# Patient Record
Sex: MALE | Race: WHITE | NOT HISPANIC OR LATINO
[De-identification: names, ages, dates, MRNs, and addresses within clinical notes are randomized per-mention and may not be internally consistent; named-entity substitution may affect disease eponyms.]

---

## 2016-12-30 NOTE — H&P ADULT. - HISTORY OF PRESENT ILLNESS
67 y/o M w/ PMHx of HTN, HLD, chronic back pain s/p multiple spinal surgeries and intrathecal pump placment, emphysema, cardiomyopathy s/p atrial PPM, and s/p EVAR for AAA in 11/2016 p/w progressive weakness since his d/c from rehab. Per pt's family, pt left AMA from his rehab on 12/21, a week prior to his planned d/c. Since returning to home, pt has been minimally ambulatory w/ a walker and unable to perform any AODL. Pt lives with his wife, who works during the day, and does not have any HHA. Pt also reported poor PO intake for over a year due to decreased appetite. Pt denied any headache, focal neurological symptom, nausea, vomiting, fever, chills. Pt endorsed constipation requiring use of miralax daily and BM every other day. Off note, pt was found to have a suspicious RLQ mass, and has an appointment with Dr. Wells (Colorectal surgery) on Wednesday.     In the ED, pt's vital signs were T 97.7, HR: 61, /87 - 160/86, RR 18, SaO2 99% in RA. A head CT was unremarkable for any acute changes, but remarkable for chronic microvascular disease.

## 2016-12-30 NOTE — ED PROVIDER NOTE - GASTROINTESTINAL, MLM
Abdomen soft, non-tender, no guarding. Palpable intrathecal pump in LLQ. Abdomen soft, non-tender, no r/r/guarding. Palpable intrathecal pump in LLQ.

## 2016-12-30 NOTE — PROGRESS NOTE ADULT - SUBJECTIVE AND OBJECTIVE BOX
HPI:  66M w/ extensive PMH who was admitted to vascular surgery service in early December 2016 for EVAR of R iliac artery aneurysm, presents to [facility] ED w/ complaints of generalized weakness and loss of appetite, worsening over past few days. Since discharge from [facility], patient was at [facility] for rehabilitation. Patient left [facility] AMA on 12/21/16 to be home for [place]. Over the past few days, patient reports decreased PO intake and reports increasing weakness. Patient has had difficulty ambulating over the past few days which became acutely worse over the past 24 hours. patient presented to [facility] ED where he was found to be dehydrated and hyponatremic. Vascular surgery was consulted to rule out any vascular cause of weakness in lower extremities.     PAST MEDICAL & SURGICAL HISTORY:  Aneurysm of aortic root  Cardiac arrhythmia  Pulmonary emphysema  DVT (deep venous thrombosis)  ONOFRE (dyspnea on exertion)  Hyperlipidemia  Cardiomyopathy  HTN (hypertension)  AAA (abdominal aortic aneurysm)  History of back surgery  History of kidney surgery  Pacemaker: medtronic    	  MEDICATIONS:                  Complaint:     Otherwise 12 point review of systems is normal.    PHYSICAL EXAM:    Constitutional: good  Eyes: PERRL, EOMI, sclera non-icteric  Neck: supple, no masses, no JVD  Respiratory: CTA b/l, good air entry b/l, no wheezing, rhonchi, rales, with normal respiratory effort and no intercostal retractions  Cardiovascular: RRR, normal S1S2, no M/R/G  Gastrointestinal: soft, NTND, no masses palpable, BS normal in all four quadrants,   Extremities:  no c/c/e  Neurological: AAOx3  Temp:Afebrile    Vital Signs Last 24 Hrs  T(C): 36.5, Max: 36.5 (12-30 @ 16:58)  T(F): 97.7, Max: 97.7 (12-30 @ 16:58)  HR: 61 (61 - 61)  BP: 160/86 (116/87 - 160/86)  BP(mean): --  RR: 18 (18 - 18)  SpO2: 99% (97% - 99%)      ECG:      CHEST X RAY    CT      INTERPRETATION:  -  CT OF THE HEAD:    Clinical information:  65 y/o male,  AMS,  weakness.     Multiple axial scans of the head were obtained without intravenous  contrast medium administration.    The ventricles and subarachnoid spaces are slightly dilated. No   intracranial hemorrhage or mass is seen.      There are several patchy hypodense areas in the periventricular white   matter of bilateral cerebral hemispheres, probably representing ischemic   changes or aging demyelination.     Calcifications are seen in bilateral carotid siphons presumably secondary   to atherosclerotic changes.     The bony structures are intact.  Both mastoid bones are well pneumatized   and appear normal.  The sella turcica is normal in size.  Both orbits   appear normal.  The visualized paranasal sinuses are clear.  CT Angio  IMPRESSION:-    1.  No intracranial hemorrhage or mass.    2.  Several patchy hypodense areas in the periventricular white   Mild stable aneurysmal dilatation of infrarenal abdominal aorta.    Unchanged bilateral common iliac artery aneurysms.    Interval enlargement of lobulated soft tissue mass within the right lower   quadrant; differential diagnosis includes both benign and malignant   neoplasms including mesenteric desmoid or carcinoid tumor; tissue   diagnosis is recommended.      MRI    MRA    CT ANGIO    CAROTID DUPLEX    DUPLEX     Echocardiogram    Catheterization:    Stress Test:     LABS:	 	  CARDIAC MARKERS:  Troponin I, Serum: <0.015 ng/mL [0.015 - 0.045] (12-30 @ 15:09)                              8.7    6.8   )-----------( 196      ( 30 Dec 2016 15:09 )             25.6     30 Dec 2016 15:09    130    |  95     |  17     ----------------------------<  103    4.2     |  24     |  1.26     Ca    8.9        30 Dec 2016 15:09    TPro  7.9    /  Alb  3.0    /  TBili  0.8    /  DBili  x      /  AST  17     /  ALT  17     /  AlkPhos  92     30 Dec 2016 15:09    proBNP:   Lipid Profile:   HgA1c:   TSH: Thyroid Stimulating Hormone, Serum: 0.937 uIU/mL (12-30 @ 15:09)            ASSESSMENT/PLAN: HPI:  66M w/ extensive PMH who was admitted to vascular surgery service in early December 2016 for EVAR of R iliac artery aneurysm, presents to [facility] ED w/ complaints of generalized weakness and loss of appetite, worsening over past few days. Since discharge from [facility], patient was at [facility] for rehabilitation. Patient left [facility] AMA on 12/21/16 to be home for [place]. Over the past few days, patient reports decreased PO intake and reports increasing weakness. Patient has had difficulty ambulating over the past few days which became acutely worse over the past 24 hours. patient presented to [facility] ED where he was found to be dehydrated and hyponatremic. Vascular surgery was consulted to rule out any vascular cause of weakness in lower extremities.     PAST MEDICAL & SURGICAL HISTORY:  Aneurysm of aortic root  Cardiac arrhythmia  Pulmonary emphysema  DVT (deep venous thrombosis)  ONOFRE (dyspnea on exertion)  Hyperlipidemia  Cardiomyopathy  HTN (hypertension)  AAA (abdominal aortic aneurysm)  History of back surgery  History of kidney surgery  Pacemaker: medtronic    	  MEDICATIONS:                  Complaint:     Otherwise 12 point review of systems is normal.    PHYSICAL EXAM:    Constitutional: good  Eyes: PERRL, EOMI, sclera non-icteric  Neck: supple, no masses, no JVD  Respiratory: CTA b/l, good air entry b/l, no wheezing, rhonchi, rales, with normal respiratory effort and no intercostal retractions  Cardiovascular: RRR, normal S1S2, no M/R/G  Gastrointestinal: soft, NTND, no masses palpable, BS normal in all four quadrants,   Extremities:  no c/c/e  Neurological: AAOx3  Temp:Afebrile    Vital Signs Last 24 Hrs  T(C): 36.5, Max: 36.5 (12-30 @ 16:58)  T(F): 97.7, Max: 97.7 (12-30 @ 16:58)  HR: 61 (61 - 61)  BP: 160/86 (116/87 - 160/86)  BP(mean): --  RR: 18 (18 - 18)  SpO2: 99% (97% - 99%)      ECG:      CHEST X RAY    CT      INTERPRETATION:  -  CT OF THE HEAD:    Clinical information:  65 y/o male,  AMS,  weakness.     Multiple axial scans of the head were obtained without intravenous  contrast medium administration.    The ventricles and subarachnoid spaces are slightly dilated. No   intracranial hemorrhage or mass is seen.      There are several patchy hypodense areas in the periventricular white   matter of bilateral cerebral hemispheres, probably representing ischemic   changes or aging demyelination.     Calcifications are seen in bilateral carotid siphons presumably secondary   to atherosclerotic changes.     The bony structures are intact.  Both mastoid bones are well pneumatized   and appear normal.  The sella turcica is normal in size.  Both orbits   appear normal.  The visualized paranasal sinuses are clear.  CT Angio  IMPRESSION:-    1.  No intracranial hemorrhage or mass.    2.  Several patchy hypodense areas in the periventricular white   Mild stable aneurysmal dilatation of infrarenal abdominal aorta.    Unchanged bilateral common iliac artery aneurysms.    Interval enlargement of lobulated soft tissue mass within the right lower   quadrant; differential diagnosis includes both benign and malignant   neoplasms including mesenteric desmoid or carcinoid tumor; tissue   diagnosis is recommended.      MRI    MRA    CT ANGIO    CAROTID DUPLEX    DUPLEX     Echocardiogram    Catheterization:    Stress Test:     LABS:	 	  CARDIAC MARKERS:  Troponin I, Serum: <0.015 ng/mL [0.015 - 0.045] (12-30 @ 15:09)                              8.7    6.8   )-----------( 196      ( 30 Dec 2016 15:09 )             25.6     30 Dec 2016 15:09    130    |  95     |  17     ----------------------------<  103    4.2     |  24     |  1.26     Ca    8.9        30 Dec 2016 15:09    TPro  7.9    /  Alb  3.0    /  TBili  0.8    /  DBili  x      /  AST  17     /  ALT  17     /  AlkPhos  92     30 Dec 2016 15:09      TSH: Thyroid Stimulating Hormone, Serum: 0.937 uIU/mL (12-30 @ 15:09)            ASSESSMENT/PLAN: 	  Problem/Plan - 1:  ·  Problem: Weakness.  Plan: Pt was sent to rehab for deconditioning and training in the setting of chronic back pain after his EAVR for AAA (d/c on 12/2/2016. Pt recently left rehab AMA, although pt was scheduled for another week.     - Likely metabolic in the setting of poor PO intake/dehydration, found to have an CLAUDIO (Cr 1.26, d/c on 0.9 on 2/12/2016)  and hyponatremic (Na 130). s/p 1L NS bolus in ED. Euvolemic during exam.     - Possibly neurologic 2/2 chronic microvascular disease seen in CT-head - however no focal signs on neuro exam, 5/5 strength in upper and lower extremities.     - Possibly hematologic - Hgb 8.7 upon presentation, 12.1 in 11/2016    - Possibly endocrinologic - TSH wnl, f/u am cortisol    - Unlikely cardiac - recent Stress/Thalliym test unremarkable for significant cardiac dysfunction - pt s/p atrial pacemaker placement. Mild bibasilar crackles on exam, no JVD, no pedal edema, no orthopnea.    - Possibly iatrogenic - pt on opioids and reported taking valium intermittently for back pain. endorses fatigue, lethargy.     Problem/Plan - 2:  ·  Problem: CLAUDIO (acute kidney injury).  Plan: Cr 1.26 upon admission - s/p 1L NS bolus in ED  - f/u am Cr  - Started NS @ 100cc/hr  - f/u urine lytes.     Problem/Plan - 3:  ·  Problem: HTN (hypertension).  Plan: - c/w Hydralazine 10mg BID.     Problem/Plan - 4:  ·  Problem: Back pain.  Plan: s/p multiple spinal surgery, s/p intrathecal pump placement. Pain mgmt consulted during last admission and recommended the pain regimen  - c/w Oxycodone ER 30mg TID, Oxycontin 15mg q4 PRN- c/w Oxycodone ER 30mg TID, Oxycontin 15mg q4 PRN (Pain mgmt consulted during last admission and recommended the regimen)..     Problem/Plan - 5:  ·  Problem: Anemia.  Plan: Hgb 8.7 at presentation - 12.1 in 11/2016  - f/u Iron panel, B12, folate, haptoglobin, LDH, retic count  - Maintain active T&S, Transfuse if Hgb <7.   Problem/Plan - 6:  Problem: Abdominal mass, RLQ (right lower quadrant). Plan: Pt found to have a RLQ soft tissue mass during previous admission.  Problem/Plan - 7:  ·  Problem: Cardiomyopathy.  Plan: Hx of cardiomyopathy - recent stress test done in 11/2016 was wnl  - c/w Coreg 6.25mg BID.     Problem/Plan - 8:  ·  Problem: Pulmonary emphysema.  Plan: c/w O2 therapy PRN.     Problem/Plan - 9:  ·  Problem: Nutrition, metabolism, and development symptoms.  Plan: F: c/w NS @ 100cc/hr  E: Replete electrolytes PRN  N: c/w Regular diet.     Problem/Plan - 10:  Problem: Prophylactic measure. Plan; BPH: c/w Carduara, Finesteride    s/p EAVR for AAA: Vascular consulted in ED - NTD    Depression: c/w home medications

## 2016-12-30 NOTE — H&P ADULT. - PROBLEM SELECTOR PLAN 1
Pt was sent to rehab for deconditioning and training in the setting of chronic back pain after his EAVR for AAA (d/c on 12/2/2016. Pt recently left rehab AMA, although pt was scheduled for another week.     - Likely metabolic in the setting of poor PO intake/dehydration, found to have an CLAUDIO (Cr 1.26, d/c on 0.9 on 2/12/2016)  and hyponatremic (Na 130). s/p 1L NS bolus in ED. Euvolemic during exam.     - Possibly neurologic 2/2 chronic microvascular disease seen in CT-head - however no focal signs on neuro exam, 5/5 strength in upper and lower extremities.     - Possibly hematologic - Hgb 8.7 upon presentation, 12.1 in 11/2016    - Possibly endocrinologic - TSH wnl, f/u am cortisol    - Unlikely cardiac - recent Stress/Thalliym test unremarkable for significant cardiac dysfunction - pt s/p atrial pacemaker placement. Mild bibasilar crackles on exam, no JVD, no pedal edema, no orthopnea.    - Possibly iatrogenic - pt on opioids and reported taking valium intermittently for back pain. endorses fatigue, lethargy

## 2016-12-30 NOTE — ED PROVIDER NOTE - CONSTITUTIONAL, MLM
normal... Appears generally weak, nontoxic, afebrile, awake, alert, oriented to person, place, time/situation

## 2016-12-30 NOTE — H&P ADULT. - ASSESSMENT
67 y/o M w/ HTN, HLD, chronic back pain s/p multiple spinal surgeries and intrathecal pump placement, emphysema, cardiomyopathy s/p atrial PPM, and s/p EVAR for AAA, recently d/c from rehab after deconditioning from hospital stay and back pain, w/ weakness found to have an CLAUDIO, hyponatremia, and anemia.

## 2016-12-30 NOTE — H&P ADULT. - NEUROLOGICAL DETAILS
responds to verbal commands/cranial nerves intact/deep reflexes intact/sensation intact/responds to pain/alert and oriented x 3/normal strength

## 2016-12-30 NOTE — CONSULT NOTE ADULT - SUBJECTIVE AND OBJECTIVE BOX
Vascular Attending: Axel      HPI:  66M w/ extensive PMH who was admitted to vascular surgery service in early December 2016 for EVAR of R iliac artery aneurysm, presents to [facility] ED w/ complaints of generalized weakness and loss of appetite, worsening over past few days. Since discharge from [facility], patient was at [facility] for rehabilitation. Patient left [facility] AMA on 12/21/16 to be home for [place]. Over the past few days, patient reports decreased PO intake and reports increasing weakness. Patient has had difficulty ambulating over the past few days which became acutely worse over the past 24 hours. patient presented to [facility] ED where he was found to be dehydrated and hyponatremic. Vascular surgery was consulted to rule out any vascular cause of weakness in lower extremities.     PAST MEDICAL & SURGICAL HISTORY:  Aneurysm of aortic root  Cardiac arrhythmia  Pulmonary emphysema  DVT (deep venous thrombosis)  ONOFRE (dyspnea on exertion)  Hyperlipidemia  Cardiomyopathy  HTN (hypertension)  AAA (abdominal aortic aneurysm)  History of back surgery  History of kidney surgery  Pacemaker: medtronic      REVIEW OF SYSTEMS  Reports generalized malaise, decreased appetite, and difficulty ambulating     MEDICATIONS  (STANDING):    MEDICATIONS  (PRN):      Allergies    Altace (Unknown)  penicillin (Unknown)    Intolerances        Vital Signs Last 24 Hrs  T(C): 36.1, Max: 36.1 (12-30 @ 13:33)  T(F): 96.9, Max: 96.9 (12-30 @ 13:33)  HR: 61 (61 - 61)  BP: 116/87 (116/87 - 116/87)  BP(mean): --  RR: 18 (18 - 18)  SpO2: 97% (97% - 97%)    PHYSICAL EXAM:      Constitutional: AAOx3, No focal deficits noted    Eyes: WNL    Respiratory: Non-labored breathing, no acute respiratory distress    Gastrointestinal: Abdomen soft, ND, NT, palpable pain medication pump in LLQ of abdomen    Extremities: Bilateral groin incisions C/D/I, closed with staples removed. Healing appropriately. No surrounding erythema/inflammation, no induration, no signs of infection     Vascular: Palpable DP/PT bilaterally     Neurological: AAOx3, No focal deficits       LABS:                        8.7    6.8   )-----------( 196      ( 30 Dec 2016 15:09 )             25.6     30 Dec 2016 15:09    130    |  95     |  17     ----------------------------<  103    4.2     |  24     |  1.26     Ca    8.9        30 Dec 2016 15:09    TPro  7.9    /  Alb  3.0    /  TBili  0.8    /  DBili  x      /  AST  17     /  ALT  17     /  AlkPhos  92     30 Dec 2016 15:09    PT/INR - ( 30 Dec 2016 15:09 )   PT: 14.4 sec;   INR: 1.29          PTT - ( 30 Dec 2016 15:09 )  PTT:36.3 sec  Urinalysis Basic - ( 30 Dec 2016 15:09 )    Color: Yellow / Appearance: Clear / SG: <=1.005 / pH: x  Gluc: x / Ketone: NEGATIVE  / Bili: NEGATIVE / Urobili: 0.2 E.U./dL   Blood: x / Protein: NEGATIVE mg/dL / Nitrite: NEGATIVE   Leuk Esterase: NEGATIVE / RBC: x / WBC x   Sq Epi: x / Non Sq Epi: x / Bacteria: x        RADIOLOGY & ADDITIONAL STUDIES  N/A

## 2016-12-30 NOTE — ED ADULT NURSE NOTE - OBJECTIVE STATEMENT
Patient arrived to ED via walk-in stating, "For the past 3 days, I have been feeling more and more weak and I get short of breath easily."  Patient is A&Ox3, in no visible acute distress, complaining of increasing weakness and ONOFRE for 3 days.  Patient denies chest pain, SOB at rest, nausea, vomiting, diarrhea, fever, chills or cough.  PIV placed, labs drawn and sent, EKG done.  Will continue with plan of care.

## 2016-12-30 NOTE — H&P ADULT. - PROBLEM SELECTOR PLAN 2
Cr 1.26 upon admission - s/p 1L NS bolus in ED  - f/u am Cr  - Started NS @ 100cc/hr  - f/u urine lytes

## 2016-12-30 NOTE — ED PROVIDER NOTE - PROGRESS NOTE DETAILS
Vascular consulted, and will come down to see pt Pt due for pain meds, OK for wife to give patient's home dose (she brought meds with her) - 40mg Oxycontin. D/w Dr. Gonzales who informed that patient actually chose to leave rehab facility early despite them feeling he was "not ready" - this explains why patient deconditioned and weak, and not well enough to be at home. No acute cardiac issues per Christian and per ED workup. [facility] for medicine admission.

## 2016-12-30 NOTE — ED PROVIDER NOTE - MEDICAL DECISION MAKING DETAILS
66M with above PMHx who p/w progressively worsening generalized weakness after DC from rehab facility on DEC 21st after admission for vascular surgery procedure in early December.

## 2016-12-30 NOTE — ED PROVIDER NOTE - PMH
AAA (abdominal aortic aneurysm)    Aneurysm of aortic root    Cardiac arrhythmia    Cardiomyopathy    ONOFRE (dyspnea on exertion)    DVT (deep venous thrombosis)    HTN (hypertension)    Hyperlipidemia    Pulmonary emphysema

## 2016-12-30 NOTE — ED PROVIDER NOTE - NEUROLOGICAL, MLM
Alert and oriented x 3. Cn 2-12 intact. Strength 5/5 and sensation intact in all 4 extremities. no pronator drift. FTN normal.

## 2016-12-30 NOTE — H&P ADULT. - RS GEN PE MLT RESP DETAILS PC
no intercostal retractions/airway patent/no rhonchi/rales/no wheezes/no chest wall tenderness/respirations non-labored/breath sounds equal/good air movement

## 2016-12-30 NOTE — ED ADULT TRIAGE NOTE - CHIEF COMPLAINT QUOTE
Pt's spouse states "he is weak for 2-3 days, and he was not himself yesterday. " Pt is A&O x 3 and able to speak coherently in full sentences at this time. Pt C/O back pain ans reports "it's chronic." Pt had AA A repair surgery on 12/1/16. Pt denies chest pain, headache, N+V, fever, SOB, dizziness. EKG in progress.

## 2016-12-30 NOTE — H&P ADULT. - PROBLEM SELECTOR PLAN 10
BPH    s/p EAVR for AAA    Depression BPH: c/w Carduara, Finesteride    s/p EAVR for AAA: Vascular consulted in ED - NTD    Depression: c/w home medications    DVT PPx: c/w HSQ    Dispo: f/u PT recommendation    FULL CODE

## 2016-12-30 NOTE — ED PROVIDER NOTE - OBJECTIVE STATEMENT
A 66 year-old male with Hx of HTN, multiple spine surgeries, L hip replacement, and aortic and R iliac aneurysm repair (Dec. 2016) presents with worsening generalized weakness associated with dyspnea on exertion and decreased appetite for the past 9 days. The patient reports he was discharged 9 days ago from a rehab facility after his aneurysm repair earlier this month. Since then his ability to ambulate has deteriorated, and he now requires a walker. He denies any CP, fever, chills. falls, bleeding, or melena. A 66 year-old male with Hx of HTN, multiple spine surgeries, L hip replacement, and aortic and R iliac aneurysm repair (Dec. 2016) presents with worsening generalized weakness associated with dyspnea on exertion and decreased appetite for the past 9 days. The patient reports he was discharged 9 days ago from a rehab facility after his aneurysm repair earlier this month. Since then his ability to ambulate has deteriorated, and he now requires a walker and was too weak to lift himself up from his chair this am. He denies any CP, fever, chills. falls, bleeding, or melena. A 66 year-old male with Hx of HTN, HLD, emphysema, multiple spine surgeries, L hip replacement, and aortic and R iliac aneurysm repair (Dec. 2016) at [facility] with Dr. Reyna who presents with worsening generalized weakness associated with dyspnea on exertion and decreased appetite for the past 9 days. The patient reports he was discharged 9 days ago from a rehab facility after his aneurysm repair earlier this month. Since then his ability to ambulate has deteriorated, and he now requires a walker and was too weak to lift himself up from his chair this am. He denies any CP, fever, chills. falls, bleeding, or melena.

## 2016-12-30 NOTE — ED PROVIDER NOTE - NS ED MD SCRIBE ATTENDING SCRIBE SECTIONS
DISPOSITION/HIV/REVIEW OF SYSTEMS/VITAL SIGNS( Pullset)/HISTORY OF PRESENT ILLNESS/PHYSICAL EXAM/PAST MEDICAL/SURGICAL/SOCIAL HISTORY REVIEW OF SYSTEMS/VITAL SIGNS( Pullset)/PAST MEDICAL/SURGICAL/SOCIAL HISTORY/PHYSICAL EXAM/HISTORY OF PRESENT ILLNESS/PROGRESS NOTE/HIV/DISPOSITION

## 2016-12-30 NOTE — H&P ADULT. - GASTROINTESTINAL DETAILS
bowel sounds normal/no distention/no rigidity/no rebound tenderness/normal/soft/nontender/no guarding

## 2016-12-30 NOTE — CONSULT NOTE ADULT - ASSESSMENT
66M w/ recent uncomplicated EVAR for R iliac aneurysm (12/2016), presents to [facility] ED for generalized malaise and dehydration.   - Continue care per primary team   - No vascular issues at this time, Aneurysm repair stable  - No vascular surgery intervention required, please re-consult if any vascular issues arise   - d/w chief resident on call  - [phone]

## 2016-12-30 NOTE — H&P ADULT. - PROBLEM SELECTOR PLAN 6
Pt found to have a RLQ soft tissue mass during previous admission. Has an appointment w/ Dr. Wells outpt on Wednesday.  - Will discuss with attending regarding consulting colorectal surgery

## 2016-12-30 NOTE — H&P ADULT. - PROBLEM SELECTOR PLAN 5
Hgb 8.7 at presentation - 12.1 in 11/2016  - f/u Iron panel, B12, folate, haptoglobin, LDH, retic count  - Maintain active T&S, Transfuse if Hgb <7

## 2016-12-30 NOTE — H&P ADULT. - PROBLEM SELECTOR PLAN 4
- c/w Oxycodone ER 30mg TID, Oxycontin 15mg q4 PRN (Pain mgmt consulted during last admission and recommended the regimen). s/p multiple spinal surgery, s/p intrathecal pump placement. Pain mgmt consulted during last admission and recommended the pain regimen  - c/w Oxycodone ER 30mg TID, Oxycontin 15mg q4 PRN

## 2016-12-31 NOTE — PHYSICAL THERAPY INITIAL EVALUATION ADULT - LEVEL OF INDEPENDENCE: SIT/STAND, REHAB EVAL
minimum assist (75% patients effort)/moderate assist (50% patients effort)/fairly steady, no loss of balance

## 2016-12-31 NOTE — PHYSICAL THERAPY INITIAL EVALUATION ADULT - GAIT DEVIATIONS NOTED, PT EVAL
decreased velocity of limb motion/decreased urban/slightly unsteady gait, 1-2 loss of balance with turning, required constant cueing to ambulate closer to assistive device and decreased flexed forward posture; decreased foot clearance (R>L)/decreased step length

## 2016-12-31 NOTE — PHYSICAL THERAPY INITIAL EVALUATION ADULT - PERTINENT HX OF CURRENT PROBLEM, REHAB EVAL
65 y/o M w/ PMHx of HTN, HLD, chronic back pain s/p multiple spinal surgeries and intrathecal pump placment, emphysema, cardiomyopathy s/p atrial PPM, and s/p EVAR for AAA in 11/2016 p/w progressive weakness since his d/c from rehab. Per pt's family, pt left AMA from his rehab on 12/21, a week prior to his planned d/c. Since returning to home, pt has been minimally ambulatory w/ a walker and unable to perform any AODL. Please refer to H&P on [place] for remaining.

## 2016-12-31 NOTE — PROGRESS NOTE ADULT - SUBJECTIVE AND OBJECTIVE BOX
INTERVAL HISTORY:66yMalePatient is a 66y old  Male who presents with a chief complaint of Weakness (30 Dec 2016 17:49)    	  MEDICATIONS:  doxazosin 4milliGRAM(s) Oral at bedtime  carvedilol 6.25milliGRAM(s) Oral every 12 hours  hydrALAZINE 10milliGRAM(s) Oral every 12 hours        oxyCODONE IR 15milliGRAM(s) Oral every 4 hours PRN  nortriptyline 75milliGRAM(s) Oral at bedtime  vortioxetine 20milliGRAM(s) Oral daily  oxyCODONE ER Tablet 30milliGRAM(s) Oral every 8 hours    polyethylene glycol 3350 17Gram(s) Oral two times a day PRN  pantoprazole    Tablet 40milliGRAM(s) Oral before breakfast    finasteride 5milliGRAM(s) Oral daily    aspirin enteric coated 81milliGRAM(s) Oral daily  folic acid 1milliGRAM(s) Oral daily  sodium chloride 0.9%. 1000milliLiter(s) IV Continuous <Continuous>  heparin  Injectable 5000Unit(s) SubCutaneous every 8 hours      Complaint:     Otherwise 12 point review of systems is normal.    PHYSICAL EXAM:    Constitutional: good  Eyes: PERRL, EOMI, sclera non-icteric  Neck: supple, no masses, no JVD  Respiratory: CTA b/l, good air entry b/l, no wheezing, rhonchi, rales, with normal respiratory effort and no intercostal retractions  Cardiovascular: RRR, normal S1S2, no M/R/G  Gastrointestinal: soft, NTND, no masses palpable, BS normal in all four quadrants,   Extremities:  no c/c/e  Neurological: AAOx3  Temp:Afebrile    Vital Signs Last 24 Hrs  T(C): 36.2, Max: 36.6 (12-30 @ 18:05)  T(F): 97.1, Max: 97.9 (12-30 @ 18:05)  HR: 70 (61 - 90)  BP: 122/78 (116/87 - 182/78)  BP(mean): --  RR: 19 (18 - 19)  SpO2: 98% (95% - 99%)      ECG:      CHEST X RAY    CT    MRI    MRA    CT ANGIO    CAROTID DUPLEX    DUPLEX     Echocardiogram    Catheterization:    Stress Test:     LABS:	 	  CARDIAC MARKERS:  Troponin I, Serum: <0.015 ng/mL [0.015 - 0.045] (12-30 @ 15:09)                              8.3    6.5   )-----------( 225      ( 31 Dec 2016 07:11 )             25.2     31 Dec 2016 07:11    131    |  97     |  15     ----------------------------<  87     3.8     |  21     |  0.92     Ca    8.7        31 Dec 2016 07:11    TPro  7.9    /  Alb  3.0    /  TBili  0.8    /  DBili  x      /  AST  17     /  ALT  17     /  AlkPhos  92     30 Dec 2016 15:09    proBNP:   Lipid Profile:   HgA1c:   TSH: Thyroid Stimulating Hormone, Serum: 0.937 uIU/mL (12-30 @ 15:09)            ASSESSMENT/PLAN:

## 2016-12-31 NOTE — PHYSICAL THERAPY INITIAL EVALUATION ADULT - DIAGNOSIS, PT EVAL
Decreased overall functional mobility secondary to gait dysfunction, impaired dynamic balance, decreased strength, decreased endurance.

## 2016-12-31 NOTE — PHYSICAL THERAPY INITIAL EVALUATION ADULT - ADDITIONAL COMMENTS
Patient denies history of falls within the past year. Patient states upon discharge home from rehab, patient unable to "get up on his own and walk." Patient states his wife assists with hygiene and is present for safety when showering. Patient's bathroom has assistive equipment (grab bars and raised toilet seats).

## 2016-12-31 NOTE — PHYSICAL THERAPY INITIAL EVALUATION ADULT - LEVEL OF INDEPENDENCE: TOILET, REHAB EVAL
contact guard/Slightly unsteady; patient unable to perform hygiene; PT performed hygiene for patient in standing with contact guard (about 2 minutes)

## 2016-12-31 NOTE — CONSULT NOTE ADULT - SUBJECTIVE AND OBJECTIVE BOX
REASON FOR CONSULT:    HISTORY OF PRESENT ILLNESS:   HPI	  67 y/o M w/ PMHx of HTN, HLD, chronic back pain s/p multiple spinal surgeries and intrathecal pump placment, emphysema, cardiomyopathy s/p atrial PPM, and s/p EVAR for AAA in 11/2016 p/w progressive weakness since his d/c from rehab. Per pt's family, pt left AMA from his rehab on 12/21, a week prior to his planned d/c. Since returning to home, pt has been minimally ambulatory w/ a walker and unable to perform any AODL. Pt lives with his wife, who works during the day, and does not have any HHA. Pt also reported poor PO intake for over a year due to decreased appetite. Pt denied any headache, focal neurological symptom, nausea, vomiting, fever, chills. Pt endorsed constipation requiring use of miralax daily and BM every other day. Off note, pt was found to have a suspicious RLQ mass, and has an appointment with Dr. Wells (Colorectal surgery) on Wednesday.       PAST MEDICAL & SURGICAL HISTORY:  Aneurysm of aortic root  Cardiac arrhythmia  Pulmonary emphysema  DVT (deep venous thrombosis)  ONOFRE (dyspnea on exertion)  Hyperlipidemia  Cardiomyopathy  HTN (hypertension)  AAA (abdominal aortic aneurysm)  History of back surgery  History of kidney surgery  Pacemaker: medtronic      [ ] Diabetes   [x ] Hypertension  [ ] Hyperlipidemia  [x ] CAD  [ ] PCI  [ ] CABG    PREVIOUS DIAGNOSTIC TESTING:    [ ] Echocardiogram:  [ ]  Catheterization:  [x ] Stress Test:  Myocardial perfusion imaging is normal. Overall left ventricular systolic   function is normal without regional wall motion abnormalities. The EKG   stress test is normal.    	    MEDICATIONS:  doxazosin 4milliGRAM(s) Oral at bedtime  carvedilol 6.25milliGRAM(s) Oral every 12 hours  hydrALAZINE 10milliGRAM(s) Oral every 12 hours        oxyCODONE IR 15milliGRAM(s) Oral every 4 hours PRN  nortriptyline 75milliGRAM(s) Oral at bedtime  vortioxetine 20milliGRAM(s) Oral daily  oxyCODONE ER Tablet 30milliGRAM(s) Oral every 8 hours    polyethylene glycol 3350 17Gram(s) Oral two times a day PRN  pantoprazole    Tablet 40milliGRAM(s) Oral before breakfast    finasteride 5milliGRAM(s) Oral daily    aspirin enteric coated 81milliGRAM(s) Oral daily  folic acid 1milliGRAM(s) Oral daily  sodium chloride 0.9%. 1000milliLiter(s) IV Continuous <Continuous>  heparin  Injectable 5000Unit(s) SubCutaneous every 8 hours      FAMILY HISTORY:      SOCIAL HISTORY:    [ ] Non-smoker  [ ] Smoker  [ ] Alcohol    Allergies    Altace (Unknown)  penicillin (Unknown)    Intolerances    	    REVIEW OF SYSTEMS:    [x] as per HPI  CONSTITUTIONAL: No fever, weight loss, or fatigue  ENT:  No difficulty hearing, tinnitus, vertigo; No sinus or throat pain  RESPIRATORY: No cough, wheezing, chills or hemoptysis; No Shortness of Breath  CARDIOVASCULAR: No chest pain, palpitations, dizziness, or leg swelling  GASTROINTESTINAL: No abdominal or epigastric pain. No nausea, vomiting, or hematemesis; No diarrhea or constipation. No melena or hematochezia.  GENITOURINARY: No dysuria, frequency, hematuria, or incontinence  NEUROLOGICAL: No headaches, memory loss, loss of strength, numbness, or tremors  MUSCULOSKELETAL: No joint pain or swelling; No muscle, back, or extremity pain  [x] All others negative	  [ ] Unable to obtain    PHYSICAL EXAM:  T(C): 36.2, Max: 36.6 (12-30 @ 18:05)  HR: 70 (61 - 90)  BP: 122/78 (116/87 - 182/78)  RR: 19 (18 - 19)  SpO2: 98% (95% - 99%)  Wt(kg): --  I&O's Summary      Appearance: Normal	  HEENT:   Normal oral mucosa, PERRL, EOMI	  Lymphatic: No lymphadenopathy  Cardiovascular: Normal S1 S2, No JVD, No murmurs, No edema  Respiratory: Lungs clear to auscultation	  Psychiatry: A & O x 3, Mood & affect appropriate  Gastrointestinal:  Soft, Non-tender, + BS	  Skin: No rashes, No ecchymoses, No cyanosis	  Neurologic: Non-focal  Extremities: Normal range of motion, No clubbing, cyanosis or edema  Vascular: Peripheral pulses palpable 2+ bilaterally    TELEMETRY: 	    ECG:  Diagnosis Line Electronic atrial pacemaker  Nonspecific intraventricular block  Possible Inferior infarct , age undetermined    ECHO:  STRESS:  CATH:  	  RADIOLOGY:  CXR:Apparent retrocardiac opacity could potentially represent a left lower   lobe pneumonia. If management would change, consider a lateral view for   further evaluation.    CT:  US:   	  	  LABS:	 	    CARDIAC MARKERS:  Troponin I, Serum: <0.015 ng/mL (12-30 @ 15:09)                                  8.3    6.5   )-----------( 225      ( 31 Dec 2016 07:11 )             25.2     31 Dec 2016 07:11    131    |  97     |  15     ----------------------------<  87     3.8     |  21     |  0.92     Ca    8.7        31 Dec 2016 07:11    TPro  7.9    /  Alb  3.0    /  TBili  0.8    /  DBili  x      /  AST  17     /  ALT  17     /  AlkPhos  92     30 Dec 2016 15:09    proBNP:   Lipid Profile:   HgA1c:   TSH: Thyroid Stimulating Hormone, Serum: 0.937 uIU/mL (12-30 @ 15:09)      ASSESSMENT/PLAN: 	  1. atrial pacemaker - would get EP to interrogate to r/o cardiac etiology of weakness, continue ASA for now  2. EVR AAA recent - keep normotensive, hgb stable pulses wequal  3. HTN - normotensive  4. CAD - recent pharm stress normal  5. cardiomyopathy - please obtain 2D echo eval EF

## 2017-01-01 NOTE — PROGRESS NOTE ADULT - SUBJECTIVE AND OBJECTIVE BOX
INTERVAL HISTORY:66yMalePatient is a 66y old  Male who presents with a chief complaint of Weakness (30 Dec 2016 17:49)    	  MEDICATIONS:  doxazosin 4milliGRAM(s) Oral at bedtime  carvedilol 6.25milliGRAM(s) Oral every 12 hours  hydrALAZINE 10milliGRAM(s) Oral every 12 hours        nortriptyline 75milliGRAM(s) Oral at bedtime  vortioxetine 20milliGRAM(s) Oral daily  oxyCODONE ER Tablet 30milliGRAM(s) Oral every 8 hours  oxyCODONE IR 15milliGRAM(s) Oral every 4 hours PRN    polyethylene glycol 3350 17Gram(s) Oral two times a day PRN  pantoprazole    Tablet 40milliGRAM(s) Oral before breakfast    finasteride 5milliGRAM(s) Oral daily    aspirin enteric coated 81milliGRAM(s) Oral daily  folic acid 1milliGRAM(s) Oral daily  sodium chloride 0.9%. 1000milliLiter(s) IV Continuous <Continuous>  heparin  Injectable 5000Unit(s) SubCutaneous every 8 hours  cyanocobalamin 1000MICROGram(s) Oral daily      Complaint:     Otherwise 12 point review of systems is normal.    PHYSICAL EXAM:      Appearance: Normal	  HEENT:   Normal oral mucosa  Cardiovascular: Normal S1 S2, No JVD, No murmurs, No edema  Respiratory: Lungs clear to auscultation	  Gastrointestinal:  Soft, Non-tender, + BS	  Extremities: Normal range of motion, No clubbing, cyanosis or edema  Vascular: Peripheral pulses palpable 2+ bilaterally            Vital Signs Last 24 Hrs  T(C): 36.7, Max: 36.7 (12-31 @ 20:29)  T(F): 98, Max: 98.1 (12-31 @ 22:16)  HR: 67 (61 - 71)  BP: 130/94 (102/68 - 170/98)  BP(mean): --  RR: 17 (17 - 18)  SpO2: 98% (95% - 98%)        ECG:      CHEST X RAY    CT    MRI    MRA    CT ANGIO    CAROTID DUPLEX    DUPLEX     Echocardiogram    Catheterization:    Stress Test:     LABS:	 	  CARDIAC MARKERS:  Troponin I, Serum: <0.015 ng/mL [0.015 - 0.045] (12-30 @ 15:09)                              8.3    4.8   )-----------( 216      ( 01 Jan 2017 12:46 )             25.2     01 Jan 2017 12:47    136    |  104    |  10     ----------------------------<  97     3.9     |  25     |  0.90     Ca    8.5        01 Jan 2017 12:47  Mg     2.2       01 Jan 2017 06:45    TPro  6.9    /  Alb  2.6    /  TBili  0.4    /  DBili  x      /  AST  17     /  ALT  14     /  AlkPhos  96     01 Jan 2017 06:45    proBNP:   Lipid Profile:   HgA1c:   TSH: Thyroid Stimulating Hormone, Serum: 0.937 uIU/mL (12-30 @ 15:09)            ASSESSMENT/PLAN: INTERVAL HISTORY:66yMalePatient is a 66y old  Male who presents with a chief complaint of Weakness (30 Dec 2016 17:49)    	  MEDICATIONS:  doxazosin 4milliGRAM(s) Oral at bedtime  carvedilol 6.25milliGRAM(s) Oral every 12 hours  hydrALAZINE 10milliGRAM(s) Oral every 12 hours        nortriptyline 75milliGRAM(s) Oral at bedtime  vortioxetine 20milliGRAM(s) Oral daily  oxyCODONE ER Tablet 30milliGRAM(s) Oral every 8 hours  oxyCODONE IR 15milliGRAM(s) Oral every 4 hours PRN    polyethylene glycol 3350 17Gram(s) Oral two times a day PRN  pantoprazole    Tablet 40milliGRAM(s) Oral before breakfast    finasteride 5milliGRAM(s) Oral daily    aspirin enteric coated 81milliGRAM(s) Oral daily  folic acid 1milliGRAM(s) Oral daily  sodium chloride 0.9%. 1000milliLiter(s) IV Continuous <Continuous>  heparin  Injectable 5000Unit(s) SubCutaneous every 8 hours  cyanocobalamin 1000MICROGram(s) Oral daily      Complaint:     Otherwise 12 point review of systems is normal.    PHYSICAL EXAM:      Appearance: Normal	  HEENT:   Normal oral mucosa  Cardiovascular: Normal S1 S2, No JVD, No murmurs, No edema  Respiratory: Lungs clear to auscultation	  Gastrointestinal:  Soft, Non-tender, + BS	  Extremities: Normal range of motion, No clubbing, cyanosis or edema  Vascular: Peripheral pulses palpable 2+ bilaterally            Vital Signs Last 24 Hrs  T(C): 36.7, Max: 36.7 (12-31 @ 20:29)  T(F): 98, Max: 98.1 (12-31 @ 22:16)  HR: 67 (61 - 71)  BP: 130/94 (102/68 - 170/98)  BP(mean): --  RR: 17 (17 - 18)  SpO2: 98% (95% - 98%)        ECG:      CHEST X RAY    CT    MRI    MRA    CT ANGIO    CAROTID DUPLEX    DUPLEX     Echocardiogram    Catheterization:    Stress Test:     LABS:	 	  CARDIAC MARKERS:  Troponin I, Serum: <0.015 ng/mL [0.015 - 0.045] (12-30 @ 15:09)                              8.3    4.8   )-----------( 216      ( 01 Jan 2017 12:46 )             25.2     01 Jan 2017 12:47    136    |  104    |  10     ----------------------------<  97     3.9     |  25     |  0.90     Ca    8.5        01 Jan 2017 12:47  Mg     2.2       01 Jan 2017 06:45    TPro  6.9    /  Alb  2.6    /  TBili  0.4    /  DBili  x      /  AST  17     /  ALT  14     /  AlkPhos  96     01 Jan 2017 06:45    proBNP:   Lipid Profile:   HgA1c:   TSH: Thyroid Stimulating Hormone, Serum: 0.937 uIU/mL (12-30 @ 15:09)            ASSESSMENT/PLAN: 	    Problem: Anemia.  Plan: Hgb 8.3 today. Was 12.7 on 11/21. Workup revealed low rectic index, likely 2/2 low production/bone marrow failure, with component of iron deficiency.     Problem/Plan - 2:  ·  Problem: Hyponatremia.  Plan:Plan: Hyponatremia now resolved, likely 2/2 hypovolemia. Will continue to monitor with BMPs. Without symptoms at this time..     Problem/Plan - 3:  ·  Problem: Chronic back painAAA (abdominal aortic aneurysm).  Plan:Plan: Patient with chronic back pain and multiple back surgeries. Takes home pain regimen of: Oxycontin 60mg q8 and Oxycodone 15q4 at home.   Will put on Oxycontin 30mg q8, and Oxycodone 15q4 PRN severe pain  Continue Nortyriptyline 75 mg qHS.     Problem/Plan - 4:  ·  Problem: AAA (abdominal aortic aneurysm)HTN (hypertension).  Plan:Plan: History of EVAR 11/16 for AAA.   Continue on Coreg 6.25mg q12hrs, Hydralazine 10mg q12hrs,.     Problem/Plan - 5:  ·  Problem: BPH (benign prostatic hyperplasia)Nutrition, metabolism, and development symptoms.  Plan:Plan: Continue Flomax and Cardura.     Problem/Plan - 6:  Problem: HTN (hypertension)Need for prophylactic measure. Plan:Plan: BP monitored while inpatient. Continue on Coreg 6.25mg q12hrs, Hydralazine 10mg q12hrs. Cardura also will help HTN..

## 2017-01-01 NOTE — PROGRESS NOTE ADULT - PROBLEM SELECTOR PLAN 7
Cyaocobalamin 1000mcg daily, folic acid 1mg daily.   No fluids  Normal diet   keep K>4, Mg>2, replete PRN

## 2017-01-01 NOTE — PROGRESS NOTE ADULT - SUBJECTIVE AND OBJECTIVE BOX
INTERVAL HPI:  	  MEDICATIONS:  doxazosin 4milliGRAM(s) Oral at bedtime  carvedilol 6.25milliGRAM(s) Oral every 12 hours  hydrALAZINE 10milliGRAM(s) Oral every 12 hours        nortriptyline 75milliGRAM(s) Oral at bedtime  vortioxetine 20milliGRAM(s) Oral daily  oxyCODONE ER Tablet 30milliGRAM(s) Oral every 8 hours  oxyCODONE IR 15milliGRAM(s) Oral every 4 hours PRN    polyethylene glycol 3350 17Gram(s) Oral two times a day PRN  pantoprazole    Tablet 40milliGRAM(s) Oral before breakfast    finasteride 5milliGRAM(s) Oral daily    aspirin enteric coated 81milliGRAM(s) Oral daily  folic acid 1milliGRAM(s) Oral daily  sodium chloride 0.9%. 1000milliLiter(s) IV Continuous <Continuous>  heparin  Injectable 5000Unit(s) SubCutaneous every 8 hours  cyanocobalamin 1000MICROGram(s) Oral daily      REVIEW OF SYSTEMS:  [x] As per HPI  CONSTITUTIONAL: No fever, weight loss, or fatigue  RESPIRATORY: No cough, wheezing, chills or hemoptysis; No Shortness of Breath  CARDIOVASCULAR: No chest pain, palpitations, dizziness, or leg swelling  GASTROINTESTINAL: No abdominal or epigastric pain. No nausea, vomiting, or hematemesis; No diarrhea or constipation. No melena or hematochezia.  MUSCULOSKELETAL: No joint pain or swelling; No muscle, back, or extremity pain  [x] All others negative	  [ ] Unable to obtain    PHYSICAL EXAM:  T(C): 36.7, Max: 36.7 (12-31 @ 20:29)  HR: 67 (61 - 71)  BP: 130/94 (102/68 - 170/98)  RR: 17 (17 - 18)  SpO2: 98% (95% - 98%)  Wt(kg): --  I&O's Summary        Appearance: Normal	  HEENT:   Normal oral mucosa  Cardiovascular: Normal S1 S2, No JVD, No murmurs, No edema  Respiratory: Lungs clear to auscultation	  Gastrointestinal:  Soft, Non-tender, + BS	  Extremities: Normal range of motion, No clubbing, cyanosis or edema  Vascular: Peripheral pulses palpable 2+ bilaterally    TELEMETRY: 	    ECG:    	  RADIOLOGY:   CXR:  CT:  US:    CARDIAC TESTING:  Echocardiogram:  Catheterization:  Stress Test:      LABS:	 	    CARDIAC MARKERS:                                  7.7    4.8   )-----------( 203      ( 01 Jan 2017 06:45 )             24.0     01 Jan 2017 06:45    138    |  105    |  11     ----------------------------<  98     3.5     |  24     |  0.91     Ca    8.3        01 Jan 2017 06:45  Mg     2.2       01 Jan 2017 06:45    TPro  6.9    /  Alb  2.6    /  TBili  0.4    /  DBili  x      /  AST  17     /  ALT  14     /  AlkPhos  96     01 Jan 2017 06:45    proBNP:   Lipid Profile:   HgA1c:   TSH:     ASSESSMENT/PLAN: 	  1. atrial pacemaker - would get EP to interrogate to r/o cardiac etiology of weakness, continue ASA for now  2. EVR AAA recent - keep normotensive, hgb stable pulses wequal  3. HTN - normotensive  4. CAD - recent pharm stress normal  5. cardiomyopathy - please obtain 2D echo eval EF

## 2017-01-01 NOTE — PROGRESS NOTE ADULT - PROBLEM SELECTOR PLAN 1
Hgb 8.3 today. Was 12.7 on 11/21. Workup revealed low rectic index, likely 2/2 low production/bone marrow failure, with component of iron deficiency.

## 2017-01-01 NOTE — PROGRESS NOTE ADULT - PROBLEM SELECTOR PLAN 2
Hyponatremia now resolved, likely 2/2 hypovolemia. Will continue to monitor with BMPs. Without symptoms at this time.

## 2017-01-01 NOTE — PROGRESS NOTE ADULT - SUBJECTIVE AND OBJECTIVE BOX
INTERVAL HPI/OVERNIGHT EVENTS:  No acute events today. Still complaining of weakness, but improved from yesterday    ROS positive for headache and weakness.     VITAL SIGNS:  T(F): 98.2  HR: 78  BP: 149/78  RR: 18  SpO2: 99%  Wt(kg): --    PHYSICAL EXAM:    Constitutional: NAD, comfortable in bed  Eyes: PERRLA, EOMI  ENMT: MMM  Neck: No lymphadenopathy  Respiratory: CTA bl, no wheezes  Cardiovascular: RRR s1s2 no murmurs, rubs, gallops  Gastrointestinal: NTND, NBSx4, no organomegaly  Extremities: WWP  Vascular: 2+ distal pulses  Neurological: AAOx3    MEDICATIONS  (STANDING):  finasteride 5milliGRAM(s) Oral daily  aspirin enteric coated 81milliGRAM(s) Oral daily  doxazosin 4milliGRAM(s) Oral at bedtime  nortriptyline 75milliGRAM(s) Oral at bedtime  carvedilol 6.25milliGRAM(s) Oral every 12 hours  pantoprazole    Tablet 40milliGRAM(s) Oral before breakfast  folic acid 1milliGRAM(s) Oral daily  sodium chloride 0.9%. 1000milliLiter(s) IV Continuous <Continuous>  heparin  Injectable 5000Unit(s) SubCutaneous every 8 hours  vortioxetine 20milliGRAM(s) Oral daily  oxyCODONE ER Tablet 30milliGRAM(s) Oral every 8 hours  hydrALAZINE 10milliGRAM(s) Oral every 12 hours  lactobacillus acidophilus 1Tablet(s) Oral daily  cyanocobalamin 1000MICROGram(s) Oral daily    MEDICATIONS  (PRN):  polyethylene glycol 3350 17Gram(s) Oral two times a day PRN Constipation  oxyCODONE IR 15milliGRAM(s) Oral every 4 hours PRN SEVERE BREAKTHROUGH PAIN (8-10)  acetaminophen   Tablet. 650milliGRAM(s) Oral every 6 hours PRN Mild Pain (1 - 3)      Allergies    Altace (Unknown)  penicillin (Unknown)    Intolerances        LABS:                        8.3    4.8   )-----------( 216      ( 01 Jan 2017 12:46 )             25.2     01 Jan 2017 12:47    136    |  104    |  10     ----------------------------<  97     3.9     |  25     |  0.90     Ca    8.5        01 Jan 2017 12:47  Mg     2.2       01 Jan 2017 06:45    TPro  6.9    /  Alb  2.6    /  TBili  0.4    /  DBili  x      /  AST  17     /  ALT  14     /  AlkPhos  96     01 Jan 2017 06:45          RADIOLOGY & ADDITIONAL TESTS:

## 2017-01-01 NOTE — PROGRESS NOTE ADULT - PROBLEM SELECTOR PLAN 3
Patient with chronic back pain and multiple back surgeries. Takes home pain regimen of: Oxycontin 60mg q8 and Oxycodone 15q4 at home.   Will put on Oxycontin 30mg q8, and Oxycodone 15q4 PRN severe pain  Continue Nortyriptyline 75 mg qHS

## 2017-01-02 NOTE — PROGRESS NOTE ADULT - PROBLEM SELECTOR PLAN 2
Hyponatremia now resolved, likely 2/2 hypovolemia. Will continue to monitor with BMPs. Without symptoms at this time. Continues to have low sodium levels today 134. Previous urine lytes showed large amount of sodium being lost in the urine. Not on diuretic therapy. Unlikely SIADH as it was fluid responsive. Serum AM cortisol was borderline for adrenal insufficiency.  - restart fluids at 80cc/hr  - discuss further work up of adrenal insufficiency

## 2017-01-02 NOTE — PROGRESS NOTE ADULT - PROBLEM SELECTOR PLAN 7
Cyaocobalamin 1000mcg daily, folic acid 1mg daily.   No fluids  Normal diet   keep K>4, Mg>2, replete PRN Cyanocobalamin 1000mcg daily, folic acid 1mg daily.   No fluids  Normal diet   keep K>4, Mg>2, replete PRN

## 2017-01-02 NOTE — PROGRESS NOTE ADULT - PROBLEM SELECTOR PLAN 3
Patient with chronic back pain and multiple back surgeries. Takes home pain regimen of: Oxycontin 30mg q8 and Oxycodone 15q4 at home. High doses of sedating medications may be contributing to patient's symptoms.   c/w Oxycontin 30mg q8h  Oxycodone 15mg q8h PRN for severe pain  c/w Nortyriptyline 75 mg qHS

## 2017-01-02 NOTE — PROGRESS NOTE ADULT - SUBJECTIVE AND OBJECTIVE BOX
Hematology/Oncology Attending Note/coverage for Dr. East  Patient evaluated, notes reviewed.  This 66 year old male was admitted 3 days ago for inability to walk and removal of a slowly growing mass in the right lower abdominal mass, present for few years. He complains of difficulty walking and was hospitalised at a nursing home for rehabilitation in late December.  He had recently endovascular surgery for an aortic aneurism and common femoral aneurism   with stent placement.  Past Medical History is significant for 8 spinal surgeries of cervical and thoraco-lumbar spine, cardiomyopathy, hypertension, hyperlipidemia, atrial fibrillation, pacemaker placement and placement of a subcutaneous pump for intrathecal opioid administration that he is no more using because poor tolerance to Dilaudid and Morphine. S/P left hip replacement.  Medications: Oxicontin 30 mg x 3/day, Oxycodone 15 mg every 6 hrs, PRN, Valium 2 mg oralaly x 1 day, Aspirin 81 mg/day.  Review of Systems: Denies abdominal pain, weight loss, gastro-intestinal bleeding, shortness of breath, dysuria.  Very poor  walking, even getting out of bed is difficult.  Physical exam: Oriented x 3.  Supple neck.  No JVD.No cervical, supraclavicular, axillary adenopathy. Cor: normal S1,S2.  Abdomen: subcutaneous pump placed in left flank.  No hepatomergaly.  No splenomegaly.  Normal bowel sounds. No leg swelling.  CBC Full  -  ( 02 Jan 2017 06:03 )  WBC Count : 5.0 K/uL  Hemoglobin : 7.8 g/dL  Hematocrit : 24.4 %  Platelet Count - Automated : 257 K/uL  Mean Cell Volume : 84.7 fL  Mean Cell Hemoglobin : 27.1 pg  Mean Cell Hemoglobin Concentration : 32.0 g/dL  Auto Neutrophil # : x  Auto Lymphocyte # : x  Auto Monocyte # : x  Auto Eosinophil # : x  Auto Basophil # : x  Auto Neutrophil % : x  Auto Lymphocyte % : x  Auto Monocyte % : x  Auto Eosinophil % : x  Auto Basophil % : x  02 Jan 2017 06:03    134    |  101    |  9      ----------------------------<  94     3.5     |  24     |  0.92     Ca    8.2        02 Jan 2017 06:03  Phos  3.0       02 Jan 2017 06:03  Mg     1.8       02 Jan 2017 06:03    TPro  6.9    /  Alb  2.6    /  TBili  0.4    /  DBili  x      /  AST  17     /  ALT  14     /  AlkPhos  96     01 Jan 2017 06:45  ICU Vital Signs Last 24 Hrs  T(C): 36.3, Max: 36.9 (01-01 @ 20:49)  T(F): 97.4, Max: 98.5 (01-01 @ 20:49)  HR: 72 (65 - 78)  BP: 151/86 (115/78 - 183/96)  BP(mean): --  ABP: --  ABP(mean): --  RR: 18 (17 - 18)  SpO2: 96% (94% - 100%)  MEDICATIONS  (STANDING):  finasteride 5milliGRAM(s) Oral daily  aspirin enteric coated 81milliGRAM(s) Oral daily  doxazosin 4milliGRAM(s) Oral at bedtime  nortriptyline 75milliGRAM(s) Oral at bedtime  carvedilol 6.25milliGRAM(s) Oral every 12 hours  pantoprazole    Tablet 40milliGRAM(s) Oral before breakfast  folic acid 1milliGRAM(s) Oral daily  heparin  Injectable 5000Unit(s) SubCutaneous every 8 hours  vortioxetine 20milliGRAM(s) Oral daily  oxyCODONE ER Tablet 30milliGRAM(s) Oral every 8 hours  lactobacillus acidophilus 1Tablet(s) Oral daily  sodium chloride 0.9%. 1000milliLiter(s) IV Continuous <Continuous>  hydrALAZINE 25milliGRAM(s) Oral every 8 hours  cyanocobalamin Injectable 1000MICROGram(s) IntraMuscular daily    MEDICATIONS  (PRN):  polyethylene glycol 3350 17Gram(s) Oral two times a day PRN Constipation  oxyCODONE IR 15milliGRAM(s) Oral every 4 hours PRN SEVERE BREAKTHROUGH PAIN (8-10)  acetaminophen   Tablet. 650milliGRAM(s) Oral every 6 hours PRN Mild Pain (1 - 3)  melatonin 3milliGRAM(s) Oral at bedtime PRN Insomnia

## 2017-01-02 NOTE — PROGRESS NOTE ADULT - PROBLEM SELECTOR PLAN 1
Hgb 8.3 today. Was 12.7 on 11/21. Workup revealed low rectic index, likely 2/2 low production/bone marrow failure, with component of iron deficiency. Hgb 7.8 today. Was 12.7 on 11/21 however at discharge from prior admission ~9 12/4. Symptomatic anemia may be causing his complaints. Iron studies show a low iron level with normal ferritin and low TIBC, more consistent with ACD. May be attributable to concerning mass seen on CT abdomen.   - transfuse 1 unit PRBCs for anemia in vasculopathic patient. Patient currently refusing blood until he has a second opinion. Consent signed and in the chart.  - Surgery consult for work up of intra-abdominal RLQ mass, Dr. Wells

## 2017-01-02 NOTE — PROGRESS NOTE ADULT - SUBJECTIVE AND OBJECTIVE BOX
INTERVAL HPI: weakness improving  	  MEDICATIONS:  doxazosin 4milliGRAM(s) Oral at bedtime  carvedilol 6.25milliGRAM(s) Oral every 12 hours  hydrALAZINE 25milliGRAM(s) Oral every 8 hours        nortriptyline 75milliGRAM(s) Oral at bedtime  vortioxetine 20milliGRAM(s) Oral daily  oxyCODONE ER Tablet 30milliGRAM(s) Oral every 8 hours  acetaminophen   Tablet. 650milliGRAM(s) Oral every 6 hours PRN  melatonin 3milliGRAM(s) Oral at bedtime PRN  oxyCODONE IR 15milliGRAM(s) Oral every 8 hours PRN    polyethylene glycol 3350 17Gram(s) Oral two times a day PRN  pantoprazole    Tablet 40milliGRAM(s) Oral before breakfast    finasteride 5milliGRAM(s) Oral daily    aspirin enteric coated 81milliGRAM(s) Oral daily  folic acid 1milliGRAM(s) Oral daily  heparin  Injectable 5000Unit(s) SubCutaneous every 8 hours  sodium chloride 0.9%. 1000milliLiter(s) IV Continuous <Continuous>  cyanocobalamin Injectable 1000MICROGram(s) IntraMuscular daily      REVIEW OF SYSTEMS:  [x] As per HPI  CONSTITUTIONAL: No fever, weight loss, or fatigue  RESPIRATORY: No cough, wheezing, chills or hemoptysis; No Shortness of Breath  CARDIOVASCULAR: No chest pain, palpitations, dizziness, or leg swelling  GASTROINTESTINAL: No abdominal or epigastric pain. No nausea, vomiting, or hematemesis; No diarrhea or constipation. No melena or hematochezia.  MUSCULOSKELETAL: No joint pain or swelling; No muscle, back, or extremity pain  [x] All others negative	  [ ] Unable to obtain    PHYSICAL EXAM:  T(C): 36.6, Max: 36.9 (01-01 @ 20:49)  HR: 78 (65 - 78)  BP: 140/88 (115/78 - 183/96)  RR: 18 (17 - 18)  SpO2: 95% (94% - 100%)  Wt(kg): --  I&O's Summary        Appearance: Normal	  HEENT:   Normal oral mucosa  Cardiovascular: Normal S1 S2, No JVD, No murmurs, No edema  Respiratory: Lungs clear to auscultation	  Gastrointestinal:  Soft, Non-tender, + BS	  Extremities: Normal range of motion, No clubbing, cyanosis or edema  Vascular: Peripheral pulses palpable 2+ bilaterally    TELEMETRY: 	    ECG:    	  RADIOLOGY:   CXR:  CT:  US:    CARDIAC TESTING:  Echocardiogram:  Catheterization:  Stress Test:      LABS:	 	    CARDIAC MARKERS:                                  7.8    5.0   )-----------( 257      ( 02 Jan 2017 06:03 )             24.4     02 Jan 2017 06:03    134    |  101    |  9      ----------------------------<  94     3.5     |  24     |  0.92     Ca    8.2        02 Jan 2017 06:03  Phos  3.0       02 Jan 2017 06:03  Mg     1.8       02 Jan 2017 06:03    TPro  6.9    /  Alb  2.6    /  TBili  0.4    /  DBili  x      /  AST  17     /  ALT  14     /  AlkPhos  96     01 Jan 2017 06:45    proBNP:   Lipid Profile:   HgA1c:   TSH:     ASSESSMENT/PLAN: 	    1. atrial pacemaker - would get EP to interrogate to r/o cardiac etiology of weakness, continue ASA for now  2. EVR AAA recent - keep normotensive, hgb stable pulses wequal  3. HTN - normotensive  4. CAD - recent pharm stress normal  5. cardiomyopathy - please obtain 2D echo eval EF

## 2017-01-02 NOTE — PROGRESS NOTE ADULT - SUBJECTIVE AND OBJECTIVE BOX
INTERVAL HPI/OVERNIGHT EVENTS:    VITAL SIGNS:  T(F): 97.8  HR: 78  BP: 140/88  RR: 18  SpO2: 95%  Wt(kg): --    PHYSICAL EXAM:    Constitutional:  Eyes:  ENMT:  Neck:  Respiratory:  Cardiovascular:  Gastrointestinal:  Extremities:  Vascular:  Neurological:  Musculoskeletal:    MEDICATIONS  (STANDING):  finasteride 5milliGRAM(s) Oral daily  aspirin enteric coated 81milliGRAM(s) Oral daily  doxazosin 4milliGRAM(s) Oral at bedtime  nortriptyline 75milliGRAM(s) Oral at bedtime  carvedilol 6.25milliGRAM(s) Oral every 12 hours  pantoprazole    Tablet 40milliGRAM(s) Oral before breakfast  folic acid 1milliGRAM(s) Oral daily  heparin  Injectable 5000Unit(s) SubCutaneous every 8 hours  vortioxetine 20milliGRAM(s) Oral daily  oxyCODONE ER Tablet 30milliGRAM(s) Oral every 8 hours  lactobacillus acidophilus 1Tablet(s) Oral daily  sodium chloride 0.9%. 1000milliLiter(s) IV Continuous <Continuous>  hydrALAZINE 25milliGRAM(s) Oral every 8 hours  cyanocobalamin Injectable 1000MICROGram(s) IntraMuscular daily    MEDICATIONS  (PRN):  polyethylene glycol 3350 17Gram(s) Oral two times a day PRN Constipation  acetaminophen   Tablet. 650milliGRAM(s) Oral every 6 hours PRN Mild Pain (1 - 3)  melatonin 3milliGRAM(s) Oral at bedtime PRN Insomnia  oxyCODONE IR 15milliGRAM(s) Oral every 8 hours PRN Severe Pain (7 - 10)      Allergies    Altace (Unknown)  penicillin (Unknown)    Intolerances        LABS:                        7.8    5.0   )-----------( 257      ( 02 Jan 2017 06:03 )             24.4     02 Jan 2017 06:03    134    |  101    |  9      ----------------------------<  94     3.5     |  24     |  0.92     Ca    8.2        02 Jan 2017 06:03  Phos  3.0       02 Jan 2017 06:03  Mg     1.8       02 Jan 2017 06:03    TPro  6.9    /  Alb  2.6    /  TBili  0.4    /  DBili  x      /  AST  17     /  ALT  14     /  AlkPhos  96     01 Jan 2017 06:45          RADIOLOGY & ADDITIONAL TESTS: INTERVAL HPI/OVERNIGHT EVENTS:  Still feeling weak and tired today. Denies HA, CP, SOB. Insisting that he wants his pain medications and valium. ROS negative.      VITAL SIGNS:  T(F): 97.8  HR: 78  BP: 140/88  RR: 18  SpO2: 95%  Wt(kg): --    PHYSICAL EXAM:    Constitutional: appears fatigued, NAD  Eyes: pale conjuctiva, PERRL, EOMI  ENMT: MMM  Neck: supple, no JVD  Respiratory: CTAB  Cardiovascular: RRR, no MRG  Gastrointestinal: soft, non-tender, distended, normal bowel sounds  Extremities: chronic skin changes in b/l LE extremities, no edema  Vascular: pulses palpable in all 4 extremities  Neurological: A&Ox3, moves all extremities, generalized weakness non-focal    MEDICATIONS  (STANDING):  finasteride 5milliGRAM(s) Oral daily  aspirin enteric coated 81milliGRAM(s) Oral daily  doxazosin 4milliGRAM(s) Oral at bedtime  nortriptyline 75milliGRAM(s) Oral at bedtime  carvedilol 6.25milliGRAM(s) Oral every 12 hours  pantoprazole    Tablet 40milliGRAM(s) Oral before breakfast  folic acid 1milliGRAM(s) Oral daily  heparin  Injectable 5000Unit(s) SubCutaneous every 8 hours  vortioxetine 20milliGRAM(s) Oral daily  oxyCODONE ER Tablet 30milliGRAM(s) Oral every 8 hours  lactobacillus acidophilus 1Tablet(s) Oral daily  sodium chloride 0.9%. 1000milliLiter(s) IV Continuous <Continuous>  hydrALAZINE 25milliGRAM(s) Oral every 8 hours  cyanocobalamin Injectable 1000MICROGram(s) IntraMuscular daily    MEDICATIONS  (PRN):  polyethylene glycol 3350 17Gram(s) Oral two times a day PRN Constipation  acetaminophen   Tablet. 650milliGRAM(s) Oral every 6 hours PRN Mild Pain (1 - 3)  melatonin 3milliGRAM(s) Oral at bedtime PRN Insomnia  oxyCODONE IR 15milliGRAM(s) Oral every 8 hours PRN Severe Pain (7 - 10)      Allergies    Altace (Unknown)  penicillin (Unknown)    Intolerances        LABS:                        7.8    5.0   )-----------( 257      ( 02 Jan 2017 06:03 )             24.4     02 Jan 2017 06:03    134    |  101    |  9      ----------------------------<  94     3.5     |  24     |  0.92     Ca    8.2        02 Jan 2017 06:03  Phos  3.0       02 Jan 2017 06:03  Mg     1.8       02 Jan 2017 06:03    TPro  6.9    /  Alb  2.6    /  TBili  0.4    /  DBili  x      /  AST  17     /  ALT  14     /  AlkPhos  96     01 Jan 2017 06:45          RADIOLOGY & ADDITIONAL TESTS:

## 2017-01-02 NOTE — PROGRESS NOTE ADULT - PROBLEM SELECTOR PLAN 2
Continues to have low sodium levels today 134. Previous urine lytes showed large amount of sodium being lost in the urine. Not on diuretic therapy. Unlikely SIADH as it was fluid responsive. Serum AM cortisol was borderline for adrenal insufficiency.  - restart fluids at 80cc/hr  - discuss further work up of adrenal insufficiency

## 2017-01-02 NOTE — PROGRESS NOTE ADULT - PROBLEM SELECTOR PLAN 3
Patient with chronic back pain and multiple back surgeries. Takes home pain regimen of: Oxycontin 60mg q8 and Oxycodone 15q4 at home.   Will put on Oxycontin 30mg q8, and Oxycodone 15q4 PRN severe pain  Continue Nortyriptyline 75 mg qHS Patient with chronic back pain and multiple back surgeries. Takes home pain regimen of: Oxycontin 30mg q8 and Oxycodone 15q4 at home. High doses of sedating medications may be contributing to patient's symptoms.   c/w Oxycontin 30mg q8h  Oxycodone 15mg q8h PRN for severe pain  c/w Nortyriptyline 75 mg qHS

## 2017-01-02 NOTE — PROGRESS NOTE ADULT - PROBLEM SELECTOR PLAN 7
Cyanocobalamin 1000mcg daily, folic acid 1mg daily.   No fluids  Normal diet   keep K>4, Mg>2, replete PRN

## 2017-01-02 NOTE — PROGRESS NOTE ADULT - SUBJECTIVE AND OBJECTIVE BOX
INTERVAL HPI/OVERNIGHT EVENTS:  Still feeling weak and tired today. Denies HA, CP, SOB. Insisting that he wants his pain medications and valium. ROS negative.  Vital Signs Last 24 Hrs  T(C): 36.6, Max: 36.9 (01-01 @ 20:49)  T(F): 97.9, Max: 98.5 (01-01 @ 20:49)  HR: 74 (65 - 78)  BP: 174/96 (140/88 - 179/94)  BP(mean): --  RR: 18 (17 - 18)  SpO2: 95% (94% - 96%)            PHYSICAL EXAM:    Constitutional: appears fatigued, NAD  Eyes: pale conjuctiva, PERRL, EOMI  ENMT: MMM  Neck: supple, no JVD  Respiratory: CTAB  Cardiovascular: RRR, no MRG  Gastrointestinal: soft, non-tender, distended, normal bowel sounds  Extremities: chronic skin changes in b/l LE extremities, no edema  Vascular: pulses palpable in all 4 extremities  Neurological: A&Ox3, moves all extremities, generalized weakness non-focal    MEDICATIONS  (STANDING):  finasteride 5milliGRAM(s) Oral daily  aspirin enteric coated 81milliGRAM(s) Oral daily  doxazosin 4milliGRAM(s) Oral at bedtime  nortriptyline 75milliGRAM(s) Oral at bedtime  carvedilol 6.25milliGRAM(s) Oral every 12 hours  pantoprazole    Tablet 40milliGRAM(s) Oral before breakfast  folic acid 1milliGRAM(s) Oral daily  heparin  Injectable 5000Unit(s) SubCutaneous every 8 hours  vortioxetine 20milliGRAM(s) Oral daily  oxyCODONE ER Tablet 30milliGRAM(s) Oral every 8 hours  lactobacillus acidophilus 1Tablet(s) Oral daily  sodium chloride 0.9%. 1000milliLiter(s) IV Continuous <Continuous>  hydrALAZINE 25milliGRAM(s) Oral every 8 hours  cyanocobalamin Injectable 1000MICROGram(s) IntraMuscular daily    MEDICATIONS  (PRN):  polyethylene glycol 3350 17Gram(s) Oral two times a day PRN Constipation  acetaminophen   Tablet. 650milliGRAM(s) Oral every 6 hours PRN Mild Pain (1 - 3)  melatonin 3milliGRAM(s) Oral at bedtime PRN Insomnia  oxyCODONE IR 15milliGRAM(s) Oral every 8 hours PRN Severe Pain (7 - 10)      Allergies    Altace (Unknown)  penicillin (Unknown)    Intolerances        LABS:                        7.8    5.0   )-----------( 257      ( 02 Jan 2017 06:03 )             24.4     02 Jan 2017 06:03    134    |  101    |  9      ----------------------------<  94     3.5     |  24     |  0.92     Ca    8.2        02 Jan 2017 06:03  Phos  3.0       02 Jan 2017 06:03  Mg     1.8       02 Jan 2017 06:03    TPro  6.9    /  Alb  2.6    /  TBili  0.4    /  DBili  x      /  AST  17     /  ALT  14     /  AlkPhos  96     01 Jan 2017 06:45          RADIOLOGY & ADDITIONAL TESTS:

## 2017-01-02 NOTE — PROGRESS NOTE ADULT - PROBLEM SELECTOR PLAN 1
Hgb 7.8 today. Was 12.7 on 11/21 however at discharge from prior admission ~9 12/4. Symptomatic anemia may be causing his complaints. Iron studies show a low iron level with normal ferritin and low TIBC, more consistent with ACD. May be attributable to concerning mass seen on CT abdomen.   - transfuse 1 unit PRBCs for anemia in vasculopathic patient. Patient currently refusing blood until he has a second opinion. Consent signed and in the chart.  - Surgery consult for work up of intra-abdominal RLQ mass, Dr. Wells

## 2017-01-02 NOTE — CONSULT NOTE ADULT - ASSESSMENT
66M with multiple medical comorbidities admitted to medicine for symptomatic anemia, incidental RLQ mass  -transfuse prn per medical team  -trend CBC

## 2017-01-03 ENCOUNTER — RESULT REVIEW (OUTPATIENT)
Age: 67
End: 2017-01-03

## 2017-01-03 NOTE — DIETITIAN INITIAL EVALUATION ADULT. - PROBLEM SELECTOR PLAN 4
s/p multiple spinal surgery, s/p intrathecal pump placement. Pain mgmt consulted during last admission and recommended the pain regimen  - c/w Oxycodone ER 30mg TID, Oxycontin 15mg q4 PRN

## 2017-01-03 NOTE — DIETITIAN INITIAL EVALUATION ADULT. - NS AS NUTRI INTERV ED CONTENT
Nutrition relationship to health/disease/Purpose of the nutrition education/Recommended modifications/Discussed Iron rich foods. Discussed tips for constipation. Encouraged pt to choose fiber rich WW products. Encouraged PO intake during meals. Understadning stated, provide additional motivation as needed.

## 2017-01-03 NOTE — PROGRESS NOTE ADULT - SUBJECTIVE AND OBJECTIVE BOX
SUBJECTIVE: Pt seen and examined at bedside. Given 1U PRBC last night for symptomatic anemia. No f/c/n/v. +BM/+Flatus. Tolerating diet.    Vital Signs Last 24 Hrs  T(C): 36.6, Max: 36.9 (01-02 @ 20:32)  T(F): 97.8, Max: 98.4 (01-02 @ 20:32)  HR: 70 (70 - 78)  BP: 158/88 (140/88 - 186/104)  BP(mean): --  RR: 18 (18 - 18)  SpO2: 95% (94% - 95%)    PHYSICAL EXAM    Gen: NAD  Abd: Soft, min distended, NT, bilateral inguinal hernias noted      LABS:                        7.8    5.0   )-----------( 257      ( 02 Jan 2017 06:03 )             24.4     03 Jan 2017 06:58    136    |  102    |  7      ----------------------------<  86     3.3     |  23     |  0.83     Ca    8.6        03 Jan 2017 06:58  Phos  3.4       03 Jan 2017 06:58  Mg     2.1       03 Jan 2017 06:58      ASSESSMENT AND PLAN  66M with multiple medical comorbidities admitted to medicine for symptomatic anemia, surgery called for RLQ mass that pt has had for past 3yrs. Pt currently AF, HD stable with benign exam  -Dr. Wells to see pt this AM and give further surgical plan  -f/u AM CBC  -Further management per primary team  -D/W Dr. Wells SUBJECTIVE: Pt seen and examined at bedside. Given 1U PRBC last night for symptomatic anemia. No f/c/n/v. +BM/+Flatus. Tolerating diet.    Vital Signs Last 24 Hrs  T(C): 36.6, Max: 36.9 (01-02 @ 20:32)  T(F): 97.8, Max: 98.4 (01-02 @ 20:32)  HR: 70 (70 - 78)  BP: 158/88 (140/88 - 186/104)  BP(mean): --  RR: 18 (18 - 18)  SpO2: 95% (94% - 95%)    PHYSICAL EXAM    Gen: NAD  Abd: Soft, min distended, NT, bilateral inguinal hernias noted      LABS:                        7.8    5.0   )-----------( 257      ( 02 Jan 2017 06:03 )             24.4     03 Jan 2017 06:58    136    |  102    |  7      ----------------------------<  86     3.3     |  23     |  0.83     Ca    8.6        03 Jan 2017 06:58  Phos  3.4       03 Jan 2017 06:58  Mg     2.1       03 Jan 2017 06:58      ASSESSMENT AND PLAN  66M with multiple medical comorbidities admitted to medicine for symptomatic anemia, surgery called for RLQ mass that pt has had for past 3yrs. Pt currently AF, HD stable with benign exam  -Dr. Wells to see pt this AM and give further surgical plan  -f/u AM CBC  -Further management per primary team    ADDENDUM 9:53AM 1/3/2017  Spoke with Dr. Wells who saw pt and discussed with primary team. Primary team currently performing Heme/GI workup. No acute surgical management until Heme/GI workup completed.  -D/W Dr. Wells

## 2017-01-03 NOTE — DIETITIAN INITIAL EVALUATION ADULT. - NS AS NUTRI INTERV MEALS SNACK
As medically feasible recommend to advance diet. If PO intake becomes poor consider low sodium > DASH to promote Intake. If pt is started on Medications for DM/if FS becomes elevated consider adding CNSCHO to diet order. Recommend Adding Mech Soft to current diet order (pt agreeable).

## 2017-01-03 NOTE — DIETITIAN INITIAL EVALUATION ADULT. - ORAL INTAKE PTA
per h&p pt is noted with poor PO intake over the last year 2/2 lack of appetite. Per Discussion with pt, pt reports consuming ~50% of meals over the "last few months" 2/2 to decreased appetite.

## 2017-01-03 NOTE — PROGRESS NOTE ADULT - PROBLEM SELECTOR PLAN 1
Hg 8.4 today after 1 unit PRBCs yesterday. Symptoms not significantly improved. Will continue work up for anemia.  - GI consulted for colonoscopy for work up of anemia and intra abdominal mass  - Colonoscopy tomorrow, NPO at midnight, bowel prep  - f/u GI and surgery recs

## 2017-01-03 NOTE — PROGRESS NOTE ADULT - SUBJECTIVE AND OBJECTIVE BOX
INTERVAL HPI/OVERNIGHT EVENTS:  GERMAINE. Still feeling weak and tired. Denies HA, SOB, CP, ROS negative.     VITAL SIGNS:  T(F): 97.6  HR: 70  BP: 143/90  RR: 18  SpO2: 95%  Wt(kg): --    PHYSICAL EXAM:    Constitutional: appears well, NAD  Eyes: PERRL, EOMI  ENMT: MMM  Neck: supple, no JVD  Respiratory: CTAB  Cardiovascular: RRR, no MRG  Gastrointestinal: soft, NTND, normal BS  Extremities: chronic skin changes, no edema  Vascular: pulses palpable in 4 extremities  Neurological: A&Ox3, generalized weakness    MEDICATIONS  (STANDING):  finasteride 5milliGRAM(s) Oral daily  aspirin enteric coated 81milliGRAM(s) Oral daily  doxazosin 4milliGRAM(s) Oral at bedtime  nortriptyline 75milliGRAM(s) Oral at bedtime  carvedilol 6.25milliGRAM(s) Oral every 12 hours  pantoprazole    Tablet 40milliGRAM(s) Oral before breakfast  folic acid 1milliGRAM(s) Oral daily  heparin  Injectable 5000Unit(s) SubCutaneous every 8 hours  vortioxetine 20milliGRAM(s) Oral daily  oxyCODONE ER Tablet 30milliGRAM(s) Oral every 8 hours  lactobacillus acidophilus 1Tablet(s) Oral daily  hydrALAZINE 25milliGRAM(s) Oral every 8 hours  cyanocobalamin Injectable 1000MICROGram(s) IntraMuscular daily  polyethylene glycol/electrolyte Solution. 4000milliLiter(s) Oral once  amLODIPine   Tablet 5milliGRAM(s) Oral daily    MEDICATIONS  (PRN):  polyethylene glycol 3350 17Gram(s) Oral two times a day PRN Constipation  acetaminophen   Tablet. 650milliGRAM(s) Oral every 6 hours PRN Mild Pain (1 - 3)  melatonin 3milliGRAM(s) Oral at bedtime PRN Insomnia  ALPRAZolam 1milliGRAM(s) Oral at bedtime PRN insomnia  oxyCODONE IR 15milliGRAM(s) Oral every 6 hours PRN Severe Pain (7 - 10)      Allergies    Altace (Unknown)  penicillin (Unknown)    Intolerances        LABS:                        8.4    5.3   )-----------( 230      ( 03 Jan 2017 06:58 )             26.3     03 Jan 2017 06:58    136    |  102    |  7      ----------------------------<  86     3.3     |  23     |  0.83     Ca    8.6        03 Jan 2017 06:58  Phos  3.4       03 Jan 2017 06:58  Mg     2.1       03 Jan 2017 06:58            RADIOLOGY & ADDITIONAL TESTS:

## 2017-01-03 NOTE — PROGRESS NOTE ADULT - ATTENDING COMMENTS
[facility] medical team    Dr Blanchard
S&E @ [facility] Corrie Pickens &Christian oCyne called
S&E @[facility]

## 2017-01-03 NOTE — DIETITIAN INITIAL EVALUATION ADULT. - FACTORS AFF FOOD INTAKE
pt denies issues swallowing. pt reports that he is not wearing his dentures as they do not fit and need to be resized, as a result pt has issues chewing; pt agreeable to trying Mech Soft Diet. pt reports constipation 2/2 medications.

## 2017-01-03 NOTE — PROGRESS NOTE ADULT - PROBLEM SELECTOR PLAN 2
Na 136 today, will continue to monitor. Na levels unlikely to be contributing to symptoms as the Na level is borderline normal. Will continue to monitor.

## 2017-01-03 NOTE — DIETITIAN INITIAL EVALUATION ADULT. - ENERGY NEEDS
Height: 5 feet 5 inches, Weight (Current): 220 pounds,  pounds +/-10%, %IBW %162, BMI 32.5    IBW used to calculate energy needs due to pt's current body weight exceeding 120% of IBW

## 2017-01-03 NOTE — DIETITIAN INITIAL EVALUATION ADULT. - OTHER INFO
pt with hx of HTN/HLD is now admitted with weakness, CLAUDIO noted, Iron Deficiency Anemia noted. pt is currently NPO @ 12 for colonoscopy today. pt noted with prior Order for DASH diet, pt reports fair PO intake of PO diet & consuming 50% of meals. NKFA. Skin Intact. Moderate Nutrition Risk.

## 2017-01-03 NOTE — PROGRESS NOTE ADULT - SUBJECTIVE AND OBJECTIVE BOX
Still feeling weak and tired. Denies HA, SOB, CP, ROS negative.   Asking for more pain meds  VITAL SIGNS:  Vital Signs Last 24 Hrs  T(C): 36.3, Max: 36.9 (01-02 @ 20:32)  T(F): 97.4, Max: 98.4 (01-02 @ 20:32)  HR: 67 (67 - 73)  BP: 96/62 (96/62 - 186/104)  BP(mean): --  RR: 18 (18 - 18)  SpO2: 95% (94% - 95%)  PHYSICAL EXAM:    Constitutional: appears well, NAD  Eyes: PERRL, EOMI  ENMT: MMM  Neck: supple, no JVD  Respiratory: CTAB  Cardiovascular: RRR, no MRG  Gastrointestinal: soft, NTND, normal BS  Extremities: chronic skin changes, no edema  Vascular: pulses palpable in 4 extremities  Neurological: A&Ox3, generalized weakness    MEDICATIONS  (STANDING):  finasteride 5milliGRAM(s) Oral daily  aspirin enteric coated 81milliGRAM(s) Oral daily  doxazosin 4milliGRAM(s) Oral at bedtime  nortriptyline 75milliGRAM(s) Oral at bedtime  carvedilol 6.25milliGRAM(s) Oral every 12 hours  pantoprazole    Tablet 40milliGRAM(s) Oral before breakfast  folic acid 1milliGRAM(s) Oral daily  heparin  Injectable 5000Unit(s) SubCutaneous every 8 hours  vortioxetine 20milliGRAM(s) Oral daily  oxyCODONE ER Tablet 30milliGRAM(s) Oral every 8 hours  lactobacillus acidophilus 1Tablet(s) Oral daily  hydrALAZINE 25milliGRAM(s) Oral every 8 hours  cyanocobalamin Injectable 1000MICROGram(s) IntraMuscular daily  polyethylene glycol/electrolyte Solution. 4000milliLiter(s) Oral once  amLODIPine   Tablet 5milliGRAM(s) Oral daily    MEDICATIONS  (PRN):  polyethylene glycol 3350 17Gram(s) Oral two times a day PRN Constipation  acetaminophen   Tablet. 650milliGRAM(s) Oral every 6 hours PRN Mild Pain (1 - 3)  melatonin 3milliGRAM(s) Oral at bedtime PRN Insomnia  ALPRAZolam 1milliGRAM(s) Oral at bedtime PRN insomnia  oxyCODONE IR 15milliGRAM(s) Oral every 6 hours PRN Severe Pain (7 - 10)      Allergies    Altace (Unknown)  penicillin (Unknown)    Intolerances        LABS:                        8.4    5.3   )-----------( 230      ( 03 Jan 2017 06:58 )             26.3     03 Jan 2017 06:58    136    |  102    |  7      ----------------------------<  86     3.3     |  23     |  0.83     Ca    8.6        03 Jan 2017 06:58  Phos  3.4       03 Jan 2017 06:58  Mg     2.1       03 Jan 2017 06:58            RADIOLOGY & ADDITIONAL TESTS:

## 2017-01-03 NOTE — DIETITIAN INITIAL EVALUATION ADULT. - ADHERENCE
pt appears to follow regular diet. pt consumes white bread/pasta & reports he does not like [employer] products

## 2017-01-03 NOTE — PROGRESS NOTE ADULT - PROBLEM SELECTOR PLAN 7
Cyanocobalamin 1000mcg daily, folic acid 1mg daily.   No fluids  Regular diet   keep K>4, Mg>2, replete PRN

## 2017-01-03 NOTE — CONSULT NOTE ADULT - SUBJECTIVE AND OBJECTIVE BOX
Patient is a 66y old  Male who presents with a chief complaint of Weakness (30 Dec 2016 17:49)      HPI:  67 y/o M w/ PMHx of HTN, HLD, chronic back pain s/p multiple spinal surgeries and intrathecal pump placment, emphysema, cardiomyopathy s/p atrial PPM, and s/p EVAR for AAA in 11/2016 p/w progressive weakness since his d/c from rehab. Per pt's family, pt left AMA from his rehab on 12/21, a week prior to his planned d/c. Since returning to home, pt has been minimally ambulatory w/ a walker and unable to perform any AODL. Pt lives with his wife, who works during the day, and does not have any HHA. Pt also reported poor PO intake for over a year due to decreased appetite. Pt denied any headache, focal neurological symptom, nausea, vomiting, fever, chills. Pt endorsed constipation requiring use of miralax daily and BM every other day. Of note, pt was found to have a suspicious RLQ mass, and has an appointment with Dr. Wells (Colorectal surgery) on Wednesday. Called by Dr. East to schedule for colonoscopy.  Per patient last one was 3-4 years ago    In the ED, pt's vital signs were T 97.7, HR: 61, /87 - 160/86, RR 18, SaO2 99% in RA. A head CT was unremarkable for any acute changes, but remarkable for chronic microvascular disease. (30 Dec 2016 17:49)      REVIEW OF SYSTEMS:  Constitutional: No fever, weight loss or fatigue  ENMT:  No difficulty hearing, tinnitus, vertigo; No sinus or throat pain  Respiratory: No cough, wheezing, chills or hemoptysis  Cardiovascular: No chest pain, palpitations, dizziness or leg swelling  Gastrointestinal: No abdominal or epigastric pain. No nausea, vomiting or hematemesis; No diarrhea or constipation. No melena or hematochezia.  Skin: No itching, burning, rashes or lesions   Musculoskeletal: No joint pain or swelling; No muscle, back or extremity pain    PAST MEDICAL & SURGICAL HISTORY:  Aneurysm of aortic root  Cardiac arrhythmia  Pulmonary emphysema  DVT (deep venous thrombosis)  ONOFRE (dyspnea on exertion)  Hyperlipidemia  Cardiomyopathy  HTN (hypertension)  AAA (abdominal aortic aneurysm)  History of back surgery  History of kidney surgery  Pacemaker: medtronic      FAMILY HISTORY:      SOCIAL HISTORY:  Smoking Status: [ ] Current, [ ] Former, [ ] Never  Pack Years:    MEDICATIONS:  MEDICATIONS  (STANDING):  finasteride 5milliGRAM(s) Oral daily  aspirin enteric coated 81milliGRAM(s) Oral daily  doxazosin 4milliGRAM(s) Oral at bedtime  nortriptyline 75milliGRAM(s) Oral at bedtime  carvedilol 6.25milliGRAM(s) Oral every 12 hours  pantoprazole    Tablet 40milliGRAM(s) Oral before breakfast  folic acid 1milliGRAM(s) Oral daily  heparin  Injectable 5000Unit(s) SubCutaneous every 8 hours  vortioxetine 20milliGRAM(s) Oral daily  oxyCODONE ER Tablet 30milliGRAM(s) Oral every 8 hours  lactobacillus acidophilus 1Tablet(s) Oral daily  hydrALAZINE 25milliGRAM(s) Oral every 8 hours  cyanocobalamin Injectable 1000MICROGram(s) IntraMuscular daily  polyethylene glycol/electrolyte Solution. 4000milliLiter(s) Oral once    MEDICATIONS  (PRN):  polyethylene glycol 3350 17Gram(s) Oral two times a day PRN Constipation  acetaminophen   Tablet. 650milliGRAM(s) Oral every 6 hours PRN Mild Pain (1 - 3)  melatonin 3milliGRAM(s) Oral at bedtime PRN Insomnia  ALPRAZolam 1milliGRAM(s) Oral at bedtime PRN insomnia      Allergies    Altace (Unknown)  penicillin (Unknown)    Intolerances        Vital Signs Last 24 Hrs  T(C): 36.4, Max: 36.9 (01-02 @ 20:32)  T(F): 97.6, Max: 98.4 (01-02 @ 20:32)  HR: 70 (70 - 74)  BP: 143/90 (143/90 - 186/104)  BP(mean): --  RR: 18 (18 - 18)  SpO2: 95% (94% - 95%)        PHYSICAL EXAM:    General: Well developed; well nourished; in no acute distress  HEENT: MMM, conjunctiva and sclera clear  Gastrointestinal: Soft, non-tender non-distended; Normal bowel sounds; No rebound or guarding  Extremities: Normal range of motion, No clubbing, cyanosis or edema  Neurological: Alert and oriented x3  Skin: Warm and dry. No obvious rash      LABS:                        8.4    5.3   )-----------( 230      ( 03 Jan 2017 06:58 )             26.3     03 Jan 2017 06:58    136    |  102    |  7      ----------------------------<  86     3.3     |  23     |  0.83     Ca    8.6        03 Jan 2017 06:58  Phos  3.4       03 Jan 2017 06:58  Mg     2.1       03 Jan 2017 06:58            RADIOLOGY & ADDITIONAL STUDIES:

## 2017-01-03 NOTE — DIETITIAN INITIAL EVALUATION ADULT. - PROBLEM SELECTOR PLAN 10
BPH: c/w Carduara, Finesteride    s/p EAVR for AAA: Vascular consulted in ED - NTD    Depression: c/w home medications    DVT PPx: c/w HSQ    Dispo: f/u PT recommendation    FULL CODE

## 2017-01-03 NOTE — PROGRESS NOTE ADULT - SUBJECTIVE AND OBJECTIVE BOX
INTERVAL HISTORY:  	  no c/o cp/sob. received prbc    MEDICATIONS:  doxazosin 4milliGRAM(s) Oral at bedtime  carvedilol 6.25milliGRAM(s) Oral every 12 hours  hydrALAZINE 25milliGRAM(s) Oral every 8 hours        nortriptyline 75milliGRAM(s) Oral at bedtime  vortioxetine 20milliGRAM(s) Oral daily  oxyCODONE ER Tablet 30milliGRAM(s) Oral every 8 hours  acetaminophen   Tablet. 650milliGRAM(s) Oral every 6 hours PRN  melatonin 3milliGRAM(s) Oral at bedtime PRN  ALPRAZolam 1milliGRAM(s) Oral at bedtime PRN    polyethylene glycol 3350 17Gram(s) Oral two times a day PRN  pantoprazole    Tablet 40milliGRAM(s) Oral before breakfast  polyethylene glycol/electrolyte Solution. 4000milliLiter(s) Oral once    finasteride 5milliGRAM(s) Oral daily    aspirin enteric coated 81milliGRAM(s) Oral daily  folic acid 1milliGRAM(s) Oral daily  heparin  Injectable 5000Unit(s) SubCutaneous every 8 hours  cyanocobalamin Injectable 1000MICROGram(s) IntraMuscular daily          PHYSICAL EXAM:  T(C): 36.4, Max: 36.9 (01-02 @ 20:32)  HR: 70 (70 - 74)  BP: 143/90 (143/90 - 186/104)  RR: 18 (18 - 18)  SpO2: 95% (94% - 95%)  Wt(kg): --  I&O's Summary        Appearance: Normal	  HEENT:   Normal oral mucosa, PERRL, EOMI	  Lymphatic: No lymphadenopathy  Cardiovascular: Normal S1 S2, No JVD, No murmurs, No edema  Respiratory: Lungs clear to auscultation	  Psychiatry: A & O x 3, Mood & affect appropriate  Gastrointestinal:  Soft, Non-tender, + BS	  Skin: No rashes, No ecchymoses, No cyanosis  Neurologic: Non-focal  Extremities: Normal range of motion, No clubbing, cyanosis or edema      TELEMETRY: 	    ECG:  	  RADIOLOGY:   DIAGNOSTIC TESTING:  [ ] Echocardiogram:  [ ]  Catheterization:  [ ] Stress Test:    OTHER: 	    LABS:	 	    CARDIAC MARKERS:                                  8.4    5.3   )-----------( 230      ( 03 Jan 2017 06:58 )             26.3     03 Jan 2017 06:58    136    |  102    |  7      ----------------------------<  86     3.3     |  23     |  0.83     Ca    8.6        03 Jan 2017 06:58  Phos  3.4       03 Jan 2017 06:58  Mg     2.1       03 Jan 2017 06:58      proBNP:   Lipid Profile:   HgA1c:   TSH:     ASSESSMENT/PLAN: 	  hb improved after 1unit PRBC- discussed with house staFF- ? IF OVERUSE OF OPIATES. gi/HEME w/u, for echo - have asked dr bee to f/u with pt- discussed with Dr oliva- once medicaL ISSUES RESOLVED- will proceed with surgical evaluation

## 2017-01-04 NOTE — PROGRESS NOTE ADULT - PROBLEM SELECTOR PLAN 7
Cyanocobalamin 1000mcg daily, folic acid 1mg daily.   No fluids  NPO for procedure, resume regular diet after  keep K>4, Mg>2, replete PRN

## 2017-01-04 NOTE — CONSULT NOTE ADULT - ASSESSMENT
Admitted for  Problem/Plan - 1:  ·  Problem: Anemia.  Plan: Hg 9.4 today, significantly improved. Plan for colonoscopy today to further assess anemia and abdominal mass.  - f/u colonoscopy results  - f/u GI and surgery recs  - c/w folate 1mg qd  - c/w B12 injections qd.     Problem/Plan - 2:  ·  Problem: Hyponatremia.  Plan: Na 139 today, will continue to monitor. Increase likely from bowel prep and mild dehydration..     Problem/Plan - 3:  ·  Problem: Chronic back pain.  Plan: Patient with chronic back pain and multiple back surgeries. Takes home pain regimen of: Oxycontin 30mg q8 and Oxycodone 15q4 at home. High doses of sedating medications may be contributing to patient's symptoms.   - Dr. Lopez to be called for recs on pain management, f/u recs  c/w Oxycontin 30mg q8h  Oxycodone 15mg q8h PRN for severe pain  c/w Nortyriptyline 75 mg qHS.     Problem/Plan - 4:  ·  Problem: AAA (abdominal aortic aneurysm).  Plan: History of EVAR 11/16 for AAA.   Continue on Coreg 6.25mg q12hrs, Hydralazine 10mg q12hrs,.

## 2017-01-04 NOTE — PROGRESS NOTE ADULT - PROBLEM SELECTOR PLAN 1
Hg 9.4 today, significantly improved. Plan for colonoscopy today to further assess anemia and abdominal mass.  - f/u colonoscopy results  - f/u GI and surgery recs  - c/w folate 1mg qd  - c/w B12 injections qd

## 2017-01-04 NOTE — CONSULT NOTE ADULT - SUBJECTIVE AND OBJECTIVE BOX
Pain Management Consult Note - [facility] [phone]    Chief Complaint: chronic back pain/opioid tolerance    HPI: 67 y/o male with chronic pain and an intrathecal pump that is no longer in use admitted with progressive weakness. He is now on OxyContin 30 mg TID and oxycodone 15 mg twice daily as needed. He states he tried to cut OxyContin down to 20 mg tid but his pain was not adequately controlled. He recent cut his OxyContin dose down from 60 mg TID to 30 mg tid. He would like to try to cut it down further.      Pain is ___ sharp __x__dull ___burning ___achy ___ Intensity: ____ mild _x__mod ___severe     Location ____surgical site ____cervical ___x__lumbar ____abd ____upper ext____lower ext    Worse with _x___activity __x__movement _____physical therapy___ Rest    Improved with _x___medication ____rest ____physical therapy    ROS: Const:  ___febrile   Eyes:___ENT:___CV: _-__chest pain  Resp: __-__sob  GI:__-_nausea _-__vomiting _-__abd pain ___npo ___clears __full diet __bm  :___ Musk: __+_pain ___spasm  Skin:___ Neuro:  ___sedation___confusion___ numbness ___weakness ___paresth  Psych:__anxiety  Endo:___ Heme:___;  __+_all others reviewed and negative    Allergies:  Altace (Unknown)  penicillin (Unknown)      PAST MEDICAL & SURGICAL HISTORY:  Aneurysm of aortic root  Cardiac arrhythmia  Pulmonary emphysema  DVT (deep venous thrombosis)  ONOFRE (dyspnea on exertion)  Hyperlipidemia  Cardiomyopathy  HTN (hypertension)  AAA (abdominal aortic aneurysm)  History of back surgery  History of kidney surgery  Pacemaker: medtronic      SH: _former__Tobacco   _-__Alcohol                              FH:FAMILY HISTORY: n/c      MEDICATIONS  (STANDING):  finasteride 5milliGRAM(s) Oral daily  aspirin enteric coated 81milliGRAM(s) Oral daily  doxazosin 4milliGRAM(s) Oral at bedtime  nortriptyline 75milliGRAM(s) Oral at bedtime  carvedilol 6.25milliGRAM(s) Oral every 12 hours  pantoprazole    Tablet 40milliGRAM(s) Oral before breakfast  folic acid 1milliGRAM(s) Oral daily  heparin  Injectable 5000Unit(s) SubCutaneous every 8 hours  vortioxetine 20milliGRAM(s) Oral daily  oxyCODONE ER Tablet 30milliGRAM(s) Oral every 8 hours  lactobacillus acidophilus 1Tablet(s) Oral daily  hydrALAZINE 25milliGRAM(s) Oral every 8 hours  cyanocobalamin Injectable 1000MICROGram(s) IntraMuscular daily  amLODIPine   Tablet 5milliGRAM(s) Oral daily  potassium chloride    Tablet ER 40milliEquivalent(s) Oral every 4 hours    MEDICATIONS  (PRN):  polyethylene glycol 3350 17Gram(s) Oral two times a day PRN Constipation  acetaminophen   Tablet. 650milliGRAM(s) Oral every 6 hours PRN Mild Pain (1 - 3)  melatonin 3milliGRAM(s) Oral at bedtime PRN Insomnia  ALPRAZolam 1milliGRAM(s) Oral at bedtime PRN insomnia  oxyCODONE IR 15milliGRAM(s) Oral every 6 hours PRN Severe Pain (7 - 10)      T(F): 97.8, Max: 97.8 (01-04 @ 10:13)  HR: 70 (62 - 75)  BP: 173/84 (96/62 - 173/84)  RR: 17 (17 - 18)  SpO2: 94% (94% - 96%)  Wt(kg): --    PHYSICAL EXAM:  Gen Appearance: _x__no acute distress __x_appropriate        Neuro: ___SILT feet__x__ EOM Intact Psych: AAOX_3_, _x__mood/affect appropriate        Eyes: _x__conjunctiva WNL  ___x__ Pupils equal and round        ENT: _x_ears and nose atraumatic__x_ Hearing grossly intact        Neck: _x__trachea midline, no visible masses ___thyroid without palpable mass    Resp: _x__Nml WOB____No tactile fremitus ___clear to auscultation    Cardio: ___extremities free from edema ____pedal pulses palpable    GI/Abdomen: ___soft _____ Nontender______Nondistended_____HSM    Lymphatic: ___no palpable nodes in neck  ___no palpable nodes calves and feet    Skin/Wound: ___Incision, ___Dressing c/d/i,   ____surrounding tissues soft,  ___drain/chest tube present____    Muscular: EHL ___/5  Gastrocnemius___/5    _x__absent clubbing/cyanosis      ASSESSMENT: This is a 66y old Male with a history of   Aneurysm of aortic root  Cardiac arrhythmia  Pulmonary emphysema  DVT (deep venous thrombosis)  ONOFRE (dyspnea on exertion)  Hyperlipidemia  Cardiomyopathy  HTN (hypertension)  AAA (abdominal aortic aneurysm)  with chronic pain history, opioid tolerance, and admitted with fatigue    Recommended Treatment PLAN:  1) Consider decreasing OxyContin to 40 mg bid - patient states he will think about it  2) Oxycodone 15 mg q 6 h prn

## 2017-01-04 NOTE — PROGRESS NOTE ADULT - SUBJECTIVE AND OBJECTIVE BOX
Vital Signs Last 24 Hrs  T(C): 36.5, Max: 36.5 (01-04 @ 05:05)  T(F): 97.7, Max: 97.7 (01-04 @ 05:05)  HR: 75 (62 - 75)  BP: 160/104 (96/62 - 160/104)  BP(mean): --  RR: 18 (18 - 18)  SpO2: 94% (94% - 96%)  PHYSICAL EXAM:    Constitutional:  Eyes:  ENMT:  Neck:  Respiratory:  Cardiovascular:  Gastrointestinal:  Extremities:  Vascular:  Neurological:  Musculoskeletal:    MEDICATIONS  (STANDING):  finasteride 5milliGRAM(s) Oral daily  aspirin enteric coated 81milliGRAM(s) Oral daily  doxazosin 4milliGRAM(s) Oral at bedtime  nortriptyline 75milliGRAM(s) Oral at bedtime  carvedilol 6.25milliGRAM(s) Oral every 12 hours  pantoprazole    Tablet 40milliGRAM(s) Oral before breakfast  folic acid 1milliGRAM(s) Oral daily  heparin  Injectable 5000Unit(s) SubCutaneous every 8 hours  vortioxetine 20milliGRAM(s) Oral daily  oxyCODONE ER Tablet 30milliGRAM(s) Oral every 8 hours  lactobacillus acidophilus 1Tablet(s) Oral daily  hydrALAZINE 25milliGRAM(s) Oral every 8 hours  cyanocobalamin Injectable 1000MICROGram(s) IntraMuscular daily  amLODIPine   Tablet 5milliGRAM(s) Oral daily    MEDICATIONS  (PRN):  polyethylene glycol 3350 17Gram(s) Oral two times a day PRN Constipation  acetaminophen   Tablet. 650milliGRAM(s) Oral every 6 hours PRN Mild Pain (1 - 3)  melatonin 3milliGRAM(s) Oral at bedtime PRN Insomnia  ALPRAZolam 1milliGRAM(s) Oral at bedtime PRN insomnia  oxyCODONE IR 15milliGRAM(s) Oral every 6 hours PRN Severe Pain (7 - 10)      Allergies    Altace (Unknown)  penicillin (Unknown)    Intolerances        LABS:                        8.4    5.3   )-----------( 230      ( 03 Jan 2017 06:58 )             26.3     03 Jan 2017 06:58    136    |  102    |  7      ----------------------------<  86     3.3     |  23     |  0.83     Ca    8.6        03 Jan 2017 06:58  Phos  3.4       03 Jan 2017 06:58  Mg     2.1       03 Jan 2017 06:58            RADIOLOGY & ADDITIONAL TESTS: Constitutional: appears well, NAD  Eyes: PERRL, EOMI  ENMT: MMM  Neck: supple, no JVD  Respiratory: CTAB  Cardiovascular: RRR, no MRG  Gastrointestinal: soft, NTND, normal BS  Extremities: no edema  Vascular: pulses palpable in 4 extremities  Neurological: a&oX3, grossly intact  Vital Signs Last 24 Hrs  T(C): 36.5, Max: 36.6 (01-04 @ 10:13)  T(F): 97.7, Max: 97.8 (01-04 @ 10:13)  HR: 85 (65 - 85)  BP: 135/85 (125/84 - 173/84)  BP(mean): --  RR: 18 (17 - 18)  SpO2: 94% (94% - 96%)      finasteride 5milliGRAM(s) Oral daily  aspirin enteric coated 81milliGRAM(s) Oral daily  doxazosin 4milliGRAM(s) Oral at bedtime  nortriptyline 75milliGRAM(s) Oral at bedtime  carvedilol 6.25milliGRAM(s) Oral every 12 hours  pantoprazole    Tablet 40milliGRAM(s) Oral before breakfast  folic acid 1milliGRAM(s) Oral daily  heparin  Injectable 5000Unit(s) SubCutaneous every 8 hours  vortioxetine 20milliGRAM(s) Oral daily  oxyCODONE ER Tablet 30milliGRAM(s) Oral every 8 hours  lactobacillus acidophilus 1Tablet(s) Oral daily  hydrALAZINE 25milliGRAM(s) Oral every 8 hours  cyanocobalamin Injectable 1000MICROGram(s) IntraMuscular daily  amLODIPine   Tablet 5milliGRAM(s) Oral daily    MEDICATIONS  (PRN):  polyethylene glycol 3350 17Gram(s) Oral two times a day PRN Constipation  acetaminophen   Tablet. 650milliGRAM(s) Oral every 6 hours PRN Mild Pain (1 - 3)  melatonin 3milliGRAM(s) Oral at bedtime PRN Insomnia  ALPRAZolam 1milliGRAM(s) Oral at bedtime PRN insomnia  oxyCODONE IR 15milliGRAM(s) Oral every 6 hours PRN Severe Pain (7 - 10)      Allergies    Altace (Unknown)  penicillin (Unknown)    Intolerances        LABS:                        8.4    5.3   )-----------( 230      ( 03 Jan 2017 06:58 )             26.3     03 Jan 2017 06:58    136    |  102    |  7      ----------------------------<  86     3.3     |  23     |  0.83     Ca    8.6        03 Jan 2017 06:58  Phos  3.4       03 Jan 2017 06:58  Mg     2.1       03 Jan 2017 06:58      Assessment and Plan:   · Assessment		  65 y/o M w/ PMHx of HTN, HLD, chronic back pain s/p multiple spinal surgeries and intrathecal pump placment, emphysema, cardiomyopathy s/p atrial PPM, and s/p EVAR for AAA in 11/2016 p/w progressive weakness since his d/c from rehab likely due to symptomatic anemia vs. medications (on high doses of opiates and benzos) vs. deconditioning from surgery.    Problem/Plan - 1:  ·  Problem: Anemia.  Plan: Hg 9.4 today, significantly improved. Plan for colonoscopy today to further assess anemia and abdominal mass.  - f/u colonoscopy results  - f/u GI and surgery recs  - c/w folate 1mg qd  - c/w B12 injections qd.     Problem/Plan - 2:  ·  Problem: Hyponatremia.  Plan: Na 139 today, will continue to monitor. Increase likely from bowel prep and mild dehydration..     Problem/Plan - 3:  ·  Problem: Chronic back pain.  Plan: Patient with chronic back pain and multiple back surgeries. Takes home pain regimen of: Oxycontin 30mg q8 and Oxycodone 15q4 at home. High doses of sedating medications may be contributing to patient's symptoms.   c/w Oxycontin 30mg q8h  Oxycodone 15mg q8h PRN for severe pain  c/w Nortyriptyline 75 mg qHS.     Problem/Plan - 4:  ·  Problem: AAA (abdominal aortic aneurysm).  Plan: History of EVAR 11/16 for AAA.   Continue on Coreg 6.25mg q12hrs, Hydralazine 10mg q12hrs,.     Problem/Plan - 5:  ·  Problem: BPH (benign prostatic hyperplasia).  Plan: Continue Flomax and Cardura.     Problem/Plan - 6:  Problem: HTN (hypertension). Plan: BP monitored while inpatient. Continue on Coreg 6.25mg q12hrs, Hydralazine 10mg q12hrs

## 2017-01-04 NOTE — PROGRESS NOTE ADULT - PROBLEM SELECTOR PLAN 5
Continue Flomax and Cardura

## 2017-01-04 NOTE — PROGRESS NOTE ADULT - PROBLEM SELECTOR PLAN 3
Patient with chronic back pain and multiple back surgeries. Takes home pain regimen of: Oxycontin 30mg q8 and Oxycodone 15q4 at home. High doses of sedating medications may be contributing to patient's symptoms.   - Dr. Lopez to be called for recs on pain management, f/u recs  c/w Oxycontin 30mg q8h  Oxycodone 15mg q8h PRN for severe pain  c/w Nortyriptyline 75 mg qHS

## 2017-01-04 NOTE — CONSULT NOTE ADULT - SUBJECTIVE AND OBJECTIVE BOX
Patient is a 66y old  Male who presents with a chief complaint of Weakness (30 Dec 2016 17:49)      HPI:  67 y/o M w/ PMHx of HTN, HLD, chronic back pain s/p multiple spinal surgeries and intrathecal pump placment, emphysema, cardiomyopathy s/p atrial PPM, and s/p EVAR for AAA in 11/2016 p/w progressive weakness since his d/c from rehab. Per pt's family, pt left AMA from his rehab on 12/21, a week prior to his planned d/c. Since returning to home, pt has been minimally ambulatory w/ a walker and unable to perform any AODL. Pt lives with his wife, who works during the day, and does not have any HHA. Pt also reported poor PO intake for over a year due to decreased appetite. Pt denied any headache, focal neurological symptom, nausea, vomiting, fever, chills. Pt endorsed constipation requiring use of miralax daily and BM every other day. Off note, pt was found to have a suspicious RLQ mass, and has an appointment with Dr. Welsl (Colorectal surgery) on Wednesday.     In the ED, pt's vital signs were T 97.7, HR: 61, /87 - 160/86, RR 18, SaO2 99% in RA. A head CT was unremarkable for any acute changes, but remarkable for chronic microvascular disease. (30 Dec 2016 17:49)      PAST MEDICAL & SURGICAL HISTORY:  Aneurysm of aortic root  Cardiac arrhythmia  Pulmonary emphysema  DVT (deep venous thrombosis)  ONOFRE (dyspnea on exertion)  Hyperlipidemia  Cardiomyopathy  HTN (hypertension)  AAA (abdominal aortic aneurysm)  History of back surgery  History of kidney surgery  Pacemaker: medtronic      MEDICATIONS  (STANDING):  finasteride 5milliGRAM(s) Oral daily  aspirin enteric coated 81milliGRAM(s) Oral daily  doxazosin 4milliGRAM(s) Oral at bedtime  nortriptyline 75milliGRAM(s) Oral at bedtime  carvedilol 6.25milliGRAM(s) Oral every 12 hours  pantoprazole    Tablet 40milliGRAM(s) Oral before breakfast  folic acid 1milliGRAM(s) Oral daily  heparin  Injectable 5000Unit(s) SubCutaneous every 8 hours  vortioxetine 20milliGRAM(s) Oral daily  lactobacillus acidophilus 1Tablet(s) Oral daily  hydrALAZINE 25milliGRAM(s) Oral every 8 hours  cyanocobalamin Injectable 1000MICROGram(s) IntraMuscular daily  amLODIPine   Tablet 5milliGRAM(s) Oral daily  oxyCODONE ER Tablet 30milliGRAM(s) Oral every 8 hours    MEDICATIONS  (PRN):  polyethylene glycol 3350 17Gram(s) Oral two times a day PRN Constipation  acetaminophen   Tablet. 650milliGRAM(s) Oral every 6 hours PRN Mild Pain (1 - 3)  melatonin 3milliGRAM(s) Oral at bedtime PRN Insomnia  ALPRAZolam 1milliGRAM(s) Oral at bedtime PRN insomnia  oxyCODONE IR 15milliGRAM(s) Oral every 6 hours PRN Severe Pain (7 - 10)      Social History: lives with spouse in a 1 level private house, + ramp to enter, was at [facility] subacute rehab left AM     Functional Level Prior to Admission: reports difficulty with self ADLS secondary to back pain , walks with a rolling walker, minimal ambulation    FAMILY HISTORY:      CBC Full  -  ( 04 Jan 2017 07:14 )  WBC Count : 4.8 K/uL  Hemoglobin : 9.4 g/dL  Hematocrit : 29.1 %  Platelet Count - Automated : 307 K/uL  Mean Cell Volume : 83.9 fL  Mean Cell Hemoglobin : 27.1 pg  Mean Cell Hemoglobin Concentration : 32.3 g/dL  Auto Neutrophil # : x  Auto Lymphocyte # : x  Auto Monocyte # : x  Auto Eosinophil # : x  Auto Basophil # : x  Auto Neutrophil % : x  Auto Lymphocyte % : x  Auto Monocyte % : x  Auto Eosinophil % : x  Auto Basophil % : x      04 Jan 2017 07:14    139    |  103    |  10     ----------------------------<  78     3.5     |  23     |  0.79     Ca    8.7        04 Jan 2017 07:14  Phos  3.4       03 Jan 2017 06:58  Mg     2.1       04 Jan 2017 07:14    Radiology:    EXAM:  CT BRAIN                           PROCEDURE DATE:  12/30/2016                 INTERPRETATION:  -  CT OF THE HEAD:    Clinical information:  67 y/o male,  AMS,  weakness.     Multiple axial scans of the head were obtained without intravenous  contrast medium administration.    The ventricles and subarachnoid spaces are slightly dilated. No   intracranial hemorrhage or mass is seen.      There are several patchy hypodense areas in the periventricular white   matter of bilateral cerebral hemispheres, probably representing ischemic   changes or aging demyelination.     Calcifications are seen in bilateral carotid siphons presumably secondary   to atherosclerotic changes.     The bony structures are intact.  Both mastoid bones are well pneumatized   and appear normal.  The sella turcica is normal in size.  Both orbits   appear normal.  The visualized paranasal sinuses are clear.    IMPRESSION:-    1.  No intracranial hemorrhage or mass.    2.  Several patchy hypodense areas in the periventricular white matter of   bilateral cerebral hemispheres.    EXAM:  XR CHEST-FRONTAL                           PROCEDURE DATE:  12/30/2016                 INTERPRETATION:    CLINICAL INDICATION: Weakness, possible pneumonia    EXAM: Portable AP radiograph of the chest.    COMPARISON: 11/01/2016    FINDINGS:    There is suggestion of a left lower lobe retrocardiac opacity. This could   represent pneumonia. No large pleural effusion or evidence of   pneumothorax. There is partially imaged cervical spine fusion hardware   again noted and a left chest wall dual lead cardiac pacemaker is also   again present. The cardiomediastinal silhouette is magnified by   projection.    IMPRESSION:   Apparent retrocardiac opacity could potentially represent a left lower   lobe pneumonia. If management would change, consider a lateral view for   further evaluation.      Vital Signs Last 24 Hrs  T(C): 36.6, Max: 36.6 (01-04 @ 10:13)  T(F): 97.8, Max: 97.8 (01-04 @ 10:13)  HR: 70 (62 - 75)  BP: 173/84 (96/62 - 173/84)  BP(mean): --  RR: 17 (17 - 18)  SpO2: 94% (94% - 96%)    REVIEW OF SYSTEMS:    CONSTITUTIONAL: fatigue  EYES: No eye pain, visual disturbances, or discharge  ENMT:  No difficulty hearing, tinnitus, vertigo; No sinus or throat pain  NECK: No pain or stiffness  BREASTS: No pain, masses, or nipple discharge  RESPIRATORY: No cough, wheezing, chills or hemoptysis; No shortness of breath  CARDIOVASCULAR: No chest pain, palpitations, dizziness, or leg swelling  GASTROINTESTINAL: No abdominal or epigastric pain. No nausea, vomiting, or hematemesis; No diarrhea or constipation. No melena or hematochezia.  GENITOURINARY: No dysuria, frequency, hematuria, or incontinence  NEUROLOGICAL: No headaches, memory loss, loss of strength, numbness, or tremors  SKIN: No itching, burning, rashes, or lesions   LYMPH NODES: No enlarged glands  ENDOCRINE: No heat or cold intolerance; No hair loss  MUSCULOSKELETAL: back pain  PSYCHIATRIC: No depression, anxiety, mood swings, or difficulty sleeping  HEME/LYMPH: No easy bruising, or bleeding gums  ALLERGY AND IMMUNOLOGIC: No hives or eczema      Physical Exam: well developed, well nourished  male, lying in bed, c/o fatigue, back pain    HEENT: normocephalic/ atraumatic, anicteric    Neck: supple, negative JVD, negative carotid bruits,    Chest: CTA bilaterally, neg wheeze, rhonchi, rales, egophany    Cardiovascular: regular rate and rhythm, neg murmurs/rubs/gallops    Abdomen: soft, non tender, negative rebound/guarding, non distended, normal bowel sounds, neg hepatosplenomegaly    Extremities: WWP, neg cyanosis/clubbing/edema, negative calf tenderness to palpation, negative Kenneth's sign    Spine: normal curvature, neg spasms, midline well surgical scars, diffuse paralumbar TTP, neg straight leg raise    Neurologic Exam:    Alert and oriented to person, place, date/year, speech fluent w/o dysarthria, repetition intact, comprehension intact,     Cranial Nerves:     II:                      pupils equal, round and reactive to light, visual fields intact   III/ IV/VI:            extraocular movements intact, neg nystagmus  V:                     facial sensation intact, V1-3 normal  VII:                    face symmetric, no droop, normal eye closure and smile  VIII:                   hearing intact to finger rub bilaterally  IX/ X:                 soft palate rise symmetrical  XI:                     head turning, shoulder shrug normal  XII:                    tongue midline    Motor Exam:    Bilateral UE:     non focal                          negative pronator drift      Bilateral LE:      non focal    Sensory: intact to LT/PP in all UE/LE dermatomes    DTR:      =  biceps/     triceps/     brachioradialis               =  patella/   medial hamstring/    ankle                 neg clonus                 neg Babinski                 Finger to Nose: wnl    Joint Position Sense:  intact    Gait:  NT        PM&R Impression:    1) deconditioned  2) chronic low back pain/opioid dependent/ failed back syndrome/ s/p multiple spinal surgeries/ IT pump      Recommendations:    1) Physical therapy focusing on therapeutic exercises, bed mobility/transfer out of bed evaluation, progressive ambulation with assistive devices.    2) Disposition Plan: anticipate subacute rehab placement

## 2017-01-04 NOTE — PROGRESS NOTE ADULT - PROBLEM SELECTOR PLAN 6
BP monitored while inpatient. Continue on Coreg 6.25mg q12hrs, Hydralazine 10mg q12hrs. Cardura also will help HTN.

## 2017-01-04 NOTE — PROGRESS NOTE ADULT - SUBJECTIVE AND OBJECTIVE BOX
Pt seen and examined colonoscopy today    REVIEW OF SYSTEMS:  Constitutional: No fever, weight loss or fatigue  Cardiovascular: No chest pain, palpitations, dizziness or leg swelling  Gastrointestinal: No abdominal or epigastric pain. No nausea, vomiting or hematemesis; No diarrhea or constipation. No melena or hematochezia.  Skin: No itching, burning, rashes or lesions       MEDICATIONS:  MEDICATIONS  (STANDING):  finasteride 5milliGRAM(s) Oral daily  aspirin enteric coated 81milliGRAM(s) Oral daily  doxazosin 4milliGRAM(s) Oral at bedtime  nortriptyline 75milliGRAM(s) Oral at bedtime  carvedilol 6.25milliGRAM(s) Oral every 12 hours  pantoprazole    Tablet 40milliGRAM(s) Oral before breakfast  folic acid 1milliGRAM(s) Oral daily  heparin  Injectable 5000Unit(s) SubCutaneous every 8 hours  vortioxetine 20milliGRAM(s) Oral daily  oxyCODONE ER Tablet 30milliGRAM(s) Oral every 8 hours  lactobacillus acidophilus 1Tablet(s) Oral daily  hydrALAZINE 25milliGRAM(s) Oral every 8 hours  cyanocobalamin Injectable 1000MICROGram(s) IntraMuscular daily  amLODIPine   Tablet 5milliGRAM(s) Oral daily  potassium chloride    Tablet ER 40milliEquivalent(s) Oral every 4 hours    MEDICATIONS  (PRN):  polyethylene glycol 3350 17Gram(s) Oral two times a day PRN Constipation  acetaminophen   Tablet. 650milliGRAM(s) Oral every 6 hours PRN Mild Pain (1 - 3)  melatonin 3milliGRAM(s) Oral at bedtime PRN Insomnia  ALPRAZolam 1milliGRAM(s) Oral at bedtime PRN insomnia  oxyCODONE IR 15milliGRAM(s) Oral every 6 hours PRN Severe Pain (7 - 10)      Allergies    Altace (Unknown)  penicillin (Unknown)    Intolerances        Vital Signs Last 24 Hrs  T(C): 36.5, Max: 36.5 (01-04 @ 05:05)  T(F): 97.7, Max: 97.7 (01-04 @ 05:05)  HR: 75 (62 - 75)  BP: 160/104 (96/62 - 160/104)  BP(mean): --  RR: 18 (18 - 18)  SpO2: 94% (94% - 96%)    I & Os for current day (as of 01-04 @ 09:03)  =============================================  IN: 0 ml / OUT: 300 ml / NET: -300 ml      PHYSICAL EXAM:    General: Well developed; well nourished; in no acute distress  HEENT: MMM, conjunctiva and sclera clear  Gastrointestinal: Soft non-tender non-distended; Normal bowel sounds; No hepatosplenomegaly  Skin: Warm and dry. No obvious rash    LABS:      CBC Full  -  ( 04 Jan 2017 07:14 )  WBC Count : 4.8 K/uL  Hemoglobin : 9.4 g/dL  Hematocrit : 29.1 %  Platelet Count - Automated : 307 K/uL  Mean Cell Volume : 83.9 fL  Mean Cell Hemoglobin : 27.1 pg  Mean Cell Hemoglobin Concentration : 32.3 g/dL  Auto Neutrophil # : x  Auto Lymphocyte # : x  Auto Monocyte # : x  Auto Eosinophil # : x  Auto Basophil # : x  Auto Neutrophil % : x  Auto Lymphocyte % : x  Auto Monocyte % : x  Auto Eosinophil % : x  Auto Basophil % : x    04 Jan 2017 07:14    139    |  103    |  10     ----------------------------<  78     3.5     |  23     |  0.79     Ca    8.7        04 Jan 2017 07:14  Phos  3.4       03 Jan 2017 06:58  Mg     2.1       04 Jan 2017 07:14      PT/INR - ( 04 Jan 2017 07:14 )   PT: 14.4 sec;   INR: 1.29          PTT - ( 04 Jan 2017 07:14 )  PTT:39.2 sec                  RADIOLOGY & ADDITIONAL STUDIES (The following images were personally reviewed):

## 2017-01-04 NOTE — PROGRESS NOTE ADULT - PROBLEM SELECTOR PROBLEM 8
Need for prophylactic measure

## 2017-01-04 NOTE — PROGRESS NOTE ADULT - SUBJECTIVE AND OBJECTIVE BOX
INTERVAL HISTORY:  	  no c/o cp, sob    MEDICATIONS:  doxazosin 4milliGRAM(s) Oral at bedtime  carvedilol 6.25milliGRAM(s) Oral every 12 hours  hydrALAZINE 25milliGRAM(s) Oral every 8 hours  amLODIPine   Tablet 5milliGRAM(s) Oral daily        nortriptyline 75milliGRAM(s) Oral at bedtime  vortioxetine 20milliGRAM(s) Oral daily  oxyCODONE ER Tablet 30milliGRAM(s) Oral every 8 hours  acetaminophen   Tablet. 650milliGRAM(s) Oral every 6 hours PRN  melatonin 3milliGRAM(s) Oral at bedtime PRN  ALPRAZolam 1milliGRAM(s) Oral at bedtime PRN  oxyCODONE IR 15milliGRAM(s) Oral every 6 hours PRN    polyethylene glycol 3350 17Gram(s) Oral two times a day PRN  pantoprazole    Tablet 40milliGRAM(s) Oral before breakfast    finasteride 5milliGRAM(s) Oral daily    aspirin enteric coated 81milliGRAM(s) Oral daily  folic acid 1milliGRAM(s) Oral daily  heparin  Injectable 5000Unit(s) SubCutaneous every 8 hours  cyanocobalamin Injectable 1000MICROGram(s) IntraMuscular daily          PHYSICAL EXAM:  T(C): 36.5, Max: 36.5 (01-04 @ 05:05)  HR: 75 (62 - 75)  BP: 160/104 (96/62 - 160/104)  RR: 18 (18 - 18)  SpO2: 94% (94% - 96%)  Wt(kg): --  I&O's Summary        Appearance: Normal	  HEENT:   Normal oral mucosa, PERRL, EOMI	  Lymphatic: No lymphadenopathy  Cardiovascular: Normal S1 S2, No JVD,  No edema  Respiratory: Lungs clear to auscultation	  Psychiatry: A & O x 3, Mood & affect appropriate,lethargic  Gastrointestinal:  Soft, Non-tender, + BS	  Skin: No rashes, No ecchymoses, No cyanosis  Neurologic: Non-focal  Extremities: Normal range of motion, No clubbing, cyanosis or edema      TELEMETRY: 	    ECG:  	  RADIOLOGY:   DIAGNOSTIC TESTING:  [ ] Echocardiogram:  [ ]  Catheterization:  [ ] Stress Test:    OTHER: 	    LABS:	 	    CARDIAC MARKERS:                                  8.4    5.3   )-----------( 230      ( 03 Jan 2017 06:58 )             26.3     03 Jan 2017 06:58    136    |  102    |  7      ----------------------------<  86     3.3     |  23     |  0.83     Ca    8.6        03 Jan 2017 06:58  Phos  3.4       03 Jan 2017 06:58  Mg     2.1       03 Jan 2017 06:58      proBNP:   Lipid Profile:   HgA1c:   TSH:     ASSESSMENT/PLAN: 	  Hb stable-hypokalemia- to correct- discussed karla avelar yesterday- Gi w/u in progress, await echo-

## 2017-01-04 NOTE — PROGRESS NOTE ADULT - PROBLEM SELECTOR PLAN 4
History of EVAR 11/16 for AAA.   Continue on Coreg 6.25mg q12hrs, Hydralazine 10mg q12hrs,

## 2017-01-05 ENCOUNTER — TRANSCRIPTION ENCOUNTER (OUTPATIENT)
Age: 67
End: 2017-01-05

## 2017-01-05 NOTE — PROGRESS NOTE ADULT - PROVIDER SPECIALTY LIST ADULT
Cardiology
Gastroenterology
Gastroenterology
Heme/Onc
Internal Medicine
Pain Medicine
Surgery
Surgery

## 2017-01-05 NOTE — PROGRESS NOTE ADULT - ASSESSMENT
1. Anemia, normocytic with no bleeding events; possibly anemia of chronic disease.  Initiate workup: Iron, TIBC, Ferritin levels, B12 levels, reticulocyte count.  2. Right lower quadrant abdominal mass: Pending surgical intervention.  May need abdominal CT scan with oral/iv contrast, CEA Levels.  3. Pain control: apparently the patient's regimen was with Oxycontin and Oxycodone.  Does not want Pain control service consultation  because he believes that are out of network.  4. Hypertension, cardiac problems: management as per Cardiology  Dr East will return tomorrow
65 y/o M w/ PMHx of HTN, HLD, chronic back pain s/p multiple spinal surgeries and intrathecal pump placment, emphysema, cardiomyopathy s/p atrial PPM, and s/p EVAR for AAA in 11/2016 p/w progressive weakness since his d/c from rehab likely due to symptomatic anemia vs. medications (on high doses of opiates and benzos) vs. deconditioning from surgery.
65 y/o M w/ PMHx of HTN, HLD, chronic back pain s/p multiple spinal surgeries and intrathecal pump placment, emphysema, cardiomyopathy s/p atrial PPM, and s/p EVAR for AAA in 11/2016 p/w progressive weakness since his d/c from rehab likely due to symptomatic anemia vs. medications (on high doses of opiates and benzos) vs. deconditioning from surgery.
67 y/o M w/ PMHx of HTN, HLD, chronic back pain s/p multiple spinal surgeries and intrathecal pump placment, emphysema, cardiomyopathy s/p atrial PPM, and s/p EVAR for AAA in 11/2016 p/w progressive weakness since his d/c from rehab likely due to symptomatic anemia vs. medications (on high doses of opiates and benzos) vs. deconditioning from surgery.
67 y/o M w/ PMHx of HTN, HLD, chronic back pain s/p multiple spinal surgeries and intrathecal pump placment, emphysema, cardiomyopathy s/p atrial PPM, and s/p EVAR for AAA in 11/2016 p/w progressive weakness since his d/c from rehab likely due to symptomatic anemia vs. medications (on high doses of opiates and benzos) vs. deconditioning from surgery.
colonoscopy  no cecal mass, one polyp and few tiny diverticulae, hemorrhoids
67 y/o M w/ PMHx of HTN, HLD, chronic back pain s/p multiple spinal surgeries and intrathecal pump placment, emphysema, cardiomyopathy s/p atrial PPM, and s/p EVAR for AAA in 11/2016 p/w progressive weakness since his d/c from rehab likely due to symptomatic anemia vs. medications (on high doses of opiates and benzos) vs. deconditioning from surgery.
results of colonoscopy discussed with patient

## 2017-01-05 NOTE — DISCHARGE NOTE ADULT - CARE PLAN
Principal Discharge DX:	Weakness  Goal:	follow up  Instructions for follow-up, activity and diet:	You were admitted to the hospital for weakness. We determined that this was due to either anemia or from the large amount of pain medications. Colonoscopy did not explain the anemia. You will be continued to be followed in the outpatient setting to continue the work up.  Secondary Diagnosis:	Abdominal aortic aneurysm (AAA) without rupture  Instructions for follow-up, activity and diet:	Your aneurysm was not an active issue on this admission. You should continue to follow with your vascular surgeon.  Secondary Diagnosis:	Abdominal mass, RLQ (right lower quadrant)  Instructions for follow-up, activity and diet:	Your abdominal mass appears that it has grown from your previous CT scan. You are being followed by Dr. Wells for further work up after rehab.

## 2017-01-05 NOTE — CONSULT NOTE ADULT - SUBJECTIVE AND OBJECTIVE BOX
The patient was seen this morning at 9:30 AM and his history was reviewed as a retired  and who had multiple skeletal surgeries for degenerative disease, severe atherosclerotic cardiovascular disease and chronic pain syndrome requiring continuous infusion of opiates which he eventually failed. The patient was admitted because of fatigue and dyspnea on exertion. He was found to be severely anemic and transfused. CT scan demonstrates a highly vascular right-sided colonic mass consistent with carcinoid tumor.    Physical exam is remarkable for a man who looks 10-15 years beyond his stated age. His vital signs are within normal limits. His head and neck exam reveals mucous membrane and facial pallor. There is no palpable lymphadenopathy in the neck and the is no jugular venous distention. The lungs sounds are clear but clearly distant. The heart  sounds are regular with no auscultatory evidence for murmur, ectopy or gallop. The patient has excessive truncal obesity with a large dye stasis recti hernia measuring over 16 cm and a left lower quadrant subcutaneous delivery device which measures appr 8 cm. The patient has no chronic venostasis changes. Neurologically he is intact.    Laboratory exam demonstrates I am saturations less than 10%. There is no microcytic indices.

## 2017-01-05 NOTE — PROGRESS NOTE ADULT - SUBJECTIVE AND OBJECTIVE BOX
Pt. seen at 0905  Pain Management Progress Note - [facility] [phone]    HPI:  Pt. complains of back pain.  Used prn pain medications minimally over the last 24 hours.  Pt. describes the pain as "achy".            Pertinent PMH: Pain at: _x__Back ___Neck___Knee ___Hip ___Shoulder ___ Opioid tolerance    Pain is ___ sharp ____dull ___burning _x_achy ___ Intensity: ____ mild ____mod ____severe     Location _____surgical site _____cervical __x___lumbar ____abd _____upper ext____lower ext    Worse with ___x_activity __x__movement _____physical therapy___ Rest    Improved with __x__medication __x__rest ____physical therapy    finasteride  aspirin enteric coated  doxazosin  nortriptyline  carvedilol  polyethylene glycol 3350  pantoprazole    Tablet  folic acid  heparin  Injectable  vortioxetine  lactobacillus acidophilus  acetaminophen   Tablet.  melatonin  cyanocobalamin Injectable  hydrALAZINE  ALPRAZolam  amLODIPine   Tablet  oxyCODONE IR  oxyCODONE ER Tablet      ROS: Const:  ___febrile   Eyes:___ENT:___CV: ___chest pain  Resp: ____sob  GI:_-__nausea _-__vomiting _-___abd pain ___npo ___clears _+__full diet __bm  :___ Musk: _+__pain ___spasm  Skin:___ Neuro:  _-__ykgqxnol_-__ioagweuqp____ numbness ___weakness ___paresthesia  Psych:___anxiety  Endo:___ Heme:___Allergy:___      01-05 @ 08:0584 mL/min/1.73M2          Hemoglobin: 9.4 g/dL (01-05 @ 08:05)  Hemoglobin: 9.4 g/dL (01-04 @ 07:14)        T(C): 37, Max: 37 (01-05 @ 09:13)  HR: 64 (64 - 85)  BP: 126/81 (108/64 - 138/84)  RR: 16 (16 - 18)  SpO2: 93% (93% - 95%)  Wt(kg): --     PHYSICAL EXAM:  Gen Appearance: _x__no acute distress _x__appropriate         Neuro: _x__SILT feet_x_ EOM Intact Psych: AAOX_3_, _x__mood/affect appropriate        Eyes: _x__conjunctiva WNL  __x___ Pupils equal and round        ENT: _x__ears and nose atraumatic_x__ Hearing grossly intact        Neck: ___trachea midline, no visible masses ___thyroid without palpable mass    Resp: _x__Nml WOB____No tactile fremitus ___clear to auscultation    Cardio: ___extremities free from edema ____pedal pulses palpable    GI/Abdomen: __x_soft __x___ Nontender______Nondistended_____HSM    Lymphatic: ___no palpable nodes in neck  ___no palpable nodes calves and feet    Skin/Wound: ___Incision, ___Dressing c/d/i,   ____surrounding tissues soft,  ___drain/chest tube present____    Muscular: EHL _5__/5  Gastrocnemius___/5    _x__absent clubbing/cyanosis         ASSESSMENT:  This is a 66y old Male with a history of:  WEAKNESS; DEHYDRATION  Aneurysm of aortic root  Cardiac arrhythmia  Pulmonary emphysema  DVT (deep venous thrombosis)  ONOFRE (dyspnea on exertion)  Hyperlipidemia  Cardiomyopathy  HTN (hypertension)  AAA (abdominal aortic aneurysm)  Weakness  Abnormal CT of the abdomen  BPH (benign prostatic hyperplasia)  Chronic back pain  Need for prophylactic measure  AAA (abdominal aortic aneurysm)  Hyponatremia  Anemia  Back pain  CLAUDIO (acute kidney injury)  Prophylactic measure  Nutrition, metabolism, and development symptoms  Cardiomyopathy  Pulmonary emphysema  Hyperlipidemia  HTN (hypertension)  Abdominal mass, RLQ (right lower quadrant)  S/P AAA repair  Weakness  History of back surgery  History of kidney surgery  Pacemaker  WEAKNESS  Dehydration  chronic back pain and is doing ok.        Recommended Treatment PLAN:  1.  Suggest decrease oxycontin to 40 mg BID  2.  Suggest continue [id number] mg po q6h prn

## 2017-01-05 NOTE — DISCHARGE NOTE ADULT - PATIENT PORTAL LINK FT
“You can access the [employer] Patient Portal, offered by [facility], by registering with the following website: [email or web address]”

## 2017-01-05 NOTE — PROGRESS NOTE ADULT - SUBJECTIVE AND OBJECTIVE BOX
INTERVAL HISTORY:  	no c/o cp/sob      MEDICATIONS:  doxazosin 4milliGRAM(s) Oral at bedtime  carvedilol 6.25milliGRAM(s) Oral every 12 hours  hydrALAZINE 25milliGRAM(s) Oral every 8 hours  amLODIPine   Tablet 5milliGRAM(s) Oral daily        nortriptyline 75milliGRAM(s) Oral at bedtime  vortioxetine 20milliGRAM(s) Oral daily  acetaminophen   Tablet. 650milliGRAM(s) Oral every 6 hours PRN  melatonin 3milliGRAM(s) Oral at bedtime PRN  ALPRAZolam 1milliGRAM(s) Oral at bedtime PRN  oxyCODONE IR 15milliGRAM(s) Oral every 6 hours PRN  oxyCODONE ER Tablet 30milliGRAM(s) Oral every 8 hours    polyethylene glycol 3350 17Gram(s) Oral two times a day PRN  pantoprazole    Tablet 40milliGRAM(s) Oral before breakfast    finasteride 5milliGRAM(s) Oral daily    aspirin enteric coated 81milliGRAM(s) Oral daily  folic acid 1milliGRAM(s) Oral daily  heparin  Injectable 5000Unit(s) SubCutaneous every 8 hours  cyanocobalamin Injectable 1000MICROGram(s) IntraMuscular daily          PHYSICAL EXAM:  T(C): 37, Max: 37 (01-05 @ 09:13)  HR: 64 (64 - 85)  BP: 126/81 (108/64 - 138/84)  RR: 16 (16 - 18)  SpO2: 93% (93% - 95%)  Wt(kg): --  I&O's Summary        Appearance: Normal	  HEENT:   Normal oral mucosa, PERRL, EOMI	  Lymphatic: No lymphadenopathy  Cardiovascular: Normal S1 S2, No JVD  Respiratory: Lungs clear to auscultation	  Psychiatry: A & O x 3, Mood & affect appropriate  Gastrointestinal:  Soft, Non-tender, + BS	  Skin: No rashes, No ecchymoses, No cyanosis  Neurologic: Non-focal  Extremities: Normal range of motion, No clubbing, cyanosis or edema      TELEMETRY: 	    ECG:  	  RADIOLOGY:   DIAGNOSTIC TESTING:  [ ] Echocardiogram:  [ ]  Catheterization:  [ ] Stress Test:    OTHER: 	    LABS:	 	    CARDIAC MARKERS:                                  9.4    4.9   )-----------( 330      ( 05 Jan 2017 08:05 )             30.2     05 Jan 2017 08:05    139    |  104    |  13     ----------------------------<  68     4.2     |  23     |  0.94     Ca    8.9        05 Jan 2017 08:05  Mg     2.1       05 Jan 2017 08:05      proBNP:   Lipid Profile:   HgA1c:   TSH:     ASSESSMENT/PLAN: 	    CV stable- had recent ppm check, echo  normal LV fx with minimal aortic root dilatation. discussed with house staff-

## 2017-01-05 NOTE — DISCHARGE NOTE ADULT - PLAN OF CARE
follow up You were admitted to the hospital for weakness. We determined that this was due to either anemia or from the large amount of pain medications. Colonoscopy did not explain the anemia. You will be continued to be followed in the outpatient setting to continue the work up. Your aneurysm was not an active issue on this admission. You should continue to follow with your vascular surgeon. Your abdominal mass appears that it has grown from your previous CT scan. You are being followed by Dr. Wells for further work up after rehab.

## 2017-01-05 NOTE — PROGRESS NOTE ADULT - SUBJECTIVE AND OBJECTIVE BOX
SUBJECTIVE: Pt seen and examined at bedside. No overnight events. No f/c/n/v. +BM/+Flatus. Pt reports less fatigue and weakness than on admission    Vital Signs Last 24 Hrs  T(C): 36.6, Max: 36.7 (01-04 @ 22:33)  T(F): 97.9, Max: 98 (01-04 @ 22:33)  HR: 68 (65 - 85)  BP: 115/75 (108/64 - 173/84)  BP(mean): --  RR: 18 (17 - 18)  SpO2: 94% (94% - 95%)    PHYSICAL EXAM    Gen: NAD  Abd: Soft, some distension, NT      LABS:                        9.4    4.8   )-----------( 307      ( 04 Jan 2017 07:14 )             29.1     04 Jan 2017 07:14    139    |  103    |  10     ----------------------------<  78     3.5     |  23     |  0.79     Ca    8.7        04 Jan 2017 07:14  Phos  3.4       03 Jan 2017 06:58  Mg     2.1       04 Jan 2017 07:14      PT/INR - ( 04 Jan 2017 07:14 )   PT: 14.4 sec;   INR: 1.29          PTT - ( 04 Jan 2017 07:14 )  PTT:39.2 sec      ASSESSMENT AND PLAN  66M with multiple medical comorbidities admitted to medicine for symptomatic anemia, surgery called for RLQ mass that pt has had for past 3yrs. Pt currently AF, HD stable with benign exam. SUBJECTIVE: Pt seen and examined at bedside. No overnight events. No f/c/n/v. +BM/+Flatus. Pt reports less fatigue and weakness than on admission    Vital Signs Last 24 Hrs  T(C): 36.6, Max: 36.7 (01-04 @ 22:33)  T(F): 97.9, Max: 98 (01-04 @ 22:33)  HR: 68 (65 - 85)  BP: 115/75 (108/64 - 173/84)  BP(mean): --  RR: 18 (17 - 18)  SpO2: 94% (94% - 95%)    PHYSICAL EXAM    Gen: NAD  Abd: Soft, some distension, NT      LABS:                        9.4    4.8   )-----------( 307      ( 04 Jan 2017 07:14 )             29.1     04 Jan 2017 07:14    139    |  103    |  10     ----------------------------<  78     3.5     |  23     |  0.79     Ca    8.7        04 Jan 2017 07:14  Phos  3.4       03 Jan 2017 06:58  Mg     2.1       04 Jan 2017 07:14      PT/INR - ( 04 Jan 2017 07:14 )   PT: 14.4 sec;   INR: 1.29          PTT - ( 04 Jan 2017 07:14 )  PTT:39.2 sec      ASSESSMENT AND PLAN  66M with multiple medical comorbidities admitted to medicine for symptomatic anemia, surgery called for RLQ mass that pt has had for past 3yrs. Pt currently AF, HD stable with benign exam.   -No surgical intervention this time  -f/u path report from colonoscopy  -Further management per primary team

## 2017-01-05 NOTE — DISCHARGE NOTE ADULT - HOSPITAL COURSE
66M PMHx HTN, HLD, emphysema, cardiomyopathy s/p atrial PPM, chronic back pain treated with high dose opioids s/p atrial PP intrathecal pump, and recent admission for AAA s/p EVAR presents to [facility] complaining of weakness after leaving [facility]. He was found to be anemic which was new since his EVAR procedure. The anemia is likely multifactorial with Fe studies consistent with anemia of chronic disease, a low vitamin B12 level with negative IF antibodies. Normal thyroid and adrenal 66M PMHx HTN, HLD, emphysema, cardiomyopathy s/p atrial PPM, chronic back pain treated with high dose opioids s/p atrial PP intrathecal pump, and recent admission for AAA s/p EVAR presents to [facility] complaining of weakness after leaving [facility]. He was found to be anemic which was new since his EVAR procedure. The anemia is likely multifactorial with Fe studies consistent with anemia of chronic disease, a low vitamin B12 level with negative IF antibodies. Normal thyroid and adrenal function. Weakness may also be attributable to the large amount of pain medications he is taking. Pain management was consulted, but the patient does not want to participate in an opioid taper. A colonoscopy was performed by Dr. Coyne and found a polyp and mild diverticulosis. Results are pending. The RLQ mass was evaluated by Dr. Wells and there is no acute surgical intervention. He will follow up with him after rehab. He is hemodynamically stable for discharge to rehab. 66M PMHx HTN, HLD, emphysema, cardiomyopathy s/p atrial PPM, chronic back pain treated with high dose opioids s/p atrial PP intrathecal pump, and recent admission for AAA s/p EVAR presents to [facility] complaining of weakness after leaving [facility]. He was found to be anemic which was new since his EVAR procedure. The anemia is likely multifactorial with Fe studies consistent with anemia of chronic disease, a low vitamin B12 level with negative IF antibodies. Normal thyroid and adrenal function. Weakness may also be attributable to the large amount of pain medications he is taking. Pain management was consulted, but the patient does not want to participate in an opioid taper. A colonoscopy was performed by Dr. Coyne and found a polyp and mild diverticulosis. Polyp is a tubular adenoma. The RLQ mass was evaluated by Dr. Wells and there is no acute surgical intervention. He will follow up with him after rehab. He is hemodynamically stable for discharge to rehab.

## 2017-01-05 NOTE — DISCHARGE NOTE ADULT - CARE PROVIDERS DIRECT ADDRESSES
,DirectAddress_Unknown,DirectAddress_Unknown,[email or web address],DirectAddress_Unknown

## 2017-01-05 NOTE — DISCHARGE NOTE ADULT - MEDICATION SUMMARY - MEDICATIONS TO TAKE
I will START or STAY ON the medications listed below when I get home from the hospital:    Proscar 5 mg oral tablet  -- 1 tab(s) by mouth once a day  -- Indication: For BPH (benign prostatic hyperplasia)    oxyCODONE 15 mg oral tablet  -- 1 tab(s) by mouth every 6 hours, As Needed MDD:4 Pills  -- Indication: For Pain    aspirin 81 mg oral tablet  -- 1 tab(s) by mouth once a day  -- Indication: For Cardiomyopathy    oxyCODONE 30 mg oral tablet, extended release  -- 1 tab(s) by mouth every 8 hours MDD:3 Pills  -- Caution federal law prohibits the transfer of this drug to any person other  than the person for whom it was prescribed.  Check with your doctor before becoming pregnant.  Do not chew, break, or crush.  Do not drink alcoholic beverages when taking this medication.  May cause drowsiness.  Alcohol may intensify this effect.  Use care when operating dangerous machinery.  Obtain medical advice before taking any non-prescription drugs as some may affect the action of this medication.  Swallow whole.  Do not crush.  This drug may impair the ability to drive or operate machinery.  Use care until you become familiar with its effects.  This prescription cannot be refilled.  Using more of this medication than prescribed may cause serious breathing problems.    -- Indication: For Pain    Cardura 4 mg oral tablet  -- 1 tab(s) by mouth once a day  -- Indication: For BPH (benign prostatic hyperplasia)    Pamelor 75 mg oral capsule  -- 1 cap(s) by mouth once a day (at bedtime)  -- Indication: For Depression    Trintellix 20 mg oral tablet  -- 1 tab(s) by mouth once a day  -- Indication: For Depression    ALPRAZolam 1 mg oral tablet  -- 1 tab(s) by mouth once a day (at bedtime), As needed, insomnia  -- Indication: For Insomnia    Coreg 6.25 mg oral tablet  -- 1 tab(s) by mouth 2 times a day  -- Indication: For HTN (hypertension)    amLODIPine 5 mg oral tablet  -- 1 tab(s) by mouth once a day  -- Indication: For HTN (hypertension)    MiraLax oral powder for reconstitution  -- 1  by mouth once a day  -- Indication: For Constipation    melatonin 3 mg oral tablet  -- 1 tab(s) by mouth once a day (at bedtime), As needed, Insomnia  -- Indication: For insomnia    lactobacillus acidophilus oral capsule  --  by mouth   -- Indication: For Prophylactic measure    omeprazole 20 mg oral delayed release capsule  -- 1 cap(s) by mouth once a day  -- Indication: For GERD    hydrALAZINE 10 mg oral tablet  --  by mouth 2 times a day  -- Indication: For HTN (hypertension)    folic acid 1 mg oral tablet  -- 1 tab(s) by mouth once a day  -- Indication: For Anemia    B-12 Resin 1000 mcg oral tablet  -- 1 tab(s) by mouth once a week  -- Indication: For Anemia

## 2017-01-05 NOTE — PROGRESS NOTE ADULT - PROBLEM SELECTOR PROBLEM 1
Anemia
Abdominal mass, RLQ (right lower quadrant)
Anemia
Abnormal CT of the abdomen

## 2017-01-05 NOTE — DISCHARGE NOTE ADULT - CARE PROVIDER_API CALL
Kahlil Wells), ColonRectal Surgery; Surgery  [address] [place] [address] [address]  [place] [address]  Phone: [phone]  Fax: [phone]    Brien East), Medicine  [address] [place] [address]  Phone: [phone]  Fax: [phone]    Cristi Coyne), Medicine  [address]  [place] [address]  Phone: [phone]  Fax: [phone]

## 2017-01-05 NOTE — CONSULT NOTE ADULT - ATTENDING COMMENTS
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

## 2017-01-05 NOTE — PROGRESS NOTE ADULT - SUBJECTIVE AND OBJECTIVE BOX
Pt seen and examined   no complaints   results of endoscopy was discussed with patient    REVIEW OF SYSTEMS:  Constitutional: No fever, weight loss or fatigue  Cardiovascular: No chest pain, palpitations, dizziness or leg swelling  Gastrointestinal: No abdominal or epigastric pain. No nausea, vomiting or hematemesis; No diarrhea or constipation. No melena or hematochezia.  Skin: No itching, burning, rashes or lesions       MEDICATIONS:  MEDICATIONS  (STANDING):  finasteride 5milliGRAM(s) Oral daily  aspirin enteric coated 81milliGRAM(s) Oral daily  doxazosin 4milliGRAM(s) Oral at bedtime  nortriptyline 75milliGRAM(s) Oral at bedtime  carvedilol 6.25milliGRAM(s) Oral every 12 hours  pantoprazole    Tablet 40milliGRAM(s) Oral before breakfast  folic acid 1milliGRAM(s) Oral daily  heparin  Injectable 5000Unit(s) SubCutaneous every 8 hours  vortioxetine 20milliGRAM(s) Oral daily  lactobacillus acidophilus 1Tablet(s) Oral daily  hydrALAZINE 25milliGRAM(s) Oral every 8 hours  cyanocobalamin Injectable 1000MICROGram(s) IntraMuscular daily  amLODIPine   Tablet 5milliGRAM(s) Oral daily  oxyCODONE ER Tablet 30milliGRAM(s) Oral every 8 hours    MEDICATIONS  (PRN):  polyethylene glycol 3350 17Gram(s) Oral two times a day PRN Constipation  acetaminophen   Tablet. 650milliGRAM(s) Oral every 6 hours PRN Mild Pain (1 - 3)  melatonin 3milliGRAM(s) Oral at bedtime PRN Insomnia  ALPRAZolam 1milliGRAM(s) Oral at bedtime PRN insomnia  oxyCODONE IR 15milliGRAM(s) Oral every 6 hours PRN Severe Pain (7 - 10)      Allergies    Altace (Unknown)  penicillin (Unknown)    Intolerances        Vital Signs Last 24 Hrs  T(C): 37, Max: 37 (01-05 @ 09:13)  T(F): 98.6, Max: 98.6 (01-05 @ 09:13)  HR: 64 (64 - 85)  BP: 126/81 (108/64 - 138/84)  BP(mean): --  RR: 16 (16 - 18)  SpO2: 93% (93% - 95%)    I & Os for current day (as of 01-05 @ 13:26)  =============================================  IN: 0 ml / OUT: 1888 ml / NET: -1888 ml      PHYSICAL EXAM:    General: Well developed; well nourished; in no acute distress  HEENT: MMM, conjunctiva and sclera clear  Gastrointestinal: Soft non-tender non-distended; Normal bowel sounds; No hepatosplenomegaly  Skin: Warm and dry. No obvious rash    LABS:      CBC Full  -  ( 05 Jan 2017 08:05 )  WBC Count : 4.9 K/uL  Hemoglobin : 9.4 g/dL  Hematocrit : 30.2 %  Platelet Count - Automated : 330 K/uL  Mean Cell Volume : 86.0 fL  Mean Cell Hemoglobin : 26.8 pg  Mean Cell Hemoglobin Concentration : 31.1 g/dL  Auto Neutrophil # : x  Auto Lymphocyte # : x  Auto Monocyte # : x  Auto Eosinophil # : x  Auto Basophil # : x  Auto Neutrophil % : x  Auto Lymphocyte % : x  Auto Monocyte % : x  Auto Eosinophil % : x  Auto Basophil % : x    05 Jan 2017 08:05    139    |  104    |  13     ----------------------------<  68     4.2     |  23     |  0.94     Ca    8.9        05 Jan 2017 08:05  Mg     2.1       05 Jan 2017 08:05      PT/INR - ( 04 Jan 2017 07:14 )   PT: 14.4 sec;   INR: 1.29          PTT - ( 04 Jan 2017 07:14 )  PTT:39.2 sec                  RADIOLOGY & ADDITIONAL STUDIES (The following images were personally reviewed):

## 2017-01-05 NOTE — CONSULT NOTE ADULT - CONSULT REQUESTED DATE/TIME
05-Jan-2017 09:14
02-Jan-2017 12:20
03-Jan-2017 10:16
04-Jan-2017 12:55
04-Jan-2017 13:35
30-Dec-2016 16:00
31-Dec-2016 10:47

## 2017-01-09 ENCOUNTER — APPOINTMENT (OUTPATIENT)
Dept: VASCULAR SURGERY | Facility: CLINIC | Age: 67
End: 2017-01-09

## 2017-02-01 ENCOUNTER — APPOINTMENT (OUTPATIENT)
Dept: HEART AND VASCULAR | Facility: CLINIC | Age: 67
End: 2017-02-01

## 2017-02-01 VITALS — DIASTOLIC BLOOD PRESSURE: 80 MMHG | SYSTOLIC BLOOD PRESSURE: 124 MMHG

## 2017-02-01 VITALS — HEART RATE: 72 BPM | BODY MASS INDEX: 30.71 KG/M2 | WEIGHT: 214 LBS

## 2017-02-01 DIAGNOSIS — Z01.810 ENCOUNTER FOR PREPROCEDURAL CARDIOVASCULAR EXAMINATION: ICD-10-CM

## 2017-02-01 RX ORDER — CIPROFLOXACIN HYDROCHLORIDE 500 MG/1
500 TABLET, FILM COATED ORAL
Qty: 6 | Refills: 0 | Status: DISCONTINUED | COMMUNITY
Start: 2017-01-26 | End: 2017-01-31

## 2017-02-01 RX ORDER — VORTIOXETINE 20 MG/1
20 TABLET, FILM COATED ORAL
Refills: 0 | Status: ACTIVE | COMMUNITY

## 2017-02-01 RX ORDER — METRONIDAZOLE 500 MG/1
500 TABLET ORAL
Qty: 6 | Refills: 0 | Status: DISCONTINUED | COMMUNITY
Start: 2017-01-26

## 2017-02-01 RX ORDER — FOLIC ACID 1 MG/1
1 TABLET ORAL
Refills: 0 | Status: ACTIVE | COMMUNITY

## 2017-02-10 VITALS
HEIGHT: 69 IN | RESPIRATION RATE: 16 BRPM | TEMPERATURE: 98 F | WEIGHT: 214.07 LBS | SYSTOLIC BLOOD PRESSURE: 161 MMHG | DIASTOLIC BLOOD PRESSURE: 80 MMHG | HEART RATE: 74 BPM

## 2017-02-10 NOTE — PATIENT PROFILE ADULT. - PMH
AAA (abdominal aortic aneurysm)    Anemia    Aneurysm of aortic root    Cardiac arrhythmia    Cardiomyopathy    Depression  anxiety  ONOFRE (dyspnea on exertion)    DVT (deep venous thrombosis)    HTN (hypertension)    Hyperlipidemia    Pulmonary emphysema  COPD AAA (abdominal aortic aneurysm)    Anemia    Aneurysm of aortic root    Cardiac arrhythmia    Cardiomyopathy    Depression  anxiety  ONOFRE (dyspnea on exertion)    DVT (deep venous thrombosis)    HTN (hypertension)    Hyperlipidemia    Pelvic mass    Pulmonary emphysema  COPD

## 2017-02-10 NOTE — PRE-OP CHECKLIST - INTERNAL PROSTHESES
yes(specify)/Left THR, Cervical and Lumbar spine with rods and screws, Cardiac Stent X4, Intrathecal pump

## 2017-02-10 NOTE — PATIENT PROFILE ADULT. - NS MD HP INPLANTS MED DEV
Pacemaker, Cardiac Stent x4, Intrathecal pump, Left hip, lumbar spine, cervical spine,/Prosthetic device(s)

## 2017-02-10 NOTE — PATIENT PROFILE ADULT. - PSH
History of back surgery  multiple, lumbar  History of kidney surgery    Pacemaker  medtronic  S/P AAA (abdominal aortic aneurysm) repair    S/P arthroscopy of right knee    S/P carpal tunnel release    S/P hip replacement, left    Surgery, elective  Intrathecal pump placement  Surgery, elective  Cardiac Stent x4  Surgery, elective  multiple neck surgery, cervical

## 2017-02-12 ENCOUNTER — RESULT REVIEW (OUTPATIENT)
Age: 67
End: 2017-02-12

## 2017-02-13 ENCOUNTER — INPATIENT (INPATIENT)
Facility: HOSPITAL | Age: 67
LOS: 4 days | Discharge: HOME HEALTH | DRG: 330 | End: 2017-02-18
Attending: COLON & RECTAL SURGERY | Admitting: COLON & RECTAL SURGERY
Payer: MEDICARE

## 2017-02-13 DIAGNOSIS — D13.9 BENIGN NEOPLASM OF ILL-DEFINED SITES WITHIN THE DIGESTIVE SYSTEM: ICD-10-CM

## 2017-02-13 DIAGNOSIS — Z98.890 OTHER SPECIFIED POSTPROCEDURAL STATES: Chronic | ICD-10-CM

## 2017-02-13 DIAGNOSIS — Z96.642 PRESENCE OF LEFT ARTIFICIAL HIP JOINT: ICD-10-CM

## 2017-02-13 DIAGNOSIS — Z41.9 ENCOUNTER FOR PROCEDURE FOR PURPOSES OTHER THAN REMEDYING HEALTH STATE, UNSPECIFIED: Chronic | ICD-10-CM

## 2017-02-13 DIAGNOSIS — K63.9 DISEASE OF INTESTINE, UNSPECIFIED: ICD-10-CM

## 2017-02-13 DIAGNOSIS — Z95.0 PRESENCE OF CARDIAC PACEMAKER: Chronic | ICD-10-CM

## 2017-02-13 DIAGNOSIS — Z96.642 PRESENCE OF LEFT ARTIFICIAL HIP JOINT: Chronic | ICD-10-CM

## 2017-02-13 DIAGNOSIS — D62 ACUTE POSTHEMORRHAGIC ANEMIA: ICD-10-CM

## 2017-02-13 LAB
ALBUMIN SERPL ELPH-MCNC: 3 G/DL — LOW (ref 3.4–5)
ALP SERPL-CCNC: 59 U/L — SIGNIFICANT CHANGE UP (ref 40–120)
ALT FLD-CCNC: 19 U/L — SIGNIFICANT CHANGE UP (ref 12–42)
ANION GAP SERPL CALC-SCNC: 12 MMOL/L — SIGNIFICANT CHANGE UP (ref 9–16)
ANION GAP SERPL CALC-SCNC: 8 MMOL/L — LOW (ref 9–16)
APTT BLD: 30.9 SEC — SIGNIFICANT CHANGE UP (ref 27.5–37.4)
AST SERPL-CCNC: 23 U/L — SIGNIFICANT CHANGE UP (ref 15–37)
BASE EXCESS BLDA CALC-SCNC: -1.5 MMOL/L — SIGNIFICANT CHANGE UP (ref -2–3)
BASE EXCESS BLDA CALC-SCNC: -3.1 MMOL/L — LOW (ref -2–3)
BILIRUB SERPL-MCNC: 0.8 MG/DL — SIGNIFICANT CHANGE UP (ref 0.2–1.2)
BUN SERPL-MCNC: 13 MG/DL — SIGNIFICANT CHANGE UP (ref 7–23)
BUN SERPL-MCNC: 13 MG/DL — SIGNIFICANT CHANGE UP (ref 7–23)
CA-I BLDA-SCNC: 1.01 MMOL/L — LOW (ref 1.1–1.3)
CA-I BLDA-SCNC: 1.06 MMOL/L — LOW (ref 1.1–1.3)
CALCIUM SERPL-MCNC: 7.5 MG/DL — LOW (ref 8.5–10.5)
CALCIUM SERPL-MCNC: 8.1 MG/DL — LOW (ref 8.5–10.5)
CHLORIDE SERPL-SCNC: 102 MMOL/L — SIGNIFICANT CHANGE UP (ref 96–108)
CHLORIDE SERPL-SCNC: 98 MMOL/L — SIGNIFICANT CHANGE UP (ref 96–108)
CO2 SERPL-SCNC: 23 MMOL/L — SIGNIFICANT CHANGE UP (ref 22–31)
CO2 SERPL-SCNC: 26 MMOL/L — SIGNIFICANT CHANGE UP (ref 22–31)
COHGB MFR BLDA: 0.3 % — SIGNIFICANT CHANGE UP
COHGB MFR BLDA: 0.3 % — SIGNIFICANT CHANGE UP
CREAT SERPL-MCNC: 0.86 MG/DL — SIGNIFICANT CHANGE UP (ref 0.5–1.3)
CREAT SERPL-MCNC: 0.9 MG/DL — SIGNIFICANT CHANGE UP (ref 0.5–1.3)
GAS PNL BLDA: SIGNIFICANT CHANGE UP
GAS PNL BLDA: SIGNIFICANT CHANGE UP
GLUCOSE SERPL-MCNC: 168 MG/DL — HIGH (ref 70–99)
GLUCOSE SERPL-MCNC: 201 MG/DL — HIGH (ref 70–99)
HCO3 BLDA-SCNC: 21 MMOL/L — SIGNIFICANT CHANGE UP (ref 21–28)
HCO3 BLDA-SCNC: 23 MMOL/L — SIGNIFICANT CHANGE UP (ref 21–28)
HCT VFR BLD CALC: 28.1 % — LOW (ref 39–50)
HCT VFR BLD CALC: 30.6 % — LOW (ref 39–50)
HGB BLD-MCNC: 10.4 G/DL — LOW (ref 13–17)
HGB BLD-MCNC: 9.3 G/DL — LOW (ref 13–17)
HGB BLDA-MCNC: 10.3 G/DL — LOW (ref 13–17)
HGB BLDA-MCNC: 9.7 G/DL — LOW (ref 13–17)
INR BLD: 1.29 — HIGH (ref 0.88–1.16)
MAGNESIUM SERPL-MCNC: 1.5 MG/DL — LOW (ref 1.6–2.4)
MAGNESIUM SERPL-MCNC: 1.6 MG/DL — SIGNIFICANT CHANGE UP (ref 1.6–2.4)
MCHC RBC-ENTMCNC: 27.1 PG — SIGNIFICANT CHANGE UP (ref 27–34)
MCHC RBC-ENTMCNC: 27.5 PG — SIGNIFICANT CHANGE UP (ref 27–34)
MCHC RBC-ENTMCNC: 33.1 G/DL — SIGNIFICANT CHANGE UP (ref 32–36)
MCHC RBC-ENTMCNC: 34 G/DL — SIGNIFICANT CHANGE UP (ref 32–36)
MCV RBC AUTO: 81 FL — SIGNIFICANT CHANGE UP (ref 80–100)
MCV RBC AUTO: 81.9 FL — SIGNIFICANT CHANGE UP (ref 80–100)
METHGB MFR BLDA: 0.5 % — SIGNIFICANT CHANGE UP
METHGB MFR BLDA: 0.7 % — SIGNIFICANT CHANGE UP
O2 CT VFR BLDA CALC: 14 ML/DL — SIGNIFICANT CHANGE UP (ref 15–23)
O2 CT VFR BLDA CALC: 14.8 ML/DL — SIGNIFICANT CHANGE UP (ref 15–23)
OXYHGB MFR BLDA: 98 % — SIGNIFICANT CHANGE UP (ref 94–100)
OXYHGB MFR BLDA: 98 % — SIGNIFICANT CHANGE UP (ref 94–100)
PCO2 BLDA: 34 MMHG — LOW (ref 35–48)
PCO2 BLDA: 36 MMHG — SIGNIFICANT CHANGE UP (ref 35–48)
PH BLDA: 7.41 — SIGNIFICANT CHANGE UP (ref 7.35–7.45)
PH BLDA: 7.42 — SIGNIFICANT CHANGE UP (ref 7.35–7.45)
PHOSPHATE SERPL-MCNC: 3.1 MG/DL — SIGNIFICANT CHANGE UP (ref 2.5–4.5)
PHOSPHATE SERPL-MCNC: 3.3 MG/DL — SIGNIFICANT CHANGE UP (ref 2.5–4.5)
PLATELET # BLD AUTO: 210 K/UL — SIGNIFICANT CHANGE UP (ref 150–400)
PLATELET # BLD AUTO: 231 K/UL — SIGNIFICANT CHANGE UP (ref 150–400)
PO2 BLDA: 238 MMHG — HIGH (ref 83–108)
PO2 BLDA: 258 MMHG — HIGH (ref 83–108)
POTASSIUM BLDA-SCNC: 3.6 MMOL/L — SIGNIFICANT CHANGE UP (ref 3.5–4.9)
POTASSIUM BLDA-SCNC: 3.6 MMOL/L — SIGNIFICANT CHANGE UP (ref 3.5–4.9)
POTASSIUM SERPL-MCNC: 3.5 MMOL/L — SIGNIFICANT CHANGE UP (ref 3.5–5.3)
POTASSIUM SERPL-MCNC: 3.8 MMOL/L — SIGNIFICANT CHANGE UP (ref 3.5–5.3)
POTASSIUM SERPL-SCNC: 3.5 MMOL/L — SIGNIFICANT CHANGE UP (ref 3.5–5.3)
POTASSIUM SERPL-SCNC: 3.8 MMOL/L — SIGNIFICANT CHANGE UP (ref 3.5–5.3)
PROT SERPL-MCNC: 6.9 G/DL — SIGNIFICANT CHANGE UP (ref 6.4–8.2)
PROTHROM AB SERPL-ACNC: 14.3 SEC — HIGH (ref 10–13.1)
RBC # BLD: 3.43 M/UL — LOW (ref 4.2–5.8)
RBC # BLD: 3.78 M/UL — LOW (ref 4.2–5.8)
RBC # FLD: 14.3 % — SIGNIFICANT CHANGE UP (ref 10.3–16.9)
RBC # FLD: 14.6 % — SIGNIFICANT CHANGE UP (ref 10.3–16.9)
SAO2 % BLDA: 99 % — SIGNIFICANT CHANGE UP (ref 95–100)
SAO2 % BLDA: 99 % — SIGNIFICANT CHANGE UP (ref 95–100)
SODIUM BLDA-SCNC: 130 MMOL/L — LOW (ref 138–146)
SODIUM BLDA-SCNC: 132 MMOL/L — LOW (ref 138–146)
SODIUM SERPL-SCNC: 133 MMOL/L — LOW (ref 135–145)
SODIUM SERPL-SCNC: 136 MMOL/L — SIGNIFICANT CHANGE UP (ref 135–145)
WBC # BLD: 14 K/UL — HIGH (ref 3.8–10.5)
WBC # BLD: 8.1 K/UL — SIGNIFICANT CHANGE UP (ref 3.8–10.5)
WBC # FLD AUTO: 14 K/UL — HIGH (ref 3.8–10.5)
WBC # FLD AUTO: 8.1 K/UL — SIGNIFICANT CHANGE UP (ref 3.8–10.5)

## 2017-02-13 PROCEDURE — 86077 PHYS BLOOD BANK SERV XMATCH: CPT

## 2017-02-13 RX ORDER — FINASTERIDE 5 MG/1
5 TABLET, FILM COATED ORAL DAILY
Qty: 0 | Refills: 0 | Status: DISCONTINUED | OUTPATIENT
Start: 2017-02-14 | End: 2017-02-18

## 2017-02-13 RX ORDER — METRONIDAZOLE 500 MG
500 TABLET ORAL EVERY 8 HOURS
Qty: 0 | Refills: 0 | Status: COMPLETED | OUTPATIENT
Start: 2017-02-13 | End: 2017-02-14

## 2017-02-13 RX ORDER — ONDANSETRON 8 MG/1
4 TABLET, FILM COATED ORAL EVERY 6 HOURS
Qty: 0 | Refills: 0 | Status: DISCONTINUED | OUTPATIENT
Start: 2017-02-13 | End: 2017-02-18

## 2017-02-13 RX ORDER — CIPROFLOXACIN LACTATE 400MG/40ML
400 VIAL (ML) INTRAVENOUS EVERY 12 HOURS
Qty: 0 | Refills: 0 | Status: COMPLETED | OUTPATIENT
Start: 2017-02-13 | End: 2017-02-14

## 2017-02-13 RX ORDER — NORTRIPTYLINE HYDROCHLORIDE 10 MG/1
75 CAPSULE ORAL AT BEDTIME
Qty: 0 | Refills: 0 | Status: DISCONTINUED | OUTPATIENT
Start: 2017-02-14 | End: 2017-02-18

## 2017-02-13 RX ORDER — NALOXONE HYDROCHLORIDE 4 MG/.1ML
0.1 SPRAY NASAL
Qty: 0 | Refills: 0 | Status: DISCONTINUED | OUTPATIENT
Start: 2017-02-13 | End: 2017-02-18

## 2017-02-13 RX ORDER — DOXAZOSIN MESYLATE 4 MG
4 TABLET ORAL AT BEDTIME
Qty: 0 | Refills: 0 | Status: DISCONTINUED | OUTPATIENT
Start: 2017-02-14 | End: 2017-02-18

## 2017-02-13 RX ORDER — MAGNESIUM SULFATE 500 MG/ML
1 VIAL (ML) INJECTION ONCE
Qty: 0 | Refills: 0 | Status: COMPLETED | OUTPATIENT
Start: 2017-02-13 | End: 2017-02-13

## 2017-02-13 RX ORDER — POTASSIUM CHLORIDE 20 MEQ
10 PACKET (EA) ORAL
Qty: 0 | Refills: 0 | Status: DISCONTINUED | OUTPATIENT
Start: 2017-02-13 | End: 2017-02-14

## 2017-02-13 RX ORDER — SODIUM CHLORIDE 9 MG/ML
1000 INJECTION, SOLUTION INTRAVENOUS
Qty: 0 | Refills: 0 | Status: DISCONTINUED | OUTPATIENT
Start: 2017-02-13 | End: 2017-02-14

## 2017-02-13 RX ORDER — HEPARIN SODIUM 5000 [USP'U]/ML
5000 INJECTION INTRAVENOUS; SUBCUTANEOUS EVERY 8 HOURS
Qty: 0 | Refills: 0 | Status: DISCONTINUED | OUTPATIENT
Start: 2017-02-14 | End: 2017-02-18

## 2017-02-13 RX ORDER — CARVEDILOL PHOSPHATE 80 MG/1
6.25 CAPSULE, EXTENDED RELEASE ORAL EVERY 12 HOURS
Qty: 0 | Refills: 0 | Status: DISCONTINUED | OUTPATIENT
Start: 2017-02-14 | End: 2017-02-18

## 2017-02-13 RX ORDER — HYDROMORPHONE HYDROCHLORIDE 2 MG/ML
30 INJECTION INTRAMUSCULAR; INTRAVENOUS; SUBCUTANEOUS
Qty: 0 | Refills: 0 | Status: DISCONTINUED | OUTPATIENT
Start: 2017-02-13 | End: 2017-02-14

## 2017-02-13 RX ORDER — HYDROMORPHONE HYDROCHLORIDE 2 MG/ML
0.5 INJECTION INTRAMUSCULAR; INTRAVENOUS; SUBCUTANEOUS ONCE
Qty: 0 | Refills: 0 | Status: DISCONTINUED | OUTPATIENT
Start: 2017-02-13 | End: 2017-02-13

## 2017-02-13 RX ORDER — PANTOPRAZOLE SODIUM 20 MG/1
40 TABLET, DELAYED RELEASE ORAL DAILY
Qty: 0 | Refills: 0 | Status: DISCONTINUED | OUTPATIENT
Start: 2017-02-13 | End: 2017-02-18

## 2017-02-13 RX ORDER — ACETAMINOPHEN 500 MG
1000 TABLET ORAL ONCE
Qty: 0 | Refills: 0 | Status: COMPLETED | OUTPATIENT
Start: 2017-02-13 | End: 2017-02-13

## 2017-02-13 RX ORDER — HYDROMORPHONE HYDROCHLORIDE 2 MG/ML
1 INJECTION INTRAMUSCULAR; INTRAVENOUS; SUBCUTANEOUS
Qty: 0 | Refills: 0 | Status: DISCONTINUED | OUTPATIENT
Start: 2017-02-13 | End: 2017-02-15

## 2017-02-13 RX ORDER — HYDROMORPHONE HYDROCHLORIDE 2 MG/ML
0.5 INJECTION INTRAMUSCULAR; INTRAVENOUS; SUBCUTANEOUS
Qty: 0 | Refills: 0 | Status: DISCONTINUED | OUTPATIENT
Start: 2017-02-13 | End: 2017-02-13

## 2017-02-13 RX ADMIN — Medication 200 MILLIGRAM(S): at 18:53

## 2017-02-13 RX ADMIN — Medication 100 GRAM(S): at 23:32

## 2017-02-13 RX ADMIN — Medication 100 MILLIEQUIVALENT(S): at 21:06

## 2017-02-13 RX ADMIN — Medication 202 MILLIGRAM(S): at 21:37

## 2017-02-13 RX ADMIN — Medication 400 MILLIGRAM(S): at 19:22

## 2017-02-13 RX ADMIN — HYDROMORPHONE HYDROCHLORIDE 30 MILLILITER(S): 2 INJECTION INTRAMUSCULAR; INTRAVENOUS; SUBCUTANEOUS at 17:35

## 2017-02-13 RX ADMIN — Medication 1 MILLIGRAM(S): at 22:30

## 2017-02-13 RX ADMIN — Medication 100 MILLIGRAM(S): at 22:28

## 2017-02-13 RX ADMIN — Medication 1000 MILLIGRAM(S): at 21:20

## 2017-02-13 RX ADMIN — HYDROMORPHONE HYDROCHLORIDE 0.5 MILLIGRAM(S): 2 INJECTION INTRAMUSCULAR; INTRAVENOUS; SUBCUTANEOUS at 18:38

## 2017-02-13 RX ADMIN — PANTOPRAZOLE SODIUM 40 MILLIGRAM(S): 20 TABLET, DELAYED RELEASE ORAL at 18:52

## 2017-02-13 RX ADMIN — HYDROMORPHONE HYDROCHLORIDE 0.5 MILLIGRAM(S): 2 INJECTION INTRAMUSCULAR; INTRAVENOUS; SUBCUTANEOUS at 17:15

## 2017-02-13 RX ADMIN — ONDANSETRON 4 MILLIGRAM(S): 8 TABLET, FILM COATED ORAL at 18:48

## 2017-02-13 NOTE — H&P ADULT. - PMH
AAA (abdominal aortic aneurysm)    Anemia    Aneurysm of aortic root    Cardiac arrhythmia    Cardiomyopathy    Depression  anxiety  ONOFRE (dyspnea on exertion)    DVT (deep venous thrombosis)    HTN (hypertension)    Hyperlipidemia    Pelvic mass    Pulmonary emphysema  COPD

## 2017-02-13 NOTE — PROGRESS NOTE ADULT - SUBJECTIVE AND OBJECTIVE BOX
Pre Op Dx: Mesenteric mass in RLQ  Procedure: Right hemicolectomy, right inguinal hernia repair   Surgeon: Dr. Wells    S: Pt seen and examined in PACU, C/O back pain, Denies CP, SOB, N/V. Pain not controlled with PCA    O:  T(C): 36.4, Max: 36.4 (02-13 @ 17:05)  T(F): 97.5, Max: 97.5 (02-13 @ 17:05)  HR: 88 (82 - 88)  BP: 160/81 (117/91 - 199/103)  RR: 18 (14 - 20)  SpO2: 100% (90% - 100%)  Wt(kg): --                        9.3    8.1   )-----------( 210      ( 13 Feb 2017 17:42 )             28.1     13 Feb 2017 17:42    136    |  102    |  13     ----------------------------<  168    3.5     |  26     |  0.86     Ca    7.5        13 Feb 2017 17:42  Phos  3.1       13 Feb 2017 17:42  Mg     1.5       13 Feb 2017 17:42        Gen: NAD, resting comfortably in bed  Neuro: AAOx3, No focal deficits  C/V: RRR, +S1/S2  Pulm: CTAB, no respiratory distress  Abd: soft, ND, ATTP  Incision: C/D/I  Extrem: WNL, SCDs in place      A/P: 66yMale s/p above procedure  Diet: NPO  IVF   Pain/nausea control PRN   DVT ppx: SCDs/SQH  Increase dilaudid breakthrough   Valium 0.5 q 6H

## 2017-02-13 NOTE — H&P ADULT. - HISTORY OF PRESENT ILLNESS
Patient is a 66 year old male with extensive past medical history including recent AAA and right iliac aneurysm repaired endovascularly, who was incidentally found to have a mesenteric mass in the RLQ that expanded on repeat imaging. Because the mass was enlarging, he was scheduled electively for surgical intervention. He was seen pre-operatively by his PMD, Dr. East and his cardiologist, Dr. Gonzales.

## 2017-02-13 NOTE — H&P ADULT. - PROBLEM SELECTOR PLAN 1
- Admit to surgery, telemetry bed  - OR for diagnostic laparoscopy, right hemicolectomy  - Post-op consults to pain management, patient's internal medicine physician and cardiologist

## 2017-02-13 NOTE — BRIEF OPERATIVE NOTE - PROCEDURE
Inguinal hernia repair, right  02/13/2017    Active  NALPER  Omentectomy  02/13/2017  partial  Active  NALPER  Hemicolectomy, right  02/13/2017    Active  NALPER  Exploratory laparotomy  02/13/2017    Active  NALPER  Diagnostic laparoscopy  02/13/2017    Active  NALPER

## 2017-02-13 NOTE — BRIEF OPERATIVE NOTE - OPERATION/FINDINGS
Diagnostic laparoscopy, small mass identified in mesentery of ascending colon, firmly adherent to surrounding small bowel and sidewall. Ileum noted to have extensive interloop adhesions, and was arranged in an unusual position in the right lower quadrant. Appendix noted within right inguinal hernia, unable to be reduced laparoscopically. Converted to open procedure to aid in visualization and mobilization of small bowel off mesenteric mass and adjacent colon. Appendix reduced, right hemicolectomy performed with stapled anastomosis.

## 2017-02-13 NOTE — H&P ADULT. - ASSESSMENT
66M with multiple medical comorbidities with enlarging mesenteric mass in RLQ presents for surgical intervention.

## 2017-02-13 NOTE — H&P ADULT. - PSH
History of back surgery  multiple, lumbar  History of kidney surgery    Pacemaker  medtronic  S/P AAA (abdominal aortic aneurysm) repair  with right iliac aneurysm endovascular repair  S/P arthroscopy of right knee    S/P carpal tunnel release    S/P hip replacement, left    Surgery, elective  Intrathecal pump placement  Surgery, elective  Cardiac Stent x4  Surgery, elective  multiple neck surgery, cervical

## 2017-02-14 LAB
ANION GAP SERPL CALC-SCNC: 9 MMOL/L — SIGNIFICANT CHANGE UP (ref 9–16)
BUN SERPL-MCNC: 12 MG/DL — SIGNIFICANT CHANGE UP (ref 7–23)
CALCIUM SERPL-MCNC: 8.3 MG/DL — LOW (ref 8.5–10.5)
CHLORIDE SERPL-SCNC: 101 MMOL/L — SIGNIFICANT CHANGE UP (ref 96–108)
CO2 SERPL-SCNC: 25 MMOL/L — SIGNIFICANT CHANGE UP (ref 22–31)
CREAT SERPL-MCNC: 0.84 MG/DL — SIGNIFICANT CHANGE UP (ref 0.5–1.3)
GLUCOSE SERPL-MCNC: 173 MG/DL — HIGH (ref 70–99)
HCT VFR BLD CALC: 25.8 % — LOW (ref 39–50)
HGB BLD-MCNC: 8.5 G/DL — LOW (ref 13–17)
MAGNESIUM SERPL-MCNC: 1.9 MG/DL — SIGNIFICANT CHANGE UP (ref 1.6–2.4)
MCHC RBC-ENTMCNC: 26.8 PG — LOW (ref 27–34)
MCHC RBC-ENTMCNC: 32.9 G/DL — SIGNIFICANT CHANGE UP (ref 32–36)
MCV RBC AUTO: 81.4 FL — SIGNIFICANT CHANGE UP (ref 80–100)
PHOSPHATE SERPL-MCNC: 1.6 MG/DL — LOW (ref 2.5–4.5)
PLATELET # BLD AUTO: 223 K/UL — SIGNIFICANT CHANGE UP (ref 150–400)
POTASSIUM SERPL-MCNC: 3.5 MMOL/L — SIGNIFICANT CHANGE UP (ref 3.5–5.3)
POTASSIUM SERPL-SCNC: 3.5 MMOL/L — SIGNIFICANT CHANGE UP (ref 3.5–5.3)
RBC # BLD: 3.17 M/UL — LOW (ref 4.2–5.8)
RBC # FLD: 14 % — SIGNIFICANT CHANGE UP (ref 10.3–16.9)
SODIUM SERPL-SCNC: 135 MMOL/L — SIGNIFICANT CHANGE UP (ref 135–145)
WBC # BLD: 8 K/UL — SIGNIFICANT CHANGE UP (ref 3.8–10.5)
WBC # FLD AUTO: 8 K/UL — SIGNIFICANT CHANGE UP (ref 3.8–10.5)

## 2017-02-14 PROCEDURE — 99221 1ST HOSP IP/OBS SF/LOW 40: CPT

## 2017-02-14 RX ORDER — ACETAMINOPHEN 500 MG
1000 TABLET ORAL ONCE
Qty: 0 | Refills: 0 | Status: COMPLETED | OUTPATIENT
Start: 2017-02-14 | End: 2017-02-14

## 2017-02-14 RX ORDER — KETOROLAC TROMETHAMINE 30 MG/ML
15 SYRINGE (ML) INJECTION EVERY 6 HOURS
Qty: 0 | Refills: 0 | Status: DISCONTINUED | OUTPATIENT
Start: 2017-02-14 | End: 2017-02-15

## 2017-02-14 RX ORDER — HYDROMORPHONE HYDROCHLORIDE 2 MG/ML
30 INJECTION INTRAMUSCULAR; INTRAVENOUS; SUBCUTANEOUS
Qty: 0 | Refills: 0 | Status: DISCONTINUED | OUTPATIENT
Start: 2017-02-14 | End: 2017-02-14

## 2017-02-14 RX ORDER — OXYCODONE HYDROCHLORIDE 5 MG/1
30 TABLET ORAL EVERY 8 HOURS
Qty: 0 | Refills: 0 | Status: DISCONTINUED | OUTPATIENT
Start: 2017-02-14 | End: 2017-02-18

## 2017-02-14 RX ORDER — LIDOCAINE 4 G/100G
1 CREAM TOPICAL ONCE
Qty: 0 | Refills: 0 | Status: COMPLETED | OUTPATIENT
Start: 2017-02-14 | End: 2017-02-14

## 2017-02-14 RX ORDER — POTASSIUM CHLORIDE 20 MEQ
20 PACKET (EA) ORAL ONCE
Qty: 0 | Refills: 0 | Status: COMPLETED | OUTPATIENT
Start: 2017-02-14 | End: 2017-02-14

## 2017-02-14 RX ORDER — HYDROMORPHONE HYDROCHLORIDE 2 MG/ML
2 INJECTION INTRAMUSCULAR; INTRAVENOUS; SUBCUTANEOUS ONCE
Qty: 0 | Refills: 0 | Status: DISCONTINUED | OUTPATIENT
Start: 2017-02-14 | End: 2017-02-14

## 2017-02-14 RX ORDER — OXYCODONE HYDROCHLORIDE 5 MG/1
15 TABLET ORAL ONCE
Qty: 0 | Refills: 0 | Status: DISCONTINUED | OUTPATIENT
Start: 2017-02-14 | End: 2017-02-14

## 2017-02-14 RX ORDER — HYDROMORPHONE HYDROCHLORIDE 2 MG/ML
30 INJECTION INTRAMUSCULAR; INTRAVENOUS; SUBCUTANEOUS
Qty: 0 | Refills: 0 | Status: DISCONTINUED | OUTPATIENT
Start: 2017-02-14 | End: 2017-02-15

## 2017-02-14 RX ORDER — SODIUM CHLORIDE 9 MG/ML
1000 INJECTION, SOLUTION INTRAVENOUS
Qty: 0 | Refills: 0 | Status: DISCONTINUED | OUTPATIENT
Start: 2017-02-14 | End: 2017-02-14

## 2017-02-14 RX ORDER — SODIUM CHLORIDE 9 MG/ML
1000 INJECTION, SOLUTION INTRAVENOUS
Qty: 0 | Refills: 0 | Status: DISCONTINUED | OUTPATIENT
Start: 2017-02-14 | End: 2017-02-15

## 2017-02-14 RX ADMIN — Medication 100 MILLIGRAM(S): at 07:28

## 2017-02-14 RX ADMIN — OXYCODONE HYDROCHLORIDE 30 MILLIGRAM(S): 5 TABLET ORAL at 22:14

## 2017-02-14 RX ADMIN — Medication 15 MILLIGRAM(S): at 12:40

## 2017-02-14 RX ADMIN — Medication 200 MILLIGRAM(S): at 06:18

## 2017-02-14 RX ADMIN — OXYCODONE HYDROCHLORIDE 30 MILLIGRAM(S): 5 TABLET ORAL at 17:54

## 2017-02-14 RX ADMIN — HYDROMORPHONE HYDROCHLORIDE 2 MILLIGRAM(S): 2 INJECTION INTRAMUSCULAR; INTRAVENOUS; SUBCUTANEOUS at 06:03

## 2017-02-14 RX ADMIN — Medication 63.75 MILLIMOLE(S): at 11:06

## 2017-02-14 RX ADMIN — PANTOPRAZOLE SODIUM 40 MILLIGRAM(S): 20 TABLET, DELAYED RELEASE ORAL at 11:06

## 2017-02-14 RX ADMIN — Medication 100 MILLIGRAM(S): at 14:33

## 2017-02-14 RX ADMIN — OXYCODONE HYDROCHLORIDE 15 MILLIGRAM(S): 5 TABLET ORAL at 06:03

## 2017-02-14 RX ADMIN — HYDROMORPHONE HYDROCHLORIDE 1 MILLIGRAM(S): 2 INJECTION INTRAMUSCULAR; INTRAVENOUS; SUBCUTANEOUS at 04:12

## 2017-02-14 RX ADMIN — HYDROMORPHONE HYDROCHLORIDE 1 MILLIGRAM(S): 2 INJECTION INTRAMUSCULAR; INTRAVENOUS; SUBCUTANEOUS at 01:49

## 2017-02-14 RX ADMIN — SODIUM CHLORIDE 120 MILLILITER(S): 9 INJECTION, SOLUTION INTRAVENOUS at 14:29

## 2017-02-14 RX ADMIN — SODIUM CHLORIDE 120 MILLILITER(S): 9 INJECTION, SOLUTION INTRAVENOUS at 06:18

## 2017-02-14 RX ADMIN — Medication 15 MILLIGRAM(S): at 17:54

## 2017-02-14 RX ADMIN — Medication 400 MILLIGRAM(S): at 05:06

## 2017-02-14 RX ADMIN — HYDROMORPHONE HYDROCHLORIDE 2 MILLIGRAM(S): 2 INJECTION INTRAMUSCULAR; INTRAVENOUS; SUBCUTANEOUS at 05:30

## 2017-02-14 RX ADMIN — Medication 4 MILLIGRAM(S): at 22:14

## 2017-02-14 RX ADMIN — CARVEDILOL PHOSPHATE 6.25 MILLIGRAM(S): 80 CAPSULE, EXTENDED RELEASE ORAL at 06:18

## 2017-02-14 RX ADMIN — HEPARIN SODIUM 5000 UNIT(S): 5000 INJECTION INTRAVENOUS; SUBCUTANEOUS at 06:18

## 2017-02-14 RX ADMIN — SODIUM CHLORIDE 135 MILLILITER(S): 9 INJECTION, SOLUTION INTRAVENOUS at 17:54

## 2017-02-14 RX ADMIN — Medication 15 MILLIGRAM(S): at 18:30

## 2017-02-14 RX ADMIN — Medication 15 MILLIGRAM(S): at 12:13

## 2017-02-14 RX ADMIN — HEPARIN SODIUM 5000 UNIT(S): 5000 INJECTION INTRAVENOUS; SUBCUTANEOUS at 22:14

## 2017-02-14 RX ADMIN — HYDROMORPHONE HYDROCHLORIDE 30 MILLILITER(S): 2 INJECTION INTRAMUSCULAR; INTRAVENOUS; SUBCUTANEOUS at 07:23

## 2017-02-14 RX ADMIN — OXYCODONE HYDROCHLORIDE 30 MILLIGRAM(S): 5 TABLET ORAL at 14:32

## 2017-02-14 RX ADMIN — OXYCODONE HYDROCHLORIDE 15 MILLIGRAM(S): 5 TABLET ORAL at 04:40

## 2017-02-14 RX ADMIN — NORTRIPTYLINE HYDROCHLORIDE 75 MILLIGRAM(S): 10 CAPSULE ORAL at 22:14

## 2017-02-14 RX ADMIN — FINASTERIDE 5 MILLIGRAM(S): 5 TABLET, FILM COATED ORAL at 12:14

## 2017-02-14 RX ADMIN — Medication 100 MILLIEQUIVALENT(S): at 00:55

## 2017-02-14 RX ADMIN — CARVEDILOL PHOSPHATE 6.25 MILLIGRAM(S): 80 CAPSULE, EXTENDED RELEASE ORAL at 17:55

## 2017-02-14 RX ADMIN — HYDROMORPHONE HYDROCHLORIDE 1 MILLIGRAM(S): 2 INJECTION INTRAMUSCULAR; INTRAVENOUS; SUBCUTANEOUS at 08:18

## 2017-02-14 RX ADMIN — HYDROMORPHONE HYDROCHLORIDE 1 MILLIGRAM(S): 2 INJECTION INTRAMUSCULAR; INTRAVENOUS; SUBCUTANEOUS at 00:30

## 2017-02-14 RX ADMIN — Medication 1000 MILLIGRAM(S): at 06:03

## 2017-02-14 RX ADMIN — HEPARIN SODIUM 5000 UNIT(S): 5000 INJECTION INTRAVENOUS; SUBCUTANEOUS at 14:31

## 2017-02-14 RX ADMIN — HYDROMORPHONE HYDROCHLORIDE 30 MILLILITER(S): 2 INJECTION INTRAMUSCULAR; INTRAVENOUS; SUBCUTANEOUS at 05:54

## 2017-02-14 RX ADMIN — HYDROMORPHONE HYDROCHLORIDE 30 MILLILITER(S): 2 INJECTION INTRAMUSCULAR; INTRAVENOUS; SUBCUTANEOUS at 09:46

## 2017-02-14 RX ADMIN — Medication 20 MILLIEQUIVALENT(S): at 11:06

## 2017-02-14 NOTE — PHYSICAL THERAPY INITIAL EVALUATION ADULT - DID THE PATIENT HAVE SURGERY?
yes/Inguinal hernia repair, right; Omentectomy; Hemicolectomy, right; Exploratory laparotomy; Diagnostic laparoscopy

## 2017-02-14 NOTE — PROGRESS NOTE ADULT - SUBJECTIVE AND OBJECTIVE BOX
POD#1    SL tachycardia  Afebrile  VSS  Excellent UO    ABD: soft    OOB  Pulmonary Toilet  Pain managemment  social svc

## 2017-02-14 NOTE — PROGRESS NOTE ADULT - SUBJECTIVE AND OBJECTIVE BOX
Patient is a 66 year old male with extensive past medical history including recent AAA and right iliac aneurysm repaired endovascularly, who was incidentally found to have a mesenteric mass in the RLQ that expanded on repeat imaging. Because the mass was enlarging, he was scheduled electively for surgical intervention.   POD #1    	  MEDICATIONS:  doxazosin 4milliGRAM(s) Oral at bedtime  carvedilol 6.25milliGRAM(s) Oral every 12 hours        ondansetron Injectable 4milliGRAM(s) IV Push every 6 hours PRN  nortriptyline 75milliGRAM(s) Oral at bedtime  HYDROmorphone PCA (5 mG/mL) Rescue Clinician Bolus 1milliGRAM(s) IV Push every 1 hour PRN  diazepam  Injectable 1milliGRAM(s) IV Push every 6 hours PRN  HYDROmorphone PCA (1 mG/mL) 30milliLiter(s) PCA Continuous PCA Continuous  oxyCODONE ER Tablet 30milliGRAM(s) Oral every 8 hours  ketorolac   Injectable 15milliGRAM(s) IV Push every 6 hours    pantoprazole  Injectable 40milliGRAM(s) IV Push daily    finasteride 5milliGRAM(s) Oral daily    heparin  Injectable 5000Unit(s) SubCutaneous every 8 hours  dextrose 5% + sodium chloride 0.45%. 1000milliLiter(s) IV Continuous <Continuous>      Complaint: Abd. pain    Otherwise 12 point review of systems is normal.    PHYSICAL EXAM:    Constitutional: NAD  Eyes: PERRL, EOMI, sclera non-icteric  Neck: supple, no masses, no JVD  Respiratory: CTA b/l, good air entry b/l, no wheezing, rhonchi, rales, with normal respiratory effort and no intercostal retractions  Cardiovascular: RRR, normal S1S2, no M/R/G  Gastrointestinal: soft,  Extremities:  no c/c/e  Neurological: AAOx3  ICU Vital Signs Last 24 Hrs  T(C): 37.2, Max: 37.2 (02-14 @ 17:06)  T(F): 98.9, Max: 98.9 (02-14 @ 17:06)  HR: 89 (85 - 120)  BP: 122/76 (122/76 - 199/103)  BP(mean): --  ABP: 171/95 (171/95 - 177/94)  ABP(mean): --  RR: 18 (16 - 20)  SpO2: 94% (94% - 100%)      ECG:      CHEST X RAY    CT    MRI    MRA    CT ANGIO    CAROTID DUPLEX    DUPLEX     Echocardiogram    Catheterization:    Stress Test:     LABS:	 	  CARDIAC MARKERS:                              8.5    8.0   )-----------( 223      ( 14 Feb 2017 07:24 )             25.8     14 Feb 2017 07:24    135    |  101    |  12     ----------------------------<  173    3.5     |  25     |  0.84     Ca    8.3        14 Feb 2017 07:24  Phos  1.6       14 Feb 2017 07:24  Mg     1.9       14 Feb 2017 07:24    TPro  6.9    /  Alb  3.0    /  TBili  0.8    /  DBili  x      /  AST  23     /  ALT  19     /  AlkPhos  59     13 Feb 2017 21:58        ASSESSMENT/PLAN: 	  NPO  Pain management  As per orders

## 2017-02-14 NOTE — CONSULT NOTE ADULT - SUBJECTIVE AND OBJECTIVE BOX
REASON FOR CONSULT:    HISTORY OF PRESENT ILLNESS:  HPI:  Patient is a 66 year old male with extensive past medical history including recent AAA and right iliac aneurysm repaired endovascularly, who was incidentally found to have a mesenteric mass in the RLQ that expanded on repeat imaging. Because the mass was enlarging, he was scheduled electively for surgical intervention. He is S/P surgery yesterday. He has no c/o cp, sob. c/o abdominal pain      PAST MEDICAL & SURGICAL HISTORY:  Pelvic mass  Anemia  Depression: anxiety  Aneurysm of aortic root  Cardiac arrhythmia  Pulmonary emphysema: COPD  DVT (deep venous thrombosis)  ONOFRE (dyspnea on exertion)  Hyperlipidemia  Cardiomyopathy  HTN (hypertension)  AAA (abdominal aortic aneurysm)  Surgery, elective: Intrathecal pump placement  Surgery, elective: Cardiac Stent x4  S/P carpal tunnel release  S/P AAA (abdominal aortic aneurysm) repair: with right iliac aneurysm endovascular repair  S/P arthroscopy of right knee  S/P hip replacement, left  Surgery, elective: multiple neck surgery, cervical  History of back surgery: multiple, lumbar  History of kidney surgery  Pacemaker: medtronic      [ ] Diabetes   [ x] Hypertension  [ ] Hyperlipidemia  [ x] CAD  [x ] PCI  [ ] CABG    PREVIOUS DIAGNOSTIC TESTING:    [x ] Echocardiogram:  [ ]  Catheterization:  [x ] Stress Test:  	    MEDICATIONS:  doxazosin 4milliGRAM(s) Oral at bedtime  carvedilol 6.25milliGRAM(s) Oral every 12 hours    metroNIDAZOLE  IVPB 500milliGRAM(s) IV Intermittent every 8 hours      ondansetron Injectable 4milliGRAM(s) IV Push every 6 hours PRN  nortriptyline 75milliGRAM(s) Oral at bedtime  HYDROmorphone PCA (5 mG/mL) Rescue Clinician Bolus 1milliGRAM(s) IV Push every 1 hour PRN  diazepam  Injectable 1milliGRAM(s) IV Push every 6 hours PRN  HYDROmorphone PCA (1 mG/mL) 30milliLiter(s) PCA Continuous PCA Continuous    pantoprazole  Injectable 40milliGRAM(s) IV Push daily    finasteride 5milliGRAM(s) Oral daily    lactated ringers. 1000milliLiter(s) IV Continuous <Continuous>  heparin  Injectable 5000Unit(s) SubCutaneous every 8 hours      FAMILY HISTORY:      SOCIAL HISTORY:    [ ] Non-smoker  [ ] Smoker  [ ] Alcohol    Allergies    Altace (Unknown)  penicillin (Unknown)    Intolerances    	    REVIEW OF SYSTEMS:    [x] as per HPI  CONSTITUTIONAL: No fever, weight loss, or fatigue  ENT:   No sinus or throat pain  RESPIRATORY: No cough, wheezing, chills or hemoptysis; No Shortness of Breath  CARDIOVASCULAR: No chest pain, palpitations, dizziness  GASTROINTESTINAL: as above  GENITOURINARY: No dysuria, hematuria, or incontinence  NEUROLOGICAL: No headaches  MUSCULOSKELETAL: Chronic back pain  [x] All others negative	  [ ] Unable to obtain    PHYSICAL EXAM:  T(C): 36.7, Max: 36.7 (02-13 @ 19:05)  HR: 104 (82 - 120)  BP: 136/79 (117/91 - 199/103)  RR: 18 (14 - 20)  SpO2: 98% (90% - 100%)  Wt(kg): --  I&O's Summary    I & Os for current day (as of 14 Feb 2017 07:41)  =============================================  IN: 2120 ml / OUT: 1830 ml / NET: 290 ml      Appearance: Normal	  HEENT:   Normal oral mucosa, PERRL, EOMI	  Lymphatic: No lymphadenopathy  Cardiovascular: Normal S1 S2, No JVD, soft systolic murmur  Respiratory: Lungs clear to auscultation	  Psychiatry: A & O x 3, Mood & affect appropriate  Gastrointestinal:  Soft, S/P surgery	  Skin: No rashes, No ecchymoses, No cyanosis	  Neurologic: Non-focal  Extremities: Normal range of motion, No clubbing, cyanosis.      TELEMETRY: Sinus tachycardia	    ECG:    ECHO:  STRESS:  CATH:  	  RADIOLOGY:  CXR:  CT:  US:   	  	  LABS:	 	    CARDIAC MARKERS:                                  10.4   14.0  )-----------( 231      ( 13 Feb 2017 21:57 )             30.6     13 Feb 2017 21:58    133    |  98     |  13     ----------------------------<  201    3.8     |  23     |  0.90     Ca    8.1        13 Feb 2017 21:58  Phos  3.3       13 Feb 2017 21:58  Mg     1.6       13 Feb 2017 21:58    TPro  6.9    /  Alb  3.0    /  TBili  0.8    /  DBili  x      /  AST  23     /  ALT  19     /  AlkPhos  59     13 Feb 2017 21:58    proBNP:   Lipid Profile:   HgA1c:   TSH:     ASSESSMENT/PLAN: 	  s/p surgery. I feel his tachycardia is due to pain and not receiving his BB. If still NPO today would give IV Lopressor until can tolerate PO Coreg,

## 2017-02-14 NOTE — CONSULT NOTE ADULT - SUBJECTIVE AND OBJECTIVE BOX
Pain Management Consult Note - [facility] [phone]    Chief Complaint:  Abdominal pain, back pain    HPI:  67 y/o male with a history of chronic back pain and now complains of abdominal pain S/P exploratory laparotomy, right hemicolectomy, inguinal hernia repair and appendectomy on 2/13/17.  The patient has had multiple neck and back surgeries in the past.  States he takes oxycontin 30 mg po q8h and oxycodone 15 mg BID at home.  Pt. states he had an intrathecal pump, but is not using it now.  Pt. states the PCA is not helping.      Pain is _x__ sharp ____dull ___burning ___achy ___ Intensity: ____ mild ___mod ___severe     Location __x__surgical site ____cervical ___x__lumbar __x__abd ____upper ext____lower ext    Worse with __x__activity _x___movement _____physical therapy___ Rest    Improved with __x__medication ___x_rest ____physical therapy    ROS: Const:  _-__febrile   Eyes:___ENT:_-__CV: _-__chest pain  Resp: _-___sob  GI:_+__nausea _-__vomiting _+__abd pain _+__npo ___clears __full diet __bm  :___ Musk: _+__pain ___spasm  Skin:___ Neuro:  _-__bjkawcuf_-__wajrvjoyx__-_ numbness _-__weakness ___paresth  Psych:__anxiety  Endo:__-_ Heme:_+__Allergy:_PCN, altace________, ___all others reviewed and negative    PAST MEDICAL & SURGICAL HISTORY:  Pelvic mass  Anemia  Depression: anxiety  Aneurysm of aortic root  Cardiac arrhythmia  Pulmonary emphysema: COPD  DVT (deep venous thrombosis)  ONOFRE (dyspnea on exertion)  Hyperlipidemia  Cardiomyopathy  HTN (hypertension)  AAA (abdominal aortic aneurysm)  Surgery, elective: Intrathecal pump placement  Surgery, elective: Cardiac Stent x4  S/P carpal tunnel release  S/P AAA (abdominal aortic aneurysm) repair: with right iliac aneurysm endovascular repair  S/P arthroscopy of right knee  S/P hip replacement, left  Surgery, elective: multiple neck surgery, cervical  History of back surgery: multiple, lumbar  History of kidney surgery  Pacemaker: medtronic      SH: _quit smoking 2010__Tobacco   _denies__Alcohol                          FH:FAMILY HISTORY:  mother-HTN      lactated ringers. 1000milliLiter(s) IV Continuous <Continuous>  ondansetron Injectable 4milliGRAM(s) IV Push every 6 hours PRN  naloxone Injectable 0.1milliGRAM(s) IV Push every 3 minutes PRN  heparin  Injectable 5000Unit(s) SubCutaneous every 8 hours  metroNIDAZOLE  IVPB 500milliGRAM(s) IV Intermittent every 8 hours  finasteride 5milliGRAM(s) Oral daily  doxazosin 4milliGRAM(s) Oral at bedtime  nortriptyline 75milliGRAM(s) Oral at bedtime  carvedilol 6.25milliGRAM(s) Oral every 12 hours  pantoprazole  Injectable 40milliGRAM(s) IV Push daily  HYDROmorphone PCA (5 mG/mL) Rescue Clinician Bolus 1milliGRAM(s) IV Push every 1 hour PRN  diazepam  Injectable 1milliGRAM(s) IV Push every 6 hours PRN  HYDROmorphone PCA (1 mG/mL) 30milliLiter(s) PCA Continuous PCA Continuous      T(C): 36.7, Max: 36.7 (02-13 @ 19:05)  HR: 104 (82 - 120)  BP: 136/79 (117/91 - 199/103)  RR: 18 (14 - 20)  SpO2: 98% (90% - 100%)  Wt(kg): --    T(C): 36.7, Max: 36.7 (02-13 @ 19:05)  HR: 104 (82 - 120)  BP: 136/79 (117/91 - 199/103)  RR: 18 (14 - 20)  SpO2: 98% (90% - 100%)  Wt(kg): --    T(C): 36.7, Max: 36.7 (02-13 @ 19:05)  HR: 104 (82 - 120)  BP: 136/79 (117/91 - 199/103)  RR: 18 (14 - 20)  SpO2: 98% (90% - 100%)  Wt(kg): --    PHYSICAL EXAM:  Gen Appearance: _x__no acute distress _x__appropriate        Neuro: _x__SILT feet__x__ EOM Intact Psych: AAOX_3_, _x__mood/affect appropriate        Eyes: _x__conjunctiva WNL  __x___ Pupils equal and round        ENT: _x__ears and nose atraumatic_x__ Hearing grossly intact        Neck: ___trachea midline, no visible masses ___thyroid without palpable mass    Resp: _x__Nml WOB____No tactile fremitus ___clear to auscultation    Cardio: _x__extremities free from edema __x__pedal pulses palpable    GI/Abdomen: _x__soft __x__ diffusely tender______Nondistended_____HSM    Lymphatic: ___no palpable nodes in neck  ___no palpable nodes calves and feet    Skin/Wound: ___Incision, _x__Dressing c/d/(midline abd incision),   ____surrounding tissues soft,  ___drain/chest tube present____    Muscular: EHL _5__/5  Gastrocnemius___/5    _x__absent clubbing/cyanosis      ASSESSMENT: This is a 66y old Male with a history of   Pelvic mass  Anemia  Depression: anxiety  Aneurysm of aortic root  Cardiac arrhythmia  Pulmonary emphysema: COPD  DVT (deep venous thrombosis)  ONOFRE (dyspnea on exertion)  Hyperlipidemia  Cardiomyopathy  HTN (hypertension)  AAA (abdominal aortic aneurysm)  Mesenteric mass  Diagnostic laparoscopy  Exploratory laparotomy  Hemicolectomy, right  Omentectomy: partial  Inguinal hernia repair, right  Surgery, elective: Intrathecal pump placement  Surgery, elective: Cardiac Stent x4  S/P carpal tunnel release  S/P AAA (abdominal aortic aneurysm) repair: with right iliac aneurysm endovascular repair  S/P arthroscopy of right knee  S/P hip replacement, left  Surgery, elective: multiple neck surgery, cervical  History of back surgery: multiple, lumbar  History of kidney surgery  Pacemaker: medtronic  and opioid dependence, chronic neck and back pain and abdominal pain S/P exploratory laparotomy, right hemicolectomy, inguinal hernia repair and is complaining of uncontrolled abdominal pain      Recommended Treatment PLAN: Pain Management Consult Note - [facility] [phone]    Chief Complaint:  Abdominal pain, back pain    HPI:  67 y/o male with a history of chronic back pain and now complains of abdominal pain S/P exploratory laparotomy, right hemicolectomy, inguinal hernia repair and appendectomy on 2/13/17.  The patient has had multiple neck and back surgeries in the past.  States he takes oxycontin 30 mg po q8h and oxycodone 15 mg BID at home.  Pt. states he had an intrathecal pump, but is not using it now.  Pt. states the PCA is not helping.      Pain is _x__ sharp ____dull ___burning ___achy ___ Intensity: ____ mild ___mod ___severe     Location __x__surgical site ____cervical ___x__lumbar __x__abd ____upper ext____lower ext    Worse with __x__activity _x___movement _____physical therapy___ Rest    Improved with __x__medication ___x_rest ____physical therapy    ROS: Const:  _-__febrile   Eyes:___ENT:_-__CV: _-__chest pain  Resp: _-___sob  GI:_+__nausea _-__vomiting _+__abd pain _+__npo ___clears __full diet __bm  :___ Musk: _+__pain ___spasm  Skin:___ Neuro:  _-__aagqegfn_-__rgtpkhyda__-_ numbness _-__weakness ___paresth  Psych:__anxiety  Endo:__-_ Heme:_+__Allergy:_PCN, altace________, ___all others reviewed and negative    PAST MEDICAL & SURGICAL HISTORY:  Pelvic mass  Anemia  Depression: anxiety  Aneurysm of aortic root  Cardiac arrhythmia  Pulmonary emphysema: COPD  DVT (deep venous thrombosis)  ONOFRE (dyspnea on exertion)  Hyperlipidemia  Cardiomyopathy  HTN (hypertension)  AAA (abdominal aortic aneurysm)  Surgery, elective: Intrathecal pump placement  Surgery, elective: Cardiac Stent x4  S/P carpal tunnel release  S/P AAA (abdominal aortic aneurysm) repair: with right iliac aneurysm endovascular repair  S/P arthroscopy of right knee  S/P hip replacement, left  Surgery, elective: multiple neck surgery, cervical  History of back surgery: multiple, lumbar  History of kidney surgery  Pacemaker: medtronic      SH: _quit smoking 2010__Tobacco   _denies__Alcohol                          FH:FAMILY HISTORY:  mother-HTN      lactated ringers. 1000milliLiter(s) IV Continuous <Continuous>  ondansetron Injectable 4milliGRAM(s) IV Push every 6 hours PRN  naloxone Injectable 0.1milliGRAM(s) IV Push every 3 minutes PRN  heparin  Injectable 5000Unit(s) SubCutaneous every 8 hours  metroNIDAZOLE  IVPB 500milliGRAM(s) IV Intermittent every 8 hours  finasteride 5milliGRAM(s) Oral daily  doxazosin 4milliGRAM(s) Oral at bedtime  nortriptyline 75milliGRAM(s) Oral at bedtime  carvedilol 6.25milliGRAM(s) Oral every 12 hours  pantoprazole  Injectable 40milliGRAM(s) IV Push daily  HYDROmorphone PCA (5 mG/mL) Rescue Clinician Bolus 1milliGRAM(s) IV Push every 1 hour PRN  diazepam  Injectable 1milliGRAM(s) IV Push every 6 hours PRN  HYDROmorphone PCA (1 mG/mL) 30milliLiter(s) PCA Continuous PCA Continuous      T(C): 36.7, Max: 36.7 (02-13 @ 19:05)  HR: 104 (82 - 120)  BP: 136/79 (117/91 - 199/103)  RR: 18 (14 - 20)  SpO2: 98% (90% - 100%)  Wt(kg): --    T(C): 36.7, Max: 36.7 (02-13 @ 19:05)  HR: 104 (82 - 120)  BP: 136/79 (117/91 - 199/103)  RR: 18 (14 - 20)  SpO2: 98% (90% - 100%)  Wt(kg): --    T(C): 36.7, Max: 36.7 (02-13 @ 19:05)  HR: 104 (82 - 120)  BP: 136/79 (117/91 - 199/103)  RR: 18 (14 - 20)  SpO2: 98% (90% - 100%)  Wt(kg): --    PHYSICAL EXAM:  Gen Appearance: _x__no acute distress _x__appropriate        Neuro: _x__SILT feet__x__ EOM Intact Psych: AAOX_3_, _x__mood/affect appropriate        Eyes: _x__conjunctiva WNL  __x___ Pupils equal and round        ENT: _x__ears and nose atraumatic_x__ Hearing grossly intact        Neck: ___trachea midline, no visible masses ___thyroid without palpable mass    Resp: _x__Nml WOB____No tactile fremitus ___clear to auscultation    Cardio: _x__extremities free from edema __x__pedal pulses palpable    GI/Abdomen: _x__soft __x__ diffusely tender______Nondistended_____HSM    Lymphatic: ___no palpable nodes in neck  ___no palpable nodes calves and feet    Skin/Wound: ___Incision, _x__Dressing c/d/(midline abd incision),   ____surrounding tissues soft,  ___drain/chest tube present____    Muscular: EHL _5__/5  Gastrocnemius___/5    _x__absent clubbing/cyanosis      ASSESSMENT: This is a 66y old Male with a history of   Pelvic mass  Anemia  Depression: anxiety  Aneurysm of aortic root  Cardiac arrhythmia  Pulmonary emphysema: COPD  DVT (deep venous thrombosis)  ONOFRE (dyspnea on exertion)  Hyperlipidemia  Cardiomyopathy  HTN (hypertension)  AAA (abdominal aortic aneurysm)  Mesenteric mass  Diagnostic laparoscopy  Exploratory laparotomy  Hemicolectomy, right  Omentectomy: partial  Inguinal hernia repair, right  Surgery, elective: Intrathecal pump placement  Surgery, elective: Cardiac Stent x4  S/P carpal tunnel release  S/P AAA (abdominal aortic aneurysm) repair: with right iliac aneurysm endovascular repair  S/P arthroscopy of right knee  S/P hip replacement, left  Surgery, elective: multiple neck surgery, cervical  History of back surgery: multiple, lumbar  History of kidney surgery  Pacemaker: medtronic  and opioid dependence, chronic neck and back pain and abdominal pain S/P exploratory laparotomy, right hemicolectomy, inguinal hernia repair and is complaining of uncontrolled abdominal pain      Recommended Treatment PLAN:  1.  Dilaudid PCA 0/0.4mg/6min with 1 mg IV q1h prn  2.  Start home dose of oxycontin 30 mg po q8h  3.  Toradol 15 mg IV q6h x 4 doses

## 2017-02-14 NOTE — PHYSICAL THERAPY INITIAL EVALUATION ADULT - ADDITIONAL COMMENTS
Patient states that he lives in a 1 level apartment with 1 small step to enter the building. Lives with wife.

## 2017-02-14 NOTE — PHYSICAL THERAPY INITIAL EVALUATION ADULT - PERTINENT HX OF CURRENT PROBLEM, REHAB EVAL
Patient is a 66 year old male with extensive past medical history including recent AAA and right iliac aneurysm repaired endovascularly, who was incidentally found to have a mesenteric mass in the RLQ that expanded on repeat imaging. Because the mass was enlarging, he was scheduled electively for surgical intervention. Patient is a 66 year old male with extensive past medical history including recent AAA and right iliac aneurysm, 5 spine surgeries with continued back pain, and L ARIEL in 2011; incidentally found to have a mesenteric mass in the RLQ that expanded on repeat imaging. Because the mass was enlarging, he was scheduled electively for surgical intervention.

## 2017-02-14 NOTE — PHYSICAL THERAPY INITIAL EVALUATION ADULT - DIAGNOSIS, PT EVAL
4I: Impaired Joint Mobility, Motor Function, Muscle Performance, and Range of Motion Associated with Bony or Soft Tissue Surgery

## 2017-02-14 NOTE — PROGRESS NOTE ADULT - SUBJECTIVE AND OBJECTIVE BOX
O/N: c/o severe back abd and shoulder pain, restless in bed, valium added for spasms. Tachycardic to 120s due to pain, PCA demand dose increased, IV tylenol added. Post Op HH stable 10.4/30.6.     STATUS POST: Lap converted to open right hemicolectomy and right inguinal hernia repair    POST OP DAY #: 1 STATUS POST: Lap converted to open right hemicolectomy and right inguinal hernia repair    POST OP DAY #: 1    SUBJECTIVE: Patient seen and examined bedside by chief resident.    O/N: c/o severe back abd and shoulder pain, restless in bed, valium added for spasms. Tachycardic to 120s due to pain, PCA demand dose increased, IV tylenol added. Post Op HH stable 10.4/30.6.     heparin  Injectable 5000Unit(s) SubCutaneous every 8 hours  metroNIDAZOLE  IVPB 500milliGRAM(s) IV Intermittent every 8 hours  doxazosin 4milliGRAM(s) Oral at bedtime  carvedilol 6.25milliGRAM(s) Oral every 12 hours      Vital Signs Last 24 Hrs  T(C): 36.7, Max: 36.7 (02-13 @ 19:05)  T(F): 98, Max: 98 (02-13 @ 19:05)  HR: 104 (82 - 120)  BP: 136/79 (117/91 - 199/103)  BP(mean): --  RR: 18 (14 - 20)  SpO2: 98% (90% - 100%)  I&O's Detail    I & Os for current day (as of 14 Feb 2017 07:36)  =============================================  IN:    lactated ringers.: 1320 ml    IV PiggyBack: 800 ml    Total IN: 2120 ml  ---------------------------------------------  OUT:    Indwelling Catheter - Urethral: 1830 ml    Total OUT: 1830 ml  ---------------------------------------------  Total NET: 290 ml      General: NAD, resting comfortably in bed  C/V: NSR  Pulm: Nonlabored breathing, no respiratory distress  Abd: soft, NT/ND. site w/o edema/erythema/discharge  Extrem: WWP, no edema, SCDs in place        LABS:                        10.4   14.0  )-----------( 231      ( 13 Feb 2017 21:57 )             30.6     13 Feb 2017 21:58    133    |  98     |  13     ----------------------------<  201    3.8     |  23     |  0.90     Ca    8.1        13 Feb 2017 21:58  Phos  3.3       13 Feb 2017 21:58  Mg     1.6       13 Feb 2017 21:58    TPro  6.9    /  Alb  3.0    /  TBili  0.8    /  DBili  x      /  AST  23     /  ALT  19     /  AlkPhos  59     13 Feb 2017 21:58    PT/INR - ( 13 Feb 2017 17:42 )   PT: 14.3 sec;   INR: 1.29          PTT - ( 13 Feb 2017 17:42 )  PTT:30.9 sec      RADIOLOGY & ADDITIONAL STUDIES:

## 2017-02-14 NOTE — CONSULT NOTE ADULT - SUBJECTIVE AND OBJECTIVE BOX
LUPE KESSLER    [id number]    Patient is a 66y old  Male who presents with a chief complaint of Mesenteric mass (13 Feb 2017 17:50)      HPI:  Patient is a 66 year old male with extensive past medical history including recent AAA and right iliac aneurysm repaired endovascularly, who was incidentally found to have a mesenteric mass in the RLQ that expanded on repeat imaging. Because the mass was enlarging, he was scheduled electively for surgical intervention. He was seen pre-operatively by his PMD, Dr. East and his cardiologist, Dr. Gonzales. (13 Feb 2017 17:50)      PAST MEDICAL & SURGICAL HISTORY:  Pelvic mass  Anemia  Depression: anxiety  Aneurysm of aortic root  Cardiac arrhythmia  Pulmonary emphysema: COPD  DVT (deep venous thrombosis)  ONOFRE (dyspnea on exertion)  Hyperlipidemia  Cardiomyopathy  HTN (hypertension)  AAA (abdominal aortic aneurysm)  Surgery, elective: Intrathecal pump placement  Surgery, elective: Cardiac Stent x4  S/P carpal tunnel release  S/P AAA (abdominal aortic aneurysm) repair: with right iliac aneurysm endovascular repair  S/P arthroscopy of right knee  S/P hip replacement, left  Surgery, elective: multiple neck surgery, cervical  History of back surgery: multiple, lumbar  History of kidney surgery  Pacemaker: medtronic        Social History:    FAMILY HISTORY:      Functional Level Prior to Admission:                          8.5    8.0   )-----------( 223      ( 14 Feb 2017 07:24 )             25.8       14 Feb 2017 07:24    135    |  101    |  12     ----------------------------<  173    3.5     |  25     |  0.84     Ca    8.3        14 Feb 2017 07:24  Phos  1.6       14 Feb 2017 07:24  Mg     1.9       14 Feb 2017 07:24    TPro  6.9    /  Alb  3.0    /  TBili  0.8    /  DBili  x      /  AST  23     /  ALT  19     /  AlkPhos  59     13 Feb 2017 21:58      Vital Signs Last 24 Hrs  T(C): 37.1, Max: 37.1 (02-14 @ 09:35)  T(F): 98.8, Max: 98.8 (02-14 @ 09:35)  HR: 106 (82 - 120)  BP: 133/81 (117/91 - 199/103)  BP(mean): --  RR: 16 (14 - 20)  SpO2: 95% (90% - 100%)      PHYSICAL EXAM:This 66 male was admitted on 2/13/17 and unerwent exp. laparotomy, Rt. hemicolectomy, Rt. inguinal hernia repair and partial omentectomy for pelvic, mesenteric mass. Recent h/o AAA repair, endovascularly. Lt. hip replacement in 2011. Lt. CTS release. Cervical and lumbar surgery. h/o OA knee. Cardiomyopathy. Lives with wife. Ambulation with cane or walker.      Constitutional:lying in bed. alert, oriented and cooperative.    Eyes:neg.    ENMT:neg.    Neck:supple. no neck pain    Breasts:    Back:no back pain.    Respiratory:    Cardiovascular:    Gastrointestinal:abdminal wound pain.    Genitourinary:    Rectal:    Extremities:rom wnl, Rt. knee pain on extension.    Vascular:    Neurological:alert. Deconditioned. no focal deficits. 3+-4/5. Attempted bed mobility- c/o mod. severe abdomail wound pain.    Skin:    Lymph Nodes:    Musculoskeletal:    Psychiatric:            Impression:1. Deconditioned. 2. Exp lap. Rt. hemicolectomy, Rt. inguinal hernia repair and partial omentectomy for pelvic andmesenteric mass. 3. OA knee.    Recommendations:1. PT for therapeutic ex., bed mobility, transfer activity and gait training as tolerated. 2. Home PT or short term Subacute Rehab. placement, pending function progress.

## 2017-02-14 NOTE — PHYSICAL THERAPY INITIAL EVALUATION ADULT - CRITERIA FOR SKILLED THERAPEUTIC INTERVENTIONS
impairments found/anticipated equipment needs at discharge/rehab potential/therapy frequency/anticipated discharge recommendation

## 2017-02-15 LAB
ANION GAP SERPL CALC-SCNC: 6 MMOL/L — LOW (ref 9–16)
BUN SERPL-MCNC: 11 MG/DL — SIGNIFICANT CHANGE UP (ref 7–23)
CALCIUM SERPL-MCNC: 8.1 MG/DL — LOW (ref 8.5–10.5)
CHLORIDE SERPL-SCNC: 105 MMOL/L — SIGNIFICANT CHANGE UP (ref 96–108)
CO2 SERPL-SCNC: 29 MMOL/L — SIGNIFICANT CHANGE UP (ref 22–31)
CREAT SERPL-MCNC: 0.92 MG/DL — SIGNIFICANT CHANGE UP (ref 0.5–1.3)
GLUCOSE SERPL-MCNC: 111 MG/DL — HIGH (ref 70–99)
HCT VFR BLD CALC: 21.8 % — LOW (ref 39–50)
HCT VFR BLD CALC: 24.8 % — LOW (ref 39–50)
HGB BLD-MCNC: 6.9 G/DL — CRITICAL LOW (ref 13–17)
HGB BLD-MCNC: 8 G/DL — LOW (ref 13–17)
MAGNESIUM SERPL-MCNC: 2.1 MG/DL — SIGNIFICANT CHANGE UP (ref 1.6–2.4)
MCHC RBC-ENTMCNC: 27.1 PG — SIGNIFICANT CHANGE UP (ref 27–34)
MCHC RBC-ENTMCNC: 27.6 PG — SIGNIFICANT CHANGE UP (ref 27–34)
MCHC RBC-ENTMCNC: 31.7 G/DL — LOW (ref 32–36)
MCHC RBC-ENTMCNC: 32.3 G/DL — SIGNIFICANT CHANGE UP (ref 32–36)
MCV RBC AUTO: 85.5 FL — SIGNIFICANT CHANGE UP (ref 80–100)
MCV RBC AUTO: 85.5 FL — SIGNIFICANT CHANGE UP (ref 80–100)
PHOSPHATE SERPL-MCNC: 1.7 MG/DL — LOW (ref 2.5–4.5)
PLATELET # BLD AUTO: 178 K/UL — SIGNIFICANT CHANGE UP (ref 150–400)
PLATELET # BLD AUTO: 191 K/UL — SIGNIFICANT CHANGE UP (ref 150–400)
POTASSIUM SERPL-MCNC: 3.3 MMOL/L — LOW (ref 3.5–5.3)
POTASSIUM SERPL-SCNC: 3.3 MMOL/L — LOW (ref 3.5–5.3)
RBC # BLD: 2.55 M/UL — LOW (ref 4.2–5.8)
RBC # BLD: 2.9 M/UL — LOW (ref 4.2–5.8)
RBC # FLD: 14.1 % — SIGNIFICANT CHANGE UP (ref 10.3–16.9)
RBC # FLD: 14.4 % — SIGNIFICANT CHANGE UP (ref 10.3–16.9)
SODIUM SERPL-SCNC: 140 MMOL/L — SIGNIFICANT CHANGE UP (ref 135–145)
WBC # BLD: 4.9 K/UL — SIGNIFICANT CHANGE UP (ref 3.8–10.5)
WBC # BLD: 6 K/UL — SIGNIFICANT CHANGE UP (ref 3.8–10.5)
WBC # FLD AUTO: 4.9 K/UL — SIGNIFICANT CHANGE UP (ref 3.8–10.5)
WBC # FLD AUTO: 6 K/UL — SIGNIFICANT CHANGE UP (ref 3.8–10.5)

## 2017-02-15 PROCEDURE — 99232 SBSQ HOSP IP/OBS MODERATE 35: CPT

## 2017-02-15 RX ORDER — OXYCODONE HYDROCHLORIDE 5 MG/1
15 TABLET ORAL EVERY 4 HOURS
Qty: 0 | Refills: 0 | Status: DISCONTINUED | OUTPATIENT
Start: 2017-02-15 | End: 2017-02-18

## 2017-02-15 RX ORDER — HYDROMORPHONE HYDROCHLORIDE 2 MG/ML
1 INJECTION INTRAMUSCULAR; INTRAVENOUS; SUBCUTANEOUS
Qty: 0 | Refills: 0 | Status: DISCONTINUED | OUTPATIENT
Start: 2017-02-15 | End: 2017-02-18

## 2017-02-15 RX ORDER — POTASSIUM CHLORIDE 20 MEQ
40 PACKET (EA) ORAL
Qty: 0 | Refills: 0 | Status: COMPLETED | OUTPATIENT
Start: 2017-02-15 | End: 2017-02-16

## 2017-02-15 RX ORDER — POTASSIUM PHOSPHATE, MONOBASIC POTASSIUM PHOSPHATE, DIBASIC 236; 224 MG/ML; MG/ML
21 INJECTION, SOLUTION INTRAVENOUS ONCE
Qty: 0 | Refills: 0 | Status: COMPLETED | OUTPATIENT
Start: 2017-02-15 | End: 2017-02-15

## 2017-02-15 RX ORDER — ACETAMINOPHEN 500 MG
650 TABLET ORAL ONCE
Qty: 0 | Refills: 0 | Status: COMPLETED | OUTPATIENT
Start: 2017-02-15 | End: 2017-02-15

## 2017-02-15 RX ADMIN — OXYCODONE HYDROCHLORIDE 15 MILLIGRAM(S): 5 TABLET ORAL at 13:29

## 2017-02-15 RX ADMIN — OXYCODONE HYDROCHLORIDE 15 MILLIGRAM(S): 5 TABLET ORAL at 23:10

## 2017-02-15 RX ADMIN — Medication 15 MILLIGRAM(S): at 00:39

## 2017-02-15 RX ADMIN — HEPARIN SODIUM 5000 UNIT(S): 5000 INJECTION INTRAVENOUS; SUBCUTANEOUS at 13:57

## 2017-02-15 RX ADMIN — HEPARIN SODIUM 5000 UNIT(S): 5000 INJECTION INTRAVENOUS; SUBCUTANEOUS at 06:13

## 2017-02-15 RX ADMIN — POTASSIUM PHOSPHATE, MONOBASIC POTASSIUM PHOSPHATE, DIBASIC 64.25 MILLIMOLE(S): 236; 224 INJECTION, SOLUTION INTRAVENOUS at 10:07

## 2017-02-15 RX ADMIN — CARVEDILOL PHOSPHATE 6.25 MILLIGRAM(S): 80 CAPSULE, EXTENDED RELEASE ORAL at 06:13

## 2017-02-15 RX ADMIN — OXYCODONE HYDROCHLORIDE 30 MILLIGRAM(S): 5 TABLET ORAL at 14:31

## 2017-02-15 RX ADMIN — Medication 40 MILLIEQUIVALENT(S): at 18:00

## 2017-02-15 RX ADMIN — HEPARIN SODIUM 5000 UNIT(S): 5000 INJECTION INTRAVENOUS; SUBCUTANEOUS at 21:08

## 2017-02-15 RX ADMIN — FINASTERIDE 5 MILLIGRAM(S): 5 TABLET, FILM COATED ORAL at 12:37

## 2017-02-15 RX ADMIN — OXYCODONE HYDROCHLORIDE 30 MILLIGRAM(S): 5 TABLET ORAL at 06:13

## 2017-02-15 RX ADMIN — OXYCODONE HYDROCHLORIDE 15 MILLIGRAM(S): 5 TABLET ORAL at 19:00

## 2017-02-15 RX ADMIN — NORTRIPTYLINE HYDROCHLORIDE 75 MILLIGRAM(S): 10 CAPSULE ORAL at 21:09

## 2017-02-15 RX ADMIN — SODIUM CHLORIDE 135 MILLILITER(S): 9 INJECTION, SOLUTION INTRAVENOUS at 11:49

## 2017-02-15 RX ADMIN — Medication 15 MILLIGRAM(S): at 06:13

## 2017-02-15 RX ADMIN — OXYCODONE HYDROCHLORIDE 15 MILLIGRAM(S): 5 TABLET ORAL at 18:00

## 2017-02-15 RX ADMIN — OXYCODONE HYDROCHLORIDE 30 MILLIGRAM(S): 5 TABLET ORAL at 15:15

## 2017-02-15 RX ADMIN — HYDROMORPHONE HYDROCHLORIDE 1 MILLIGRAM(S): 2 INJECTION INTRAMUSCULAR; INTRAVENOUS; SUBCUTANEOUS at 21:12

## 2017-02-15 RX ADMIN — Medication 40 MILLIEQUIVALENT(S): at 08:56

## 2017-02-15 RX ADMIN — CARVEDILOL PHOSPHATE 6.25 MILLIGRAM(S): 80 CAPSULE, EXTENDED RELEASE ORAL at 18:00

## 2017-02-15 RX ADMIN — Medication 4 MILLIGRAM(S): at 21:09

## 2017-02-15 RX ADMIN — OXYCODONE HYDROCHLORIDE 15 MILLIGRAM(S): 5 TABLET ORAL at 22:41

## 2017-02-15 RX ADMIN — OXYCODONE HYDROCHLORIDE 30 MILLIGRAM(S): 5 TABLET ORAL at 21:08

## 2017-02-15 RX ADMIN — PANTOPRAZOLE SODIUM 40 MILLIGRAM(S): 20 TABLET, DELAYED RELEASE ORAL at 12:38

## 2017-02-15 RX ADMIN — OXYCODONE HYDROCHLORIDE 15 MILLIGRAM(S): 5 TABLET ORAL at 12:51

## 2017-02-15 RX ADMIN — HYDROMORPHONE HYDROCHLORIDE 1 MILLIGRAM(S): 2 INJECTION INTRAMUSCULAR; INTRAVENOUS; SUBCUTANEOUS at 21:35

## 2017-02-15 RX ADMIN — Medication 650 MILLIGRAM(S): at 07:28

## 2017-02-15 RX ADMIN — Medication 650 MILLIGRAM(S): at 08:30

## 2017-02-15 NOTE — PROGRESS NOTE ADULT - SUBJECTIVE AND OBJECTIVE BOX
POD#2    passed flatus  pain controlled    AVSS  Abd: soft, appropriately tender  incisons clean  excellent UO    OOB  Pulmonary toilet  clears  soc svc

## 2017-02-15 NOTE — PROGRESS NOTE ADULT - SUBJECTIVE AND OBJECTIVE BOX
Pain Management Progress Note - [facility] [phone]    HPI:  Pt. states pain is improved today.  States he is taking clear liquids now.              Pertinent PMH: Pain at: ___Back ___Neck___Knee ___Hip ___Shoulder __x_ Opioid tolerance    Pain is __x_ sharp ____dull ___burning ___achy ___ Intensity: ____ mild ____mod ____severe     Location __x___surgical site _____cervical _____lumbar __x__abd _____upper ext____lower ext    Worse with __x__activity __x__movement _____physical therapy___ Rest    Improved with __x__medication __x__rest ____physical therapy      ondansetron Injectable  naloxone Injectable  heparin  Injectable  finasteride  doxazosin  nortriptyline  carvedilol  pantoprazole  Injectable  diazepam  Injectable  oxyCODONE ER Tablet  dextrose 5% + sodium chloride 0.45%.  potassium chloride   Powder  oxyCODONE IR  HYDROmorphone  Injectable      ROS: Const:  ___febrile   Eyes:___ENT:___CV: ___chest pain  Resp: ____sob  GI:___nausea ___vomiting __+__abd pain ___npo __+_clears ___full diet __bm  :___ Musk: __+_pain ___spasm  Skin:___ Neuro:  _-__ridaepya_-__qouaubdvo__-__ numbness ___weakness ___paresthesia  Psych:___anxiety  Endo:___ Heme:___Allergy:___      02-15 @ 07:1886 mL/min/1.73M2          Hemoglobin: 6.9 g/dL (02-15 @ 07:18)  Hemoglobin: 8.5 g/dL (02-14 @ 07:24)  Hemoglobin: 10.4 g/dL (02-13 @ 21:57)  Hemoglobin: 9.3 g/dL (02-13 @ 17:42)        T(C): 37.1, Max: 37.2 (02-14 @ 17:06)  HR: 73 (73 - 90)  BP: 115/68 (115/68 - 137/71)  RR: 16 (16 - 18)  SpO2: 96% (94% - 97%)  Wt(kg): --     PHYSICAL EXAM:  Gen Appearance: __x_no acute distress _x__appropriate         Neuro: _x__SILT feet__x__ EOM Intact Psych: AAOX_3_, _x__mood/affect appropriate        Eyes: _x__conjunctiva WNL  _x____ Pupils equal and round        ENT: _x__ears and nose atraumatic_x__ Hearing grossly intact        Neck: ___trachea midline, no visible masses ___thyroid without palpable mass    Resp: __x_Nml WOB____No tactile fremitus ___clear to auscultation    Cardio: ___extremities free from edema ____pedal pulses palpable    GI/Abdomen: ___soft _____ Nontender______Nondistended_____HSM    Lymphatic: ___no palpable nodes in neck  ___no palpable nodes calves and feet    Skin/Wound: ___Incision, __x_Dressing c/d/i to midline abd incision,   ____surrounding tissues soft,  ___drain/chest tube present____    Muscular: EHL __5_/5  Gastrocnemius___/5    _x__absent clubbing/cyanosis         ASSESSMENT:  This is a 66y old Male with a history of:  Pelvic mass  Anemia  Depression  Aneurysm of aortic root  Cardiac arrhythmia  Pulmonary emphysema  DVT (deep venous thrombosis)  ONOFRE (dyspnea on exertion)  Hyperlipidemia  Cardiomyopathy  HTN (hypertension)  AAA (abdominal aortic aneurysm)  Mesenteric mass  Diagnostic laparoscopy  Exploratory laparotomy  Hemicolectomy, right  Omentectomy  Inguinal hernia repair, right  Surgery, elective  Surgery, elective  S/P carpal tunnel release  S/P AAA (abdominal aortic aneurysm) repair  S/P arthroscopy of right knee  S/P hip replacement, left  Surgery, elective  History of back surgery  History of kidney surgery  Pacemaker  and opioid dependence with abdominal pain S/P exploratory laparotomy and right hemicolectomy and is doing better today      Recommended Treatment PLAN:  1.  Oxycontin 30 mg po q8h  2.  D/C PCA start oxycodone 15 mg po q4h prn  3.  Dilaudid 1 mg IV q2h prn

## 2017-02-15 NOTE — PROGRESS NOTE ADULT - SUBJECTIVE AND OBJECTIVE BOX
INTERVAL HISTORY:  	feeling betetr except for abdominal "soreness", ambulated , on liquid diet. no c/o cp/sob      MEDICATIONS:  doxazosin 4milliGRAM(s) Oral at bedtime  carvedilol 6.25milliGRAM(s) Oral every 12 hours        ondansetron Injectable 4milliGRAM(s) IV Push every 6 hours PRN  nortriptyline 75milliGRAM(s) Oral at bedtime  HYDROmorphone PCA (5 mG/mL) [facility] Bolus 1milliGRAM(s) IV Push every 1 hour PRN  diazepam  Injectable 1milliGRAM(s) IV Push every 6 hours PRN  HYDROmorphone PCA (1 mG/mL) 30milliLiter(s) PCA Continuous PCA Continuous  oxyCODONE ER Tablet 30milliGRAM(s) Oral every 8 hours    pantoprazole  Injectable 40milliGRAM(s) IV Push daily    finasteride 5milliGRAM(s) Oral daily    heparin  Injectable 5000Unit(s) SubCutaneous every 8 hours  dextrose 5% + sodium chloride 0.45%. 1000milliLiter(s) IV Continuous <Continuous>          PHYSICAL EXAM:  T(C): 36.3, Max: 37.2 (02-14 @ 17:06)  HR: 75 (74 - 106)  BP: 137/71 (122/76 - 137/71)  RR: 18 (16 - 18)  SpO2: 94% (94% - 97%)  Wt(kg): --  I&O's Summary    I & Os for current day (as of 15 Feb 2017 07:57)  =============================================  IN: 3285 ml / OUT: 3650 ml / NET: -365 ml        Appearance: Normal	  HEENT:   Normal oral mucosa, PERRL, EOMI	  Lymphatic: No lymphadenopathy  Cardiovascular: Normal S1 S2, No JVD, No murmurs, No edema  Respiratory: Lungs clear to auscultation	  Psychiatry: A & O x 3, Mood & affect appropriate  Gastrointestinal:  Soft, mild tenderness	  Skin: No rashes, No ecchymoses, No cyanosis  Neurologic: Non-focal  Extremities: Normal range of motion, No clubbing,     TELEMETRY: 	    ECG:  	  RADIOLOGY:   DIAGNOSTIC TESTING:  [ ] Echocardiogram:  [ ]  Catheterization:  [ ] Stress Test:    OTHER: 	    LABS:	 	    CARDIAC MARKERS:                                  6.9    4.9   )-----------( 178      ( 15 Feb 2017 07:18 )             21.8     14 Feb 2017 07:24    135    |  101    |  12     ----------------------------<  173    3.5     |  25     |  0.84     Ca    8.3        14 Feb 2017 07:24  Phos  1.6       14 Feb 2017 07:24  Mg     1.9       14 Feb 2017 07:24    TPro  6.9    /  Alb  3.0    /  TBili  0.8    /  DBili  x      /  AST  23     /  ALT  19     /  AlkPhos  59     13 Feb 2017 21:58    proBNP:   Lipid Profile:   HgA1c:   TSH:     ASSESSMENT/PLAN: 	  CV status satble, HR better now that he is back on Coreg. monitor BP- discussed with nursing staff

## 2017-02-15 NOTE — PROGRESS NOTE ADULT - ASSESSMENT
66yMale s/p above procedure    NPO/IVF  Pain/nausea control  DVT ppx  AM labs  [facility]  [facility] meds 66yMale s/p R hemicoloectomy, passing gas, pain well controlled     Advance to CLD  D/c verde, TOV 1500  Pain/nausea control  DVT ppx  AM labs  [facility]

## 2017-02-15 NOTE — PROGRESS NOTE ADULT - SUBJECTIVE AND OBJECTIVE BOX
Patient is a 66 year old male with extensive past medical history including recent AAA and right iliac aneurysm repaired endovascularly, who was incidentally found to have a mesenteric mass in the RLQ that expanded on repeat imaging. Because the mass was enlarging, he was scheduled electively for surgical intervention.   POD #2        	  MEDICATIONS:  doxazosin 4milliGRAM(s) Oral at bedtime  carvedilol 6.25milliGRAM(s) Oral every 12 hours        ondansetron Injectable 4milliGRAM(s) IV Push every 6 hours PRN  nortriptyline 75milliGRAM(s) Oral at bedtime  diazepam  Injectable 1milliGRAM(s) IV Push every 6 hours PRN  oxyCODONE ER Tablet 30milliGRAM(s) Oral every 8 hours  oxyCODONE IR 15milliGRAM(s) Oral every 4 hours PRN  HYDROmorphone  Injectable 1milliGRAM(s) IV Push every 2 hours PRN    pantoprazole  Injectable 40milliGRAM(s) IV Push daily    finasteride 5milliGRAM(s) Oral daily    heparin  Injectable 5000Unit(s) SubCutaneous every 8 hours  potassium chloride   Powder 40milliEquivalent(s) Oral two times a day      Complaint:     Otherwise 12 point review of systems is normal.    PHYSICAL EXAM:    Constitutional:NAD  Eyes: PERRL, EOMI, sclera non-icteric  Neck: supple, no masses, no JVD  Respiratory: CTA b/l, good air entry b/l, no wheezing, rhonchi, rales, with normal respiratory effort and no intercostal retractions  Cardiovascular: RRR, normal S1S2, no M/R/G  Gastrointestinal: soft, NTND,  Extremities:  no c/c/e  Neurological: AAOx3    ICU Vital Signs Last 24 Hrs  T(C): 36.1, Max: 37.1 (02-15 @ 10:16)  T(F): 97, Max: 98.7 (02-15 @ 10:16)  HR: 78 (73 - 86)  BP: 141/74 (113/80 - 141/74)  BP(mean): --  ABP: --  ABP(mean): --  RR: 16 (15 - 18)  SpO2: 97% (94% - 97%)      ECG:      CHEST X RAY    CT    MRI    MRA    CT ANGIO    CAROTID DUPLEX    DUPLEX     Echocardiogram    Catheterization:    Stress Test:     LABS:	 	  CARDIAC MARKERS:                              8.0    6.0   )-----------( 191      ( 15 Feb 2017 17:19 )             24.8     15 Feb 2017 07:18    140    |  105    |  11     ----------------------------<  111    3.3     |  29     |  0.92     Ca    8.1        15 Feb 2017 07:18  Phos  1.7       15 Feb 2017 07:18  Mg     2.1       15 Feb 2017 07:18    TPro  6.9    /  Alb  3.0    /  TBili  0.8    /  DBili  x      /  AST  23     /  ALT  19     /  AlkPhos  59     13 Feb 2017 21:58    ASSESSMENT/PLAN:    OOB  Pain control  clears

## 2017-02-15 NOTE — PROGRESS NOTE ADULT - SUBJECTIVE AND OBJECTIVE BOX
O/N: VSS, afebrile, pain well controlled. +Flatus  2/14: GERMAINE. Pain much improved from yesterday. OOBA with PT. No flatus, no nausea.    STATUS POST: Lap converted to open right hemicolectomy and right inguinal hernia repair     POST OP DAY #: 2 STATUS POST: Lap converted to open right hemicolectomy and right inguinal hernia repair     POST OP DAY #: 2    STATUS POST:  R hemicolectomy     SUBJECTIVE: Patient seen and examined bedside by chief resident.    O/N: VSS, afebrile, pain well controlled. +Flatus  2/14: GERMAINE. Pain much improved from yesterday. OOBA with PT. No flatus, no nausea.    heparin  Injectable 5000Unit(s) SubCutaneous every 8 hours  doxazosin 4milliGRAM(s) Oral at bedtime  carvedilol 6.25milliGRAM(s) Oral every 12 hours      Vital Signs Last 24 Hrs  T(C): 36.3, Max: 37.2 (02-14 @ 17:06)  T(F): 97.3, Max: 98.9 (02-14 @ 17:06)  HR: 75 (74 - 106)  BP: 137/71 (122/76 - 137/71)  BP(mean): --  RR: 18 (16 - 18)  SpO2: 94% (94% - 97%)  I&O's Detail    I & Os for current day (as of 15 Feb 2017 07:09)  =============================================  IN:    dextrose 5% + sodium chloride 0.45%.: 1350 ml    lactated ringers.: 600 ml    lactated ringers: 480 ml    IV PiggyBack: 350 ml    Oral Fluid: 100 ml    Total IN: 2880 ml  ---------------------------------------------  OUT:    Indwelling Catheter - Urethral: 3250 ml    Total OUT: 3250 ml  ---------------------------------------------  Total NET: -370 ml      General: NAD, resting comfortably in bed  C/V: NSR  Pulm: Nonlabored breathing, no respiratory distress  Abd: soft, NT/ND, site clean with mild serous drainage from midline  Extrem: WWP, no edema, SCDs in place      LABS:                        8.5    8.0   )-----------( 223      ( 14 Feb 2017 07:24 )             25.8     14 Feb 2017 07:24    135    |  101    |  12     ----------------------------<  173    3.5     |  25     |  0.84     Ca    8.3        14 Feb 2017 07:24  Phos  1.6       14 Feb 2017 07:24  Mg     1.9       14 Feb 2017 07:24    TPro  6.9    /  Alb  3.0    /  TBili  0.8    /  DBili  x      /  AST  23     /  ALT  19     /  AlkPhos  59     13 Feb 2017 21:58    PT/INR - ( 13 Feb 2017 17:42 )   PT: 14.3 sec;   INR: 1.29          PTT - ( 13 Feb 2017 17:42 )  PTT:30.9 sec      RADIOLOGY & ADDITIONAL STUDIES:

## 2017-02-16 ENCOUNTER — TRANSCRIPTION ENCOUNTER (OUTPATIENT)
Age: 67
End: 2017-02-16

## 2017-02-16 LAB
ANION GAP SERPL CALC-SCNC: 7 MMOL/L — LOW (ref 9–16)
BUN SERPL-MCNC: 7 MG/DL — SIGNIFICANT CHANGE UP (ref 7–23)
CALCIUM SERPL-MCNC: 8.3 MG/DL — LOW (ref 8.5–10.5)
CHLORIDE SERPL-SCNC: 106 MMOL/L — SIGNIFICANT CHANGE UP (ref 96–108)
CO2 SERPL-SCNC: 26 MMOL/L — SIGNIFICANT CHANGE UP (ref 22–31)
CREAT SERPL-MCNC: 0.9 MG/DL — SIGNIFICANT CHANGE UP (ref 0.5–1.3)
GLUCOSE SERPL-MCNC: 83 MG/DL — SIGNIFICANT CHANGE UP (ref 70–99)
HCT VFR BLD CALC: 27.8 % — LOW (ref 39–50)
HGB BLD-MCNC: 8.8 G/DL — LOW (ref 13–17)
MAGNESIUM SERPL-MCNC: 2.1 MG/DL — SIGNIFICANT CHANGE UP (ref 1.6–2.4)
MCHC RBC-ENTMCNC: 27.2 PG — SIGNIFICANT CHANGE UP (ref 27–34)
MCHC RBC-ENTMCNC: 31.7 G/DL — LOW (ref 32–36)
MCV RBC AUTO: 85.8 FL — SIGNIFICANT CHANGE UP (ref 80–100)
PHOSPHATE SERPL-MCNC: 2.5 MG/DL — SIGNIFICANT CHANGE UP (ref 2.5–4.5)
PLATELET # BLD AUTO: 190 K/UL — SIGNIFICANT CHANGE UP (ref 150–400)
POTASSIUM SERPL-MCNC: 4.7 MMOL/L — SIGNIFICANT CHANGE UP (ref 3.5–5.3)
POTASSIUM SERPL-SCNC: 4.7 MMOL/L — SIGNIFICANT CHANGE UP (ref 3.5–5.3)
RBC # BLD: 3.24 M/UL — LOW (ref 4.2–5.8)
RBC # FLD: 14.9 % — SIGNIFICANT CHANGE UP (ref 10.3–16.9)
SODIUM SERPL-SCNC: 139 MMOL/L — SIGNIFICANT CHANGE UP (ref 135–145)
WBC # BLD: 7.1 K/UL — SIGNIFICANT CHANGE UP (ref 3.8–10.5)
WBC # FLD AUTO: 7.1 K/UL — SIGNIFICANT CHANGE UP (ref 3.8–10.5)

## 2017-02-16 PROCEDURE — 99232 SBSQ HOSP IP/OBS MODERATE 35: CPT

## 2017-02-16 RX ORDER — ACETAMINOPHEN 500 MG
975 TABLET ORAL EVERY 8 HOURS
Qty: 0 | Refills: 0 | Status: DISCONTINUED | OUTPATIENT
Start: 2017-02-16 | End: 2017-02-18

## 2017-02-16 RX ORDER — KETOROLAC TROMETHAMINE 30 MG/ML
15 SYRINGE (ML) INJECTION EVERY 6 HOURS
Qty: 0 | Refills: 0 | Status: DISCONTINUED | OUTPATIENT
Start: 2017-02-16 | End: 2017-02-18

## 2017-02-16 RX ORDER — HYDROMORPHONE HYDROCHLORIDE 2 MG/ML
1 INJECTION INTRAMUSCULAR; INTRAVENOUS; SUBCUTANEOUS ONCE
Qty: 0 | Refills: 0 | Status: DISCONTINUED | OUTPATIENT
Start: 2017-02-16 | End: 2017-02-16

## 2017-02-16 RX ORDER — KETOROLAC TROMETHAMINE 30 MG/ML
15 SYRINGE (ML) INJECTION EVERY 6 HOURS
Qty: 0 | Refills: 0 | Status: DISCONTINUED | OUTPATIENT
Start: 2017-02-16 | End: 2017-02-16

## 2017-02-16 RX ORDER — HYDRALAZINE HCL 50 MG
10 TABLET ORAL
Qty: 0 | Refills: 0 | Status: DISCONTINUED | OUTPATIENT
Start: 2017-02-16 | End: 2017-02-18

## 2017-02-16 RX ADMIN — FINASTERIDE 5 MILLIGRAM(S): 5 TABLET, FILM COATED ORAL at 14:04

## 2017-02-16 RX ADMIN — HYDROMORPHONE HYDROCHLORIDE 1 MILLIGRAM(S): 2 INJECTION INTRAMUSCULAR; INTRAVENOUS; SUBCUTANEOUS at 11:03

## 2017-02-16 RX ADMIN — OXYCODONE HYDROCHLORIDE 15 MILLIGRAM(S): 5 TABLET ORAL at 22:15

## 2017-02-16 RX ADMIN — HYDROMORPHONE HYDROCHLORIDE 1 MILLIGRAM(S): 2 INJECTION INTRAMUSCULAR; INTRAVENOUS; SUBCUTANEOUS at 06:00

## 2017-02-16 RX ADMIN — OXYCODONE HYDROCHLORIDE 30 MILLIGRAM(S): 5 TABLET ORAL at 23:00

## 2017-02-16 RX ADMIN — OXYCODONE HYDROCHLORIDE 15 MILLIGRAM(S): 5 TABLET ORAL at 17:49

## 2017-02-16 RX ADMIN — OXYCODONE HYDROCHLORIDE 15 MILLIGRAM(S): 5 TABLET ORAL at 18:49

## 2017-02-16 RX ADMIN — NORTRIPTYLINE HYDROCHLORIDE 75 MILLIGRAM(S): 10 CAPSULE ORAL at 22:14

## 2017-02-16 RX ADMIN — HYDROMORPHONE HYDROCHLORIDE 1 MILLIGRAM(S): 2 INJECTION INTRAMUSCULAR; INTRAVENOUS; SUBCUTANEOUS at 04:43

## 2017-02-16 RX ADMIN — HEPARIN SODIUM 5000 UNIT(S): 5000 INJECTION INTRAVENOUS; SUBCUTANEOUS at 14:15

## 2017-02-16 RX ADMIN — HEPARIN SODIUM 5000 UNIT(S): 5000 INJECTION INTRAVENOUS; SUBCUTANEOUS at 04:46

## 2017-02-16 RX ADMIN — OXYCODONE HYDROCHLORIDE 15 MILLIGRAM(S): 5 TABLET ORAL at 13:30

## 2017-02-16 RX ADMIN — Medication 975 MILLIGRAM(S): at 14:04

## 2017-02-16 RX ADMIN — OXYCODONE HYDROCHLORIDE 30 MILLIGRAM(S): 5 TABLET ORAL at 14:05

## 2017-02-16 RX ADMIN — Medication 4 MILLIGRAM(S): at 22:14

## 2017-02-16 RX ADMIN — Medication 10 MILLIGRAM(S): at 14:22

## 2017-02-16 RX ADMIN — HEPARIN SODIUM 5000 UNIT(S): 5000 INJECTION INTRAVENOUS; SUBCUTANEOUS at 22:14

## 2017-02-16 RX ADMIN — Medication 40 MILLIEQUIVALENT(S): at 04:46

## 2017-02-16 RX ADMIN — PANTOPRAZOLE SODIUM 40 MILLIGRAM(S): 20 TABLET, DELAYED RELEASE ORAL at 14:04

## 2017-02-16 RX ADMIN — OXYCODONE HYDROCHLORIDE 30 MILLIGRAM(S): 5 TABLET ORAL at 04:46

## 2017-02-16 RX ADMIN — HYDROMORPHONE HYDROCHLORIDE 1 MILLIGRAM(S): 2 INJECTION INTRAMUSCULAR; INTRAVENOUS; SUBCUTANEOUS at 05:05

## 2017-02-16 RX ADMIN — CARVEDILOL PHOSPHATE 6.25 MILLIGRAM(S): 80 CAPSULE, EXTENDED RELEASE ORAL at 17:49

## 2017-02-16 RX ADMIN — Medication 975 MILLIGRAM(S): at 22:14

## 2017-02-16 RX ADMIN — CARVEDILOL PHOSPHATE 6.25 MILLIGRAM(S): 80 CAPSULE, EXTENDED RELEASE ORAL at 04:46

## 2017-02-16 RX ADMIN — HYDROMORPHONE HYDROCHLORIDE 1 MILLIGRAM(S): 2 INJECTION INTRAMUSCULAR; INTRAVENOUS; SUBCUTANEOUS at 10:02

## 2017-02-16 RX ADMIN — OXYCODONE HYDROCHLORIDE 15 MILLIGRAM(S): 5 TABLET ORAL at 23:00

## 2017-02-16 RX ADMIN — OXYCODONE HYDROCHLORIDE 30 MILLIGRAM(S): 5 TABLET ORAL at 15:05

## 2017-02-16 RX ADMIN — OXYCODONE HYDROCHLORIDE 15 MILLIGRAM(S): 5 TABLET ORAL at 12:30

## 2017-02-16 RX ADMIN — HYDROMORPHONE HYDROCHLORIDE 1 MILLIGRAM(S): 2 INJECTION INTRAMUSCULAR; INTRAVENOUS; SUBCUTANEOUS at 05:43

## 2017-02-16 NOTE — PROVIDER CONTACT NOTE (OTHER) - BACKGROUND
2/13 lap converted to open right hemicolectomy
pt is postop day 1 for abdominal surgery, and pain is unrelieved despite all available ordered interventions

## 2017-02-16 NOTE — PROGRESS NOTE ADULT - ASSESSMENT
66yMale s/p R hemicoloectomy, passing gas, pain well controlled     Advance to CLD  D/c verde, TOV 1500  Pain/nausea control  DVT ppx  AM labs  [facility] 66[facility] s/p R hemicoloectomy, passing gas, pain well controlled     Advance to CLD  Pain/nausea control  DVT ppx  AM labs  IS  OOBA  Home meds

## 2017-02-16 NOTE — PROGRESS NOTE ADULT - SUBJECTIVE AND OBJECTIVE BOX
INTERVAL HISTORY:66yMalePatient is a 66y old  Male who presents with a chief complaint of Mesenteric mass (16 Feb 2017 12:15)    	  MEDICATIONS:  doxazosin 4milliGRAM(s) Oral at bedtime  carvedilol 6.25milliGRAM(s) Oral every 12 hours  hydrALAZINE 10milliGRAM(s) Oral two times a day        ondansetron Injectable 4milliGRAM(s) IV Push every 6 hours PRN  nortriptyline 75milliGRAM(s) Oral at bedtime  diazepam  Injectable 1milliGRAM(s) IV Push every 6 hours PRN  oxyCODONE ER Tablet 30milliGRAM(s) Oral every 8 hours  oxyCODONE IR 15milliGRAM(s) Oral every 4 hours PRN  HYDROmorphone  Injectable 1milliGRAM(s) IV Push every 2 hours PRN  ketorolac   Injectable 15milliGRAM(s) IV Push every 6 hours PRN  acetaminophen   Tablet 975milliGRAM(s) Oral every 8 hours    pantoprazole  Injectable 40milliGRAM(s) IV Push daily    finasteride 5milliGRAM(s) Oral daily    heparin  Injectable 5000Unit(s) SubCutaneous every 8 hours      Complaint: Pain    Otherwise 12 point review of systems is normal.    PHYSICAL EXAM:    Constitutional:NAD  Eyes: PERRL, EOMI, sclera non-icteric  Neck: supple, no masses, no JVD  Respiratory: CTA b/l, good air entry b/l, no wheezing, rhonchi, rales, with normal respiratory effort and no intercostal retractions  Cardiovascular: RRR, normal S1S2, no M/R/G  Gastrointestinal: soft,  Extremities:  no c/c/e  Neurological: AAOx3  OOB in chair  ICU Vital Signs Last 24 Hrs  T(C): 37.1, Max: 37.4 (02-15 @ 21:04)  T(F): 98.7, Max: 99.3 (02-15 @ 21:04)  HR: 90 (70 - 97)  BP: 113/73 (113/73 - 194/86)  BP(mean): --  ABP: --  ABP(mean): --  RR: 16 (15 - 16)  SpO2: 95% (93% - 97%)      ECG:      CHEST X RAY    CT    MRI    MRA    CT ANGIO    CAROTID DUPLEX    DUPLEX     Echocardiogram    Catheterization:    Stress Test:     LABS:	 	  CARDIAC MARKERS:                              8.8    7.1   )-----------( 190      ( 16 Feb 2017 07:30 )             27.8     16 Feb 2017 07:27    139    |  106    |  7      ----------------------------<  83     4.7     |  26     |  0.90     Ca    8.3        16 Feb 2017 07:27  Phos  2.5       16 Feb 2017 07:27  Mg     2.1       16 Feb 2017 07:27            ASSESSMENT/PLAN: 	  As per orders

## 2017-02-16 NOTE — PROGRESS NOTE ADULT - SUBJECTIVE AND OBJECTIVE BOX
Pt. seen at 0740  Pain Management Progress Note - [facility] [phone]    HPI:  Pt. continues to complain of sharp pain in the abdomen.  Used prn pain medication moderately over the last 24 hours.            Pertinent PMH: Pain at: ___Back ___Neck___Knee ___Hip ___Shoulder _x__ Opioid tolerance    Pain is __x_ sharp ____dull ___burning ___achy ___ Intensity: ____ mild ____mod ____severe     Location __x___surgical site _____cervical _____lumbar __x__abd _____upper ext____lower ext    Worse with ___x_activity __x__movement _____physical therapy___ Rest    Improved with __x__medication __x__rest ____physical therapy    ondansetron Injectable  naloxone Injectable  heparin  Injectable  finasteride  doxazosin  nortriptyline  carvedilol  pantoprazole  Injectable  diazepam  Injectable  oxyCODONE ER Tablet  oxyCODONE IR  HYDROmorphone  Injectable        ROS: Const:  ___febrile   Eyes:___ENT:___CV: ___chest pain  Resp: ____sob  GI:___nausea ___vomiting _+___abd pain ___npo _+_clears ___full diet __bm  :___ Musk: ___pain ___spasm  Skin:___ Neuro:  _-__dhqspwmh__-_loftswdoz__-__ numbness _-__weakness ___paresthesia  Psych:___anxiety  Endo:___ Heme:___Allergy:___      02-16 @ 07:2788 mL/min/1.73M2          Hemoglobin: 8.8 g/dL (02-16 @ 07:30)  Hemoglobin: 8.0 g/dL (02-15 @ 17:19)  Hemoglobin: 6.9 g/dL (02-15 @ 07:18)        T(C): 36.6, Max: 37.4 (02-15 @ 21:04)  HR: 70 (70 - 97)  BP: 160/86 (113/80 - 174/87)  RR: 16 (15 - 16)  SpO2: 93% (93% - 97%)  Wt(kg): --     PHYSICAL EXAM:  Gen Appearance: __x_no acute distress __x_appropriate         Neuro: _x__SILT feet__x__ EOM Intact Psych: AAOX_3_, __x_mood/affect appropriate        Eyes: __x_conjunctiva WNL  ____x_ Pupils equal and round        ENT: _x__ears and nose atraumatic__x_ Hearing grossly intact        Neck: ___trachea midline, no visible masses ___thyroid without palpable mass    Resp: _x__Nml WOB____No tactile fremitus ___clear to auscultation    Cardio: ___extremities free from edema ____pedal pulses palpable    GI/Abdomen: ___soft __x___ diffusely tender to light touch_____Nondistended_____HSM    Lymphatic: ___no palpable nodes in neck  ___no palpable nodes calves and feet    Skin/Wound: ___Incision, ___Dressing c/d/i,   ____surrounding tissues soft,  ___drain/chest tube present____    Muscular: EHL _5__/5  Gastrocnemius___/5    _x__absent clubbing/cyanosis         ASSESSMENT:  This is a 66y old Male with a history of:  R19.07  No h/o HF  Pelvic mass  Anemia  Depression  Aneurysm of aortic root  Cardiac arrhythmia  Pulmonary emphysema  DVT (deep venous thrombosis)  ONOFRE (dyspnea on exertion)  Hyperlipidemia  Cardiomyopathy  HTN (hypertension)  AAA (abdominal aortic aneurysm)  Mesenteric mass  Diagnostic laparoscopy  Exploratory laparotomy  Hemicolectomy, right  Omentectomy  Inguinal hernia repair, right  Surgery, elective  Surgery, elective  S/P carpal tunnel release  S/P AAA (abdominal aortic aneurysm) repair  S/P arthroscopy of right knee  S/P hip replacement, left  Surgery, elective  History of back surgery  History of kidney surgery  Pacemaker  opioid dependence, abdominal pain S/P exploratory laparotomy and right hemicolectomy and continues to complain of abdominal pain on current regimen        Recommended Treatment PLAN:  1.  Oxycontin 30 mg po q8h  2.  Dilaudid 1 mg IV q2h prn  3.  Oxycodone 15 mg po q4h prn  4.  Toradol 15 mg IV q6h x 4  5.  Tylenol 975 mg po q8h

## 2017-02-16 NOTE — DISCHARGE NOTE ADULT - MEDICATION SUMMARY - MEDICATIONS TO TAKE
I will START or STAY ON the medications listed below when I get home from the hospital:    Proscar 5 mg oral tablet  -- 1 tab(s) by mouth once a day  -- Indication: For home med/5alpha reductase inhibitor    aspirin 81 mg oral tablet  -- 1 tab(s) by mouth once a day  -- Indication: For Ppx    acetaminophen 325 mg oral tablet  -- 3 tab(s) by mouth every 8 hours  -- Indication: For Pain, as needed    oxyCODONE 15 mg oral tablet  -- 1 tab(s) by mouth every 4 hours, As needed, Moderate Pain (4 - 6) MDD:6  -- Indication: For Moderate pain, as needed    oxyCODONE 30 mg oral tablet, extended release  -- 1 tab(s) by mouth every 8 hours, As Needed -for severe pain MDD:3  -- Indication: For Severe pain    Cardura 4 mg oral tablet  -- 1 tab(s) by mouth once a day  -- Indication: For home med/antiandrenergic    Pamelor 75 mg oral capsule  -- 1 cap(s) by mouth once a day (at bedtime)  -- Indication: For home med/antidepressant    Trintellix 20 mg oral tablet  -- 1 tab(s) by mouth once a day  -- Indication: For home med/antidepressant    ALPRAZolam 1 mg oral tablet  -- 1 tab(s) by mouth once a day (at bedtime), As needed, insomnia  -- Indication: For home med/anxiolytic    Coreg 6.25 mg oral tablet  -- 1 tab(s) by mouth 2 times a day  -- Indication: For home med/HTN    omeprazole 20 mg oral delayed release capsule  -- 1 cap(s) by mouth once a day  -- Indication: For home med/PPI    hydrALAZINE 10 mg oral tablet  --  by mouth 2 times a day  -- Indication: For home med/HTN    folic acid 1 mg oral tablet  -- 1 tab(s) by mouth once a day  -- Indication: For home med/supplement    B-12 Resin 1000 mcg oral tablet  -- 1 tab(s) by mouth once a week  -- Indication: For home med/supplement

## 2017-02-16 NOTE — DISCHARGE NOTE ADULT - PLAN OF CARE
Recovery, return to normal activities You will have home physical therapy initiated. Please continue to work on their recommended therapeutic regimen to ensure best possible post-operative outcomes. Continue to advance diet as tolerated. Please follow a low residue diet, minimizing consumption of dairy, whole grains, and raw vegetables. You may shower, but do not bathe or submerge in water for at least two weeks. Leave steri-strips in place if present; they will fall off on their own. Please be sure to keep the wound clean and dry, changing any dressings daily (except steri-strips), unless instructed otherwise. No heavy lifting or strenuous exercise until cleared by your surgeon.    Please follow up with Dr. Wells in one week; you may call the office to make an appointment at your earliest convenience.    Contact your doctor or go to the ER for fever > 101.5, chills, nausea, vomiting, chest pain, shortness of breath, pain not controlled by medication or excessive bleeding. You will have home physical therapy initiated. Please continue to work on their recommended therapeutic regimen to ensure best possible post-operative outcomes. Continue to advance diet as tolerated. Please follow a low residue diet, minimizing consumption of dairy, whole grains, and raw vegetables. You may shower, but do not bathe or submerge in water for at least two weeks. Leave steri-strips in place if present; they will fall off on their own. Please be sure to keep the wound clean and dry, changing any dressings daily (except steri-strips), unless instructed otherwise. No heavy lifting or strenuous exercise until cleared by your surgeon.    Please follow up with Dr. Wells on Friday; you may call the office to make an appointment at your earliest convenience.    Contact your doctor or go to the ER for fever > 101.5, chills, nausea, vomiting, chest pain, shortness of breath, pain not controlled by medication or excessive bleeding.

## 2017-02-16 NOTE — DISCHARGE NOTE ADULT - HOSPITAL COURSE
66yoM with PMH depression, DVT, emphysema, HTN, HLD, AAA s/p EVAR, cardiomyopathy admitted with omental mass for which he underwent a right hemicolectomy and omentectomy, which was uncomplicated though dissection was complicated. Patient's post-operative course was complicated by difficult pain management as pt was on intrathecal pain pump and robust pain medication regimen at home. Pain management service was consulted for adequate pain management. Diet was advanced as tolerated and pain was well controlled on medication. On day of discharge, pt deemed stable and ready to return home with plan to follow up as an outpatient.

## 2017-02-16 NOTE — PROGRESS NOTE ADULT - SUBJECTIVE AND OBJECTIVE BOX
O/N; GERMAINE  2/15: Advanced to clears. PT recs HPT. Hg 6.9, transfused 1unit pRBC, repeat 8.0. mehreen d/c'd, passed TOV O/N; GERMAINE  2/15: Advanced to clears. PT recs HPT. Hg 6.9, transfused 1unit pRBC, repeat 8.0. verde d/c'd, passed TOV    STATUS POST:   Lap converted to open right hemicolectomy and right inguinal hernia repair     POST OPERATIVE DAY #: 3    SUBJECTIVE:   Pain controlled. Denies CP/SOB/N/V/palpitations  Flatus: [ X] YES [ ] NO             Bowel Movement: [ ] YES [X ] NO      MEDICATIONS  (STANDING):  heparin  Injectable 5000Unit(s) SubCutaneous every 8 hours  finasteride 5milliGRAM(s) Oral daily  doxazosin 4milliGRAM(s) Oral at bedtime  nortriptyline 75milliGRAM(s) Oral at bedtime  carvedilol 6.25milliGRAM(s) Oral every 12 hours  pantoprazole  Injectable 40milliGRAM(s) IV Push daily  oxyCODONE ER Tablet 30milliGRAM(s) Oral every 8 hours    MEDICATIONS  (PRN):  ondansetron Injectable 4milliGRAM(s) IV Push every 6 hours PRN Nausea  naloxone Injectable 0.1milliGRAM(s) IV Push every 3 minutes PRN For ANY of the following changes in patient status:  A. RR LESS THAN 10 breaths per minute, B. Oxygen saturation LESS THAN 90%, C. Sedation score of 6  diazepam  Injectable 1milliGRAM(s) IV Push every 6 hours PRN Muscle spasm  oxyCODONE IR 15milliGRAM(s) Oral every 4 hours PRN Moderate Pain (4 - 6)  HYDROmorphone  Injectable 1milliGRAM(s) IV Push every 2 hours PRN Severe Pain (7 - 10)      Vital Signs Last 24 Hrs  T(C): 36.6, Max: 37.4 (02-15 @ 21:04)  T(F): 97.8, Max: 99.3 (02-15 @ 21:04)  HR: 97 (73 - 97)  BP: 174/87 (113/80 - 174/87)  BP(mean): --  RR: 16 (15 - 16)  SpO2: 97% (94% - 97%)    Physical Exam:    General: A&Ox3, NAD.   CV: RRR  Pulm: nonlabored breathing  Abd: soft, NTND, no guarding, no rebound. Incision: CDI.  Ext: No edema. WWP.       I&O's Detail    I & Os for current day (as of 16 Feb 2017 08:06)  =============================================  IN:    Oral Fluid: 900 ml    dextrose 5% + sodium chloride 0.45%.: 540 ml    Packed Red Blood Cells: 400 ml    IV PiggyBack: 250 ml    Total IN: 2090 ml  ---------------------------------------------  OUT:    Voided: 2875 ml    Total OUT: 2875 ml  ---------------------------------------------  Total NET: -785 ml      LABS:                        8.8    7.1   )-----------( 190      ( 16 Feb 2017 07:30 )             27.8     16 Feb 2017 07:27    139    |  106    |  7      ----------------------------<  83     4.7     |  26     |  0.90     Ca    8.3        16 Feb 2017 07:27  Phos  2.5       16 Feb 2017 07:27  Mg     2.1       16 Feb 2017 07:27

## 2017-02-16 NOTE — DISCHARGE NOTE ADULT - CARE PLAN
Principal Discharge DX:	Mesenteric mass  Goal:	Recovery, return to normal activities  Instructions for follow-up, activity and diet:	You will have home physical therapy initiated. Please continue to work on their recommended therapeutic regimen to ensure best possible post-operative outcomes. Continue to advance diet as tolerated. Please follow a low residue diet, minimizing consumption of dairy, whole grains, and raw vegetables. You may shower, but do not bathe or submerge in water for at least two weeks. Leave steri-strips in place if present; they will fall off on their own. Please be sure to keep the wound clean and dry, changing any dressings daily (except steri-strips), unless instructed otherwise. No heavy lifting or strenuous exercise until cleared by your surgeon.    Please follow up with Dr. Wells in one week; you may call the office to make an appointment at your earliest convenience.    Contact your doctor or go to the ER for fever > 101.5, chills, nausea, vomiting, chest pain, shortness of breath, pain not controlled by medication or excessive bleeding. Principal Discharge DX:	Mesenteric mass  Goal:	Recovery, return to normal activities  Instructions for follow-up, activity and diet:	You will have home physical therapy initiated. Please continue to work on their recommended therapeutic regimen to ensure best possible post-operative outcomes. Continue to advance diet as tolerated. Please follow a low residue diet, minimizing consumption of dairy, whole grains, and raw vegetables. You may shower, but do not bathe or submerge in water for at least two weeks. Leave steri-strips in place if present; they will fall off on their own. Please be sure to keep the wound clean and dry, changing any dressings daily (except steri-strips), unless instructed otherwise. No heavy lifting or strenuous exercise until cleared by your surgeon.    Please follow up with Dr. Wells on Friday; you may call the office to make an appointment at your earliest convenience.    Contact your doctor or go to the ER for fever > 101.5, chills, nausea, vomiting, chest pain, shortness of breath, pain not controlled by medication or excessive bleeding.

## 2017-02-16 NOTE — DISCHARGE NOTE ADULT - CARE PROVIDER_API CALL
Kahlil Wells), ColonRectal Surgery  [address] [place] [address]  Phone: [phone]  Fax: [phone]

## 2017-02-16 NOTE — PROGRESS NOTE ADULT - SUBJECTIVE AND OBJECTIVE BOX
INTERVAL HISTORY:  	no c/o sob,cp. c/o back and abdominal pain  Received PRBC    MEDICATIONS:  doxazosin 4milliGRAM(s) Oral at bedtime  carvedilol 6.25milliGRAM(s) Oral every 12 hours        ondansetron Injectable 4milliGRAM(s) IV Push every 6 hours PRN  nortriptyline 75milliGRAM(s) Oral at bedtime  diazepam  Injectable 1milliGRAM(s) IV Push every 6 hours PRN  oxyCODONE ER Tablet 30milliGRAM(s) Oral every 8 hours  oxyCODONE IR 15milliGRAM(s) Oral every 4 hours PRN  HYDROmorphone  Injectable 1milliGRAM(s) IV Push every 2 hours PRN    pantoprazole  Injectable 40milliGRAM(s) IV Push daily    finasteride 5milliGRAM(s) Oral daily    heparin  Injectable 5000Unit(s) SubCutaneous every 8 hours          PHYSICAL EXAM:  T(C): 36.6, Max: 37.4 (02-15 @ 21:04)  HR: 97 (73 - 97)  BP: 137/93 (113/80 - 174/87)  RR: 16 (15 - 16)  SpO2: 97% (94% - 97%)  Wt(kg): --  I&O's Summary    I & Os for current day (as of 16 Feb 2017 09:08)  =============================================  IN: 2090 ml / OUT: 2875 ml / NET: -785 ml        Appearance: Normal	  HEENT:   Normal oral mucosa, PERRL, EOMI	  Lymphatic: No lymphadenopathy  Cardiovascular: Normal S1 S2, No JVD, soft murmur  Respiratory: Lungs clear to auscultation	  Psychiatry: A & O x 3, Mood & affect appropriate  Gastrointestinal:  Soft,   Skin: No rashes, No ecchymoses, No cyanosis  Neurologic: Non-focal  Extremities: Normal range of motion, No clubbing, cyanosis  Vascular: Peripheral pulses palpable 2+ bilaterally    TELEMETRY: 	    ECG:  	  RADIOLOGY:   DIAGNOSTIC TESTING:  [ ] Echocardiogram:  [ ]  Catheterization:  [ ] Stress Test:    OTHER: 	    LABS:	 	    CARDIAC MARKERS:                                  8.8    7.1   )-----------( 190      ( 16 Feb 2017 07:30 )             27.8     16 Feb 2017 07:27    139    |  106    |  7      ----------------------------<  83     4.7     |  26     |  0.90     Ca    8.3        16 Feb 2017 07:27  Phos  2.5       16 Feb 2017 07:27  Mg     2.1       16 Feb 2017 07:27      proBNP:   Lipid Profile:   HgA1c:   TSH:     ASSESSMENT/PLAN: 	  BP running a bit high but probably due to pain- to monitor. DVT prophylaxis, cont antihypeertensives and coreg

## 2017-02-16 NOTE — DISCHARGE NOTE ADULT - PATIENT PORTAL LINK FT
“You can access the [facility] Patient Portal, offered by [facility], by registering with the following website: [email or web address]”

## 2017-02-16 NOTE — PROGRESS NOTE ADULT - SUBJECTIVE AND OBJECTIVE BOX
more pain overnight  passing flatus  tolerating PO    AVSS  Abd: soft  appropriately tender  incision: clean    OOB  Clears  check labs  PT  Soc Svc

## 2017-02-17 LAB
ANION GAP SERPL CALC-SCNC: 8 MMOL/L — LOW (ref 9–16)
BUN SERPL-MCNC: 9 MG/DL — SIGNIFICANT CHANGE UP (ref 7–23)
CALCIUM SERPL-MCNC: 8.3 MG/DL — LOW (ref 8.5–10.5)
CHLORIDE SERPL-SCNC: 105 MMOL/L — SIGNIFICANT CHANGE UP (ref 96–108)
CO2 SERPL-SCNC: 25 MMOL/L — SIGNIFICANT CHANGE UP (ref 22–31)
CREAT SERPL-MCNC: 0.92 MG/DL — SIGNIFICANT CHANGE UP (ref 0.5–1.3)
GLUCOSE SERPL-MCNC: 88 MG/DL — SIGNIFICANT CHANGE UP (ref 70–99)
HCT VFR BLD CALC: 27.4 % — LOW (ref 39–50)
HGB BLD-MCNC: 8.8 G/DL — LOW (ref 13–17)
MAGNESIUM SERPL-MCNC: 2 MG/DL — SIGNIFICANT CHANGE UP (ref 1.6–2.4)
MCHC RBC-ENTMCNC: 27.2 PG — SIGNIFICANT CHANGE UP (ref 27–34)
MCHC RBC-ENTMCNC: 32.1 G/DL — SIGNIFICANT CHANGE UP (ref 32–36)
MCV RBC AUTO: 84.8 FL — SIGNIFICANT CHANGE UP (ref 80–100)
PHOSPHATE SERPL-MCNC: 3 MG/DL — SIGNIFICANT CHANGE UP (ref 2.5–4.5)
PLATELET # BLD AUTO: 212 K/UL — SIGNIFICANT CHANGE UP (ref 150–400)
POTASSIUM SERPL-MCNC: 3.8 MMOL/L — SIGNIFICANT CHANGE UP (ref 3.5–5.3)
POTASSIUM SERPL-SCNC: 3.8 MMOL/L — SIGNIFICANT CHANGE UP (ref 3.5–5.3)
RBC # BLD: 3.23 M/UL — LOW (ref 4.2–5.8)
RBC # FLD: 14.8 % — SIGNIFICANT CHANGE UP (ref 10.3–16.9)
SODIUM SERPL-SCNC: 138 MMOL/L — SIGNIFICANT CHANGE UP (ref 135–145)
WBC # BLD: 5.2 K/UL — SIGNIFICANT CHANGE UP (ref 3.8–10.5)
WBC # FLD AUTO: 5.2 K/UL — SIGNIFICANT CHANGE UP (ref 3.8–10.5)

## 2017-02-17 PROCEDURE — 99232 SBSQ HOSP IP/OBS MODERATE 35: CPT

## 2017-02-17 RX ORDER — POTASSIUM CHLORIDE 20 MEQ
20 PACKET (EA) ORAL ONCE
Qty: 0 | Refills: 0 | Status: COMPLETED | OUTPATIENT
Start: 2017-02-17 | End: 2017-02-17

## 2017-02-17 RX ORDER — OXYCODONE HYDROCHLORIDE 5 MG/1
1 TABLET ORAL
Qty: 21 | Refills: 0 | OUTPATIENT
Start: 2017-02-17 | End: 2017-02-24

## 2017-02-17 RX ADMIN — OXYCODONE HYDROCHLORIDE 15 MILLIGRAM(S): 5 TABLET ORAL at 02:44

## 2017-02-17 RX ADMIN — OXYCODONE HYDROCHLORIDE 30 MILLIGRAM(S): 5 TABLET ORAL at 06:31

## 2017-02-17 RX ADMIN — Medication 975 MILLIGRAM(S): at 05:07

## 2017-02-17 RX ADMIN — Medication 20 MILLIEQUIVALENT(S): at 10:16

## 2017-02-17 RX ADMIN — FINASTERIDE 5 MILLIGRAM(S): 5 TABLET, FILM COATED ORAL at 10:51

## 2017-02-17 RX ADMIN — OXYCODONE HYDROCHLORIDE 15 MILLIGRAM(S): 5 TABLET ORAL at 11:25

## 2017-02-17 RX ADMIN — Medication 975 MILLIGRAM(S): at 14:00

## 2017-02-17 RX ADMIN — CARVEDILOL PHOSPHATE 6.25 MILLIGRAM(S): 80 CAPSULE, EXTENDED RELEASE ORAL at 19:10

## 2017-02-17 RX ADMIN — OXYCODONE HYDROCHLORIDE 15 MILLIGRAM(S): 5 TABLET ORAL at 21:40

## 2017-02-17 RX ADMIN — OXYCODONE HYDROCHLORIDE 15 MILLIGRAM(S): 5 TABLET ORAL at 17:20

## 2017-02-17 RX ADMIN — HYDROMORPHONE HYDROCHLORIDE 1 MILLIGRAM(S): 2 INJECTION INTRAMUSCULAR; INTRAVENOUS; SUBCUTANEOUS at 19:18

## 2017-02-17 RX ADMIN — HEPARIN SODIUM 5000 UNIT(S): 5000 INJECTION INTRAVENOUS; SUBCUTANEOUS at 13:59

## 2017-02-17 RX ADMIN — PANTOPRAZOLE SODIUM 40 MILLIGRAM(S): 20 TABLET, DELAYED RELEASE ORAL at 05:06

## 2017-02-17 RX ADMIN — Medication 4 MILLIGRAM(S): at 22:33

## 2017-02-17 RX ADMIN — OXYCODONE HYDROCHLORIDE 15 MILLIGRAM(S): 5 TABLET ORAL at 20:57

## 2017-02-17 RX ADMIN — OXYCODONE HYDROCHLORIDE 30 MILLIGRAM(S): 5 TABLET ORAL at 00:00

## 2017-02-17 RX ADMIN — Medication 10 MILLIGRAM(S): at 05:06

## 2017-02-17 RX ADMIN — CARVEDILOL PHOSPHATE 6.25 MILLIGRAM(S): 80 CAPSULE, EXTENDED RELEASE ORAL at 05:07

## 2017-02-17 RX ADMIN — OXYCODONE HYDROCHLORIDE 30 MILLIGRAM(S): 5 TABLET ORAL at 14:40

## 2017-02-17 RX ADMIN — HEPARIN SODIUM 5000 UNIT(S): 5000 INJECTION INTRAVENOUS; SUBCUTANEOUS at 05:06

## 2017-02-17 RX ADMIN — OXYCODONE HYDROCHLORIDE 30 MILLIGRAM(S): 5 TABLET ORAL at 23:32

## 2017-02-17 RX ADMIN — Medication 975 MILLIGRAM(S): at 22:33

## 2017-02-17 RX ADMIN — HEPARIN SODIUM 5000 UNIT(S): 5000 INJECTION INTRAVENOUS; SUBCUTANEOUS at 22:32

## 2017-02-17 RX ADMIN — OXYCODONE HYDROCHLORIDE 15 MILLIGRAM(S): 5 TABLET ORAL at 03:45

## 2017-02-17 RX ADMIN — Medication 10 MILLIGRAM(S): at 19:10

## 2017-02-17 RX ADMIN — OXYCODONE HYDROCHLORIDE 15 MILLIGRAM(S): 5 TABLET ORAL at 10:51

## 2017-02-17 RX ADMIN — HYDROMORPHONE HYDROCHLORIDE 1 MILLIGRAM(S): 2 INJECTION INTRAMUSCULAR; INTRAVENOUS; SUBCUTANEOUS at 20:00

## 2017-02-17 RX ADMIN — OXYCODONE HYDROCHLORIDE 30 MILLIGRAM(S): 5 TABLET ORAL at 14:00

## 2017-02-17 RX ADMIN — NORTRIPTYLINE HYDROCHLORIDE 75 MILLIGRAM(S): 10 CAPSULE ORAL at 22:33

## 2017-02-17 RX ADMIN — OXYCODONE HYDROCHLORIDE 30 MILLIGRAM(S): 5 TABLET ORAL at 05:06

## 2017-02-17 RX ADMIN — OXYCODONE HYDROCHLORIDE 30 MILLIGRAM(S): 5 TABLET ORAL at 22:29

## 2017-02-17 RX ADMIN — OXYCODONE HYDROCHLORIDE 15 MILLIGRAM(S): 5 TABLET ORAL at 16:59

## 2017-02-17 NOTE — DIETITIAN INITIAL EVALUATION ADULT. - ENERGY NEEDS
IBW used as pt is currently greater than 120% ideal body weight.   Increased protein needs post-operatively

## 2017-02-17 NOTE — PROGRESS NOTE ADULT - SUBJECTIVE AND OBJECTIVE BOX
Physical Medicine and Rehabilitation Progress Note    LUPE KESSLER    MRN-[id number]    Patient is a 66y old  Male who presents with a chief complaint of Mesenteric mass (16 Feb 2017 12:15)      Vital Signs Last 24 Hrs  T(C): 36.1, Max: 37.2 (02-16 @ 14:00)  T(F): 96.9, Max: 98.9 (02-16 @ 14:00)  HR: 71 (65 - 90)  BP: 162/87 (113/73 - 194/86)  BP(mean): --  RR: 16 (15 - 20)  SpO2: 97% (94% - 100%)    Current Functional Status in Physical Therapy:OOB in chair. Alert, oriented and cooperative. Stable. No abdominal pain. General condition improved. 2/17/17 Hb 8.8 Hct 27.4.    Bed Mobility:    Transfers:Able to stand from chair with close contact guarding. Poor balance.    Ambulation:using rolling walker, contact guarding.      Impression:!. Deconditioned. 2. s/p exp hemicolectomy, ing. hernia repair. 3. OA knnees. s/p Lt. THR.      Recommendations:1. Continue PT as tolerated. 2. Home PT.

## 2017-02-17 NOTE — PROGRESS NOTE ADULT - SUBJECTIVE AND OBJECTIVE BOX
STATUS POST:  POD4 hemicolectomy, omentectomy      SUBJECTIVE: Patient seen and examined bedside by chief resident. No complaints this AM. Pain well controlled.    heparin  Injectable 5000Unit(s) SubCutaneous every 8 hours  doxazosin 4milliGRAM(s) Oral at bedtime  carvedilol 6.25milliGRAM(s) Oral every 12 hours  hydrALAZINE 10milliGRAM(s) Oral two times a day      Vital Signs Last 24 Hrs  T(C): 36.1, Max: 37.2 (02-16 @ 14:00)  T(F): 96.9, Max: 98.9 (02-16 @ 14:00)  HR: 71 (65 - 90)  BP: 162/87 (113/73 - 194/86)  BP(mean): --  RR: 16 (15 - 20)  SpO2: 97% (93% - 100%)  I&O's Detail    I & Os for current day (as of 17 Feb 2017 08:08)  =============================================  IN:    Oral Fluid: 120 ml    Total IN: 120 ml  ---------------------------------------------  OUT:    Voided: 2600 ml    Total OUT: 2600 ml  ---------------------------------------------  Total NET: -2480 ml      General: NAD, resting comfortably in bed  C/V: NSR, HR in 70s. , though pt asymptomatic  Pulm: Nonlabored breathing, no respiratory distress  Abd: soft, NT/ND.  Extrem: WWP, no edema, SCDs in place        LABS:                        8.8    5.2   )-----------( 212      ( 17 Feb 2017 07:38 )             27.4     17 Feb 2017 07:38    138    |  105    |  9      ----------------------------<  88     3.8     |  25     |  0.92     Ca    8.3        17 Feb 2017 07:38  Phos  3.0       17 Feb 2017 07:38  Mg     2.0       17 Feb 2017 07:38            RADIOLOGY & ADDITIONAL STUDIES:

## 2017-02-17 NOTE — PROGRESS NOTE ADULT - SUBJECTIVE AND OBJECTIVE BOX
INTERVAL HISTORY:66yMalePatient is a 66y old  Male who presents with a chief complaint of Mesenteric mass (16 Feb 2017 12:15)    	  MEDICATIONS:  doxazosin 4milliGRAM(s) Oral at bedtime  carvedilol 6.25milliGRAM(s) Oral every 12 hours  hydrALAZINE 10milliGRAM(s) Oral two times a day        ondansetron Injectable 4milliGRAM(s) IV Push every 6 hours PRN  nortriptyline 75milliGRAM(s) Oral at bedtime  diazepam  Injectable 1milliGRAM(s) IV Push every 6 hours PRN  oxyCODONE ER Tablet 30milliGRAM(s) Oral every 8 hours  oxyCODONE IR 15milliGRAM(s) Oral every 4 hours PRN  HYDROmorphone  Injectable 1milliGRAM(s) IV Push every 2 hours PRN  ketorolac   Injectable 15milliGRAM(s) IV Push every 6 hours PRN  acetaminophen   Tablet 975milliGRAM(s) Oral every 8 hours    pantoprazole  Injectable 40milliGRAM(s) IV Push daily    finasteride 5milliGRAM(s) Oral daily    heparin  Injectable 5000Unit(s) SubCutaneous every 8 hours  potassium chloride   Powder 20milliEquivalent(s) Oral once      Complaint:     Otherwise 12 point review of systems is normal.    PHYSICAL EXAM:    Constitutional: good  Eyes: PERRL, EOMI, sclera non-icteric  Neck: supple, no masses, no JVD  Respiratory: CTA b/l, good air entry b/l, no wheezing, rhonchi, rales,   Cardiovascular: RRR, normal S1S2, no M/R/G  Gastrointestinal: soft, NTND,  Extremities:  no c/c/e  Neurological: AAOx3  OOB in chair    Vital Signs Last 24 Hrs  T(C): 36.1, Max: 37.2 (02-16 @ 14:00)  T(F): 96.9, Max: 98.9 (02-16 @ 14:00)  HR: 71 (65 - 90)  BP: 162/87 (113/73 - 194/86)  BP(mean): --  RR: 16 (15 - 20)  SpO2: 97% (93% - 100%)      ECG:      CHEST X RAY    CT    MRI    MRA    CT ANGIO    CAROTID DUPLEX    DUPLEX     Echocardiogram    Catheterization:    Stress Test:     LABS:	 	  CARDIAC MARKERS:                              8.8    5.2   )-----------( 212      ( 17 Feb 2017 07:38 )             27.4     17 Feb 2017 07:38    138    |  105    |  9      ----------------------------<  88     3.8     |  25     |  0.92     Ca    8.3        17 Feb 2017 07:38  Phos  3.0       17 Feb 2017 07:38  Mg     2.0       17 Feb 2017 07:38            ASSESSMENT/PLAN: 	    66yoM with PMH depression, DVT, emphysema, HTN, HLD, AAA s/p EVAR, cardiomyopathy admitted after right hemicolectomy, omentectomy POD4  Pain/Nausea control  Diet FLD, advanced as tolerated  SQH/SCDs/OOBA/IS

## 2017-02-17 NOTE — PROGRESS NOTE ADULT - SUBJECTIVE AND OBJECTIVE BOX
INTERVAL HPI: tolerating diet, pain control no cp sob palp  	  MEDICATIONS:  doxazosin 4milliGRAM(s) Oral at bedtime  carvedilol 6.25milliGRAM(s) Oral every 12 hours  hydrALAZINE 10milliGRAM(s) Oral two times a day        ondansetron Injectable 4milliGRAM(s) IV Push every 6 hours PRN  nortriptyline 75milliGRAM(s) Oral at bedtime  diazepam  Injectable 1milliGRAM(s) IV Push every 6 hours PRN  oxyCODONE ER Tablet 30milliGRAM(s) Oral every 8 hours  oxyCODONE IR 15milliGRAM(s) Oral every 4 hours PRN  HYDROmorphone  Injectable 1milliGRAM(s) IV Push every 2 hours PRN  ketorolac   Injectable 15milliGRAM(s) IV Push every 6 hours PRN  acetaminophen   Tablet 975milliGRAM(s) Oral every 8 hours    pantoprazole  Injectable 40milliGRAM(s) IV Push daily    finasteride 5milliGRAM(s) Oral daily    heparin  Injectable 5000Unit(s) SubCutaneous every 8 hours      REVIEW OF SYSTEMS:  [x] As per HPI  CONSTITUTIONAL: No fever, weight loss, or fatigue  RESPIRATORY: No cough, wheezing, chills or hemoptysis; No Shortness of Breath  CARDIOVASCULAR: No chest pain, palpitations, dizziness, or leg swelling  GASTROINTESTINAL: No abdominal or epigastric pain. No nausea, vomiting, or hematemesis; No diarrhea or constipation. No melena or hematochezia.  MUSCULOSKELETAL: No joint pain or swelling; No muscle, back, or extremity pain  [x] All others negative	  [ ] Unable to obtain    PHYSICAL EXAM:  T(C): 36.1, Max: 37.2 (02-16 @ 14:00)  HR: 71 (65 - 90)  BP: 162/87 (113/73 - 186/92)  RR: 16 (15 - 20)  SpO2: 97% (95% - 100%)  Wt(kg): --  I&O's Summary  I & Os for 24h ending 17 Feb 2017 07:00  =============================================  IN: 120 ml / OUT: 2600 ml / NET: -2480 ml    I & Os for current day (as of 17 Feb 2017 13:46)  =============================================  IN: 300 ml / OUT: 0 ml / NET: 300 ml        Appearance: Normal	  HEENT:   Normal oral mucosa  Cardiovascular: Normal S1 S2, No JVD, No murmurs, No edema  Respiratory: Lungs clear to auscultation	  Gastrointestinal:  Soft, Non-tender, + BS	  Extremities: Normal range of motion, No clubbing, cyanosis or edema  Vascular: Peripheral pulses palpable 2+ bilaterally    TELEMETRY: 	    ECG:    	  RADIOLOGY:   CXR:  CT:  US:    CARDIAC TESTING:  Echocardiogram:  Catheterization:  Stress Test:      LABS:	 	    CARDIAC MARKERS:                                  8.8    5.2   )-----------( 212      ( 17 Feb 2017 07:38 )             27.4     17 Feb 2017 07:38    138    |  105    |  9      ----------------------------<  88     3.8     |  25     |  0.92     Ca    8.3        17 Feb 2017 07:38  Phos  3.0       17 Feb 2017 07:38  Mg     2.0       17 Feb 2017 07:38      proBNP:   Lipid Profile:   HgA1c:   TSH:     ASSESSMENT/PLAN: 	  BP running a bit high but probably due to pain- to monitor. DVT prophylaxis, cont antihypeertensives and coreg

## 2017-02-17 NOTE — PROVIDER CONTACT NOTE (OTHER) - ACTION/TREATMENT ORDERED:
Coreg 6.25 and hydralazine 10mg po will be given
TYLER Marquez ordered valium 1mg IV and was given.
Will continue to monitor.
1x IV Dilaudid 1mg
15mg oxycodone PO ordered, and  1g ofirmev iv

## 2017-02-17 NOTE — PROVIDER CONTACT NOTE (OTHER) - ASSESSMENT
pt's bp 175/85 hr 77.
Pt has b/l hand tremors. Wife stated pt has had that before surgery but was "on and off". Now, b/l hand tremors appear continuous.
Pt verbalizes severe pain in abdomen. Pain level 10/10. Pt is restless and moaning in pain. PCA dilaudid boluses 1mg x2 has been given without any pain relief.
Pt states pain is 8/10. Not relieved by Dilaudid 1mg IV. /87 HR 76. AM coreg 6.25mg PO.
pt has severe pain 10/10 and tachycardia in 130s bp 147/89

## 2017-02-17 NOTE — PROGRESS NOTE ADULT - SUBJECTIVE AND OBJECTIVE BOX
tolerating PO  passing flatus  pain controlled    AVSS  BP on high side  Abd: soft  incision clean    Med F/U  Increase diet

## 2017-02-17 NOTE — PROGRESS NOTE ADULT - ASSESSMENT
66yoM with PMH depression, DVT, emphysema, HTN, HLD, AAA s/p EVAR, cardiomyopathy admitted after right hemicolectomy, omentectomy POD4  Pain/Nausea control  Diet FLD, advanced as tolerated  SQH/SCDs/OOBA/IS  Possible d/c this weekend, will discuss plan with attending

## 2017-02-17 NOTE — PROGRESS NOTE ADULT - SUBJECTIVE AND OBJECTIVE BOX
Pain Management Progress Note - [facility] [phone]    HPI: Patient reports pain is improved today. Patient to be discharged tomorrow. Patient using pain medications regularly reports well controlled.           Pertinent PMH: Pain at: ___Back ___Neck___Knee ___Hip ___Shoulder __x_ Opioid tolerance    Pain is ___ sharp ____dull ___burning ___achy ___ Intensity: ____ mild ____mod ____severe     Location _____surgical site _____cervical _____lumbar __x__abd _____upper ext____lower ext    Worse with __x__activity ____movement _____physical therapy___ Rest    Improved with _x___medication _x__rest ____physical therapy    (Floorstock) [completed]  (Floorstock) [completed]  (Floorstock) [completed]  (Floorstock) [completed]  (Floorstock) [completed]  (Floorstock) [completed]  (Floorstock) [completed]  lactated ringers. [discontinued]  ondansetron Injectable  HYDROmorphone PCA (1 mG/mL) [discontinued]  HYDROmorphone PCA (1 mG/mL) Rescue Clinician Bolus [discontinued]  naloxone Injectable  heparin  Injectable  (ADM OVERRIDE) [completed]  (ADM OVERRIDE) [completed]  ciprofloxacin   IVPB [completed]  metroNIDAZOLE  IVPB [completed]  HYDROmorphone  Injectable [completed]  finasteride  doxazosin  nortriptyline  carvedilol  pantoprazole  Injectable  acetaminophen  IVPB. [completed]  HYDROmorphone PCA (5 mG/mL) Rescue Clinician Bolus [discontinued]  (ADM OVERRIDE) [completed]  diazepam  Injectable [discontinued]  magnesium sulfate  IVPB [completed]  potassium chloride  10 mEq/100 mL IVPB [discontinued]  promethazine IVPB [completed]  (ADM OVERRIDE) [completed]  LORazepam   Injectable [completed]  (ADM OVERRIDE) [completed]  diazepam  Injectable  (ADM OVERRIDE) [completed]  oxyCODONE IR [completed]  (ADM OVERRIDE) [completed]  acetaminophen  IVPB. [completed]  HYDROmorphone PCA (1 mG/mL) [discontinued]  lidocaine   Patch [completed]  HYDROmorphone PCA (1 mG/mL) [discontinued]  HYDROmorphone  Injectable [completed]  (ADM OVERRIDE) [completed]  HYDROmorphone PCA (1 mG/mL) [discontinued]  sodium phosphate IVPB [completed]  potassium chloride    Tablet ER [completed]  oxyCODONE ER Tablet  ketorolac   Injectable [completed]  lactated ringers [discontinued]  dextrose 5% + sodium chloride 0.45%. [discontinued]  acetaminophen   Tablet. [completed]  potassium chloride   Powder [completed]  potassium phosphate IVPB [completed]  oxyCODONE IR  HYDROmorphone  Injectable  HYDROmorphone  Injectable [completed]  ketorolac   Injectable [discontinued]  ketorolac   Injectable  acetaminophen   Tablet  hydrALAZINE  freetext medication  -  potassium chloride   Powder [completed]      ROS: Const:  ___febrile   Eyes:___ENT:___CV: ___chest pain  Resp: ____sob  GI:_-__nausea _-__vomiting __+__abd pain ___npo ___clears __+_full diet __bm  :___ Musk: ___pain ___spasm  Skin:___ Neuro:  __-_cuirfhwh_-__mheaywgyi____ numbness ___weakness ___paresthesia  Psych:_-__anxiety  Endo:___ Heme:___Allergy:___      02-17 @ 07:3885 mL/min/1.73M2          Hemoglobin: 8.8 g/dL (02-17 @ 07:38)  Hemoglobin: 8.8 g/dL (02-16 @ 07:30)  Hemoglobin: 8.0 g/dL (02-15 @ 17:19)        T(C): 36.4, Max: 37.1 (02-16 @ 17:50)  HR: 84 (65 - 90)  BP: 122/86 (113/73 - 186/92)  RR: 16 (15 - 20)  SpO2: 99% (95% - 100%)  Wt(kg): --     PHYSICAL EXAM:  Gen Appearance: __x_no acute distress _x__appropriate         Neuro: _x__SILT feet__x__ EOM Intact Psych: AAOX_x_, _x__mood/affect appropriate        Eyes: __x_conjunctiva WNL  __x___ Pupils equal and round        ENT: _x__ears and nose atraumatic_x__ Hearing grossly intact        Neck: _x__trachea midline, no visible masses _x__thyroid without palpable mass    Resp: ___Nml WOB____No tactile fremitus ___clear to auscultation    Cardio: ___extremities free from edema ____pedal pulses palpable    GI/Abdomen: _x__soft ___x__ Nontender____x__Nondistended_____HSM    Lymphatic: ___no palpable nodes in neck  ___no palpable nodes calves and feet    Skin/Wound: _staples intact, no erythema__Incision, ___Dressing c/d/i,   ____surrounding tissues soft,  ___drain/chest tube present____    Muscular: EHL __5_/5  Gastrocnemius__5_/5    __x_absent clubbing/cyanosis         ASSESSMENT:  This is a 66y old Male with a history of:  R19.07  No h/o HF  Handoff  MEWS Score  Pelvic mass  Anemia  Depression  Aneurysm of aortic root  Cardiac arrhythmia  Pulmonary emphysema  DVT (deep venous thrombosis)  ONOFRE (dyspnea on exertion)  Hyperlipidemia  Cardiomyopathy  HTN (hypertension)  AAA (abdominal aortic aneurysm)  Mesenteric mass  Mesenteric mass  Mesenteric mass  Mesenteric mass  Mesenteric mass  Diagnostic laparoscopy  Exploratory laparotomy  Hemicolectomy, right  Omentectomy  Inguinal hernia repair, right  Surgery, elective  Surgery, elective  S/P carpal tunnel release  S/P AAA (abdominal aortic aneurysm) repair  S/P arthroscopy of right knee  S/P hip replacement, left  Surgery, elective  History of back surgery  History of kidney surgery  Pacemaker    Reports doing well with no significant changes. Patient reports will be discharged tomorrow.     Recommended Treatment PLAN:  Oxycontin 30mg po q8h  Dilaudid 1mg IV q2h PRN  Oxycodone 15mg po q4h PRN   Toradol 15mg IV q6h x4 PRN  Tylenol 975 po q8h

## 2017-02-18 VITALS
HEART RATE: 77 BPM | TEMPERATURE: 96 F | SYSTOLIC BLOOD PRESSURE: 156 MMHG | OXYGEN SATURATION: 98 % | DIASTOLIC BLOOD PRESSURE: 96 MMHG | RESPIRATION RATE: 18 BRPM

## 2017-02-18 LAB
ANION GAP SERPL CALC-SCNC: 6 MMOL/L — LOW (ref 9–16)
BUN SERPL-MCNC: 9 MG/DL — SIGNIFICANT CHANGE UP (ref 7–23)
CALCIUM SERPL-MCNC: 8.3 MG/DL — LOW (ref 8.5–10.5)
CHLORIDE SERPL-SCNC: 106 MMOL/L — SIGNIFICANT CHANGE UP (ref 96–108)
CO2 SERPL-SCNC: 28 MMOL/L — SIGNIFICANT CHANGE UP (ref 22–31)
CREAT SERPL-MCNC: 0.84 MG/DL — SIGNIFICANT CHANGE UP (ref 0.5–1.3)
GLUCOSE SERPL-MCNC: 87 MG/DL — SIGNIFICANT CHANGE UP (ref 70–99)
HCT VFR BLD CALC: 30.4 % — LOW (ref 39–50)
HGB BLD-MCNC: 9.7 G/DL — LOW (ref 13–17)
MAGNESIUM SERPL-MCNC: 2 MG/DL — SIGNIFICANT CHANGE UP (ref 1.6–2.4)
MCHC RBC-ENTMCNC: 26.8 PG — LOW (ref 27–34)
MCHC RBC-ENTMCNC: 31.9 G/DL — LOW (ref 32–36)
MCV RBC AUTO: 84 FL — SIGNIFICANT CHANGE UP (ref 80–100)
PHOSPHATE SERPL-MCNC: 3.2 MG/DL — SIGNIFICANT CHANGE UP (ref 2.5–4.5)
PLATELET # BLD AUTO: 289 K/UL — SIGNIFICANT CHANGE UP (ref 150–400)
POTASSIUM SERPL-MCNC: 3.7 MMOL/L — SIGNIFICANT CHANGE UP (ref 3.5–5.3)
POTASSIUM SERPL-SCNC: 3.7 MMOL/L — SIGNIFICANT CHANGE UP (ref 3.5–5.3)
RBC # BLD: 3.62 M/UL — LOW (ref 4.2–5.8)
RBC # FLD: 14.4 % — SIGNIFICANT CHANGE UP (ref 10.3–16.9)
SODIUM SERPL-SCNC: 140 MMOL/L — SIGNIFICANT CHANGE UP (ref 135–145)
WBC # BLD: 5.4 K/UL — SIGNIFICANT CHANGE UP (ref 3.8–10.5)
WBC # FLD AUTO: 5.4 K/UL — SIGNIFICANT CHANGE UP (ref 3.8–10.5)

## 2017-02-18 PROCEDURE — 86922 COMPATIBILITY TEST ANTIGLOB: CPT

## 2017-02-18 PROCEDURE — 85730 THROMBOPLASTIN TIME PARTIAL: CPT

## 2017-02-18 PROCEDURE — 86850 RBC ANTIBODY SCREEN: CPT

## 2017-02-18 PROCEDURE — 85027 COMPLETE CBC AUTOMATED: CPT

## 2017-02-18 PROCEDURE — 85610 PROTHROMBIN TIME: CPT

## 2017-02-18 PROCEDURE — 86880 COOMBS TEST DIRECT: CPT

## 2017-02-18 PROCEDURE — 82330 ASSAY OF CALCIUM: CPT

## 2017-02-18 PROCEDURE — 88307 TISSUE EXAM BY PATHOLOGIST: CPT

## 2017-02-18 PROCEDURE — 86921 COMPATIBILITY TEST INCUBATE: CPT

## 2017-02-18 PROCEDURE — 86901 BLOOD TYPING SEROLOGIC RH(D): CPT

## 2017-02-18 PROCEDURE — 36415 COLL VENOUS BLD VENIPUNCTURE: CPT

## 2017-02-18 PROCEDURE — 84132 ASSAY OF SERUM POTASSIUM: CPT

## 2017-02-18 PROCEDURE — 88309 TISSUE EXAM BY PATHOLOGIST: CPT

## 2017-02-18 PROCEDURE — 85018 HEMOGLOBIN: CPT

## 2017-02-18 PROCEDURE — 84100 ASSAY OF PHOSPHORUS: CPT

## 2017-02-18 PROCEDURE — 80048 BASIC METABOLIC PNL TOTAL CA: CPT

## 2017-02-18 PROCEDURE — 84295 ASSAY OF SERUM SODIUM: CPT

## 2017-02-18 PROCEDURE — 80053 COMPREHEN METABOLIC PANEL: CPT

## 2017-02-18 PROCEDURE — 88313 SPECIAL STAINS GROUP 2: CPT

## 2017-02-18 PROCEDURE — 36430 TRANSFUSION BLD/BLD COMPNT: CPT

## 2017-02-18 PROCEDURE — 86870 RBC ANTIBODY IDENTIFICATION: CPT

## 2017-02-18 PROCEDURE — 88305 TISSUE EXAM BY PATHOLOGIST: CPT

## 2017-02-18 PROCEDURE — 83735 ASSAY OF MAGNESIUM: CPT

## 2017-02-18 PROCEDURE — 97162 PT EVAL MOD COMPLEX 30 MIN: CPT

## 2017-02-18 PROCEDURE — 86900 BLOOD TYPING SEROLOGIC ABO: CPT

## 2017-02-18 PROCEDURE — 86904 BLOOD TYPING PATIENT SERUM: CPT

## 2017-02-18 PROCEDURE — P9016: CPT

## 2017-02-18 RX ORDER — OXYCODONE HYDROCHLORIDE 5 MG/1
1 TABLET ORAL
Qty: 30 | Refills: 0
Start: 2017-02-18

## 2017-02-18 RX ORDER — OXYCODONE HYDROCHLORIDE 5 MG/1
1 TABLET ORAL
Qty: 90 | Refills: 0 | COMMUNITY

## 2017-02-18 RX ORDER — ACETAMINOPHEN 500 MG
3 TABLET ORAL
Qty: 0 | Refills: 0 | DISCHARGE
Start: 2017-02-18

## 2017-02-18 RX ORDER — POTASSIUM CHLORIDE 20 MEQ
20 PACKET (EA) ORAL ONCE
Qty: 0 | Refills: 0 | Status: COMPLETED | OUTPATIENT
Start: 2017-02-18 | End: 2017-02-18

## 2017-02-18 RX ORDER — METOCLOPRAMIDE HCL 10 MG
10 TABLET ORAL ONCE
Qty: 0 | Refills: 0 | Status: DISCONTINUED | OUTPATIENT
Start: 2017-02-18 | End: 2017-02-18

## 2017-02-18 RX ADMIN — HEPARIN SODIUM 5000 UNIT(S): 5000 INJECTION INTRAVENOUS; SUBCUTANEOUS at 06:06

## 2017-02-18 RX ADMIN — OXYCODONE HYDROCHLORIDE 30 MILLIGRAM(S): 5 TABLET ORAL at 06:08

## 2017-02-18 RX ADMIN — HEPARIN SODIUM 5000 UNIT(S): 5000 INJECTION INTRAVENOUS; SUBCUTANEOUS at 14:00

## 2017-02-18 RX ADMIN — Medication 20 MILLIEQUIVALENT(S): at 12:27

## 2017-02-18 RX ADMIN — FINASTERIDE 5 MILLIGRAM(S): 5 TABLET, FILM COATED ORAL at 14:01

## 2017-02-18 RX ADMIN — Medication 10 MILLIGRAM(S): at 06:08

## 2017-02-18 RX ADMIN — OXYCODONE HYDROCHLORIDE 30 MILLIGRAM(S): 5 TABLET ORAL at 07:31

## 2017-02-18 RX ADMIN — OXYCODONE HYDROCHLORIDE 15 MILLIGRAM(S): 5 TABLET ORAL at 08:30

## 2017-02-18 RX ADMIN — OXYCODONE HYDROCHLORIDE 15 MILLIGRAM(S): 5 TABLET ORAL at 08:06

## 2017-02-18 RX ADMIN — Medication 975 MILLIGRAM(S): at 14:01

## 2017-02-18 RX ADMIN — PANTOPRAZOLE SODIUM 40 MILLIGRAM(S): 20 TABLET, DELAYED RELEASE ORAL at 06:06

## 2017-02-18 RX ADMIN — OXYCODONE HYDROCHLORIDE 30 MILLIGRAM(S): 5 TABLET ORAL at 14:01

## 2017-02-18 RX ADMIN — Medication 975 MILLIGRAM(S): at 06:08

## 2017-02-18 RX ADMIN — OXYCODONE HYDROCHLORIDE 15 MILLIGRAM(S): 5 TABLET ORAL at 12:27

## 2017-02-18 RX ADMIN — CARVEDILOL PHOSPHATE 6.25 MILLIGRAM(S): 80 CAPSULE, EXTENDED RELEASE ORAL at 06:07

## 2017-02-18 RX ADMIN — ONDANSETRON 4 MILLIGRAM(S): 8 TABLET, FILM COATED ORAL at 08:05

## 2017-02-18 RX ADMIN — OXYCODONE HYDROCHLORIDE 30 MILLIGRAM(S): 5 TABLET ORAL at 15:00

## 2017-02-18 RX ADMIN — OXYCODONE HYDROCHLORIDE 15 MILLIGRAM(S): 5 TABLET ORAL at 13:00

## 2017-02-18 NOTE — PROGRESS NOTE ADULT - SUBJECTIVE AND OBJECTIVE BOX
O/N: afebrile, VSS, GERMAINE  2/17: Diet advanced, tolerated well.    STATUS POST: Lap converted to open right hemicolectomy and right inguinal hernia repair EBL 600cc      POST OP DAY #: 5 O/N: afebrile, VSS, GERMAINE  2/17: Diet advanced, tolerated well.    STATUS POST: Lap converted to open right hemicolectomy and right inguinal hernia repair EBL 600cc      POST OP DAY #: 5     SUBJECTIVE: Patient seen and examined bedside by chief resident. No complaints this AM. +BM. Pain well controlled. Anticipating discharge home today    heparin  Injectable 5000Unit(s) SubCutaneous every 8 hours  doxazosin 4milliGRAM(s) Oral at bedtime  carvedilol 6.25milliGRAM(s) Oral every 12 hours  hydrALAZINE 10milliGRAM(s) Oral two times a day      Vital Signs Last 24 Hrs  T(C): 36.4, Max: 36.8 (02-17 @ 17:09)  T(F): 97.5, Max: 98.2 (02-17 @ 17:09)  HR: 71 (69 - 84)  BP: 134/73 (122/86 - 159/91)  BP(mean): --  RR: 18 (16 - 18)  SpO2: 97% (96% - 99%)  I&O's Detail    I & Os for current day (as of 18 Feb 2017 09:39)  =============================================  IN:    Oral Fluid: 300 ml    Total IN: 300 ml  ---------------------------------------------  OUT:    Voided: 600 ml    Total OUT: 600 ml  ---------------------------------------------  Total NET: -300 ml      General: NAD, resting comfortably in bed  C/V: NSR, HR in 80s  Pulm: Nonlabored breathing, no respiratory distress  Abd: soft, NT/ND. Well healing incisions  Extrem: WWP, no edema, SCDs in place        LABS:                        9.7    5.4   )-----------( 289      ( 18 Feb 2017 07:25 )             30.4     18 Feb 2017 07:25    140    |  106    |  9      ----------------------------<  87     3.7     |  28     |  0.84     Ca    8.3        18 Feb 2017 07:25  Phos  3.2       18 Feb 2017 07:25  Mg     2.0       18 Feb 2017 07:25            RADIOLOGY & ADDITIONAL STUDIES:

## 2017-02-18 NOTE — PROGRESS NOTE ADULT - ASSESSMENT
66yMale s/p above procedure    Low res diet  Pain control  DVT ppx  IS  OOBA  Home meds 66yoM with complicated PMH admitted with omental mass s/p right hemicolectomy and omentectomy POD5    Low res diet  Pain control  DVT ppx  IS  OOBA  Home meds  repletion for hypokalemia  d/c home today

## 2017-02-18 NOTE — PROGRESS NOTE ADULT - SUBJECTIVE AND OBJECTIVE BOX
moved bowels  adequate po intake    AVSS  appropriate abdominal tenderness  incision clean  stable H&H  NL elytes    D/C home later  low residue diet  f/u next friday

## 2017-02-21 DIAGNOSIS — Z88.0 ALLERGY STATUS TO PENICILLIN: ICD-10-CM

## 2017-02-21 DIAGNOSIS — R25.1 TREMOR, UNSPECIFIED: ICD-10-CM

## 2017-02-21 DIAGNOSIS — K21.9 GASTRO-ESOPHAGEAL REFLUX DISEASE WITHOUT ESOPHAGITIS: ICD-10-CM

## 2017-02-21 DIAGNOSIS — D64.9 ANEMIA, UNSPECIFIED: ICD-10-CM

## 2017-02-21 DIAGNOSIS — E66.9 OBESITY, UNSPECIFIED: ICD-10-CM

## 2017-02-21 DIAGNOSIS — R10.9 UNSPECIFIED ABDOMINAL PAIN: ICD-10-CM

## 2017-02-21 DIAGNOSIS — G89.29 OTHER CHRONIC PAIN: ICD-10-CM

## 2017-02-21 DIAGNOSIS — I72.3 ANEURYSM OF ILIAC ARTERY: ICD-10-CM

## 2017-02-21 DIAGNOSIS — D49.0 NEOPLASM OF UNSPECIFIED BEHAVIOR OF DIGESTIVE SYSTEM: ICD-10-CM

## 2017-02-21 DIAGNOSIS — I10 ESSENTIAL (PRIMARY) HYPERTENSION: ICD-10-CM

## 2017-02-21 DIAGNOSIS — E78.5 HYPERLIPIDEMIA, UNSPECIFIED: ICD-10-CM

## 2017-02-21 DIAGNOSIS — M54.2 CERVICALGIA: ICD-10-CM

## 2017-02-21 DIAGNOSIS — I51.7 CARDIOMEGALY: ICD-10-CM

## 2017-02-21 DIAGNOSIS — I71.4 ABDOMINAL AORTIC ANEURYSM, WITHOUT RUPTURE: ICD-10-CM

## 2017-02-21 DIAGNOSIS — M54.9 DORSALGIA, UNSPECIFIED: ICD-10-CM

## 2017-02-21 DIAGNOSIS — G89.28 OTHER CHRONIC POSTPROCEDURAL PAIN: ICD-10-CM

## 2017-02-21 DIAGNOSIS — Z95.0 PRESENCE OF CARDIAC PACEMAKER: ICD-10-CM

## 2017-02-21 DIAGNOSIS — Z86.718 PERSONAL HISTORY OF OTHER VENOUS THROMBOSIS AND EMBOLISM: ICD-10-CM

## 2017-02-21 DIAGNOSIS — F32.9 MAJOR DEPRESSIVE DISORDER, SINGLE EPISODE, UNSPECIFIED: ICD-10-CM

## 2017-02-21 DIAGNOSIS — F11.20 OPIOID DEPENDENCE, UNCOMPLICATED: ICD-10-CM

## 2017-02-21 DIAGNOSIS — Z88.8 ALLERGY STATUS TO OTHER DRUGS, MEDICAMENTS AND BIOLOGICAL SUBSTANCES STATUS: ICD-10-CM

## 2017-02-21 DIAGNOSIS — J43.9 EMPHYSEMA, UNSPECIFIED: ICD-10-CM

## 2017-02-21 DIAGNOSIS — K40.90 UNILATERAL INGUINAL HERNIA, WITHOUT OBSTRUCTION OR GANGRENE, NOT SPECIFIED AS RECURRENT: ICD-10-CM

## 2017-02-22 DIAGNOSIS — Z53.31 LAPAROSCOPIC SURGICAL PROCEDURE CONVERTED TO OPEN PROCEDURE: ICD-10-CM

## 2017-02-22 DIAGNOSIS — K40.30 UNILATERAL INGUINAL HERNIA, WITH OBSTRUCTION, WITHOUT GANGRENE, NOT SPECIFIED AS RECURRENT: ICD-10-CM

## 2017-03-08 ENCOUNTER — APPOINTMENT (OUTPATIENT)
Dept: HEART AND VASCULAR | Facility: CLINIC | Age: 67
End: 2017-03-08

## 2017-03-08 VITALS — SYSTOLIC BLOOD PRESSURE: 118 MMHG | HEART RATE: 88 BPM | DIASTOLIC BLOOD PRESSURE: 70 MMHG

## 2017-03-08 PROBLEM — R19.00 INTRA-ABDOMINAL AND PELVIC SWELLING, MASS AND LUMP, UNSPECIFIED SITE: Chronic | Status: ACTIVE | Noted: 2017-02-10

## 2017-03-08 PROBLEM — F32.9 MAJOR DEPRESSIVE DISORDER, SINGLE EPISODE, UNSPECIFIED: Chronic | Status: ACTIVE | Noted: 2017-02-10

## 2017-03-08 PROBLEM — D64.9 ANEMIA, UNSPECIFIED: Chronic | Status: ACTIVE | Noted: 2017-02-10

## 2017-03-08 RX ORDER — METOCLOPRAMIDE 5 MG/1
5 TABLET ORAL
Qty: 20 | Refills: 0 | Status: DISCONTINUED | COMMUNITY
Start: 2017-02-24

## 2017-03-08 RX ORDER — POLYETHYLENE GLYCOL 3350 17 G/17G
17 POWDER, FOR SOLUTION ORAL
Qty: 30 | Refills: 0 | Status: DISCONTINUED | COMMUNITY
Start: 2016-12-20

## 2017-03-08 RX ORDER — SODIUM SULFATE, POTASSIUM SULFATE, MAGNESIUM SULFATE 17.5; 3.13; 1.6 G/ML; G/ML; G/ML
17.5-3.13-1.6 SOLUTION, CONCENTRATE ORAL
Qty: 354 | Refills: 0 | Status: DISCONTINUED | COMMUNITY
Start: 2017-01-26

## 2017-03-15 ENCOUNTER — APPOINTMENT (OUTPATIENT)
Dept: HEART AND VASCULAR | Facility: CLINIC | Age: 67
End: 2017-03-15

## 2017-03-15 VITALS — SYSTOLIC BLOOD PRESSURE: 156 MMHG | DIASTOLIC BLOOD PRESSURE: 98 MMHG | HEART RATE: 84 BPM

## 2017-03-15 DIAGNOSIS — I10 ESSENTIAL (PRIMARY) HYPERTENSION: ICD-10-CM

## 2017-03-23 ENCOUNTER — MEDICATION RENEWAL (OUTPATIENT)
Age: 67
End: 2017-03-23

## 2017-03-28 LAB — SURGICAL PATHOLOGY STUDY: SIGNIFICANT CHANGE UP

## 2017-03-29 ENCOUNTER — APPOINTMENT (OUTPATIENT)
Dept: HEART AND VASCULAR | Facility: CLINIC | Age: 67
End: 2017-03-29

## 2017-03-29 VITALS — DIASTOLIC BLOOD PRESSURE: 84 MMHG | HEART RATE: 72 BPM | SYSTOLIC BLOOD PRESSURE: 122 MMHG

## 2017-03-29 VITALS — WEIGHT: 202.63 LBS | BODY MASS INDEX: 29.07 KG/M2

## 2017-03-29 RX ORDER — CLOTRIMAZOLE 10 MG/G
1 CREAM TOPICAL
Qty: 30 | Refills: 0 | Status: DISCONTINUED | COMMUNITY
Start: 2016-12-20 | End: 2017-03-22

## 2017-03-29 RX ORDER — NYSTATIN 100000 [USP'U]/G
100000 CREAM TOPICAL
Qty: 30 | Refills: 0 | Status: DISCONTINUED | COMMUNITY
Start: 2016-12-02

## 2017-03-29 RX ORDER — CARVEDILOL 6.25 MG/1
6.25 TABLET, FILM COATED ORAL
Qty: 60 | Refills: 0 | Status: DISCONTINUED | COMMUNITY
Start: 2016-12-20

## 2017-03-29 RX ORDER — NYSTATIN AND TRIAMCINOLONE ACETONIDE 100000; 1 MG/G; MG/G
100000-0.1 CREAM TOPICAL
Qty: 60 | Refills: 0 | Status: DISCONTINUED | COMMUNITY
Start: 2017-01-26 | End: 2017-03-13

## 2017-05-17 ENCOUNTER — APPOINTMENT (OUTPATIENT)
Dept: HEART AND VASCULAR | Facility: CLINIC | Age: 67
End: 2017-05-17

## 2017-05-17 VITALS
DIASTOLIC BLOOD PRESSURE: 78 MMHG | BODY MASS INDEX: 29.27 KG/M2 | SYSTOLIC BLOOD PRESSURE: 120 MMHG | WEIGHT: 204 LBS | HEART RATE: 76 BPM

## 2017-09-06 ENCOUNTER — APPOINTMENT (OUTPATIENT)
Dept: HEART AND VASCULAR | Facility: CLINIC | Age: 67
End: 2017-09-06
Payer: MEDICARE

## 2017-09-06 VITALS — DIASTOLIC BLOOD PRESSURE: 86 MMHG | HEART RATE: 80 BPM | SYSTOLIC BLOOD PRESSURE: 130 MMHG

## 2017-09-06 PROCEDURE — 93000 ELECTROCARDIOGRAM COMPLETE: CPT

## 2017-09-06 PROCEDURE — 99214 OFFICE O/P EST MOD 30 MIN: CPT | Mod: 25

## 2017-12-13 ENCOUNTER — APPOINTMENT (OUTPATIENT)
Dept: HEART AND VASCULAR | Facility: CLINIC | Age: 67
End: 2017-12-13
Payer: MEDICARE

## 2017-12-13 VITALS — HEART RATE: 72 BPM

## 2017-12-13 VITALS — DIASTOLIC BLOOD PRESSURE: 80 MMHG | WEIGHT: 204 LBS | BODY MASS INDEX: 29.27 KG/M2 | SYSTOLIC BLOOD PRESSURE: 130 MMHG

## 2017-12-13 PROCEDURE — 99214 OFFICE O/P EST MOD 30 MIN: CPT | Mod: 25

## 2017-12-13 PROCEDURE — 93000 ELECTROCARDIOGRAM COMPLETE: CPT

## 2017-12-13 RX ORDER — OXYCODONE HYDROCHLORIDE 30 MG/1
30 TABLET ORAL
Qty: 21 | Refills: 0 | Status: DISCONTINUED | COMMUNITY
Start: 2017-02-17 | End: 2017-12-13

## 2017-12-13 RX ORDER — OXYCODONE HYDROCHLORIDE 20 MG/1
20 TABLET, EXTENDED RELEASE ORAL
Qty: 120 | Refills: 0 | Status: ACTIVE | COMMUNITY
Start: 2017-11-20

## 2018-03-08 ENCOUNTER — MEDICATION RENEWAL (OUTPATIENT)
Age: 68
End: 2018-03-08

## 2018-03-21 ENCOUNTER — APPOINTMENT (OUTPATIENT)
Dept: HEART AND VASCULAR | Facility: CLINIC | Age: 68
End: 2018-03-21

## 2018-04-08 ENCOUNTER — APPOINTMENT (OUTPATIENT)
Dept: HEART AND VASCULAR | Facility: CLINIC | Age: 68
End: 2018-04-08
Payer: MEDICARE

## 2018-04-08 ENCOUNTER — MEDICATION RENEWAL (OUTPATIENT)
Age: 68
End: 2018-04-08

## 2018-04-08 VITALS
DIASTOLIC BLOOD PRESSURE: 86 MMHG | WEIGHT: 204 LBS | SYSTOLIC BLOOD PRESSURE: 130 MMHG | BODY MASS INDEX: 29.27 KG/M2 | HEART RATE: 76 BPM

## 2018-04-08 PROCEDURE — 99214 OFFICE O/P EST MOD 30 MIN: CPT | Mod: 25

## 2018-04-08 PROCEDURE — 93000 ELECTROCARDIOGRAM COMPLETE: CPT

## 2018-04-08 RX ORDER — IRON/IRON ASP GLY/FA/MV-MIN 38 125-25-1MG
TABLET ORAL
Refills: 0 | Status: DISCONTINUED | COMMUNITY
End: 2018-04-08

## 2018-06-10 ENCOUNTER — APPOINTMENT (OUTPATIENT)
Dept: HEART AND VASCULAR | Facility: CLINIC | Age: 68
End: 2018-06-10
Payer: MEDICARE

## 2018-06-10 PROCEDURE — 93000 ELECTROCARDIOGRAM COMPLETE: CPT

## 2018-06-10 PROCEDURE — 99214 OFFICE O/P EST MOD 30 MIN: CPT | Mod: 25

## 2018-06-18 ENCOUNTER — MEDICATION RENEWAL (OUTPATIENT)
Age: 68
End: 2018-06-18

## 2018-07-05 ENCOUNTER — MEDICATION RENEWAL (OUTPATIENT)
Age: 68
End: 2018-07-05

## 2018-08-01 ENCOUNTER — APPOINTMENT (OUTPATIENT)
Dept: HEART AND VASCULAR | Facility: CLINIC | Age: 68
End: 2018-08-01
Payer: MEDICARE

## 2018-08-01 VITALS — HEART RATE: 72 BPM

## 2018-08-01 VITALS — SYSTOLIC BLOOD PRESSURE: 146 MMHG | DIASTOLIC BLOOD PRESSURE: 98 MMHG | BODY MASS INDEX: 29.7 KG/M2 | WEIGHT: 207 LBS

## 2018-08-01 DIAGNOSIS — Z87.891 PERSONAL HISTORY OF NICOTINE DEPENDENCE: ICD-10-CM

## 2018-08-01 DIAGNOSIS — I49.9 CARDIAC ARRHYTHMIA, UNSPECIFIED: ICD-10-CM

## 2018-08-01 PROCEDURE — 99213 OFFICE O/P EST LOW 20 MIN: CPT | Mod: 25

## 2018-08-01 RX ORDER — LABETALOL HYDROCHLORIDE 200 MG/1
200 TABLET, FILM COATED ORAL
Qty: 60 | Refills: 1 | Status: DISCONTINUED | COMMUNITY
Start: 2018-07-05 | End: 2018-07-18

## 2018-09-27 ENCOUNTER — APPOINTMENT (OUTPATIENT)
Dept: VASCULAR SURGERY | Facility: CLINIC | Age: 68
End: 2018-09-27
Payer: MEDICARE

## 2018-09-27 VITALS
SYSTOLIC BLOOD PRESSURE: 120 MMHG | HEART RATE: 87 BPM | DIASTOLIC BLOOD PRESSURE: 70 MMHG | OXYGEN SATURATION: 95 % | RESPIRATION RATE: 14 BRPM

## 2018-09-27 PROCEDURE — 99213 OFFICE O/P EST LOW 20 MIN: CPT

## 2018-10-02 RX ORDER — DRONABINOL 10 MG/1
10 CAPSULE ORAL
Qty: 60 | Refills: 0 | Status: DISCONTINUED | COMMUNITY
Start: 2018-01-29 | End: 2018-10-02

## 2018-10-02 RX ORDER — OXYCODONE AND ACETAMINOPHEN 10; 325 MG/1; MG/1
10-325 TABLET ORAL
Qty: 42 | Refills: 0 | Status: DISCONTINUED | COMMUNITY
Start: 2017-02-17 | End: 2018-10-02

## 2018-10-02 RX ORDER — METOPROLOL SUCCINATE 50 MG/1
50 TABLET, EXTENDED RELEASE ORAL
Refills: 3 | Status: DISCONTINUED | COMMUNITY
End: 2018-10-02

## 2018-10-02 RX ORDER — NORTRIPTYLINE HYDROCHLORIDE 75 MG/1
75 CAPSULE ORAL
Refills: 0 | Status: ACTIVE | COMMUNITY

## 2018-10-05 ENCOUNTER — APPOINTMENT (OUTPATIENT)
Dept: HEART AND VASCULAR | Facility: CLINIC | Age: 68
End: 2018-10-05
Payer: MEDICARE

## 2018-10-05 VITALS — SYSTOLIC BLOOD PRESSURE: 120 MMHG | HEART RATE: 68 BPM | DIASTOLIC BLOOD PRESSURE: 80 MMHG

## 2018-10-05 PROCEDURE — 93000 ELECTROCARDIOGRAM COMPLETE: CPT

## 2018-10-05 PROCEDURE — 99214 OFFICE O/P EST MOD 30 MIN: CPT | Mod: 25

## 2018-10-05 RX ORDER — BACLOFEN 10 MG/1
10 TABLET ORAL
Refills: 0 | Status: DISCONTINUED | COMMUNITY
End: 2018-10-05

## 2018-10-05 RX ORDER — LABETALOL HYDROCHLORIDE 200 MG/1
200 TABLET, FILM COATED ORAL
Refills: 0 | Status: DISCONTINUED | COMMUNITY
End: 2018-10-05

## 2018-10-17 ENCOUNTER — APPOINTMENT (OUTPATIENT)
Dept: GASTROENTEROLOGY | Facility: CLINIC | Age: 68
End: 2018-10-17

## 2019-01-09 ENCOUNTER — APPOINTMENT (OUTPATIENT)
Dept: HEART AND VASCULAR | Facility: CLINIC | Age: 69
End: 2019-01-09

## 2019-01-15 NOTE — ED PROVIDER NOTE - INPATIENT RESIDENT/ACP NOTIFIED DATE
Date Form Received: 01/14/2019  Authorization: YES  Date Sent for Auth:   Type of Form: DISABILITY   Company: [email or web address]   Where form complete:  Comment: FAX TO SONALI   30-Dec-2016 16:28

## 2019-01-30 ENCOUNTER — APPOINTMENT (OUTPATIENT)
Dept: HEART AND VASCULAR | Facility: CLINIC | Age: 69
End: 2019-01-30

## 2019-02-20 ENCOUNTER — APPOINTMENT (OUTPATIENT)
Dept: HEART AND VASCULAR | Facility: CLINIC | Age: 69
End: 2019-02-20
Payer: MEDICARE

## 2019-02-20 VITALS
WEIGHT: 204 LBS | SYSTOLIC BLOOD PRESSURE: 130 MMHG | DIASTOLIC BLOOD PRESSURE: 76 MMHG | BODY MASS INDEX: 30.21 KG/M2 | HEIGHT: 69 IN | HEART RATE: 76 BPM

## 2019-02-20 DIAGNOSIS — Z95.0 PRESENCE OF CARDIAC PACEMAKER: ICD-10-CM

## 2019-02-20 PROCEDURE — 93000 ELECTROCARDIOGRAM COMPLETE: CPT

## 2019-02-20 PROCEDURE — 93306 TTE W/DOPPLER COMPLETE: CPT

## 2019-02-20 PROCEDURE — 99214 OFFICE O/P EST MOD 30 MIN: CPT | Mod: 25

## 2019-02-20 NOTE — PHYSICAL EXAM
[General Appearance - Well Developed] : well developed [Normal Appearance] : normal appearance [Well Groomed] : well groomed [General Appearance - Well Nourished] : well nourished [No Deformities] : no deformities [General Appearance - In No Acute Distress] : no acute distress [Normal Conjunctiva] : the conjunctiva exhibited no abnormalities [Normal Oral Mucosa] : normal oral mucosa [Normal Jugular Venous A Waves Present] : normal jugular venous A waves present [Normal Jugular Venous V Waves Present] : normal jugular venous V waves present [No Jugular Venous Sousa A Waves] : no jugular venous sousa A waves [] : no respiratory distress [Respiration, Rhythm And Depth] : normal respiratory rhythm and effort [Exaggerated Use Of Accessory Muscles For Inspiration] : no accessory muscle use [Auscultation Breath Sounds / Voice Sounds] : lungs were clear to auscultation bilaterally [Heart Rate And Rhythm] : heart rate and rhythm were normal [Heart Sounds] : normal S1 and S2 [Murmurs] : no murmurs present [Edema] : no peripheral edema present [Bowel Sounds] : normal bowel sounds [Abdomen Soft] : soft [Abdomen Tenderness] : non-tender [Nail Clubbing] : no clubbing of the fingernails [Skin Color & Pigmentation] : normal skin color and pigmentation [Oriented To Time, Place, And Person] : oriented to person, place, and time [FreeTextEntry1] : uses cane

## 2019-02-20 NOTE — DISCUSSION/SUMMARY
[FreeTextEntry1] : EKG:NSR,first degree AVB\par cont Zocor for lipids; hydralazine for hptn\par echo:( COULDN'T POSITION PT. PROPERLY DUE TO BACK/ORTHOPEDIC ISSUES)\par DILATED AORTIC ROOT, NORMAL LV FX\par labs done DEC reviewed, LDL=85mg%,DW=930un%\par ppm f/u with Dr ROSA Friedman. f/u for AAA WITH dR RAHMAN\par WILL GET CHEST CT W/O CONTRAST FOR BETTER DEFINITION OF AORTIC ROOT ENLARGEMENT

## 2019-02-20 NOTE — REVIEW OF SYSTEMS
[Headache] : headache [see HPI] : see HPI [Heartburn] : heartburn [Nocturia] : nocturia [Joint Pain] : joint pain [Tingling (Paresthesia)] : tingling [Negative] : Heme/Lymph [Abdominal Pain] : no abdominal pain [Nausea] : no nausea [Change in Appetite] : no change in appetite [Dizziness] : no dizziness [Tremor] : no tremor was seen [Numbness (Hypesthesia)] : no numbness [Confusion] : no confusion was observed [Memory Lapses Or Loss] : no memory lapses or loss [Anxiety] : no anxiety [Under Stress] : not under stress

## 2020-01-27 NOTE — PROGRESS NOTE ADULT - SUBJECTIVE AND OBJECTIVE BOX
INTERVAL HPI/OVERNIGHT EVENTS:    VITAL SIGNS:  T(F): 97.7  HR: 75  BP: 160/104  RR: 18  SpO2: 94%  Wt(kg): --    PHYSICAL EXAM:    Constitutional:  Eyes:  ENMT:  Neck:  Respiratory:  Cardiovascular:  Gastrointestinal:  Extremities:  Vascular:  Neurological:  Musculoskeletal:    MEDICATIONS  (STANDING):  finasteride 5milliGRAM(s) Oral daily  aspirin enteric coated 81milliGRAM(s) Oral daily  doxazosin 4milliGRAM(s) Oral at bedtime  nortriptyline 75milliGRAM(s) Oral at bedtime  carvedilol 6.25milliGRAM(s) Oral every 12 hours  pantoprazole    Tablet 40milliGRAM(s) Oral before breakfast  folic acid 1milliGRAM(s) Oral daily  heparin  Injectable 5000Unit(s) SubCutaneous every 8 hours  vortioxetine 20milliGRAM(s) Oral daily  oxyCODONE ER Tablet 30milliGRAM(s) Oral every 8 hours  lactobacillus acidophilus 1Tablet(s) Oral daily  hydrALAZINE 25milliGRAM(s) Oral every 8 hours  cyanocobalamin Injectable 1000MICROGram(s) IntraMuscular daily  amLODIPine   Tablet 5milliGRAM(s) Oral daily    MEDICATIONS  (PRN):  polyethylene glycol 3350 17Gram(s) Oral two times a day PRN Constipation  acetaminophen   Tablet. 650milliGRAM(s) Oral every 6 hours PRN Mild Pain (1 - 3)  melatonin 3milliGRAM(s) Oral at bedtime PRN Insomnia  ALPRAZolam 1milliGRAM(s) Oral at bedtime PRN insomnia  oxyCODONE IR 15milliGRAM(s) Oral every 6 hours PRN Severe Pain (7 - 10)      Allergies    Altace (Unknown)  penicillin (Unknown)    Intolerances        LABS:                        8.4    5.3   )-----------( 230      ( 03 Jan 2017 06:58 )             26.3     03 Jan 2017 06:58    136    |  102    |  7      ----------------------------<  86     3.3     |  23     |  0.83     Ca    8.6        03 Jan 2017 06:58  Phos  3.4       03 Jan 2017 06:58  Mg     2.1       03 Jan 2017 06:58            RADIOLOGY & ADDITIONAL TESTS: sialoadenitis INTERVAL HPI/OVERNIGHT EVENTS:  Bowel prep overnight with expected discomfort. Otherwise asymptomatic, ROS negative.    VITAL SIGNS:  T(F): 97.7  HR: 75  BP: 160/104  RR: 18  SpO2: 94%  Wt(kg): --    PHYSICAL EXAM:    Constitutional: appears well, NAD  Eyes: PERRL, EOMI  ENMT: MMM  Neck: supple, no JVD  Respiratory: CTAB  Cardiovascular: RRR, no MRG  Gastrointestinal: soft, NTND, normal BS  Extremities: no edema  Vascular: pulses palpable in 4 extremities  Neurological: a&oX3, grossly intact    MEDICATIONS  (STANDING):  finasteride 5milliGRAM(s) Oral daily  aspirin enteric coated 81milliGRAM(s) Oral daily  doxazosin 4milliGRAM(s) Oral at bedtime  nortriptyline 75milliGRAM(s) Oral at bedtime  carvedilol 6.25milliGRAM(s) Oral every 12 hours  pantoprazole    Tablet 40milliGRAM(s) Oral before breakfast  folic acid 1milliGRAM(s) Oral daily  heparin  Injectable 5000Unit(s) SubCutaneous every 8 hours  vortioxetine 20milliGRAM(s) Oral daily  oxyCODONE ER Tablet 30milliGRAM(s) Oral every 8 hours  lactobacillus acidophilus 1Tablet(s) Oral daily  hydrALAZINE 25milliGRAM(s) Oral every 8 hours  cyanocobalamin Injectable 1000MICROGram(s) IntraMuscular daily  amLODIPine   Tablet 5milliGRAM(s) Oral daily    MEDICATIONS  (PRN):  polyethylene glycol 3350 17Gram(s) Oral two times a day PRN Constipation  acetaminophen   Tablet. 650milliGRAM(s) Oral every 6 hours PRN Mild Pain (1 - 3)  melatonin 3milliGRAM(s) Oral at bedtime PRN Insomnia  ALPRAZolam 1milliGRAM(s) Oral at bedtime PRN insomnia  oxyCODONE IR 15milliGRAM(s) Oral every 6 hours PRN Severe Pain (7 - 10)      Allergies    Altace (Unknown)  penicillin (Unknown)    Intolerances        LABS:                        8.4    5.3   )-----------( 230      ( 03 Jan 2017 06:58 )             26.3     03 Jan 2017 06:58    136    |  102    |  7      ----------------------------<  86     3.3     |  23     |  0.83     Ca    8.6        03 Jan 2017 06:58  Phos  3.4       03 Jan 2017 06:58  Mg     2.1       03 Jan 2017 06:58            RADIOLOGY & ADDITIONAL TESTS:

## 2020-02-26 ENCOUNTER — APPOINTMENT (OUTPATIENT)
Dept: HEART AND VASCULAR | Facility: CLINIC | Age: 70
End: 2020-02-26
Payer: MEDICARE

## 2020-02-26 VITALS
WEIGHT: 186 LBS | SYSTOLIC BLOOD PRESSURE: 138 MMHG | DIASTOLIC BLOOD PRESSURE: 90 MMHG | BODY MASS INDEX: 27.47 KG/M2 | HEART RATE: 64 BPM

## 2020-02-26 PROCEDURE — 93000 ELECTROCARDIOGRAM COMPLETE: CPT

## 2020-02-26 PROCEDURE — 99214 OFFICE O/P EST MOD 30 MIN: CPT | Mod: 25

## 2020-02-26 NOTE — PHYSICAL EXAM
[General Appearance - Well Developed] : well developed [Well Groomed] : well groomed [Normal Appearance] : normal appearance [General Appearance - Well Nourished] : well nourished [No Deformities] : no deformities [General Appearance - In No Acute Distress] : no acute distress [Normal Conjunctiva] : the conjunctiva exhibited no abnormalities [Normal Oral Mucosa] : normal oral mucosa [Normal Jugular Venous A Waves Present] : normal jugular venous A waves present [Normal Jugular Venous V Waves Present] : normal jugular venous V waves present [No Jugular Venous Sousa A Waves] : no jugular venous sousa A waves [] : no respiratory distress [Exaggerated Use Of Accessory Muscles For Inspiration] : no accessory muscle use [Respiration, Rhythm And Depth] : normal respiratory rhythm and effort [Heart Rate And Rhythm] : heart rate and rhythm were normal [Auscultation Breath Sounds / Voice Sounds] : lungs were clear to auscultation bilaterally [Heart Sounds] : normal S1 and S2 [Edema] : no peripheral edema present [Murmurs] : no murmurs present [Abdomen Soft] : soft [Bowel Sounds] : normal bowel sounds [Abdomen Tenderness] : non-tender [Nail Clubbing] : no clubbing of the fingernails [Skin Color & Pigmentation] : normal skin color and pigmentation [Oriented To Time, Place, And Person] : oriented to person, place, and time [FreeTextEntry1] : uses cane

## 2020-02-26 NOTE — REVIEW OF SYSTEMS
[Feeling Fatigued] : feeling fatigued [see HPI] : see HPI [Heartburn] : heartburn [Joint Pain] : joint pain [Nocturia] : nocturia [Tingling (Paresthesia)] : tingling [Negative] : Heme/Lymph [Recent Weight Loss (___ Lbs)] : recent [unfilled] ~Ulb weight loss [Numbness (Hypesthesia)] : numbness [Nausea] : no nausea [Headache] : no headache [Abdominal Pain] : no abdominal pain [Dizziness] : no dizziness [Change in Appetite] : no change in appetite [Tremor] : no tremor was seen [Anxiety] : no anxiety [Confusion] : no confusion was observed [Memory Lapses Or Loss] : no memory lapses or loss [Under Stress] : not under stress

## 2020-02-26 NOTE — DISCUSSION/SUMMARY
[FreeTextEntry1] : EKG:NSR,IVCD,First degree AVB\par advised to f/u with dr diggs for AAA and gave them Rx for CTA for thoracic( for aortic root dilatation) and abdominal  aorta. cont Zocor for lipids; hydralazine +toprol or hptn. meds renewed. labs done feb reviewed\par f/u with Dr Pickens for abdominal issues\par BP elevated but in a lot of pain- lost weight due to dental issues

## 2020-03-11 ENCOUNTER — TRANSCRIPTION ENCOUNTER (OUTPATIENT)
Age: 70
End: 2020-03-11

## 2020-07-23 ENCOUNTER — APPOINTMENT (OUTPATIENT)
Dept: VASCULAR SURGERY | Facility: CLINIC | Age: 70
End: 2020-07-23
Payer: MEDICARE

## 2020-07-23 VITALS — SYSTOLIC BLOOD PRESSURE: 90 MMHG | HEART RATE: 74 BPM | DIASTOLIC BLOOD PRESSURE: 40 MMHG

## 2020-07-23 PROCEDURE — 99213 OFFICE O/P EST LOW 20 MIN: CPT

## 2020-08-19 ENCOUNTER — APPOINTMENT (OUTPATIENT)
Dept: HEART AND VASCULAR | Facility: CLINIC | Age: 70
End: 2020-08-19
Payer: MEDICARE

## 2020-08-19 VITALS — WEIGHT: 183 LBS | HEART RATE: 64 BPM | BODY MASS INDEX: 27.02 KG/M2

## 2020-08-19 VITALS — SYSTOLIC BLOOD PRESSURE: 140 MMHG | DIASTOLIC BLOOD PRESSURE: 90 MMHG

## 2020-08-19 PROCEDURE — 99214 OFFICE O/P EST MOD 30 MIN: CPT | Mod: 25

## 2020-08-19 PROCEDURE — 93000 ELECTROCARDIOGRAM COMPLETE: CPT

## 2020-08-19 NOTE — DISCUSSION/SUMMARY
[FreeTextEntry1] : EKG:NSR ,IVCD\par has increased muscle cramps- they are concerned it's the statin- add CoQ 10 and hold Zocor for 10 DAYS.increase hydralazine for better BP control and check BP; resume Zocor if cramps not improved( for lipids. f/u PPM with EP. f/u AAA with dr diggs. called for CT report- no thoracic aneurysm

## 2020-08-19 NOTE — PHYSICAL EXAM
[General Appearance - Well Developed] : well developed [Normal Appearance] : normal appearance [General Appearance - Well Nourished] : well nourished [No Deformities] : no deformities [Well Groomed] : well groomed [Normal Oral Mucosa] : normal oral mucosa [Normal Conjunctiva] : the conjunctiva exhibited no abnormalities [General Appearance - In No Acute Distress] : no acute distress [Normal Jugular Venous V Waves Present] : normal jugular venous V waves present [Normal Jugular Venous A Waves Present] : normal jugular venous A waves present [No Jugular Venous Sousa A Waves] : no jugular venous sousa A waves [] : no respiratory distress [Auscultation Breath Sounds / Voice Sounds] : lungs were clear to auscultation bilaterally [Exaggerated Use Of Accessory Muscles For Inspiration] : no accessory muscle use [Respiration, Rhythm And Depth] : normal respiratory rhythm and effort [Heart Rate And Rhythm] : heart rate and rhythm were normal [Heart Sounds] : normal S1 and S2 [Edema] : no peripheral edema present [Bowel Sounds] : normal bowel sounds [Murmurs] : no murmurs present [Abdomen Tenderness] : non-tender [Abdomen Soft] : soft [FreeTextEntry1] : device in LLQ [Nail Clubbing] : no clubbing of the fingernails [Skin Color & Pigmentation] : normal skin color and pigmentation [Oriented To Time, Place, And Person] : oriented to person, place, and time

## 2020-08-19 NOTE — HISTORY OF PRESENT ILLNESS
[FreeTextEntry1] : hydralazine lowered because of low BP- had ppm yesterday and bp 160/90. no cp,sob,palpitations

## 2020-08-19 NOTE — REVIEW OF SYSTEMS
[Headache] : no headache [Feeling Fatigued] : feeling fatigued [see HPI] : see HPI [Recent Weight Loss (___ Lbs)] : recent [unfilled] ~Ulb weight loss [Heartburn] : heartburn [Nausea] : no nausea [Abdominal Pain] : no abdominal pain [Change in Appetite] : no change in appetite [Nocturia] : nocturia [Joint Pain] : joint pain [Dizziness] : no dizziness [Muscle Cramps] : muscle cramps [Tingling (Paresthesia)] : tingling [Tremor] : no tremor was seen [Numbness (Hypesthesia)] : numbness [Confusion] : no confusion was observed [Anxiety] : no anxiety [Memory Lapses Or Loss] : no memory lapses or loss [Under Stress] : not under stress [Negative] : Heme/Lymph

## 2020-10-09 NOTE — CONSULT NOTE ADULT - SUBJECTIVE AND OBJECTIVE BOX
"[facility]    Medicine Progress Note - Hospitalist Service       Date of Admission:  10/6/2020  Assessment & Plan                   Gio Lin is a 44 year old male admitted on 10/6/2020. He presented to the emergency department with symptoms consistent with alcohol withdrawal.    He was admitted in ICU and received frequent doses of Ativan for the first 36 hours of hospitalization.  He was transferred to the medical floor on 10/8/2020.    Over the last 24 hours has required less Ativan but still 7mg in last 10 hours.  He still feels anxious but no longer having visual hallucinations. We will continue hospitalization tonight due to ongoing need for high dose benzodiazepines.    His paresthesias are nearly resolved.    Initially had a very elevated lactate but this resolved with IV hydration.  We have not found  any signs or symptoms of infection.      Principal Problem:    Alcohol withdrawal syndrome without complication (H).  History of alcohol dependance with recent relapse for months.    Assessment: Patient reports he has been drinking \"1/2 gallon of vodka\" daily with his girlfriend.  Last drink about 52 hours ago.    Plan: Continue CIWA protocol.  Ativan.  Oral vitamins.    Active Problems:    Alcoholic ketoacidosis    Assessment: Improved dramatically with IV fluid bolus and Ativan dosage.  Eating well.    Plan: Continue with good oral intake.    Hypomagnesemia    Assessment: Resolved    Plan: MOnitor    Hypophosphatemia    Assessment: Resolved    Plan: Monitor             Diet: Combination Diet Regular Diet Adult    DVT Prophylaxis: Pneumatic Compression Devices  Aguilar Catheter: not present  Code Status: Full Code           Disposition Plan   Expected discharge: Tomorrow, recommended to prior living arrangement once no longer requiring high-dose benzodiazepines.  Entered: Narayan Chiu MD 10/09/2020, 9:52 AM       The patient's care was discussed with the Patient.  Did call " patient's son but he did not answer.  His emergency contact is currently hospitalized.    Narayan Chiu MD  Hospitalist Service  [facility]  Contact information available via [facility] Paging/Directory    ______________________________________________________________________    Interval History   Patient reports that he does feel better today.  Still quite anxious but not as bad as yesterday.  Visual hallucinations are now gone.  Paresthesias in his lower extremities have completely resolved.  Still has some pins-and-needles sensation over his outer arms bilaterally in the upper extremities.  No nausea or vomiting.  Eating and drinking well.  Has not had any fevers.  Denies any shortness of breath, cough or cold symptoms.  Denies any abdominal pain.    Data reviewed today: I reviewed all medications, new labs and imaging results over the last 24 hours. I personally reviewed no images or EKG's today.    Physical Exam   Vital Signs: Temp: 96.9  F (36.1  C) Temp src: Tympanic BP: 122/83 Pulse: 63   Resp: 16 SpO2: 95 % O2 Device: None (Room air)    Weight: 211 lbs 9.6 oz  Constitutional: awake, alert, cooperative, no apparent distress, and appears stated age  Respiratory: Clear toascultation bilaterally.  No increased respiratory effort.   Cardiovascular: Regularrate and rhythm.  No murmurs rubs or gallops heard.   GI: Abdomen Soft, non-tender.  No masses, rebound or guarding.   Musculoskeletal: No synovitis.  Muscle strength age and body habitus appropriateas well as equal and even.   Neuropsychiatric: General: normal, calm and normal eye contact  Orientation: oriented to self, place, time and situation  Memory and insight: memory for past and recent events intact and thought process normal    Data   Recent Labs   Lab 10/09/20  0455 10/08/20  1200 10/08/20  0445 10/07/20  0430 10/06/20  1049 10/06/20  1049   WBC 7.9  --   --  11.0  --  16.4*   HGB 15.0  --   --  13.7  --  16.2   MCV 86  --   --   86  --  84     --   --  233  --  308     --  139 141  --  138   POTASSIUM 3.7 3.8 3.1*  3.1* 3.5   < > 3.1*   CHLORIDE 108*  --  110* 110*  --  101   CO2 23  --  20* 24  --  15*   BUN 12  --  9 10  --  11   CR 0.94  --  0.83 0.94  --  1.07   ANIONGAP 7  --  9 7  --  22*   GINA 8.9  --  8.4* 8.2*  --  9.7   GLC 84  --  119* 105  --  166*   ALBUMIN  --   --   --  3.5  --  4.4   PROTTOTAL  --   --   --  5.9*  --  7.6   BILITOTAL  --   --   --  0.9  --  0.8   ALKPHOS  --   --   --  52  --  57   ALT  --   --   --  17  --  23   AST  --   --   --  18  --  24   LIPASE  --   --   --   --   --  24    < > = values in this interval not displayed.     No results found for this or any previous visit (from the past 24 hour(s)).   HPI:  67 y/o M w/ PMHx of HTN, HLD, chronic back pain s/p multiple spinal surgeries and intrathecal pump placment, emphysema, cardiomyopathy s/p atrial PPM, and s/p EVAR for AAA 12/1/2016 admitted to the medical team for symptomatic anemia; no overt signs of bleeding, FOBT negative on admission. Patient was discharged from rehab end of December and became progressively weak to the point where he had trouble getting up on his own (previously able). Patient denies fevers, chills, night sweats, no chest pain or SOB but endorses ONOFRE. No abdominal pain, n/v, or changes in bowel habits. No bloody or dark or tarry stools. Last colonoscopy was 4 years ago, polyp removed by GI doctor at [facility]. Was scheduled to meet Dr. Wells on Wed for workup of RLQ mass found on CT in October 2016.      PAST MEDICAL & SURGICAL HISTORY:  Aneurysm of aortic root  Cardiac arrhythmia  Pulmonary emphysema  DVT (deep venous thrombosis)  ONOFRE (dyspnea on exertion)  Hyperlipidemia  Cardiomyopathy  HTN (hypertension)  AAA (abdominal aortic aneurysm)  History of back surgery  History of kidney surgery  Pacemaker: [employer]      REVIEW OF SYSTEMS per hpi    MEDICATIONS  (STANDING):  finasteride 5milliGRAM(s) Oral daily  aspirin enteric coated 81milliGRAM(s) Oral daily  doxazosin 4milliGRAM(s) Oral at bedtime  nortriptyline 75milliGRAM(s) Oral at bedtime  carvedilol 6.25milliGRAM(s) Oral every 12 hours  pantoprazole    Tablet 40milliGRAM(s) Oral before breakfast  folic acid 1milliGRAM(s) Oral daily  heparin  Injectable 5000Unit(s) SubCutaneous every 8 hours  vortioxetine 20milliGRAM(s) Oral daily  oxyCODONE ER Tablet 30milliGRAM(s) Oral every 8 hours  lactobacillus acidophilus 1Tablet(s) Oral daily  sodium chloride 0.9%. 1000milliLiter(s) IV Continuous <Continuous>  hydrALAZINE 25milliGRAM(s) Oral every 8 hours  cyanocobalamin Injectable 1000MICROGram(s) IntraMuscular daily    MEDICATIONS  (PRN):  polyethylene glycol 3350 17Gram(s) Oral two times a day PRN Constipation  acetaminophen   Tablet. 650milliGRAM(s) Oral every 6 hours PRN Mild Pain (1 - 3)  melatonin 3milliGRAM(s) Oral at bedtime PRN Insomnia  oxyCODONE IR 15milliGRAM(s) Oral every 8 hours PRN Severe Pain (7 - 10)      Allergies    Altace (Unknown)  penicillin (Unknown)    Intolerances        SOCIAL HISTORY: NC    FAMILY HISTORY:  denies family history of colon cancer or any other cancer  denies personal history of any other cancer    Vital Signs Last 24 Hrs  T(C): 36.6, Max: 36.9 (01-01 @ 20:49)  T(F): 97.8, Max: 98.5 (01-01 @ 20:49)  HR: 78 (65 - 78)  BP: 140/88 (115/78 - 183/96)  BP(mean): --  RR: 18 (17 - 18)  SpO2: 95% (94% - 100%)    PHYSICAL EXAM:      Constitutional: NAD    Respiratory: CTAB    Cardiovascular: RRR, +s1,s2    Gastrointestinal: soft, NDNT, no rebound    Rectal: deferred    Extremities: WWP, no edema    Vascular: DP pulses palpated bilaterally    Neurological: AAOx3      LABS:                        7.8    5.0   )-----------( 257      ( 02 Jan 2017 06:03 )             24.4     02 Jan 2017 06:03    134    |  101    |  9      ----------------------------<  94     3.5     |  24     |  0.92     Ca    8.2        02 Jan 2017 06:03  Phos  3.0       02 Jan 2017 06:03  Mg     1.8       02 Jan 2017 06:03    TPro  6.9    /  Alb  2.6    /  TBili  0.4    /  DBili  x      /  AST  17     /  ALT  14     /  AlkPhos  96     01 Jan 2017 06:45          RADIOLOGY & ADDITIONAL STUDIES

## 2021-01-21 ENCOUNTER — NON-APPOINTMENT (OUTPATIENT)
Age: 71
End: 2021-01-21

## 2021-01-27 ENCOUNTER — APPOINTMENT (OUTPATIENT)
Dept: HEART AND VASCULAR | Facility: CLINIC | Age: 71
End: 2021-01-27
Payer: MEDICARE

## 2021-01-27 VITALS
WEIGHT: 189 LBS | BODY MASS INDEX: 27.91 KG/M2 | SYSTOLIC BLOOD PRESSURE: 110 MMHG | DIASTOLIC BLOOD PRESSURE: 70 MMHG | HEART RATE: 60 BPM

## 2021-01-27 PROCEDURE — 93000 ELECTROCARDIOGRAM COMPLETE: CPT

## 2021-01-27 PROCEDURE — 99214 OFFICE O/P EST MOD 30 MIN: CPT | Mod: 25

## 2021-01-27 RX ORDER — DOXYCYCLINE HYCLATE 100 MG/1
100 CAPSULE ORAL
Refills: 0 | Status: DISCONTINUED | COMMUNITY
End: 2021-01-27

## 2021-01-27 NOTE — DISCUSSION/SUMMARY
[FreeTextEntry1] : EKG:NSR,Rhythm strip NSR\par discussed NOAC bleeding and to avoid NSAIDs,etc\par cont hydralazine for HPTN;Zocor for lipids; Eliquis for pAF- called radiology to see if had Chest CT- pt states he did(had to leave message for facility to call me back - if not will need f/u for thoracic aorta\par Reviewed labs done JAN 4- CMP NOT DONE. LDL=62MG%he will be seeing PCP next week and asked them to get full chemistries

## 2021-01-27 NOTE — PHYSICAL EXAM
[General Appearance - Well Developed] : well developed [Normal Appearance] : normal appearance [Well Groomed] : well groomed [General Appearance - Well Nourished] : well nourished [No Deformities] : no deformities [General Appearance - In No Acute Distress] : no acute distress [Normal Conjunctiva] : the conjunctiva exhibited no abnormalities [Normal Oral Mucosa] : normal oral mucosa [Normal Jugular Venous A Waves Present] : normal jugular venous A waves present [Normal Jugular Venous V Waves Present] : normal jugular venous V waves present [No Jugular Venous Sousa A Waves] : no jugular venous sousa A waves [] : no respiratory distress [Respiration, Rhythm And Depth] : normal respiratory rhythm and effort [Exaggerated Use Of Accessory Muscles For Inspiration] : no accessory muscle use [Auscultation Breath Sounds / Voice Sounds] : lungs were clear to auscultation bilaterally [Heart Rate And Rhythm] : heart rate and rhythm were normal [Heart Sounds] : normal S1 and S2 [Murmurs] : no murmurs present [Edema] : no peripheral edema present [Bowel Sounds] : normal bowel sounds [Abdomen Soft] : soft [Abdomen Tenderness] : non-tender [Nail Clubbing] : no clubbing of the fingernails [Skin Color & Pigmentation] : normal skin color and pigmentation [FreeTextEntry1] : uses cane [Oriented To Time, Place, And Person] : oriented to person, place, and time

## 2021-01-27 NOTE — REVIEW OF SYSTEMS
[Recent Weight Gain (___ Lbs)] : recent [unfilled] ~Ulb weight gain [Feeling Fatigued] : feeling fatigued [see HPI] : see HPI [Heartburn] : heartburn [Nocturia] : nocturia [Joint Pain] : joint pain [Muscle Cramps] : muscle cramps [Numbness (Hypesthesia)] : numbness [Tingling (Paresthesia)] : tingling [Negative] : Heme/Lymph [Headache] : no headache [Recent Weight Loss (___ Lbs)] : no recent weight loss [Abdominal Pain] : no abdominal pain [Nausea] : no nausea [Change in Appetite] : no change in appetite [Dizziness] : no dizziness [Tremor] : no tremor was seen [Confusion] : no confusion was observed [Memory Lapses Or Loss] : no memory lapses or loss [Anxiety] : no anxiety [Under Stress] : not under stress

## 2021-01-27 NOTE — HISTORY OF PRESENT ILLNESS
[FreeTextEntry1] : no cp,sob- has LBP- no bleeding\par on review of EP note apparently pt. had bout of prolonged AF- I started him on Eliquis as i feel benefits>> risks

## 2021-02-10 ENCOUNTER — APPOINTMENT (OUTPATIENT)
Dept: HEART AND VASCULAR | Facility: CLINIC | Age: 71
End: 2021-02-10

## 2021-05-12 ENCOUNTER — APPOINTMENT (OUTPATIENT)
Dept: VASCULAR SURGERY | Facility: CLINIC | Age: 71
End: 2021-05-12
Payer: MEDICARE

## 2021-05-12 VITALS — SYSTOLIC BLOOD PRESSURE: 144 MMHG | DIASTOLIC BLOOD PRESSURE: 85 MMHG | HEART RATE: 69 BPM

## 2021-05-12 VITALS — SYSTOLIC BLOOD PRESSURE: 144 MMHG | HEART RATE: 69 BPM | DIASTOLIC BLOOD PRESSURE: 85 MMHG

## 2021-05-12 PROCEDURE — 93978 VASCULAR STUDY: CPT

## 2021-05-12 PROCEDURE — 99213 OFFICE O/P EST LOW 20 MIN: CPT

## 2021-05-17 NOTE — HISTORY OF PRESENT ILLNESS
[FreeTextEntry1] : 72 y/o M with PMH of HLD, pAfib, HTN, PPM, leg edema, ONOFRE, DVT, emphysema, presence of neurostimulator/ drug pump in abdomen, ascending TAA, aortic root dilation, R iliac arty aneurysm s/p EVAR 2016. He presents for routine follow up on EVAR. Patient complains of chronic left sided neck pain and back pain which he notes has become constant. He recently had a CT scan of the chest ordered by Dr. Gonzales and bring the CD for review of images. Patient was told there is a slight dilation noted on CT scan but he does not have the report with him. He tries to stay active as much as possible. Denies any rest pain, unusual abdominal or back pain, bowel disfunction, fever or chills. \par \par Patient accompanied by his wife today.

## 2021-05-17 NOTE — ADDENDUM
[FreeTextEntry1] : This note was written by Marixa Mendez on 05/12/2021 acting as scribe for Sangeetha Torre M.D.\par \par I, Dr. Jonathan Vivas  have read and attest that all the information, medical decision making and discharge instructions within are true and accurate. \par \par I, Dr. Jonathan Vivas, personally performed the evaluation and management (E/M) services for this established patient who presents today with (a) new problem(s)/exacerbation of (an) existing condition(s).  That E/M includes conducting the examination, assessing all new/exacerbated conditions, and establishing a new plan of care.  Today, my ACP, Natalya Andrews NP, was here to observe my evaluation and management services for this new problem/exacerbated condition to be followed going forward.\par

## 2021-05-17 NOTE — PHYSICAL EXAM
[Respiratory Effort] : normal respiratory effort [Oriented to Person] : oriented to person [Oriented to Place] : oriented to place [Calm] : calm [2+] : left 2+ [No Rash or Lesion] : No rash or lesion [JVD] : no jugular venous distention  [Ankle Swelling (On Exam)] : not present [Varicose Veins Of Lower Extremities] : not present [] : not present [Abdomen Masses] : No abdominal masses [Abdomen Tenderness] : ~T ~M No abdominal tenderness [Abdominal Bruit] : no abdominal bruit  [de-identified] : Cooperative, Friendly [de-identified] : NC/AT  [de-identified] : Supple  [de-identified] : FROM

## 2021-05-17 NOTE — CONSULT LETTER
[Dear  ___] : Dear  [unfilled], [FreeTextEntry2] : Santiago Gonzales MD\par [address]\[place] [address] \par \par EAMON Palacios\par [address]\[place] [address]\par \par Patric Garcia MD\par [address]\[place] [address] [FreeTextEntry1] : I saw Mr Surinder Guerrero in the office for followup. He underwent an EVAR in 2016 for a right common iliac artery aneurysm. He also has known thoracic aortic aneurysmal changes.  Today, he presents accompanied by his wife and reports a recent CT scan of the chest with increase in dilation size of ascending thoracic aneurysm.  Denies any unusual abdominal, back or chest pain. He tries to stay as active as possible.\par \par On exam, abdomen is soft and nontender. Legs are warm with no edema. Blood pressure 144/85, heart rate 69 b/min. \par \par Abdominal ultrasound is very limited due to a nerve stimulator pump on left mid and lower quadrants. Aortic endovascular graft patent extending into right common iliac. Residual right common iliac aneurysm sac measuring 2.4x2.8cm without evidence of an endoleak.  Left common iliac artery not visualized but left external iliac appreciated and patent.\par \par I reviewed the CT scans.  Mr Guerrero has an ascending aortic aneurysm of 3.9 cm.  I will refer him to Dr Garcia for further monitoring of this.  I recommend a yearly CT angiogram of the chest and include the abdomen and pelvis given difficult visualizing the endograft with ultrasound.   \par \par I will see him again in one year. \par \par  [FreeTextEntry3] : Sincerely, \par \par Jonathan Vivas M.D. \par , [facility]\par , Department of Surgery [facility]\par Professor of Surgery, [employer] at [facility]

## 2021-05-17 NOTE — ASSESSMENT
[Arterial/Venous Disease] : arterial/venous disease [Aneurysm Surgery] : aneurysm surgery [Medication Management] : medication management [Smoking Cessation] : smoking cessation [FreeTextEntry1] : 70 y/o M with hx of ascending TAA, aortic root dilation, and R Iliac artery aneurysm s/p EVAR 2016. Abdomen soft and nontender. LEs warm to touch with no edema. /85, HR 69. Abd US ordered is slightly limited due to abdominal nerve stimulator in place. Endovascular graft in abdominal aorta to right VONDA is patent. Residual R VONDA aneurysm sac with no evidence of endoleak, 2.4x2.8cm. L VONDA not able to visualize. L external iliac is patent. Also, I have reviewed the CT of the chest completed from outside hospital. \par \par Plan:\par -I recommended to complete yearly CT of the chest and include abdomen/pelvis given difficulty to visualize EVAR due to neurostimulator pump. \par -I have provided referral with Dr. Patric Garcia cardiothoracic surgeon for ascending aortic aneurysm and aortic root dilation. \par -No vascular surgery interventions recommended at this time. \par -Will contact Dr. Gonzales to discuss case. \par -Continue medications as prescribed\par -Stay as active as possible\par -Monitor any unusual abdominal or back pain\par -F/u here in 1 year with CT scan results.

## 2021-05-17 NOTE — PROCEDURE
[FreeTextEntry1] :  Abdominal US ordered today, shows : patent endovascular graft in abdominal aorta to right VONDA. Limited study due to abdominal pump/stimulator. Residual R VONDA aneurysm sac with no evidence of endoleak, 2.4x2.8cm. L VONDA not able to visualize. L external iliac is patent.

## 2021-05-26 ENCOUNTER — APPOINTMENT (OUTPATIENT)
Dept: HEART AND VASCULAR | Facility: CLINIC | Age: 71
End: 2021-05-26
Payer: MEDICARE

## 2021-05-26 VITALS
WEIGHT: 190 LBS | HEART RATE: 80 BPM | BODY MASS INDEX: 28.06 KG/M2 | SYSTOLIC BLOOD PRESSURE: 110 MMHG | DIASTOLIC BLOOD PRESSURE: 80 MMHG

## 2021-05-26 DIAGNOSIS — I71.4 ABDOMINAL AORTIC ANEURYSM, W/OUT RUPTURE: ICD-10-CM

## 2021-05-26 PROCEDURE — 99214 OFFICE O/P EST MOD 30 MIN: CPT | Mod: 25

## 2021-05-26 PROCEDURE — 93000 ELECTROCARDIOGRAM COMPLETE: CPT

## 2021-05-26 NOTE — PHYSICAL EXAM
[Normal Venous Pressure] : normal venous pressure [No Carotid Bruit] : no carotid bruit [Normal S1, S2] : normal S1, S2 [No Rub] : no rub [Normal] : clear lung fields, good air entry, no respiratory distress [Soft] : abdomen soft [Non Tender] : non-tender [Normal Bowel Sounds] : normal bowel sounds [No Edema] : no edema [No Rash] : no rash [Moves all extremities] : moves all extremities [Alert and Oriented] : alert and oriented [de-identified] : uses cane

## 2021-05-26 NOTE — HISTORY OF PRESENT ILLNESS
[FreeTextEntry1] : saw dr diggs who will be following him yearly- he will be seeing dr Garcia in aorta evaluation\par no cp,sob\par gets ppm checked with Dr ROSA Friedman

## 2021-05-26 NOTE — DISCUSSION/SUMMARY
[FreeTextEntry1] : EKG:NSR,First Degree AVB\par cont Zocor for lipids; Eliquis +Toprol for PAF;hydralazine for hPTN( changed to 2 separate dosages to make meds easier). toprol +hydralazine for thoracic aorta\par meds renewed\par AAA f/u with Dr Vivas- aortic f/u with Dr barth

## 2021-06-02 ENCOUNTER — APPOINTMENT (OUTPATIENT)
Dept: CARDIOTHORACIC SURGERY | Facility: CLINIC | Age: 71
End: 2021-06-02
Payer: MEDICARE

## 2021-06-02 VITALS
SYSTOLIC BLOOD PRESSURE: 184 MMHG | HEART RATE: 82 BPM | RESPIRATION RATE: 17 BRPM | DIASTOLIC BLOOD PRESSURE: 95 MMHG | BODY MASS INDEX: 28.14 KG/M2 | WEIGHT: 190 LBS | HEIGHT: 69 IN | TEMPERATURE: 96.5 F | OXYGEN SATURATION: 95 %

## 2021-06-02 PROCEDURE — 99204 OFFICE O/P NEW MOD 45 MIN: CPT

## 2021-10-13 ENCOUNTER — APPOINTMENT (OUTPATIENT)
Dept: HEART AND VASCULAR | Facility: CLINIC | Age: 71
End: 2021-10-13

## 2021-10-22 ENCOUNTER — APPOINTMENT (OUTPATIENT)
Dept: HEART AND VASCULAR | Facility: CLINIC | Age: 71
End: 2021-10-22
Payer: MEDICARE

## 2021-10-22 VITALS — DIASTOLIC BLOOD PRESSURE: 84 MMHG | SYSTOLIC BLOOD PRESSURE: 122 MMHG | HEART RATE: 72 BPM

## 2021-10-22 DIAGNOSIS — I44.0 ATRIOVENTRICULAR BLOCK, FIRST DEGREE: ICD-10-CM

## 2021-10-22 PROCEDURE — 93000 ELECTROCARDIOGRAM COMPLETE: CPT

## 2021-10-22 PROCEDURE — 99214 OFFICE O/P EST MOD 30 MIN: CPT | Mod: 25

## 2021-10-22 RX ORDER — SIMVASTATIN 10 MG/1
10 TABLET, FILM COATED ORAL
Refills: 0 | Status: DISCONTINUED | COMMUNITY
Start: 2021-06-03 | End: 2021-10-22

## 2021-10-22 NOTE — DISCUSSION/SUMMARY
[FreeTextEntry1] : EKG:NSR,First Degree AVB,RBBB\par continue Zocor for lipids; hydralazine for HPTN/aorta\par toprol + Eliquis for PAF\par needs f/u with Dr Lemos for ppm and will get echo there

## 2021-10-22 NOTE — PHYSICAL EXAM
[Normal Venous Pressure] : normal venous pressure [No Carotid Bruit] : no carotid bruit [Normal S1, S2] : normal S1, S2 [No Rub] : no rub [Normal] : clear lung fields, good air entry, no respiratory distress [Soft] : abdomen soft [Non Tender] : non-tender [Normal Bowel Sounds] : normal bowel sounds [No Edema] : no edema [No Rash] : no rash [Moves all extremities] : moves all extremities [Alert and Oriented] : alert and oriented [de-identified] : uses cane

## 2021-10-31 ENCOUNTER — RX RENEWAL (OUTPATIENT)
Age: 71
End: 2021-10-31

## 2021-11-05 ENCOUNTER — APPOINTMENT (OUTPATIENT)
Dept: HEART AND VASCULAR | Facility: CLINIC | Age: 71
End: 2021-11-05
Payer: MEDICARE

## 2021-11-05 PROCEDURE — 93306 TTE W/DOPPLER COMPLETE: CPT

## 2021-12-29 NOTE — PROGRESS NOTE ADULT - PROBLEM SELECTOR PROBLEM 2
Rebeca Delaney is a 61 year old female presenting for back pain and hip pain. Pt states that its the lower left side. Pt states that the pain started around last week. Pt declines injury. Pt declines numbness and tingling. Pt states that its hard for her to bend down. Pt has experienced similar back pain in the past.     Patient would like communication of their results via:   Cell Phone:   Telephone Information:   Mobile [phone]     Okay to leave a message containing results? Yes     Medications reviewed and updated.Denies known Latex allergy or symptoms of Latex sensitivity.    Health Maintenance Due   Topic Date Due   • COVID-19 Vaccine (1) Never done   • Shingles Vaccine (1 of 2) Never done   • Influenza Vaccine (1) Never done   • Cervical Cancer Risk  01/20/2022   • Lung Cancer Screening  02/12/2022       Patient is due for topics as listed above but is not proceeding with Immunization(s) COVID-19, Influenza and Shingles at this time.     Recent PHQ 2/9 Score    PHQ 2:  Date Adult PHQ 2 Score Adult PHQ 2 Interpretation   12/29/2021 0 No further screening needed       
Hyponatremia
Anemia
Hyponatremia
Anemia

## 2022-03-22 NOTE — CONSULT NOTE ADULT - PROBLEM SELECTOR RECOMMENDATION 2
From: Venessa Huerta  To: Cherelle Lowery  Sent: 3/19/2022 1:50 AM CDT  Subject: Question regarding TESTOSTERONE, TOTAL, MALE    What are things I can do to raise my testosterone levels because I have a pretty healthy diet I’ve gone from 300LBS to 226 and now I’m also dieting down to reach a goal of 205 lbs im consistent in the gym( 6-7 days) and with my clean eating what do you recommend ?   
colonoscopy  patient consents

## 2022-05-06 ENCOUNTER — APPOINTMENT (OUTPATIENT)
Dept: HEART AND VASCULAR | Facility: CLINIC | Age: 72
End: 2022-05-06
Payer: MEDICARE

## 2022-05-06 VITALS — SYSTOLIC BLOOD PRESSURE: 130 MMHG | HEART RATE: 72 BPM | DIASTOLIC BLOOD PRESSURE: 80 MMHG

## 2022-05-06 PROCEDURE — 93000 ELECTROCARDIOGRAM COMPLETE: CPT

## 2022-05-06 PROCEDURE — 99214 OFFICE O/P EST MOD 30 MIN: CPT | Mod: 25

## 2022-05-06 RX ORDER — KRILL/OM-3/DHA/EPA/PHOSPHO/AST 1000-230MG
81 CAPSULE ORAL DAILY
Refills: 0 | Status: DISCONTINUED | COMMUNITY
Start: 2021-06-03 | End: 2022-05-06

## 2022-05-06 NOTE — PHYSICAL EXAM
[Normal Venous Pressure] : normal venous pressure [No Carotid Bruit] : no carotid bruit [Normal S1, S2] : normal S1, S2 [No Rub] : no rub [Normal] : clear lung fields, good air entry, no respiratory distress [Soft] : abdomen soft [Non Tender] : non-tender [Normal Bowel Sounds] : normal bowel sounds [No Edema] : no edema [No Rash] : no rash [Moves all extremities] : moves all extremities [Alert and Oriented] : alert and oriented [de-identified] : using walker

## 2022-05-06 NOTE — DISCUSSION/SUMMARY
[FreeTextEntry1] : EKG NSR,RBBB\par continue Eliquis +Toprol for PAF; Zocor for lipids;hydralazine for HPTN

## 2022-06-30 ENCOUNTER — APPOINTMENT (OUTPATIENT)
Dept: GASTROENTEROLOGY | Facility: CLINIC | Age: 72
End: 2022-06-30

## 2022-06-30 VITALS — HEIGHT: 69 IN | WEIGHT: 202 LBS | OXYGEN SATURATION: 99 % | TEMPERATURE: 96.9 F | BODY MASS INDEX: 29.92 KG/M2

## 2022-06-30 DIAGNOSIS — Z98.890 OTHER SPECIFIED POSTPROCEDURAL STATES: ICD-10-CM

## 2022-06-30 DIAGNOSIS — R12 HEARTBURN: ICD-10-CM

## 2022-06-30 DIAGNOSIS — R13.10 DYSPHAGIA, UNSPECIFIED: ICD-10-CM

## 2022-06-30 DIAGNOSIS — Z86.79 OTHER SPECIFIED POSTPROCEDURAL STATES: ICD-10-CM

## 2022-06-30 PROCEDURE — 99205 OFFICE O/P NEW HI 60 MIN: CPT

## 2022-06-30 NOTE — REVIEW OF SYSTEMS
[As noted in HPI] : as noted in HPI [Shortness Of Breath] : shortness of breath [SOB on Exertion] : shortness of breath during exertion [As Noted in HPI] : as noted in HPI [Arthralgias] : arthralgias [Joint Swelling] : joint swelling [Joint Stiffness] : joint stiffness [Limb Weakness] : limb weakness [Difficulty Walking] : difficulty walking [Negative] : Integumentary

## 2022-06-30 NOTE — ASSESSMENT
[FreeTextEntry1] : Impression: Longstanding GERD with worsening symptoms off of PPI.  Chronic constipation secondary to opiates as well as neurologic/spinal abnormalities.  Mild anemia which is normocytic and appears to be secondary to chronic disease with no evidence of iron deficiency.  History of polyps on early colonoscopies but none on recent colonoscopies.  Status post resection for benign mass outside of colon.  Oropharyngeal dysphagia due to cervical radiculopathy.\par \par Plan: Renew omeprazole 20 mg daily.  Can use as needed.  No problem with long-term daily use if necessary.  Continue MiraLAX daily.  Take Colace daily as well.  Can reduce to every other day if stool becomes too soft or loose.  No GI work-up is indicated for this mild normocytic anemia.  Patient has a history of polyps but has significant cardiopulmonary morbidities and at this point risk-benefit analysis does not favor further screening colonoscopies.

## 2022-06-30 NOTE — PHYSICAL EXAM
[General Appearance - Alert] : alert [General Appearance - In No Acute Distress] : in no acute distress [Sclera] : the sclera and conjunctiva were normal [PERRL With Normal Accommodation] : pupils were equal in size, round, and reactive to light [Extraocular Movements] : extraocular movements were intact [Outer Ear] : the ears and nose were normal in appearance [Oropharynx] : the oropharynx was normal [Auscultation Breath Sounds / Voice Sounds] : lungs were clear to auscultation bilaterally [Heart Sounds] : normal S1 and S2 [Heart Rate And Rhythm] : heart rate was normal and rhythm regular [Heart Sounds Gallop] : no gallops [Murmurs] : no murmurs [Heart Sounds Pericardial Friction Rub] : no pericardial rub [Bowel Sounds] : normal bowel sounds [Abdomen Soft] : soft [Abdomen Tenderness] : non-tender [] : no hepato-splenomegaly [Abdomen Mass (___ Cm)] : no abdominal mass palpated [FreeTextEntry1] : Multiple joint deformities and varying degrees of peripheral muscular atrophy

## 2022-06-30 NOTE — HISTORY OF PRESENT ILLNESS
[Heartburn] : heartburn worsened [Nausea] : denies nausea [Vomiting] : denies vomiting [Diarrhea] : denies diarrhea [Constipation] : stable constipation [Yellow Skin Or Eyes (Jaundice)] : denies jaundice [Abdominal Pain] : denies abdominal pain [Abdominal Swelling] : denies abdominal swelling [Rectal Pain] : denies rectal pain [GERD] : gastroesophageal reflux disease [Peptic Ulcer Disease] : no peptic ulcer disease [Pancreatitis] : no pancreatitis [Cholelithiasis] : cholelithiasis [Inflammatory Bowel Disease] : no inflammatory bowel disease [Irritable Bowel Syndrome] : no irritable bowel syndrome [Diverticulitis] : no diverticulitis [Alcohol Abuse] : no alcohol abuse [Abdominal Surgery] : abdominal surgery [Appendectomy] : appendectomy [de-identified] : 72-year-old male with multiple medical problems including atrial fibrillation, cardiomyopathy, PPM and thoracic and abdominal aortic aneurysms, hypertension, multiple spinal and orthopedic surgeries with chronic pain and opioid dependence resulting in chronic constipation with occasional fecal impaction as well as rare incontinence has not been seen here for many years and is now here to establish follow-up.  Patient had stenting of AAA in 2016, in 2017 he underwent colon and small bowel resection for a benign mass outside of the colon found on imaging.  He had a colonoscopy prior to this which was reportedly unremarkable.  He had polyps on his initial colonoscopy but has not had any polyps on follow-up colonoscopy since.  Also has a history of reflux had been on omeprazole for many years but this was discontinued by his cardiologist for reasons unknown other than he was on it "for too long".  Patient has daily and severe heartburn for which she takes large amounts of Tums.  Patient also has oropharyngeal dysphagia and subsists on a soft diet for the most part.  EGD in 2014 showed no significant pathology.  Uses MiraLAX every day and is fairly regular bowel elimination, although occasionally he has a buildup of very hard stool for which he takes an enema with good relief.  Uses Colace only under such conditions not on a regular basis.  In the past had been on Linzess and Amitiza as well as Metamucil without much benefit.\par \par Recent labs by his primary nurse practitioner showing hemoglobin of 10.7 with an MCV of 86.6.  He also has mild renal insufficiency with a creatinine around 1.4.  BUN is normal.

## 2022-08-07 ENCOUNTER — INPATIENT (INPATIENT)
Facility: HOSPITAL | Age: 72
LOS: 8 days | Discharge: NURSING, REHAB OR LONG-TERM CARE | DRG: 444 | End: 2022-08-16
Attending: STUDENT IN AN ORGANIZED HEALTH CARE EDUCATION/TRAINING PROGRAM | Admitting: INTERNAL MEDICINE
Payer: MEDICARE

## 2022-08-07 VITALS
SYSTOLIC BLOOD PRESSURE: 108 MMHG | TEMPERATURE: 97 F | HEART RATE: 60 BPM | OXYGEN SATURATION: 95 % | RESPIRATION RATE: 18 BRPM | DIASTOLIC BLOOD PRESSURE: 64 MMHG

## 2022-08-07 DIAGNOSIS — Z95.0 PRESENCE OF CARDIAC PACEMAKER: Chronic | ICD-10-CM

## 2022-08-07 DIAGNOSIS — Z41.9 ENCOUNTER FOR PROCEDURE FOR PURPOSES OTHER THAN REMEDYING HEALTH STATE, UNSPECIFIED: Chronic | ICD-10-CM

## 2022-08-07 DIAGNOSIS — Z98.890 OTHER SPECIFIED POSTPROCEDURAL STATES: Chronic | ICD-10-CM

## 2022-08-07 DIAGNOSIS — Z96.642 PRESENCE OF LEFT ARTIFICIAL HIP JOINT: Chronic | ICD-10-CM

## 2022-08-07 LAB
ALBUMIN SERPL ELPH-MCNC: 2.9 G/DL — LOW (ref 3.3–5)
ALP SERPL-CCNC: 60 U/L — SIGNIFICANT CHANGE UP (ref 40–120)
ALT FLD-CCNC: 9 U/L — LOW (ref 10–45)
ANION GAP SERPL CALC-SCNC: 11 MMOL/L — SIGNIFICANT CHANGE UP (ref 5–17)
AST SERPL-CCNC: 19 U/L — SIGNIFICANT CHANGE UP (ref 10–40)
BILIRUB SERPL-MCNC: 0.9 MG/DL — SIGNIFICANT CHANGE UP (ref 0.2–1.2)
BUN SERPL-MCNC: 26 MG/DL — HIGH (ref 7–23)
CALCIUM SERPL-MCNC: 8 MG/DL — LOW (ref 8.4–10.5)
CHLORIDE SERPL-SCNC: 97 MMOL/L — SIGNIFICANT CHANGE UP (ref 96–108)
CO2 SERPL-SCNC: 21 MMOL/L — LOW (ref 22–31)
CREAT SERPL-MCNC: 1.67 MG/DL — HIGH (ref 0.5–1.3)
EGFR: 43 ML/MIN/1.73M2 — LOW
GLUCOSE SERPL-MCNC: 96 MG/DL — SIGNIFICANT CHANGE UP (ref 70–99)
HCT VFR BLD CALC: 28 % — LOW (ref 39–50)
HGB BLD-MCNC: 9.4 G/DL — LOW (ref 13–17)
MAGNESIUM SERPL-MCNC: 2 MG/DL — SIGNIFICANT CHANGE UP (ref 1.6–2.6)
MCHC RBC-ENTMCNC: 29.2 PG — SIGNIFICANT CHANGE UP (ref 27–34)
MCHC RBC-ENTMCNC: 33.6 GM/DL — SIGNIFICANT CHANGE UP (ref 32–36)
MCV RBC AUTO: 87 FL — SIGNIFICANT CHANGE UP (ref 80–100)
NRBC # BLD: 0 /100 WBCS — SIGNIFICANT CHANGE UP (ref 0–0)
PHOSPHATE SERPL-MCNC: 3.3 MG/DL — SIGNIFICANT CHANGE UP (ref 2.5–4.5)
PLATELET # BLD AUTO: 170 K/UL — SIGNIFICANT CHANGE UP (ref 150–400)
POTASSIUM SERPL-MCNC: 4.7 MMOL/L — SIGNIFICANT CHANGE UP (ref 3.5–5.3)
POTASSIUM SERPL-SCNC: 4.7 MMOL/L — SIGNIFICANT CHANGE UP (ref 3.5–5.3)
PROT SERPL-MCNC: 6.1 G/DL — SIGNIFICANT CHANGE UP (ref 6–8.3)
RBC # BLD: 3.22 M/UL — LOW (ref 4.2–5.8)
RBC # FLD: 13 % — SIGNIFICANT CHANGE UP (ref 10.3–14.5)
SARS-COV-2 RNA SPEC QL NAA+PROBE: SIGNIFICANT CHANGE UP
SODIUM SERPL-SCNC: 129 MMOL/L — LOW (ref 135–145)
WBC # BLD: 12.05 K/UL — HIGH (ref 3.8–10.5)
WBC # FLD AUTO: 12.05 K/UL — HIGH (ref 3.8–10.5)

## 2022-08-07 PROCEDURE — 76705 ECHO EXAM OF ABDOMEN: CPT | Mod: 26

## 2022-08-07 RX ORDER — CARVEDILOL PHOSPHATE 80 MG/1
12.5 CAPSULE, EXTENDED RELEASE ORAL EVERY 12 HOURS
Refills: 0 | Status: DISCONTINUED | OUTPATIENT
Start: 2022-08-07 | End: 2022-08-07

## 2022-08-07 RX ORDER — DIAZEPAM 5 MG
2 TABLET ORAL DAILY
Refills: 0 | Status: DISCONTINUED | OUTPATIENT
Start: 2022-08-07 | End: 2022-08-09

## 2022-08-07 RX ORDER — OXYCODONE HYDROCHLORIDE 5 MG/1
10 TABLET ORAL EVERY 6 HOURS
Refills: 0 | Status: DISCONTINUED | OUTPATIENT
Start: 2022-08-07 | End: 2022-08-08

## 2022-08-07 RX ORDER — OXYCODONE HYDROCHLORIDE 5 MG/1
20 TABLET ORAL THREE TIMES A DAY
Refills: 0 | Status: DISCONTINUED | OUTPATIENT
Start: 2022-08-07 | End: 2022-08-07

## 2022-08-07 RX ORDER — FOLIC ACID 0.8 MG
1 TABLET ORAL DAILY
Refills: 0 | Status: DISCONTINUED | OUTPATIENT
Start: 2022-08-07 | End: 2022-08-16

## 2022-08-07 RX ORDER — METOPROLOL TARTRATE 50 MG
50 TABLET ORAL
Refills: 0 | Status: DISCONTINUED | OUTPATIENT
Start: 2022-08-07 | End: 2022-08-16

## 2022-08-07 RX ORDER — SIMVASTATIN 20 MG/1
10 TABLET, FILM COATED ORAL AT BEDTIME
Refills: 0 | Status: DISCONTINUED | OUTPATIENT
Start: 2022-08-07 | End: 2022-08-16

## 2022-08-07 RX ORDER — OXYCODONE HYDROCHLORIDE 5 MG/1
15 TABLET ORAL
Refills: 0 | Status: DISCONTINUED | OUTPATIENT
Start: 2022-08-07 | End: 2022-08-07

## 2022-08-07 RX ORDER — OXYCODONE HYDROCHLORIDE 5 MG/1
20 TABLET ORAL EVERY 12 HOURS
Refills: 0 | Status: DISCONTINUED | OUTPATIENT
Start: 2022-08-07 | End: 2022-08-08

## 2022-08-07 RX ORDER — CEFTRIAXONE 500 MG/1
INJECTION, POWDER, FOR SOLUTION INTRAMUSCULAR; INTRAVENOUS
Refills: 0 | Status: DISCONTINUED | OUTPATIENT
Start: 2022-08-07 | End: 2022-08-08

## 2022-08-07 RX ORDER — NORTRIPTYLINE HYDROCHLORIDE 10 MG/1
75 CAPSULE ORAL DAILY
Refills: 0 | Status: DISCONTINUED | OUTPATIENT
Start: 2022-08-07 | End: 2022-08-09

## 2022-08-07 RX ORDER — METRONIDAZOLE 500 MG
TABLET ORAL
Refills: 0 | Status: DISCONTINUED | OUTPATIENT
Start: 2022-08-07 | End: 2022-08-14

## 2022-08-07 RX ORDER — HEPARIN SODIUM 5000 [USP'U]/ML
1600 INJECTION INTRAVENOUS; SUBCUTANEOUS
Qty: 25000 | Refills: 0 | Status: DISCONTINUED | OUTPATIENT
Start: 2022-08-07 | End: 2022-08-08

## 2022-08-07 RX ORDER — POLYETHYLENE GLYCOL 3350 17 G/17G
17 POWDER, FOR SOLUTION ORAL DAILY
Refills: 0 | Status: DISCONTINUED | OUTPATIENT
Start: 2022-08-07 | End: 2022-08-15

## 2022-08-07 RX ORDER — DOXAZOSIN MESYLATE 4 MG
4 TABLET ORAL AT BEDTIME
Refills: 0 | Status: DISCONTINUED | OUTPATIENT
Start: 2022-08-07 | End: 2022-08-16

## 2022-08-07 RX ORDER — POLYETHYLENE GLYCOL 3350 17 G/17G
17 POWDER, FOR SOLUTION ORAL DAILY
Refills: 0 | Status: DISCONTINUED | OUTPATIENT
Start: 2022-08-07 | End: 2022-08-07

## 2022-08-07 RX ORDER — SODIUM CHLORIDE 9 MG/ML
1000 INJECTION INTRAMUSCULAR; INTRAVENOUS; SUBCUTANEOUS
Refills: 0 | Status: DISCONTINUED | OUTPATIENT
Start: 2022-08-07 | End: 2022-08-08

## 2022-08-07 RX ORDER — CEFTRIAXONE 500 MG/1
1000 INJECTION, POWDER, FOR SOLUTION INTRAMUSCULAR; INTRAVENOUS ONCE
Refills: 0 | Status: COMPLETED | OUTPATIENT
Start: 2022-08-07 | End: 2022-08-07

## 2022-08-07 RX ORDER — OXYCODONE HYDROCHLORIDE 5 MG/1
15 TABLET ORAL EVERY 4 HOURS
Refills: 0 | Status: DISCONTINUED | OUTPATIENT
Start: 2022-08-07 | End: 2022-08-07

## 2022-08-07 RX ORDER — METRONIDAZOLE 500 MG
500 TABLET ORAL EVERY 8 HOURS
Refills: 0 | Status: DISCONTINUED | OUTPATIENT
Start: 2022-08-07 | End: 2022-08-14

## 2022-08-07 RX ORDER — CEFTRIAXONE 500 MG/1
1000 INJECTION, POWDER, FOR SOLUTION INTRAMUSCULAR; INTRAVENOUS EVERY 24 HOURS
Refills: 0 | Status: DISCONTINUED | OUTPATIENT
Start: 2022-08-08 | End: 2022-08-08

## 2022-08-07 RX ORDER — PANTOPRAZOLE SODIUM 20 MG/1
40 TABLET, DELAYED RELEASE ORAL
Refills: 0 | Status: DISCONTINUED | OUTPATIENT
Start: 2022-08-07 | End: 2022-08-16

## 2022-08-07 RX ORDER — METRONIDAZOLE 500 MG
500 TABLET ORAL ONCE
Refills: 0 | Status: COMPLETED | OUTPATIENT
Start: 2022-08-07 | End: 2022-08-07

## 2022-08-07 RX ORDER — FINASTERIDE 5 MG/1
5 TABLET, FILM COATED ORAL DAILY
Refills: 0 | Status: DISCONTINUED | OUTPATIENT
Start: 2022-08-07 | End: 2022-08-16

## 2022-08-07 RX ORDER — OXYCODONE HYDROCHLORIDE 5 MG/1
20 TABLET ORAL EVERY 8 HOURS
Refills: 0 | Status: DISCONTINUED | OUTPATIENT
Start: 2022-08-07 | End: 2022-08-07

## 2022-08-07 RX ADMIN — CEFTRIAXONE 100 MILLIGRAM(S): 500 INJECTION, POWDER, FOR SOLUTION INTRAMUSCULAR; INTRAVENOUS at 19:36

## 2022-08-07 RX ADMIN — OXYCODONE HYDROCHLORIDE 15 MILLIGRAM(S): 5 TABLET ORAL at 22:14

## 2022-08-07 RX ADMIN — Medication 100 MILLIGRAM(S): at 22:20

## 2022-08-07 RX ADMIN — SODIUM CHLORIDE 50 MILLILITER(S): 9 INJECTION INTRAMUSCULAR; INTRAVENOUS; SUBCUTANEOUS at 22:16

## 2022-08-07 RX ADMIN — Medication 50 MILLIGRAM(S): at 22:16

## 2022-08-07 RX ADMIN — Medication 4 MILLIGRAM(S): at 22:18

## 2022-08-07 RX ADMIN — Medication 100 MILLIGRAM(S): at 18:26

## 2022-08-07 NOTE — H&P ADULT - NSICDXPASTSURGICALHX_GEN_ALL_CORE_FT
PAST SURGICAL HISTORY:  History of back surgery multiple, lumbar    History of kidney surgery     Pacemaker medtronic    S/P AAA (abdominal aortic aneurysm) repair with right iliac aneurysm endovascular repair    S/P arthroscopy of right knee     S/P carpal tunnel release     S/P hip replacement, left     Surgery, elective Cardiac Stent x4    Surgery, elective Intrathecal pump placement    Surgery, elective multiple neck surgery, cervical

## 2022-08-07 NOTE — H&P ADULT - NSICDXPASTMEDICALHX_GEN_ALL_CORE_FT
PAST MEDICAL HISTORY:  AAA (abdominal aortic aneurysm)     Anemia     Aneurysm of aortic root     Cardiac arrhythmia     Cardiomyopathy     Depression anxiety    ONOFRE (dyspnea on exertion)     DVT (deep venous thrombosis)     HTN (hypertension)     Hyperlipidemia     Pelvic mass     Pulmonary emphysema COPD

## 2022-08-07 NOTE — H&P ADULT - ASSESSMENT
73 y/o male with complicated medical history presents with 1 day history of symptomatic acute cholecystitis vs. choledocholithiasis.    NPO/IVF pending echo  f/u Echo  f/u RUQ U/S  CTX/Flagyl  f/u cards, GI  Home meds as appropriate

## 2022-08-07 NOTE — H&P ADULT - HISTORY OF PRESENT ILLNESS
71 y/o male with complicated past medical history of colon mass s/p partial colectomy (2017), appendectomy, hernia repair, aortic and iliac aneurysm surgery (2017) s/p stent placement, HTN, HLD, paroxysmal atrial fibrillation on Eliquis, blood clotting disorder s/p IVC filter placement, right knee ligament repair, pacemaker implant (2004) s/p recent ppm interrogation on Eliquis, herniated disc and related surgery in [id number] complicated by spinal fusion, local hematoma, foot drop with chronic pain s/p failed intrathecal morphine pump which caused bradycardia and left hip prosthesis. He presents as a transfer from [facility] where he initially presented for "crushing" chest pain, and "stabbing" epigastric pain. No associated nausea/vomiting, fever, or jaundice. Afebrile, VSS on presentation to [facility] ED.  Cardiac workup was negative at that hospital. CT revealed distended gall bladder with gall stones, jeannette-cholecystic fluid, dilated CBD to 2cm and CBD stone.         Home meds:  Oxycontin 20mg TID  Oxycodone 15mg BID as needed  Zocor 10mg daily  Pamelor 75mg daily  Carvedilol 12.5mg BID  Hydralazine 50mg BID - 2nd dose cut in half (25mg)  Cardura 4mg daily  Proscar 5mg daily  Trintellix 20mg daily  Valium 2mg prn  Omeprazole 20mg daily  Culturelle probiotic daily  Folic acid 1mg daily  Miralax once daily  Metoprolol 50mg BID  Vitamin D 50,000u once a week

## 2022-08-07 NOTE — H&P ADULT - NSHPPHYSICALEXAM_GEN_ALL_CORE
General: Comfortable, NAD  HEENT: Normocephalic, atraumatic. Vision and hearing grossly normal.   CV: Regular rate and rhythm  Pulm: No signs of respiratory distress  Abd: Soft, tender to palpation in RUQ, nontender in other quadrants, non distended. Parnell's sign positive. No guarding or rigidity.   : Deferred  Neuro: A&Ox4  Psych: Appropriate mood/affect  Skin: No rashes, no jaundice  Ext: No peripheral edema, SCDs in place

## 2022-08-08 DIAGNOSIS — I10 ESSENTIAL (PRIMARY) HYPERTENSION: ICD-10-CM

## 2022-08-08 DIAGNOSIS — F32.1 MAJOR DEPRESSIVE DISORDER, SINGLE EPISODE, MODERATE: ICD-10-CM

## 2022-08-08 DIAGNOSIS — I48.20 CHRONIC ATRIAL FIBRILLATION, UNSPECIFIED: ICD-10-CM

## 2022-08-08 DIAGNOSIS — K81.0 ACUTE CHOLECYSTITIS: ICD-10-CM

## 2022-08-08 DIAGNOSIS — E87.1 HYPO-OSMOLALITY AND HYPONATREMIA: ICD-10-CM

## 2022-08-08 LAB
ALBUMIN SERPL ELPH-MCNC: 2.9 G/DL — LOW (ref 3.3–5)
ALBUMIN SERPL ELPH-MCNC: 3.1 G/DL — LOW (ref 3.3–5)
ALP SERPL-CCNC: 68 U/L — SIGNIFICANT CHANGE UP (ref 40–120)
ALP SERPL-CCNC: 74 U/L — SIGNIFICANT CHANGE UP (ref 40–120)
ALT FLD-CCNC: 12 U/L — SIGNIFICANT CHANGE UP (ref 10–45)
ALT FLD-CCNC: 12 U/L — SIGNIFICANT CHANGE UP (ref 10–45)
ANION GAP SERPL CALC-SCNC: 11 MMOL/L — SIGNIFICANT CHANGE UP (ref 5–17)
ANION GAP SERPL CALC-SCNC: 12 MMOL/L — SIGNIFICANT CHANGE UP (ref 5–17)
ANION GAP SERPL CALC-SCNC: 12 MMOL/L — SIGNIFICANT CHANGE UP (ref 5–17)
APTT BLD: 130.8 SEC — CRITICAL HIGH (ref 27.5–35.5)
APTT BLD: 58.7 SEC — HIGH (ref 27.5–35.5)
APTT BLD: 65.2 SEC — HIGH (ref 27.5–35.5)
APTT BLD: >200 SEC — CRITICAL HIGH (ref 27.5–35.5)
AST SERPL-CCNC: 22 U/L — SIGNIFICANT CHANGE UP (ref 10–40)
AST SERPL-CCNC: 29 U/L — SIGNIFICANT CHANGE UP (ref 10–40)
BILIRUB SERPL-MCNC: 0.6 MG/DL — SIGNIFICANT CHANGE UP (ref 0.2–1.2)
BILIRUB SERPL-MCNC: 0.8 MG/DL — SIGNIFICANT CHANGE UP (ref 0.2–1.2)
BLD GP AB SCN SERPL QL: NEGATIVE — SIGNIFICANT CHANGE UP
BUN SERPL-MCNC: 28 MG/DL — HIGH (ref 7–23)
BUN SERPL-MCNC: 30 MG/DL — HIGH (ref 7–23)
BUN SERPL-MCNC: 31 MG/DL — HIGH (ref 7–23)
CALCIUM SERPL-MCNC: 8.2 MG/DL — LOW (ref 8.4–10.5)
CALCIUM SERPL-MCNC: 8.5 MG/DL — SIGNIFICANT CHANGE UP (ref 8.4–10.5)
CALCIUM SERPL-MCNC: 8.8 MG/DL — SIGNIFICANT CHANGE UP (ref 8.4–10.5)
CHLORIDE SERPL-SCNC: 95 MMOL/L — LOW (ref 96–108)
CHLORIDE SERPL-SCNC: 95 MMOL/L — LOW (ref 96–108)
CHLORIDE SERPL-SCNC: 96 MMOL/L — SIGNIFICANT CHANGE UP (ref 96–108)
CK MB CFR SERPL CALC: 3 NG/ML — SIGNIFICANT CHANGE UP (ref 0–6.7)
CK SERPL-CCNC: 89 U/L — SIGNIFICANT CHANGE UP (ref 30–200)
CO2 SERPL-SCNC: 19 MMOL/L — LOW (ref 22–31)
CO2 SERPL-SCNC: 22 MMOL/L — SIGNIFICANT CHANGE UP (ref 22–31)
CO2 SERPL-SCNC: 23 MMOL/L — SIGNIFICANT CHANGE UP (ref 22–31)
CREAT ?TM UR-MCNC: 93 MG/DL — SIGNIFICANT CHANGE UP
CREAT SERPL-MCNC: 1.42 MG/DL — HIGH (ref 0.5–1.3)
CREAT SERPL-MCNC: 1.49 MG/DL — HIGH (ref 0.5–1.3)
CREAT SERPL-MCNC: 1.67 MG/DL — HIGH (ref 0.5–1.3)
EGFR: 43 ML/MIN/1.73M2 — LOW
EGFR: 49 ML/MIN/1.73M2 — LOW
EGFR: 52 ML/MIN/1.73M2 — LOW
GLUCOSE SERPL-MCNC: 93 MG/DL — SIGNIFICANT CHANGE UP (ref 70–99)
GLUCOSE SERPL-MCNC: 94 MG/DL — SIGNIFICANT CHANGE UP (ref 70–99)
GLUCOSE SERPL-MCNC: 95 MG/DL — SIGNIFICANT CHANGE UP (ref 70–99)
HCT VFR BLD CALC: 29.2 % — LOW (ref 39–50)
HGB BLD-MCNC: 9.5 G/DL — LOW (ref 13–17)
INR BLD: 2.49 — HIGH (ref 0.88–1.16)
INR BLD: 2.96 — HIGH (ref 0.88–1.16)
MAGNESIUM SERPL-MCNC: 2 MG/DL — SIGNIFICANT CHANGE UP (ref 1.6–2.6)
MAGNESIUM SERPL-MCNC: 2.1 MG/DL — SIGNIFICANT CHANGE UP (ref 1.6–2.6)
MCHC RBC-ENTMCNC: 28.9 PG — SIGNIFICANT CHANGE UP (ref 27–34)
MCHC RBC-ENTMCNC: 32.5 GM/DL — SIGNIFICANT CHANGE UP (ref 32–36)
MCV RBC AUTO: 88.8 FL — SIGNIFICANT CHANGE UP (ref 80–100)
NRBC # BLD: 0 /100 WBCS — SIGNIFICANT CHANGE UP (ref 0–0)
OSMOLALITY SERPL: 275 MOSM/KG — LOW (ref 280–301)
OSMOLALITY UR: 441 MOSM/KG — SIGNIFICANT CHANGE UP (ref 300–900)
PHOSPHATE SERPL-MCNC: 3.2 MG/DL — SIGNIFICANT CHANGE UP (ref 2.5–4.5)
PHOSPHATE SERPL-MCNC: 3.9 MG/DL — SIGNIFICANT CHANGE UP (ref 2.5–4.5)
PLATELET # BLD AUTO: 151 K/UL — SIGNIFICANT CHANGE UP (ref 150–400)
POTASSIUM SERPL-MCNC: 4.2 MMOL/L — SIGNIFICANT CHANGE UP (ref 3.5–5.3)
POTASSIUM SERPL-MCNC: 4.5 MMOL/L — SIGNIFICANT CHANGE UP (ref 3.5–5.3)
POTASSIUM SERPL-MCNC: 4.9 MMOL/L — SIGNIFICANT CHANGE UP (ref 3.5–5.3)
POTASSIUM SERPL-SCNC: 4.2 MMOL/L — SIGNIFICANT CHANGE UP (ref 3.5–5.3)
POTASSIUM SERPL-SCNC: 4.5 MMOL/L — SIGNIFICANT CHANGE UP (ref 3.5–5.3)
POTASSIUM SERPL-SCNC: 4.9 MMOL/L — SIGNIFICANT CHANGE UP (ref 3.5–5.3)
PROT SERPL-MCNC: 6.4 G/DL — SIGNIFICANT CHANGE UP (ref 6–8.3)
PROT SERPL-MCNC: 6.4 G/DL — SIGNIFICANT CHANGE UP (ref 6–8.3)
PROTHROM AB SERPL-ACNC: 29.9 SEC — HIGH (ref 10.5–13.4)
PROTHROM AB SERPL-ACNC: 35.6 SEC — HIGH (ref 10.5–13.4)
RBC # BLD: 3.29 M/UL — LOW (ref 4.2–5.8)
RBC # FLD: 13.1 % — SIGNIFICANT CHANGE UP (ref 10.3–14.5)
RH IG SCN BLD-IMP: POSITIVE — SIGNIFICANT CHANGE UP
SODIUM SERPL-SCNC: 126 MMOL/L — LOW (ref 135–145)
SODIUM SERPL-SCNC: 129 MMOL/L — LOW (ref 135–145)
SODIUM SERPL-SCNC: 130 MMOL/L — LOW (ref 135–145)
SODIUM UR-SCNC: 20 MMOL/L — SIGNIFICANT CHANGE UP
TROPONIN T SERPL-MCNC: 0.01 NG/ML — SIGNIFICANT CHANGE UP (ref 0–0.01)
WBC # BLD: 12.33 K/UL — HIGH (ref 3.8–10.5)
WBC # FLD AUTO: 12.33 K/UL — HIGH (ref 3.8–10.5)

## 2022-08-08 PROCEDURE — 99223 1ST HOSP IP/OBS HIGH 75: CPT

## 2022-08-08 PROCEDURE — 71045 X-RAY EXAM CHEST 1 VIEW: CPT | Mod: 26

## 2022-08-08 PROCEDURE — 93306 TTE W/DOPPLER COMPLETE: CPT | Mod: 26

## 2022-08-08 PROCEDURE — 99222 1ST HOSP IP/OBS MODERATE 55: CPT

## 2022-08-08 RX ORDER — OXYCODONE HYDROCHLORIDE 5 MG/1
15 TABLET ORAL EVERY 8 HOURS
Refills: 0 | Status: DISCONTINUED | OUTPATIENT
Start: 2022-08-08 | End: 2022-08-09

## 2022-08-08 RX ORDER — CEFTRIAXONE 500 MG/1
2000 INJECTION, POWDER, FOR SOLUTION INTRAMUSCULAR; INTRAVENOUS EVERY 24 HOURS
Refills: 0 | Status: DISCONTINUED | OUTPATIENT
Start: 2022-08-09 | End: 2022-08-14

## 2022-08-08 RX ORDER — SODIUM CHLORIDE 9 MG/ML
1000 INJECTION, SOLUTION INTRAVENOUS
Refills: 0 | Status: DISCONTINUED | OUTPATIENT
Start: 2022-08-08 | End: 2022-08-09

## 2022-08-08 RX ORDER — CEFTRIAXONE 500 MG/1
INJECTION, POWDER, FOR SOLUTION INTRAMUSCULAR; INTRAVENOUS
Refills: 0 | Status: DISCONTINUED | OUTPATIENT
Start: 2022-08-08 | End: 2022-08-14

## 2022-08-08 RX ORDER — OXYCODONE HYDROCHLORIDE 5 MG/1
20 TABLET ORAL EVERY 8 HOURS
Refills: 0 | Status: DISCONTINUED | OUTPATIENT
Start: 2022-08-08 | End: 2022-08-09

## 2022-08-08 RX ORDER — CEFTRIAXONE 500 MG/1
2000 INJECTION, POWDER, FOR SOLUTION INTRAMUSCULAR; INTRAVENOUS ONCE
Refills: 0 | Status: COMPLETED | OUTPATIENT
Start: 2022-08-08 | End: 2022-08-08

## 2022-08-08 RX ORDER — HEPARIN SODIUM 5000 [USP'U]/ML
1400 INJECTION INTRAVENOUS; SUBCUTANEOUS
Qty: 25000 | Refills: 0 | Status: DISCONTINUED | OUTPATIENT
Start: 2022-08-08 | End: 2022-08-08

## 2022-08-08 RX ADMIN — Medication 4 MILLIGRAM(S): at 22:37

## 2022-08-08 RX ADMIN — PANTOPRAZOLE SODIUM 40 MILLIGRAM(S): 20 TABLET, DELAYED RELEASE ORAL at 06:18

## 2022-08-08 RX ADMIN — OXYCODONE HYDROCHLORIDE 15 MILLIGRAM(S): 5 TABLET ORAL at 19:59

## 2022-08-08 RX ADMIN — OXYCODONE HYDROCHLORIDE 20 MILLIGRAM(S): 5 TABLET ORAL at 22:37

## 2022-08-08 RX ADMIN — OXYCODONE HYDROCHLORIDE 20 MILLIGRAM(S): 5 TABLET ORAL at 23:00

## 2022-08-08 RX ADMIN — OXYCODONE HYDROCHLORIDE 10 MILLIGRAM(S): 5 TABLET ORAL at 06:17

## 2022-08-08 RX ADMIN — OXYCODONE HYDROCHLORIDE 20 MILLIGRAM(S): 5 TABLET ORAL at 00:05

## 2022-08-08 RX ADMIN — CEFTRIAXONE 100 MILLIGRAM(S): 500 INJECTION, POWDER, FOR SOLUTION INTRAMUSCULAR; INTRAVENOUS at 18:44

## 2022-08-08 RX ADMIN — Medication 2 MILLIGRAM(S): at 22:38

## 2022-08-08 RX ADMIN — Medication 100 MILLIGRAM(S): at 16:53

## 2022-08-08 RX ADMIN — NORTRIPTYLINE HYDROCHLORIDE 75 MILLIGRAM(S): 10 CAPSULE ORAL at 11:43

## 2022-08-08 RX ADMIN — HEPARIN SODIUM 16 UNIT(S)/HR: 5000 INJECTION INTRAVENOUS; SUBCUTANEOUS at 02:57

## 2022-08-08 RX ADMIN — Medication 100 MILLIGRAM(S): at 06:19

## 2022-08-08 RX ADMIN — SIMVASTATIN 10 MILLIGRAM(S): 20 TABLET, FILM COATED ORAL at 00:06

## 2022-08-08 RX ADMIN — OXYCODONE HYDROCHLORIDE 20 MILLIGRAM(S): 5 TABLET ORAL at 13:42

## 2022-08-08 RX ADMIN — SODIUM CHLORIDE 75 MILLILITER(S): 9 INJECTION, SOLUTION INTRAVENOUS at 16:50

## 2022-08-08 RX ADMIN — OXYCODONE HYDROCHLORIDE 10 MILLIGRAM(S): 5 TABLET ORAL at 16:14

## 2022-08-08 RX ADMIN — FINASTERIDE 5 MILLIGRAM(S): 5 TABLET, FILM COATED ORAL at 11:39

## 2022-08-08 RX ADMIN — Medication 100 MILLIGRAM(S): at 22:36

## 2022-08-08 RX ADMIN — SIMVASTATIN 10 MILLIGRAM(S): 20 TABLET, FILM COATED ORAL at 22:37

## 2022-08-08 RX ADMIN — OXYCODONE HYDROCHLORIDE 20 MILLIGRAM(S): 5 TABLET ORAL at 00:30

## 2022-08-08 RX ADMIN — Medication 50 MILLIGRAM(S): at 18:44

## 2022-08-08 RX ADMIN — NORTRIPTYLINE HYDROCHLORIDE 75 MILLIGRAM(S): 10 CAPSULE ORAL at 00:06

## 2022-08-08 RX ADMIN — OXYCODONE HYDROCHLORIDE 15 MILLIGRAM(S): 5 TABLET ORAL at 18:43

## 2022-08-08 RX ADMIN — Medication 1 MILLIGRAM(S): at 11:41

## 2022-08-08 RX ADMIN — OXYCODONE HYDROCHLORIDE 20 MILLIGRAM(S): 5 TABLET ORAL at 11:44

## 2022-08-08 RX ADMIN — Medication 50 MILLIGRAM(S): at 06:25

## 2022-08-08 NOTE — CONSULT NOTE ADULT - SUBJECTIVE AND OBJECTIVE BOX
HPI:  73 y/o male with complicated past medical history of colon mass s/p partial colectomy (2017), appendectomy, hernia repair, aortic and iliac aneurysm surgery (2017) s/p stent placement, HTN, HLD, paroxysmal atrial fibrillation on Eliquis, Right LE DVT >5 years ago s/p IVC filter placement, right knee ligament repair, pacemaker implant (2004) s/p recent ppm interrogation on Eliquis, herniated disc and related surgery in [id number] complicated by spinal fusion, local hematoma, foot drop with chronic pain s/p failed intrathecal morphine pump which caused bradycardia and left hip prosthesis. He presents as a transfer from [facility] where he initially presented for "crushing" chest pain, and "stabbing" epigastric pain. No associated nausea/vomiting, fever, or jaundice. Afebrile, VSS on presentation to [place] ED.  Cardiac workup was negative at that hospital. CT revealed distended gall bladder with gall stones, jeannette-cholecystic fluid, dilated CBD to 2cm and CBD stone.     Home meds:  Oxycontin 20mg TID  Oxycodone 15mg BID as needed  Zocor 10mg daily  Pamelor 75mg daily  Carvedilol 12.5mg BID  Hydralazine 50mg BID - 2nd dose cut in half (25mg)  Cardura 4mg daily  Proscar 5mg daily  Trintellix 20mg daily  Valium 2mg prn  Omeprazole 20mg daily  Culturelle probiotic daily  Folic acid 1mg daily  Miralax once daily  Metoprolol 50mg BID  Vitamin D 50,000u once a week   (07 Aug 2022 17:45)    PAST MEDICAL & SURGICAL HISTORY:  AAA (abdominal aortic aneurysm)  HTN (hypertension)  Cardiomyopathy  Hyperlipidemia  ONOFRE (dyspnea on exertion)  DVT (deep venous thrombosis)  Pulmonary emphysema  COPD  Cardiac arrhythmia  Aneurysm of aortic root  Depression  anxiety  Anemia  Pelvic mass    Pacemaker  medtronic      History of kidney surgery  History of back surgery  multiple, lumbar      Surgery, elective  multiple neck surgery, cervical    S/P hip replacement, left    S/P arthroscopy of right knee    S/P AAA (abdominal aortic aneurysm) repair  with right iliac aneurysm endovascular repair    S/P carpal tunnel release    Surgery, elective  Cardiac Stent x4    Surgery, elective  Intrathecal pump placement    Home Medications:  ALPRAZolam 1 mg oral tablet: 1 tab(s) orally once a day (at bedtime), As needed, insomnia (07 Aug 2022 21:10)  Cardura 4 mg oral tablet: 1 tab(s) orally once a day (07 Aug 2022 21:10)  Eliquis 5 mg oral tablet: 1 tab(s) orally once a day (07 Aug 2022 21:10)  folic acid 1 mg oral tablet: 1 tab(s) orally once a day (07 Aug 2022 21:10)  hydrALAZINE 10 mg oral tablet: 50 milligram(s) orally 1 time a day + 25 milligrams orally 1 time a day (07 Aug 2022 21:10)  omeprazole 20 mg oral delayed release capsule: 1 cap(s) orally once a day (07 Aug 2022 21:10)  OxyCONTIN 20 mg oral tablet, extended release: 20 milligram(s) orally every 8 hours (07 Aug 2022 21:10)  Pamelor 75 mg oral capsule: 1 cap(s) orally once a day (at bedtime) (07 Aug 2022 21:10)  Proscar 5 mg oral tablet: 1 tab(s) orally once a day (07 Aug 2022 21:10)  simvastatin 5 mg oral tablet: 1 tab(s) orally once a day (at bedtime) (07 Aug 2022 21:10)  Toprol-XL 50 mg oral tablet, extended release: 1 tab(s) orally 2 times a day (07 Aug 2022 21:10)  Trintellix 20 mg oral tablet: 1 tab(s) orally once a day (07 Aug 2022 21:10)  Valium 2 mg oral tablet: 1 tab(s) orally once a day (at bedtime), As Needed - for insomnia (07 Aug 2022 21:10)    Allergies  Altace (Unknown)  penicillin (Unknown)    Intolerances    FAMILY HISTORY:  Stroke in mother   Stroke in father     Social History:  No current cigarette use   Lives in [place] with wife     REVIEW OF SYSTEMS:  CONSTITUTIONAL: No fever, weight loss  EYES: No eye pain, or discharge  ENMT:  No tinnitus, vertigo  NECK: No pain or stiffness  RESPIRATORY: No cough, No dyspnea  CARDIOVASCULAR: No chest pain, or leg swelling  GASTROINTESTINAL: No abdominal pain. No diarrhea ;No melena or hematochezia.  GENITOURINARY: No dysuria, frequency, or hematuria  NEUROLOGICAL: No numbness, or tremors  SKIN: No itching, burning, rashes, or lesions   ENDOCRINE: No heat or cold intolerance;  MUSCULOSKELETAL: No joint pain or swelling;   PSYCHIATRIC: No mood swings, or difficulty sleeping  HEME/LYMPH: No easy bruising, or bleeding gums  ALLERGY AND IMMUNOLOGIC: No hives or eczema    Diet, NPO:   Except Medications (08-07-22 @ 21:04) [Active]      CURRENT MEDICATIONS:   cefTRIAXone   IVPB      cefTRIAXone   IVPB 2000 milliGRAM(s) IV Intermittent once  diazepam    Tablet 2 milliGRAM(s) Oral daily PRN  doxazosin 4 milliGRAM(s) Oral at bedtime  finasteride 5 milliGRAM(s) Oral daily  folic acid 1 milliGRAM(s) Oral daily  lactated ringers. 1000 milliLiter(s) IV Continuous <Continuous>  metoprolol succinate ER 50 milliGRAM(s) Oral two times a day  metroNIDAZOLE  IVPB      metroNIDAZOLE  IVPB 500 milliGRAM(s) IV Intermittent every 8 hours  nortriptyline 75 milliGRAM(s) Oral daily  oxyCODONE    IR 10 milliGRAM(s) Oral every 6 hours PRN  oxyCODONE  ER Tablet 20 milliGRAM(s) Oral every 12 hours  pantoprazole    Tablet 40 milliGRAM(s) Oral before breakfast  polyethylene glycol 3350 17 Gram(s) Oral daily PRN  simvastatin 10 milliGRAM(s) Oral at bedtime      VITAL SIGNS, INS/OUTS (last 24 hours):  Vital Signs Last 24 Hrs  T(C): 36.2 (08 Aug 2022 09:24), Max: 36.9 (08 Aug 2022 06:01)  T(F): 97.2 (08 Aug 2022 09:24), Max: 98.4 (08 Aug 2022 06:01)  HR: 70 (08 Aug 2022 11:42) (60 - 70)  BP: 159/73 (08 Aug 2022 11:42) (122/65 - 159/73)  BP(mean): 105 (08 Aug 2022 11:42) (87 - 110)  RR: 18 (08 Aug 2022 11:42) (17 - 18)  SpO2: 95% (08 Aug 2022 11:42) (94% - 98%)    Parameters below as of 08 Aug 2022 11:42  Patient On (Oxygen Delivery Method): nasal cannula  O2 Flow (L/min): 2    I&O's Summary    07 Aug 2022 07:01  -  08 Aug 2022 07:00  --------------------------------------------------------  IN: 830 mL / OUT: 700 mL / NET: 130 mL    08 Aug 2022 07:01  -  08 Aug 2022 14:05  --------------------------------------------------------  IN: 320 mL / OUT: 300 mL / NET: 20 mL        PHYSICAL EXAM:  Gen: Reclining in bed at time of exam, appears stated age  HEENT: NCAT, MMM, clear OP  Neck: supple, trachea at midline  CV: RRR, +S1/S2  Pulm: adequate respiratory effort, no increase in work of breathing  Abd: soft, NTND  Skin: warm and dry, no new rashes vs prior report  Ext: WWP, no LE edema  Neuro: AOx3, no gross focal neurological deficits  Psych: affect and behavior appropriate, pleasant at time of interview    BASIC LABS:                        9.5    12.33 )-----------( 151      ( 08 Aug 2022 06:11 )             29.2     08-08    126<L>  |  95<L>  |  31<H>  ----------------------------<  93  4.9   |  19<L>  |  1.67<H>    Ca    8.2<L>      08 Aug 2022 06:11  Phos  3.9     08-08  Mg     2.0     08-08    TPro  6.4  /  Alb  2.9<L>  /  TBili  0.8  /  DBili  x   /  AST  29  /  ALT  12  /  AlkPhos  74  08-08    PT/INR - ( 08 Aug 2022 06:11 )   PT: 35.6 sec;   INR: 2.96          PTT - ( 08 Aug 2022 12:05 )  PTT:>200.0 sec    CAPILLARY BLOOD GLUCOSE          OTHER LABS:        MICRODATA:      IMAGING:    EKG:    #Diet - Diet, NPO:   Except Medications (08-07-22 @ 21:04) [Active]        #DVT PPx -  HPI:  71 y/o male with complicated past medical history of colon mass s/p partial colectomy (2017), appendectomy, hernia repair, aortic and iliac aneurysm surgery (2017) s/p stent placement, HTN, HLD, paroxysmal atrial fibrillation on Eliquis, Right LE DVT >5 years ago s/p IVC filter placement, right knee ligament repair, pacemaker implant (2004) s/p recent ppm interrogation on Eliquis, herniated disc and related surgery in [id number] complicated by spinal fusion, local hematoma, foot drop with chronic pain s/p failed intrathecal morphine pump which caused bradycardia and left hip prosthesis. He presents as a transfer from [facility] where he initially presented for "crushing" chest pain, and "stabbing" epigastric pain. No associated nausea/vomiting, fever, or jaundice. Afebrile, VSS on presentation to [place] ED.  Cardiac workup was negative at that hospital. CT revealed distended gall bladder with gall stones, jeannette-cholecystic fluid, dilated CBD to 2cm and CBD stone.     Home meds:  Oxycontin 20mg TID  Oxycodone 15mg BID as needed  Zocor 10mg daily  Pamelor 75mg daily  Carvedilol 12.5mg BID  Hydralazine 50mg BID - 2nd dose cut in half (25mg)  Cardura 4mg daily  Proscar 5mg daily  Trintellix 20mg daily  Valium 2mg prn  Omeprazole 20mg daily  Culturelle probiotic daily  Folic acid 1mg daily  Miralax once daily  Metoprolol 50mg BID  Vitamin D 50,000u once a week   (07 Aug 2022 17:45)    PAST MEDICAL & SURGICAL HISTORY:  AAA (abdominal aortic aneurysm)  HTN (hypertension)  Cardiomyopathy  Hyperlipidemia  ONOFRE (dyspnea on exertion)  DVT (deep venous thrombosis)  Pulmonary emphysema  COPD  Cardiac arrhythmia  Aneurysm of aortic root  Depression  anxiety  Anemia  Pelvic mass    Pacemaker  medtronic      History of kidney surgery  History of back surgery  multiple, lumbar      Surgery, elective  multiple neck surgery, cervical    S/P hip replacement, left    S/P arthroscopy of right knee    S/P AAA (abdominal aortic aneurysm) repair  with right iliac aneurysm endovascular repair    S/P carpal tunnel release    Surgery, elective  Cardiac Stent x4    Surgery, elective  Intrathecal pump placement    Home Medications:  ALPRAZolam 1 mg oral tablet: 1 tab(s) orally once a day (at bedtime), As needed, insomnia (07 Aug 2022 21:10)  Cardura 4 mg oral tablet: 1 tab(s) orally once a day (07 Aug 2022 21:10)  Eliquis 5 mg oral tablet: 1 tab(s) orally once a day (07 Aug 2022 21:10)  folic acid 1 mg oral tablet: 1 tab(s) orally once a day (07 Aug 2022 21:10)  hydrALAZINE 10 mg oral tablet: 50 milligram(s) orally 1 time a day + 25 milligrams orally 1 time a day (07 Aug 2022 21:10)  omeprazole 20 mg oral delayed release capsule: 1 cap(s) orally once a day (07 Aug 2022 21:10)  OxyCONTIN 20 mg oral tablet, extended release: 20 milligram(s) orally every 8 hours (07 Aug 2022 21:10)  Pamelor 75 mg oral capsule: 1 cap(s) orally once a day (at bedtime) (07 Aug 2022 21:10)  Proscar 5 mg oral tablet: 1 tab(s) orally once a day (07 Aug 2022 21:10)  simvastatin 5 mg oral tablet: 1 tab(s) orally once a day (at bedtime) (07 Aug 2022 21:10)  Toprol-XL 50 mg oral tablet, extended release: 1 tab(s) orally 2 times a day (07 Aug 2022 21:10)  Trintellix 20 mg oral tablet: 1 tab(s) orally once a day (07 Aug 2022 21:10)  Valium 2 mg oral tablet: 1 tab(s) orally once a day (at bedtime), As Needed - for insomnia (07 Aug 2022 21:10)    Allergies  Altace (Unknown)  penicillin (Unknown)    Intolerances    FAMILY HISTORY:  Stroke in mother   Stroke in father     Social History:  No current cigarette use   Lives in [place] with wife     REVIEW OF SYSTEMS:  CONSTITUTIONAL: No fever, weight loss  EYES: No eye pain, or discharge  ENMT:  No tinnitus, vertigo  NECK: No pain or stiffness  RESPIRATORY: No cough, No dyspnea  CARDIOVASCULAR: CV symptoms as per HPI  GASTROINTESTINAL: Abdominal symptoms as per HPI.   GENITOURINARY: No dysuria, frequency, or hematuria  NEUROLOGICAL: No numbness, or tremors  SKIN: No itching, burning, rashes, or lesions   ENDOCRINE: No heat or cold intolerance;  MUSCULOSKELETAL: No joint pain or swelling;   HEME/LYMPH: No easy bruising, or bleeding gums  ALLERGY AND IMMUNOLOGIC: No hives or eczema    Diet, NPO:   Except Medications (08-07-22 @ 21:04) [Active]      CURRENT MEDICATIONS:   cefTRIAXone   IVPB      cefTRIAXone   IVPB 2000 milliGRAM(s) IV Intermittent once  diazepam    Tablet 2 milliGRAM(s) Oral daily PRN  doxazosin 4 milliGRAM(s) Oral at bedtime  finasteride 5 milliGRAM(s) Oral daily  folic acid 1 milliGRAM(s) Oral daily  lactated ringers. 1000 milliLiter(s) IV Continuous <Continuous>  metoprolol succinate ER 50 milliGRAM(s) Oral two times a day  metroNIDAZOLE  IVPB      metroNIDAZOLE  IVPB 500 milliGRAM(s) IV Intermittent every 8 hours  nortriptyline 75 milliGRAM(s) Oral daily  oxyCODONE    IR 10 milliGRAM(s) Oral every 6 hours PRN  oxyCODONE  ER Tablet 20 milliGRAM(s) Oral every 12 hours  pantoprazole    Tablet 40 milliGRAM(s) Oral before breakfast  polyethylene glycol 3350 17 Gram(s) Oral daily PRN  simvastatin 10 milliGRAM(s) Oral at bedtime      VITAL SIGNS, INS/OUTS (last 24 hours):  Vital Signs Last 24 Hrs  T(C): 36.2 (08 Aug 2022 09:24), Max: 36.9 (08 Aug 2022 06:01)  T(F): 97.2 (08 Aug 2022 09:24), Max: 98.4 (08 Aug 2022 06:01)  HR: 70 (08 Aug 2022 11:42) (60 - 70)  BP: 159/73 (08 Aug 2022 11:42) (122/65 - 159/73)  BP(mean): 105 (08 Aug 2022 11:42) (87 - 110)  RR: 18 (08 Aug 2022 11:42) (17 - 18)  SpO2: 95% (08 Aug 2022 11:42) (94% - 98%)    Parameters below as of 08 Aug 2022 11:42  Patient On (Oxygen Delivery Method): nasal cannula  O2 Flow (L/min): 2    I&O's Summary    07 Aug 2022 07:01  -  08 Aug 2022 07:00  --------------------------------------------------------  IN: 830 mL / OUT: 700 mL / NET: 130 mL    08 Aug 2022 07:01  -  08 Aug 2022 14:05  --------------------------------------------------------  IN: 320 mL / OUT: 300 mL / NET: 20 mL    PHYSICAL EXAM:  Gen: Reclining in bed at time of exam, appears stated age  HEENT: NCAT, MMM, clear OP  Neck: supple, trachea at midline  CV: RRR, +S1/S2  Pulm: adequate respiratory effort, no increase in work of breathing  Abd: soft, NTND  Skin: warm and dry, no new rashes vs prior report  Ext: WWP, no LE edema  Neuro: AOx3, no gross focal neurological deficits  Psych: affect and behavior appropriate, pleasant at time of interview    BASIC LABS:                        9.5    12.33 )-----------( 151      ( 08 Aug 2022 06:11 )             29.2     08-08    126<L>  |  95<L>  |  31<H>  ----------------------------<  93  4.9   |  19<L>  |  1.67<H>    Ca    8.2<L>      08 Aug 2022 06:11  Phos  3.9     08-08  Mg     2.0     08-08    TPro  6.4  /  Alb  2.9<L>  /  TBili  0.8  /  DBili  x   /  AST  29  /  ALT  12  /  AlkPhos  74  08-08    PT/INR - ( 08 Aug 2022 06:11 )   PT: 35.6 sec;   INR: 2.96          PTT - ( 08 Aug 2022 12:05 )  PTT:>200.0 sec    CAPILLARY BLOOD GLUCOSE          OTHER LABS:        MICRODATA:      IMAGING:    EKG:    #Diet - Diet, NPO:   Except Medications (08-07-22 @ 21:04) [Active]        #DVT PPx -  HPI:  71 y/o male with complicated past medical history of colon mass s/p partial colectomy (2017), appendectomy, hernia repair, aortic and iliac aneurysm surgery (2017) s/p stent placement, HTN, HLD, paroxysmal atrial fibrillation on Eliquis, Right LE DVT >5 years ago s/p IVC filter placement, right knee ligament repair, pacemaker implant (2004) s/p recent ppm interrogation on Eliquis, herniated disc and related surgery in [id number] complicated by spinal fusion, local hematoma, foot drop with chronic pain s/p failed intrathecal morphine pump which caused bradycardia and left hip prosthesis. He presents as a transfer from [facility] where he initially presented for "crushing" chest pain, and "stabbing" epigastric pain. No associated nausea/vomiting, fever, or jaundice. Afebrile, VSS on presentation to [place] ED.  Cardiac workup was negative at that hospital. CT revealed distended gall bladder with gall stones, jeannette-cholecystic fluid, dilated CBD to 2cm and CBD stone.     Home meds:  Oxycontin 20mg TID  Oxycodone 15mg BID as needed  Zocor 10mg daily  Pamelor 75mg daily  Hydralazine 50mg BID - 2nd dose cut in half (25mg)  Cardura 4mg daily  Proscar 5mg daily  Trintellix 20mg daily  Valium 2mg prn  Omeprazole 20mg daily  Culturelle probiotic daily  Folic acid 1mg daily  Miralax once daily  Metoprolol 50mg BID  Vitamin D 50,000u once a week   (07 Aug 2022 17:45)    PAST MEDICAL & SURGICAL HISTORY:  AAA (abdominal aortic aneurysm)  HTN (hypertension)  Cardiomyopathy  Hyperlipidemia  ONOFRE (dyspnea on exertion)  DVT (deep venous thrombosis)  Pulmonary emphysema  COPD  Cardiac arrhythmia  Aneurysm of aortic root  Depression  anxiety  Anemia  Pelvic mass    Pacemaker  medtronic      History of kidney surgery  History of back surgery  multiple, lumbar      Surgery, elective  multiple neck surgery, cervical    S/P hip replacement, left    S/P arthroscopy of right knee    S/P AAA (abdominal aortic aneurysm) repair  with right iliac aneurysm endovascular repair    S/P carpal tunnel release    Surgery, elective  Cardiac Stent x4    Surgery, elective  Intrathecal pump placement    Home Medications:  ALPRAZolam 1 mg oral tablet: 1 tab(s) orally once a day (at bedtime), As needed, insomnia (07 Aug 2022 21:10)  Cardura 4 mg oral tablet: 1 tab(s) orally once a day (07 Aug 2022 21:10)  Eliquis 5 mg oral tablet: 1 tab(s) orally once a day (07 Aug 2022 21:10)  folic acid 1 mg oral tablet: 1 tab(s) orally once a day (07 Aug 2022 21:10)  hydrALAZINE 10 mg oral tablet: 50 milligram(s) orally 1 time a day + 25 milligrams orally 1 time a day (07 Aug 2022 21:10)  omeprazole 20 mg oral delayed release capsule: 1 cap(s) orally once a day (07 Aug 2022 21:10)  OxyCONTIN 20 mg oral tablet, extended release: 20 milligram(s) orally every 8 hours (07 Aug 2022 21:10)  Pamelor 75 mg oral capsule: 1 cap(s) orally once a day (at bedtime) (07 Aug 2022 21:10)  Proscar 5 mg oral tablet: 1 tab(s) orally once a day (07 Aug 2022 21:10)  simvastatin 5 mg oral tablet: 1 tab(s) orally once a day (at bedtime) (07 Aug 2022 21:10)  Toprol-XL 50 mg oral tablet, extended release: 1 tab(s) orally 2 times a day (07 Aug 2022 21:10)  Trintellix 20 mg oral tablet: 1 tab(s) orally once a day (07 Aug 2022 21:10)  Valium 2 mg oral tablet: 1 tab(s) orally once a day (at bedtime), As Needed - for insomnia (07 Aug 2022 21:10)    Allergies  Altace (Unknown)  penicillin (Unknown)    Intolerances    FAMILY HISTORY:  Stroke in mother   Stroke in father     Social History:  No current cigarette use   Lives in [place] with wife     REVIEW OF SYSTEMS:  CONSTITUTIONAL: No fever, weight loss  EYES: No eye pain, or discharge  ENMT:  No tinnitus, vertigo  NECK: No pain or stiffness  RESPIRATORY: No cough, No dyspnea  CARDIOVASCULAR: CV symptoms as per HPI  GASTROINTESTINAL: Abdominal symptoms as per HPI.   GENITOURINARY: No dysuria, frequency, or hematuria  NEUROLOGICAL: No numbness, or tremors  SKIN: No itching, burning, rashes, or lesions   ENDOCRINE: No heat or cold intolerance;  MUSCULOSKELETAL: No joint pain or swelling;   HEME/LYMPH: No easy bruising, or bleeding gums  ALLERGY AND IMMUNOLOGIC: No hives or eczema    Diet, NPO:   Except Medications (08-07-22 @ 21:04) [Active]      CURRENT MEDICATIONS:   cefTRIAXone   IVPB      cefTRIAXone   IVPB 2000 milliGRAM(s) IV Intermittent once  diazepam    Tablet 2 milliGRAM(s) Oral daily PRN  doxazosin 4 milliGRAM(s) Oral at bedtime  finasteride 5 milliGRAM(s) Oral daily  folic acid 1 milliGRAM(s) Oral daily  lactated ringers. 1000 milliLiter(s) IV Continuous <Continuous>  metoprolol succinate ER 50 milliGRAM(s) Oral two times a day  metroNIDAZOLE  IVPB      metroNIDAZOLE  IVPB 500 milliGRAM(s) IV Intermittent every 8 hours  nortriptyline 75 milliGRAM(s) Oral daily  oxyCODONE    IR 10 milliGRAM(s) Oral every 6 hours PRN  oxyCODONE  ER Tablet 20 milliGRAM(s) Oral every 12 hours  pantoprazole    Tablet 40 milliGRAM(s) Oral before breakfast  polyethylene glycol 3350 17 Gram(s) Oral daily PRN  simvastatin 10 milliGRAM(s) Oral at bedtime      VITAL SIGNS, INS/OUTS (last 24 hours):  Vital Signs Last 24 Hrs  T(C): 36.2 (08 Aug 2022 09:24), Max: 36.9 (08 Aug 2022 06:01)  T(F): 97.2 (08 Aug 2022 09:24), Max: 98.4 (08 Aug 2022 06:01)  HR: 70 (08 Aug 2022 11:42) (60 - 70)  BP: 159/73 (08 Aug 2022 11:42) (122/65 - 159/73)  BP(mean): 105 (08 Aug 2022 11:42) (87 - 110)  RR: 18 (08 Aug 2022 11:42) (17 - 18)  SpO2: 95% (08 Aug 2022 11:42) (94% - 98%)    Parameters below as of 08 Aug 2022 11:42  Patient On (Oxygen Delivery Method): nasal cannula  O2 Flow (L/min): 2    I&O's Summary    07 Aug 2022 07:01  -  08 Aug 2022 07:00  --------------------------------------------------------  IN: 830 mL / OUT: 700 mL / NET: 130 mL    08 Aug 2022 07:01  -  08 Aug 2022 14:05  --------------------------------------------------------  IN: 320 mL / OUT: 300 mL / NET: 20 mL    PHYSICAL EXAM:  Gen: Reclining in bed at time of exam, appears stated age  HEENT: NCAT, MMM, clear OP  Neck: supple, trachea at midline  CV: RRR, +S1/S2  Pulm: adequate respiratory effort, no increase in work of breathing  Abd: soft, ND; tender to palpation   Skin: warm and dry,   Ext: WWP, no LE edema  Neuro: AOx3, no gross focal neurological deficits  Psych: affect and behavior appropriate, pleasant at time of interview    BASIC LABS:                        9.5    12.33 )-----------( 151      ( 08 Aug 2022 06:11 )             29.2     08-08    126<L>  |  95<L>  |  31<H>  ----------------------------<  93  4.9   |  19<L>  |  1.67<H>    Ca    8.2<L>      08 Aug 2022 06:11  Phos  3.9     08-08  Mg     2.0     08-08    TPro  6.4  /  Alb  2.9<L>  /  TBili  0.8  /  DBili  x   /  AST  29  /  ALT  12  /  AlkPhos  74  08-08    PT/INR - ( 08 Aug 2022 06:11 )   PT: 35.6 sec;   INR: 2.96          PTT - ( 08 Aug 2022 12:05 )  PTT:>200.0 sec    CAPILLARY BLOOD GLUCOSE          OTHER LABS:        MICRODATA:      IMAGING:    EKG:    #Diet - Diet, NPO:   Except Medications (08-07-22 @ 21:04) [Active]        #DVT PPx -

## 2022-08-08 NOTE — PROVIDER CONTACT NOTE (CHANGE IN STATUS NOTIFICATION) - NAME OF MD/NP/PA/DO NOTIFIED:
PLAN:  1. I will check a PTH, calcium and albumin.  2. I will check 1, 25 dihydroxy vitamin D and 25-hydroxy vitamin D.  3. I will check a 24-hour urine calcium.  4. I did advised the patient to decrease calcium to 600 mg per day.  5. I advised the patient to increase dietary intake of calcium and vitamin D.  6. She is to have one yogurt per day to increase her dietary calcium.  7. She was advised that the recommended daily calcium is 1000 mg with diet and supplement combined.  8. A list of high calcium-containing foods and high vitamin D-containing foods was given to the patient.  9. I also discussed alternative treatment options with the patient such as Fosamax.  10. Patient was reassured that none of these treatments are lifelong. She was assured that bone mineral density will be reevaluated every 1-2 years.  11. She was advised of side effects including osteonecrosis of the jaw and irritation of the GI mucosal lining which can worsen esophageal reflux with Fosamax.  12. I will have the patient follow-up in 3 months if she chooses to start treatment.  13. I advised patient we will discuss lab results over the phone.    ORDERS:  Orders Placed This Encounter   • Albumin Level   • Calcium Level   • Parathyroid Hormone Intact without Calcium   • Vitamin D 1 25 Dihydroxy   • Calcium Urine/24 hr   • Creatinine Serum   • Cortisol, Saliva   • Cortisol, Saliva     Dietary Sources of Vitamin D   Food Vitamin D Content   Cod Liver oil, 1 tablespoon 1360 units (34 mcg)   Elmer (sockeye), cooked, 3oz 794 units (~20 mcg)   Mushrooms that have been exposed to ultraviolet light to increase vitamin D, 3 oz (not yet commonly available) 400 units (10 mcg)   Mackerel, cooked, 3oz 388 units (~10 mcg)   Tuna Fish, canned in water, drained, 3 oz 154 units (~4 mcg)   Milk, nonfat ,reduced fat, and whole vitamin D-fortified, 1 cup ~120 units (3 mcg)   Orange juice fortified with vitamin D, 1 cup 100 units (2.5 mcg)   Margarine,  fortified, 1 tablespoon 60 units (1.5 mcg)   Egg, 1 whole (vitamin D is found in yolk) 25 units (~0.6 mcg)     Calcium content of common foods   Food Calcium Content   Milk or yogurt 8oz 300mg   Swiss cheese 1oz 270mg   Cottage cheeese 8oz 125mg   Calcium-fortified cereal 1c 300mg   Calcium-fortified orange juice 1c 270mg   Sardines (6) 290mg   Tofu (firm) 1c 500mg   Broccoli cooked 1c 75mg   Almonds 4oz 300mg         Dietary supplements       Alendronate Sodium Effervescent tablet    What is this medicine?  ALENDRONATE (a BECKY droe aaliyah) slows calcium loss from bones. It helps to make healthy bone and to slow bone loss in people with osteoporosis.  This medicine may be used for other purposes; ask your health care provider or pharmacist if you have questions.    What should I tell my health care provider before I take this medicine?  They need to know if you have any of these conditions:  · esophagus, stomach, or intestine problems, like acid-reflux or GERD  · dental disease  · heart disease  · high blood pressure  · kidney disease  · low blood calcium  · low vitamin D  · problems swallowing  · problems sitting or standing for 30 minutes  · an unusual or allergic reaction to alendronate, other medicines, foods, dyes, or preservatives  · pregnant or trying to get pregnant  · breast-feeding    How should I use this medicine?  You must take this medicine exactly as directed or you will lower the amount of medicine you absorb into your body or you may cause yourself harm. Take your dose by mouth first thing in the morning, after you are up for the day. Do not eat or drink anything before you take this medicine. Dissolve your medicine in a glass (4 fluid ounces) of plain, room temperature water. Do not take this tablet with any other drink. Wait at least 5 minutes after the medicine has dissolved and then stir for 10 seconds and drink. After taking this medicine, do not eat breakfast, drink, or take any medicines or  vitamins for at least 30 minutes. Stand or sit up for at least 30 minutes after you take this medicine; do not lie down. Take this medicine on the same day every week. Do not take your medicine more often than directed.  Talk to your pediatrician regarding the use of this medicine in children. Special care may be needed.  Overdosage: If you think you've taken too much of this medicine contact a poison control center or emergency room at once.  NOTE: This medicine is only for you. Do not share this medicine with others.    What if I miss a dose?  If you miss a dose, take the dose on the morning after you remember. Then take your next dose on your regular day of the week. Never take 2 tablets on the same day. Do not take double or extra doses.    What may interact with this medicine?  · aluminum hydroxide  · antacids  · aspirin  · calcium supplements  · drugs for inflammation like ibuprofen, naproxen, and others  · iron supplements  · magnesium supplements  · vitamins with minerals    This list may not describe all possible interactions. Give your health care provider a list of all the medicines, herbs, non-prescription drugs, or dietary supplements you use. Also tell them if you smoke, drink alcohol, or use illegal drugs. Some items may interact with your medicine.    What should I watch for while using this medicine?  Visit your doctor or health care professional for regular checks ups. It may be some time before you see benefit from this medicine. Do not stop taking your medication except on your doctor's advice. Your doctor or health care professional may order blood tests and other tests to see how you are doing.  You should make sure you get enough calcium and vitamin D while you are taking this medicine, unless your doctor tells you not to. Discuss the foods you eat and the vitamins you take with your health care professional.  Some people who take this medicine have severe bone, joint, and/or muscle pain. This  MD Jannie Madison medicine may also increase your risk for a broken thigh bone. Tell your doctor right away if you have pain in your upper leg or groin. Tell your doctor if you have any pain that does not go away or that gets worse.  This medicine can make you more sensitive to the sun. If you get a rash while taking this medicine, sunlight may cause the rash to get worse. Keep out of the sun. If you cannot avoid being in the sun, wear protective clothing and use sunscreen. Do not use sun lamps or tanning beds/booths.    What side effects may I notice from receiving this medicine?  Side effects that you should report to your doctor or health care professional as soon as possible:  · allergic reactions like skin rash, itching or hives, swelling of the face, lips, or tongue  · black or tarry stools  · bone, muscle or joint pain  · changes in vision  · chest pain  · heartburn or stomach pain  · jaw pain, especially after dental work  · pain or trouble when swallowing  · redness, blistering, peeling or loosening of the skin, including inside the mouth  Side effects that usually do not require medical attention (Report these to your doctor or health care professional if they continue or are bothersome.):  · changes in taste  · diarrhea or constipation  · eye pain or itching  · headache  · nausea or vomiting  · stomach gas or fullness  This list may not describe all possible side effects. Call your doctor for medical advice about side effects. You may report side effects to FDA at 8-747-FDA-8182.    Where should I keep my medicine?  Keep out of the reach of children.  Store between 20 and 25 degrees C (68 and 77 degrees F). Keep this medicine in the original container. Throw away any unused medicine after the expiration date.    NOTE: This sheet is a summary. It may not cover all possible information. If you have questions about this medicine, talk to your doctor, pharmacist, or health care provider.  NOTE:This sheet is a summary. It may  not cover all possible information. If you have questions about this medicine, talk to your doctor, pharmacist, or health care provider. Copyright© 2016 [employer]    FOLLOW-UP    It is recommended you schedule a follow-up appointment with Dr. Rojas and Maldonado Jay PA-C    Return in about 3 months (around 11/8/2017).    Office hours are 8:00 am to 5:00 pm Monday through Friday. If it is urgent that you speak with someone outside of these hours, the [facility] will be able to assist you. You can reach the office by calling [phone].    Thank you for choosing Shannan Rojas M.D. and Maldonado Jay PA-C, as your Endocrinology providers.    At [facility], one important tool we use to improve our patient services is our Patient Survey. Following your visit you may receive our survey in the mail.     · Please take the time to complete the survey.     · If your visit with us was great, we want to hear about it.     · If we can improve, please let us know how.

## 2022-08-08 NOTE — CONSULT NOTE ADULT - ASSESSMENT
71 y/o male with complicated past medical history of benign colon mass s/p partial colectomy (2017), appendectomy, hernia repair, aortic and iliac aneurysm surgery (2017) s/p stent placement, HTN, HLD, paroxysmal atrial fibrillation on Eliquis, blood clotting disorder s/p IVC filter placement, right knee ligament repair, pacemaker implant (2004) s/p recent ppm interrogation on Eliquis, herniated disc and related surgery in 7485-2797 complicated by spinal fusion, local hematoma, foot drop with chronic pain s/p failed intrathecal morphine pump which caused bradycardia and left hip prosthesis. He presents as a transfer from [facility] for chest pain/epigastric pain. CT revealed distended gall bladder with gall stones, jeannette-cholecystic fluid, dilated CBD to 1.8 cm.    #  71 y/o male with complicated past medical history of benign colon mass s/p partial colectomy (2017), appendectomy, hernia repair, aortic and iliac aneurysm surgery (2017) s/p stent placement, HTN, HLD, paroxysmal atrial fibrillation on Eliquis, blood clotting disorder s/p IVC filter placement, right knee ligament repair, pacemaker implant (2004) s/p recent ppm interrogation on Eliquis, herniated disc and related surgery in [id number] complicated by spinal fusion, local hematoma, foot drop with chronic pain s/p failed intrathecal morphine pump which caused bradycardia and left hip prosthesis. He presents as a transfer from [facility] for chest pain/epigastric pain. US revealed distended gall bladder with gall stones, 1.3 cm gallstone in the GB neck, dilated CBD to 1.8 cm.    # Gallstones disease  # Chronic CBD dilation --attributed to opiate use   no evidence of CBD stones on US  and given normal LTs including bilirubin there is a low suspicion for choledocholithiasis  - may obtain MRCP is possible  - plan for cholecystectomy and consider IOC if needed  - monitor LTs. Replete elctrolytes    Recommendations discussed with primary team  Plan discussed with GI service attending    Prasad Cervantes MD  PGY-5 GI fellow  Pager: [phone]

## 2022-08-08 NOTE — CONSULT NOTE ADULT - ASSESSMENT
***incomplete note 71 y/o male with history of colon mass s/p partial colectomy (2017), appendectomy, hernia repair, aortic and iliac aneurysm surgery (2017) s/p stent placement, HTN, HLD, chronic atrial fibrillation on Eliquis, Right LE DVT >5 years ago s/p IVC filter placement, right knee ligament repair, , herniated disc and related surgery in [id number] complicated by spinal fusion, local hematoma, foot drop with chronic pain s/p failed intrathecal morphine pump which caused bradycardia (s/p pacemaker implant [2004]) and left hip prosthesis. He presents as a transfer from [facility] where he initially presented epigastric pain. Cardiac workup was negative at that hospital. CT imaging concerning for cholecystitis vs choledocholithiasis.     # Acute cholecystitis vs choledocholithiasis.   Agree with empiric antibiotics; Recommend Ceftriaxone 2g given weight, indication.   # Preoperative evaluation - Defer assessment of jeannette-operative cardiac risk, and need for further cardiac evaluation to cardiology consult service.     # Chronic atrial fibrillation - Continue metoprolol succinate (per outpatient documentation, takes 50mg ER BID); Holding apixaban pending possible procedure; [ ] resume apixaban 5mg BID post-procedure when bleeding risk acceptable    # Moderate major depression - Continue nortriptyline 75mg qd ,  [ ] Resume vortioxetine 20mg qd     # HTN - takes hydralazine 50mg qAM, 25mg qPM at home; [ ] Resume if BP persistently above 150mmHg;   Ensure adequate pain control   IF NPO and pain control, with SBP >180 mmHg persistently, can use labetalol 10mg IV push if HR >60 bpm or hydralazine IV 5mg    #Hyponatremia   Sodium, Serum: 126 mmol/L (08-08-22 @ 06:11)  Sodium, Serum: 129 mmol/L (08-07-22 @ 15:25)  Likely hypovolemic hyponatremia 2/2 diminished PO intake   Continue IVF, f/u PM BMP; monitor closely on IV fluids given small left sided pleural effusion on CXR from today.     # HLD - continue simvastatin 10mg qHS  # BPH - continue finasteride 5mg qd, doxazosin 4mg qHS  # Chronic pain - Defer pain med recs to pain management consult; takes miralax qd at home. [ ] Recommend continuing standing bowel regimen-- miralax qd while on opiates.     DVT ppx - Already on heparin drip

## 2022-08-08 NOTE — PROGRESS NOTE ADULT - ASSESSMENT
72 year old male with complex PMHx admitted for acute cholecystitis vs. possible choledocholithiasis.    NPO/IVF  Pain/nausea control  CTX/Flagyl  OOBA/IS/SCDs  Heparin gtt  Home meds as appropriate  ERCP today if pacemaker compatible  ECHO pending  Pain management consult  AM labs

## 2022-08-08 NOTE — PROGRESS NOTE ADULT - SUBJECTIVE AND OBJECTIVE BOX
EPS Device interrogation    Indication:  Pt has paroxysmal AFIB, with PPM implanted in 2004     Device model: 					    Implanting Physician:    Functioning Mode: 			    Underlying Rhythm:    Pacemaker dependency: No    Battery status:    Lead parameters:   				  RA lead:    Sense:      mV.              Threshold:      V at    ms.           Impedance:       ohms  RV lead:    Sense:      mV.              Threshold:      V at    ms.           Impedance:       ohms  LV lead:    Sense:      mV.              Threshold:      V at    ms.           Impedance:       ohms    Shock coil Impedance:     Events/Alert:     Parameter change: 	    For ICD only:  tachy therapy – unchanged / Turned OFF / Turned ON      Will discuss with EPS attending.    EPS Device interrogation    Indication:  Pt has paroxysmal AFIB, with PPM implanted in 2004, and generator changed in 2014 by Dr. Florencio Salmeron.  Pt states he goes to have Dr. Lemos for PPM check.      Device model: Medtronic dual chamber PPM (Adapta ADDR01).    RA lead model: 75326 -- implanted in 2004   RV lead model: 505110  -- implanted in 2004      Functioning Mode: AAIR-DDDR  bpm     Underlying Rhythm:  NSR with 1:1 conduction, currently HR 60-70s on telemetry.     Pacemaker dependency: No  AP 95%   3%    Battery status: 27 months left     Lead parameters: 				  RA lead:    Sense: 1.4-2     mV.              Threshold: 0.75  V at 0.4   ms.           Impedance:  432  ohms (stable trend)  RV lead:    Sense: 5.6-8     mV.              Threshold: 2.25 V at 0.4   ms.           Impedance: 1000  ohms (stable trend)      Observations:  Pt is not pacing dependent.  Battery life is ~2 more years.   Has chronically high RV pacing threshold and impedance, but these trends have been stable.   Has paroxysmal AFIB episodes. Last episode was in 5/8/22.   Device is NOT MRI compatible.   No need for reprogramming before surgery.

## 2022-08-08 NOTE — PROGRESS NOTE ADULT - SUBJECTIVE AND OBJECTIVE BOX
INTERVAL HPI/OVERNIGHT EVENTS: endurance cath placed, hep drip started, NS 50, echo pending, RUQ US - stone at gb     SUBJECTIVE: Patient seen and examined at bedside with chief. Patient reports doing well, primarily c/o chronic back pain and perseverating on pain regimen. Otherwise denies abdominal pain, n/v, cp, sob. +BM, +flatus.      cefTRIAXone   IVPB      cefTRIAXone   IVPB 1000 milliGRAM(s) IV Intermittent every 24 hours  doxazosin 4 milliGRAM(s) Oral at bedtime  heparin  Infusion 1400 Unit(s)/Hr IV Continuous <Continuous>  metoprolol succinate ER 50 milliGRAM(s) Oral two times a day  metroNIDAZOLE  IVPB      metroNIDAZOLE  IVPB 500 milliGRAM(s) IV Intermittent every 8 hours      Vital Signs Last 24 Hrs  T(C): 36.9 (08 Aug 2022 06:01), Max: 36.9 (08 Aug 2022 06:01)  T(F): 98.4 (08 Aug 2022 06:01), Max: 98.4 (08 Aug 2022 06:01)  HR: 66 (08 Aug 2022 03:35) (60 - 66)  BP: 155/78 (08 Aug 2022 03:35) (108/64 - 155/78)  BP(mean): 110 (08 Aug 2022 03:35) (89 - 110)  RR: 17 (08 Aug 2022 03:35) (17 - 18)  SpO2: 98% (08 Aug 2022 03:35) (94% - 98%)    Parameters below as of 08 Aug 2022 03:35  Patient On (Oxygen Delivery Method): nasal cannula  O2 Flow (L/min): 4    I&O's Detail    07 Aug 2022 07:01  -  08 Aug 2022 07:00  --------------------------------------------------------  IN:    IV PiggyBack: 150 mL    Oral Fluid: 150 mL  Total IN: 300 mL    OUT:    Voided (mL): 700 mL  Total OUT: 700 mL    Total NET: -400 mL          General: NAD, resting comfortably in bed  C/V: NSR  Pulm: Nonlabored breathing, no respiratory distress  Abd: soft, NT/ND.   Extrem: WWP, no edema, SCDs in place      LABS:                        9.5    12.33 )-----------( 151      ( 08 Aug 2022 06:11 )             29.2     08-08    126<L>  |  95<L>  |  31<H>  ----------------------------<  93  4.9   |  19<L>  |  1.67<H>    Ca    8.2<L>      08 Aug 2022 06:11  Phos  3.9     08-08  Mg     2.0     08-08    TPro  6.4  /  Alb  2.9<L>  /  TBili  0.8  /  DBili  x   /  AST  29  /  ALT  12  /  AlkPhos  74  08-08    PT/INR - ( 08 Aug 2022 06:11 )   PT: 35.6 sec;   INR: 2.96          PTT - ( 08 Aug 2022 06:11 )  PTT:130.8 sec      RADIOLOGY & ADDITIONAL STUDIES:

## 2022-08-08 NOTE — CONSULT NOTE ADULT - SUBJECTIVE AND OBJECTIVE BOX
GASTROENTEROLOGY CONSULT NOTE  HPI:  71 y/o male with complicated past medical history of colon mass s/p partial colectomy (2017), appendectomy, hernia repair, aortic and iliac aneurysm surgery (2017) s/p stent placement, HTN, HLD, paroxysmal atrial fibrillation on Eliquis, blood clotting disorder s/p IVC filter placement, right knee ligament repair, pacemaker implant (2004) s/p recent ppm interrogation on Eliquis, herniated disc and related surgery in [id number] complicated by spinal fusion, local hematoma, foot drop with chronic pain s/p failed intrathecal morphine pump which caused bradycardia and left hip prosthesis. He presents as a transfer from [facility] where he initially presented for "crushing" chest pain, and "stabbing" epigastric pain. No associated nausea/vomiting, fever, or jaundice. Afebrile, VSS on presentation to [facility].  Cardiac workup was negative at that hospital. CT revealed distended gall bladder with gall stones, jeannette-cholecystic fluid, dilated CBD to 2cm and CBD stone. Pt was seen by GI in June 2022 for GERD        Home meds:  Oxycontin 20mg TID  Oxycodone 15mg BID as needed  Zocor 10mg daily  Pamelor 75mg daily  Carvedilol 12.5mg BID  Hydralazine 50mg BID - 2nd dose cut in half (25mg)  Cardura 4mg daily  Proscar 5mg daily  Trintellix 20mg daily  Valium 2mg prn  Omeprazole 20mg daily  Culturelle probiotic daily  Folic acid 1mg daily  Miralax once daily  Metoprolol 50mg BID  Vitamin D 50,000u once a week   (07 Aug 2022 17:45)    Allergies    Altace (Unknown)  penicillin (Unknown)    Intolerances      Home Medications:  ALPRAZolam 1 mg oral tablet: 1 tab(s) orally once a day (at bedtime), As needed, insomnia (07 Aug 2022 21:10)  Cardura 4 mg oral tablet: 1 tab(s) orally once a day (07 Aug 2022 21:10)  Eliquis 5 mg oral tablet: 1 tab(s) orally once a day (07 Aug 2022 21:10)  folic acid 1 mg oral tablet: 1 tab(s) orally once a day (07 Aug 2022 21:10)  hydrALAZINE 10 mg oral tablet: 50 milligram(s) orally 1 time a day + 25 milligrams orally 1 time a day (07 Aug 2022 21:10)  omeprazole 20 mg oral delayed release capsule: 1 cap(s) orally once a day (07 Aug 2022 21:10)  OxyCONTIN 20 mg oral tablet, extended release: 20 milligram(s) orally every 8 hours (07 Aug 2022 21:10)  Pamelor 75 mg oral capsule: 1 cap(s) orally once a day (at bedtime) (07 Aug 2022 21:10)  Proscar 5 mg oral tablet: 1 tab(s) orally once a day (07 Aug 2022 21:10)  simvastatin 5 mg oral tablet: 1 tab(s) orally once a day (at bedtime) (07 Aug 2022 21:10)  Toprol-XL 50 mg oral tablet, extended release: 1 tab(s) orally 2 times a day (07 Aug 2022 21:10)  Trintellix 20 mg oral tablet: 1 tab(s) orally once a day (07 Aug 2022 21:10)  Valium 2 mg oral tablet: 1 tab(s) orally once a day (at bedtime), As Needed - for insomnia (07 Aug 2022 21:10)    MEDICATIONS:  MEDICATIONS  (STANDING):  cefTRIAXone   IVPB      cefTRIAXone   IVPB 2000 milliGRAM(s) IV Intermittent once  doxazosin 4 milliGRAM(s) Oral at bedtime  finasteride 5 milliGRAM(s) Oral daily  folic acid 1 milliGRAM(s) Oral daily  heparin  Infusion 1400 Unit(s)/Hr (14 mL/Hr) IV Continuous <Continuous>  lactated ringers. 1000 milliLiter(s) (75 mL/Hr) IV Continuous <Continuous>  metoprolol succinate ER 50 milliGRAM(s) Oral two times a day  metroNIDAZOLE  IVPB      metroNIDAZOLE  IVPB 500 milliGRAM(s) IV Intermittent every 8 hours  nortriptyline 75 milliGRAM(s) Oral daily  oxyCODONE  ER Tablet 20 milliGRAM(s) Oral every 12 hours  pantoprazole    Tablet 40 milliGRAM(s) Oral before breakfast  simvastatin 10 milliGRAM(s) Oral at bedtime    MEDICATIONS  (PRN):  diazepam    Tablet 2 milliGRAM(s) Oral daily PRN sleep  oxyCODONE    IR 10 milliGRAM(s) Oral every 6 hours PRN Severe Pain (7 - 10)  polyethylene glycol 3350 17 Gram(s) Oral daily PRN Constipation    PAST MEDICAL & SURGICAL HISTORY:  AAA (abdominal aortic aneurysm)      HTN (hypertension)      Cardiomyopathy      Hyperlipidemia      ONOFRE (dyspnea on exertion)      DVT (deep venous thrombosis)      Pulmonary emphysema  COPD      Cardiac arrhythmia      Aneurysm of aortic root      Depression  anxiety      Anemia      Pelvic mass      Pacemaker  medtronic      History of kidney surgery      History of back surgery  multiple, lumbar      Surgery, elective  multiple neck surgery, cervical      S/P hip replacement, left      S/P arthroscopy of right knee      S/P AAA (abdominal aortic aneurysm) repair  with right iliac aneurysm endovascular repair      S/P carpal tunnel release      Surgery, elective  Cardiac Stent x4      Surgery, elective  Intrathecal pump placement        FAMILY HISTORY:    SOCIAL HISTORY:  [place]: [ ] Current, [ ] Former, [ ] Never; Pack Years:  Alcohol:  Illicit Drugs:    REVIEW OF SYSTEMS:  CONSTITUTIONAL: No fevers or chills  HEENT: No visual changes; No vertigo or throat pain   NECK: No pain or stiffness  RESPIRATORY: No cough, wheezing, hemoptysis; No shortness of breath  CARDIOVASCULAR: No chest pain or palpitations  GASTROINTESTINAL: as above  GENITOURINARY: No dysuria, frequency or hematuria  NEUROLOGICAL: No numbness or weakness  SKIN: No itching, burning, rashes, or lesions   All other 10 review of systems is negative unless indicated above.    Vital Signs Last 24 Hrs  T(C): 36.2 (08 Aug 2022 09:24), Max: 36.9 (08 Aug 2022 06:01)  T(F): 97.2 (08 Aug 2022 09:24), Max: 98.4 (08 Aug 2022 06:01)  HR: 70 (08 Aug 2022 11:42) (60 - 70)  BP: 159/73 (08 Aug 2022 11:42) (108/64 - 159/73)  BP(mean): 105 (08 Aug 2022 11:42) (87 - 110)  RR: 18 (08 Aug 2022 11:42) (17 - 18)  SpO2: 95% (08 Aug 2022 11:42) (94% - 98%)    Parameters below as of 08 Aug 2022 11:42  Patient On (Oxygen Delivery Method): nasal cannula  O2 Flow (L/min): 2      08-07 @ 07:01  -  08-08 @ 07:00  --------------------------------------------------------  IN: 830 mL / OUT: 700 mL / NET: 130 mL    08-08 @ 07:01  -  08-08 @ 12:44  --------------------------------------------------------  IN: 320 mL / OUT: 300 mL / NET: 20 mL        PHYSICAL EXAM:    General: Well developed; in no acute distress  Eyes: Anicteric sclerae, moist conjunctivae  HENT: Moist mucous membranes  Neck: Trachea midline, supple  Lungs: Normal respiratory effort  Cardiovascular: RRR  Abdomen: Soft, mild RUQ tenderness, non-distended; old abdominal scars. No rebound or guarding  Extremities: Normal range of motion, No clubbing, cyanosis or edema  Neurological: Alert and oriented x3  Skin: Warm and dry. No obvious rash    LABS:                        9.5    12.33 )-----------( 151      ( 08 Aug 2022 06:11 )             29.2     08-08    126<L>  |  95<L>  |  31<H>  ----------------------------<  93  4.9   |  19<L>  |  1.67<H>    Ca    8.2<L>      08 Aug 2022 06:11  Phos  3.9     08-08  Mg     2.0     08-08    TPro  6.4  /  Alb  2.9<L>  /  TBili  0.8  /  DBili  x   /  AST  29  /  ALT  12  /  AlkPhos  74  08-08        PT/INR - ( 08 Aug 2022 06:11 )   PT: 35.6 sec;   INR: 2.96          PTT - ( 08 Aug 2022 06:11 )  PTT:130.8 sec    RADIOLOGY & ADDITIONAL STUDIES:     Reviewed

## 2022-08-08 NOTE — CONSULT NOTE ADULT - SUBJECTIVE AND OBJECTIVE BOX
[phone]    Chief Complaint: abdominal pain     HPI: Patient seen and examined today. This is a 73 y/o male PMH of colon mass s/p partial colectomy, appendectomy, hernia repair, aortic and iliac aneurysm, s/p stent placement, HTN, HLD, afib on eliquis, chronic back pain with 1 day history of symptomatic acute cholecystitis vs choledocholithiasis with reports of abdominal pain. Patient reports endorisng pain to the right upper quadrant, states pain feels crushing and sharp. Patient reports pain comes and goes but increases with any movement. Patient reports a history of chronic back pain for which he takes Oxycontin 20 mg PO TID and Oxycodone 15 mg PO q8h PRN prescribed by his PCP. Patient reports concern that he is on a lower dose here in the hospital than his home dose. Patient reports history of Morphine intrathecal pump which he did not react well to, still in place but inactive. Patient is istop positive for Oxycodone 15 mg po q8h last filled 8/2022 and Oxycontin er 20 mg po tid last filled 8/2022 prescribed by Federico Dill. Patient denies side effects from current pain regimen.     Pain is Xsharp ____dull ___burning ___achy ___ Intensity: ____ mild ___mod _X__severe     Location ____surgical site ____cervical _____lumbar __X__abd ____upper ext____lower ext    Worse with ___X_activity _X___movement _____physical therapy___ Rest    Improved with ___X_medication __X__rest ____physical therapy    ROS: Const:  _N__febrile   Eyes:___ENT:___CV: _N__chest pain  Resp: _N___sob  GI:N___nausea __N_vomiting Y___abd pain ___npo ___clears __full diet __bm  :___ Musk: __Y_pain ___spasm  Skin:___ Neuro:  ___sedation__N_confusion___ numbness _N__weakness ___paresth  Psych:__anxiety  Endo:___ Heme:___Allergy:_ALTACE, PCN________, __Y_all others reviewed and negative    PAST MEDICAL & SURGICAL HISTORY:  AAA (abdominal aortic aneurysm)  HTN (hypertension)  Cardiomyopathy  Hyperlipidemia  ONOFRE (dyspnea on exertion)  DVT (deep venous thrombosis)  Pulmonary emphysema  COPD  Cardiac arrhythmia  Aneurysm of aortic root  Depression  anxiety  Anemia  Pelvic mass  Pacemaker  medtronic  History of kidney surgery  History of back surgery  multiple, lumbar  Surgery, elective  multiple neck surgery, cervical  S/P hip replacement, left  S/P arthroscopy of right knee  S/P AAA (abdominal aortic aneurysm) repair  with right iliac aneurysm endovascular repair  S/P carpal tunnel release  Surgery, elective  Cardiac Stent x4  Surgery, elective  Intrathecal pump placement    SH: __Y_Tobacco   __N_Alcohol                          FH:FAMILY HISTORY: NO PERTINENT MEDICAL HISTORY NOTED IN FIRST DEGREE RELATIVES      cefTRIAXone   IVPB      cefTRIAXone   IVPB 2000 milliGRAM(s) IV Intermittent once  diazepam    Tablet 2 milliGRAM(s) Oral daily PRN  doxazosin 4 milliGRAM(s) Oral at bedtime  finasteride 5 milliGRAM(s) Oral daily  folic acid 1 milliGRAM(s) Oral daily  heparin  Infusion 1400 Unit(s)/Hr IV Continuous <Continuous>  lactated ringers. 1000 milliLiter(s) IV Continuous <Continuous>  metoprolol succinate ER 50 milliGRAM(s) Oral two times a day  metroNIDAZOLE  IVPB      metroNIDAZOLE  IVPB 500 milliGRAM(s) IV Intermittent every 8 hours  nortriptyline 75 milliGRAM(s) Oral daily  oxyCODONE    IR 10 milliGRAM(s) Oral every 6 hours PRN  oxyCODONE  ER Tablet 20 milliGRAM(s) Oral every 12 hours  pantoprazole    Tablet 40 milliGRAM(s) Oral before breakfast  polyethylene glycol 3350 17 Gram(s) Oral daily PRN  simvastatin 10 milliGRAM(s) Oral at bedtime      T(C): 36.2 (08-08-22 @ 09:24), Max: 36.9 (08-08-22 @ 06:01)  HR: 66 (08-08-22 @ 08:20) (60 - 66)  BP: 122/65 (08-08-22 @ 08:20) (108/64 - 155/78)  RR: 17 (08-08-22 @ 08:20) (17 - 18)  SpO2: 95% (08-08-22 @ 08:20) (94% - 98%)  Wt(kg): --    T(C): 36.2 (08-08-22 @ 09:24), Max: 36.9 (08-08-22 @ 06:01)  HR: 66 (08-08-22 @ 08:20) (60 - 66)  BP: 122/65 (08-08-22 @ 08:20) (108/64 - 155/78)  RR: 17 (08-08-22 @ 08:20) (17 - 18)  SpO2: 95% (08-08-22 @ 08:20) (94% - 98%)  Wt(kg): --    T(C): 36.2 (08-08-22 @ 09:24), Max: 36.9 (08-08-22 @ 06:01)  HR: 66 (08-08-22 @ 08:20) (60 - 66)  BP: 122/65 (08-08-22 @ 08:20) (108/64 - 155/78)  RR: 17 (08-08-22 @ 08:20) (17 - 18)  SpO2: 95% (08-08-22 @ 08:20) (94% - 98%)  Wt(kg): --    PHYSICAL EXAM:  Gen Appearance: __X_no acute distress _X__appropriate        Neuro: ___SILT feet____ EOM Intact Psych: AAOX_3_, _X__mood/affect appropriate        Eyes: __X_conjunctiva WNL  _X____ Pupils equal and round        ENT: _X__ears and nose atraumatic_X__ Hearing grossly intact        Neck: _X__trachea midline, no visible masses ___thyroid without palpable mass    Resp: _X__Nml WOB____No tactile fremitus ___clear to auscultation    Cardio: __X_extremities free from edema ____pedal pulses palpable    GI/Abdomen: ___soft _____ Nontender___X___Nondistended_____HSM    Lymphatic: ___no palpable nodes in neck  ___no palpable nodes calves and feet    Skin/Wound: ___Incision, ___Dressing c/d/i,   ____surrounding tissues soft,  ___drain/chest tube present____    Muscular: EHL ___/5  Gastrocnemius___/5    __X_absent clubbing/cyanosis      ASSESSMENT: This is a 72y old Male with a history of colon mass s/p partial colectomy, appendectomy, hernia repair, aortic and iliac aneurysm, s/p stent placement, HTN, HLD, afib on eliquis, chronic back pain with 1 day history of symptomatic acute cholecystitis vs choledocholithiasis with reports of abdominal pain.    Recommended Treatment PLAN:                 [phone]    Chief Complaint: abdominal pain     HPI: Patient seen and examined today. This is a 71 y/o male PMH of colon mass s/p partial colectomy, appendectomy, hernia repair, aortic and iliac aneurysm, s/p stent placement, HTN, HLD, afib on eliquis, chronic back pain with 1 day history of symptomatic acute cholecystitis vs choledocholithiasis with reports of abdominal pain. Patient reports endorisng pain to the right upper quadrant, states pain feels crushing and sharp. Patient reports pain comes and goes but increases with any movement. Patient reports a history of chronic back pain for which he takes Oxycontin 20 mg PO TID and Oxycodone 15 mg PO q8h PRN prescribed by his PCP. Patient reports concern that he is on a lower dose here in the hospital than his home dose. Patient reports history of Morphine intrathecal pump which he did not react well to, still in place but inactive. Patient is istop positive for Oxycodone 15 mg po q8h last filled 8/2022 and Oxycontin er 20 mg po tid last filled 8/2022 prescribed by Federico Dill. Patient denies side effects from current pain regimen.     Pain is Xsharp ____dull ___burning ___achy ___ Intensity: ____ mild ___mod _X__severe     Location ____surgical site ____cervical _____lumbar __X__abd ____upper ext____lower ext    Worse with ___X_activity _X___movement _____physical therapy___ Rest    Improved with ___X_medication __X__rest ____physical therapy    ROS: Const:  _N__febrile   Eyes:___ENT:___CV: _N__chest pain  Resp: _N___sob  GI:N___nausea __N_vomiting Y___abd pain ___npo ___clears __full diet __bm  :___ Musk: __Y_pain ___spasm  Skin:___ Neuro:  ___sedation__N_confusion___ numbness _N__weakness ___paresth  Psych:__anxiety  Endo:___ Heme:___Allergy:_ALTACE, PCN________, __Y_all others reviewed and negative    PAST MEDICAL & SURGICAL HISTORY:  AAA (abdominal aortic aneurysm)  HTN (hypertension)  Cardiomyopathy  Hyperlipidemia  ONOFRE (dyspnea on exertion)  DVT (deep venous thrombosis)  Pulmonary emphysema  COPD  Cardiac arrhythmia  Aneurysm of aortic root  Depression  anxiety  Anemia  Pelvic mass  Pacemaker  medtronic  History of kidney surgery  History of back surgery  multiple, lumbar  Surgery, elective  multiple neck surgery, cervical  S/P hip replacement, left  S/P arthroscopy of right knee  S/P AAA (abdominal aortic aneurysm) repair  with right iliac aneurysm endovascular repair  S/P carpal tunnel release  Surgery, elective  Cardiac Stent x4  Surgery, elective  Intrathecal pump placement    SH: __Y_Tobacco   __N_Alcohol                          FH:FAMILY HISTORY: NO PERTINENT MEDICAL HISTORY NOTED IN FIRST DEGREE RELATIVES      cefTRIAXone   IVPB      cefTRIAXone   IVPB 2000 milliGRAM(s) IV Intermittent once  diazepam    Tablet 2 milliGRAM(s) Oral daily PRN  doxazosin 4 milliGRAM(s) Oral at bedtime  finasteride 5 milliGRAM(s) Oral daily  folic acid 1 milliGRAM(s) Oral daily  heparin  Infusion 1400 Unit(s)/Hr IV Continuous <Continuous>  lactated ringers. 1000 milliLiter(s) IV Continuous <Continuous>  metoprolol succinate ER 50 milliGRAM(s) Oral two times a day  metroNIDAZOLE  IVPB      metroNIDAZOLE  IVPB 500 milliGRAM(s) IV Intermittent every 8 hours  nortriptyline 75 milliGRAM(s) Oral daily  oxyCODONE    IR 10 milliGRAM(s) Oral every 6 hours PRN  oxyCODONE  ER Tablet 20 milliGRAM(s) Oral every 12 hours  pantoprazole    Tablet 40 milliGRAM(s) Oral before breakfast  polyethylene glycol 3350 17 Gram(s) Oral daily PRN  simvastatin 10 milliGRAM(s) Oral at bedtime      T(C): 36.2 (08-08-22 @ 09:24), Max: 36.9 (08-08-22 @ 06:01)  HR: 66 (08-08-22 @ 08:20) (60 - 66)  BP: 122/65 (08-08-22 @ 08:20) (108/64 - 155/78)  RR: 17 (08-08-22 @ 08:20) (17 - 18)  SpO2: 95% (08-08-22 @ 08:20) (94% - 98%)  Wt(kg): --    T(C): 36.2 (08-08-22 @ 09:24), Max: 36.9 (08-08-22 @ 06:01)  HR: 66 (08-08-22 @ 08:20) (60 - 66)  BP: 122/65 (08-08-22 @ 08:20) (108/64 - 155/78)  RR: 17 (08-08-22 @ 08:20) (17 - 18)  SpO2: 95% (08-08-22 @ 08:20) (94% - 98%)  Wt(kg): --    T(C): 36.2 (08-08-22 @ 09:24), Max: 36.9 (08-08-22 @ 06:01)  HR: 66 (08-08-22 @ 08:20) (60 - 66)  BP: 122/65 (08-08-22 @ 08:20) (108/64 - 155/78)  RR: 17 (08-08-22 @ 08:20) (17 - 18)  SpO2: 95% (08-08-22 @ 08:20) (94% - 98%)  Wt(kg): --    PHYSICAL EXAM:  Gen Appearance: __X_no acute distress _X__appropriate        Neuro: ___SILT feet____ EOM Intact Psych: AAOX_3_, _X__mood/affect appropriate        Eyes: __X_conjunctiva WNL  _X____ Pupils equal and round        ENT: _X__ears and nose atraumatic_X__ Hearing grossly intact        Neck: _X__trachea midline, no visible masses ___thyroid without palpable mass    Resp: _X__Nml WOB____No tactile fremitus ___clear to auscultation    Cardio: __X_extremities free from edema ____pedal pulses palpable    GI/Abdomen: ___soft _____ Nontender___X___Nondistended_____HSM    Lymphatic: ___no palpable nodes in neck  ___no palpable nodes calves and feet    Skin/Wound: ___Incision, ___Dressing c/d/i,   ____surrounding tissues soft,  ___drain/chest tube present____    Muscular: EHL ___/5  Gastrocnemius___/5    __X_absent clubbing/cyanosis      ASSESSMENT: This is a 72y old Male with a history of colon mass s/p partial colectomy, appendectomy, hernia repair, aortic and iliac aneurysm, s/p stent placement, HTN, HLD, afib on eliquis, chronic back pain with 1 day history of symptomatic acute cholecystitis vs choledocholithiasis with reports of abdominal pain.    Recommended Treatment PLAN:  1. Consider increasing to Oxycontin ER 20 mg PO TID (patient home dose as per patient and ISTOP)  2. Consider increasing to Oxycodone 15 mg PO q8h PRN severe pain (patient home dose as per patient and ISTOP)  HOLD ALL OPIOIDS FOR SEDATION, RR<10, O2SAT <93%, SBP <96  Plan discussed with Dr. Frey

## 2022-08-08 NOTE — PROVIDER CONTACT NOTE (CHANGE IN STATUS NOTIFICATION) - ASSESSMENT
Pt stated having sharp chest pain that radiated to his clavicles, no shortness of breath sating at 94%, BP was 179/82, HR 81

## 2022-08-09 ENCOUNTER — APPOINTMENT (OUTPATIENT)
Dept: GASTROENTEROLOGY | Facility: CLINIC | Age: 72
End: 2022-08-09

## 2022-08-09 LAB
ALBUMIN SERPL ELPH-MCNC: 3.2 G/DL — LOW (ref 3.3–5)
ALBUMIN SERPL ELPH-MCNC: 3.5 G/DL — SIGNIFICANT CHANGE UP (ref 3.3–5)
ALP SERPL-CCNC: 70 U/L — SIGNIFICANT CHANGE UP (ref 40–120)
ALP SERPL-CCNC: 77 U/L — SIGNIFICANT CHANGE UP (ref 40–120)
ALT FLD-CCNC: 12 U/L — SIGNIFICANT CHANGE UP (ref 10–45)
ALT FLD-CCNC: 14 U/L — SIGNIFICANT CHANGE UP (ref 10–45)
ANION GAP SERPL CALC-SCNC: 13 MMOL/L — SIGNIFICANT CHANGE UP (ref 5–17)
ANION GAP SERPL CALC-SCNC: 13 MMOL/L — SIGNIFICANT CHANGE UP (ref 5–17)
APTT BLD: 56.8 SEC — HIGH (ref 27.5–35.5)
APTT BLD: 58.4 SEC — HIGH (ref 27.5–35.5)
APTT BLD: 61.4 SEC — HIGH (ref 27.5–35.5)
APTT BLD: 86 SEC — HIGH (ref 27.5–35.5)
AST SERPL-CCNC: 22 U/L — SIGNIFICANT CHANGE UP (ref 10–40)
AST SERPL-CCNC: 26 U/L — SIGNIFICANT CHANGE UP (ref 10–40)
BASE EXCESS BLDA CALC-SCNC: -0.2 MMOL/L — SIGNIFICANT CHANGE UP (ref -2–3)
BILIRUB DIRECT SERPL-MCNC: 0.3 MG/DL — SIGNIFICANT CHANGE UP (ref 0–0.3)
BILIRUB INDIRECT FLD-MCNC: 0.3 MG/DL — SIGNIFICANT CHANGE UP (ref 0.2–1)
BILIRUB SERPL-MCNC: 0.6 MG/DL — SIGNIFICANT CHANGE UP (ref 0.2–1.2)
BILIRUB SERPL-MCNC: 0.7 MG/DL — SIGNIFICANT CHANGE UP (ref 0.2–1.2)
BUN SERPL-MCNC: 25 MG/DL — HIGH (ref 7–23)
BUN SERPL-MCNC: 26 MG/DL — HIGH (ref 7–23)
CALCIUM SERPL-MCNC: 8.8 MG/DL — SIGNIFICANT CHANGE UP (ref 8.4–10.5)
CALCIUM SERPL-MCNC: 8.8 MG/DL — SIGNIFICANT CHANGE UP (ref 8.4–10.5)
CHLORIDE SERPL-SCNC: 97 MMOL/L — SIGNIFICANT CHANGE UP (ref 96–108)
CHLORIDE SERPL-SCNC: 97 MMOL/L — SIGNIFICANT CHANGE UP (ref 96–108)
CK MB CFR SERPL CALC: 7 NG/ML — HIGH (ref 0–6.7)
CK MB CFR SERPL CALC: 9 NG/ML — HIGH (ref 0–6.7)
CK SERPL-CCNC: 208 U/L — HIGH (ref 30–200)
CK SERPL-CCNC: 239 U/L — HIGH (ref 30–200)
CO2 BLDA-SCNC: 24 MMOL/L — SIGNIFICANT CHANGE UP (ref 19–24)
CO2 SERPL-SCNC: 22 MMOL/L — SIGNIFICANT CHANGE UP (ref 22–31)
CO2 SERPL-SCNC: 22 MMOL/L — SIGNIFICANT CHANGE UP (ref 22–31)
CREAT SERPL-MCNC: 1.27 MG/DL — SIGNIFICANT CHANGE UP (ref 0.5–1.3)
CREAT SERPL-MCNC: 1.27 MG/DL — SIGNIFICANT CHANGE UP (ref 0.5–1.3)
EGFR: 60 ML/MIN/1.73M2 — SIGNIFICANT CHANGE UP
EGFR: 60 ML/MIN/1.73M2 — SIGNIFICANT CHANGE UP
GLUCOSE BLDC GLUCOMTR-MCNC: 128 MG/DL — HIGH (ref 70–99)
GLUCOSE BLDC GLUCOMTR-MCNC: 141 MG/DL — HIGH (ref 70–99)
GLUCOSE SERPL-MCNC: 133 MG/DL — HIGH (ref 70–99)
GLUCOSE SERPL-MCNC: 136 MG/DL — HIGH (ref 70–99)
HCO3 BLDA-SCNC: 23 MMOL/L — SIGNIFICANT CHANGE UP (ref 21–28)
HCT VFR BLD CALC: 29 % — LOW (ref 39–50)
HCT VFR BLD CALC: 30.3 % — LOW (ref 39–50)
HGB BLD-MCNC: 10 G/DL — LOW (ref 13–17)
HGB BLD-MCNC: 9.8 G/DL — LOW (ref 13–17)
INR BLD: 2.16 — HIGH (ref 0.88–1.16)
INR BLD: 2.22 — HIGH (ref 0.88–1.16)
INR BLD: 2.51 — HIGH (ref 0.88–1.16)
LACTATE SERPL-SCNC: 2.1 MMOL/L — HIGH (ref 0.5–2)
MAGNESIUM SERPL-MCNC: 1.8 MG/DL — SIGNIFICANT CHANGE UP (ref 1.6–2.6)
MAGNESIUM SERPL-MCNC: 2 MG/DL — SIGNIFICANT CHANGE UP (ref 1.6–2.6)
MCHC RBC-ENTMCNC: 28.7 PG — SIGNIFICANT CHANGE UP (ref 27–34)
MCHC RBC-ENTMCNC: 29.4 PG — SIGNIFICANT CHANGE UP (ref 27–34)
MCHC RBC-ENTMCNC: 33 GM/DL — SIGNIFICANT CHANGE UP (ref 32–36)
MCHC RBC-ENTMCNC: 33.8 GM/DL — SIGNIFICANT CHANGE UP (ref 32–36)
MCV RBC AUTO: 86.8 FL — SIGNIFICANT CHANGE UP (ref 80–100)
MCV RBC AUTO: 87.1 FL — SIGNIFICANT CHANGE UP (ref 80–100)
NRBC # BLD: 0 /100 WBCS — SIGNIFICANT CHANGE UP (ref 0–0)
NRBC # BLD: 0 /100 WBCS — SIGNIFICANT CHANGE UP (ref 0–0)
NT-PROBNP SERPL-SCNC: 6929 PG/ML — HIGH (ref 0–300)
NT-PROBNP SERPL-SCNC: HIGH PG/ML (ref 0–300)
PCO2 BLDA: 31 MMHG — LOW (ref 35–48)
PH BLDA: 7.47 — HIGH (ref 7.35–7.45)
PHOSPHATE SERPL-MCNC: 2 MG/DL — LOW (ref 2.5–4.5)
PHOSPHATE SERPL-MCNC: 2.4 MG/DL — LOW (ref 2.5–4.5)
PLATELET # BLD AUTO: 145 K/UL — LOW (ref 150–400)
PLATELET # BLD AUTO: 163 K/UL — SIGNIFICANT CHANGE UP (ref 150–400)
PO2 BLDA: 182 MMHG — HIGH (ref 83–108)
POTASSIUM SERPL-MCNC: 3.5 MMOL/L — SIGNIFICANT CHANGE UP (ref 3.5–5.3)
POTASSIUM SERPL-MCNC: 3.7 MMOL/L — SIGNIFICANT CHANGE UP (ref 3.5–5.3)
POTASSIUM SERPL-SCNC: 3.5 MMOL/L — SIGNIFICANT CHANGE UP (ref 3.5–5.3)
POTASSIUM SERPL-SCNC: 3.7 MMOL/L — SIGNIFICANT CHANGE UP (ref 3.5–5.3)
PROT SERPL-MCNC: 6.7 G/DL — SIGNIFICANT CHANGE UP (ref 6–8.3)
PROT SERPL-MCNC: 7 G/DL — SIGNIFICANT CHANGE UP (ref 6–8.3)
PROTHROM AB SERPL-ACNC: 25.9 SEC — HIGH (ref 10.5–13.4)
PROTHROM AB SERPL-ACNC: 26.6 SEC — HIGH (ref 10.5–13.4)
PROTHROM AB SERPL-ACNC: 30.2 SEC — HIGH (ref 10.5–13.4)
RBC # BLD: 3.33 M/UL — LOW (ref 4.2–5.8)
RBC # BLD: 3.49 M/UL — LOW (ref 4.2–5.8)
RBC # FLD: 13 % — SIGNIFICANT CHANGE UP (ref 10.3–14.5)
RBC # FLD: 13.1 % — SIGNIFICANT CHANGE UP (ref 10.3–14.5)
SAO2 % BLDA: 100 % — HIGH (ref 94–98)
SODIUM SERPL-SCNC: 132 MMOL/L — LOW (ref 135–145)
SODIUM SERPL-SCNC: 132 MMOL/L — LOW (ref 135–145)
TROPONIN T SERPL-MCNC: 0.01 NG/ML — SIGNIFICANT CHANGE UP (ref 0–0.01)
TROPONIN T SERPL-MCNC: 0.01 NG/ML — SIGNIFICANT CHANGE UP (ref 0–0.01)
TROPONIN T SERPL-MCNC: 0.15 NG/ML — CRITICAL HIGH (ref 0–0.01)
TROPONIN T SERPL-MCNC: 0.16 NG/ML — CRITICAL HIGH (ref 0–0.01)
WBC # BLD: 10.14 K/UL — SIGNIFICANT CHANGE UP (ref 3.8–10.5)
WBC # BLD: 13.33 K/UL — HIGH (ref 3.8–10.5)
WBC # FLD AUTO: 10.14 K/UL — SIGNIFICANT CHANGE UP (ref 3.8–10.5)
WBC # FLD AUTO: 13.33 K/UL — HIGH (ref 3.8–10.5)

## 2022-08-09 PROCEDURE — 93971 EXTREMITY STUDY: CPT | Mod: 26,GC

## 2022-08-09 PROCEDURE — 99223 1ST HOSP IP/OBS HIGH 75: CPT

## 2022-08-09 PROCEDURE — 71045 X-RAY EXAM CHEST 1 VIEW: CPT | Mod: 26

## 2022-08-09 PROCEDURE — 93010 ELECTROCARDIOGRAM REPORT: CPT

## 2022-08-09 PROCEDURE — 99291 CRITICAL CARE FIRST HOUR: CPT | Mod: 25

## 2022-08-09 PROCEDURE — 76604 US EXAM CHEST: CPT | Mod: 26,GC

## 2022-08-09 PROCEDURE — 76705 ECHO EXAM OF ABDOMEN: CPT | Mod: 26,GC

## 2022-08-09 PROCEDURE — 93308 TTE F-UP OR LMTD: CPT | Mod: 26

## 2022-08-09 PROCEDURE — 93308 TTE F-UP OR LMTD: CPT | Mod: 26,GC

## 2022-08-09 RX ORDER — ASPIRIN/CALCIUM CARB/MAGNESIUM 324 MG
324 TABLET ORAL ONCE
Refills: 0 | Status: DISCONTINUED | OUTPATIENT
Start: 2022-08-09 | End: 2022-08-09

## 2022-08-09 RX ORDER — PHYTONADIONE (VIT K1) 5 MG
10 TABLET ORAL ONCE
Refills: 0 | Status: COMPLETED | OUTPATIENT
Start: 2022-08-09 | End: 2022-08-09

## 2022-08-09 RX ORDER — HYDRALAZINE HCL 50 MG
25 TABLET ORAL
Refills: 0 | Status: DISCONTINUED | OUTPATIENT
Start: 2022-08-09 | End: 2022-08-11

## 2022-08-09 RX ORDER — CLOPIDOGREL BISULFATE 75 MG/1
600 TABLET, FILM COATED ORAL ONCE
Refills: 0 | Status: DISCONTINUED | OUTPATIENT
Start: 2022-08-09 | End: 2022-08-09

## 2022-08-09 RX ORDER — POTASSIUM PHOSPHATE, MONOBASIC POTASSIUM PHOSPHATE, DIBASIC 236; 224 MG/ML; MG/ML
30 INJECTION, SOLUTION INTRAVENOUS ONCE
Refills: 0 | Status: COMPLETED | OUTPATIENT
Start: 2022-08-09 | End: 2022-08-09

## 2022-08-09 RX ORDER — ACETAMINOPHEN 500 MG
1000 TABLET ORAL ONCE
Refills: 0 | Status: COMPLETED | OUTPATIENT
Start: 2022-08-09 | End: 2022-08-09

## 2022-08-09 RX ORDER — FUROSEMIDE 40 MG
20 TABLET ORAL ONCE
Refills: 0 | Status: COMPLETED | OUTPATIENT
Start: 2022-08-09 | End: 2022-08-09

## 2022-08-09 RX ORDER — HEPARIN SODIUM 5000 [USP'U]/ML
1000 INJECTION INTRAVENOUS; SUBCUTANEOUS
Qty: 25000 | Refills: 0 | Status: DISCONTINUED | OUTPATIENT
Start: 2022-08-09 | End: 2022-08-09

## 2022-08-09 RX ORDER — LABETALOL HCL 100 MG
10 TABLET ORAL ONCE
Refills: 0 | Status: COMPLETED | OUTPATIENT
Start: 2022-08-09 | End: 2022-08-09

## 2022-08-09 RX ORDER — ACETAMINOPHEN 500 MG
650 TABLET ORAL EVERY 6 HOURS
Refills: 0 | Status: DISCONTINUED | OUTPATIENT
Start: 2022-08-09 | End: 2022-08-16

## 2022-08-09 RX ORDER — SODIUM,POTASSIUM PHOSPHATES 278-250MG
1 POWDER IN PACKET (EA) ORAL ONCE
Refills: 0 | Status: COMPLETED | OUTPATIENT
Start: 2022-08-09 | End: 2022-08-09

## 2022-08-09 RX ORDER — DIAZEPAM 5 MG
2 TABLET ORAL AT BEDTIME
Refills: 0 | Status: DISCONTINUED | OUTPATIENT
Start: 2022-08-09 | End: 2022-08-09

## 2022-08-09 RX ORDER — NICARDIPINE HYDROCHLORIDE 30 MG/1
10 CAPSULE, EXTENDED RELEASE ORAL
Qty: 40 | Refills: 0 | Status: DISCONTINUED | OUTPATIENT
Start: 2022-08-09 | End: 2022-08-10

## 2022-08-09 RX ORDER — FUROSEMIDE 40 MG
40 TABLET ORAL ONCE
Refills: 0 | Status: COMPLETED | OUTPATIENT
Start: 2022-08-09 | End: 2022-08-09

## 2022-08-09 RX ORDER — LIDOCAINE 4 G/100G
1 CREAM TOPICAL ONCE
Refills: 0 | Status: COMPLETED | OUTPATIENT
Start: 2022-08-09 | End: 2022-08-09

## 2022-08-09 RX ORDER — CLOPIDOGREL BISULFATE 75 MG/1
300 TABLET, FILM COATED ORAL ONCE
Refills: 0 | Status: DISCONTINUED | OUTPATIENT
Start: 2022-08-09 | End: 2022-08-09

## 2022-08-09 RX ORDER — HEPARIN SODIUM 5000 [USP'U]/ML
900 INJECTION INTRAVENOUS; SUBCUTANEOUS
Qty: 25000 | Refills: 0 | Status: DISCONTINUED | OUTPATIENT
Start: 2022-08-09 | End: 2022-08-10

## 2022-08-09 RX ORDER — CHLORHEXIDINE GLUCONATE 213 G/1000ML
1 SOLUTION TOPICAL
Refills: 0 | Status: DISCONTINUED | OUTPATIENT
Start: 2022-08-09 | End: 2022-08-16

## 2022-08-09 RX ORDER — OLANZAPINE 15 MG/1
5 TABLET, FILM COATED ORAL ONCE
Refills: 0 | Status: COMPLETED | OUTPATIENT
Start: 2022-08-09 | End: 2022-08-09

## 2022-08-09 RX ORDER — HEPARIN SODIUM 5000 [USP'U]/ML
INJECTION INTRAVENOUS; SUBCUTANEOUS
Qty: 25000 | Refills: 0 | Status: DISCONTINUED | OUTPATIENT
Start: 2022-08-09 | End: 2022-08-09

## 2022-08-09 RX ORDER — LABETALOL HCL 100 MG
20 TABLET ORAL ONCE
Refills: 0 | Status: COMPLETED | OUTPATIENT
Start: 2022-08-09 | End: 2022-08-09

## 2022-08-09 RX ORDER — HYDROMORPHONE HYDROCHLORIDE 2 MG/ML
0.5 INJECTION INTRAMUSCULAR; INTRAVENOUS; SUBCUTANEOUS ONCE
Refills: 0 | Status: DISCONTINUED | OUTPATIENT
Start: 2022-08-09 | End: 2022-08-09

## 2022-08-09 RX ORDER — DEXMEDETOMIDINE HYDROCHLORIDE IN 0.9% SODIUM CHLORIDE 4 UG/ML
0.1 INJECTION INTRAVENOUS
Qty: 400 | Refills: 0 | Status: DISCONTINUED | OUTPATIENT
Start: 2022-08-09 | End: 2022-08-09

## 2022-08-09 RX ORDER — HYDRALAZINE HCL 50 MG
50 TABLET ORAL
Refills: 0 | Status: DISCONTINUED | OUTPATIENT
Start: 2022-08-09 | End: 2022-08-11

## 2022-08-09 RX ADMIN — OXYCODONE HYDROCHLORIDE 20 MILLIGRAM(S): 5 TABLET ORAL at 06:30

## 2022-08-09 RX ADMIN — OXYCODONE HYDROCHLORIDE 15 MILLIGRAM(S): 5 TABLET ORAL at 01:44

## 2022-08-09 RX ADMIN — Medication 1 MILLIGRAM(S): at 07:02

## 2022-08-09 RX ADMIN — NICARDIPINE HYDROCHLORIDE 50 MG/HR: 30 CAPSULE, EXTENDED RELEASE ORAL at 12:35

## 2022-08-09 RX ADMIN — DEXMEDETOMIDINE HYDROCHLORIDE IN 0.9% SODIUM CHLORIDE 2.28 MICROGRAM(S)/KG/HR: 4 INJECTION INTRAVENOUS at 14:46

## 2022-08-09 RX ADMIN — OXYCODONE HYDROCHLORIDE 20 MILLIGRAM(S): 5 TABLET ORAL at 06:00

## 2022-08-09 RX ADMIN — DEXMEDETOMIDINE HYDROCHLORIDE IN 0.9% SODIUM CHLORIDE 2.28 MICROGRAM(S)/KG/HR: 4 INJECTION INTRAVENOUS at 11:45

## 2022-08-09 RX ADMIN — Medication 10 MILLIGRAM(S): at 02:56

## 2022-08-09 RX ADMIN — Medication 40 MILLIGRAM(S): at 18:34

## 2022-08-09 RX ADMIN — OLANZAPINE 5 MILLIGRAM(S): 15 TABLET, FILM COATED ORAL at 11:09

## 2022-08-09 RX ADMIN — Medication 10 MILLIGRAM(S): at 03:30

## 2022-08-09 RX ADMIN — Medication 102 MILLIGRAM(S): at 07:53

## 2022-08-09 RX ADMIN — Medication 100 MILLIGRAM(S): at 15:34

## 2022-08-09 RX ADMIN — Medication 50 MILLIGRAM(S): at 06:32

## 2022-08-09 RX ADMIN — Medication 100 MILLIGRAM(S): at 05:31

## 2022-08-09 RX ADMIN — HEPARIN SODIUM 10 UNIT(S)/HR: 5000 INJECTION INTRAVENOUS; SUBCUTANEOUS at 16:58

## 2022-08-09 RX ADMIN — Medication 20 MILLIGRAM(S): at 09:53

## 2022-08-09 RX ADMIN — LIDOCAINE 1 PATCH: 4 CREAM TOPICAL at 23:01

## 2022-08-09 RX ADMIN — Medication 20 MILLIGRAM(S): at 09:45

## 2022-08-09 RX ADMIN — Medication 10 MILLIGRAM(S): at 05:22

## 2022-08-09 RX ADMIN — OXYCODONE HYDROCHLORIDE 15 MILLIGRAM(S): 5 TABLET ORAL at 02:52

## 2022-08-09 RX ADMIN — Medication 40 MILLIGRAM(S): at 11:27

## 2022-08-09 RX ADMIN — CEFTRIAXONE 100 MILLIGRAM(S): 500 INJECTION, POWDER, FOR SOLUTION INTRAMUSCULAR; INTRAVENOUS at 18:34

## 2022-08-09 RX ADMIN — PANTOPRAZOLE SODIUM 40 MILLIGRAM(S): 20 TABLET, DELAYED RELEASE ORAL at 07:53

## 2022-08-09 RX ADMIN — Medication 400 MILLIGRAM(S): at 19:32

## 2022-08-09 RX ADMIN — Medication 1000 MILLIGRAM(S): at 19:59

## 2022-08-09 RX ADMIN — Medication 1 PACKET(S): at 16:38

## 2022-08-09 RX ADMIN — Medication 1 MILLIGRAM(S): at 10:45

## 2022-08-09 RX ADMIN — Medication 20 MILLIGRAM(S): at 07:32

## 2022-08-09 RX ADMIN — HYDROMORPHONE HYDROCHLORIDE 0.5 MILLIGRAM(S): 2 INJECTION INTRAMUSCULAR; INTRAVENOUS; SUBCUTANEOUS at 03:00

## 2022-08-09 RX ADMIN — Medication 100 MILLIGRAM(S): at 22:27

## 2022-08-09 RX ADMIN — HYDROMORPHONE HYDROCHLORIDE 0.5 MILLIGRAM(S): 2 INJECTION INTRAMUSCULAR; INTRAVENOUS; SUBCUTANEOUS at 02:45

## 2022-08-09 NOTE — CONSULT NOTE ADULT - SUBJECTIVE AND OBJECTIVE BOX
CRITICAL CARE CONSULT NOTE    HPI:  73 y/o male with complicated past medical history of colon mass s/p partial colectomy (2017), appendectomy, hernia repair, aortic and iliac aneurysm surgery (2017) s/p stent placement, HTN, HLD, paroxysmal atrial fibrillation on Eliquis, blood clotting disorder s/p IVC filter placement, right knee ligament repair, pacemaker implant (2004) s/p recent ppm interrogation on Eliquis, herniated disc and related surgery in 6582-0504 complicated by spinal fusion, local hematoma, foot drop with chronic pain s/p failed intrathecal morphine pump which caused bradycardia and left hip prosthesis. He presents as a transfer from [facility] where he initially presented for "crushing" chest pain, and "stabbing" epigastric pain. No associated nausea/vomiting, fever, or jaundice. Afebrile, VSS on presentation to [place] ED.  Cardiac workup was negative at that hospital. CT revealed distended gall bladder with gall stones, jeannette-cholecystic fluid, dilated CBD to 2cm and CBD stone.         Home meds:  Oxycontin 20mg TID  Oxycodone 15mg BID as needed  Zocor 10mg daily  Pamelor 75mg daily  Carvedilol 12.5mg BID  Hydralazine 50mg BID - 2nd dose cut in half (25mg)  Cardura 4mg daily  Proscar 5mg daily  Trintellix 20mg daily  Valium 2mg prn  Omeprazole 20mg daily  Culturelle probiotic daily  Folic acid 1mg daily  Miralax once daily  Metoprolol 50mg BID  Vitamin D 50,000u once a week   (07 Aug 2022 17:45)      REVIEW OF SYSTEMS:  Constitutional: No fever, weight loss or fatigue  Eyes: No eye pain, visual disturbances, or discharge  ENMT:  No difficulty hearing, tinnitus, vertigo; No sinus or throat pain  Neck: No pain, stiffness or neck swelling  Respiratory: see HPI  Cardiovascular: No chest pain, palpitations, dizziness or leg swelling  Gastrointestinal: No abdominal or epigastric pain. No nausea, vomiting or hematemesis; No diarrhea or constipation. No melena or hematochezia.  Genitourinary: No dysuria, frequency, hematuria or incontinence  Neurological: No headaches, memory loss, loss of strength, numbness or tremors  Skin: No itching, burning, rashes or lesions   Lymph Nodes: No enlarged glands  Endocrine: No heat or cold intolerance; No hair loss  Musculoskeletal: No joint pain or swelling; No muscle, back or extremity pain  Psychiatric: No depression, anxiety, mood swings or difficulty sleeping  Heme/Lymph: No easy bruising or bleeding gums  Allergy and Immunologic: No hives or eczema    PAST MEDICAL & SURGICAL HISTORY:  AAA (abdominal aortic aneurysm)      HTN (hypertension)      Cardiomyopathy      Hyperlipidemia      ONOFRE (dyspnea on exertion)      DVT (deep venous thrombosis)      Pulmonary emphysema  COPD      Cardiac arrhythmia      Aneurysm of aortic root      Depression  anxiety      Anemia      Pelvic mass      Pacemaker  medtronic      History of kidney surgery      History of back surgery  multiple, lumbar      Surgery, elective  multiple neck surgery, cervical      S/P hip replacement, left      S/P arthroscopy of right knee      S/P AAA (abdominal aortic aneurysm) repair  with right iliac aneurysm endovascular repair      S/P carpal tunnel release      Surgery, elective  Cardiac Stent x4      Surgery, elective  Intrathecal pump placement          FAMILY HISTORY:      SOCIAL HISTORY:  Smoking Status: [ ] Current, [ ] Former, [ ] Never  Pack Years:    MEDICATIONS:  Pulmonary:    Antimicrobials:  cefTRIAXone   IVPB      cefTRIAXone   IVPB 2000 milliGRAM(s) IV Intermittent every 24 hours  metroNIDAZOLE  IVPB      metroNIDAZOLE  IVPB 500 milliGRAM(s) IV Intermittent every 8 hours    Anticoagulants:    Onc:    GI/:  pantoprazole    Tablet 40 milliGRAM(s) Oral before breakfast  polyethylene glycol 3350 17 Gram(s) Oral daily PRN    Endocrine:  finasteride 5 milliGRAM(s) Oral daily  simvastatin 10 milliGRAM(s) Oral at bedtime    Cardiac:  doxazosin 4 milliGRAM(s) Oral at bedtime  hydrALAZINE 50 milliGRAM(s) Oral <User Schedule>  hydrALAZINE 25 milliGRAM(s) Oral <User Schedule>  metoprolol succinate ER 50 milliGRAM(s) Oral two times a day  niCARdipine Infusion 10 mG/Hr IV Continuous <Continuous>    Other Medications:  chlorhexidine 2% Cloths 1 Application(s) Topical <User Schedule>  dexMEDEtomidine Infusion 0.1 MICROgram(s)/kG/Hr IV Continuous <Continuous>  diazepam    Tablet 2 milliGRAM(s) Oral at bedtime  folic acid 1 milliGRAM(s) Oral daily  lidocaine   4% Patch 1 Patch Transdermal once PRN  LORazepam     Tablet 1 milliGRAM(s) Oral at bedtime  nortriptyline 75 milliGRAM(s) Oral daily  oxyCODONE    IR 15 milliGRAM(s) Oral every 8 hours PRN  oxyCODONE  ER Tablet 20 milliGRAM(s) Oral every 8 hours  potassium phosphate IVPB 30 milliMole(s) IV Intermittent once      Allergies    Altace (Unknown)  penicillin (Unknown)    Intolerances        Vital Signs Last 24 Hrs  T(C): 37.2 (09 Aug 2022 10:12), Max: 37.2 (09 Aug 2022 10:12)  T(F): 98.9 (09 Aug 2022 10:12), Max: 98.9 (09 Aug 2022 10:12)  HR: 114 (09 Aug 2022 10:00) (82 - 120)  BP: 190/120 (09 Aug 2022 11:34) (169/79 - 224/114)  BP(mean): 122 (09 Aug 2022 09:50) (122 - 144)  RR: 40 (09 Aug 2022 10:00) (14 - 45)  SpO2: 96% (09 Aug 2022 10:00) (90% - 98%)    Parameters below as of 09 Aug 2022 10:00  Patient On (Oxygen Delivery Method): mask, Venturi        08-08 @ 07:01  -  08-09 @ 07:00  --------------------------------------------------------  IN: 3036 mL / OUT: 1720 mL / NET: 1316 mL    08-09 @ 07:01  -  08-09 @ 13:05  --------------------------------------------------------  IN: 0 mL / OUT: 3150 mL / NET: -3150 mL          PHYSICAL EXAM:  Constitutional: WDWN  Head: NC/AT  EENT: PERRL, anicteric sclera; oropharynx clear, MMM  Neck: supple, no appreciable JVD  Respiratory: CTA B/L; no W/R/R  Cardiovascular: +S1/S2, RRR  Gastrointestinal: soft, NT/ND  Extremities: WWP; no edema, clubbing or cyanosis  Vascular: 2+ radial pulses B/L  Neurological: awake and alert; PIMENTEL    LABS:  ABG - ( 09 Aug 2022 09:31 )  pH, Arterial: 7.47  pH, Blood: x     /  pCO2: 31    /  pO2: 182   / HCO3: 23    / Base Excess: -0.2  /  SaO2: 100.0               CBC Full  -  ( 09 Aug 2022 09:33 )  WBC Count : 13.33 K/uL  RBC Count : 3.49 M/uL  Hemoglobin : 10.0 g/dL  Hematocrit : 30.3 %  Platelet Count - Automated : 163 K/uL  Mean Cell Volume : 86.8 fl  Mean Cell Hemoglobin : 28.7 pg  Mean Cell Hemoglobin Concentration : 33.0 gm/dL  Auto Neutrophil # : x  Auto Lymphocyte # : x  Auto Monocyte # : x  Auto Eosinophil # : x  Auto Basophil # : x  Auto Neutrophil % : x  Auto Lymphocyte % : x  Auto Monocyte % : x  Auto Eosinophil % : x  Auto Basophil % : x    08-09    132<L>  |  97  |  26<H>  ----------------------------<  136<H>  3.5   |  22  |  1.27    Ca    8.8      09 Aug 2022 09:33  Phos  2.0     08-09  Mg     1.8     08-09    TPro  7.0  /  Alb  3.5  /  TBili  0.7  /  DBili  x   /  AST  26  /  ALT  14  /  AlkPhos  77  08-09    PT/INR - ( 09 Aug 2022 09:33 )   PT: 25.9 sec;   INR: 2.16          PTT - ( 09 Aug 2022 09:33 )  PTT:61.4 sec                  RADIOLOGY & ADDITIONAL STUDIES: CRITICAL CARE CONSULT NOTE    HPI:  71 y/o male with complicated past medical history of colon mass s/p partial colectomy (2017), appendectomy, hernia repair, aortic and iliac aneurysm surgery (2017) s/p stent placement, HTN, HLD, paroxysmal atrial fibrillation on Eliquis, blood clotting disorder s/p IVC filter placement, right knee ligament repair, pacemaker implant (2004) s/p recent ppm interrogation on Eliquis, herniated disc and related surgery in 4311-3904 complicated by spinal fusion, local hematoma, foot drop with chronic pain s/p failed intrathecal morphine pump which caused bradycardia and left hip prosthesis. He presents as a transfer from [facility] where he initially presented for "crushing" chest pain, and "stabbing" epigastric pain. No associated nausea/vomiting, fever, or jaundice. Afebrile, VSS on presentation to [place] ED.  Cardiac workup was negative at that hospital. CT revealed distended gall bladder with gall stones, jeannette-cholecystic fluid, dilated CBD to 2cm and CBD stone.         Home meds:  Oxycontin 20mg TID  Oxycodone 15mg BID as needed  Zocor 10mg daily  Pamelor 75mg daily  Carvedilol 12.5mg BID  Hydralazine 50mg BID - 2nd dose cut in half (25mg)  Cardura 4mg daily  Proscar 5mg daily  Trintellix 20mg daily  Valium 2mg prn  Omeprazole 20mg daily  Culturelle probiotic daily  Folic acid 1mg daily  Miralax once daily  Metoprolol 50mg BID  Vitamin D 50,000u once a week   (07 Aug 2022 17:45)      REVIEW OF SYSTEMS:  Constitutional: No fever, weight loss or fatigue  Eyes: No eye pain, visual disturbances, or discharge  ENMT:  No difficulty hearing, tinnitus, vertigo; No sinus or throat pain  Neck: No pain, stiffness or neck swelling  Respiratory: see HPI  Cardiovascular: No chest pain, palpitations, dizziness or leg swelling  Gastrointestinal: No abdominal or epigastric pain. No nausea, vomiting or hematemesis; No diarrhea or constipation. No melena or hematochezia.  Genitourinary: No dysuria, frequency, hematuria or incontinence  Neurological: No headaches, memory loss, loss of strength, numbness or tremors  Skin: No itching, burning, rashes or lesions   Lymph Nodes: No enlarged glands  Endocrine: No heat or cold intolerance; No hair loss  Musculoskeletal: No joint pain or swelling; No muscle, back or extremity pain  Psychiatric: No depression, anxiety, mood swings or difficulty sleeping  Heme/Lymph: No easy bruising or bleeding gums  Allergy and Immunologic: No hives or eczema    PAST MEDICAL & SURGICAL HISTORY:  AAA (abdominal aortic aneurysm)      HTN (hypertension)      Cardiomyopathy      Hyperlipidemia      ONOFRE (dyspnea on exertion)      DVT (deep venous thrombosis)      Pulmonary emphysema  COPD      Cardiac arrhythmia      Aneurysm of aortic root      Depression  anxiety      Anemia      Pelvic mass      Pacemaker  medtronic      History of kidney surgery      History of back surgery  multiple, lumbar      Surgery, elective  multiple neck surgery, cervical      S/P hip replacement, left      S/P arthroscopy of right knee      S/P AAA (abdominal aortic aneurysm) repair  with right iliac aneurysm endovascular repair      S/P carpal tunnel release      Surgery, elective  Cardiac Stent x4      Surgery, elective  Intrathecal pump placement          FAMILY HISTORY:      SOCIAL HISTORY:  Smoking Status: [ ] Current, [ ] Former, [ ] Never  Pack Years:    MEDICATIONS:  Pulmonary:    Antimicrobials:  cefTRIAXone   IVPB      cefTRIAXone   IVPB 2000 milliGRAM(s) IV Intermittent every 24 hours  metroNIDAZOLE  IVPB      metroNIDAZOLE  IVPB 500 milliGRAM(s) IV Intermittent every 8 hours    Anticoagulants:    Onc:    GI/:  pantoprazole    Tablet 40 milliGRAM(s) Oral before breakfast  polyethylene glycol 3350 17 Gram(s) Oral daily PRN    Endocrine:  finasteride 5 milliGRAM(s) Oral daily  simvastatin 10 milliGRAM(s) Oral at bedtime    Cardiac:  doxazosin 4 milliGRAM(s) Oral at bedtime  hydrALAZINE 50 milliGRAM(s) Oral <User Schedule>  hydrALAZINE 25 milliGRAM(s) Oral <User Schedule>  metoprolol succinate ER 50 milliGRAM(s) Oral two times a day  niCARdipine Infusion 10 mG/Hr IV Continuous <Continuous>    Other Medications:  chlorhexidine 2% Cloths 1 Application(s) Topical <User Schedule>  dexMEDEtomidine Infusion 0.1 MICROgram(s)/kG/Hr IV Continuous <Continuous>  diazepam    Tablet 2 milliGRAM(s) Oral at bedtime  folic acid 1 milliGRAM(s) Oral daily  lidocaine   4% Patch 1 Patch Transdermal once PRN  LORazepam     Tablet 1 milliGRAM(s) Oral at bedtime  nortriptyline 75 milliGRAM(s) Oral daily  oxyCODONE    IR 15 milliGRAM(s) Oral every 8 hours PRN  oxyCODONE  ER Tablet 20 milliGRAM(s) Oral every 8 hours  potassium phosphate IVPB 30 milliMole(s) IV Intermittent once      Allergies    Altace (Unknown)  penicillin (Unknown)    Intolerances        Vital Signs Last 24 Hrs  T(C): 37.2 (09 Aug 2022 10:12), Max: 37.2 (09 Aug 2022 10:12)  T(F): 98.9 (09 Aug 2022 10:12), Max: 98.9 (09 Aug 2022 10:12)  HR: 114 (09 Aug 2022 10:00) (82 - 120)  BP: 190/120 (09 Aug 2022 11:34) (169/79 - 224/114)  BP(mean): 122 (09 Aug 2022 09:50) (122 - 144)  RR: 40 (09 Aug 2022 10:00) (14 - 45)  SpO2: 96% (09 Aug 2022 10:00) (90% - 98%)    Parameters below as of 09 Aug 2022 10:00  Patient On (Oxygen Delivery Method): mask, Venturi        08-08 @ 07:01  -  08-09 @ 07:00  --------------------------------------------------------  IN: 3036 mL / OUT: 1720 mL / NET: 1316 mL    08-09 @ 07:01  -  08-09 @ 13:05  --------------------------------------------------------  IN: 0 mL / OUT: 3150 mL / NET: -3150 mL          PHYSICAL EXAM:  Constitutional: agitated on b/l mitten restraints, on BiPAP  Head: NC/AT  EENT: PERRL, anicteric sclera; oropharynx clear, MMM  Neck: supple, no appreciable JVD  Respiratory: +crackles right lung base, otherwise clear to auscultation remaining lung fields and w/o wheezing or rhonchi.  Cardiovascular: +S1/S2, RRR  Gastrointestinal: soft, NT/ND  Extremities: WWP; no edema, clubbing or cyanosis  Vascular: 2+ radial pulses B/L  Neurological: awake and alert    LABS:  ABG - ( 09 Aug 2022 09:31 )  pH, Arterial: 7.47  pH, Blood: x     /  pCO2: 31    /  pO2: 182   / HCO3: 23    / Base Excess: -0.2  /  SaO2: 100.0               CBC Full  -  ( 09 Aug 2022 09:33 )  WBC Count : 13.33 K/uL  RBC Count : 3.49 M/uL  Hemoglobin : 10.0 g/dL  Hematocrit : 30.3 %  Platelet Count - Automated : 163 K/uL  Mean Cell Volume : 86.8 fl  Mean Cell Hemoglobin : 28.7 pg  Mean Cell Hemoglobin Concentration : 33.0 gm/dL  Auto Neutrophil # : x  Auto Lymphocyte # : x  Auto Monocyte # : x  Auto Eosinophil # : x  Auto Basophil # : x  Auto Neutrophil % : x  Auto Lymphocyte % : x  Auto Monocyte % : x  Auto Eosinophil % : x  Auto Basophil % : x    08-09    132<L>  |  97  |  26<H>  ----------------------------<  136<H>  3.5   |  22  |  1.27    Ca    8.8      09 Aug 2022 09:33  Phos  2.0     08-09  Mg     1.8     08-09    TPro  7.0  /  Alb  3.5  /  TBili  0.7  /  DBili  x   /  AST  26  /  ALT  14  /  AlkPhos  77  08-09    PT/INR - ( 09 Aug 2022 09:33 )   PT: 25.9 sec;   INR: 2.16          PTT - ( 09 Aug 2022 09:33 )  PTT:61.4 sec                  RADIOLOGY & ADDITIONAL STUDIES:

## 2022-08-09 NOTE — CONSULT NOTE ADULT - ASSESSMENT
Assessment: 2 y/o M with past medical history significant for HTN, HLD, pAFib (Eliquis), ?hypercoagulable state s/p IVC filter, PPM (2004), colon adenocarcinoma s/p partial colectomy (2017), appendectomy, aorta/iliac aneurysm repair (2017), herniated disc s/p spinal fusion (1986) with foot drop and chronic pain who presented to [facility] on 8/7/22 for further management of cholecystitis vs choledocholithiasis. RRT was called on 8/9/22 for hypertension, tachypnea with total picture concerning for flash pulmonary edema likely secondary to TACO vs TRALI after being given 2 packs of FFP at 4AM and 5AM. Plan to continue with respiratory support and diuresis.     Plan of care while under SICU:   - POCUS revealing small R pleural effusion with underlying consolidation, B-lines in L anterior lung fields, heart with mildly decreased contractility  - Patient agitated, anxious and disorientated- given 1mg IV Ativan, followed by 5mg IM Zyprexa with minimal improvement  - Repeat POCUS with pulm fellows revealing moderately reduced EF (LV ballooning concerning for takasubo), bilateral consolidations  - Started on Precedex gtt for agitation   - UOP decreased, given an additional 40mg IV lasix  - MICU team consulted for further assessment and transfer to their team

## 2022-08-09 NOTE — PROCEDURE NOTE - NSUS ED ADDITIONAL DETAIL1 FT
Normal appearing kidneys with no signs of ascities Normal appearing kidneys with no signs of ascites

## 2022-08-09 NOTE — CONSULT NOTE ADULT - ASSESSMENT
73yo man w/ hx multiple abdominal surgeries, hx aortic and iliac aneurysm surgery (2017) s/p stent placement, HTN, HLD, paroxysmal atrial fibrillation on Eliquis, blood clotting disorder s/p IVC filter placement, hx pacemaker implant (2004) s/p recent ppm interrogation, admitted from outside hospital for management of suspected acute cholecystitis vs. choledocholithiasis, MICU team consulted due to concern for increased work of breathing in setting of hypertensive urgency and interval worsening of LV function noted on bedside POCUS c/f possible Takusubo cardiomyopathy.     Recommendations:  #Increased Work of Breathing  Pt noted for increased WOB in past 24hrs following completion of plasma transfusion to achieve pre-op INR goal. 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 DDx includes TRALI vs. TACO vs. flash pulmonary edema 2/2 HTN urgency vs. takusubo cardiomyopathy   - recommend cardene gtt 10mg/kg to acheive BP control, goal SBP b/t 140-160 within 24hrs  - s/p IV lasix 80mg, will hold off on further diuresis for now in favor of optimizing afterload reduction first  - formal repeat TTE  - trend troponins, CKMB Q6hrs to peak to r/o acute MI    #HTN urgency  - c/w Precedex gtt  - recommend cardene gtt 10mg/kg to acheive BP control, goal SBP b/t 140-160 within 24hrs    #R/O Sepsis  - f/u BCx sent 8/9  - obtain sputum cx if patient able to produce  - c/w CTX 2g Q24hrs  - c/w IV flagyl Q8hrs    Recommend transfer to MICU for further optimization of BP management and respiratory monitoring prior to undergoing possible cholecystectomy. Discussed with critical care attending Dr. Luisa Thorpe. All recommendations are finalized after attending attestation.

## 2022-08-09 NOTE — CONSULT NOTE ADULT - ATTENDING COMMENTS
Initial attending contact date  8/9/22    . See fellow note written above for details. I reviewed the fellow documentation. I have personally seen and examined this patient. I reviewed vitals, labs, medications, cardiac studies, and additional imaging. I agree with the above fellow's findings and plans as written above with the following additions/statements.      72M w/ complicated PMH including depression/anxiety, atrial fibrillation on Eliquis, PPM (2004), HTN, HLD, R hemicoloectomy for colon mass (2017), AAA s/p EVAR (2016) for R iliac artery aneurysm, possible coagulopathy s/p IVC filter placement, chronic back pain w/ remote spinal fusion and intrathecal pump, who was transferred from [facility] 8/7/22 w/ acute cholecystitis  -Cardiology initially consulted for pre-op risk assessment, deemed to be int risk for int risk procedure given prior ECHO with normal LVEF  -EKG NSR with 1st degree, RBBB  -In the interim, with hypertensive emergency leading to pulm edema/acute HF with BNP in 6000s, CXR consistent with bl effusions, PVC  -On exam agitated on NRB  -Bedside ECHO appears with depressed EF ~ 30% with anteroapical ballooning/hypokinesis with inferobasal hyperkinesis consistent with likely takotsubo either due to HTN emergency vs TRALI  -Plan for intubation with aggressive IV diuresis - given total lasix 60 mg IV x 1  -Would start lasix 40 mg IV bid,  agree with nicardipine drip for BP control in setting of HF   -Official ECHO  -Resume AC (IV heparin) for known parox A fib Initial attending contact date  8/9/22    . See fellow note written above for details. I reviewed the fellow documentation. I have personally seen and examined this patient. I reviewed vitals, labs, medications, cardiac studies, and additional imaging. I agree with the above fellow's findings and plans as written above with the following additions/statements.      72M w/ complicated PMH including depression/anxiety, atrial fibrillation on Eliquis, PPM (2004), HTN, HLD, R hemicoloectomy for colon mass (2017), AAA s/p EVAR (2016) for R iliac artery aneurysm, possible coagulopathy s/p IVC filter placement, chronic back pain w/ remote spinal fusion and intrathecal pump, who was transferred from [facility] 8/7/22 w/ acute cholecystitis  -Cardiology initially consulted for pre-op risk assessment, deemed to be int risk for int risk procedure given prior ECHO with normal LVEF  -EKG NSR with 1st degree, RBBB  -In the interim, with hypertensive emergency leading to pulm edema/acute HF with BNP in 6000s, CXR consistent with bl effusions, PVC  -On exam agitated on NRB  -Bedside ECHO appears with depressed EF ~ 30% with anteroapical ballooning/hypokinesis with inferobasal hyperkinesis consistent with likely takotsubo either due to HTN emergency vs TRALI  -EKG remains unchanged with RBBB without new ischemic changes   -Plan for intubation with aggressive IV diuresis - given total lasix 60 mg IV x 1  -Would start lasix 40 mg IV bid,  agree with nicardipine drip for BP control in setting of HF   -Official ECHO  -Resume AC (IV heparin) for known parox A fib Initial attending contact date  8/9/22    . See fellow note written above for details. I reviewed the fellow documentation. I have personally seen and examined this patient. I reviewed vitals, labs, medications, cardiac studies, and additional imaging. I agree with the above fellow's findings and plans as written above with the following additions/statements.      72M w/ complicated PMH including depression/anxiety, atrial fibrillation on Eliquis, PPM (2004), HTN, HLD, R hemicoloectomy for colon mass (2017), AAA s/p EVAR (2016) for R iliac artery aneurysm, possible coagulopathy s/p IVC filter placement, chronic back pain w/ remote spinal fusion and intrathecal pump, who was transferred from [facility] 8/7/22 w/ acute cholecystitis  -Cardiology initially consulted for pre-op risk assessment, deemed to be int risk for int risk procedure given prior ECHO with normal LVEF  -EKG NSR with 1st degree, RBBB  -In the interim, with hypertensive emergency leading to pulm edema/acute HF with BNP in 6000s, CXR consistent with bl effusions, PVC  -On exam agitated on NRB  -Bedside ECHO appears with depressed EF ~ 30% with anteroapical ballooning/hypokinesis with inferobasal hyperkinesis consistent with likely takotsubo either due to HTN emergency vs TRALI  -EKG remains unchanged with RBBB without new ischemic changes   -Plan for intubation with aggressive IV diuresis - given total lasix 60 mg IV x 1  -Would start lasix 40 mg IV bid,  agree with nicardipine drip for BP control in setting of HF   -Official ECHO  -Resume AC (IV heparin) for known parox A fib    Addendum:  cpk/mb/trop: 239/9/ .15    Mild elevation in cardio biomarkers including troponin expected in setting of hypertensive emergency/acute HF/stress induced CM  Unlikely type 1 MI/ACS/ acute coronary plaque rupture given nonischemic EKG. However can only definitely r/o underlying CAD with CCTA vs cath which can be pursued if EF does not improve or new ischemic changes on ekg or significant elevation in troponin

## 2022-08-09 NOTE — PROCEDURE NOTE - NSUSCPTCODES_ED_ALL
30787 Duplex Scan of Extremity Veins (Unilateral)
57729 US Chest (PTX, Pleural Effussion/CHF vs COPD)
04480 Abdominal Ultrasound (GB/FAST)
36699 Echocardiography Transthoracic with Image 2D (Echo/FAST)

## 2022-08-09 NOTE — PROGRESS NOTE ADULT - ASSESSMENT
71 y/o male with complicated past medical history of colon mass s/p partial colectomy (2017), appendectomy, hernia repair, aortic and iliac aneurysm surgery (2017) s/p stent placement, HTN, HLD, paroxysmal atrial fibrillation on Eliquis, blood clotting disorder s/p IVC filter placement, right knee ligament repair, pacemaker implant (2004) s/p recent ppm interrogation on Eliquis, herniated disc and related surgery in 9332-8418 complicated by spinal fusion, local hematoma, foot drop with chronic pain s/p failed intrathecal morphine pump which caused bradycardia and left hip prosthesis. He presents as a transfer from [facility] where he initially presented for "crushing" chest pain, and "stabbing" epigastric pain. No associated nausea/vomiting, fever, or jaundice. Afebrile, VSS on presentation to [facility].  Cardiac workup was negative at that hospital. CT revealed distended gall bladder with gall stones, jeannette-cholecystic fluid, dilated CBD to 2cm and CBD stone. On 8/9 AM, a rapid response was called for tachypnea and hypertension to the 220s with concern for flash pulmonary edema; MICU was consulted for better management of hypertensive urgency, pt was transferred to the MICU.     Neuro:    Pulm:    Cardio:    GI:    Endo:    ID:    :      Heme/Onc:      F: None  E: Replete PRN  N: NPO  GI ppx: Pantoprazole PO 40 Qd  DVT ppx: None  Code: Full  Dispo: MICU       73 y/o male with complicated past medical history of colon mass s/p partial colectomy (2017), appendectomy, hernia repair, aortic and iliac aneurysm surgery (2017) s/p stent placement, HTN, HLD, paroxysmal atrial fibrillation on Eliquis, blood clotting disorder s/p IVC filter placement, right knee ligament repair, pacemaker implant (2004) s/p recent ppm interrogation on Eliquis, herniated disc and related surgery in 5471-1061 complicated by spinal fusion, local hematoma, foot drop with chronic pain s/p failed intrathecal morphine pump which caused bradycardia and left hip prosthesis. He presents as a transfer from [facility] where he initially presented for "crushing" chest pain, and "stabbing" epigastric pain. No associated nausea/vomiting, fever, or jaundice. Afebrile, VSS on presentation to [place] [facility].  Cardiac workup was negative at that hospital. CT revealed distended gall bladder with gall stones, jeannette-cholecystic fluid, dilated CBD to 2cm and CBD stone. On 8/9 AM, a rapid response was called for tachypnea and hypertension to the 220s with concern for flash pulmonary edema; MICU was consulted for better management of hypertensive urgency, pt was transferred to the MICU.     Neuro:  #Metabolic encephalopathy    Pulm:  #    Cardio:  #Hypertensive emergency  8/9 AM rapid response was called for hypertensive emergency as patient desatted to 90% requiring 4L NC, with /121 and . On exam patient awake and alert, AAOx3, mentating well and protecting airway. Complaining of abdominal pain. EKG done without ischemic changes. Bedside POCUS significant for small pleural effusions, B-lines consistent w/ pulmonary edema, and overly distended bladder. Barber catheter was placed with 1L immediate output. Nicardipine drip started to control BP, BPs dropped to 90s/60s, Nicardipine drip dc'ed    #Troponemia      GI:  # Acute cholecystitis   RUQ US results:  - C/w Ceftriaxone 2g and Metronidazole 500 Q8  - Coags in morningas per surg    Endo:    ID:    :      Heme/Onc:      F: None  E: Replete PRN  N: NPO  GI ppx: Pantoprazole PO 40 Qd  DVT ppx: None  Code: Full  Dispo: MICU       73 y/o male with complicated past medical history of colon mass s/p partial colectomy (2017), appendectomy, hernia repair, aortic and iliac aneurysm surgery (2017) s/p stent placement, HTN, HLD, paroxysmal atrial fibrillation on Eliquis, blood clotting disorder s/p IVC filter placement, right knee ligament repair, pacemaker implant (2004) s/p recent ppm interrogation on Eliquis, herniated disc and related surgery in [id number] complicated by spinal fusion, local hematoma, foot drop with chronic pain s/p failed intrathecal morphine pump which caused bradycardia and left hip prosthesis. He presents as a transfer from [facility] where he initially presented for "crushing" chest pain, and "stabbing" epigastric pain. No associated nausea/vomiting, fever, or jaundice. Afebrile, VSS on presentation to [place] ED.  Cardiac workup was negative at that hospital. CT revealed distended gall bladder with gall stones, jeannette-cholecystic fluid, dilated CBD to 2cm and CBD stone. On 8/9 AM, a rapid response was called for tachypnea and hypertension to the 220s with concern for flash pulmonary edema; MICU was consulted for better management of hypertensive urgency, pt was transferred to the MICU.     Neuro:  #Metabolic encephalopathy    Pulm:  #Increased Work of Breathing  Pt noted for increased WOB in past 24hrs following completion of plasma transfusion to achieve pre-op INR goal. 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 DDx includes TRALI vs. TACO vs. flash pulmonary edema 2/2 HTN urgency vs. takusubo cardiomyopathy. Pt was placed on bipap for transfer to MICU but currently back on NC    - s/p IV lasix 80mg, will hold off on further diuresis for now in favor of optimizing afterload reduction first  - If increased work of breathing and decreased oxygen saturation, can go back on bipap  - f/u formal repeat TTE      Cardio:  #Hypertensive emergency  8/9 AM rapid response was called for hypertensive emergency as patient desatted to 90% requiring 4L NC, with /121 and . On exam patient awake and alert, AAOx3, mentating well and protecting airway.  EKG done without ischemic changes. Barber catheter was placed with 1L immediate output. Nicardipine drip started to control BP, BPs dropped to 90s/60s, Nicardipine drip dc'ed. Goal SBP b/t 140-160 within 24hrs  - Low dose Cardine if Systolic BP >180  - f/u TTE  - c/w Precedex gtt    #Paroxysmal afib  On eliquis at home, s/p IVC filter placement. Pacemaker was implanted in 2004, s/p recent PPM interrogation on Eliquis.  - Currently on heparin gtt    #Troponemia, r/o NSTEMI  Noted on interval bedside POCUS for dilated LV w/ hyperdynamic base and apical hypokinesis c/f possible takusubo cardiomyopathy picture. Most recent troponin T elevated at 0.15, CKMB elevated at 9.0.   - ASA and plavix loaded  - Trend troponin to peak  - f/u EKG  - f/u TTE    #Hx aortic and iliac aneurysm surgery (2017) s/p stent placement  - No intervention currently necessary     GI:  # Acute cholecystitis   CT revealed distended gall bladder with gall stones, jeannette-cholecystic fluid, dilated CBD to 2cm and CBD stone  RUQ US results: Gallstone impacted at the gallbladder neck with associated gallbladder wall thickening and positive sonographic Parnell's sign, compatible with acute cholecystitis.    - C/w Ceftriaxone 2g and Metronidazole 500 Q8  - Coags in morning as per surg  - f/u surg recs    Endo:  No active issues    ID:  #R/O Sepsis   Meets SIRS criteria with HR>90, RR>20, WBC >12K, and source of infection (Cholecystitis)  - f/u BCx sent 8/9  - obtain sputum cx if patient able to produce  - c/w CTX 2g Q24hrs  - c/w IV flagyl Q8hrs    :  No active issues     Heme/Onc:  #Hx colon mass s/p partial colectomy (2017)  - No intervention currently necessary     F: None  E: Replete PRN  N: NPO  GI ppx: None  DVT ppx: Heparin gtt  Code: Full  Dispo: MICU       73 y/o male with complicated past medical history of colon mass s/p partial colectomy (2017), appendectomy, hernia repair, aortic and iliac aneurysm surgery (2017) s/p stent placement, HTN, HLD, paroxysmal atrial fibrillation on Eliquis, blood clotting disorder s/p IVC filter placement, right knee ligament repair, pacemaker implant (2004) s/p recent ppm interrogation on Eliquis, herniated disc and related surgery in [id number] complicated by spinal fusion, local hematoma, foot drop with chronic pain s/p failed intrathecal morphine pump which caused bradycardia and left hip prosthesis. He presents as a transfer from [facility] where he initially presented for "crushing" chest pain, and "stabbing" epigastric pain. No associated nausea/vomiting, fever, or jaundice. Afebrile, VSS on presentation to [place] ED.  Cardiac workup was negative at that hospital. CT revealed distended gall bladder with gall stones, jeannette-cholecystic fluid, dilated CBD to 2cm and CBD stone. On 8/9 AM, a rapid response was called for tachypnea and hypertension to the 220s with concern for flash pulmonary edema; MICU was consulted for better management of hypertensive urgency, pt was transferred to the MICU.     Neuro:  #Metabolic encephalopathy  Likely i/s/o sepsis and hypertensive emergency  - Manage underlying causes as below    Pulm:  #Increased Work of Breathing  Pt noted for increased WOB in past 24hrs following completion of plasma transfusion to achieve pre-op INR goal. 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 DDx includes TRALI vs. TACO vs. flash pulmonary edema 2/2 HTN urgency vs. takusubo cardiomyopathy. Pt was placed on bipap for transfer to MICU but currently back on NC    - s/p IV lasix 80mg, will hold off on further diuresis for now in favor of optimizing afterload reduction first  - If increased work of breathing and decreased oxygen saturation, can go back on bipap  - f/u formal repeat TTE      Cardio:  #Hypertensive emergency  8/9 AM rapid response was called for hypertensive emergency as patient desatted to 90% requiring 4L NC, with /121 and . On exam patient awake and alert, AAOx3, mentating well and protecting airway.  EKG done without ischemic changes. Barber catheter was placed with 1L immediate output. Nicardipine drip started to control BP, BPs dropped to 90s/60s, Nicardipine drip dc'ed. Goal SBP b/t 140-160 within 24hrs  - Low dose Cardine if Systolic BP >180  - f/u TTE  - c/w Precedex gtt    #Paroxysmal afib  On eliquis at home, s/p IVC filter placement. Pacemaker was implanted in 2004, s/p recent PPM interrogation on Eliquis.  - Currently on heparin gtt    #Troponemia, r/o NSTEMI  Noted on interval bedside POCUS for dilated LV w/ hyperdynamic base and apical hypokinesis c/f possible takusubo cardiomyopathy picture. Most recent troponin T elevated at 0.15, CKMB elevated at 9.0.   - ASA and plavix loaded  - Trend troponin to peak  - f/u EKG  - f/u TTE    #Hx aortic and iliac aneurysm surgery (2017) s/p stent placement  - No intervention currently necessary     GI:  # Acute cholecystitis   CT revealed distended gall bladder with gall stones, jeannette-cholecystic fluid, dilated CBD to 2cm and CBD stone  RUQ US results: Gallstone impacted at the gallbladder neck with associated gallbladder wall thickening and positive sonographic Parnell's sign, compatible with acute cholecystitis.    - C/w Ceftriaxone 2g and Metronidazole 500 Q8  - Coags in morning as per surg  - f/u surg recs    Endo:  No active issues    ID:  #R/O Sepsis   Meets SIRS criteria with HR>90, RR>20, WBC >12K, and source of infection (Cholecystitis)  - f/u BCx sent 8/9  - obtain sputum cx if patient able to produce  - c/w CTX 2g Q24hrs  - c/w IV flagyl Q8hrs    :  No active issues, use PO instead of IV electrolyte repletion if possible (in the interest of maintaining optimal fluid balance)    Heme/Onc:  #Hx colon mass s/p partial colectomy (2017)  - No intervention currently necessary     F: None  E: Replete PRN  N: NPO  GI ppx: None  DVT ppx: Heparin gtt  Code: Full  Dispo: MICU

## 2022-08-09 NOTE — PROGRESS NOTE ADULT - ASSESSMENT
71 y/o male with complicated medical history presents with 1 day history of symptomatic acute cholecystitis vs. choledocholithiasis. Patient's INR remains elevated this AM despite receiving 3U FFP in last 24 hours.     OR pending normalization of INR  NPO/IVF  Pain/nausea control  CTX/Flagyl  OOBA/IS/SCDs  Home meds as appropriate  AM labs

## 2022-08-09 NOTE — CONSULT NOTE ADULT - SUBJECTIVE AND OBJECTIVE BOX
HPI:  Surinder Guerrero is a 71 y/o M with past medical history significant for HTN, HLD, pAFib (Eliquis), ?hypercoagulable state s/p IVC filter, PPM (2004), colon adenocarcinoma s/p partial colectomy (2017), appendectomy, aorta/iliac aneurysm repair (2017), herniated disc s/p spinal fusion (1986) with foot drop and chronic pain ***    PAST MEDICAL & SURGICAL HISTORY:  AAA (abdominal aortic aneurysm)  HTN (hypertension)  Cardiomyopathy  Hyperlipidemia  ONOFRE (dyspnea on exertion)  DVT (deep venous thrombosis)  Pulmonary emphysema  COPD  Cardiac arrhythmia  Aneurysm of aortic root  Depression-anxiety  Anemia  Pelvic mass  Pacemaker-medtronic  History of kidney surgery  History of back surgery-multiple, lumbar  Surgery, elective-multiple neck surgery, cervical  S/P hip replacement, left  S/P arthroscopy of right knee  S/P AAA (abdominal aortic aneurysm) repair-with right iliac aneurysm endovascular repair  S/P carpal tunnel release  Surgery, elective-Cardiac Stent x4  Surgery, elective-Intrathecal pump placement      Allergies  Altace (Unknown)  penicillin (Unknown)      Home Medications:  ALPRAZolam 1 mg oral tablet: 1 tab(s) orally once a day (at bedtime), As needed, insomnia (07 Aug 2022 21:10)  Cardura 4 mg oral tablet: 1 tab(s) orally once a day (07 Aug 2022 21:10)  Eliquis 5 mg oral tablet: 1 tab(s) orally once a day (07 Aug 2022 21:10)  folic acid 1 mg oral tablet: 1 tab(s) orally once a day (07 Aug 2022 21:10)  hydrALAZINE 10 mg oral tablet: 50 milligram(s) orally 1 time a day + 25 milligrams orally 1 time a day (07 Aug 2022 21:10)  omeprazole 20 mg oral delayed release capsule: 1 cap(s) orally once a day (07 Aug 2022 21:10)  OxyCONTIN 20 mg oral tablet, extended release: 20 milligram(s) orally every 8 hours (07 Aug 2022 21:10)  Pamelor 75 mg oral capsule: 1 cap(s) orally once a day (at bedtime) (07 Aug 2022 21:10)  Proscar 5 mg oral tablet: 1 tab(s) orally once a day (07 Aug 2022 21:10)  simvastatin 5 mg oral tablet: 1 tab(s) orally once a day (at bedtime) (07 Aug 2022 21:10)  Toprol-XL 50 mg oral tablet, extended release: 1 tab(s) orally 2 times a day (07 Aug 2022 21:10)  Trintellix 20 mg oral tablet: 1 tab(s) orally once a day (07 Aug 2022 21:10)  Valium 2 mg oral tablet: 1 tab(s) orally once a day (at bedtime), As Needed - for insomnia (07 Aug 2022 21:10)    MEDICATIONS  (STANDING):  cefTRIAXone   IVPB      cefTRIAXone   IVPB 2000 milliGRAM(s) IV Intermittent every 24 hours  chlorhexidine 2% Cloths 1 Application(s) Topical <User Schedule>  dexMEDEtomidine Infusion 0.1 MICROgram(s)/kG/Hr (2.28 mL/Hr) IV Continuous <Continuous>  doxazosin 4 milliGRAM(s) Oral at bedtime  finasteride 5 milliGRAM(s) Oral daily  folic acid 1 milliGRAM(s) Oral daily  heparin  Infusion 1000 Unit(s)/Hr (10 mL/Hr) IV Continuous <Continuous>  hydrALAZINE 50 milliGRAM(s) Oral <User Schedule>  hydrALAZINE 25 milliGRAM(s) Oral <User Schedule>  metoprolol succinate ER 50 milliGRAM(s) Oral two times a day  metroNIDAZOLE  IVPB      metroNIDAZOLE  IVPB 500 milliGRAM(s) IV Intermittent every 8 hours  niCARdipine Infusion 10 mG/Hr (50 mL/Hr) IV Continuous <Continuous>  pantoprazole    Tablet 40 milliGRAM(s) Oral before breakfast  simvastatin 10 milliGRAM(s) Oral at bedtime    MEDICATIONS  (PRN):  lidocaine   4% Patch 1 Patch Transdermal once PRN abd pain  polyethylene glycol 3350 17 Gram(s) Oral daily PRN Constipation      Physical Exam:   General: Well appearing, resting comfortably in bed in no acute distress  Neuro: Grossly intact bilaterally   HEENT: Normocephalic, atraumatic, no JVD, trachea midline   Chest:   Heart: Regular S1/S2, no murmus rubs or gallops    Lungs: Unlabored breathing on ***; Clear to auscultation bilaterally, no adventitious sounds   Abdomen: Soft, non-tender, normoactive bowel sounds, no tenderness to palpation in all 4 quadrants   Upper Extremities: No edema, freely mobile bilaterally   Lower Extremities: No edema, SCDs in place, 2+ DP pulse bilaterally   Skin: Warm, non-diaphoretic throughout       Labs:                         10.0   13.33 )-----------( 163      ( 09 Aug 2022 09:33 )             30.3     08-09    132<L>  |  97  |  26<H>  ----------------------------<  136<H>  3.5   |  22  |  1.27    Ca    8.8      09 Aug 2022 09:33  Phos  2.0     08-09  Mg     1.8     08-09    TPro  7.0  /  Alb  3.5  /  TBili  0.7  /  DBili  x   /  AST  26  /  ALT  14  /  AlkPhos  77  08-09    CAPILLARY BLOOD GLUCOSE  POCT Blood Glucose.: 128 mg/dL (09 Aug 2022 09:03)    CARDIAC MARKERS ( 09 Aug 2022 14:05 )  x     / 0.15 ng/mL / 239 U/L / x     / 9.0 ng/mL  CARDIAC MARKERS ( 09 Aug 2022 09:33 )  x     / 0.01 ng/mL / x     / x     / x      CARDIAC MARKERS ( 09 Aug 2022 05:30 )  x     / 0.01 ng/mL / x     / x     / x      CARDIAC MARKERS ( 08 Aug 2022 20:23 )  x     / 0.01 ng/mL / 89 U/L / x     / 3.0 ng/mL      PT/INR - ( 09 Aug 2022 09:33 )   PT: 25.9 sec;   INR: 2.16     PTT - ( 09 Aug 2022 09:33 )  PTT:61.4 sec    COVID-19 PCR: NotDetec (07 Aug 2022 15:25)      Vital Signs:   Vital Signs Last 24 Hrs  T(C): 37.2 (09 Aug 2022 10:12), Max: 37.2 (09 Aug 2022 10:12)  T(F): 98.9 (09 Aug 2022 10:12), Max: 98.9 (09 Aug 2022 10:12)  HR: 102 (09 Aug 2022 15:21) (77 - 120)  BP: 116/88 (09 Aug 2022 15:00) (97/63 - 224/114)  BP(mean): 97 (09 Aug 2022 15:00) (76 - 149)  RR: 39 (09 Aug 2022 15:21) (14 - 45)  SpO2: 93% (09 Aug 2022 15:21) (90% - 99%)    Parameters below as of 09 Aug 2022 15:21  Patient On (Oxygen Delivery Method): nasal cannula  O2 Flow (L/min): 6      Input/Output:   I&O's Detail    08 Aug 2022 07:01  -  09 Aug 2022 07:00  --------------------------------------------------------  IN:    Heparin: 70 mL    IV PiggyBack: 200 mL    Lactated Ringers: 1575 mL    Oral Fluid: 480 mL    Plasma: 611 mL    sodium chloride 0.9%: 100 mL  Total IN: 3036 mL    OUT:    Voided (mL): 1720 mL  Total OUT: 1720 mL    Total NET: 1316 mL      09 Aug 2022 07:01  -  09 Aug 2022 15:40  --------------------------------------------------------  IN:    Dexmedetomidine: 25 mL    NiCARdipine: 50 mL  Total IN: 75 mL    OUT:    Indwelling Catheter - Urethral (mL): 3750 mL    Voided (mL): 200 mL  Total OUT: 3950 mL    Total NET: -3875 mL        Daily     Daily      HPI:  Surinder Guerrero is a 71 y/o M with past medical history significant for HTN, HLD, pAFib (Eliquis), ?hypercoagulable state s/p IVC filter, PPM (2004), colon adenocarcinoma s/p partial colectomy (2017), appendectomy, aorta/iliac aneurysm repair (2017), herniated disc s/p spinal fusion (1986) with foot drop and chronic pain. He had presented to [facility] on 8/7/22 as a transfer from [facility] where he presented for stabbing epigastric pain. CT scan was significant for dilated CBD to 2cm, CBD stone and jeannette-cholecystic fluid. Upon arrival to [facility], he was started on heparin drip due to history of AFib on Eliquis at home, however patient had an inappropriate response to heparin with PTT supratherapeutic to 130 then >200, heparin drip was held and he was given FFP to correct his coagulopathy. Overnight patient was noted to be shaking, along with HTN with SBP 200s refractory to Labetalol IV pushes and anxious. Rapid response was called at around 0830 8/9/22, due to hypertension, tachypnea and oxygen desaturation. STAT labs were ordered revealing mildly respiratory alkalosis only. CXR completed showed pulmonary edema, he was ordered for BiPAP, given 20mg IV lasix and transferred to SICU for further management.       PAST MEDICAL & SURGICAL HISTORY:  AAA (abdominal aortic aneurysm)  HTN (hypertension)  Cardiomyopathy  Hyperlipidemia  ONOFRE (dyspnea on exertion)  DVT (deep venous thrombosis)  Pulmonary emphysema  COPD  Cardiac arrhythmia  Aneurysm of aortic root  Depression-anxiety  Anemia  Pelvic mass  Pacemaker-medtronic  History of kidney surgery  History of back surgery-multiple, lumbar  Surgery, elective-multiple neck surgery, cervical  S/P hip replacement, left  S/P arthroscopy of right knee  S/P AAA (abdominal aortic aneurysm) repair-with right iliac aneurysm endovascular repair  S/P carpal tunnel release  Surgery, elective-Cardiac Stent x4  Surgery, elective-Intrathecal pump placement      Allergies  Altace (Unknown)  penicillin (Unknown)      Home Medications:  ALPRAZolam 1 mg oral tablet: 1 tab(s) orally once a day (at bedtime), As needed, insomnia (07 Aug 2022 21:10)  Cardura 4 mg oral tablet: 1 tab(s) orally once a day (07 Aug 2022 21:10)  Eliquis 5 mg oral tablet: 1 tab(s) orally once a day (07 Aug 2022 21:10)  folic acid 1 mg oral tablet: 1 tab(s) orally once a day (07 Aug 2022 21:10)  hydrALAZINE 10 mg oral tablet: 50 milligram(s) orally 1 time a day + 25 milligrams orally 1 time a day (07 Aug 2022 21:10)  omeprazole 20 mg oral delayed release capsule: 1 cap(s) orally once a day (07 Aug 2022 21:10)  OxyCONTIN 20 mg oral tablet, extended release: 20 milligram(s) orally every 8 hours (07 Aug 2022 21:10)  Pamelor 75 mg oral capsule: 1 cap(s) orally once a day (at bedtime) (07 Aug 2022 21:10)  Proscar 5 mg oral tablet: 1 tab(s) orally once a day (07 Aug 2022 21:10)  simvastatin 5 mg oral tablet: 1 tab(s) orally once a day (at bedtime) (07 Aug 2022 21:10)  Toprol-XL 50 mg oral tablet, extended release: 1 tab(s) orally 2 times a day (07 Aug 2022 21:10)  Trintellix 20 mg oral tablet: 1 tab(s) orally once a day (07 Aug 2022 21:10)  Valium 2 mg oral tablet: 1 tab(s) orally once a day (at bedtime), As Needed - for insomnia (07 Aug 2022 21:10)    MEDICATIONS  (STANDING):  cefTRIAXone   IVPB      cefTRIAXone   IVPB 2000 milliGRAM(s) IV Intermittent every 24 hours  chlorhexidine 2% Cloths 1 Application(s) Topical <User Schedule>  dexMEDEtomidine Infusion 0.1 MICROgram(s)/kG/Hr (2.28 mL/Hr) IV Continuous <Continuous>  doxazosin 4 milliGRAM(s) Oral at bedtime  finasteride 5 milliGRAM(s) Oral daily  folic acid 1 milliGRAM(s) Oral daily  heparin  Infusion 1000 Unit(s)/Hr (10 mL/Hr) IV Continuous <Continuous>  hydrALAZINE 50 milliGRAM(s) Oral <User Schedule>  hydrALAZINE 25 milliGRAM(s) Oral <User Schedule>  metoprolol succinate ER 50 milliGRAM(s) Oral two times a day  metroNIDAZOLE  IVPB      metroNIDAZOLE  IVPB 500 milliGRAM(s) IV Intermittent every 8 hours  niCARdipine Infusion 10 mG/Hr (50 mL/Hr) IV Continuous <Continuous>  pantoprazole    Tablet 40 milliGRAM(s) Oral before breakfast  simvastatin 10 milliGRAM(s) Oral at bedtime    MEDICATIONS  (PRN):  lidocaine   4% Patch 1 Patch Transdermal once PRN abd pain  polyethylene glycol 3350 17 Gram(s) Oral daily PRN Constipation      Physical Exam:   General: Anxious ,restless elderly male    Neuro: Grossly intact bilaterally, Alert to name and year only   HEENT: Normocephalic, atraumatic, no JVD, trachea midline   Chest: Equal rise and fall of chest   Heart: Regular S1/S2, no murmurs rubs or gallops    Lungs: Tachypneic on face mask, Clear to auscultation bilaterally, no adventitious sounds   Abdomen: Soft, non-tender, normoactive bowel sounds, no tenderness to palpation in all 4 quadrants   Upper Extremities: No edema, freely mobile bilaterally   Lower Extremities: No edema, SCDs in place, 2+ DP pulse bilaterally   Skin: Warm, non-diaphoretic throughout       Labs:                         10.0   13.33 )-----------( 163      ( 09 Aug 2022 09:33 )             30.3     08-09    132<L>  |  97  |  26<H>  ----------------------------<  136<H>  3.5   |  22  |  1.27    Ca    8.8      09 Aug 2022 09:33  Phos  2.0     08-09  Mg     1.8     08-09    TPro  7.0  /  Alb  3.5  /  TBili  0.7  /  DBili  x   /  AST  26  /  ALT  14  /  AlkPhos  77  08-09    CAPILLARY BLOOD GLUCOSE  POCT Blood Glucose.: 128 mg/dL (09 Aug 2022 09:03)    CARDIAC MARKERS ( 09 Aug 2022 14:05 )  x     / 0.15 ng/mL / 239 U/L / x     / 9.0 ng/mL  CARDIAC MARKERS ( 09 Aug 2022 09:33 )  x     / 0.01 ng/mL / x     / x     / x      CARDIAC MARKERS ( 09 Aug 2022 05:30 )  x     / 0.01 ng/mL / x     / x     / x      CARDIAC MARKERS ( 08 Aug 2022 20:23 )  x     / 0.01 ng/mL / 89 U/L / x     / 3.0 ng/mL      PT/INR - ( 09 Aug 2022 09:33 )   PT: 25.9 sec;   INR: 2.16     PTT - ( 09 Aug 2022 09:33 )  PTT:61.4 sec    COVID-19 PCR: NotDetec (07 Aug 2022 15:25)      Vital Signs:   Vital Signs Last 24 Hrs  T(C): 37.2 (09 Aug 2022 10:12), Max: 37.2 (09 Aug 2022 10:12)  T(F): 98.9 (09 Aug 2022 10:12), Max: 98.9 (09 Aug 2022 10:12)  HR: 102 (09 Aug 2022 15:21) (77 - 120)  BP: 116/88 (09 Aug 2022 15:00) (97/63 - 224/114)  BP(mean): 97 (09 Aug 2022 15:00) (76 - 149)  RR: 39 (09 Aug 2022 15:21) (14 - 45)  SpO2: 93% (09 Aug 2022 15:21) (90% - 99%)    Parameters below as of 09 Aug 2022 15:21  Patient On (Oxygen Delivery Method): nasal cannula  O2 Flow (L/min): 6      Input/Output:   I&O's Detail    08 Aug 2022 07:01  -  09 Aug 2022 07:00  --------------------------------------------------------  IN:    Heparin: 70 mL    IV PiggyBack: 200 mL    Lactated Ringers: 1575 mL    Oral Fluid: 480 mL    Plasma: 611 mL    sodium chloride 0.9%: 100 mL  Total IN: 3036 mL    OUT:    Voided (mL): 1720 mL  Total OUT: 1720 mL    Total NET: 1316 mL      09 Aug 2022 07:01  -  09 Aug 2022 15:40  --------------------------------------------------------  IN:    Dexmedetomidine: 25 mL    NiCARdipine: 50 mL  Total IN: 75 mL    OUT:    Indwelling Catheter - Urethral (mL): 3750 mL    Voided (mL): 200 mL  Total OUT: 3950 mL    Total NET: -3875 mL        Daily     Daily

## 2022-08-09 NOTE — CONSULT NOTE ADULT - SUBJECTIVE AND OBJECTIVE BOX
HPI:  73 y/o male with complicated past medical history of colon mass s/p partial colectomy (2017), appendectomy, hernia repair, aortic and iliac aneurysm surgery (2017) s/p stent placement, HTN, HLD, paroxysmal atrial fibrillation on Eliquis, blood clotting disorder s/p IVC filter placement, right knee ligament repair, pacemaker implant (2004) s/p recent ppm interrogation on Eliquis, herniated disc and related surgery in [id number] complicated by spinal fusion, local hematoma, foot drop with chronic pain s/p failed intrathecal morphine pump which caused bradycardia and left hip prosthesis. He presents as a transfer from [facility] where he initially presented for "crushing" chest pain, and "stabbing" epigastric pain. No associated nausea/vomiting, fever, or jaundice. Afebrile, VSS on presentation to [place] ED.  Cardiac workup was negative at that hospital. CT revealed distended gall bladder with gall stones, jeannette-cholecystic fluid, dilated CBD to 2cm and CBD stone.         Home meds:  Oxycontin 20mg TID  Oxycodone 15mg BID as needed  Zocor 10mg daily  Pamelor 75mg daily  Carvedilol 12.5mg BID  Hydralazine 50mg BID - 2nd dose cut in half (25mg)  Cardura 4mg daily  Proscar 5mg daily  Trintellix 20mg daily  Valium 2mg prn  Omeprazole 20mg daily  Culturelle probiotic daily  Folic acid 1mg daily  Miralax once daily  Metoprolol 50mg BID  Vitamin D 50,000u once a week   (07 Aug 2022 17:45)      ROS: A 10-point review of systems was otherwise negative.    PAST MEDICAL & SURGICAL HISTORY:  AAA (abdominal aortic aneurysm)      HTN (hypertension)      Cardiomyopathy      Hyperlipidemia      ONOFRE (dyspnea on exertion)      DVT (deep venous thrombosis)      Pulmonary emphysema  COPD      Cardiac arrhythmia      Aneurysm of aortic root      Depression  anxiety      Anemia      Pelvic mass      Pacemaker  medtronic      History of kidney surgery      History of back surgery  multiple, lumbar      Surgery, elective  multiple neck surgery, cervical      S/P hip replacement, left      S/P arthroscopy of right knee      S/P AAA (abdominal aortic aneurysm) repair  with right iliac aneurysm endovascular repair      S/P carpal tunnel release      Surgery, elective  Cardiac Stent x4      Surgery, elective  Intrathecal pump placement        SOCIAL HISTORY:  FAMILY HISTORY:    ALLERGIES: 	  Altace (Unknown)  penicillin (Unknown)          MEDICATIONS:  cefTRIAXone   IVPB      cefTRIAXone   IVPB 2000 milliGRAM(s) IV Intermittent every 24 hours  diazepam    Tablet 2 milliGRAM(s) Oral daily PRN  doxazosin 4 milliGRAM(s) Oral at bedtime  finasteride 5 milliGRAM(s) Oral daily  folic acid 1 milliGRAM(s) Oral daily  lactated ringers. 1000 milliLiter(s) IV Continuous <Continuous>  metoprolol succinate ER 50 milliGRAM(s) Oral two times a day  metroNIDAZOLE  IVPB      metroNIDAZOLE  IVPB 500 milliGRAM(s) IV Intermittent every 8 hours  nortriptyline 75 milliGRAM(s) Oral daily  oxyCODONE    IR 15 milliGRAM(s) Oral every 8 hours PRN  oxyCODONE  ER Tablet 20 milliGRAM(s) Oral every 8 hours  pantoprazole    Tablet 40 milliGRAM(s) Oral before breakfast  polyethylene glycol 3350 17 Gram(s) Oral daily PRN  simvastatin 10 milliGRAM(s) Oral at bedtime      PHYSICAL EXAM:  T(C): 36.8 (08-08-22 @ 21:46), Max: 36.9 (08-08-22 @ 06:01)  HR: 82 (08-08-22 @ 21:11) (66 - 82)  BP: 169/79 (08-08-22 @ 21:11) (122/65 - 179/88)  RR: 14 (08-08-22 @ 21:11) (14 - 18)  SpO2: 97% (08-08-22 @ 21:11) (94% - 98%)  Wt(kg): --    GEN: Awake, comfortable. NAD.   HEENT: NCAT  RESP: CTA b/l  CV: RRR, normal s1/s2. No m/r/g.  ABD: very tender to palpation in epigastric region and RUQ  EXT: Warm. No edema.  NEURO: AAOx3. No focal deficits.    I&O's Summary    07 Aug 2022 07:01  -  08 Aug 2022 07:00  --------------------------------------------------------  IN: 830 mL / OUT: 700 mL / NET: 130 mL    08 Aug 2022 07:01  -  09 Aug 2022 00:32  --------------------------------------------------------  IN: 1950 mL / OUT: 1170 mL / NET: 780 mL        LABS:	 	                        9.5    12.33 )-----------( 151      ( 08 Aug 2022 06:11 )             29.2     08-08    130<L>  |  95<L>  |  28<H>  ----------------------------<  95  4.2   |  23  |  1.49<H>    Ca    8.5      08 Aug 2022 20:23  Phos  3.2     08-08  Mg     2.1     08-08    TPro  6.4  /  Alb  3.1<L>  /  TBili  0.6  /  DBili  x   /  AST  22  /  ALT  12  /  AlkPhos  68  08-08    CARDIAC MARKERS ( 08 Aug 2022 20:23 )  x     / 0.01 ng/mL / 89 U/L / x     / 3.0 ng/mL    echo:      1. Normal left ventricular size and systolic function.   2. The right ventricle is normal in size. Right ventricular systolic   function is borderline reduced.   3. No significant valvular disease.   4. Pulmonary hypertension present, pulmonary artery systolic pressure is   48 mmHg.   5. No pericardial effusion.   6. The aortic root is mildly dilated. The aortic root measures 3.90 cm   at level of the sinuses of Valsalva (normal 3.1-3.7 cm for men, 2.7-3.3   cm for women).

## 2022-08-09 NOTE — GOALS OF CARE CONVERSATION - ADVANCED CARE PLANNING - CONVERSATION DETAILS
Discussed advanced directives and past wishes with wife Radha. She describes that he does not have a specific advanced directive or healthcare proxy, but she feels comfortable acting as his surrogate decision-maker. They have had several friends discuss code status and adopt DNR/DNI status, and they have discussed this. As of a few months ago, their discussion was that they would NOT want to be DNR/DNI, they would want treatment to resolve acute conditions even if that treatment were invasive such as chest compressions or a breathing tube.  Discussed in a little more detail what some of these interventions would look like and what Surinder would choose for himself. Radha thinks that he would want to go through a code and intubation if we thought it were reversible. However, if we knew he had a grave prognosis or after a prolonged period of suffering, she would want to then transition treatment to comfort. We discussed how in the moment it is difficult to discern these differences, so our default behavior will be to provide all care and update her as soon as possible. She is amenable to this plan and to future discussions.    Patient remains full code.

## 2022-08-09 NOTE — CONSULT NOTE ADULT - ASSESSMENT
73 y/o male with history of colon mass s/p partial colectomy (2017), appendectomy, hernia repair, aortic and iliac aneurysm surgery (2017) s/p stent placement, HTN, HLD, chronic atrial fibrillation on Eliquis, Right LE DVT >5 years ago s/p IVC filter placement, right knee ligament repair, herniated disc and related surgery in [id number] complicated by spinal fusion, local hematoma, foot drop with chronic pain s/p failed intrathecal morphine pump which caused bradycardia (s/p pacemaker implant [2004]) and left hip prosthesis. He presents as a transfer from [facility] where he initially presented epigastric pain. Cardiac workup was negative at that hospital. CT imaging concerning for cholecystitis vs choledocholithiasis. Cardiology was consulted for pre-op risk assessment an possible chest pain.    # Atypical chest pain   Patient reports that he has moderate-to-severe abdominal pain that is worse with deep breath, palpation or position change. He reports that the pain form the abdomen feels as if it is extending to the chest and to his left and right shoulders. He denies SOB. Before he presented with choledocholithiasis, patient reports good ET, denies ONOFRE.   Patient reports that he had a stress test about 1 1/2 years ago and it was normal. Patient follows with Dr. Wallace as an outpatient.   EKG: NSR @83 bpm, 1st degree AV block, unchanged form admission  ECHO: normal LV,  RV function borderline reduced, PASP 48 mmMg, mildly dilated aortic root  Trop negative  c/w trop trend   Please assure adequate pain control  c/w tele monitoring       # Pre-op risk assessment   METs>4  Patient reports that he had a stress test about 1 1/2 years ago and it was normal.  No history of CAD per patient  RCRI: 71 y/o male with history of colon mass s/p partial colectomy (2017), appendectomy, hernia repair, aortic and iliac aneurysm surgery (2017) s/p stent placement, HTN, HLD, chronic atrial fibrillation on Eliquis, Right LE DVT >5 years ago s/p IVC filter placement, right knee ligament repair, herniated disc and related surgery in [id number] complicated by spinal fusion, local hematoma, foot drop with chronic pain s/p failed intrathecal morphine pump which caused bradycardia (s/p pacemaker implant [2004]) and left hip prosthesis. He presents as a transfer from [facility] where he initially presented epigastric pain. Cardiac workup was negative at that hospital. CT imaging concerning for cholecystitis vs choledocholithiasis. Cardiology was consulted for pre-op risk assessment an possible chest pain.    # Atypical chest pain   Patient reports that he has moderate-to-severe abdominal pain that is worse with deep breath, palpation or position change. He reports that the pain form the abdomen feels as if it is extending to the chest and to his left and right shoulders. He denies SOB. Before he presented with choledocholithiasis, patient reports good ET, denies ONOFRE.   Patient reports that he had a stress test about 1 1/2 years ago and it was normal. Patient follows with Dr. Wallace as an outpatient.   EKG: NSR @83 bpm, 1st degree AV block, unchanged form admission  ECHO: normal LV,  RV function borderline reduced, PASP 48 mmMg, mildly dilated aortic root  Trop negative  c/w trop trend   Please assure adequate pain control  c/w tele monitoring       # Pre-op risk assessment   METs>4  Patient reports that he had a stress test about 1 1/2 years ago and it was normal.  No history of CAD per patient  RCRI: 1, class II risk (elevated procedure risk), 6.0% of 30 day risk of death, MI or cardiac arrest   If no ekg changes with next trop and trop is negative, patient is intermediate risk for an intermediate risk procedure ( urgent cholecystectomy)      #afib  -resume apixaban post procedure  - c/w home BB     73 y/o male with history of colon mass s/p partial colectomy (2017), appendectomy, hernia repair, aortic and iliac aneurysm surgery (2017) s/p stent placement, HTN, HLD, chronic atrial fibrillation on Eliquis, Right LE DVT >5 years ago s/p IVC filter placement, right knee ligament repair, herniated disc and related surgery in [id number] complicated by spinal fusion, local hematoma, foot drop with chronic pain s/p failed intrathecal morphine pump which caused bradycardia (s/p pacemaker implant [2004]) and left hip prosthesis. He presents as a transfer from [facility] where he initially presented epigastric pain. Cardiac workup was negative at that hospital. CT imaging concerning for cholecystitis vs choledocholithiasis. Cardiology was consulted for pre-op risk assessment an possible chest pain.    # Atypical chest pain   Patient reports that he has moderate-to-severe abdominal pain that is worse with deep breath, palpation or position change. He reports that the pain form the abdomen feels as if it is extending to the chest and to his left and right shoulders. He denies SOB. Before he presented with choledocholithiasis, patient reports good ET, denies ONOFRE.   Patient reports that he had a stress test about 1 1/2 years ago and it was normal per patient. Patient follows with Dr. Gonzales and Dr. Lemos () as an outpatient.   EKG: NSR @83 bpm, 1st degree AV block, RBBB, unchanged form admission  ECHO: normal LV,  RV function borderline reduced, PASP 48 mmMg, mildly dilated aortic root  Trop negative  c/w trop trend   Please assure adequate pain control  c/w tele monitoring     # Pre-op risk assessment   METs>4  Patient reports that he had a stress test about 1 1/2 years ago and it was normal.  No history of CAD per patient  RCRI: 1, class II risk (elevated procedure risk), 6.0% of 30 day risk of death, MI or cardiac arrest   If no ekg changes with next trop and trop is negative, patient is intermediate risk for an intermediate risk procedure ( urgent cholecystectomy)      #afib  -resume apixaban post procedure  - c/w home BB     71 y/o male with history of colon mass s/p partial colectomy (2017), appendectomy, hernia repair, aortic and iliac aneurysm surgery (2017) s/p stent placement, HTN, HLD, chronic atrial fibrillation on Eliquis, Right LE DVT >5 years ago s/p IVC filter placement, right knee ligament repair, herniated disc and related surgery in [id number] complicated by spinal fusion, local hematoma, foot drop with chronic pain s/p failed intrathecal morphine pump which caused bradycardia (s/p pacemaker implant [2004]) and left hip prosthesis. He presents as a transfer from [facility] where he initially presented epigastric pain. Cardiac workup was negative at that hospital. CT imaging concerning for cholecystitis vs choledocholithiasis. Cardiology was consulted for pre-op risk assessment an possible chest pain.    # Atypical chest pain   Patient reports that he has moderate-to-severe abdominal pain that is worse with deep breath, palpation or position change. He reports that the pain form the abdomen feels as if it is extending to the chest and to his left and right shoulders. He denies SOB. Before he presented with choledocholithiasis, patient reports good ET, denies ONOFRE.   Patient reports that he had a stress test about 1 1/2 years ago and it was normal per patient. Patient follows with Dr. Gonzales and Dr. Lemos () as an outpatient.   EKG: NSR @83 bpm, 1st degree AV block, RBBB, unchanged form admission  ECHO: normal LV,  RV function borderline reduced, PASP 48 mmMg, mildly dilated aortic root  Trop negative  c/w trop trend   Please assure adequate pain control  c/w tele monitoring     # Pre-op risk assessment   METs>4  Patient reports that he had a stress test about 1 1/2 years ago and it was normal.  No history of CAD per patient  RCRI: 1, class II risk (elevated procedure risk), 6.0% of 30 day risk of death, MI or cardiac arrest   If no ekg changes with next trop and trop is negative, patient is intermediate risk for an intermediate risk procedure ( urgent cholecystectomy)      #afib  -resume apixaban post procedure  - c/w home BB      ADDENDUM  Overnight patient was noted to be hypertensive to the 200s and required IV labetalol. Rapid response called for acute hypoxic respiratory failure with POCUS concerning for newly reduced EF. Previous TTE 8/8/22 Normal LV EF 60-65%, mild dilation of the aortic root. Patient placed on BiPAP and transferred to the SICU (5EA). Concern for flash pulmonary edema in the setting of suspected transfusion related acute lung injury (s/p 2 U FFP).  - Recommend STAT echocardiogram to re-assess EF   - Management as per SICU/Critical care team for hypertension.      Case discussed with Cardiology consult attending.   71 y/o male with history of colon mass s/p partial colectomy (2017), appendectomy, hernia repair, aortic and iliac aneurysm surgery (2017) s/p stent placement, HTN, HLD, chronic atrial fibrillation on Eliquis, Right LE DVT >5 years ago s/p IVC filter placement, right knee ligament repair, herniated disc and related surgery in [id number] complicated by spinal fusion, local hematoma, foot drop with chronic pain s/p failed intrathecal morphine pump which caused bradycardia (s/p pacemaker implant [2004]) and left hip prosthesis. He presents as a transfer from [facility] where he initially presented epigastric pain. Cardiac workup was negative at that hospital. CT imaging concerning for cholecystitis vs choledocholithiasis. Cardiology was consulted for pre-op risk assessment an possible chest pain.    # Atypical chest pain   Patient reports that he has moderate-to-severe abdominal pain that is worse with deep breath, palpation or position change. He reports that the pain form the abdomen feels as if it is extending to the chest and to his left and right shoulders. He denies SOB. Before he presented with choledocholithiasis, patient reports good ET, denies ONOFRE.   Patient reports that he had a stress test about 1 1/2 years ago and it was normal per patient. Patient follows with Dr. Gonzales and Dr. Lemos () as an outpatient.   EKG: NSR @83 bpm, 1st degree AV block, RBBB, unchanged form admission  ECHO: normal LV,  RV function borderline reduced, PASP 48 mmMg, mildly dilated aortic root  Trop negative  c/w trop trend   Please assure adequate pain control  c/w tele monitoring     # Pre-op risk assessment   METs>4  Patient reports that he had a stress test about 1 1/2 years ago and it was normal.  No history of CAD per patient  RCRI: 1, class II risk (elevated procedure risk), 6.0% of 30 day risk of death, MI or cardiac arrest   If no ekg changes with next trop and trop is negative, patient is intermediate risk for an intermediate risk procedure ( urgent cholecystectomy)      #afib  -resume apixaban post procedure  - c/w home BB      ADDENDUM  Overnight patient was noted to be hypertensive to the 200s and required IV labetalol and Lasix 20mg IVP. Rapid response called for acute hypoxic respiratory failure with POCUS concerning for newly reduced EF. Previous TTE 8/8/22 Normal LV EF 60-65%, mild dilation of the aortic root. Patient placed on BiPAP and transferred to the SICU (5EA). Concern for flash pulmonary edema in the setting of suspected transfusion related acute lung injury (s/p 2 U FFP).  - Recommend STAT echocardiogram to re-assess EF   - Management as per SICU/Critical care team for hypertension.      Case discussed with Cardiology consult attending.

## 2022-08-09 NOTE — CONSULT NOTE ADULT - CRITICAL CARE ATTENDING COMMENT
Transferred from outside hospital for cholecystectomy complicated by acute decompensation with new cardiomyopathy, hypertension and encephalopathy. Doubt TACO as only a small volume of blood products was transfused; does have apical ballooning on bedside POCUS which can also lead to cardiomyopathy in an acute infectious state. Troponins are rising; will treat as NSTEMI with hep gtt but will hold anti platelets due to possible OR versus IR placement of drainage tube.  Abx to cover intraabdominal pathogens. Transfer to MICU.

## 2022-08-09 NOTE — CHART NOTE - NSCHARTNOTEFT_GEN_A_CORE
9am 8/9/22 rapid response called overhead, patient assessed at bedside. History: 72M w/ complicated PMH including depression/anxiety, atrial fibrillation on Eliquis, PPM (2004), HTN, HLD, R hemicoloectomy for colon mass (2017), AAA s/p EVAR (2016) for R iliac artery aneurysm, possible coagulopathy s/p IVC filter placement, chronic back pain w/ remote spinal fusion and intrathecal pump, who was transferred from [facility] 8/7/22 w/ acute cholecystitis, admitted to telemetry floor for INR correction and optimization for surgery. TTE this admission significant for pulm HTN otherwise unremarkable.   this morning found to be in hypertensive emergency with BP 9am 8/9/22 rapid response called overhead, patient assessed at bedside. History: 72M w/ complicated PMH including depression/anxiety, atrial fibrillation on Eliquis, PPM (2004), HTN, HLD, R hemicoloectomy for colon mass (2017), AAA s/p EVAR (2016) for R iliac artery aneurysm, possible coagulopathy s/p IVC filter placement, chronic back pain w/ remote spinal fusion and intrathecal pump, who was transferred from [facility] 8/7/22 w/ acute cholecystitis, admitted to telemetry floor for INR correction and optimization for surgery. TTE this admission significant for pulm HTN otherwise unremarkable.   This morning at approximately 9am rapid response was called for hypertensive emergency as patient desatted to 90% requiring 4L NC, with /121 and . On exam patient awake and alert, AAOx3, mentating well and protecting airway. Complaining of abdominal pain. EKG done without ischemic changes. Bedside POCUS significant for small pleural effusions, B-lines consistent w/ pulmonary edema, and overly distended bladder. Barber catheter was placed with 1L immediate output. Repeat labs drawn. Patient was given dose IV lasix and further BP control. Placed on bipap for tachypnea. Patient was subsequently transferred to SICU service for BP management and monitoring.

## 2022-08-09 NOTE — GOALS OF CARE CONVERSATION - ADVANCED CARE PLANNING - NSGCTIMESPENT_GEN_ALL_CORE
"----- Message from Sima Shin sent at 8/31/2017 12:42 PM CDT -----  Contact: Pt called  Pt called, states she was informed of the "Cyber hacking" of pacemakers and a recall. She will like to speak with someone today. Ph for pt is [phone]. Thank you  " 30

## 2022-08-09 NOTE — PROGRESS NOTE ADULT - SUBJECTIVE AND OBJECTIVE BOX
SUBJECTIVE:  Patient seen and examined at bedside.  ROS:    Vital Signs Last 12 Hrs  T(F): 98.9 (08-09-22 @ 10:12), Max: 98.9 (08-09-22 @ 10:12)  HR: 77 (08-09-22 @ 14:00) (77 - 120)  BP: 97/66 (08-09-22 @ 14:00) (97/63 - 219/111)  BP(mean): 76 (08-09-22 @ 14:00) (76 - 144)  RR: 40 (08-09-22 @ 10:00) (14 - 45)  SpO2: 98% (08-09-22 @ 14:00) (90% - 99%)  I&O's Summary    08 Aug 2022 07:01  -  09 Aug 2022 07:00  --------------------------------------------------------  IN: 3036 mL / OUT: 1720 mL / NET: 1316 mL    09 Aug 2022 07:01  -  09 Aug 2022 15:04  --------------------------------------------------------  IN: 50 mL / OUT: 3950 mL / NET: -3900 mL        PHYSICAL EXAM:  Constitutional: NAD, comfortable in bed.  HEENT: NC/AT, PERRLA, EOMI, no conjunctival pallor or scleral icterus, MMM  Neck: Supple, no JVD  Respiratory: CTA B/L. No w/r/r.   Cardiovascular: RRR, normal S1 and S2, no m/r/g.   Gastrointestinal: +BS, soft NTND, no guarding or rebound tenderness, no palpable masses   Extremities: wwp; no cyanosis, clubbing or edema.   Vascular: Pulses equal and strong throughout.   Neurological: AAOx3, no CN deficits, strength and sensation intact throughout.   Skin: No gross skin abnormalities or rashes        LABS:                        10.0   13.33 )-----------( 163      ( 09 Aug 2022 09:33 )             30.3     08-09    132<L>  |  97  |  26<H>  ----------------------------<  136<H>  3.5   |  22  |  1.27    Ca    8.8      09 Aug 2022 09:33  Phos  2.0     08-09  Mg     1.8     08-09    TPro  7.0  /  Alb  3.5  /  TBili  0.7  /  DBili  x   /  AST  26  /  ALT  14  /  AlkPhos  77  08-09    PT/INR - ( 09 Aug 2022 09:33 )   PT: 25.9 sec;   INR: 2.16          PTT - ( 09 Aug 2022 09:33 )  PTT:61.4 sec        RADIOLOGY & ADDITIONAL TESTS:    MEDICATIONS  (STANDING):  cefTRIAXone   IVPB      cefTRIAXone   IVPB 2000 milliGRAM(s) IV Intermittent every 24 hours  chlorhexidine 2% Cloths 1 Application(s) Topical <User Schedule>  dexMEDEtomidine Infusion 0.1 MICROgram(s)/kG/Hr (2.28 mL/Hr) IV Continuous <Continuous>  doxazosin 4 milliGRAM(s) Oral at bedtime  finasteride 5 milliGRAM(s) Oral daily  folic acid 1 milliGRAM(s) Oral daily  hydrALAZINE 50 milliGRAM(s) Oral <User Schedule>  hydrALAZINE 25 milliGRAM(s) Oral <User Schedule>  metoprolol succinate ER 50 milliGRAM(s) Oral two times a day  metroNIDAZOLE  IVPB      metroNIDAZOLE  IVPB 500 milliGRAM(s) IV Intermittent every 8 hours  niCARdipine Infusion 10 mG/Hr (50 mL/Hr) IV Continuous <Continuous>  pantoprazole    Tablet 40 milliGRAM(s) Oral before breakfast  potassium phosphate IVPB 30 milliMole(s) IV Intermittent once  simvastatin 10 milliGRAM(s) Oral at bedtime    MEDICATIONS  (PRN):  lidocaine   4% Patch 1 Patch Transdermal once PRN abd pain  polyethylene glycol 3350 17 Gram(s) Oral daily PRN Constipation     SUBJECTIVE:  Patient seen and examined at bedside.  Unable to obtain ROS as pt is confused and has altered mentation    Vital Signs Last 12 Hrs  T(F): 98.9 (08-09-22 @ 10:12), Max: 98.9 (08-09-22 @ 10:12)  HR: 77 (08-09-22 @ 14:00) (77 - 120)  BP: 97/66 (08-09-22 @ 14:00) (97/63 - 219/111)  BP(mean): 76 (08-09-22 @ 14:00) (76 - 144)  RR: 40 (08-09-22 @ 10:00) (14 - 45)  SpO2: 98% (08-09-22 @ 14:00) (90% - 99%)  I&O's Summary    08 Aug 2022 07:01  -  09 Aug 2022 07:00  --------------------------------------------------------  IN: 3036 mL / OUT: 1720 mL / NET: 1316 mL    09 Aug 2022 07:01  -  09 Aug 2022 15:04  --------------------------------------------------------  IN: 50 mL / OUT: 3950 mL / NET: -3900 mL        PHYSICAL EXAM:  Constitutional: NAD  HEENT: NC/AT  Neck: Supple, no JVD  Respiratory: CTA B/L. No w/r/r.   Cardiovascular: RRR, normal S1 and S2, no m/r/g.   Gastrointestinal: +BS, soft NTND, no guarding or rebound tenderness, no palpable masses   Extremities: wwp; no cyanosis, clubbing or edema.   Vascular: Pulses equal and strong throughout.   Neurological: AAOx0, responds to voice and stimulation  Skin: No gross skin abnormalities or rashes        LABS:                        10.0   13.33 )-----------( 163      ( 09 Aug 2022 09:33 )             30.3     08-09    132<L>  |  97  |  26<H>  ----------------------------<  136<H>  3.5   |  22  |  1.27    Ca    8.8      09 Aug 2022 09:33  Phos  2.0     08-09  Mg     1.8     08-09    TPro  7.0  /  Alb  3.5  /  TBili  0.7  /  DBili  x   /  AST  26  /  ALT  14  /  AlkPhos  77  08-09    PT/INR - ( 09 Aug 2022 09:33 )   PT: 25.9 sec;   INR: 2.16          PTT - ( 09 Aug 2022 09:33 )  PTT:61.4 sec        RADIOLOGY & ADDITIONAL TESTS:    MEDICATIONS  (STANDING):  cefTRIAXone   IVPB      cefTRIAXone   IVPB 2000 milliGRAM(s) IV Intermittent every 24 hours  chlorhexidine 2% Cloths 1 Application(s) Topical <User Schedule>  dexMEDEtomidine Infusion 0.1 MICROgram(s)/kG/Hr (2.28 mL/Hr) IV Continuous <Continuous>  doxazosin 4 milliGRAM(s) Oral at bedtime  finasteride 5 milliGRAM(s) Oral daily  folic acid 1 milliGRAM(s) Oral daily  hydrALAZINE 50 milliGRAM(s) Oral <User Schedule>  hydrALAZINE 25 milliGRAM(s) Oral <User Schedule>  metoprolol succinate ER 50 milliGRAM(s) Oral two times a day  metroNIDAZOLE  IVPB      metroNIDAZOLE  IVPB 500 milliGRAM(s) IV Intermittent every 8 hours  niCARdipine Infusion 10 mG/Hr (50 mL/Hr) IV Continuous <Continuous>  pantoprazole    Tablet 40 milliGRAM(s) Oral before breakfast  potassium phosphate IVPB 30 milliMole(s) IV Intermittent once  simvastatin 10 milliGRAM(s) Oral at bedtime    MEDICATIONS  (PRN):  lidocaine   4% Patch 1 Patch Transdermal once PRN abd pain  polyethylene glycol 3350 17 Gram(s) Oral daily PRN Constipation

## 2022-08-09 NOTE — PRE-OP CHECKLIST - 1.
Old spinal injuries from  work at [employer] [facility] [employer] in the 80's..[employer]

## 2022-08-09 NOTE — PROGRESS NOTE ADULT - SUBJECTIVE AND OBJECTIVE BOX
INTERVAL HPI/OVERNIGHT EVENTS: c/o chest pain - EKG, Labs, CXR wnl; Cards rec - pain managment, trend trops, cleared for OR; 6 PM Coags - INR 2.49, 12 AM Coags - INR 2.52; given 2u FFP; given .5 Dilaudid x 1 for breakthrough severe pain, SBPs >200 (was anxious/shaking) given labet 10mg x 1, SBPs 188, given labet 10mg x 1, now ; 5 AM, given lidocaine patch , given labet 10mg x 1    SUBJECTIVE: Patient seen and examined at bedside with chief. Patient reports severe anxiety regarding surgery but otherwise denies abdominal pain, n/v, cp, sob.      cefTRIAXone   IVPB      cefTRIAXone   IVPB 2000 milliGRAM(s) IV Intermittent every 24 hours  doxazosin 4 milliGRAM(s) Oral at bedtime  labetalol Injectable 20 milliGRAM(s) IV Push once  metoprolol succinate ER 50 milliGRAM(s) Oral two times a day  metroNIDAZOLE  IVPB      metroNIDAZOLE  IVPB 500 milliGRAM(s) IV Intermittent every 8 hours      Vital Signs Last 24 Hrs  T(C): 36.7 (09 Aug 2022 05:52), Max: 37 (09 Aug 2022 02:00)  T(F): 98 (09 Aug 2022 05:52), Max: 98.6 (09 Aug 2022 02:00)  HR: 95 (09 Aug 2022 05:00) (66 - 95)  BP: 197/92 (09 Aug 2022 05:00) (122/65 - 224/114)  BP(mean): 126 (08 Aug 2022 16:29) (87 - 126)  RR: 14 (09 Aug 2022 01:00) (14 - 18)  SpO2: 93% (09 Aug 2022 05:52) (93% - 97%)    Parameters below as of 08 Aug 2022 16:29  Patient On (Oxygen Delivery Method): nasal cannula  O2 Flow (L/min): 2    I&O's Detail    08 Aug 2022 07:01  -  09 Aug 2022 07:00  --------------------------------------------------------  IN:    Heparin: 70 mL    IV PiggyBack: 200 mL    Lactated Ringers: 1575 mL    Oral Fluid: 480 mL    Plasma: 611 mL    sodium chloride 0.9%: 100 mL  Total IN: 3036 mL    OUT:    Voided (mL): 1520 mL  Total OUT: 1520 mL    Total NET: 1516 mL          General: NAD, resting comfortably in bed  C/V: NSR  Pulm: Nonlabored breathing, no respiratory distress  Abd: soft, mildly distended, moderate RUQ ttp with no rebound or guarding.  Extrem: WWP, no edema, SCDs in place        LABS:                        9.8    10.14 )-----------( 145      ( 09 Aug 2022 05:30 )             29.0     08-09    132<L>  |  97  |  25<H>  ----------------------------<  133<H>  3.7   |  22  |  1.27    Ca    8.8      09 Aug 2022 05:30  Phos  2.4     08-09  Mg     2.0     08-09    TPro  6.7  /  Alb  3.2<L>  /  TBili  0.6  /  DBili  0.3  /  AST  22  /  ALT  12  /  AlkPhos  70  08-09    PT/INR - ( 09 Aug 2022 05:30 )   PT: 26.6 sec;   INR: 2.22          PTT - ( 09 Aug 2022 05:30 )  PTT:58.4 sec      RADIOLOGY & ADDITIONAL STUDIES:

## 2022-08-09 NOTE — CHART NOTE - NSCHARTNOTEFT_GEN_A_CORE
72M w/ complicated PMH including depression/anxiety, atrial fibrillation on Eliquis, PPM (2004), HTN, HLD, R hemicoloectomy for colon mass (2017), AAA s/p EVAR (2016) for R iliac artery aneurysm, possible coagulopathy s/p IVC filter placement, chronic back pain w/ remote spinal fusion and intrathecal pump, who was transferred from [facility] 8/7/22 w/ acute cholecystitis. On admission T 97F, HR 60, /64, RR 18. Exam significant for TTP in RUQ, soft, non-distended. Labs significant for WBC 12, INR 2.96, Cr 1.67, Tbili 0.9, Alk phos 60, AST/ALT 19/9. US revealed stable CBD dilation to 1.8 cm (compared to CT 7/2020), 1.3 cm gallstone impacted at GB neck, and wall thickening to 0.6 cm, consistent with acute cholecystitis. CXR showed small L sided pleural effusion. In consultation with GI chronic CBD dilation was felt to be used to chronic opioid use at home for back pain. Patient was admitted to telemetry floor for INR correction and optimization for surgery. He was given his home metoprolol and hydralazine held as was not hypertensive. Patient was given FFP and vitamin K and INR slowly began to correct. Cardiology was consulted for pre-operative risk stratification and recommended continuing beta blocker jeannette-operatively. Echo significant for pulmonary HTN but otherwise unremarkable. Overnight on hospital day one patient became hypertensive to up to 224/114, for which he was given repeated doses of labetalol with decrease to 181/86. Patient was tachycardic to 100-110 on monitor thus hydralazine was not restarted. Vitals otherwise were normal and patient had normal O2 Sat on RA, making 0.7 cc/kg/hr urine on 75 cc/hr NS. On HD2 patient was seen on rounds and found to be hypertensive to 208/100 for which 20 labetalol was administered. At that time exam significant for some mild RUQ pain. At approximately 930AM patient became SOB  with desaturation to 90 requiring 4L NC, with /121 and . Patient was AAO x1 to name, and rapid response was called. EKG non-concerning, bedside US significant for B-lines consistent w/ pulmonary edema, and overly distended bladder. Barber catheter was placed with 1L immediate output 72M w/ complicated PMH including depression/anxiety, atrial fibrillation on Eliquis, PPM (2004), HTN, HLD, R hemicoloectomy for colon mass (2017), AAA s/p EVAR (2016) for R iliac artery aneurysm, possible coagulopathy s/p IVC filter placement, chronic back pain w/ remote spinal fusion and intrathecal pump, who was transferred from [facility] 8/7/22 w/ acute cholecystitis. On admission T 97F, HR 60, /64, RR 18. Exam significant for TTP in RUQ, soft, non-distended. Labs significant for WBC 12, INR 2.96, Cr 1.67, Tbili 0.9, Alk phos 60, AST/ALT 19/9. US revealed stable CBD dilation to 1.8 cm (compared to CT 7/2020), 1.3 cm gallstone impacted at GB neck, and wall thickening to 0.6 cm, consistent with acute cholecystitis. CXR showed small L sided pleural effusion. In consultation with GI chronic CBD dilation was felt to be used to chronic opioid use at home for back pain. Patient was admitted to telemetry floor for INR correction and optimization for surgery. He was given his home metoprolol and hydralazine held as was not hypertensive. Patient was given FFP and vitamin K and INR slowly began to correct. Cardiology was consulted for pre-operative risk stratification and recommended continuing beta blocker jeannette-operatively. Echo significant for pulmonary HTN but otherwise unremarkable. Overnight on hospital day one patient became hypertensive to up to 224/114, for which he was given repeated doses of labetalol with decrease to 181/86. Patient was tachycardic to 100-110 on monitor thus hydralazine was not restarted. Vitals otherwise were normal and patient had normal O2 Sat on RA, making 0.7 cc/kg/hr urine on 75 cc/hr NS. On HD2 patient was seen on rounds and found to be hypertensive to 208/100 for which 20 labetalol was administered. At that time exam significant for some mild RUQ pain. At approximately 930AM patient became SOB  with desaturation to 90 requiring 4L NC, with /121 and . Patient was AAO x1 to name, and rapid response was called. EKG non-concerning, bedside US significant for B-lines consistent w/ pulmonary edema, and overly distended bladder. Barber catheter was placed with 1L immediate output. Labs were repeated significant for WBC 13.3 (10.1), Hgb 10 (9.8), INR 2.16, Cr 1.27, lactate 2.1, and normal LFTs. After speaking with Dr. Doyle patient was transferred to SICU for BP management and monitoring, restarted on hydralazine, and given 20 lasix. Will continue to monitor.

## 2022-08-10 LAB
ALBUMIN SERPL ELPH-MCNC: 3.2 G/DL — LOW (ref 3.3–5)
ALP SERPL-CCNC: 76 U/L — SIGNIFICANT CHANGE UP (ref 40–120)
ALT FLD-CCNC: 14 U/L — SIGNIFICANT CHANGE UP (ref 10–45)
ANION GAP SERPL CALC-SCNC: 13 MMOL/L — SIGNIFICANT CHANGE UP (ref 5–17)
ANION GAP SERPL CALC-SCNC: 15 MMOL/L — SIGNIFICANT CHANGE UP (ref 5–17)
ANION GAP SERPL CALC-SCNC: 17 MMOL/L — SIGNIFICANT CHANGE UP (ref 5–17)
APTT BLD: 58.6 SEC — HIGH (ref 27.5–35.5)
APTT BLD: 69.9 SEC — HIGH (ref 27.5–35.5)
APTT BLD: 84.1 SEC — HIGH (ref 27.5–35.5)
AST SERPL-CCNC: 29 U/L — SIGNIFICANT CHANGE UP (ref 10–40)
BILIRUB SERPL-MCNC: 0.8 MG/DL — SIGNIFICANT CHANGE UP (ref 0.2–1.2)
BUN SERPL-MCNC: 29 MG/DL — HIGH (ref 7–23)
BUN SERPL-MCNC: 33 MG/DL — HIGH (ref 7–23)
BUN SERPL-MCNC: 34 MG/DL — HIGH (ref 7–23)
CALCIUM SERPL-MCNC: 8.5 MG/DL — SIGNIFICANT CHANGE UP (ref 8.4–10.5)
CALCIUM SERPL-MCNC: 8.9 MG/DL — SIGNIFICANT CHANGE UP (ref 8.4–10.5)
CALCIUM SERPL-MCNC: 9 MG/DL — SIGNIFICANT CHANGE UP (ref 8.4–10.5)
CHLORIDE SERPL-SCNC: 94 MMOL/L — LOW (ref 96–108)
CHLORIDE SERPL-SCNC: 95 MMOL/L — LOW (ref 96–108)
CHLORIDE SERPL-SCNC: 96 MMOL/L — SIGNIFICANT CHANGE UP (ref 96–108)
CK MB CFR SERPL CALC: 5.5 NG/ML — SIGNIFICANT CHANGE UP (ref 0–6.7)
CK SERPL-CCNC: 129 U/L — SIGNIFICANT CHANGE UP (ref 30–200)
CO2 SERPL-SCNC: 25 MMOL/L — SIGNIFICANT CHANGE UP (ref 22–31)
CO2 SERPL-SCNC: 25 MMOL/L — SIGNIFICANT CHANGE UP (ref 22–31)
CO2 SERPL-SCNC: 28 MMOL/L — SIGNIFICANT CHANGE UP (ref 22–31)
CREAT SERPL-MCNC: 1.33 MG/DL — HIGH (ref 0.5–1.3)
CREAT SERPL-MCNC: 1.34 MG/DL — HIGH (ref 0.5–1.3)
CREAT SERPL-MCNC: 1.42 MG/DL — HIGH (ref 0.5–1.3)
EGFR: 52 ML/MIN/1.73M2 — LOW
EGFR: 56 ML/MIN/1.73M2 — LOW
EGFR: 56 ML/MIN/1.73M2 — LOW
GLUCOSE SERPL-MCNC: 118 MG/DL — HIGH (ref 70–99)
GLUCOSE SERPL-MCNC: 120 MG/DL — HIGH (ref 70–99)
GLUCOSE SERPL-MCNC: 125 MG/DL — HIGH (ref 70–99)
HCT VFR BLD CALC: 31 % — LOW (ref 39–50)
HGB BLD-MCNC: 10.6 G/DL — LOW (ref 13–17)
INR BLD: 1.61 — HIGH (ref 0.88–1.16)
MAGNESIUM SERPL-MCNC: 1.9 MG/DL — SIGNIFICANT CHANGE UP (ref 1.6–2.6)
MAGNESIUM SERPL-MCNC: 2 MG/DL — SIGNIFICANT CHANGE UP (ref 1.6–2.6)
MAGNESIUM SERPL-MCNC: 2 MG/DL — SIGNIFICANT CHANGE UP (ref 1.6–2.6)
MCHC RBC-ENTMCNC: 29 PG — SIGNIFICANT CHANGE UP (ref 27–34)
MCHC RBC-ENTMCNC: 34.2 GM/DL — SIGNIFICANT CHANGE UP (ref 32–36)
MCV RBC AUTO: 84.9 FL — SIGNIFICANT CHANGE UP (ref 80–100)
NRBC # BLD: 0 /100 WBCS — SIGNIFICANT CHANGE UP (ref 0–0)
PHOSPHATE SERPL-MCNC: 1.9 MG/DL — LOW (ref 2.5–4.5)
PHOSPHATE SERPL-MCNC: 2 MG/DL — LOW (ref 2.5–4.5)
PHOSPHATE SERPL-MCNC: 2.9 MG/DL — SIGNIFICANT CHANGE UP (ref 2.5–4.5)
PLATELET # BLD AUTO: 227 K/UL — SIGNIFICANT CHANGE UP (ref 150–400)
POTASSIUM SERPL-MCNC: 2.6 MMOL/L — CRITICAL LOW (ref 3.5–5.3)
POTASSIUM SERPL-MCNC: 2.9 MMOL/L — CRITICAL LOW (ref 3.5–5.3)
POTASSIUM SERPL-MCNC: 3.6 MMOL/L — SIGNIFICANT CHANGE UP (ref 3.5–5.3)
POTASSIUM SERPL-SCNC: 2.6 MMOL/L — CRITICAL LOW (ref 3.5–5.3)
POTASSIUM SERPL-SCNC: 2.9 MMOL/L — CRITICAL LOW (ref 3.5–5.3)
POTASSIUM SERPL-SCNC: 3.6 MMOL/L — SIGNIFICANT CHANGE UP (ref 3.5–5.3)
PROT SERPL-MCNC: 7.1 G/DL — SIGNIFICANT CHANGE UP (ref 6–8.3)
PROTHROM AB SERPL-ACNC: 19.2 SEC — HIGH (ref 10.5–13.4)
RBC # BLD: 3.65 M/UL — LOW (ref 4.2–5.8)
RBC # FLD: 12.8 % — SIGNIFICANT CHANGE UP (ref 10.3–14.5)
SARS-COV-2 RNA SPEC QL NAA+PROBE: SIGNIFICANT CHANGE UP
SODIUM SERPL-SCNC: 135 MMOL/L — SIGNIFICANT CHANGE UP (ref 135–145)
SODIUM SERPL-SCNC: 136 MMOL/L — SIGNIFICANT CHANGE UP (ref 135–145)
SODIUM SERPL-SCNC: 137 MMOL/L — SIGNIFICANT CHANGE UP (ref 135–145)
TROPONIN T SERPL-MCNC: 0.1 NG/ML — CRITICAL HIGH (ref 0–0.01)
WBC # BLD: 12.4 K/UL — HIGH (ref 3.8–10.5)
WBC # FLD AUTO: 12.4 K/UL — HIGH (ref 3.8–10.5)

## 2022-08-10 PROCEDURE — 76937 US GUIDE VASCULAR ACCESS: CPT | Mod: 26

## 2022-08-10 PROCEDURE — 93010 ELECTROCARDIOGRAM REPORT: CPT

## 2022-08-10 PROCEDURE — 36000 PLACE NEEDLE IN VEIN: CPT

## 2022-08-10 PROCEDURE — 99233 SBSQ HOSP IP/OBS HIGH 50: CPT | Mod: GC

## 2022-08-10 PROCEDURE — 99233 SBSQ HOSP IP/OBS HIGH 50: CPT

## 2022-08-10 PROCEDURE — 99447 NTRPROF PH1/NTRNET/EHR 11-20: CPT

## 2022-08-10 PROCEDURE — ZZZZZ: CPT

## 2022-08-10 RX ORDER — HEPARIN SODIUM 5000 [USP'U]/ML
800 INJECTION INTRAVENOUS; SUBCUTANEOUS
Qty: 25000 | Refills: 0 | Status: DISCONTINUED | OUTPATIENT
Start: 2022-08-10 | End: 2022-08-11

## 2022-08-10 RX ORDER — OXYCODONE HYDROCHLORIDE 5 MG/1
15 TABLET ORAL ONCE
Refills: 0 | Status: DISCONTINUED | OUTPATIENT
Start: 2022-08-10 | End: 2022-08-10

## 2022-08-10 RX ORDER — LANOLIN ALCOHOL/MO/W.PET/CERES
5 CREAM (GRAM) TOPICAL AT BEDTIME
Refills: 0 | Status: DISCONTINUED | OUTPATIENT
Start: 2022-08-10 | End: 2022-08-16

## 2022-08-10 RX ORDER — POTASSIUM CHLORIDE 20 MEQ
10 PACKET (EA) ORAL
Refills: 0 | Status: COMPLETED | OUTPATIENT
Start: 2022-08-10 | End: 2022-08-10

## 2022-08-10 RX ORDER — POLYETHYLENE GLYCOL 3350 17 G/17G
17 POWDER, FOR SOLUTION ORAL DAILY
Refills: 0 | Status: DISCONTINUED | OUTPATIENT
Start: 2022-08-10 | End: 2022-08-15

## 2022-08-10 RX ORDER — HYDRALAZINE HCL 50 MG
25 TABLET ORAL ONCE
Refills: 0 | Status: COMPLETED | OUTPATIENT
Start: 2022-08-10 | End: 2022-08-10

## 2022-08-10 RX ORDER — OXYCODONE HYDROCHLORIDE 5 MG/1
20 TABLET ORAL EVERY 8 HOURS
Refills: 0 | Status: DISCONTINUED | OUTPATIENT
Start: 2022-08-10 | End: 2022-08-16

## 2022-08-10 RX ORDER — POTASSIUM CHLORIDE 20 MEQ
40 PACKET (EA) ORAL ONCE
Refills: 0 | Status: DISCONTINUED | OUTPATIENT
Start: 2022-08-10 | End: 2022-08-10

## 2022-08-10 RX ORDER — MAGNESIUM SULFATE 500 MG/ML
1 VIAL (ML) INJECTION ONCE
Refills: 0 | Status: COMPLETED | OUTPATIENT
Start: 2022-08-10 | End: 2022-08-10

## 2022-08-10 RX ORDER — POTASSIUM PHOSPHATE, MONOBASIC POTASSIUM PHOSPHATE, DIBASIC 236; 224 MG/ML; MG/ML
30 INJECTION, SOLUTION INTRAVENOUS ONCE
Refills: 0 | Status: COMPLETED | OUTPATIENT
Start: 2022-08-10 | End: 2022-08-10

## 2022-08-10 RX ORDER — POTASSIUM CHLORIDE 20 MEQ
40 PACKET (EA) ORAL EVERY 4 HOURS
Refills: 0 | Status: COMPLETED | OUTPATIENT
Start: 2022-08-10 | End: 2022-08-10

## 2022-08-10 RX ORDER — HYDRALAZINE HCL 50 MG
10 TABLET ORAL ONCE
Refills: 0 | Status: COMPLETED | OUTPATIENT
Start: 2022-08-10 | End: 2022-08-10

## 2022-08-10 RX ORDER — SODIUM,POTASSIUM PHOSPHATES 278-250MG
1 POWDER IN PACKET (EA) ORAL ONCE
Refills: 0 | Status: COMPLETED | OUTPATIENT
Start: 2022-08-10 | End: 2022-08-10

## 2022-08-10 RX ORDER — POTASSIUM CHLORIDE 20 MEQ
40 PACKET (EA) ORAL ONCE
Refills: 0 | Status: COMPLETED | OUTPATIENT
Start: 2022-08-10 | End: 2022-08-10

## 2022-08-10 RX ORDER — OXYCODONE HYDROCHLORIDE 5 MG/1
5 TABLET ORAL ONCE
Refills: 0 | Status: DISCONTINUED | OUTPATIENT
Start: 2022-08-10 | End: 2022-08-10

## 2022-08-10 RX ADMIN — CHLORHEXIDINE GLUCONATE 1 APPLICATION(S): 213 SOLUTION TOPICAL at 05:11

## 2022-08-10 RX ADMIN — LIDOCAINE 1 PATCH: 4 CREAM TOPICAL at 11:53

## 2022-08-10 RX ADMIN — Medication 25 MILLIGRAM(S): at 20:50

## 2022-08-10 RX ADMIN — Medication 50 MILLIGRAM(S): at 17:45

## 2022-08-10 RX ADMIN — Medication 50 MILLIGRAM(S): at 06:02

## 2022-08-10 RX ADMIN — POLYETHYLENE GLYCOL 3350 17 GRAM(S): 17 POWDER, FOR SOLUTION ORAL at 13:25

## 2022-08-10 RX ADMIN — Medication 40 MILLIEQUIVALENT(S): at 10:41

## 2022-08-10 RX ADMIN — Medication 100 MILLIEQUIVALENT(S): at 11:47

## 2022-08-10 RX ADMIN — Medication 100 MILLIEQUIVALENT(S): at 07:06

## 2022-08-10 RX ADMIN — Medication 4 MILLIGRAM(S): at 21:07

## 2022-08-10 RX ADMIN — Medication 100 MILLIEQUIVALENT(S): at 08:49

## 2022-08-10 RX ADMIN — Medication 40 MILLIEQUIVALENT(S): at 13:24

## 2022-08-10 RX ADMIN — PANTOPRAZOLE SODIUM 40 MILLIGRAM(S): 20 TABLET, DELAYED RELEASE ORAL at 06:02

## 2022-08-10 RX ADMIN — Medication 10 MILLIGRAM(S): at 22:17

## 2022-08-10 RX ADMIN — Medication 1 MILLIGRAM(S): at 11:17

## 2022-08-10 RX ADMIN — POTASSIUM PHOSPHATE, MONOBASIC POTASSIUM PHOSPHATE, DIBASIC 83.33 MILLIMOLE(S): 236; 224 INJECTION, SOLUTION INTRAVENOUS at 16:34

## 2022-08-10 RX ADMIN — CEFTRIAXONE 100 MILLIGRAM(S): 500 INJECTION, POWDER, FOR SOLUTION INTRAMUSCULAR; INTRAVENOUS at 19:05

## 2022-08-10 RX ADMIN — Medication 100 MILLIGRAM(S): at 13:00

## 2022-08-10 RX ADMIN — OXYCODONE HYDROCHLORIDE 5 MILLIGRAM(S): 5 TABLET ORAL at 08:06

## 2022-08-10 RX ADMIN — Medication 650 MILLIGRAM(S): at 07:45

## 2022-08-10 RX ADMIN — OXYCODONE HYDROCHLORIDE 5 MILLIGRAM(S): 5 TABLET ORAL at 08:09

## 2022-08-10 RX ADMIN — OXYCODONE HYDROCHLORIDE 20 MILLIGRAM(S): 5 TABLET ORAL at 11:15

## 2022-08-10 RX ADMIN — Medication 40 MILLIEQUIVALENT(S): at 21:06

## 2022-08-10 RX ADMIN — Medication 25 MILLIGRAM(S): at 19:03

## 2022-08-10 RX ADMIN — Medication 40 MILLIEQUIVALENT(S): at 17:46

## 2022-08-10 RX ADMIN — OXYCODONE HYDROCHLORIDE 20 MILLIGRAM(S): 5 TABLET ORAL at 19:03

## 2022-08-10 RX ADMIN — HEPARIN SODIUM 800 UNIT(S)/HR: 5000 INJECTION INTRAVENOUS; SUBCUTANEOUS at 14:27

## 2022-08-10 RX ADMIN — OXYCODONE HYDROCHLORIDE 15 MILLIGRAM(S): 5 TABLET ORAL at 21:43

## 2022-08-10 RX ADMIN — Medication 40 MILLIEQUIVALENT(S): at 21:46

## 2022-08-10 RX ADMIN — Medication 50 MILLIGRAM(S): at 05:16

## 2022-08-10 RX ADMIN — FINASTERIDE 5 MILLIGRAM(S): 5 TABLET, FILM COATED ORAL at 11:17

## 2022-08-10 RX ADMIN — OXYCODONE HYDROCHLORIDE 20 MILLIGRAM(S): 5 TABLET ORAL at 12:00

## 2022-08-10 RX ADMIN — Medication 1 PACKET(S): at 07:05

## 2022-08-10 RX ADMIN — LIDOCAINE 1 PATCH: 4 CREAM TOPICAL at 06:03

## 2022-08-10 RX ADMIN — Medication 100 MILLIGRAM(S): at 21:07

## 2022-08-10 RX ADMIN — Medication 100 MILLIGRAM(S): at 05:16

## 2022-08-10 RX ADMIN — Medication 100 GRAM(S): at 21:45

## 2022-08-10 RX ADMIN — OXYCODONE HYDROCHLORIDE 20 MILLIGRAM(S): 5 TABLET ORAL at 20:00

## 2022-08-10 RX ADMIN — SIMVASTATIN 10 MILLIGRAM(S): 20 TABLET, FILM COATED ORAL at 21:07

## 2022-08-10 RX ADMIN — Medication 650 MILLIGRAM(S): at 07:05

## 2022-08-10 RX ADMIN — OXYCODONE HYDROCHLORIDE 15 MILLIGRAM(S): 5 TABLET ORAL at 22:15

## 2022-08-10 RX ADMIN — Medication 40 MILLIEQUIVALENT(S): at 07:05

## 2022-08-10 NOTE — DIETITIAN INITIAL EVALUATION ADULT - PERTINENT LABORATORY DATA
08-10    135  |  94<L>  |  29<H>  ----------------------------<  120<H>  2.6<LL>   |  28  |  1.42<H>    Ca    8.9      10 Aug 2022 05:30  Phos  1.9     08-10  Mg     2.0     08-10    TPro  7.1  /  Alb  3.2<L>  /  TBili  0.8  /  DBili  x   /  AST  29  /  ALT  14  /  AlkPhos  76  08-10  POCT Blood Glucose.: 141 mg/dL (08-09-22 @ 17:08)

## 2022-08-10 NOTE — PROCEDURE NOTE - NSPOSTCAREGUIDE_GEN_A_CORE
Verbal/written post procedure instructions were given to patient/caregiver/Instructed patient/caregiver to follow-up with primary care physician/Instructed patient/caregiver regarding signs and symptoms of infection/Keep the cast/splint/dressing clean and dry/Care for catheter as per unit/ICU protocols
Verbal/written post procedure instructions were given to patient/caregiver/Instructed patient/caregiver regarding signs and symptoms of infection/Keep the cast/splint/dressing clean and dry/Care for catheter as per unit/ICU protocols

## 2022-08-10 NOTE — DIETITIAN INITIAL EVALUATION ADULT - OTHER CALCULATIONS
IBW 73kg (124% IBW); estimated needs based on IBW (ABW >120% IBW); needs adjusted for acute illness, fluid per primary team

## 2022-08-10 NOTE — PROGRESS NOTE ADULT - SUBJECTIVE AND OBJECTIVE BOX
Pain Management Progress Note - [facility] [phone]    HPI: Patient seen and examined today, patient with complicated hospital course. Patient reports endorsing pain to the back and abdomen this AM. Patient reports he would like to be back on his home dose medications including Oxycontin 20 mg PO TID and Oxycodone 15 mg PO q8h PRN. Patient reports pain increases with any type of deep breathing or coughing. Patient denies side effects from current pain regimen.     Pertinent PMH: Pain at: ___Back ___Neck___Knee ___Hip ___Shoulder _X__ Opioid tolerance    Pain is __X_ sharp ____dull ___burning ___achy ___ Intensity: ____ mild __X__mod _X___severe     Location __X___surgical site _____cervical _____lumbar ___X_abd _____upper ext____lower ext    Worse with __X__activity ___X_movement _____physical therapy___ Rest    Improved with __X__medication __X__rest ____physical therapy    PAST MEDICAL & SURGICAL HISTORY:  AAA (abdominal aortic aneurysm)  HTN (hypertension)  Cardiomyopathy  Hyperlipidemia  ONOFRE (dyspnea on exertion)  DVT (deep venous thrombosis)  Pulmonary emphysema  COPD  Cardiac arrhythmia  Aneurysm of aortic root  Depression  anxiety  Anemia  Pelvic mass  Pacemaker  medtronic  History of kidney surgery  History of back surgery  multiple, lumbar  Surgery, elective  multiple neck surgery, cervical  S/P hip replacement, left  S/P arthroscopy of right knee  S/P AAA (abdominal aortic aneurysm) repair  with right iliac aneurysm endovascular repair  S/P carpal tunnel release  Surgery, elective  Cardiac Stent x4  Surgery, elective  Intrathecal pump placement    MEDICATIONS:  polyethylene glycol 3350  oxyCODONE  ER Tablet  oxyCODONE    IR  potassium chloride    Tablet ER  potassium chloride   Powder  potassium phosphate / sodium phosphate Powder (PHOS-NaK)  potassium chloride  10 mEq/100 mL IVPB  heparin  Infusion.  heparin  Infusion  acetaminophen   IVPB ..  acetaminophen     Tablet ..  furosemide   Injectable  potassium phosphate / sodium phosphate Powder (PHOS-NaK)  clopidogrel Tablet  clopidogrel Tablet  clopidogrel Tablet  aspirin  chewable  heparin  Infusion  heparin  Infusion.  niCARdipine Infusion  dexMEDEtomidine Infusion  furosemide   Injectable  OLANZapine Injectable  LORazepam   Injectable  chlorhexidine 2% Cloths  diazepam    Tablet  LORazepam     Tablet  hydrALAZINE  hydrALAZINE  furosemide   Injectable  furosemide   Injectable  phytonadione  IVPB  potassium phosphate IVPB  LORazepam   Injectable  labetalol Injectable  labetalol Injectable  labetalol Injectable  lidocaine   4% Patch  labetalol Injectable  HYDROmorphone  Injectable  oxyCODONE    IR  oxyCODONE  ER Tablet  cefTRIAXone   IVPB  cefTRIAXone   IVPB  cefTRIAXone   IVPB  lactated ringers.  heparin  Infusion  heparin  Infusion  oxyCODONE  ER Tablet  oxyCODONE    IR  sodium chloride 0.9%.  oxyCODONE  ER Tablet  metoprolol succinate ER  polyethylene glycol 3350  polyethylene glycol 3350  folic acid  diazepam    Tablet  doxazosin  carvedilol  nortriptyline  simvastatin  oxyCODONE    IR  oxyCODONE  ER Tablet  pantoprazole    Tablet  oxyCODONE  ER Tablet  oxyCODONE    IR  finasteride  cefTRIAXone   IVPB  cefTRIAXone   IVPB  metroNIDAZOLE  IVPB  metroNIDAZOLE  IVPB  metroNIDAZOLE  IVPB  cefTRIAXone   IVPB    ROS: Const:  _N__febrile   Eyes:___ENT:___CV: _N__chest pain  Resp: __N__sob  GI:_N__nausea __N_vomiting __Y__abd pain ___npo ___clears ___full diet __bm  :___ Musk: __Y_pain ___spasm  Skin:___ Neuro:  _N__sedation___confusion___N_ numbness _N__weakness __N_paresthesia  Psych:___anxiety  Endo:___ Heme:___Allergy:__ALTACE, PCN_    08-10 @ 05:301.42 mg/dL<H>  Hemoglobin: 10.6 g/dL (08-10 @ 05:30)  Hemoglobin: 10.0 g/dL (08-09 @ 09:33)  Hemoglobin: 9.8 g/dL (08-09 @ 05:30)  T(C): 37.6 (08-10-22 @ 10:02), Max: 38.3 (08-09-22 @ 19:27)  HR: 109 (08-10-22 @ 12:00) (68 - 120)  BP: 157/99 (08-10-22 @ 12:00) (97/63 - 174/99)  RR: 36 (08-10-22 @ 12:00) (19 - 39)  SpO2: 100% (08-10-22 @ 12:00) (91% - 100%)  Wt(kg): --     PHYSICAL EXAM:  Gen Appearance: _X__no acute distress __X_appropriate       Neuro: ___SILT feet____ EOM Intact Psych: AAOX_3_, _X__mood/affect appropriate        Eyes: __X_conjunctiva WNL  ____X_ Pupils equal and round        ENT: __X_ears and nose atraumatic__X_ Hearing grossly intact        Neck: X___trachea midline, no visible masses ___thyroid without palpable mass    Resp: _X__Nml WOB____No tactile fremitus ___clear to auscultation    Cardio: X___extremities free from edema ____pedal pulses palpable    GI/Abdomen: ___soft _____ Nontender____X__Nondistended_____HSM    Lymphatic: ___no palpable nodes in neck  ___no palpable nodes calves and feet    Skin/Wound: ___Incision, ___Dressing c/d/i,   ____surrounding tissues soft,  ___drain/chest tube present____    Muscular: EHL ___/5  Gastrocnemius___/5    __X_absent clubbing/cyanosis         ASSESSMENT:  This is a 72y old Male with a history of colon mass s/p partial colectomy, appendectomy, hernia repair, aortic and iliac aneurysm, s/p stent placement, HTN, HLD, afib on eliquis, chronic back pain with 1 day history of symptomatic acute cholecystitis vs choledocholithiasis with reports of abdominal pain.    Recommended Treatment PLAN:  1. Consider continuing Oxycontin 20 mg PO TID  2. Consider starting Oxycodone 10 mg PO q8h PRN severe pain  3. Consider continuing Tylenol 650 mg PO q6h PRN mild pain  HOLD ALL OPIOIDS FOR SEDATION, RR<10, O2SAT <93%, SBP <96  Plan discussed with Dr. Frey

## 2022-08-10 NOTE — PROGRESS NOTE ADULT - SUBJECTIVE AND OBJECTIVE BOX
SUBJECTIVE: ON was aggravated and mildly confused, pulled out PIV access, given Zyprexa. Tmax 100.9 @1930. Trop max 0.16 now downtrending. WBC downtrending.      MEDICATIONS  (STANDING):  cefTRIAXone   IVPB      cefTRIAXone   IVPB 2000 milliGRAM(s) IV Intermittent every 24 hours  chlorhexidine 2% Cloths 1 Application(s) Topical <User Schedule>  doxazosin 4 milliGRAM(s) Oral at bedtime  finasteride 5 milliGRAM(s) Oral daily  folic acid 1 milliGRAM(s) Oral daily  heparin  Infusion. 800 Unit(s)/Hr (8 mL/Hr) IV Continuous <Continuous>  hydrALAZINE 50 milliGRAM(s) Oral <User Schedule>  hydrALAZINE 25 milliGRAM(s) Oral <User Schedule>  metoprolol succinate ER 50 milliGRAM(s) Oral two times a day  metroNIDAZOLE  IVPB      metroNIDAZOLE  IVPB 500 milliGRAM(s) IV Intermittent every 8 hours  oxyCODONE  ER Tablet 20 milliGRAM(s) Oral every 8 hours  pantoprazole    Tablet 40 milliGRAM(s) Oral before breakfast  potassium chloride  10 mEq/100 mL IVPB 10 milliEquivalent(s) IV Intermittent every 1 hour  simvastatin 10 milliGRAM(s) Oral at bedtime    MEDICATIONS  (PRN):  acetaminophen     Tablet .. 650 milliGRAM(s) Oral every 6 hours PRN Temp greater or equal to 38C (100.4F), Mild Pain (1 - 3), Moderate Pain (4 - 6)  polyethylene glycol 3350 17 Gram(s) Oral daily PRN Constipation      Vital Signs Last 24 Hrs  T(C): 37.6 (10 Aug 2022 10:02), Max: 38.3 (09 Aug 2022 19:27)  T(F): 99.7 (10 Aug 2022 10:02), Max: 100.9 (09 Aug 2022 19:27)  HR: 118 (10 Aug 2022 10:00) (68 - 118)  BP: 128/95 (10 Aug 2022 10:00) (97/63 - 208/114)  BP(mean): 108 (10 Aug 2022 10:00) (76 - 145)  RR: 20 (10 Aug 2022 10:00) (19 - 39)  SpO2: 97% (10 Aug 2022 10:00) (91% - 100%)    Parameters below as of 10 Aug 2022 10:00  Patient On (Oxygen Delivery Method): nasal cannula w/ humidification  O2 Flow (L/min): 6      Physical Exam:  General: NAD, resting comfortably in bed  Pulmonary: Nonlabored breathing, no respiratory distress, 2L NC  Cardiovascular: NSR  Abdominal: soft, mild diffuse TTP focused on RUQ, ND  Extremities: WWP, normal strength  Neuro: A/O x 3, CNs II-XII grossly intact, no focal deficits    I&O's Summary    09 Aug 2022 07:01  -  10 Aug 2022 07:00  --------------------------------------------------------  IN: 944 mL / OUT: 5970 mL / NET: -5026 mL    10 Aug 2022 07:01  -  10 Aug 2022 11:25  --------------------------------------------------------  IN: 24 mL / OUT: 235 mL / NET: -211 mL        LABS:                        10.6   12.40 )-----------( 227      ( 10 Aug 2022 05:30 )             31.0     08-10    135  |  94<L>  |  29<H>  ----------------------------<  120<H>  2.6<LL>   |  28  |  1.42<H>    Ca    8.9      10 Aug 2022 05:30  Phos  1.9     08-10  Mg     2.0     08-10    TPro  7.1  /  Alb  3.2<L>  /  TBili  0.8  /  DBili  x   /  AST  29  /  ALT  14  /  AlkPhos  76  08-10    PT/INR - ( 10 Aug 2022 05:30 )   PT: 19.2 sec;   INR: 1.61          PTT - ( 10 Aug 2022 05:30 )  PTT:84.1 sec    CAPILLARY BLOOD GLUCOSE      POCT Blood Glucose.: 141 mg/dL (09 Aug 2022 17:08)    LIVER FUNCTIONS - ( 10 Aug 2022 05:30 )  Alb: 3.2 g/dL / Pro: 7.1 g/dL / ALK PHOS: 76 U/L / ALT: 14 U/L / AST: 29 U/L / GGT: x             RADIOLOGY & ADDITIONAL STUDIES:

## 2022-08-10 NOTE — PROCEDURE NOTE - NSPROCNAME_GEN_A_CORE
Point of Care Ultrasound Other
Peripheral Line Insertion
Point of Care Ultrasound FAST
Point of Care Ultrasound Lung
Peripheral Line Insertion
Point of Care Ultrasound Cardiac

## 2022-08-10 NOTE — CONSULT NOTE ADULT - REASON FOR ADMISSION
acute cholecystitis vs. choledocholithiasis

## 2022-08-10 NOTE — PROGRESS NOTE ADULT - ASSESSMENT
72M w/ complicated PMH including depression/anxiety, Atrial fibrillation on Eliquis, PPM (2004), HTN, HLD, R hemicoloectomy for colon mass (2017), AAA s/p EVAR (2016) for R iliac artery aneurysm, possible coagulopathy s/p IVC filter placement, chronic back pain w/ remote spinal fusion and intrathecal pump, who was transferred from [facility] 8/7/22 w/ acute cholecystitis. Cardiology consulted for perioperative risk stratification c/b new onset heart failure. s/p FFP and hypertensive emergency.    #Perioperative Risk Stratification  - EKG: NSR @83 bpm, 1st degree AV block, RBBB, unchanged form admission  - ECHO: normal LV,  RV function borderline reduced, PASP 48 mmHg, mildly dilated aortic root.  - Stress test about 1 1/2 years ago and it was normal per patient (Dr. Gonzales and Dr. Lemos (EP) as an outpatient.   - Uses cane s/p multiple back surgeries which limits his functional capacity METS~4  - Patient initially intermediate risk for intermediate risk procedure, however subsequent flash pulmonary edema 2/2 TRALI vs hypertensive emergency with newly depressed EF 35% and RWA, now elevated to high risk for intermediate risk procedure.  - Recommend cardiac anesthesia if undergoing surgery.    #r/o ACS  Noted to have elevated cardiac biomarkers and TTE with RWA and newly depressed EF 35% without ischemic ECG changes.    Unlikely Type 1 MI s/p recent stress test that was negative.  - Daily EKGs  - Recommend ruling out obstructive CAD with CCTA vs LHC after repeat TTE if EF not improved.   - Diuresis as per primary team     #Atrial Fibrillation  On Eliquis for AC at home.  - c/w IV heparin      Case discussed with Cardiology consult attending.

## 2022-08-10 NOTE — DIETITIAN INITIAL EVALUATION ADULT - PERTINENT MEDS FT
MEDICATIONS  (STANDING):  cefTRIAXone   IVPB      cefTRIAXone   IVPB 2000 milliGRAM(s) IV Intermittent every 24 hours  chlorhexidine 2% Cloths 1 Application(s) Topical <User Schedule>  doxazosin 4 milliGRAM(s) Oral at bedtime  finasteride 5 milliGRAM(s) Oral daily  folic acid 1 milliGRAM(s) Oral daily  heparin  Infusion. 800 Unit(s)/Hr (8 mL/Hr) IV Continuous <Continuous>  hydrALAZINE 50 milliGRAM(s) Oral <User Schedule>  hydrALAZINE 25 milliGRAM(s) Oral <User Schedule>  metoprolol succinate ER 50 milliGRAM(s) Oral two times a day  metroNIDAZOLE  IVPB      metroNIDAZOLE  IVPB 500 milliGRAM(s) IV Intermittent every 8 hours  oxyCODONE  ER Tablet 20 milliGRAM(s) Oral every 8 hours  pantoprazole    Tablet 40 milliGRAM(s) Oral before breakfast  polyethylene glycol 3350 17 Gram(s) Oral daily  simvastatin 10 milliGRAM(s) Oral at bedtime    MEDICATIONS  (PRN):  acetaminophen     Tablet .. 650 milliGRAM(s) Oral every 6 hours PRN Temp greater or equal to 38C (100.4F), Mild Pain (1 - 3), Moderate Pain (4 - 6)  polyethylene glycol 3350 17 Gram(s) Oral daily PRN Constipation

## 2022-08-10 NOTE — DIETITIAN INITIAL EVALUATION ADULT - ADD RECOMMEND
-Continue DASH/TLC diet  -Follow PO, labs, electrolytes closely, adjust prn  -Pain and bowel regimen per team  -F/u nutrition ed

## 2022-08-10 NOTE — PROGRESS NOTE ADULT - SUBJECTIVE AND OBJECTIVE BOX
OVERNIGHT EVENTS:   Patient agitated overnight. Pulled out 2 peripheral IVs. Required 5mg zyrexa and restraints    SUBJECTIVE: Patient seen and examined at bedside. This morning patient resting comfortably in bed. A&Ox3. Patient still reports abdominal pain worst in RUQ. Patient w/ large volume diuresis yesterday s/p 120mg lasix IV. Patient's BP stabilized and INR improved.     Vital Signs Last 12 Hrs  T(F): 99.7 (08-10-22 @ 10:02), Max: 99.7 (08-10-22 @ 10:02)  HR: 118 (08-10-22 @ 10:00) (71 - 118)  BP: 128/95 (08-10-22 @ 10:00) (110/70 - 174/99)  BP(mean): 108 (08-10-22 @ 10:00) (84 - 130)  RR: 20 (08-10-22 @ 10:00) (19 - 31)  SpO2: 97% (08-10-22 @ 10:00) (91% - 100%)  I&O's Summary    09 Aug 2022 07:01  -  10 Aug 2022 07:00  --------------------------------------------------------  IN: 944 mL / OUT: 5970 mL / NET: -5026 mL    10 Aug 2022 07:01  -  10 Aug 2022 10:17  --------------------------------------------------------  IN: 24 mL / OUT: 235 mL / NET: -211 mL        PHYSICAL EXAM:  Constitutional: NAD, comfortable in bed.  HEENT: NC/AT, PERRLA, EOMI, no scleral icterus,   Neck: Supple  Respiratory: CTA B/L. No w/r/r.   Cardiovascular: RRR, normal S1 and S2, no m/r/g.   Gastrointestinal: +BS, abdomen mildly distened with diffuse tenderness to palpation, worst at RUQ., no guarding or rebound tenderness, Palpable mass as L periumbilical region consistent w/ known pain management device.   Extremities: wwp; no cyanosis, clubbing or edema.   Vascular: Pulses equal and strong throughout.   Neurological: AAOx3, no CN deficits, strength and sensation intact throughout.   Skin: No gross skin abnormalities or rashes. No jaundice        LABS:                        10.6   12.40 )-----------( 227      ( 10 Aug 2022 05:30 )             31.0     08-10    135  |  94<L>  |  29<H>  ----------------------------<  120<H>  2.6<LL>   |  28  |  1.42<H>    Ca    8.9      10 Aug 2022 05:30  Phos  1.9     08-10  Mg     2.0     08-10    TPro  7.1  /  Alb  3.2<L>  /  TBili  0.8  /  DBili  x   /  AST  29  /  ALT  14  /  AlkPhos  76  08-10    PT/INR - ( 10 Aug 2022 05:30 )   PT: 19.2 sec;   INR: 1.61          PTT - ( 10 Aug 2022 05:30 )  PTT:84.1 sec        RADIOLOGY & ADDITIONAL TESTS:    MEDICATIONS  (STANDING):  cefTRIAXone   IVPB      cefTRIAXone   IVPB 2000 milliGRAM(s) IV Intermittent every 24 hours  chlorhexidine 2% Cloths 1 Application(s) Topical <User Schedule>  doxazosin 4 milliGRAM(s) Oral at bedtime  finasteride 5 milliGRAM(s) Oral daily  folic acid 1 milliGRAM(s) Oral daily  heparin  Infusion. 800 Unit(s)/Hr (8 mL/Hr) IV Continuous <Continuous>  hydrALAZINE 50 milliGRAM(s) Oral <User Schedule>  hydrALAZINE 25 milliGRAM(s) Oral <User Schedule>  metoprolol succinate ER 50 milliGRAM(s) Oral two times a day  metroNIDAZOLE  IVPB      metroNIDAZOLE  IVPB 500 milliGRAM(s) IV Intermittent every 8 hours  oxyCODONE  ER Tablet 20 milliGRAM(s) Oral every 8 hours  pantoprazole    Tablet 40 milliGRAM(s) Oral before breakfast  potassium chloride    Tablet ER 40 milliEquivalent(s) Oral every 4 hours  potassium chloride  10 mEq/100 mL IVPB 10 milliEquivalent(s) IV Intermittent every 1 hour  simvastatin 10 milliGRAM(s) Oral at bedtime    MEDICATIONS  (PRN):  acetaminophen     Tablet .. 650 milliGRAM(s) Oral every 6 hours PRN Temp greater or equal to 38C (100.4F), Mild Pain (1 - 3), Moderate Pain (4 - 6)  polyethylene glycol 3350 17 Gram(s) Oral daily PRN Constipation

## 2022-08-10 NOTE — CONSULT NOTE ADULT - ASSESSMENT
Assessment: 71 y/o male with complicated past medical history. He presents as a transfer from [facility] where he initially presented for "crushing" chest pain, and "stabbing" epigastric pain, transferred 8/7/22 w/ acute cholecystitis. Cardiac workup was negative at that hospital. CT at [facility] revealed distended gall bladder with gall stones, jeannette-cholecystic fluid, dilated CBD to 2cm and CBD stone. On 8/9 AM, a rapid response was called for tachypnea and hypertension to the 220s with concern for flash pulmonary edema likely secondary to TACO vs TRALI after being given 2 packs of FFP at 4AM and 5AM; pt was transferred to the MICU. Troponins are rising; being treated as NSTEMI with hep gtt, and Abx to cover intraabdominal pathogens. IR consulted for percutaneous cholecystostomy tube placement. Case reviewed with Dr. Bellamy, plan procedure with anesthesia once pt NPO x8 hours, likely Thursday, unless pt becomes acutely septic and urgent procedure is indicated.     Recommendations:   -NPO at midnight prior to procedure with anesthesia.   -Heparin gtt to be held for at least 4 hours prior to procedure.   -Please ensure Covid swab is up to date (within last 72 hours).    Communicated with: primary team

## 2022-08-10 NOTE — CONSULT NOTE ADULT - SUBJECTIVE AND OBJECTIVE BOX
73 y/o male with complicated past medical history of colon mass s/p partial colectomy (2017), appendectomy, hernia repair, aortic and iliac aneurysm surgery (2017) s/p stent placement, HTN, HLD, paroxysmal atrial fibrillation on Eliquis, blood clotting disorder s/p IVC filter placement, right knee ligament repair, pacemaker implant (2004) s/p recent ppm interrogation on Eliquis, herniated disc and related surgery in [id number] complicated by spinal fusion, local hematoma, foot drop with chronic pain s/p failed intrathecal morphine pump which caused bradycardia and left hip prosthesis. He presents as a transfer from [facility] where he initially presented for "crushing" chest pain, and "stabbing" epigastric pain, transferred 8/7/22 w/ acute cholecystitis. Cardiac workup was negative at that hospital. CT at [place] revealed distended gall bladder with gall stones, jeannette-cholecystic fluid, dilated CBD to 2cm and CBD stone. On 8/9 AM, a rapid response was called for tachypnea and hypertension to the 220s with concern for flash pulmonary edema likely secondary to TACO vs TRALI after being given 2 packs of FFP at 4AM and 5AM; pt was transferred to the MICU. Troponins are rising; being treated as NSTEMI with hep gtt, and Abx to cover intraabdominal pathogens. IR consulted for percutaneous cholecystostomy tube placement.     Clinical History: CHOLEDOCHOLITHIASIS    Handoff    MEWS Score    AAA (abdominal aortic aneurysm)    HTN (hypertension)    Cardiomyopathy    Hyperlipidemia    ONOFRE (dyspnea on exertion)    DVT (deep venous thrombosis)    Pulmonary emphysema    Cardiac arrhythmia    Aneurysm of aortic root    Depression    Anemia    Pelvic mass    Acute cholecystitis    Chronic atrial fibrillation    Moderate major depression    Hypertension    Hyponatremia    Insertion of intravenous catheter in adult patient    Midline catheter insertion    Insertion, needle, vein    Pacemaker    History of kidney surgery    History of back surgery    Surgery, elective    S/P hip replacement, left    S/P arthroscopy of right knee    S/P AAA (abdominal aortic aneurysm) repair    S/P carpal tunnel release    Surgery, elective    Surgery, elective    [id number]        Meds:acetaminophen     Tablet .. 650 milliGRAM(s) Oral every 6 hours PRN  cefTRIAXone   IVPB      cefTRIAXone   IVPB 2000 milliGRAM(s) IV Intermittent every 24 hours  chlorhexidine 2% Cloths 1 Application(s) Topical <User Schedule>  doxazosin 4 milliGRAM(s) Oral at bedtime  finasteride 5 milliGRAM(s) Oral daily  folic acid 1 milliGRAM(s) Oral daily  heparin  Infusion. 800 Unit(s)/Hr IV Continuous <Continuous>  hydrALAZINE 50 milliGRAM(s) Oral <User Schedule>  hydrALAZINE 25 milliGRAM(s) Oral <User Schedule>  metoprolol succinate ER 50 milliGRAM(s) Oral two times a day  metroNIDAZOLE  IVPB      metroNIDAZOLE  IVPB 500 milliGRAM(s) IV Intermittent every 8 hours  oxyCODONE  ER Tablet 20 milliGRAM(s) Oral every 8 hours  pantoprazole    Tablet 40 milliGRAM(s) Oral before breakfast  polyethylene glycol 3350 17 Gram(s) Oral daily  polyethylene glycol 3350 17 Gram(s) Oral daily PRN  potassium chloride   Powder 40 milliEquivalent(s) Oral every 4 hours  potassium phosphate IVPB 30 milliMole(s) IV Intermittent once  simvastatin 10 milliGRAM(s) Oral at bedtime      Allergies:Altace (Unknown)  penicillin (Unknown)        Labs:                           10.6   12.40 )-----------( 227      ( 10 Aug 2022 05:30 )             31.0     PT/INR - ( 10 Aug 2022 05:30 )   PT: 19.2 sec;   INR: 1.61          PTT - ( 10 Aug 2022 12:15 )  PTT:58.6 sec  08-10    137  |  95<L>  |  33<H>  ----------------------------<  125<H>  2.9<LL>   |  25  |  1.34<H>    Ca    9.0      10 Aug 2022 10:57  Phos  2.0     08-10  Mg     2.0     08-10    TPro  7.1  /  Alb  3.2<L>  /  TBili  0.8  /  DBili  x   /  AST  29  /  ALT  14  /  AlkPhos  76  08-10          Imaging Findings: Gallstone impacted at the gallbladder neck with associated gallbladder wall thickening and positive sonographic Parnell's sign, compatible with acute cholecystitis.

## 2022-08-10 NOTE — CONSULT NOTE ADULT - CONSULT REASON
request for percutaneous cholecystostomy tube placement
abdominal pain
increased work of breathing
Hypertension, AMS, tachypnea
Internal Medicine Co-management
CBD dilation
CP

## 2022-08-10 NOTE — PROGRESS NOTE ADULT - SUBJECTIVE AND OBJECTIVE BOX
INTERVAL HPI/OVERNIGHT EVENTS:    SUBJECTIVE: Patient seen and examined at bedside.    OBJECTIVE:    VITAL SIGNS:  ICU Vital Signs Last 24 Hrs  T(C): 37.6 (10 Aug 2022 10:02), Max: 38.3 (09 Aug 2022 19:27)  T(F): 99.7 (10 Aug 2022 10:02), Max: 100.9 (09 Aug 2022 19:27)  HR: 107 (10 Aug 2022 13:00) (68 - 120)  BP: 111/81 (10 Aug 2022 13:00) (97/63 - 174/99)  BP(mean): 92 (10 Aug 2022 13:00) (76 - 130)  ABP: --  ABP(mean): --  RR: 36 (10 Aug 2022 13:00) (19 - 39)  SpO2: 100% (10 Aug 2022 13:00) (91% - 100%)    O2 Parameters below as of 10 Aug 2022 13:00  Patient On (Oxygen Delivery Method): nasal cannula w/ humidification  O2 Flow (L/min): 6            08-09 @ 07:01  -  08-10 @ 07:00  --------------------------------------------------------  IN: 944 mL / OUT: 5970 mL / NET: -5026 mL    08-10 @ 07:01  -  08-10 @ 13:26  --------------------------------------------------------  IN: 40 mL / OUT: 265 mL / NET: -225 mL      CAPILLARY BLOOD GLUCOSE      POCT Blood Glucose.: 141 mg/dL (09 Aug 2022 17:08)      PHYSICAL EXAM:    General: WDWN ; NAD  HEENT: NC/AT; PERRL, anicteric sclera  Neck: supple, no JVD  Respiratory: CTA B/L; no W/R/R  Cardiovascular: +S1/S2; RRR; no M/R/G  Gastrointestinal: soft, NT/ND; +BS x4  Extremities: WWP; 2+ peripheral pulses B/L; no LE edema  Skin: normal color and turgor; no rash  Neurological:     MEDICATIONS:  MEDICATIONS  (STANDING):  cefTRIAXone   IVPB      cefTRIAXone   IVPB 2000 milliGRAM(s) IV Intermittent every 24 hours  chlorhexidine 2% Cloths 1 Application(s) Topical <User Schedule>  doxazosin 4 milliGRAM(s) Oral at bedtime  finasteride 5 milliGRAM(s) Oral daily  folic acid 1 milliGRAM(s) Oral daily  heparin  Infusion. 800 Unit(s)/Hr (8 mL/Hr) IV Continuous <Continuous>  hydrALAZINE 50 milliGRAM(s) Oral <User Schedule>  hydrALAZINE 25 milliGRAM(s) Oral <User Schedule>  metoprolol succinate ER 50 milliGRAM(s) Oral two times a day  metroNIDAZOLE  IVPB      metroNIDAZOLE  IVPB 500 milliGRAM(s) IV Intermittent every 8 hours  oxyCODONE  ER Tablet 20 milliGRAM(s) Oral every 8 hours  pantoprazole    Tablet 40 milliGRAM(s) Oral before breakfast  polyethylene glycol 3350 17 Gram(s) Oral daily  potassium chloride   Powder 40 milliEquivalent(s) Oral every 4 hours  potassium phosphate IVPB 30 milliMole(s) IV Intermittent once  simvastatin 10 milliGRAM(s) Oral at bedtime    MEDICATIONS  (PRN):  acetaminophen     Tablet .. 650 milliGRAM(s) Oral every 6 hours PRN Temp greater or equal to 38C (100.4F), Mild Pain (1 - 3), Moderate Pain (4 - 6)  polyethylene glycol 3350 17 Gram(s) Oral daily PRN Constipation      ALLERGIES:  Allergies    Altace (Unknown)  penicillin (Unknown)    Intolerances        LABS:                        10.6   12.40 )-----------( 227      ( 10 Aug 2022 05:30 )             31.0     08-10    137  |  95<L>  |  33<H>  ----------------------------<  125<H>  2.9<LL>   |  25  |  1.34<H>    Ca    9.0      10 Aug 2022 10:57  Phos  2.0     08-10  Mg     2.0     08-10    TPro  7.1  /  Alb  3.2<L>  /  TBili  0.8  /  DBili  x   /  AST  29  /  ALT  14  /  AlkPhos  76  08-10    LIVER FUNCTIONS - ( 10 Aug 2022 05:30 )  Alb: 3.2 g/dL / Pro: 7.1 g/dL / ALK PHOS: 76 U/L / ALT: 14 U/L / AST: 29 U/L / GGT: x           PT/INR - ( 10 Aug 2022 05:30 )   PT: 19.2 sec;   INR: 1.61          PTT - ( 10 Aug 2022 12:15 )  PTT:58.6 sec    CARDIAC MARKERS ( 10 Aug 2022 05:30 )  x     / 0.10 ng/mL / 129 U/L / x     / 5.5 ng/mL  CARDIAC MARKERS ( 09 Aug 2022 18:51 )  x     / 0.16 ng/mL / 208 U/L / x     / 7.0 ng/mL  CARDIAC MARKERS ( 09 Aug 2022 14:05 )  x     / 0.15 ng/mL / 239 U/L / x     / 9.0 ng/mL  CARDIAC MARKERS ( 09 Aug 2022 09:33 )  x     / 0.01 ng/mL / x     / x     / x      CARDIAC MARKERS ( 09 Aug 2022 05:30 )  x     / 0.01 ng/mL / x     / x     / x      CARDIAC MARKERS ( 08 Aug 2022 20:23 )  x     / 0.01 ng/mL / 89 U/L / x     / 3.0 ng/mL        RADIOLOGY & ADDITIONAL TESTS: Reviewed.   INTERVAL HPI/OVERNIGHT EVENTS: GERMAINE    SUBJECTIVE: Patient seen and examined at bedside. Endorses epigastric pain that is worse with position. Denies chest pain, shortness of breath, LE edema.     OBJECTIVE:    VITAL SIGNS:  ICU Vital Signs Last 24 Hrs  T(C): 37.6 (10 Aug 2022 10:02), Max: 38.3 (09 Aug 2022 19:27)  T(F): 99.7 (10 Aug 2022 10:02), Max: 100.9 (09 Aug 2022 19:27)  HR: 107 (10 Aug 2022 13:00) (68 - 120)  BP: 111/81 (10 Aug 2022 13:00) (97/63 - 174/99)  BP(mean): 92 (10 Aug 2022 13:00) (76 - 130)  RR: 36 (10 Aug 2022 13:00) (19 - 39)  SpO2: 100% (10 Aug 2022 13:00) (91% - 100%)    O2 Parameters below as of 10 Aug 2022 13:00  Patient On (Oxygen Delivery Method): nasal cannula w/ humidification  O2 Flow (L/min): 6      08-09 @ 07:01  -  08-10 @ 07:00  --------------------------------------------------------  IN: 944 mL / OUT: 5970 mL / NET: -5026 mL    08-10 @ 07:01  -  08-10 @ 13:26  --------------------------------------------------------  IN: 40 mL / OUT: 265 mL / NET: -225 mL      CAPILLARY BLOOD GLUCOSE      POCT Blood Glucose.: 141 mg/dL (09 Aug 2022 17:08)      PHYSICAL EXAM:  GENERAL: NAD, speaks in full sentences, no signs of respiratory distress  HEAD:  Atraumatic, Normocephalic  EYES: EOMI, PERRLA, conjunctiva and sclera clear  NECK: Supple, No JVD  CHEST/LUNG: Clear to auscultation bilaterally; No wheeze; No crackles; No accessory muscles used  HEART: Regular rate and rhythm; No murmurs;   ABDOMEN: Soft, Nontender, Nondistended; Bowel sounds present; No guarding  EXTREMITIES:  2+ Peripheral Pulses, No cyanosis or edema  PSYCH: AAOx3  NEUROLOGY: non-focal  SKIN: No rashes or lesions    MEDICATIONS:  MEDICATIONS  (STANDING):  cefTRIAXone   IVPB      cefTRIAXone   IVPB 2000 milliGRAM(s) IV Intermittent every 24 hours  chlorhexidine 2% Cloths 1 Application(s) Topical <User Schedule>  doxazosin 4 milliGRAM(s) Oral at bedtime  finasteride 5 milliGRAM(s) Oral daily  folic acid 1 milliGRAM(s) Oral daily  heparin  Infusion. 800 Unit(s)/Hr (8 mL/Hr) IV Continuous <Continuous>  hydrALAZINE 50 milliGRAM(s) Oral <User Schedule>  hydrALAZINE 25 milliGRAM(s) Oral <User Schedule>  metoprolol succinate ER 50 milliGRAM(s) Oral two times a day  metroNIDAZOLE  IVPB      metroNIDAZOLE  IVPB 500 milliGRAM(s) IV Intermittent every 8 hours  oxyCODONE  ER Tablet 20 milliGRAM(s) Oral every 8 hours  pantoprazole    Tablet 40 milliGRAM(s) Oral before breakfast  polyethylene glycol 3350 17 Gram(s) Oral daily  potassium chloride   Powder 40 milliEquivalent(s) Oral every 4 hours  potassium phosphate IVPB 30 milliMole(s) IV Intermittent once  simvastatin 10 milliGRAM(s) Oral at bedtime    MEDICATIONS  (PRN):  acetaminophen     Tablet .. 650 milliGRAM(s) Oral every 6 hours PRN Temp greater or equal to 38C (100.4F), Mild Pain (1 - 3), Moderate Pain (4 - 6)  polyethylene glycol 3350 17 Gram(s) Oral daily PRN Constipation      ALLERGIES:  Allergies    Altace (Unknown)  penicillin (Unknown)    Intolerances        LABS:                        10.6   12.40 )-----------( 227      ( 10 Aug 2022 05:30 )             31.0     08-10    137  |  95<L>  |  33<H>  ----------------------------<  125<H>  2.9<LL>   |  25  |  1.34<H>    Ca    9.0      10 Aug 2022 10:57  Phos  2.0     08-10  Mg     2.0     08-10    TPro  7.1  /  Alb  3.2<L>  /  TBili  0.8  /  DBili  x   /  AST  29  /  ALT  14  /  AlkPhos  76  08-10    LIVER FUNCTIONS - ( 10 Aug 2022 05:30 )  Alb: 3.2 g/dL / Pro: 7.1 g/dL / ALK PHOS: 76 U/L / ALT: 14 U/L / AST: 29 U/L / GGT: x           PT/INR - ( 10 Aug 2022 05:30 )   PT: 19.2 sec;   INR: 1.61          PTT - ( 10 Aug 2022 12:15 )  PTT:58.6 sec    CARDIAC MARKERS ( 10 Aug 2022 05:30 )  x     / 0.10 ng/mL / 129 U/L / x     / 5.5 ng/mL  CARDIAC MARKERS ( 09 Aug 2022 18:51 )  x     / 0.16 ng/mL / 208 U/L / x     / 7.0 ng/mL  CARDIAC MARKERS ( 09 Aug 2022 14:05 )  x     / 0.15 ng/mL / 239 U/L / x     / 9.0 ng/mL  CARDIAC MARKERS ( 09 Aug 2022 09:33 )  x     / 0.01 ng/mL / x     / x     / x      CARDIAC MARKERS ( 09 Aug 2022 05:30 )  x     / 0.01 ng/mL / x     / x     / x      CARDIAC MARKERS ( 08 Aug 2022 20:23 )  x     / 0.01 ng/mL / 89 U/L / x     / 3.0 ng/mL        RADIOLOGY & ADDITIONAL TESTS: Reviewed.

## 2022-08-10 NOTE — PROGRESS NOTE ADULT - ASSESSMENT
71 y/o male with complicated past medical history of colon mass s/p partial colectomy (2017), appendectomy, hernia repair, aortic and iliac aneurysm surgery (2017) s/p stent placement, HTN, HLD, paroxysmal atrial fibrillation on Eliquis, blood clotting disorder s/p IVC filter placement, right knee ligament repair, pacemaker implant (2004) s/p recent ppm interrogation on Eliquis, herniated disc and related surgery in [id number] complicated by spinal fusion, local hematoma, foot drop with chronic pain s/p failed intrathecal morphine pump which caused bradycardia and left hip prosthesis. He presents as a transfer from [facility] where he initially presented for "crushing" chest pain, and "stabbing" epigastric pain. No associated nausea/vomiting, fever, or jaundice. Afebrile, VSS on presentation to [place] ED.  Cardiac workup was negative at that hospital. CT revealed distended gall bladder with gall stones, jeannette-cholecystic fluid, dilated CBD to 2cm and CBD stone. On 8/9 AM, a rapid response was called for tachypnea and hypertension to the 220s with concern for flash pulmonary edema; MICU was consulted for better management of hypertensive urgency, pt was transferred to the MICU.     Neuro:  #Metabolic encephalopathy  Likely i/s/o sepsis and hypertensive emergency  - Manage underlying causes as below    Pulm:  #Increased Work of Breathing  Pt noted for increased WOB in past 24hrs following completion of plasma transfusion to achieve pre-op INR goal. 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 DDx includes TRALI vs. TACO vs. flash pulmonary edema 2/2 HTN urgency vs. takusubo cardiomyopathy. Pt was placed on bipap for transfer to MICU but currently back on NC    - s/p IV lasix 120mg, w/ net fluid balance of -5L  - No evidence of fluid overload at this time  - If increased work of breathing and decreased oxygen saturation, can go back on bipap. Currently comfortable and saturating well on NC      Cardio:  #Hypertensive emergency  8/9 AM rapid response was called for hypertensive emergency as patient desatted to 90% requiring 4L NC, with /121 and . On exam patient awake and alert, AAOx3, mentating well and protecting airway.  EKG done without ischemic changes. Barber catheter was placed with 1L immediate output. Nicardipine drip started to control BP, BPs dropped to 90s/60s, Nicardipine drip dc'ed. Goal SBP b/t 140-160 within 24hrs  - BP normalized; Restarted home hydralazine and metoprolol  - TTE w/ evidence of stress cardiomyopathy; EF 35%    #Paroxysmal afib  On eliquis at home, s/p IVC filter placement. Pacemaker was implanted in 2004, s/p recent PPM interrogation on Eliquis.  - Currently on heparin gtt    #Troponemia, r/o NSTEMI  Noted on interval bedside POCUS for dilated LV w/ hyperdynamic base and apical hypokinesis c/f possible takusubo cardiomyopathy picture. Most recent troponin T elevated at 0.15, CKMB elevated at 9.0.   - ASA and plavix loaded  - troponin and CKMB trending down; will stop trending  -     #Hx aortic and iliac aneurysm surgery (2017) s/p stent placement  - No intervention currently necessary     GI:  # Acute cholecystitis   CT revealed distended gall bladder with gall stones, jeannette-cholecystic fluid, dilated CBD to 2cm and CBD stone  RUQ US results: Gallstone impacted at the gallbladder neck with associated gallbladder wall thickening and positive sonographic Parnell's sign, compatible with acute cholecystitis.    - C/w Ceftriaxone 2g and Metronidazole 500 Q8  - Coags in morning as per surg  - f/u surg recs    Endo:  No active issues    ID:  #R/O Sepsis   Meets SIRS criteria with HR>90, RR>20, WBC >12K, and source of infection (Cholecystitis)  - f/u BCx sent 8/9  - obtain sputum cx if patient able to produce  - c/w CTX 2g Q24hrs  - c/w IV flagyl Q8hrs    :  No active issues, use PO instead of IV electrolyte repletion if possible (in the interest of maintaining optimal fluid balance)    Heme/Onc:  #Hx colon mass s/p partial colectomy (2017)  - No intervention currently necessary     F: None  E: Replete PRN  N: NPO  GI ppx: None  DVT ppx: Heparin gtt  Code: Full  Dispo: MICU       71 y/o male with complicated past medical history of colon mass s/p partial colectomy (2017), appendectomy, hernia repair, aortic and iliac aneurysm surgery (2017) s/p stent placement, HTN, HLD, paroxysmal atrial fibrillation on Eliquis, blood clotting disorder s/p IVC filter placement, right knee ligament repair, pacemaker implant (2004) s/p recent ppm interrogation on Eliquis, herniated disc and related surgery in [id number] complicated by spinal fusion, local hematoma, foot drop with chronic pain s/p failed intrathecal morphine pump which caused bradycardia and left hip prosthesis. He presents as a transfer from [facility] where he initially presented for "crushing" chest pain, and "stabbing" epigastric pain. No associated nausea/vomiting, fever, or jaundice. Afebrile, VSS on presentation to [place] ED.  Cardiac workup was negative at that hospital. CT revealed distended gall bladder with gall stones, jeannette-cholecystic fluid, dilated CBD to 2cm and CBD stone. On 8/9 AM, a rapid response was called for tachypnea and hypertension to the 220s with concern for flash pulmonary edema; MICU was consulted for better management of hypertensive urgency, pt was transferred to the MICU.     Neuro:  #Metabolic encephalopathy  Likely i/s/o sepsis and hypertensive emergency  - Manage underlying causes as below    Pulm:  #Increased Work of Breathing  Pt noted for increased WOB in past 24hrs following completion of plasma transfusion to achieve pre-op INR goal. 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 DDx includes TRALI vs. TACO vs. flash pulmonary edema 2/2 HTN urgency vs. takusubo cardiomyopathy. Pt was placed on bipap for transfer to MICU but currently back on NC    - s/p IV lasix 120mg, w/ net fluid balance of -5L  - No evidence of fluid overload at this time  - If increased work of breathing and decreased oxygen saturation, can go back on bipap. Currently comfortable and saturating well on NC      Cardio:  #Non-ischemic cardiomyopathy  TTE 8/9 w/ mid-distal anteroseptum, basal-mid anterolateral and basal-mid anterior walls appear hypokinetic to akinetic c/f stress cardiomyopathy. New since prior echo 8/8. BNP 21k from 7k. BP stable.  - f/u cardiology recommendations  - Will initiate GDMT per cards    #Hypertensive emergency  8/9 AM rapid response was called for hypertensive emergency as patient desatted to 90% requiring 4L NC, with /121 and . On exam patient awake and alert, AAOx3, mentating well and protecting airway.  EKG done without ischemic changes. Barber catheter was placed with 1L immediate output. Nicardipine drip started to control BP, BPs dropped to 90s/60s, Nicardipine drip dc'ed. Goal SBP b/t 140-160 within 24hrs  - BP normalized; Restarted home hydralazine and metoprolol  - TTE w/ evidence of stress cardiomyopathy; EF 35%    #Paroxysmal afib  On eliquis at home, s/p IVC filter placement. Pacemaker was implanted in 2004, s/p recent PPM interrogation on Eliquis.  - Currently on heparin gtt    #Troponemia, r/o NSTEMI  Noted on interval bedside POCUS for dilated LV w/ hyperdynamic base and apical hypokinesis c/f possible takusubo cardiomyopathy picture. Most recent troponin T elevated at 0.15, CKMB elevated at 9.0.   - ASA and plavix loaded  - troponin and CKMB trending down; will stop trending  - On heparin gtt    #Hx aortic and iliac aneurysm surgery (2017) s/p stent placement  - No intervention currently necessary     GI:  # Acute cholecystitis   CT revealed distended gall bladder with gall stones, jeannette-cholecystic fluid, dilated CBD to 2cm and CBD stone  RUQ US results: Gallstone impacted at the gallbladder neck with associated gallbladder wall thickening and positive sonographic Parnell's sign, compatible with acute cholecystitis.    - C/w Ceftriaxone 2g and Metronidazole 500 Q8  - INR improved; patient on heparin gtt  - GI w/ low suspicion for choledocolithiasis  - f/u surgery for cholecystectomy    Endo:  No active issues    ID:  #R/O Sepsis   Meets SIRS criteria with HR>90, RR>20, WBC >12K, and source of infection (Cholecystitis)  - BCx 8/9 NGTD  - obtain sputum cx if patient able to produce  - c/w CTX 2g Q24hrs  - c/w IV flagyl Q8hrs    Renal/:  No active issues, use PO instead of IV electrolyte repletion if possible (in the interest of maintaining optimal fluid balance)  -New electrolyte derangements large volume diuresis yesterday  -Will monitor electrolytes closely    Heme/Onc:  #Hx colon mass s/p partial colectomy (2017)  - No intervention currently necessary     F: None  E: Replete PRN  N: NPO  GI ppx: None  DVT ppx: Heparin gtt  Code: Full  Dispo: MICU       71 y/o male with complicated past medical history of colon mass s/p partial colectomy (2017), appendectomy, hernia repair, aortic and iliac aneurysm surgery (2017) s/p stent placement, HTN, HLD, paroxysmal atrial fibrillation on Eliquis, blood clotting disorder s/p IVC filter placement, right knee ligament repair, pacemaker implant (2004) s/p recent ppm interrogation on Eliquis, herniated disc and related surgery in [id number] complicated by spinal fusion, local hematoma, foot drop with chronic pain s/p failed intrathecal morphine pump which caused bradycardia and left hip prosthesis. He presents as a transfer from [facility] where he initially presented for "crushing" chest pain, and "stabbing" epigastric pain. No associated nausea/vomiting, fever, or jaundice. Afebrile, VSS on presentation to [place] ED.  Cardiac workup was negative at that hospital. CT revealed distended gall bladder with gall stones, jeannette-cholecystic fluid, dilated CBD to 2cm and CBD stone. On 8/9 AM, a rapid response was called for tachypnea and hypertension to the 220s with concern for flash pulmonary edema; MICU was consulted for better management of hypertensive urgency, pt was transferred to the MICU.     Neuro:  #Metabolic encephalopathy  Likely i/s/o sepsis and hypertensive emergency  - Manage underlying causes as below    #Chronic back pain  -Restart home oxycontin 20mg TID    Pulm:  #Increased Work of Breathing  Pt noted for increased WOB in past 24hrs following completion of plasma transfusion to achieve pre-op INR goal. 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 DDx includes TRALI vs. TACO vs. flash pulmonary edema 2/2 HTN urgency vs. takusubo cardiomyopathy. Pt was placed on bipap for transfer to MICU but currently back on NC    - s/p IV lasix 120mg, w/ net fluid balance of -5L  - No evidence of fluid overload at this time  - If increased work of breathing and decreased oxygen saturation, can go back on bipap. Currently comfortable and saturating well on NC      Cardio:  #Non-ischemic cardiomyopathy  TTE 8/9 w/ mid-distal anteroseptum, basal-mid anterolateral and basal-mid anterior walls appear hypokinetic to akinetic c/f stress cardiomyopathy. New since prior echo 8/8. BNP 21k from 7k. BP stable.  - f/u cardiology recommendations  - Will initiate GDMT per cards    #Hypertensive emergency  8/9 AM rapid response was called for hypertensive emergency as patient desatted to 90% requiring 4L NC, with /121 and . On exam patient awake and alert, AAOx3, mentating well and protecting airway.  EKG done without ischemic changes. Barber catheter was placed with 1L immediate output. Nicardipine drip started to control BP, BPs dropped to 90s/60s, Nicardipine drip dc'ed. Goal SBP b/t 140-160 within 24hrs  - BP normalized; Restarted home hydralazine and metoprolol  - TTE w/ evidence of stress cardiomyopathy; EF 35%    #Paroxysmal afib  On eliquis at home, s/p IVC filter placement. Pacemaker was implanted in 2004, s/p recent PPM interrogation on Eliquis.  - Currently on heparin gtt    #Troponemia, r/o NSTEMI  Noted on interval bedside POCUS for dilated LV w/ hyperdynamic base and apical hypokinesis c/f possible takusubo cardiomyopathy picture. Most recent troponin T elevated at 0.15, CKMB elevated at 9.0.   - ASA and plavix loaded  - troponin and CKMB trending down; will stop trending  - On heparin gtt    #Hx aortic and iliac aneurysm surgery (2017) s/p stent placement  - No intervention currently necessary     GI:  # Acute cholecystitis   CT revealed distended gall bladder with gall stones, jeannette-cholecystic fluid, dilated CBD to 2cm and CBD stone  RUQ US results: Gallstone impacted at the gallbladder neck with associated gallbladder wall thickening and positive sonographic Parnell's sign, compatible with acute cholecystitis.    - C/w Ceftriaxone 2g and Metronidazole 500 Q8  - INR improved; patient on heparin gtt  - GI w/ low suspicion for choledocolithiasis  - f/u surgery for cholecystectomy    Endo:  No active issues    ID:  #R/O Sepsis   Meets SIRS criteria with HR>90, RR>20, WBC >12K, and source of infection (Cholecystitis)  - BCx 8/9 NGTD  - obtain sputum cx if patient able to produce  - c/w CTX 2g Q24hrs  - c/w IV flagyl Q8hrs    Renal/:  No active issues, use PO instead of IV electrolyte repletion if possible (in the interest of maintaining optimal fluid balance)  -New electrolyte derangements large volume diuresis yesterday  -Will monitor electrolytes closely    Heme/Onc:  #Hx colon mass s/p partial colectomy (2017)  - No intervention currently necessary     F: None  E: Replete PRN  N: NPO  GI ppx: None  DVT ppx: Heparin gtt  Code: Full  Dispo: MICU

## 2022-08-10 NOTE — CONSULT NOTE ADULT - CONSULT REQUESTED DATE/TIME
08-Aug-2022 11:00
10-Aug-2022 14:39
08-Aug-2022 12:37
08-Aug-2022 14:05
09-Aug-2022 11:00
09-Aug-2022 11:41
09-Aug-2022

## 2022-08-10 NOTE — PROGRESS NOTE ADULT - ASSESSMENT
73 y/o male with complicated medical history presents with 1 day history of symptomatic acute cholecystitis vs. choledocholithiasis. Now s/p 3U FFP i/s/o elevated INR. Pt then was noted to be hypertensive, with lung infiltrates and hypoxia, elevated troponins and transferred to MICU for further management. Pt transiently on cardene drip for pressure control.    No acute surgical intervention at this time  Will observe w/ antibiotic therapy, determine if clinical improvement or need for further intervention  Rest of plan per MICU

## 2022-08-10 NOTE — DIETITIAN INITIAL EVALUATION ADULT - OTHER INFO
71 y/o male with complicated past medical history of colon mass s/p partial colectomy (2017), appendectomy, hernia repair, aortic and iliac aneurysm surgery (2017) s/p stent placement, HTN, HLD, paroxysmal atrial fibrillation on Eliquis, blood clotting disorder s/p IVC filter placement, right knee ligament repair, pacemaker implant (2004) s/p recent ppm interrogation on Eliquis, herniated disc and related surgery in [id number] complicated by spinal fusion, local hematoma, foot drop with chronic pain s/p failed intrathecal morphine pump which caused bradycardia and left hip prosthesis. He presents as a transfer from [facility] where he initially presented for "crushing" chest pain, and "stabbing" epigastric pain. CT revealed distended gall bladder with gall stones, jeannette-cholecystic fluid, dilated CBD to 2cm and CBD stone. On 8/9 AM, a rapid response was called for tachypnea and hypertension to the 220s with concern for flash pulmonary edema; MICU was consulted for better management of hypertensive urgency, pt was transferred to the MICU.      73 y/o male with complicated past medical history of colon mass s/p partial colectomy (2017), appendectomy, hernia repair, aortic and iliac aneurysm surgery (2017) s/p stent placement, HTN, HLD, paroxysmal atrial fibrillation on Eliquis, blood clotting disorder s/p IVC filter placement, right knee ligament repair, pacemaker implant (2004) s/p recent ppm interrogation on Eliquis, herniated disc and related surgery in [id number] complicated by spinal fusion, local hematoma, foot drop with chronic pain s/p failed intrathecal morphine pump which caused bradycardia and left hip prosthesis. He presents as a transfer from [facility] where he initially presented for "crushing" chest pain, and "stabbing" epigastric pain. CT revealed distended gall bladder with gall stones, jeannette-cholecystic fluid, dilated CBD to 2cm and CBD stone. On 8/9 AM, a rapid response was called for tachypnea and hypertension to the 220s with concern for flash pulmonary edema; MICU was consulted for better management of hypertensive urgency, pt was transferred to the MICU.     Pt assessed at bedside. No family present. Pt resting in bed on 6L NC satting %. Pt reports fair PO intake, tolerating well with no N/V/D/C. No reports of decreased PO or weight loss PTA. Pt tries to eat a low fat, low salt "healthy" diet at home. Reviewed adequate intake parameters, necessity for DASH/TLC diet. Diet preferences reviewed. No cultural preferences. Pain noted, team aware. Leonardo score 13. RD to follow, nutrition recommendations below.

## 2022-08-10 NOTE — PROCEDURE NOTE - NSICDXPROCEDURE_GEN_ALL_CORE_FT
PROCEDURES:  Insertion of intravenous catheter with ultrasound guidance 10-Aug-2022 18:55:04  Daksha Campbell  
PROCEDURES:  Insertion of intravenous catheter in adult patient 10-Aug-2022 02:04:15  Anne Burton  Insertion, needle, vein 10-Aug-2022 02:04:31  Anne Burton

## 2022-08-11 LAB
ALBUMIN SERPL ELPH-MCNC: 3 G/DL — LOW (ref 3.3–5)
ALP SERPL-CCNC: 113 U/L — SIGNIFICANT CHANGE UP (ref 40–120)
ALT FLD-CCNC: 15 U/L — SIGNIFICANT CHANGE UP (ref 10–45)
ANION GAP SERPL CALC-SCNC: 15 MMOL/L — SIGNIFICANT CHANGE UP (ref 5–17)
ANION GAP SERPL CALC-SCNC: 16 MMOL/L — SIGNIFICANT CHANGE UP (ref 5–17)
APTT BLD: 193.3 SEC — CRITICAL HIGH (ref 27.5–35.5)
APTT BLD: 49 SEC — HIGH (ref 27.5–35.5)
AST SERPL-CCNC: 31 U/L — SIGNIFICANT CHANGE UP (ref 10–40)
BASOPHILS # BLD AUTO: 0.02 K/UL — SIGNIFICANT CHANGE UP (ref 0–0.2)
BASOPHILS NFR BLD AUTO: 0.2 % — SIGNIFICANT CHANGE UP (ref 0–2)
BILIRUB SERPL-MCNC: 0.5 MG/DL — SIGNIFICANT CHANGE UP (ref 0.2–1.2)
BLD GP AB SCN SERPL QL: NEGATIVE — SIGNIFICANT CHANGE UP
BUN SERPL-MCNC: 30 MG/DL — HIGH (ref 7–23)
BUN SERPL-MCNC: 32 MG/DL — HIGH (ref 7–23)
CALCIUM SERPL-MCNC: 8.5 MG/DL — SIGNIFICANT CHANGE UP (ref 8.4–10.5)
CALCIUM SERPL-MCNC: 8.6 MG/DL — SIGNIFICANT CHANGE UP (ref 8.4–10.5)
CHLORIDE SERPL-SCNC: 97 MMOL/L — SIGNIFICANT CHANGE UP (ref 96–108)
CHLORIDE SERPL-SCNC: 98 MMOL/L — SIGNIFICANT CHANGE UP (ref 96–108)
CO2 SERPL-SCNC: 23 MMOL/L — SIGNIFICANT CHANGE UP (ref 22–31)
CO2 SERPL-SCNC: 23 MMOL/L — SIGNIFICANT CHANGE UP (ref 22–31)
CREAT SERPL-MCNC: 1.04 MG/DL — SIGNIFICANT CHANGE UP (ref 0.5–1.3)
CREAT SERPL-MCNC: 1.08 MG/DL — SIGNIFICANT CHANGE UP (ref 0.5–1.3)
EGFR: 72 ML/MIN/1.73M2 — SIGNIFICANT CHANGE UP
EGFR: 76 ML/MIN/1.73M2 — SIGNIFICANT CHANGE UP
EOSINOPHIL # BLD AUTO: 0.01 K/UL — SIGNIFICANT CHANGE UP (ref 0–0.5)
EOSINOPHIL NFR BLD AUTO: 0.1 % — SIGNIFICANT CHANGE UP (ref 0–6)
GLUCOSE BLDC GLUCOMTR-MCNC: 115 MG/DL — HIGH (ref 70–99)
GLUCOSE SERPL-MCNC: 118 MG/DL — HIGH (ref 70–99)
GLUCOSE SERPL-MCNC: 149 MG/DL — HIGH (ref 70–99)
GRAM STN FLD: SIGNIFICANT CHANGE UP
HCT VFR BLD CALC: 32.2 % — LOW (ref 39–50)
HGB BLD-MCNC: 10.7 G/DL — LOW (ref 13–17)
IMM GRANULOCYTES NFR BLD AUTO: 0.5 % — SIGNIFICANT CHANGE UP (ref 0–1.5)
INR BLD: 1.44 — HIGH (ref 0.88–1.16)
INR BLD: 1.49 — HIGH (ref 0.88–1.16)
LYMPHOCYTES # BLD AUTO: 0.41 K/UL — LOW (ref 1–3.3)
LYMPHOCYTES # BLD AUTO: 3.2 % — LOW (ref 13–44)
MAGNESIUM SERPL-MCNC: 2.2 MG/DL — SIGNIFICANT CHANGE UP (ref 1.6–2.6)
MCHC RBC-ENTMCNC: 28.7 PG — SIGNIFICANT CHANGE UP (ref 27–34)
MCHC RBC-ENTMCNC: 33.2 GM/DL — SIGNIFICANT CHANGE UP (ref 32–36)
MCV RBC AUTO: 86.3 FL — SIGNIFICANT CHANGE UP (ref 80–100)
MONOCYTES # BLD AUTO: 1.25 K/UL — HIGH (ref 0–0.9)
MONOCYTES NFR BLD AUTO: 9.6 % — SIGNIFICANT CHANGE UP (ref 2–14)
NEUTROPHILS # BLD AUTO: 11.21 K/UL — HIGH (ref 1.8–7.4)
NEUTROPHILS NFR BLD AUTO: 86.4 % — HIGH (ref 43–77)
NRBC # BLD: 0 /100 WBCS — SIGNIFICANT CHANGE UP (ref 0–0)
PHOSPHATE SERPL-MCNC: 2.2 MG/DL — LOW (ref 2.5–4.5)
PLATELET # BLD AUTO: 228 K/UL — SIGNIFICANT CHANGE UP (ref 150–400)
POTASSIUM SERPL-MCNC: 4 MMOL/L — SIGNIFICANT CHANGE UP (ref 3.5–5.3)
POTASSIUM SERPL-MCNC: SIGNIFICANT CHANGE UP (ref 3.5–5.3)
POTASSIUM SERPL-SCNC: 4 MMOL/L — SIGNIFICANT CHANGE UP (ref 3.5–5.3)
POTASSIUM SERPL-SCNC: SIGNIFICANT CHANGE UP (ref 3.5–5.3)
PROT SERPL-MCNC: 7 G/DL — SIGNIFICANT CHANGE UP (ref 6–8.3)
PROTHROM AB SERPL-ACNC: 17.2 SEC — HIGH (ref 10.5–13.4)
PROTHROM AB SERPL-ACNC: 17.8 SEC — HIGH (ref 10.5–13.4)
RBC # BLD: 3.73 M/UL — LOW (ref 4.2–5.8)
RBC # FLD: 13.1 % — SIGNIFICANT CHANGE UP (ref 10.3–14.5)
RH IG SCN BLD-IMP: POSITIVE — SIGNIFICANT CHANGE UP
SODIUM SERPL-SCNC: 135 MMOL/L — SIGNIFICANT CHANGE UP (ref 135–145)
SODIUM SERPL-SCNC: 137 MMOL/L — SIGNIFICANT CHANGE UP (ref 135–145)
SPECIMEN SOURCE: SIGNIFICANT CHANGE UP
WBC # BLD: 12.97 K/UL — HIGH (ref 3.8–10.5)
WBC # FLD AUTO: 12.97 K/UL — HIGH (ref 3.8–10.5)

## 2022-08-11 PROCEDURE — 99233 SBSQ HOSP IP/OBS HIGH 50: CPT

## 2022-08-11 PROCEDURE — 99233 SBSQ HOSP IP/OBS HIGH 50: CPT | Mod: GC

## 2022-08-11 PROCEDURE — 99231 SBSQ HOSP IP/OBS SF/LOW 25: CPT | Mod: GC

## 2022-08-11 PROCEDURE — 47490 INCISION OF GALLBLADDER: CPT

## 2022-08-11 RX ORDER — NORTRIPTYLINE HYDROCHLORIDE 10 MG/1
75 CAPSULE ORAL EVERY 24 HOURS
Refills: 0 | Status: DISCONTINUED | OUTPATIENT
Start: 2022-08-11 | End: 2022-08-11

## 2022-08-11 RX ORDER — HYDRALAZINE HCL 50 MG
25 TABLET ORAL ONCE
Refills: 0 | Status: COMPLETED | OUTPATIENT
Start: 2022-08-11 | End: 2022-08-11

## 2022-08-11 RX ORDER — OXYCODONE HYDROCHLORIDE 5 MG/1
15 TABLET ORAL ONCE
Refills: 0 | Status: DISCONTINUED | OUTPATIENT
Start: 2022-08-11 | End: 2022-08-11

## 2022-08-11 RX ORDER — MORPHINE SULFATE 50 MG/1
2 CAPSULE, EXTENDED RELEASE ORAL ONCE
Refills: 0 | Status: DISCONTINUED | OUTPATIENT
Start: 2022-08-11 | End: 2022-08-11

## 2022-08-11 RX ORDER — HYDRALAZINE HCL 50 MG
10 TABLET ORAL ONCE
Refills: 0 | Status: COMPLETED | OUTPATIENT
Start: 2022-08-11 | End: 2022-08-11

## 2022-08-11 RX ORDER — HYDRALAZINE HCL 50 MG
50 TABLET ORAL
Refills: 0 | Status: DISCONTINUED | OUTPATIENT
Start: 2022-08-11 | End: 2022-08-12

## 2022-08-11 RX ORDER — ACETAMINOPHEN 500 MG
1000 TABLET ORAL ONCE
Refills: 0 | Status: COMPLETED | OUTPATIENT
Start: 2022-08-11 | End: 2022-08-11

## 2022-08-11 RX ORDER — NORTRIPTYLINE HYDROCHLORIDE 10 MG/1
75 CAPSULE ORAL AT BEDTIME
Refills: 0 | Status: DISCONTINUED | OUTPATIENT
Start: 2022-08-11 | End: 2022-08-16

## 2022-08-11 RX ORDER — NICARDIPINE HYDROCHLORIDE 30 MG/1
5 CAPSULE, EXTENDED RELEASE ORAL
Qty: 40 | Refills: 0 | Status: DISCONTINUED | OUTPATIENT
Start: 2022-08-11 | End: 2022-08-11

## 2022-08-11 RX ORDER — HYDRALAZINE HCL 50 MG
75 TABLET ORAL
Refills: 0 | Status: DISCONTINUED | OUTPATIENT
Start: 2022-08-12 | End: 2022-08-12

## 2022-08-11 RX ORDER — ACETAMINOPHEN 500 MG
650 TABLET ORAL ONCE
Refills: 0 | Status: COMPLETED | OUTPATIENT
Start: 2022-08-11 | End: 2022-08-11

## 2022-08-11 RX ORDER — SODIUM,POTASSIUM PHOSPHATES 278-250MG
1 POWDER IN PACKET (EA) ORAL ONCE
Refills: 0 | Status: DISCONTINUED | OUTPATIENT
Start: 2022-08-11 | End: 2022-08-11

## 2022-08-11 RX ADMIN — Medication 1000 MILLIGRAM(S): at 14:30

## 2022-08-11 RX ADMIN — Medication 50 MILLIGRAM(S): at 05:10

## 2022-08-11 RX ADMIN — CHLORHEXIDINE GLUCONATE 1 APPLICATION(S): 213 SOLUTION TOPICAL at 05:11

## 2022-08-11 RX ADMIN — NORTRIPTYLINE HYDROCHLORIDE 75 MILLIGRAM(S): 10 CAPSULE ORAL at 21:25

## 2022-08-11 RX ADMIN — OXYCODONE HYDROCHLORIDE 15 MILLIGRAM(S): 5 TABLET ORAL at 18:47

## 2022-08-11 RX ADMIN — Medication 650 MILLIGRAM(S): at 04:48

## 2022-08-11 RX ADMIN — OXYCODONE HYDROCHLORIDE 15 MILLIGRAM(S): 5 TABLET ORAL at 19:17

## 2022-08-11 RX ADMIN — Medication 50 MILLIGRAM(S): at 19:33

## 2022-08-11 RX ADMIN — Medication 5 MILLIGRAM(S): at 00:10

## 2022-08-11 RX ADMIN — Medication 50 MILLIGRAM(S): at 18:47

## 2022-08-11 RX ADMIN — OXYCODONE HYDROCHLORIDE 20 MILLIGRAM(S): 5 TABLET ORAL at 23:53

## 2022-08-11 RX ADMIN — Medication 5 MILLIGRAM(S): at 21:25

## 2022-08-11 RX ADMIN — OXYCODONE HYDROCHLORIDE 20 MILLIGRAM(S): 5 TABLET ORAL at 16:30

## 2022-08-11 RX ADMIN — MORPHINE SULFATE 2 MILLIGRAM(S): 50 CAPSULE, EXTENDED RELEASE ORAL at 15:42

## 2022-08-11 RX ADMIN — Medication 4 MILLIGRAM(S): at 21:25

## 2022-08-11 RX ADMIN — Medication 10 MILLIGRAM(S): at 03:18

## 2022-08-11 RX ADMIN — OXYCODONE HYDROCHLORIDE 20 MILLIGRAM(S): 5 TABLET ORAL at 01:12

## 2022-08-11 RX ADMIN — OXYCODONE HYDROCHLORIDE 20 MILLIGRAM(S): 5 TABLET ORAL at 01:39

## 2022-08-11 RX ADMIN — Medication 50 MILLIGRAM(S): at 07:05

## 2022-08-11 RX ADMIN — MORPHINE SULFATE 2 MILLIGRAM(S): 50 CAPSULE, EXTENDED RELEASE ORAL at 16:10

## 2022-08-11 RX ADMIN — Medication 100 MILLIGRAM(S): at 21:25

## 2022-08-11 RX ADMIN — Medication 100 MILLIGRAM(S): at 15:29

## 2022-08-11 RX ADMIN — SIMVASTATIN 10 MILLIGRAM(S): 20 TABLET, FILM COATED ORAL at 21:25

## 2022-08-11 RX ADMIN — Medication 650 MILLIGRAM(S): at 05:30

## 2022-08-11 RX ADMIN — NICARDIPINE HYDROCHLORIDE 25 MG/HR: 30 CAPSULE, EXTENDED RELEASE ORAL at 12:08

## 2022-08-11 RX ADMIN — Medication 100 MILLIGRAM(S): at 05:11

## 2022-08-11 RX ADMIN — CEFTRIAXONE 100 MILLIGRAM(S): 500 INJECTION, POWDER, FOR SOLUTION INTRAMUSCULAR; INTRAVENOUS at 18:37

## 2022-08-11 RX ADMIN — OXYCODONE HYDROCHLORIDE 20 MILLIGRAM(S): 5 TABLET ORAL at 17:30

## 2022-08-11 RX ADMIN — Medication 400 MILLIGRAM(S): at 13:49

## 2022-08-11 RX ADMIN — Medication 25 MILLIGRAM(S): at 10:16

## 2022-08-11 NOTE — PROGRESS NOTE ADULT - ASSESSMENT
73 y/o male with complicated past medical history of colon mass s/p partial colectomy (2017), appendectomy, hernia repair, aortic and iliac aneurysm surgery (2017) s/p stent placement, HTN, HLD, paroxysmal atrial fibrillation on Eliquis, blood clotting disorder s/p IVC filter placement, right knee ligament repair, pacemaker implant (2004) s/p recent ppm interrogation on Eliquis, herniated disc and related surgery in [id number] complicated by spinal fusion, local hematoma, foot drop with chronic pain s/p failed intrathecal morphine pump which caused bradycardia and left hip prosthesis. He presents as a transfer from [facility] where he initially presented for "crushing" chest pain, and "stabbing" epigastric pain. No associated nausea/vomiting, fever, or jaundice. Afebrile, VSS on presentation to [place] ED.  Cardiac workup was negative at that hospital. CT revealed distended gall bladder with gall stones, jeannette-cholecystic fluid, dilated CBD to 2cm and CBD stone. On 8/9 AM, a rapid response was called for tachypnea and hypertension to the 220s with concern for flash pulmonary edema; MICU was consulted for better management of hypertensive urgency, pt was transferred to the MICU.     Neuro:  #Metabolic encephalopathy  Likely i/s/o sepsis and hypertensive emergency  - Manage underlying causes as below    #Chronic back pain  -Restart home oxycontin 20mg TID    Pulm:  #Increased Work of Breathing  Pt noted for increased WOB in past 24hrs following completion of plasma transfusion to achieve pre-op INR goal. 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 DDx includes TRALI vs. TACO vs. flash pulmonary edema 2/2 HTN urgency vs. takusubo cardiomyopathy. Pt was placed on bipap for transfer to MICU but currently back on NC    - Euvolemic at this time  - If increased work of breathing and decreased oxygen saturation, can go back on bipap. Currently comfortable and saturating well on NC      Cardio:  #Non-ischemic cardiomyopathy  TTE 8/9 w/ mid-distal anteroseptum, basal-mid anterolateral and basal-mid anterior walls appear hypokinetic to akinetic c/f stress cardiomyopathy. New since prior echo 8/8. BNP 21k from 7k. BP stable.  - f/u cardiology recommendations  - Will initiate GDMT per cards    #Hypertensive emergency  8/9 AM rapid response was called for hypertensive emergency as patient desatted to 90% requiring 4L NC, with /121 and . On exam patient awake and alert, AAOx3, mentating well and protecting airway.  EKG done without ischemic changes. Barber catheter was placed with 1L immediate output. Nicardipine drip started to control BP, BPs dropped to 90s/60s, Nicardipine drip dc'ed. Goal SBP b/t 140-160 within 24hrs  - BP normalized; Restarted home hydralazine and metoprolol  - TTE w/ evidence of stress cardiomyopathy; EF 35%  - Will repeat TTE in coming days    #Paroxysmal afib  On eliquis at home, s/p IVC filter placement. Pacemaker was implanted in 2004, s/p recent PPM interrogation on Eliquis.  - Heparin gtt held for IR; will resume after completion    #Troponemia, r/o NSTEMI  Noted on interval bedside POCUS for dilated LV w/ hyperdynamic base and apical hypokinesis c/f possible takusubo cardiomyopathy picture. Most recent troponin T elevated at 0.15, CKMB elevated at 9.0.   - ASA and plavix loaded  - troponin and CKMB trending down; will stop trending  - hold hepearin gtt    #Hx aortic and iliac aneurysm surgery (2017) s/p stent placement  - No intervention currently necessary     GI:  # Acute cholecystitis   CT revealed distended gall bladder with gall stones, jeannette-cholecystic fluid, dilated CBD to 2cm and CBD stone  RUQ US results: Gallstone impacted at the gallbladder neck with associated gallbladder wall thickening and positive sonographic Parnell's sign, compatible with acute cholecystitis.    - C/w Ceftriaxone 2g and Metronidazole 500 Q8  - INR improved; patient on heparin gtt  - GI w/ low suspicion for choledocolithiasis  -percutaneous cholecystostomy today    Endo:  No active issues    ID:  #R/O Sepsis   Meets SIRS criteria with HR>90, RR>20, WBC >12K, and source of infection (Cholecystitis)  - BCx 8/9 NGTD  - obtain sputum cx if patient able to produce  - c/w CTX 2g Q24hrs  - c/w IV flagyl Q8hrs    Renal/:  No active issues, use PO instead of IV electrolyte repletion if possible (in the interest of maintaining optimal fluid balance)  -New electrolyte derangements large volume diuresis yesterday  -Will monitor electrolytes closely    Heme/Onc:  #Hx colon mass s/p partial colectomy (2017)  - No intervention currently necessary     F: None  E: Replete PRN  N: NPO  GI ppx: None  DVT ppx: Heparin gtt  Code: Full  Dispo: MICU

## 2022-08-11 NOTE — PROGRESS NOTE ADULT - ASSESSMENT
72M w/ complicated PMH including depression/anxiety, Atrial fibrillation on Eliquis, PPM (2004), HTN, HLD, R hemicoloectomy for colon mass (2017), AAA s/p EVAR (2016) for R iliac artery aneurysm, possible coagulopathy s/p IVC filter placement, chronic back pain w/ remote spinal fusion and intrathecal pump, who was transferred from [facility] 8/7/22 w/ acute cholecystitis. Cardiology consulted for perioperative risk stratification c/b new onset heart failure s/p FFP and hypertensive emergency.    #Hypertensive Emergency  BPs elevated to 180s/120s overnight and required additional hydralazine. HTN also likely exacerbated by pain. Started on nicardipine gtt for BP control.   - Recommend initiating Losartan 50mg QD  - c/w Hydralazine 75mg in AM, 25mg in PM  - c/w Toprol 50mg BID    #Perioperative Risk Stratification  - EKG: NSR @83 bpm, 1st degree AV block, RBBB, unchanged form admission  - ECHO: normal LV,  RV function borderline reduced, PASP 48 mmHg, mildly dilated aortic root.  - Stress test about 1 1/2 years ago and it was normal per patient (Dr. Gonzales and Dr. Lemos (EP) as an outpatient.   - Uses cane s/p multiple back surgeries which limits his functional capacity METS~4  - Patient initially intermediate risk for intermediate risk procedure, however subsequent flash pulmonary edema 2/2 TRALI vs hypertensive emergency with newly depressed EF 35% and RWA, now elevated to high risk for intermediate risk procedure.  - Recommend cardiac anesthesia if undergoing surgery vs percutaneous cholecystostomy    #r/o ACS/HFrEF  Noted to have elevated cardiac biomarkers and TTE with RWA and newly depressed EF 35% without ischemic ECG changes.    Unlikely Type 1 MI s/p recent stress test that was negative.  - Daily EKGs  - Recommend ruling out obstructive CAD with CCTA vs LHC after repeat TTE if EF not improved.   - Diuresis as per primary team   - Repeat echocardiogram postoperatively    #Atrial Fibrillation  On Eliquis for AC at home. CHADSVASC 4 (CHF, AAA, Age, HTN)  - c/w IV heparin - can be held for procedure/      Case discussed with Cardiology consult attending.

## 2022-08-11 NOTE — PROGRESS NOTE ADULT - SUBJECTIVE AND OBJECTIVE BOX
SUBJECTIVE: Doing well this AM. Pt persistently tachycardic to 100s-110s w/ elevated SBPs. Denies n/v. Endorses f/bm. Denies cp/sob. Pain is well controlled.      MEDICATIONS  (STANDING):  cefTRIAXone   IVPB      cefTRIAXone   IVPB 2000 milliGRAM(s) IV Intermittent every 24 hours  chlorhexidine 2% Cloths 1 Application(s) Topical <User Schedule>  doxazosin 4 milliGRAM(s) Oral at bedtime  finasteride 5 milliGRAM(s) Oral daily  folic acid 1 milliGRAM(s) Oral daily  hydrALAZINE 25 milliGRAM(s) Oral <User Schedule>  hydrALAZINE 25 milliGRAM(s) Oral once  hydrALAZINE 75 milliGRAM(s) Oral <User Schedule>  melatonin 5 milliGRAM(s) Oral at bedtime  metoprolol succinate ER 50 milliGRAM(s) Oral two times a day  metroNIDAZOLE  IVPB      metroNIDAZOLE  IVPB 500 milliGRAM(s) IV Intermittent every 8 hours  nortriptyline 75 milliGRAM(s) Oral at bedtime  oxyCODONE  ER Tablet 20 milliGRAM(s) Oral every 8 hours  pantoprazole    Tablet 40 milliGRAM(s) Oral before breakfast  polyethylene glycol 3350 17 Gram(s) Oral daily  simvastatin 10 milliGRAM(s) Oral at bedtime    MEDICATIONS  (PRN):  acetaminophen     Tablet .. 650 milliGRAM(s) Oral every 6 hours PRN Temp greater or equal to 38C (100.4F), Mild Pain (1 - 3), Moderate Pain (4 - 6)  polyethylene glycol 3350 17 Gram(s) Oral daily PRN Constipation      Vital Signs Last 24 Hrs  T(C): 36.4 (11 Aug 2022 09:15), Max: 37.2 (10 Aug 2022 16:59)  T(F): 97.5 (11 Aug 2022 09:15), Max: 98.9 (10 Aug 2022 16:59)  HR: 112 (11 Aug 2022 10:00) (95 - 118)  BP: 176/119 (11 Aug 2022 10:00) (111/81 - 199/106)  BP(mean): 142 (11 Aug 2022 10:00) (92 - 147)  RR: 28 (11 Aug 2022 10:00) (18 - 36)  SpO2: 100% (11 Aug 2022 10:00) (97% - 100%)    Parameters below as of 11 Aug 2022 10:00  Patient On (Oxygen Delivery Method): room air        Physical Exam:  General: NAD, resting comfortably in bed  Pulmonary: Nonlabored breathing, no respiratory distress, 6L NC  Cardiovascular: Sinus tachy  Abdominal: soft, mild RUQ TTP, ND  Extremities: WWP, normal strength  Neuro: A/O x 3, CNs II-XII grossly intact, no focal deficits    I&O's Summary    10 Aug 2022 07:01  -  11 Aug 2022 07:00  --------------------------------------------------------  IN: 761.2 mL / OUT: 1305 mL / NET: -543.8 mL    11 Aug 2022 07:01  -  11 Aug 2022 11:01  --------------------------------------------------------  IN: 0 mL / OUT: 80 mL / NET: -80 mL        LABS:                        10.7   12.97 )-----------( 228      ( 11 Aug 2022 04:48 )             32.2     08-11    135  |  97  |  30<H>  ----------------------------<  149<H>  4.0   |  23  |  1.04    Ca    8.5      11 Aug 2022 05:54  Phos  2.2     08-11  Mg     2.2     08-11    TPro  7.0  /  Alb  3.0<L>  /  TBili  0.5  /  DBili  x   /  AST  31  /  ALT  15  /  AlkPhos  113  08-11    PT/INR - ( 11 Aug 2022 08:23 )   PT: 17.2 sec;   INR: 1.44          PTT - ( 11 Aug 2022 08:23 )  PTT:49.0 sec    CAPILLARY BLOOD GLUCOSE        LIVER FUNCTIONS - ( 11 Aug 2022 04:48 )  Alb: 3.0 g/dL / Pro: 7.0 g/dL / ALK PHOS: 113 U/L / ALT: 15 U/L / AST: 31 U/L / GGT: x             RADIOLOGY & ADDITIONAL STUDIES:

## 2022-08-11 NOTE — PROGRESS NOTE ADULT - ASSESSMENT
71 y/o male with complicated medical history presents with 1 day history of symptomatic acute cholecystitis vs. choledocholithiasis. Now s/p 3U FFP i/s/o elevated INR. Pt then was noted to be hypertensive, with lung infiltrates and hypoxia, elevated troponins and transferred to MICU for further management. Pt transiently on cardene drip for pressure control. Pt still having abdominal pain despite antibiotic therapy indicative of medical management failure. Perc yonis indicated at this time.    No acute surgical intervention at this time  IR plan for perc yonis today  Rest of plan per MICU

## 2022-08-11 NOTE — PROGRESS NOTE ADULT - SUBJECTIVE AND OBJECTIVE BOX
INTERVAL HPI/OVERNIGHT EVENTS:  O/N: Patient hypertensive overnight requiring hydralazine. Held for IR procedure. NPO at midnight  This morning: Patient was seen and examined at bedside. Patient still reporting abdominal pain. Patient aware of plan for percutaneous cholecystostomy.     VITAL SIGNS:  T(F): 97.9 (08-12-22 @ 05:36)  HR: 72 (08-12-22 @ 06:00)  BP: 96/57 (08-12-22 @ 00:00)  RR: 18 (08-12-22 @ 06:00)  SpO2: 96% (08-12-22 @ 06:00)  Wt(kg): --    PHYSICAL EXAM:    Constitutional: NAD, comfortable in bed.  HEENT: NC/AT, PERRLA, EOMI, no scleral icterus,   Neck: Supple  Respiratory: CTA B/L. No w/r/r.   Cardiovascular: RRR, normal S1 and S2, no m/r/g.   Gastrointestinal: +BS, abdomen mildly distened with diffuse tenderness to palpation, worst at RUQ., no guarding or rebound tenderness, Palpable mass as L periumbilical region consistent w/ known pain management device.   Extremities: wwp; no cyanosis, clubbing or edema.   Vascular: Pulses equal and strong throughout.   Neurological: AAOx3, no CN deficits, strength and sensation intact throughout.   Skin: No gross skin abnormalities or rashes. No jaundice    MEDICATIONS  (STANDING):  cefTRIAXone   IVPB      cefTRIAXone   IVPB 2000 milliGRAM(s) IV Intermittent every 24 hours  chlorhexidine 2% Cloths 1 Application(s) Topical <User Schedule>  doxazosin 4 milliGRAM(s) Oral at bedtime  finasteride 5 milliGRAM(s) Oral daily  folic acid 1 milliGRAM(s) Oral daily  hydrALAZINE 50 milliGRAM(s) Oral <User Schedule>  hydrALAZINE 75 milliGRAM(s) Oral <User Schedule>  melatonin 5 milliGRAM(s) Oral at bedtime  metoprolol succinate ER 50 milliGRAM(s) Oral two times a day  metroNIDAZOLE  IVPB      metroNIDAZOLE  IVPB 500 milliGRAM(s) IV Intermittent every 8 hours  nortriptyline 75 milliGRAM(s) Oral at bedtime  oxyCODONE  ER Tablet 20 milliGRAM(s) Oral every 8 hours  pantoprazole    Tablet 40 milliGRAM(s) Oral before breakfast  polyethylene glycol 3350 17 Gram(s) Oral daily  potassium chloride  10 mEq/100 mL IVPB 10 milliEquivalent(s) IV Intermittent every 1 hour  potassium phosphate IVPB 30 milliMole(s) IV Intermittent once  simvastatin 10 milliGRAM(s) Oral at bedtime    MEDICATIONS  (PRN):  acetaminophen     Tablet .. 650 milliGRAM(s) Oral every 6 hours PRN Temp greater or equal to 38C (100.4F), Mild Pain (1 - 3), Moderate Pain (4 - 6)  polyethylene glycol 3350 17 Gram(s) Oral daily PRN Constipation      Allergies    Altace (Unknown)  penicillin (Unknown)    Intolerances        LABS:                        9.2    7.51  )-----------( 220      ( 12 Aug 2022 05:09 )             28.2     08-12    135  |  100  |  30<H>  ----------------------------<  156<H>  3.4<L>   |  27  |  1.02    Ca    8.2<L>      12 Aug 2022 05:09  Phos  1.7     08-12  Mg     2.1     08-12    TPro  5.9<L>  /  Alb  2.5<L>  /  TBili  0.3  /  DBili  0.2  /  AST  19  /  ALT  12  /  AlkPhos  94  08-12    PT/INR - ( 11 Aug 2022 08:23 )   PT: 17.2 sec;   INR: 1.44          PTT - ( 11 Aug 2022 08:23 )  PTT:49.0 sec          Culture - Body Fluid with Gram Stain (collected 08-11-22 @ 12:20)  Source: Bile Bile  Gram Stain (08-11-22 @ 15:49):    No organisms seen    Rare WBC's        RADIOLOGY & ADDITIONAL TESTS:  Reviewed

## 2022-08-11 NOTE — PROGRESS NOTE ADULT - SUBJECTIVE AND OBJECTIVE BOX
INTERVAL HPI/OVERNIGHT EVENTS: Hypertensive overnight - requiring additional hydralazine doses. Telemetry significant for infrequent PVCs and sinus tachycardia.    SUBJECTIVE: Patient seen and examined at bedside. Endorses RUQ abdominal pain, mental status waxing and waning,  denies chest pain, SOB.    OBJECTIVE:    VITAL SIGNS:  ICU Vital Signs Last 24 Hrs  T(C): 36.4 (11 Aug 2022 09:15), Max: 37.2 (10 Aug 2022 16:59)  T(F): 97.5 (11 Aug 2022 09:15), Max: 98.9 (10 Aug 2022 16:59)  HR: 107 (11 Aug 2022 13:00) (95 - 118)  BP: 143/82 (11 Aug 2022 12:50) (140/85 - 202/126)  BP(mean): 105 (11 Aug 2022 12:50) (105 - 156)  ABP: 135/66 (11 Aug 2022 13:00) (123/71 - 135/66)  ABP(mean): 92 (11 Aug 2022 13:00) (91 - 92)  RR: 43 (11 Aug 2022 13:00) (18 - 43)  SpO2: 97% (11 Aug 2022 13:00) (97% - 100%)    O2 Parameters below as of 11 Aug 2022 11:42  Patient On (Oxygen Delivery Method): nasal cannula  O2 Flow (L/min): 3            08-10 @ 07:01 - 08-11 @ 07:00  --------------------------------------------------------  IN: 761.2 mL / OUT: 1305 mL / NET: -543.8 mL    08-11 @ 07:01  -  08-11 @ 13:28  --------------------------------------------------------  IN: 50 mL / OUT: 240 mL / NET: -190 mL      CAPILLARY BLOOD GLUCOSE      POCT Blood Glucose.: 115 mg/dL (11 Aug 2022 11:14)      PHYSICAL EXAM:    GENERAL: NAD, speaks in full sentences, no signs of respiratory distress  HEAD:  Atraumatic, Normocephalic  EYES: EOMI, PERRLA, conjunctiva and sclera clear  NECK: Supple,  +JVP  CHEST/LUNG: Fine bibasilar crackles  HEART: Regular rate and rhythm; No murmurs;   ABDOMEN: Soft, Nontender, Nondistended; Bowel sounds present; No guarding  EXTREMITIES:  2+ Peripheral Pulses, 1-2+ edema   PSYCH: AAOx2  NEUROLOGY: non-focal  SKIN: No rashes or lesions       MEDICATIONS:  MEDICATIONS  (STANDING):  cefTRIAXone   IVPB      cefTRIAXone   IVPB 2000 milliGRAM(s) IV Intermittent every 24 hours  chlorhexidine 2% Cloths 1 Application(s) Topical <User Schedule>  doxazosin 4 milliGRAM(s) Oral at bedtime  finasteride 5 milliGRAM(s) Oral daily  folic acid 1 milliGRAM(s) Oral daily  hydrALAZINE 25 milliGRAM(s) Oral <User Schedule>  hydrALAZINE 75 milliGRAM(s) Oral <User Schedule>  melatonin 5 milliGRAM(s) Oral at bedtime  metoprolol succinate ER 50 milliGRAM(s) Oral two times a day  metroNIDAZOLE  IVPB      metroNIDAZOLE  IVPB 500 milliGRAM(s) IV Intermittent every 8 hours  niCARdipine Infusion 5 mG/Hr (25 mL/Hr) IV Continuous <Continuous>  nortriptyline 75 milliGRAM(s) Oral at bedtime  oxyCODONE  ER Tablet 20 milliGRAM(s) Oral every 8 hours  pantoprazole    Tablet 40 milliGRAM(s) Oral before breakfast  polyethylene glycol 3350 17 Gram(s) Oral daily  simvastatin 10 milliGRAM(s) Oral at bedtime    MEDICATIONS  (PRN):  acetaminophen     Tablet .. 650 milliGRAM(s) Oral every 6 hours PRN Temp greater or equal to 38C (100.4F), Mild Pain (1 - 3), Moderate Pain (4 - 6)  polyethylene glycol 3350 17 Gram(s) Oral daily PRN Constipation      ALLERGIES:  Allergies    Altace (Unknown)  penicillin (Unknown)    Intolerances        LABS:                        10.7   12.97 )-----------( 228      ( 11 Aug 2022 04:48 )             32.2     08-11    135  |  97  |  30<H>  ----------------------------<  149<H>  4.0   |  23  |  1.04    Ca    8.5      11 Aug 2022 05:54  Phos  2.2     08-11  Mg     2.2     08-11    TPro  7.0  /  Alb  3.0<L>  /  TBili  0.5  /  DBili  x   /  AST  31  /  ALT  15  /  AlkPhos  113  08-11    LIVER FUNCTIONS - ( 11 Aug 2022 04:48 )  Alb: 3.0 g/dL / Pro: 7.0 g/dL / ALK PHOS: 113 U/L / ALT: 15 U/L / AST: 31 U/L / GGT: x           PT/INR - ( 11 Aug 2022 08:23 )   PT: 17.2 sec;   INR: 1.44          PTT - ( 11 Aug 2022 08:23 )  PTT:49.0 sec    CARDIAC MARKERS ( 10 Aug 2022 05:30 )  x     / 0.10 ng/mL / 129 U/L / x     / 5.5 ng/mL  CARDIAC MARKERS ( 09 Aug 2022 18:51 )  x     / 0.16 ng/mL / 208 U/L / x     / 7.0 ng/mL  CARDIAC MARKERS ( 09 Aug 2022 14:05 )  x     / 0.15 ng/mL / 239 U/L / x     / 9.0 ng/mL        RADIOLOGY & ADDITIONAL TESTS: Reviewed.

## 2022-08-12 DIAGNOSIS — E78.5 HYPERLIPIDEMIA, UNSPECIFIED: ICD-10-CM

## 2022-08-12 DIAGNOSIS — M54.9 DORSALGIA, UNSPECIFIED: ICD-10-CM

## 2022-08-12 DIAGNOSIS — J81.0 ACUTE PULMONARY EDEMA: ICD-10-CM

## 2022-08-12 DIAGNOSIS — I51.81 TAKOTSUBO SYNDROME: ICD-10-CM

## 2022-08-12 DIAGNOSIS — R63.8 OTHER SYMPTOMS AND SIGNS CONCERNING FOOD AND FLUID INTAKE: ICD-10-CM

## 2022-08-12 DIAGNOSIS — I16.1 HYPERTENSIVE EMERGENCY: ICD-10-CM

## 2022-08-12 LAB
ALBUMIN SERPL ELPH-MCNC: 2.5 G/DL — LOW (ref 3.3–5)
ALP SERPL-CCNC: 94 U/L — SIGNIFICANT CHANGE UP (ref 40–120)
ALT FLD-CCNC: 12 U/L — SIGNIFICANT CHANGE UP (ref 10–45)
ANION GAP SERPL CALC-SCNC: 8 MMOL/L — SIGNIFICANT CHANGE UP (ref 5–17)
AST SERPL-CCNC: 19 U/L — SIGNIFICANT CHANGE UP (ref 10–40)
BASOPHILS # BLD AUTO: 0.02 K/UL — SIGNIFICANT CHANGE UP (ref 0–0.2)
BASOPHILS NFR BLD AUTO: 0.3 % — SIGNIFICANT CHANGE UP (ref 0–2)
BILIRUB DIRECT SERPL-MCNC: 0.2 MG/DL — SIGNIFICANT CHANGE UP (ref 0–0.3)
BILIRUB SERPL-MCNC: 0.3 MG/DL — SIGNIFICANT CHANGE UP (ref 0.2–1.2)
BUN SERPL-MCNC: 30 MG/DL — HIGH (ref 7–23)
CALCIUM SERPL-MCNC: 8.2 MG/DL — LOW (ref 8.4–10.5)
CHLORIDE SERPL-SCNC: 100 MMOL/L — SIGNIFICANT CHANGE UP (ref 96–108)
CO2 SERPL-SCNC: 27 MMOL/L — SIGNIFICANT CHANGE UP (ref 22–31)
CREAT SERPL-MCNC: 1.02 MG/DL — SIGNIFICANT CHANGE UP (ref 0.5–1.3)
EGFR: 78 ML/MIN/1.73M2 — SIGNIFICANT CHANGE UP
EOSINOPHIL # BLD AUTO: 0.14 K/UL — SIGNIFICANT CHANGE UP (ref 0–0.5)
EOSINOPHIL NFR BLD AUTO: 1.9 % — SIGNIFICANT CHANGE UP (ref 0–6)
GLUCOSE SERPL-MCNC: 156 MG/DL — HIGH (ref 70–99)
HCT VFR BLD CALC: 28.2 % — LOW (ref 39–50)
HGB BLD-MCNC: 9.2 G/DL — LOW (ref 13–17)
IMM GRANULOCYTES NFR BLD AUTO: 0.7 % — SIGNIFICANT CHANGE UP (ref 0–1.5)
LYMPHOCYTES # BLD AUTO: 0.72 K/UL — LOW (ref 1–3.3)
LYMPHOCYTES # BLD AUTO: 9.6 % — LOW (ref 13–44)
MAGNESIUM SERPL-MCNC: 2.1 MG/DL — SIGNIFICANT CHANGE UP (ref 1.6–2.6)
MCHC RBC-ENTMCNC: 28.8 PG — SIGNIFICANT CHANGE UP (ref 27–34)
MCHC RBC-ENTMCNC: 32.6 GM/DL — SIGNIFICANT CHANGE UP (ref 32–36)
MCV RBC AUTO: 88.4 FL — SIGNIFICANT CHANGE UP (ref 80–100)
MONOCYTES # BLD AUTO: 0.8 K/UL — SIGNIFICANT CHANGE UP (ref 0–0.9)
MONOCYTES NFR BLD AUTO: 10.7 % — SIGNIFICANT CHANGE UP (ref 2–14)
NEUTROPHILS # BLD AUTO: 5.78 K/UL — SIGNIFICANT CHANGE UP (ref 1.8–7.4)
NEUTROPHILS NFR BLD AUTO: 76.8 % — SIGNIFICANT CHANGE UP (ref 43–77)
NRBC # BLD: 0 /100 WBCS — SIGNIFICANT CHANGE UP (ref 0–0)
PHOSPHATE SERPL-MCNC: 1.7 MG/DL — LOW (ref 2.5–4.5)
PLATELET # BLD AUTO: 220 K/UL — SIGNIFICANT CHANGE UP (ref 150–400)
POTASSIUM SERPL-MCNC: 3.4 MMOL/L — LOW (ref 3.5–5.3)
POTASSIUM SERPL-SCNC: 3.4 MMOL/L — LOW (ref 3.5–5.3)
PROT SERPL-MCNC: 5.9 G/DL — LOW (ref 6–8.3)
RBC # BLD: 3.19 M/UL — LOW (ref 4.2–5.8)
RBC # FLD: 13.4 % — SIGNIFICANT CHANGE UP (ref 10.3–14.5)
SODIUM SERPL-SCNC: 135 MMOL/L — SIGNIFICANT CHANGE UP (ref 135–145)
WBC # BLD: 7.51 K/UL — SIGNIFICANT CHANGE UP (ref 3.8–10.5)
WBC # FLD AUTO: 7.51 K/UL — SIGNIFICANT CHANGE UP (ref 3.8–10.5)

## 2022-08-12 PROCEDURE — 93010 ELECTROCARDIOGRAM REPORT: CPT

## 2022-08-12 PROCEDURE — 99223 1ST HOSP IP/OBS HIGH 75: CPT | Mod: GC

## 2022-08-12 PROCEDURE — 99233 SBSQ HOSP IP/OBS HIGH 50: CPT | Mod: GC

## 2022-08-12 PROCEDURE — 99233 SBSQ HOSP IP/OBS HIGH 50: CPT

## 2022-08-12 PROCEDURE — 93308 TTE F-UP OR LMTD: CPT | Mod: 26

## 2022-08-12 RX ORDER — OXYCODONE HYDROCHLORIDE 5 MG/1
15 TABLET ORAL ONCE
Refills: 0 | Status: DISCONTINUED | OUTPATIENT
Start: 2022-08-12 | End: 2022-08-12

## 2022-08-12 RX ORDER — HYDRALAZINE HCL 50 MG
75 TABLET ORAL
Refills: 0 | Status: DISCONTINUED | OUTPATIENT
Start: 2022-08-12 | End: 2022-08-16

## 2022-08-12 RX ORDER — ENOXAPARIN SODIUM 100 MG/ML
40 INJECTION SUBCUTANEOUS EVERY 24 HOURS
Refills: 0 | Status: DISCONTINUED | OUTPATIENT
Start: 2022-08-12 | End: 2022-08-12

## 2022-08-12 RX ORDER — POTASSIUM CHLORIDE 20 MEQ
40 PACKET (EA) ORAL ONCE
Refills: 0 | Status: DISCONTINUED | OUTPATIENT
Start: 2022-08-12 | End: 2022-08-12

## 2022-08-12 RX ORDER — FAMOTIDINE 10 MG/ML
20 INJECTION INTRAVENOUS DAILY
Refills: 0 | Status: DISCONTINUED | OUTPATIENT
Start: 2022-08-12 | End: 2022-08-16

## 2022-08-12 RX ORDER — POTASSIUM CHLORIDE 20 MEQ
10 PACKET (EA) ORAL
Refills: 0 | Status: COMPLETED | OUTPATIENT
Start: 2022-08-12 | End: 2022-08-12

## 2022-08-12 RX ORDER — APIXABAN 2.5 MG/1
5 TABLET, FILM COATED ORAL EVERY 12 HOURS
Refills: 0 | Status: DISCONTINUED | OUTPATIENT
Start: 2022-08-12 | End: 2022-08-16

## 2022-08-12 RX ORDER — HYDRALAZINE HCL 50 MG
50 TABLET ORAL
Refills: 0 | Status: DISCONTINUED | OUTPATIENT
Start: 2022-08-12 | End: 2022-08-16

## 2022-08-12 RX ORDER — POTASSIUM PHOSPHATE, MONOBASIC POTASSIUM PHOSPHATE, DIBASIC 236; 224 MG/ML; MG/ML
30 INJECTION, SOLUTION INTRAVENOUS ONCE
Refills: 0 | Status: COMPLETED | OUTPATIENT
Start: 2022-08-12 | End: 2022-08-12

## 2022-08-12 RX ADMIN — Medication 100 MILLIEQUIVALENT(S): at 06:17

## 2022-08-12 RX ADMIN — Medication 75 MILLIGRAM(S): at 09:46

## 2022-08-12 RX ADMIN — Medication 50 MILLIGRAM(S): at 22:08

## 2022-08-12 RX ADMIN — OXYCODONE HYDROCHLORIDE 20 MILLIGRAM(S): 5 TABLET ORAL at 13:15

## 2022-08-12 RX ADMIN — OXYCODONE HYDROCHLORIDE 20 MILLIGRAM(S): 5 TABLET ORAL at 12:12

## 2022-08-12 RX ADMIN — OXYCODONE HYDROCHLORIDE 20 MILLIGRAM(S): 5 TABLET ORAL at 19:48

## 2022-08-12 RX ADMIN — Medication 1 MILLIGRAM(S): at 12:12

## 2022-08-12 RX ADMIN — APIXABAN 5 MILLIGRAM(S): 2.5 TABLET, FILM COATED ORAL at 18:19

## 2022-08-12 RX ADMIN — FAMOTIDINE 20 MILLIGRAM(S): 10 INJECTION INTRAVENOUS at 23:11

## 2022-08-12 RX ADMIN — Medication 50 MILLIGRAM(S): at 19:19

## 2022-08-12 RX ADMIN — POLYETHYLENE GLYCOL 3350 17 GRAM(S): 17 POWDER, FOR SOLUTION ORAL at 12:13

## 2022-08-12 RX ADMIN — Medication 100 MILLIGRAM(S): at 22:08

## 2022-08-12 RX ADMIN — OXYCODONE HYDROCHLORIDE 15 MILLIGRAM(S): 5 TABLET ORAL at 05:20

## 2022-08-12 RX ADMIN — CHLORHEXIDINE GLUCONATE 1 APPLICATION(S): 213 SOLUTION TOPICAL at 05:16

## 2022-08-12 RX ADMIN — Medication 100 MILLIEQUIVALENT(S): at 09:10

## 2022-08-12 RX ADMIN — OXYCODONE HYDROCHLORIDE 20 MILLIGRAM(S): 5 TABLET ORAL at 19:18

## 2022-08-12 RX ADMIN — Medication 100 MILLIEQUIVALENT(S): at 07:31

## 2022-08-12 RX ADMIN — Medication 50 MILLIGRAM(S): at 12:58

## 2022-08-12 RX ADMIN — PANTOPRAZOLE SODIUM 40 MILLIGRAM(S): 20 TABLET, DELAYED RELEASE ORAL at 06:39

## 2022-08-12 RX ADMIN — OXYCODONE HYDROCHLORIDE 15 MILLIGRAM(S): 5 TABLET ORAL at 04:53

## 2022-08-12 RX ADMIN — NORTRIPTYLINE HYDROCHLORIDE 75 MILLIGRAM(S): 10 CAPSULE ORAL at 22:11

## 2022-08-12 RX ADMIN — SIMVASTATIN 10 MILLIGRAM(S): 20 TABLET, FILM COATED ORAL at 22:12

## 2022-08-12 RX ADMIN — Medication 4 MILLIGRAM(S): at 22:11

## 2022-08-12 RX ADMIN — Medication 100 MILLIGRAM(S): at 05:15

## 2022-08-12 RX ADMIN — POTASSIUM PHOSPHATE, MONOBASIC POTASSIUM PHOSPHATE, DIBASIC 83.33 MILLIMOLE(S): 236; 224 INJECTION, SOLUTION INTRAVENOUS at 06:59

## 2022-08-12 RX ADMIN — OXYCODONE HYDROCHLORIDE 20 MILLIGRAM(S): 5 TABLET ORAL at 00:22

## 2022-08-12 RX ADMIN — FINASTERIDE 5 MILLIGRAM(S): 5 TABLET, FILM COATED ORAL at 12:12

## 2022-08-12 RX ADMIN — Medication 5 MILLIGRAM(S): at 22:08

## 2022-08-12 RX ADMIN — CEFTRIAXONE 100 MILLIGRAM(S): 500 INJECTION, POWDER, FOR SOLUTION INTRAMUSCULAR; INTRAVENOUS at 18:19

## 2022-08-12 RX ADMIN — Medication 100 MILLIGRAM(S): at 13:28

## 2022-08-12 NOTE — PROGRESS NOTE ADULT - SUBJECTIVE AND OBJECTIVE BOX
INTERVAL HPI/OVERNIGHT EVENTS:    SUBJECTIVE: Patient seen and examined at bedside.    OBJECTIVE:    VITAL SIGNS:  ICU Vital Signs Last 24 Hrs  T(C): 36.4 (12 Aug 2022 10:04), Max: 36.6 (11 Aug 2022 18:34)  T(F): 97.5 (12 Aug 2022 10:04), Max: 97.9 (11 Aug 2022 18:34)  HR: 65 (12 Aug 2022 11:00) (60 - 119)  BP: 126/81 (12 Aug 2022 11:00) (96/57 - 143/82)  BP(mean): 98 (12 Aug 2022 11:00) (66 - 105)  ABP: 114/53 (12 Aug 2022 10:00) (95/52 - 177/93)  ABP(mean): 75 (12 Aug 2022 10:00) (66 - 123)  RR: 16 (12 Aug 2022 11:00) (14 - 38)  SpO2: 95% (12 Aug 2022 11:00) (95% - 100%)    O2 Parameters below as of 12 Aug 2022 11:00  Patient On (Oxygen Delivery Method): room air              08-11 @ 07:01  -  08-12 @ 07:00  --------------------------------------------------------  IN: 955.8 mL / OUT: 1395 mL / NET: -439.2 mL    08-12 @ 07:01 - 08-12 @ 12:40  --------------------------------------------------------  IN: 349.9 mL / OUT: 70 mL / NET: 279.9 mL      CAPILLARY BLOOD GLUCOSE      POCT Blood Glucose.: 115 mg/dL (11 Aug 2022 11:14)      PHYSICAL EXAM:    General: WDWN ; NAD  HEENT: NC/AT; PERRL, anicteric sclera  Neck: supple, no JVD  Respiratory: CTA B/L; no W/R/R  Cardiovascular: +S1/S2; RRR; no M/R/G  Gastrointestinal: soft, NT/ND; +BS x4  Extremities: WWP; 2+ peripheral pulses B/L; no LE edema  Skin: normal color and turgor; no rash  Neurological:     MEDICATIONS:  MEDICATIONS  (STANDING):  apixaban 5 milliGRAM(s) Oral every 12 hours  cefTRIAXone   IVPB      cefTRIAXone   IVPB 2000 milliGRAM(s) IV Intermittent every 24 hours  chlorhexidine 2% Cloths 1 Application(s) Topical <User Schedule>  doxazosin 4 milliGRAM(s) Oral at bedtime  finasteride 5 milliGRAM(s) Oral daily  folic acid 1 milliGRAM(s) Oral daily  hydrALAZINE 50 milliGRAM(s) Oral <User Schedule>  hydrALAZINE 75 milliGRAM(s) Oral <User Schedule>  melatonin 5 milliGRAM(s) Oral at bedtime  metoprolol succinate ER 50 milliGRAM(s) Oral two times a day  metroNIDAZOLE  IVPB      metroNIDAZOLE  IVPB 500 milliGRAM(s) IV Intermittent every 8 hours  nortriptyline 75 milliGRAM(s) Oral at bedtime  oxyCODONE  ER Tablet 20 milliGRAM(s) Oral every 8 hours  pantoprazole    Tablet 40 milliGRAM(s) Oral before breakfast  polyethylene glycol 3350 17 Gram(s) Oral daily  simvastatin 10 milliGRAM(s) Oral at bedtime    MEDICATIONS  (PRN):  acetaminophen     Tablet .. 650 milliGRAM(s) Oral every 6 hours PRN Temp greater or equal to 38C (100.4F), Mild Pain (1 - 3), Moderate Pain (4 - 6)  polyethylene glycol 3350 17 Gram(s) Oral daily PRN Constipation      ALLERGIES:  Allergies    Altace (Unknown)  penicillin (Unknown)    Intolerances        LABS:                        9.2    7.51  )-----------( 220      ( 12 Aug 2022 05:09 )             28.2     08-12    135  |  100  |  30<H>  ----------------------------<  156<H>  3.4<L>   |  27  |  1.02    Ca    8.2<L>      12 Aug 2022 05:09  Phos  1.7     08-12  Mg     2.1     08-12    TPro  5.9<L>  /  Alb  2.5<L>  /  TBili  0.3  /  DBili  0.2  /  AST  19  /  ALT  12  /  AlkPhos  94  08-12    LIVER FUNCTIONS - ( 12 Aug 2022 05:09 )  Alb: 2.5 g/dL / Pro: 5.9 g/dL / ALK PHOS: 94 U/L / ALT: 12 U/L / AST: 19 U/L / GGT: x           PT/INR - ( 11 Aug 2022 08:23 )   PT: 17.2 sec;   INR: 1.44          PTT - ( 11 Aug 2022 08:23 )  PTT:49.0 sec          RADIOLOGY & ADDITIONAL TESTS: Reviewed.   INTERVAL HPI/OVERNIGHT EVENTS: Telemetry - sinus tachycardia with APCs. Off nicardipine gtt and increased hydralazine dosing. s/p Perc cholecystostomy.     SUBJECTIVE: Patient seen and examined at bedside. Denies chest pain, SOB, abdominal pain, changes in mental status.     OBJECTIVE:    VITAL SIGNS:  ICU Vital Signs Last 24 Hrs  T(C): 36.4 (12 Aug 2022 10:04), Max: 36.6 (11 Aug 2022 18:34)  T(F): 97.5 (12 Aug 2022 10:04), Max: 97.9 (11 Aug 2022 18:34)  HR: 65 (12 Aug 2022 11:00) (60 - 119)  BP: 126/81 (12 Aug 2022 11:00) (96/57 - 143/82)  BP(mean): 98 (12 Aug 2022 11:00) (66 - 105)  ABP: 114/53 (12 Aug 2022 10:00) (95/52 - 177/93)  ABP(mean): 75 (12 Aug 2022 10:00) (66 - 123)  RR: 16 (12 Aug 2022 11:00) (14 - 38)  SpO2: 95% (12 Aug 2022 11:00) (95% - 100%)    O2 Parameters below as of 12 Aug 2022 11:00  Patient On (Oxygen Delivery Method): room air              08-11 @ 07:01  -  08-12 @ 07:00  --------------------------------------------------------  IN: 955.8 mL / OUT: 1395 mL / NET: -439.2 mL    08-12 @ 07:01  -  08-12 @ 12:40  --------------------------------------------------------  IN: 349.9 mL / OUT: 70 mL / NET: 279.9 mL      CAPILLARY BLOOD GLUCOSE      POCT Blood Glucose.: 115 mg/dL (11 Aug 2022 11:14)      PHYSICAL EXAM:  GENERAL: Elderly  male in NAD, speaks in full sentences, no signs of respiratory distress  HEAD:  Atraumatic, Normocephalic  EYES: EOMI, PERRLA, conjunctiva and sclera clear  NECK: Supple,  +JVP  CHEST/LUNG: Fine bibasilar crackles  HEART: Regular rate and rhythm; No murmurs;   ABDOMEN: Soft, Nontender, Nondistended; Bowel sounds present; No guarding  EXTREMITIES:  2+ Peripheral Pulses, 1-2+ edema   PSYCH: AAOx2  NEUROLOGY: non-focal  SKIN: No rashes or lesions    MEDICATIONS:  MEDICATIONS  (STANDING):  apixaban 5 milliGRAM(s) Oral every 12 hours  cefTRIAXone   IVPB      cefTRIAXone   IVPB 2000 milliGRAM(s) IV Intermittent every 24 hours  chlorhexidine 2% Cloths 1 Application(s) Topical <User Schedule>  doxazosin 4 milliGRAM(s) Oral at bedtime  finasteride 5 milliGRAM(s) Oral daily  folic acid 1 milliGRAM(s) Oral daily  hydrALAZINE 50 milliGRAM(s) Oral <User Schedule>  hydrALAZINE 75 milliGRAM(s) Oral <User Schedule>  melatonin 5 milliGRAM(s) Oral at bedtime  metoprolol succinate ER 50 milliGRAM(s) Oral two times a day  metroNIDAZOLE  IVPB      metroNIDAZOLE  IVPB 500 milliGRAM(s) IV Intermittent every 8 hours  nortriptyline 75 milliGRAM(s) Oral at bedtime  oxyCODONE  ER Tablet 20 milliGRAM(s) Oral every 8 hours  pantoprazole    Tablet 40 milliGRAM(s) Oral before breakfast  polyethylene glycol 3350 17 Gram(s) Oral daily  simvastatin 10 milliGRAM(s) Oral at bedtime    MEDICATIONS  (PRN):  acetaminophen     Tablet .. 650 milliGRAM(s) Oral every 6 hours PRN Temp greater or equal to 38C (100.4F), Mild Pain (1 - 3), Moderate Pain (4 - 6)  polyethylene glycol 3350 17 Gram(s) Oral daily PRN Constipation      ALLERGIES:  Allergies    Altace (Unknown)  penicillin (Unknown)    Intolerances        LABS:                        9.2    7.51  )-----------( 220      ( 12 Aug 2022 05:09 )             28.2     08-12    135  |  100  |  30<H>  ----------------------------<  156<H>  3.4<L>   |  27  |  1.02    Ca    8.2<L>      12 Aug 2022 05:09  Phos  1.7     08-12  Mg     2.1     08-12    TPro  5.9<L>  /  Alb  2.5<L>  /  TBili  0.3  /  DBili  0.2  /  AST  19  /  ALT  12  /  AlkPhos  94  08-12    LIVER FUNCTIONS - ( 12 Aug 2022 05:09 )  Alb: 2.5 g/dL / Pro: 5.9 g/dL / ALK PHOS: 94 U/L / ALT: 12 U/L / AST: 19 U/L / GGT: x           PT/INR - ( 11 Aug 2022 08:23 )   PT: 17.2 sec;   INR: 1.44          PTT - ( 11 Aug 2022 08:23 )  PTT:49.0 sec          RADIOLOGY & ADDITIONAL TESTS: Reviewed.

## 2022-08-12 NOTE — PROGRESS NOTE ADULT - PROBLEM SELECTOR PLAN 6
Pt w/ hx of spinal fusion and s/p failed intrathecal morphine pump which caused bradycardia   Home med: oxycontin 20mg TID, oxycodone 15mg TID  - c/w home oxycontin 20mg q8  - holding oxycodone On Eliquis for AC at home. s/p IVC filter placement. Pacemaker was implanted in 2004, s/p recent PPM interrogation. CHADSVASC 4 (CHF, AAA, Age, HTN)  - c/w Eliquis 5mg BID   - c/w Toprol 50mg BID for rate control

## 2022-08-12 NOTE — PROGRESS NOTE ADULT - PROBLEM SELECTOR PLAN 7
- c/w simvastatin 10mg qhs Pt w/ hx of spinal fusion and s/p failed intrathecal morphine pump which caused bradycardia   Home med: oxycontin 20mg TID, oxycodone 15mg TID  - c/w home oxycontin 20mg q8  - holding oxycodone Pt w/ hx of spinal fusion and s/p failed intrathecal morphine pump which caused bradycardia   Home med: oxycontin 20mg TID, oxycodone 15mg TID  - c/w home oxycontin 20mg q8  - holding oxycodone. can resume if needed

## 2022-08-12 NOTE — PROGRESS NOTE ADULT - ASSESSMENT
72M w/ complicated PMH including depression/anxiety, Atrial fibrillation on Eliquis, PPM (2004), HTN, HLD, R hemicoloectomy for colon mass (2017), AAA s/p EVAR (2016) for R iliac artery aneurysm, possible coagulopathy s/p IVC filter placement, chronic back pain w/ remote spinal fusion and intrathecal pump, who was transferred from [facility] 8/7/22 w/ acute cholecystitis. Cardiology consulted for perioperative risk stratification c/b new onset heart failure s/p FFP and hypertensive emergency now POD#1 percutaneous cholecystostomy.      #Perioperative Risk Stratification  - EKG: NSR @83 bpm, 1st degree AV block, RBBB, unchanged form admission  - ECHO: normal LV,  RV function borderline reduced, PASP 48 mmHg, mildly dilated aortic root.  - Stress test about 1 1/2 years ago and it was normal per patient (Dr. Gonzales and Dr. Lemos (EP) as an outpatient.   - Uses cane s/p multiple back surgeries which limits his functional capacity METS~4  - Patient initially intermediate risk for intermediate risk procedure, however subsequent flash pulmonary edema 2/2 TRALI vs hypertensive emergency - takotsubo's stress CM - newly depressed EF 35% and RWA, now elevated to high risk for intermediate risk procedure.  - POD#1 - percutaneous cholecystostomy with cardiac anesthesia.  - no anginal equivalents postoperatively, perc drain in place.    #Hypertensive Emergency  BPs well controlled s/p cardine gtt and up titration of hydralazine and pain control.   - Recommend initiating Losartan 50mg QD  - c/w Hydralazine 75mg in AM, 25mg in PM  - c/w Toprol 50mg BID    #Stress Induced Cardiomyopathy  Noted to have elevated cardiac biomarkers and TTE with RWA and newly depressed EF 35% without ischemic ECG changes.    Unlikely Type 1 MI s/p recent stress test that was negative. Likely stress induced Takotsubo's CM - bedside TTE with improved EF  - Please obtain official TTE for LV function assessment.  - Daily EKGs  - Recommend ruling out obstructive CAD with CCTA vs C after repeat TTE if EF not improved.   - Diuresis as per primary team     #Atrial Fibrillation  On Eliquis for AC at home. CHADSVASC 4 (CHF, AAA, Age, HTN)  - Can reinitiate Eliquis 5mg BID for AC  - Rate control with Toprol 50 BID      Case discussed with Cardiology consult attending.   72M w/ complicated PMH including depression/anxiety, Atrial fibrillation on Eliquis, PPM (2004), HTN, HLD, R hemicoloectomy for colon mass (2017), AAA s/p EVAR (2016) for R iliac artery aneurysm, possible coagulopathy s/p IVC filter placement, chronic back pain w/ remote spinal fusion and intrathecal pump, who was transferred from [facility] 8/7/22 w/ acute cholecystitis. Cardiology consulted for perioperative risk stratification c/b new onset heart failure s/p FFP and hypertensive emergency now POD#1 percutaneous cholecystostomy.      #Perioperative Risk Stratification  - EKG: NSR @83 bpm, 1st degree AV block, RBBB, unchanged form admission  - ECHO: normal LV,  RV function borderline reduced, PASP 48 mmHg, mildly dilated aortic root.  - Stress test about 1 1/2 years ago and it was normal per patient (Dr. Gonzales and Dr. Lemos (EP) as an outpatient.   - Uses cane s/p multiple back surgeries which limits his functional capacity METS~4  - Patient initially intermediate risk for intermediate risk procedure, however subsequent flash pulmonary edema 2/2 TRALI vs hypertensive emergency - takotsubo's stress CM - newly depressed EF 35% and RWA, now elevated to high risk for intermediate risk procedure.  - POD#1 - percutaneous cholecystostomy with cardiac anesthesia.  - no anginal equivalents postoperatively, perc drain in place.    #Hypertensive Emergency  BPs well controlled s/p cardine gtt and up titration of hydralazine and pain control.   - c/w Hydralazine 75mg in AM, 25mg in PM - consider down titrating antihypertensive regimen   - c/w Toprol 50mg BID    #Stress Induced Cardiomyopathy  Noted to have elevated cardiac biomarkers and TTE with RWA and newly depressed EF 35% without ischemic ECG changes.    Unlikely Type 1 MI s/p recent stress test that was negative. Likely stress induced Takotsubo's CM - bedside TTE with improved EF  - Please obtain official TTE for LV function assessment.  - Daily EKGs  - Recommend ruling out obstructive CAD with CCTA vs C after repeat TTE if EF not improved.   - Diuresis as per primary team     #Atrial Fibrillation  On Eliquis for AC at home. CHADSVASC 4 (CHF, AAA, Age, HTN)  - Can reinitiate Eliquis 5mg BID for AC  - Rate control with Toprol 50 BID      Case discussed with Cardiology consult attending.

## 2022-08-12 NOTE — PROGRESS NOTE ADULT - SUBJECTIVE AND OBJECTIVE BOX
Incomplete  ***ACCEPTING TRANSFER FROM MICU TO [facility]***    SUBJECTIVE / INTERVAL HPI: Patient seen and examined at bedside.     VITAL SIGNS:  Vital Signs Last 24 Hrs  T(C): 36.6 (12 Aug 2022 14:27), Max: 36.6 (11 Aug 2022 18:34)  T(F): 97.9 (12 Aug 2022 14:27), Max: 97.9 (11 Aug 2022 18:34)  HR: 72 (12 Aug 2022 15:00) (60 - 93)  BP: 120/60 (12 Aug 2022 15:00) (96/57 - 126/81)  BP(mean): 85 (12 Aug 2022 15:00) (66 - 98)  RR: 27 (12 Aug 2022 15:00) (14 - 31)  SpO2: 97% (12 Aug 2022 15:00) (95% - 100%)    Parameters below as of 12 Aug 2022 15:00  Patient On (Oxygen Delivery Method): room air      I&O's Summary    11 Aug 2022 07:01  -  12 Aug 2022 07:00  --------------------------------------------------------  IN: 955.8 mL / OUT: 1395 mL / NET: -439.2 mL    12 Aug 2022 07:01  -  12 Aug 2022 17:33  --------------------------------------------------------  IN: 616.5 mL / OUT: 70 mL / NET: 546.5 mL        PHYSICAL EXAM:    General: WDWN  HEENT: NC/AT; PERRL, anicteric sclera; MMM  Neck: supple  Cardiovascular: +S1/S2; RRR  Respiratory: CTA B/L; no W/R/R  Gastrointestinal: soft, NT/ND; +BSx4  Extremities: WWP; no edema, clubbing or cyanosis  Vascular: 2+ radial, DP/PT pulses B/L  Neurological: AAOx3; no focal deficits    MEDICATIONS:  MEDICATIONS  (STANDING):  apixaban 5 milliGRAM(s) Oral every 12 hours  cefTRIAXone   IVPB      cefTRIAXone   IVPB 2000 milliGRAM(s) IV Intermittent every 24 hours  chlorhexidine 2% Cloths 1 Application(s) Topical <User Schedule>  doxazosin 4 milliGRAM(s) Oral at bedtime  finasteride 5 milliGRAM(s) Oral daily  folic acid 1 milliGRAM(s) Oral daily  hydrALAZINE 75 milliGRAM(s) Oral <User Schedule>  hydrALAZINE 50 milliGRAM(s) Oral <User Schedule>  melatonin 5 milliGRAM(s) Oral at bedtime  metoprolol succinate ER 50 milliGRAM(s) Oral two times a day  metroNIDAZOLE  IVPB      metroNIDAZOLE  IVPB 500 milliGRAM(s) IV Intermittent every 8 hours  nortriptyline 75 milliGRAM(s) Oral at bedtime  oxyCODONE  ER Tablet 20 milliGRAM(s) Oral every 8 hours  pantoprazole    Tablet 40 milliGRAM(s) Oral before breakfast  polyethylene glycol 3350 17 Gram(s) Oral daily  simvastatin 10 milliGRAM(s) Oral at bedtime    MEDICATIONS  (PRN):  acetaminophen     Tablet .. 650 milliGRAM(s) Oral every 6 hours PRN Temp greater or equal to 38C (100.4F), Mild Pain (1 - 3), Moderate Pain (4 - 6)  polyethylene glycol 3350 17 Gram(s) Oral daily PRN Constipation      ALLERGIES:  Allergies    Altace (Unknown)  penicillin (Unknown)    Intolerances        LABS:                        9.2    7.51  )-----------( 220      ( 12 Aug 2022 05:09 )             28.2     08-12    135  |  100  |  30<H>  ----------------------------<  156<H>  3.4<L>   |  27  |  1.02    Ca    8.2<L>      12 Aug 2022 05:09  Phos  1.7     08-12  Mg     2.1     08-12    TPro  5.9<L>  /  Alb  2.5<L>  /  TBili  0.3  /  DBili  0.2  /  AST  19  /  ALT  12  /  AlkPhos  94  08-12    PT/INR - ( 11 Aug 2022 08:23 )   PT: 17.2 sec;   INR: 1.44          PTT - ( 11 Aug 2022 08:23 )  PTT:49.0 sec    CAPILLARY BLOOD GLUCOSE      POCT Blood Glucose.: 115 mg/dL (11 Aug 2022 11:14)      RADIOLOGY & ADDITIONAL TESTS: Reviewed.   Incomplete  ***ACCEPTING TRANSFER FROM MICU TO [facility]***    Hospital Course  71 y/o male with complicated past medical history of colon mass s/p partial colectomy (2017), appendectomy, hernia repair, aortic and iliac aneurysm surgery (2017) s/p stent placement, HTN, HLD, paroxysmal atrial fibrillation on Eliquis, blood clotting disorder s/p IVC filter placement, right knee ligament repair, pacemaker implant (2004) s/p recent ppm interrogation on Eliquis, herniated disc and related surgery in [id number] complicated by spinal fusion, local hematoma, foot drop with chronic pain s/p failed intrathecal morphine pump which caused bradycardia and left hip prosthesis. He presents as a transfer from [facility] where he initially presented for "crushing" chest pain, and "stabbing" epigastric pain. No associated nausea/vomiting, fever, or jaundice. Afebrile, VSS on presentation to [place] ED.  Cardiac workup was negative at that hospital. CT revealed distended gall bladder with gall stones, jeannette-cholecystic fluid, dilated CBD to 2cm and CBD stone. Patient was planned to go to for cholecystectomy however, due to elevated INR, patient was given FFP. After transfusion, rapid response was called for tachypnea and hypertension with SBP to 220s. Patient mental status altered, and with new pulmonary edema. Concern for flash pulmonary edema 2/2 hypertensive emergency vs TRALI vs TACO. TTE demonstrating left ventricular function appears moderately reduced with regional wall motion abnormalities, estimated EF approx. 35%. The mid-distal anteroseptum, basal-mid anterolateral and basal-mid anterior walls appear hypokinetic to akinetic. Concerning for takotsubo cardiomyopathy. Patient started on nicardipine drip, and BP better controlled. Patient diuresed well with IV lasix and pulmonary edema resolved. Patient started on ceftriaxone and flagyl for cholecystitis. Repeat TTEs w/ improving LV function. Percutaneous cholecystostomy performed by IR 8/11. Tolerated procedure well. Patient weened off nicardipine drip onto home hydralazine, and stable for transfer to floor.     SUBJECTIVE / INTERVAL HPI: Patient seen and examined at bedside.     VITAL SIGNS:  Vital Signs Last 24 Hrs  T(C): 36.6 (12 Aug 2022 14:27), Max: 36.6 (11 Aug 2022 18:34)  T(F): 97.9 (12 Aug 2022 14:27), Max: 97.9 (11 Aug 2022 18:34)  HR: 72 (12 Aug 2022 15:00) (60 - 93)  BP: 120/60 (12 Aug 2022 15:00) (96/57 - 126/81)  BP(mean): 85 (12 Aug 2022 15:00) (66 - 98)  RR: 27 (12 Aug 2022 15:00) (14 - 31)  SpO2: 97% (12 Aug 2022 15:00) (95% - 100%)    Parameters below as of 12 Aug 2022 15:00  Patient On (Oxygen Delivery Method): room air      I&O's Summary    11 Aug 2022 07:01  -  12 Aug 2022 07:00  --------------------------------------------------------  IN: 955.8 mL / OUT: 1395 mL / NET: -439.2 mL    12 Aug 2022 07:01  -  12 Aug 2022 17:33  --------------------------------------------------------  IN: 616.5 mL / OUT: 70 mL / NET: 546.5 mL        PHYSICAL EXAM:    General: WDWN  HEENT: NC/AT; PERRL, anicteric sclera; MMM  Neck: supple  Cardiovascular: +S1/S2; RRR  Respiratory: CTA B/L; no W/R/R  Gastrointestinal: soft, NT/ND; +BSx4  Extremities: WWP; no edema, clubbing or cyanosis  Vascular: 2+ radial, DP/PT pulses B/L  Neurological: AAOx3; no focal deficits    MEDICATIONS:  MEDICATIONS  (STANDING):  apixaban 5 milliGRAM(s) Oral every 12 hours  cefTRIAXone   IVPB      cefTRIAXone   IVPB 2000 milliGRAM(s) IV Intermittent every 24 hours  chlorhexidine 2% Cloths 1 Application(s) Topical <User Schedule>  doxazosin 4 milliGRAM(s) Oral at bedtime  finasteride 5 milliGRAM(s) Oral daily  folic acid 1 milliGRAM(s) Oral daily  hydrALAZINE 75 milliGRAM(s) Oral <User Schedule>  hydrALAZINE 50 milliGRAM(s) Oral <User Schedule>  melatonin 5 milliGRAM(s) Oral at bedtime  metoprolol succinate ER 50 milliGRAM(s) Oral two times a day  metroNIDAZOLE  IVPB      metroNIDAZOLE  IVPB 500 milliGRAM(s) IV Intermittent every 8 hours  nortriptyline 75 milliGRAM(s) Oral at bedtime  oxyCODONE  ER Tablet 20 milliGRAM(s) Oral every 8 hours  pantoprazole    Tablet 40 milliGRAM(s) Oral before breakfast  polyethylene glycol 3350 17 Gram(s) Oral daily  simvastatin 10 milliGRAM(s) Oral at bedtime    MEDICATIONS  (PRN):  acetaminophen     Tablet .. 650 milliGRAM(s) Oral every 6 hours PRN Temp greater or equal to 38C (100.4F), Mild Pain (1 - 3), Moderate Pain (4 - 6)  polyethylene glycol 3350 17 Gram(s) Oral daily PRN Constipation      ALLERGIES:  Allergies    Altace (Unknown)  penicillin (Unknown)    Intolerances        LABS:                        9.2    7.51  )-----------( 220      ( 12 Aug 2022 05:09 )             28.2     08-12    135  |  100  |  30<H>  ----------------------------<  156<H>  3.4<L>   |  27  |  1.02    Ca    8.2<L>      12 Aug 2022 05:09  Phos  1.7     08-12  Mg     2.1     08-12    TPro  5.9<L>  /  Alb  2.5<L>  /  TBili  0.3  /  DBili  0.2  /  AST  19  /  ALT  12  /  AlkPhos  94  08-12    PT/INR - ( 11 Aug 2022 08:23 )   PT: 17.2 sec;   INR: 1.44          PTT - ( 11 Aug 2022 08:23 )  PTT:49.0 sec    CAPILLARY BLOOD GLUCOSE      POCT Blood Glucose.: 115 mg/dL (11 Aug 2022 11:14)      RADIOLOGY & ADDITIONAL TESTS: Reviewed.     ***ACCEPTING TRANSFER FROM MICU TO [facility]***    Hospital Course  73 y/o male with complicated past medical history of colon mass s/p partial colectomy (2017), appendectomy, hernia repair, aortic and iliac aneurysm surgery (2017) s/p stent placement, HTN, HLD, paroxysmal atrial fibrillation on Eliquis, blood clotting disorder s/p IVC filter placement, right knee ligament repair, pacemaker implant (2004) s/p recent ppm interrogation on Eliquis, herniated disc and related surgery in [id number] complicated by spinal fusion, local hematoma, foot drop with chronic pain s/p failed intrathecal morphine pump which caused bradycardia and left hip prosthesis. He presents as a transfer from [facility] where he initially presented for "crushing" chest pain, and "stabbing" epigastric pain. No associated nausea/vomiting, fever, or jaundice. Afebrile, VSS on presentation to [place] ED.  Cardiac workup was negative at that hospital. CT revealed distended gall bladder with gall stones, jeannette-cholecystic fluid, dilated CBD to 2cm and CBD stone. Patient was planned to go to for cholecystectomy however, due to elevated INR, patient was given FFP. After transfusion, rapid response was called for tachypnea and hypertension with SBP to 220s. Patient mental status altered, and with new pulmonary edema. Concern for flash pulmonary edema 2/2 hypertensive emergency vs TRALI vs TACO. TTE demonstrating left ventricular function appears moderately reduced with regional wall motion abnormalities, estimated EF approx. 35%. The mid-distal anteroseptum, basal-mid anterolateral and basal-mid anterior walls appear hypokinetic to akinetic. Concerning for takotsubo cardiomyopathy. Patient started on nicardipine drip, and BP better controlled. Patient diuresed well with IV lasix and pulmonary edema resolved. Patient started on ceftriaxone and flagyl for cholecystitis. Repeat TTEs w/ improving LV function. Percutaneous cholecystostomy performed by IR 8/11. Tolerated procedure well. Patient weened off nicardipine drip onto home hydralazine, and stable for transfer to floor.     SUBJECTIVE / INTERVAL HPI: Patient seen and examined at bedside.     VITAL SIGNS:  Vital Signs Last 24 Hrs  T(C): 36.6 (12 Aug 2022 14:27), Max: 36.6 (11 Aug 2022 18:34)  T(F): 97.9 (12 Aug 2022 14:27), Max: 97.9 (11 Aug 2022 18:34)  HR: 72 (12 Aug 2022 15:00) (60 - 93)  BP: 120/60 (12 Aug 2022 15:00) (96/57 - 126/81)  BP(mean): 85 (12 Aug 2022 15:00) (66 - 98)  RR: 27 (12 Aug 2022 15:00) (14 - 31)  SpO2: 97% (12 Aug 2022 15:00) (95% - 100%)    Parameters below as of 12 Aug 2022 15:00  Patient On (Oxygen Delivery Method): room air      I&O's Summary    11 Aug 2022 07:01  -  12 Aug 2022 07:00  --------------------------------------------------------  IN: 955.8 mL / OUT: 1395 mL / NET: -439.2 mL    12 Aug 2022 07:01  -  12 Aug 2022 17:33  --------------------------------------------------------  IN: 616.5 mL / OUT: 70 mL / NET: 546.5 mL        PHYSICAL EXAM:  General: NAD, sitting up eating in bed  HEENT: NC/AT, PERRLA, EOMI, no scleral icterus  Neck: Supple  Respiratory: CTA B/L. No w/r/r.   Cardiovascular: RRR, normal S1 and S2, no m/r/g.   Gastrointestinal: +BS, mild TTP in b/l lower quadrants. Cholecystostomy drain in R flank w/o erythema, fluctuance or drainage. Drain outputting black/brown fluid. No guarding or rebound tenderness, palpable intrathecal device  Extremities: wwp; no cyanosis, clubbing or edema.   Vascular: Pulses equal and strong throughout.   Neurological: AAOx3, no CN deficits, strength and sensation intact throughout.       MEDICATIONS:  MEDICATIONS  (STANDING):  apixaban 5 milliGRAM(s) Oral every 12 hours  cefTRIAXone   IVPB      cefTRIAXone   IVPB 2000 milliGRAM(s) IV Intermittent every 24 hours  chlorhexidine 2% Cloths 1 Application(s) Topical <User Schedule>  doxazosin 4 milliGRAM(s) Oral at bedtime  finasteride 5 milliGRAM(s) Oral daily  folic acid 1 milliGRAM(s) Oral daily  hydrALAZINE 75 milliGRAM(s) Oral <User Schedule>  hydrALAZINE 50 milliGRAM(s) Oral <User Schedule>  melatonin 5 milliGRAM(s) Oral at bedtime  metoprolol succinate ER 50 milliGRAM(s) Oral two times a day  metroNIDAZOLE  IVPB      metroNIDAZOLE  IVPB 500 milliGRAM(s) IV Intermittent every 8 hours  nortriptyline 75 milliGRAM(s) Oral at bedtime  oxyCODONE  ER Tablet 20 milliGRAM(s) Oral every 8 hours  pantoprazole    Tablet 40 milliGRAM(s) Oral before breakfast  polyethylene glycol 3350 17 Gram(s) Oral daily  simvastatin 10 milliGRAM(s) Oral at bedtime    MEDICATIONS  (PRN):  acetaminophen     Tablet .. 650 milliGRAM(s) Oral every 6 hours PRN Temp greater or equal to 38C (100.4F), Mild Pain (1 - 3), Moderate Pain (4 - 6)  polyethylene glycol 3350 17 Gram(s) Oral daily PRN Constipation      ALLERGIES:  Allergies    Altace (Unknown)  penicillin (Unknown)    Intolerances        LABS:                        9.2    7.51  )-----------( 220      ( 12 Aug 2022 05:09 )             28.2     08-12    135  |  100  |  30<H>  ----------------------------<  156<H>  3.4<L>   |  27  |  1.02    Ca    8.2<L>      12 Aug 2022 05:09  Phos  1.7     08-12  Mg     2.1     08-12    TPro  5.9<L>  /  Alb  2.5<L>  /  TBili  0.3  /  DBili  0.2  /  AST  19  /  ALT  12  /  AlkPhos  94  08-12    PT/INR - ( 11 Aug 2022 08:23 )   PT: 17.2 sec;   INR: 1.44          PTT - ( 11 Aug 2022 08:23 )  PTT:49.0 sec    CAPILLARY BLOOD GLUCOSE      POCT Blood Glucose.: 115 mg/dL (11 Aug 2022 11:14)      RADIOLOGY & ADDITIONAL TESTS: Reviewed.

## 2022-08-12 NOTE — PHYSICAL THERAPY INITIAL EVALUATION ADULT - PERTINENT HX OF CURRENT PROBLEM, REHAB EVAL
Pt. is a 72 y.o male presenting as a transfer from [facility] where he was admitted for acute cholecystitis, transferred to [facility] on 8/7, s/p percutaneous cholecystostomy on 8/11; admitted to MICU for management of post procedure hypertensive urgency. Course c/b new Dx of cardiomyopathy.

## 2022-08-12 NOTE — PROGRESS NOTE ADULT - PROBLEM SELECTOR PLAN 5
On Eliquis for AC at home. s/p IVC filter placement. Pacemaker was implanted in 2004, s/p recent PPM interrogation. CHADSVASC 4 (CHF, AAA, Age, HTN)  - c/w Eliquis 5mg BID   - c/w Toprol 50mg BID for rate control --RESOLVED  Pt w/ new pulmonary edema s/p FFP infusion for elevated INR. Concern for flash pulmonary edema vs. TRALI vs. TACO. TTE demonstrating left ventricular function appears moderately reduced with regional wall motion abnormalities, estimated EF approx. 35%. Patient diuresed well with IV lasix and pulmonary edema resolved.

## 2022-08-12 NOTE — PROGRESS NOTE ADULT - ASSESSMENT
72M w/ multiple medical problems PMH including Atrial fibrillation on Eliquis, PPM (2004), HTN, HLD, R hemicoloectomy for colon mass (2017), AAA s/p EVAR (2016) for R iliac artery aneurysm, possible coagulopathy s/p IVC filter placement, chronic back pain w/ remote spinal fusion and intrathecal pump, who [facility] w/ acute cholecystitis c/b new onset heart failure s/p FFP and hypertensive emergency now POD#1 percutaneous cholecystostomy.

## 2022-08-12 NOTE — PROGRESS NOTE ADULT - SUBJECTIVE AND OBJECTIVE BOX
72M with complicated PMH who presented as transfer from [facility] on 8/7/2022 with acute cholecystitis s/p percutaneous cholecystostomy tube placement by Dr. Gonzales on 8/11/2022.    Dressing c/d/i  Cholecystostomy tube with 55 cc dark bilious output in past 24 hr  Flushed easily with 2-3 cc NS  Continue to monitor output

## 2022-08-12 NOTE — PROGRESS NOTE ADULT - PROBLEM SELECTOR PLAN 8
F: none - tolerating PO  E: replete as needed  N: pureed   DVT ppx: eliquis  Dispo: MICU --> [facility]    CODE STATUS: FULL CODE - c/w simvastatin 10mg qhs

## 2022-08-12 NOTE — PROGRESS NOTE ADULT - ASSESSMENT
71 y/o male with complicated past medical history of colon mass s/p partial colectomy (2017), appendectomy, hernia repair, aortic and iliac aneurysm surgery (2017) s/p stent placement, HTN, HLD, paroxysmal atrial fibrillation on Eliquis, blood clotting disorder s/p IVC filter placement, right knee ligament repair, pacemaker implant (2004) s/p recent ppm interrogation on Eliquis, herniated disc and related surgery in [id number] complicated by spinal fusion, local hematoma, foot drop with chronic pain s/p failed intrathecal morphine pump which caused bradycardia and left hip prosthesis. He presents as a transfer from [facility] where he initially presented for "crushing" chest pain, and "stabbing" epigastric pain. No associated nausea/vomiting, fever, or jaundice. Afebrile, VSS on presentation to [facility] ED.  Cardiac workup was negative at that hospital. CT revealed distended gall bladder with gall stones, jeannette-cholecystic fluid, dilated CBD to 2cm and CBD stone. On 8/9 AM, a rapid response was called for tachypnea and hypertension to the 220s with concern for flash pulmonary edema; MICU was consulted for better management of hypertensive urgency, pt was transferred to the MICU.     Neuro:  #Metabolic encephalopathy  Likely i/s/o sepsis and hypertensive emergency  - Manage underlying causes as below    #Chronic back pain  -Continue home oxycontin 20mg TID    Pulm:  #Increased Work of Breathing  Pt noted for increased WOB in past 24hrs following completion of plasma transfusion to achieve pre-op INR goal. 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 DDx includes TRALI vs. TACO vs. flash pulmonary edema 2/2 HTN urgency vs. takusubo cardiomyopathy. Pt was placed on bipap for transfer to MICU but currently back on NC    - Euvolemic at this time  - If increased work of breathing and decreased oxygen saturation, can go back on bipap. Currently comfortable and saturating well on NC      Cardio:  #Non-ischemic cardiomyopathy  TTE 8/9 w/ mid-distal anteroseptum, basal-mid anterolateral and basal-mid anterior walls appear hypokinetic to akinetic c/f stress cardiomyopathy. New since prior echo 8/8. BNP 21k from 7k. BP stable.   - Most recent TTE 8/12: Normal LV cavity size. Moderately reduced LV function  - f/u cardiology recommendations  - Will initiate GDMT per cards    #Hypertensive emergency  8/9 AM rapid response was called for hypertensive emergency as patient desatted to 90% requiring 4L NC, with /121 and . On exam patient awake and alert, AAOx3, mentating well and protecting airway.  EKG done without ischemic changes. Barber catheter was placed with 1L immediate output. Nicardipine drip started to control BP, BPs dropped to 90s/60s, Nicardipine drip dc'ed. Goal SBP b/t 140-160 within 24hrs  - BP normalized w/ nicardipine gtt; now d/c  - Restarted home hydralazine at increased dose of 75mg Qam and 50mg QHS  - Continue to monitor  - Can consider ACEi/ARB due to likely need for HF GDMT, however patient has documented adverse reaction to ramipril     #Paroxysmal afib  On eliquis at home, s/p IVC filter placement. Pacemaker was implanted in 2004, s/p recent PPM interrogation on Eliquis.  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 Most recent troponin T elevated at 0.15, CKMB elevated at 9.0.   - ASA and plavix loaded  - troponin and CKMB trending down; will stop trending      #Hx aortic and iliac aneurysm surgery (2017) s/p stent placement  - No intervention currently necessary     GI:  # Acute cholecystitis   CT revealed distended gall bladder with gall stones, jeannette-cholecystic fluid, dilated CBD to 2cm and CBD stone  RUQ US results: Gallstone impacted at the gallbladder neck with associated gallbladder wall thickening and positive sonographic Parnell's sign, compatible with acute cholecystitis.    - s/p percutaneous cholecystostomy 8/11  - Drain management per IR  - Continue ceftriaxone/flagyl for 7 days after source control (8/12-8/18)    Endo:  No active issues    ID:  #R/O Sepsis   Meets SIRS criteria with HR>90, RR>20, WBC >12K, and source of infection (Cholecystitis)  - BCx 8/9 NGTD  - obtain sputum cx if patient able to produce  - c/w CTX 2g Q24hrs  - c/w IV flagyl Q8hrs  - ceftriaxone/flagyl for 7 days after source control (8/12-8/18)    Renal/:  No active issues, use PO instead of IV electrolyte repletion if possible (in the interest of maintaining optimal fluid balance)  -New electrolyte derangements large volume diuresis yesterday  -Will monitor electrolytes closely    Heme/Onc:  #Hx colon mass s/p partial colectomy (2017)  - No intervention currently necessary     F: None  E: Replete PRN  N: NPO  GI ppx: None  DVT ppx: eliquis  Code: Full  Dispo: MICU

## 2022-08-12 NOTE — PROGRESS NOTE ADULT - PROBLEM SELECTOR PLAN 4
Pt presented to [place] ED w/ "stabbing" epigastric pain and "crushing" chest pain. CT revealed distended gall bladder with gall stones, jeannette-cholecystic fluid, dilated CBD to 2cm and CBD stone. RUQ US results: Gallstone impacted at the gallbladder neck with associated gallbladder wall thickening and positive sonographic Parnell's sign, compatible with acute cholecystitis. s/p percutaneous cholycystostomy 8/11 w/ improvement in abdominal pain. Pt stable and WBC downtrending.    - c/w CTX 2g Q24hrs  - c/w IV flagyl Q8hrs  - continue to trend CBC and monitor fever curve

## 2022-08-12 NOTE — PROGRESS NOTE ADULT - ASSESSMENT
71 y/o male with complicated medical history presents with 1 day history of symptomatic acute cholecystitis vs. choledocholithiasis. Now s/p 3U FFP i/s/o elevated INR. Pt then was noted to be hypertensive, with lung infiltrates and hypoxia, elevated troponins and transferred to MICU for further management. Pt transiently on cardene drip for pressure control. Pt still having abdominal pain despite antibiotic therapy indicative of medical management failure. Now s/p perc yonis on 8/11 w/ significant improvement in his abdominal pain.    No acute surgical intervention at this time  Will follow drain output  Rest of plan per MICU

## 2022-08-12 NOTE — PROGRESS NOTE ADULT - SUBJECTIVE AND OBJECTIVE BOX
TRANSFER FROM MICU TO FLOOR    HOSPITAL COURSE:  71 y/o male with complicated past medical history of colon mass s/p partial colectomy (2017), appendectomy, hernia repair, aortic and iliac aneurysm surgery (2017) s/p stent placement, HTN, HLD, paroxysmal atrial fibrillation on Eliquis, blood clotting disorder s/p IVC filter placement, right knee ligament repair, pacemaker implant (2004) s/p recent ppm interrogation on Eliquis, herniated disc and related surgery in [id number] complicated by spinal fusion, local hematoma, foot drop with chronic pain s/p failed intrathecal morphine pump which caused bradycardia and left hip prosthesis. He presents as a transfer from [facility] where he initially presented for "crushing" chest pain, and "stabbing" epigastric pain. No associated nausea/vomiting, fever, or jaundice. Afebrile, VSS on presentation to [place] ED.  Cardiac workup was negative at that hospital. CT revealed distended gall bladder with gall stones, jeannette-cholecystic fluid, dilated CBD to 2cm and CBD stone. Patient was planned to go to for cholecystectomy however, due to elevated INR, patient was given FFP. After transfusion, rapid response was called for tachypnea and hypertension with SBP to 220s. Patient mental status altered, and with new pulmonary edema. Concern for flash pulmonary edema 2/2 hypertensive emergency vs TRALI vs TACO. TTE demonstrating left ventricular function appears moderately reduced with regional wall motion abnormalities, estimated EF approx. 35%. The mid-distal anteroseptum, basal-mid anterolateral and basal-mid anterior walls appear hypokinetic to akinetic. Concerning for takotsubo cardiomyopathy. Patient started on nicardopine drip, and BP better controlled. Patient diuresed well with IV lasix and pulmonary edema resolved. Patient started on ceftriaxone and flagyl for cholecystitis. Repeat TTEs w/ improving LV function. Percutaneous cholecystostomy performed by IR 8/11. Tolerated procedure well. Patient weened off nicardipine drip onto home hydralazine, and stable for transfer to floor.     INTERVAL HPI/OVERNIGHT EVENTS:  O/N: Nicardipine drip discontinued due to SBP <100. Patient asymptomatic. Pain managed with oxycodone  This morning: Patient was seen and examined at bedside. Patient states his back and abdominal pain are much improved. Denies fevers, chills, headache, SOB, chest pain, n/v/d.     VITAL SIGNS:  T(F): 97.9 (08-12-22 @ 14:27)  HR: 72 (08-12-22 @ 15:00)  BP: 120/60 (08-12-22 @ 15:00)  RR: 27 (08-12-22 @ 15:00)  SpO2: 97% (08-12-22 @ 15:00)  Wt(kg): --    PHYSICAL EXAM:    Constitutional: NAD, comfortable in bed.  HEENT: NC/AT, PERRLA, EOMI, no scleral icterus,   Neck: Supple  Respiratory: CTA B/L. No w/r/r.   Cardiovascular: RRR, normal S1 and S2, no m/r/g.   Gastrointestinal: +BS, abdomen mildly distended. Mild tenderness to palpation in RUQ. Cholecystostomy drain in R flank w/o erythema, fluctuance or drainage. Drain outputting black/brown fluid. No guarding or rebound tenderness, Palpable mass as L periumbilical region consistent w/ known pain management device.   Extremities: wwp; no cyanosis, clubbing or edema.   Vascular: Pulses equal and strong throughout.   Neurological: AAOx3, no CN deficits, strength and sensation intact throughout.   Skin: No gross skin abnormalities or rashes. No jaundice    MEDICATIONS  (STANDING):  apixaban 5 milliGRAM(s) Oral every 12 hours  cefTRIAXone   IVPB      cefTRIAXone   IVPB 2000 milliGRAM(s) IV Intermittent every 24 hours  chlorhexidine 2% Cloths 1 Application(s) Topical <User Schedule>  doxazosin 4 milliGRAM(s) Oral at bedtime  finasteride 5 milliGRAM(s) Oral daily  folic acid 1 milliGRAM(s) Oral daily  hydrALAZINE 75 milliGRAM(s) Oral <User Schedule>  hydrALAZINE 50 milliGRAM(s) Oral <User Schedule>  melatonin 5 milliGRAM(s) Oral at bedtime  metoprolol succinate ER 50 milliGRAM(s) Oral two times a day  metroNIDAZOLE  IVPB      metroNIDAZOLE  IVPB 500 milliGRAM(s) IV Intermittent every 8 hours  nortriptyline 75 milliGRAM(s) Oral at bedtime  oxyCODONE  ER Tablet 20 milliGRAM(s) Oral every 8 hours  pantoprazole    Tablet 40 milliGRAM(s) Oral before breakfast  polyethylene glycol 3350 17 Gram(s) Oral daily  simvastatin 10 milliGRAM(s) Oral at bedtime    MEDICATIONS  (PRN):  acetaminophen     Tablet .. 650 milliGRAM(s) Oral every 6 hours PRN Temp greater or equal to 38C (100.4F), Mild Pain (1 - 3), Moderate Pain (4 - 6)  polyethylene glycol 3350 17 Gram(s) Oral daily PRN Constipation      Allergies    Altace (Unknown)  penicillin (Unknown)    Intolerances        LABS:                        9.2    7.51  )-----------( 220      ( 12 Aug 2022 05:09 )             28.2     08-12    135  |  100  |  30<H>  ----------------------------<  156<H>  3.4<L>   |  27  |  1.02    Ca    8.2<L>      12 Aug 2022 05:09  Phos  1.7     08-12  Mg     2.1     08-12    TPro  5.9<L>  /  Alb  2.5<L>  /  TBili  0.3  /  DBili  0.2  /  AST  19  /  ALT  12  /  AlkPhos  94  08-12    PT/INR - ( 11 Aug 2022 08:23 )   PT: 17.2 sec;   INR: 1.44          PTT - ( 11 Aug 2022 08:23 )  PTT:49.0 sec            RADIOLOGY & ADDITIONAL TESTS:  Reviewed

## 2022-08-12 NOTE — PROGRESS NOTE ADULT - SUBJECTIVE AND OBJECTIVE BOX
SUBJECTIVE: Doing well this AM. NAEON. Denies n/v. Endorses f/bm. Denies cp/sob. Pain is improving. Tolerating diet.      MEDICATIONS  (STANDING):  apixaban 5 milliGRAM(s) Oral every 12 hours  cefTRIAXone   IVPB      cefTRIAXone   IVPB 2000 milliGRAM(s) IV Intermittent every 24 hours  chlorhexidine 2% Cloths 1 Application(s) Topical <User Schedule>  doxazosin 4 milliGRAM(s) Oral at bedtime  finasteride 5 milliGRAM(s) Oral daily  folic acid 1 milliGRAM(s) Oral daily  hydrALAZINE 75 milliGRAM(s) Oral <User Schedule>  hydrALAZINE 50 milliGRAM(s) Oral <User Schedule>  melatonin 5 milliGRAM(s) Oral at bedtime  metoprolol succinate ER 50 milliGRAM(s) Oral two times a day  metroNIDAZOLE  IVPB      metroNIDAZOLE  IVPB 500 milliGRAM(s) IV Intermittent every 8 hours  nortriptyline 75 milliGRAM(s) Oral at bedtime  oxyCODONE  ER Tablet 20 milliGRAM(s) Oral every 8 hours  pantoprazole    Tablet 40 milliGRAM(s) Oral before breakfast  polyethylene glycol 3350 17 Gram(s) Oral daily  simvastatin 10 milliGRAM(s) Oral at bedtime    MEDICATIONS  (PRN):  acetaminophen     Tablet .. 650 milliGRAM(s) Oral every 6 hours PRN Temp greater or equal to 38C (100.4F), Mild Pain (1 - 3), Moderate Pain (4 - 6)  polyethylene glycol 3350 17 Gram(s) Oral daily PRN Constipation      Vital Signs Last 24 Hrs  T(C): 36.6 (12 Aug 2022 14:27), Max: 36.6 (11 Aug 2022 18:34)  T(F): 97.9 (12 Aug 2022 14:27), Max: 97.9 (11 Aug 2022 18:34)  HR: 72 (12 Aug 2022 15:00) (60 - 115)  BP: 120/60 (12 Aug 2022 15:00) (96/57 - 126/81)  BP(mean): 85 (12 Aug 2022 15:00) (66 - 98)  RR: 27 (12 Aug 2022 15:00) (14 - 34)  SpO2: 97% (12 Aug 2022 15:00) (95% - 100%)    Parameters below as of 12 Aug 2022 15:00  Patient On (Oxygen Delivery Method): room air        Physical Exam:  General: NAD, resting comfortably in bed  Pulmonary: Nonlabored breathing, no respiratory distress  Cardiovascular: NSR  Abdominal: soft, minimally approp TTP over drain site, ND, drain w/ dark bilious contents  Extremities: WWP, normal strength  Neuro: A/O x 3, CNs II-XII grossly intact, no focal deficits    I&O's Summary    11 Aug 2022 07:01  -  12 Aug 2022 07:00  --------------------------------------------------------  IN: 955.8 mL / OUT: 1395 mL / NET: -439.2 mL    12 Aug 2022 07:01  -  12 Aug 2022 16:19  --------------------------------------------------------  IN: 616.5 mL / OUT: 70 mL / NET: 546.5 mL        LABS:                        9.2    7.51  )-----------( 220      ( 12 Aug 2022 05:09 )             28.2     08-12    135  |  100  |  30<H>  ----------------------------<  156<H>  3.4<L>   |  27  |  1.02    Ca    8.2<L>      12 Aug 2022 05:09  Phos  1.7     08-12  Mg     2.1     08-12    TPro  5.9<L>  /  Alb  2.5<L>  /  TBili  0.3  /  DBili  0.2  /  AST  19  /  ALT  12  /  AlkPhos  94  08-12    PT/INR - ( 11 Aug 2022 08:23 )   PT: 17.2 sec;   INR: 1.44          PTT - ( 11 Aug 2022 08:23 )  PTT:49.0 sec    CAPILLARY BLOOD GLUCOSE        LIVER FUNCTIONS - ( 12 Aug 2022 05:09 )  Alb: 2.5 g/dL / Pro: 5.9 g/dL / ALK PHOS: 94 U/L / ALT: 12 U/L / AST: 19 U/L / GGT: x             RADIOLOGY & ADDITIONAL STUDIES:

## 2022-08-12 NOTE — PHYSICAL THERAPY INITIAL EVALUATION ADULT - ADDITIONAL COMMENTS
Pt. has been ambulatory with [facility] until 2 weeks PTA when he started using RW. He lives with spouse, no stairs to negotiate in his living environment; no current home care services.

## 2022-08-13 LAB
ALBUMIN SERPL ELPH-MCNC: 2.6 G/DL — LOW (ref 3.3–5)
ALP SERPL-CCNC: 126 U/L — HIGH (ref 40–120)
ALT FLD-CCNC: 24 U/L — SIGNIFICANT CHANGE UP (ref 10–45)
ANION GAP SERPL CALC-SCNC: 8 MMOL/L — SIGNIFICANT CHANGE UP (ref 5–17)
AST SERPL-CCNC: 47 U/L — HIGH (ref 10–40)
BASOPHILS # BLD AUTO: 0.04 K/UL — SIGNIFICANT CHANGE UP (ref 0–0.2)
BASOPHILS NFR BLD AUTO: 0.5 % — SIGNIFICANT CHANGE UP (ref 0–2)
BILIRUB SERPL-MCNC: 0.3 MG/DL — SIGNIFICANT CHANGE UP (ref 0.2–1.2)
BUN SERPL-MCNC: 25 MG/DL — HIGH (ref 7–23)
CALCIUM SERPL-MCNC: 8.5 MG/DL — SIGNIFICANT CHANGE UP (ref 8.4–10.5)
CHLORIDE SERPL-SCNC: 98 MMOL/L — SIGNIFICANT CHANGE UP (ref 96–108)
CO2 SERPL-SCNC: 26 MMOL/L — SIGNIFICANT CHANGE UP (ref 22–31)
CREAT SERPL-MCNC: 1.08 MG/DL — SIGNIFICANT CHANGE UP (ref 0.5–1.3)
CULTURE RESULTS: SIGNIFICANT CHANGE UP
EGFR: 72 ML/MIN/1.73M2 — SIGNIFICANT CHANGE UP
EOSINOPHIL # BLD AUTO: 0.28 K/UL — SIGNIFICANT CHANGE UP (ref 0–0.5)
EOSINOPHIL NFR BLD AUTO: 3.8 % — SIGNIFICANT CHANGE UP (ref 0–6)
GLUCOSE SERPL-MCNC: 121 MG/DL — HIGH (ref 70–99)
HCT VFR BLD CALC: 31.4 % — LOW (ref 39–50)
HGB BLD-MCNC: 10.2 G/DL — LOW (ref 13–17)
IMM GRANULOCYTES NFR BLD AUTO: 1.7 % — HIGH (ref 0–1.5)
LYMPHOCYTES # BLD AUTO: 0.68 K/UL — LOW (ref 1–3.3)
LYMPHOCYTES # BLD AUTO: 9.2 % — LOW (ref 13–44)
MAGNESIUM SERPL-MCNC: 1.8 MG/DL — SIGNIFICANT CHANGE UP (ref 1.6–2.6)
MCHC RBC-ENTMCNC: 29 PG — SIGNIFICANT CHANGE UP (ref 27–34)
MCHC RBC-ENTMCNC: 32.5 GM/DL — SIGNIFICANT CHANGE UP (ref 32–36)
MCV RBC AUTO: 89.2 FL — SIGNIFICANT CHANGE UP (ref 80–100)
MONOCYTES # BLD AUTO: 0.9 K/UL — SIGNIFICANT CHANGE UP (ref 0–0.9)
MONOCYTES NFR BLD AUTO: 12.1 % — SIGNIFICANT CHANGE UP (ref 2–14)
NEUTROPHILS # BLD AUTO: 5.4 K/UL — SIGNIFICANT CHANGE UP (ref 1.8–7.4)
NEUTROPHILS NFR BLD AUTO: 72.7 % — SIGNIFICANT CHANGE UP (ref 43–77)
NRBC # BLD: 0 /100 WBCS — SIGNIFICANT CHANGE UP (ref 0–0)
PHOSPHATE SERPL-MCNC: 3.4 MG/DL — SIGNIFICANT CHANGE UP (ref 2.5–4.5)
PLATELET # BLD AUTO: 219 K/UL — SIGNIFICANT CHANGE UP (ref 150–400)
POTASSIUM SERPL-MCNC: 4.8 MMOL/L — SIGNIFICANT CHANGE UP (ref 3.5–5.3)
POTASSIUM SERPL-SCNC: 4.8 MMOL/L — SIGNIFICANT CHANGE UP (ref 3.5–5.3)
PROT SERPL-MCNC: 6.2 G/DL — SIGNIFICANT CHANGE UP (ref 6–8.3)
RBC # BLD: 3.52 M/UL — LOW (ref 4.2–5.8)
RBC # FLD: 13.6 % — SIGNIFICANT CHANGE UP (ref 10.3–14.5)
SODIUM SERPL-SCNC: 132 MMOL/L — LOW (ref 135–145)
SPECIMEN SOURCE: SIGNIFICANT CHANGE UP
WBC # BLD: 7.43 K/UL — SIGNIFICANT CHANGE UP (ref 3.8–10.5)
WBC # FLD AUTO: 7.43 K/UL — SIGNIFICANT CHANGE UP (ref 3.8–10.5)

## 2022-08-13 PROCEDURE — 99233 SBSQ HOSP IP/OBS HIGH 50: CPT | Mod: GC

## 2022-08-13 RX ORDER — OXYCODONE HYDROCHLORIDE 5 MG/1
10 TABLET ORAL ONCE
Refills: 0 | Status: DISCONTINUED | OUTPATIENT
Start: 2022-08-13 | End: 2022-08-13

## 2022-08-13 RX ORDER — ACETAMINOPHEN 500 MG
1000 TABLET ORAL ONCE
Refills: 0 | Status: COMPLETED | OUTPATIENT
Start: 2022-08-13 | End: 2022-08-13

## 2022-08-13 RX ADMIN — Medication 100 MILLIGRAM(S): at 06:42

## 2022-08-13 RX ADMIN — Medication 100 MILLIGRAM(S): at 22:05

## 2022-08-13 RX ADMIN — POLYETHYLENE GLYCOL 3350 17 GRAM(S): 17 POWDER, FOR SOLUTION ORAL at 12:21

## 2022-08-13 RX ADMIN — NORTRIPTYLINE HYDROCHLORIDE 75 MILLIGRAM(S): 10 CAPSULE ORAL at 22:06

## 2022-08-13 RX ADMIN — Medication 50 MILLIGRAM(S): at 19:11

## 2022-08-13 RX ADMIN — CEFTRIAXONE 100 MILLIGRAM(S): 500 INJECTION, POWDER, FOR SOLUTION INTRAMUSCULAR; INTRAVENOUS at 19:01

## 2022-08-13 RX ADMIN — OXYCODONE HYDROCHLORIDE 20 MILLIGRAM(S): 5 TABLET ORAL at 12:52

## 2022-08-13 RX ADMIN — APIXABAN 5 MILLIGRAM(S): 2.5 TABLET, FILM COATED ORAL at 06:42

## 2022-08-13 RX ADMIN — Medication 1 MILLIGRAM(S): at 12:23

## 2022-08-13 RX ADMIN — OXYCODONE HYDROCHLORIDE 20 MILLIGRAM(S): 5 TABLET ORAL at 04:31

## 2022-08-13 RX ADMIN — PANTOPRAZOLE SODIUM 40 MILLIGRAM(S): 20 TABLET, DELAYED RELEASE ORAL at 06:42

## 2022-08-13 RX ADMIN — OXYCODONE HYDROCHLORIDE 10 MILLIGRAM(S): 5 TABLET ORAL at 18:07

## 2022-08-13 RX ADMIN — OXYCODONE HYDROCHLORIDE 20 MILLIGRAM(S): 5 TABLET ORAL at 22:06

## 2022-08-13 RX ADMIN — OXYCODONE HYDROCHLORIDE 20 MILLIGRAM(S): 5 TABLET ORAL at 05:00

## 2022-08-13 RX ADMIN — Medication 5 MILLIGRAM(S): at 22:06

## 2022-08-13 RX ADMIN — OXYCODONE HYDROCHLORIDE 20 MILLIGRAM(S): 5 TABLET ORAL at 23:06

## 2022-08-13 RX ADMIN — FINASTERIDE 5 MILLIGRAM(S): 5 TABLET, FILM COATED ORAL at 12:22

## 2022-08-13 RX ADMIN — SIMVASTATIN 10 MILLIGRAM(S): 20 TABLET, FILM COATED ORAL at 22:07

## 2022-08-13 RX ADMIN — Medication 75 MILLIGRAM(S): at 06:41

## 2022-08-13 RX ADMIN — CHLORHEXIDINE GLUCONATE 1 APPLICATION(S): 213 SOLUTION TOPICAL at 06:44

## 2022-08-13 RX ADMIN — APIXABAN 5 MILLIGRAM(S): 2.5 TABLET, FILM COATED ORAL at 17:40

## 2022-08-13 RX ADMIN — Medication 50 MILLIGRAM(S): at 09:23

## 2022-08-13 RX ADMIN — OXYCODONE HYDROCHLORIDE 10 MILLIGRAM(S): 5 TABLET ORAL at 17:37

## 2022-08-13 RX ADMIN — OXYCODONE HYDROCHLORIDE 20 MILLIGRAM(S): 5 TABLET ORAL at 12:22

## 2022-08-13 RX ADMIN — Medication 1000 MILLIGRAM(S): at 14:32

## 2022-08-13 RX ADMIN — Medication 50 MILLIGRAM(S): at 22:06

## 2022-08-13 RX ADMIN — Medication 100 MILLIGRAM(S): at 16:08

## 2022-08-13 RX ADMIN — Medication 400 MILLIGRAM(S): at 14:02

## 2022-08-13 RX ADMIN — Medication 4 MILLIGRAM(S): at 22:06

## 2022-08-13 NOTE — PROGRESS NOTE ADULT - SUBJECTIVE AND OBJECTIVE BOX
Patient is a 72y old  Male who presents with a chief complaint of acute cholecystitis vs. choledocholithiasis (13 Aug 2022 13:09)        SUBJECTIVE:  Patient was seen and examined at bedside.    Overnight Events :       Review of systems: 12 point Review of systems negative unless otherwise documented elsewhere in note.     Diet, Pureed (08-11-22 @ 17:22) [Active]      MEDICATIONS:  MEDICATIONS  (STANDING):  apixaban 5 milliGRAM(s) Oral every 12 hours  cefTRIAXone   IVPB      cefTRIAXone   IVPB 2000 milliGRAM(s) IV Intermittent every 24 hours  chlorhexidine 2% Cloths 1 Application(s) Topical <User Schedule>  doxazosin 4 milliGRAM(s) Oral at bedtime  finasteride 5 milliGRAM(s) Oral daily  folic acid 1 milliGRAM(s) Oral daily  hydrALAZINE 75 milliGRAM(s) Oral <User Schedule>  hydrALAZINE 50 milliGRAM(s) Oral <User Schedule>  melatonin 5 milliGRAM(s) Oral at bedtime  metoprolol succinate ER 50 milliGRAM(s) Oral two times a day  metroNIDAZOLE  IVPB      metroNIDAZOLE  IVPB 500 milliGRAM(s) IV Intermittent every 8 hours  nortriptyline 75 milliGRAM(s) Oral at bedtime  oxyCODONE    IR 10 milliGRAM(s) Oral once  oxyCODONE  ER Tablet 20 milliGRAM(s) Oral every 8 hours  pantoprazole    Tablet 40 milliGRAM(s) Oral before breakfast  polyethylene glycol 3350 17 Gram(s) Oral daily  simvastatin 10 milliGRAM(s) Oral at bedtime    MEDICATIONS  (PRN):  acetaminophen     Tablet .. 650 milliGRAM(s) Oral every 6 hours PRN Temp greater or equal to 38C (100.4F), Mild Pain (1 - 3), Moderate Pain (4 - 6)  famotidine    Tablet 20 milliGRAM(s) Oral daily PRN acid reflux  polyethylene glycol 3350 17 Gram(s) Oral daily PRN Constipation      Allergies    Altace (Unknown)  penicillin (Unknown)    Intolerances        OBJECTIVE:  Vital Signs Last 24 Hrs  T(C): 36.7 (13 Aug 2022 06:07), Max: 37 (12 Aug 2022 18:26)  T(F): 98 (13 Aug 2022 06:07), Max: 98.6 (12 Aug 2022 18:26)  HR: 74 (13 Aug 2022 06:07) (67 - 77)  BP: 123/77 (13 Aug 2022 06:07) (122/82 - 141/73)  BP(mean): 99 (12 Aug 2022 17:00) (99 - 99)  RR: 18 (13 Aug 2022 06:07) (14 - 18)  SpO2: 93% (13 Aug 2022 06:07) (93% - 94%)    Parameters below as of 12 Aug 2022 20:12  Patient On (Oxygen Delivery Method): room air      I&O's Summary    12 Aug 2022 07:01  -  13 Aug 2022 07:00  --------------------------------------------------------  IN: 616.5 mL / OUT: 120 mL / NET: 496.5 mL        PHYSICAL EXAM:  Gen: Resting in bed at time of exam, not in distress   HEENT: moist mucosa, no lesions   Neck: supple, trachea at midline  CV: RRR, +S1/S2  Pulm: no wheezing , no crackles  no increase in work of breathing  Abd: soft, NTND  Skin: warm and dry, no new rashes   Ext: moving all 4 extremities spontaneously , no edema  ,  Neuro: AOx3, no gross focal neurological deficits  Psych: affect and behavior appropriate, pleasant at time of interview    LABS:                        10.2   7.43  )-----------( 219      ( 13 Aug 2022 10:13 )             31.4     08-13    132<L>  |  98  |  25<H>  ----------------------------<  121<H>  4.8   |  26  |  1.08    Ca    8.5      13 Aug 2022 10:13  Phos  3.4     08-13  Mg     1.8     08-13    TPro  6.2  /  Alb  2.6<L>  /  TBili  0.3  /  DBili  x   /  AST  47<H>  /  ALT  24  /  AlkPhos  126<H>  08-13    LIVER FUNCTIONS - ( 13 Aug 2022 10:13 )  Alb: 2.6 g/dL / Pro: 6.2 g/dL / ALK PHOS: 126 U/L / ALT: 24 U/L / AST: 47 U/L / GGT: x             CAPILLARY BLOOD GLUCOSE            MICRODATA:    Culture - Body Fluid with Gram Stain (collected 11 Aug 2022 12:20)  Source: Bile Bile  Gram Stain (11 Aug 2022 15:49):    No organisms seen    Rare WBC's  Final Report (13 Aug 2022 13:46):    Rare Lactobacillus rhamnosus        RADIOLOGY/OTHER STUDIES:

## 2022-08-13 NOTE — OCCUPATIONAL THERAPY INITIAL EVALUATION ADULT - PERTINENT HX OF CURRENT PROBLEM, REHAB EVAL
Pt. is a 72 y.o male presenting as a transfer from [facility] where he was admitted for acute cholecystitis, transferred to [facility] on 8/7, s/p percutaneous cholecystostomy on 8/11; admitted to MICU for management of post procedure hypertensive urgency. Course c/b new Dx of cardiomyopathy.

## 2022-08-13 NOTE — PROGRESS NOTE ADULT - SUBJECTIVE AND OBJECTIVE BOX
72M with complicated PMH who presented as transfer from [facility] on 8/7/2022 with acute cholecystitis s/p percutaneous cholecystostomy tube placement by Dr. Gonzales on 8/11/2022.    Dressing c/d/i  Cholecystostomy tube with 50 cc dark bilious output in past 24 hr, 55cc output in the prior 24h with same appearance   Flushed easily with 2-3 cc NS  Continue to monitor output

## 2022-08-13 NOTE — PROGRESS NOTE ADULT - SUBJECTIVE AND OBJECTIVE BOX
SUBJECTIVE: Pt seen and examined at bedside by surgery team. JOAN.  Pain improving. Denies f/n/v/cp/sob.    Vital Signs Last 24 Hrs  T(C): 36.9 (13 Aug 2022 15:00), Max: 36.9 (13 Aug 2022 15:00)  T(F): 98.5 (13 Aug 2022 15:00), Max: 98.5 (13 Aug 2022 15:00)  HR: 77 (13 Aug 2022 15:00) (74 - 77)  BP: 135/74 (13 Aug 2022 15:00) (123/77 - 135/74)  BP(mean): --  RR: 18 (13 Aug 2022 15:00) (18 - 18)  SpO2: 98% (13 Aug 2022 15:00) (93% - 98%)    Parameters below as of 13 Aug 2022 15:00  Patient On (Oxygen Delivery Method): room air      Physical Exam:  General: NAD, resting comfortably in bed  Pulmonary: Nonlabored breathing, no respiratory distress  Cardiovascular: NSR  Abdominal: soft, minimally TTP over drain site, ND, drain w/ dark bilious contents  Extremities: WWP, normal strength  Neuro: Awake, alert          I&O's Detail    12 Aug 2022 07:01  -  13 Aug 2022 07:00  --------------------------------------------------------  IN:    IV PiggyBack: 516.5 mL    IV PiggyBack: 100 mL  Total IN: 616.5 mL    OUT:    Indwelling Catheter - Urethral (mL): 70 mL    T-Tube (mL): 50 mL  Total OUT: 120 mL    Total NET: 496.5 mL          LABS:                        10.2   7.43  )-----------( 219      ( 13 Aug 2022 10:13 )             31.4     08-13    132<L>  |  98  |  25<H>  ----------------------------<  121<H>  4.8   |  26  |  1.08    Ca    8.5      13 Aug 2022 10:13  Phos  3.4     08-13  Mg     1.8     08-13    TPro  6.2  /  Alb  2.6<L>  /  TBili  0.3  /  DBili  x   /  AST  47<H>  /  ALT  24  /  AlkPhos  126<H>  08-13          RADIOLOGY & ADDITIONAL STUDIES:

## 2022-08-13 NOTE — OCCUPATIONAL THERAPY INITIAL EVALUATION ADULT - ADDITIONAL COMMENTS
Pt lives with his spouse in an apartment with no COLETTE. Pt reports that he requires intermittent assistance from his wife for all ADLs/mobility. Pt states that he has recently required use of rolling walker for transfers/ambulation. Pt also reporting that he uses a shower chair in his tub/shower, as well as a raised toilet seat to assist with toilet transfers.

## 2022-08-13 NOTE — OCCUPATIONAL THERAPY INITIAL EVALUATION ADULT - MODALITIES TREATMENT COMMENTS
pt able to ambulate 3 side steps along EOB, requiring Mod Ax1 using RW. Pt demo decreased ability to weight shift on b/l LE, decreased step length, and decreased functional activity tolerance.

## 2022-08-13 NOTE — PROGRESS NOTE ADULT - ASSESSMENT
73 y/o male with complicated medical history presents with 1 day history of symptomatic acute cholecystitis vs. choledocholithiasis. Now s/p 3U FFP i/s/o elevated INR. Pt then was noted to be hypertensive, with lung infiltrates and hypoxia, elevated troponins and transferred to MICU for further management. Pt transiently on cardene drip for pressure control. Pt still having abdominal pain despite antibiotic therapy indicative of medical management failure. Now s/p perc yonis on 8/11 w/ significant improvement in his abdominal pain.    No acute surgical intervention at this time  Will follow drain output  Rest of plan per MICU

## 2022-08-13 NOTE — OCCUPATIONAL THERAPY INITIAL EVALUATION ADULT - DIAGNOSIS, OT EVAL
Pt presenting with impaired strength, decreased postural control, poor functional activity tolerance, and impaired balance, impacting ability to perform functional mobility/ADLs.

## 2022-08-13 NOTE — OCCUPATIONAL THERAPY INITIAL EVALUATION ADULT - GENERAL OBSERVATIONS, REHAB EVAL
OT IE completed. Pt admitted to [facility] for tx of cardiomyopathy s/p hypertensive urgency. Orders received, chart reviewed, pt cleared for OT by MERON Jauregui. Pt received semi supine in bed, NAD, +IV. Pt A&Ox4, agreeable to OT, and tolerated session well.

## 2022-08-13 NOTE — PROGRESS NOTE ADULT - ASSESSMENT
72M w/ multiple medical problems PMH including Atrial fibrillation on Eliquis, PPM (2004), HTN, HLD, R hemicoloectomy for colon mass (2017), AAA s/p EVAR (2016) for R iliac artery aneurysm, possible coagulopathy s/p IVC filter placement, chronic back pain w/ remote spinal fusion and intrathecal pump, who was transferred from [facility] 8/7/22 w/ acute cholecystitis c/b new onset heart failure and hypertensive emergency which has resolved , he underwent  percutaneous cholecystostomy on 8/11     Takotsubo cardiomyopathy   -. Newly depressed EF 35% w/o ischemic changes. Cardiology consulted. Type I MI less likely as recent stress test negative. Repeat TTE no change in EF.   - f/u outpatient w/ repeat ECHO for newly diagnosed reduced EF.    Hypertensive emergency   -treated with Nifecardipine in MICU , and BP has now been stable for 24 hours   -Started on Hydralazine 75mg AM and 25mg PM to maintain BPs.    Acute cholecystitis.   ·  Plan: Pt presented to [place] ED w/ "stabbing" epigastric pain and "crushing" chest pain. CT revealed distended gall bladder with gall stones, jeannette-cholecystic fluid, dilated CBD to 2cm and CBD stone. RUQ US results: Gallstone impacted at the gallbladder neck with associated gallbladder wall thickening and positive sonographic Parnell's sign, compatible with acute cholecystitis. s/p percutaneous cholycystostomy 8/11 w/ improvement in abdominal pain. Pt stable and WBC downtrending.    - c/w CTX 2g Q24hrs and  IV flagyl Q8hrs for 7 days ,8/8-8/14       Acute pulmonary edema due to HFrEF and HTN urgency     Chronic atrial fibrillation.   -On Eliquis for AC at home. s/p IVC filter placement. Pacemaker was implanted in 2004, s/p recent PPM interrogation. CHADSVASC 4 (CHF, AAA, Age, HTN)  - c/w Toprol 50mg BID for rate control.    Chronic back pain.   -Pt w/ hx of spinal fusion and s/p failed intrathecal morphine pump which caused bradycardia   Home med: oxycontin 20mg TID, oxycodone 15mg TID  - c/w home oxycontin 20mg q8  - holding oxycodone. can resume if needed.     Hyperlipidemia.   - c/w simvastatin 10mg qhs.      DVT ppx: eliquis    Dispo: [facility] , will be medically ready 8/14     CODE STATUS: FULL CODE.

## 2022-08-13 NOTE — OCCUPATIONAL THERAPY INITIAL EVALUATION ADULT - PLANNED THERAPY INTERVENTIONS, OT EVAL
ADL retraining/balance training/bed mobility training/neuromuscular re-education/strengthening/stretching/transfer training

## 2022-08-14 LAB
ALBUMIN SERPL ELPH-MCNC: 2.3 G/DL — LOW (ref 3.3–5)
ALP SERPL-CCNC: 126 U/L — HIGH (ref 40–120)
ALT FLD-CCNC: 34 U/L — SIGNIFICANT CHANGE UP (ref 10–45)
ANION GAP SERPL CALC-SCNC: 8 MMOL/L — SIGNIFICANT CHANGE UP (ref 5–17)
AST SERPL-CCNC: 65 U/L — HIGH (ref 10–40)
BILIRUB SERPL-MCNC: 0.3 MG/DL — SIGNIFICANT CHANGE UP (ref 0.2–1.2)
BUN SERPL-MCNC: 18 MG/DL — SIGNIFICANT CHANGE UP (ref 7–23)
CALCIUM SERPL-MCNC: 8.6 MG/DL — SIGNIFICANT CHANGE UP (ref 8.4–10.5)
CHLORIDE SERPL-SCNC: 98 MMOL/L — SIGNIFICANT CHANGE UP (ref 96–108)
CO2 SERPL-SCNC: 25 MMOL/L — SIGNIFICANT CHANGE UP (ref 22–31)
CREAT SERPL-MCNC: 1.16 MG/DL — SIGNIFICANT CHANGE UP (ref 0.5–1.3)
CULTURE RESULTS: SIGNIFICANT CHANGE UP
EGFR: 67 ML/MIN/1.73M2 — SIGNIFICANT CHANGE UP
GLUCOSE SERPL-MCNC: 101 MG/DL — HIGH (ref 70–99)
HCT VFR BLD CALC: 30.6 % — LOW (ref 39–50)
HGB BLD-MCNC: 9.9 G/DL — LOW (ref 13–17)
MAGNESIUM SERPL-MCNC: 1.9 MG/DL — SIGNIFICANT CHANGE UP (ref 1.6–2.6)
MCHC RBC-ENTMCNC: 29 PG — SIGNIFICANT CHANGE UP (ref 27–34)
MCHC RBC-ENTMCNC: 32.4 GM/DL — SIGNIFICANT CHANGE UP (ref 32–36)
MCV RBC AUTO: 89.7 FL — SIGNIFICANT CHANGE UP (ref 80–100)
NRBC # BLD: 0 /100 WBCS — SIGNIFICANT CHANGE UP (ref 0–0)
PHOSPHATE SERPL-MCNC: 3.2 MG/DL — SIGNIFICANT CHANGE UP (ref 2.5–4.5)
PLATELET # BLD AUTO: 209 K/UL — SIGNIFICANT CHANGE UP (ref 150–400)
POTASSIUM SERPL-MCNC: 4.5 MMOL/L — SIGNIFICANT CHANGE UP (ref 3.5–5.3)
POTASSIUM SERPL-SCNC: 4.5 MMOL/L — SIGNIFICANT CHANGE UP (ref 3.5–5.3)
PROT SERPL-MCNC: 6 G/DL — SIGNIFICANT CHANGE UP (ref 6–8.3)
RBC # BLD: 3.41 M/UL — LOW (ref 4.2–5.8)
RBC # FLD: 13.4 % — SIGNIFICANT CHANGE UP (ref 10.3–14.5)
SODIUM SERPL-SCNC: 131 MMOL/L — LOW (ref 135–145)
SPECIMEN SOURCE: SIGNIFICANT CHANGE UP
WBC # BLD: 7.46 K/UL — SIGNIFICANT CHANGE UP (ref 3.8–10.5)
WBC # FLD AUTO: 7.46 K/UL — SIGNIFICANT CHANGE UP (ref 3.8–10.5)

## 2022-08-14 PROCEDURE — 99233 SBSQ HOSP IP/OBS HIGH 50: CPT | Mod: GC

## 2022-08-14 RX ORDER — OXYCODONE HYDROCHLORIDE 5 MG/1
15 TABLET ORAL THREE TIMES A DAY
Refills: 0 | Status: DISCONTINUED | OUTPATIENT
Start: 2022-08-14 | End: 2022-08-16

## 2022-08-14 RX ADMIN — Medication 650 MILLIGRAM(S): at 04:55

## 2022-08-14 RX ADMIN — Medication 100 MILLIGRAM(S): at 06:17

## 2022-08-14 RX ADMIN — OXYCODONE HYDROCHLORIDE 15 MILLIGRAM(S): 5 TABLET ORAL at 20:13

## 2022-08-14 RX ADMIN — APIXABAN 5 MILLIGRAM(S): 2.5 TABLET, FILM COATED ORAL at 06:17

## 2022-08-14 RX ADMIN — OXYCODONE HYDROCHLORIDE 20 MILLIGRAM(S): 5 TABLET ORAL at 06:16

## 2022-08-14 RX ADMIN — OXYCODONE HYDROCHLORIDE 15 MILLIGRAM(S): 5 TABLET ORAL at 19:43

## 2022-08-14 RX ADMIN — Medication 650 MILLIGRAM(S): at 03:55

## 2022-08-14 RX ADMIN — Medication 1 MILLIGRAM(S): at 11:59

## 2022-08-14 RX ADMIN — Medication 50 MILLIGRAM(S): at 22:41

## 2022-08-14 RX ADMIN — Medication 50 MILLIGRAM(S): at 19:02

## 2022-08-14 RX ADMIN — Medication 5 MILLIGRAM(S): at 22:41

## 2022-08-14 RX ADMIN — NORTRIPTYLINE HYDROCHLORIDE 75 MILLIGRAM(S): 10 CAPSULE ORAL at 22:41

## 2022-08-14 RX ADMIN — OXYCODONE HYDROCHLORIDE 20 MILLIGRAM(S): 5 TABLET ORAL at 07:11

## 2022-08-14 RX ADMIN — OXYCODONE HYDROCHLORIDE 20 MILLIGRAM(S): 5 TABLET ORAL at 22:01

## 2022-08-14 RX ADMIN — OXYCODONE HYDROCHLORIDE 20 MILLIGRAM(S): 5 TABLET ORAL at 12:38

## 2022-08-14 RX ADMIN — FINASTERIDE 5 MILLIGRAM(S): 5 TABLET, FILM COATED ORAL at 11:59

## 2022-08-14 RX ADMIN — OXYCODONE HYDROCHLORIDE 15 MILLIGRAM(S): 5 TABLET ORAL at 13:44

## 2022-08-14 RX ADMIN — Medication 4 MILLIGRAM(S): at 22:41

## 2022-08-14 RX ADMIN — OXYCODONE HYDROCHLORIDE 20 MILLIGRAM(S): 5 TABLET ORAL at 12:08

## 2022-08-14 RX ADMIN — OXYCODONE HYDROCHLORIDE 15 MILLIGRAM(S): 5 TABLET ORAL at 14:14

## 2022-08-14 RX ADMIN — SIMVASTATIN 10 MILLIGRAM(S): 20 TABLET, FILM COATED ORAL at 22:41

## 2022-08-14 RX ADMIN — CHLORHEXIDINE GLUCONATE 1 APPLICATION(S): 213 SOLUTION TOPICAL at 06:15

## 2022-08-14 RX ADMIN — OXYCODONE HYDROCHLORIDE 20 MILLIGRAM(S): 5 TABLET ORAL at 21:01

## 2022-08-14 RX ADMIN — Medication 75 MILLIGRAM(S): at 06:16

## 2022-08-14 RX ADMIN — Medication 50 MILLIGRAM(S): at 09:16

## 2022-08-14 RX ADMIN — PANTOPRAZOLE SODIUM 40 MILLIGRAM(S): 20 TABLET, DELAYED RELEASE ORAL at 06:16

## 2022-08-14 RX ADMIN — APIXABAN 5 MILLIGRAM(S): 2.5 TABLET, FILM COATED ORAL at 18:21

## 2022-08-14 NOTE — PROGRESS NOTE ADULT - PROBLEM SELECTOR PLAN 4
Pt presented to [place] ED w/ "stabbing" epigastric pain and "crushing" chest pain. CT revealed distended gall bladder with gall stones, jeannette-cholecystic fluid, dilated CBD to 2cm and CBD stone. RUQ US results: Gallstone impacted at the gallbladder neck with associated gallbladder wall thickening and positive sonographic Parnell's sign, compatible with acute cholecystitis. s/p percutaneous cholycystostomy 8/11 w/ improvement in abdominal pain. Pt stable and WBC downtrending.    - c/w CTX 2g Q24hrs  - c/w IV flagyl Q8hrs  - continue to trend CBC and monitor fever curve

## 2022-08-14 NOTE — PROGRESS NOTE ADULT - ASSESSMENT
72M w/ multiple medical problems PMH including Atrial fibrillation on Eliquis, PPM (2004), HTN, HLD, R hemicoloectomy for colon mass (2017), AAA s/p EVAR (2016) for R iliac artery aneurysm, possible coagulopathy s/p IVC filter placement, chronic back pain w/ remote spinal fusion and intrathecal pump, who was transferred from [facility] 8/7/22 w/ acute cholecystitis c/b new onset heart failure s/p FFP and hypertensive emergency now POD#1 percutaneous cholecystostomy.

## 2022-08-14 NOTE — PROGRESS NOTE ADULT - SUBJECTIVE AND OBJECTIVE BOX
LUPE KESSLER, 72y, Male  MRN-[id number]  Patient is a 72y old  Male who presents with a chief complaint of acute cholecystitis vs. choledocholithiasis (13 Aug 2022 19:11)      OVERNIGHT EVENTS:     SUBJECTIVE:    12 Point ROS Negative unless noted otherwise above.  -------------------------------------------------------------------------------  VITAL SIGNS:  Vital Signs Last 24 Hrs  T(C): 36.6 (14 Aug 2022 05:41), Max: 36.9 (13 Aug 2022 15:00)  T(F): 97.8 (14 Aug 2022 05:41), Max: 98.5 (13 Aug 2022 15:00)  HR: 62 (14 Aug 2022 05:41) (62 - 77)  BP: 142/61 (14 Aug 2022 05:41) (135/74 - 150/83)  BP(mean): --  RR: 19 (14 Aug 2022 05:41) (18 - 19)  SpO2: 97% (14 Aug 2022 05:41) (96% - 98%)    Parameters below as of 14 Aug 2022 05:41  Patient On (Oxygen Delivery Method): room air      I&O's Summary    12 Aug 2022 07:01  -  13 Aug 2022 07:00  --------------------------------------------------------  IN: 616.5 mL / OUT: 120 mL / NET: 496.5 mL    13 Aug 2022 07:01  -  14 Aug 2022 06:34  --------------------------------------------------------  IN: 0 mL / OUT: 60 mL / NET: -60 mL    PHYSICAL EXAM:   General: NAD, sitting up eating in bed  HEENT: NC/AT, PERRLA, EOMI, no scleral icterus  Neck: Supple  Respiratory: CTA B/L. No w/r/r.   Cardiovascular: RRR, normal S1 and S2, no m/r/g.   Gastrointestinal: +BS, mild TTP in b/l lower quadrants. Cholecystostomy drain in R flank w/o erythema, fluctuance or drainage. Drain outputting black/brown fluid. No guarding or rebound tenderness, palpable intrathecal device  Extremities: wwp; no cyanosis, clubbing or edema.   Vascular: Pulses equal and strong throughout.   Neurological: AAOx3, no CN deficits, strength and sensation intact throughout.     ALLERGIES:  Allergies    Altace (Unknown)  penicillin (Unknown)    Intolerances        MEDICATIONS:  MEDICATIONS  (STANDING):  apixaban 5 milliGRAM(s) Oral every 12 hours  cefTRIAXone   IVPB      cefTRIAXone   IVPB 2000 milliGRAM(s) IV Intermittent every 24 hours  chlorhexidine 2% Cloths 1 Application(s) Topical <User Schedule>  doxazosin 4 milliGRAM(s) Oral at bedtime  finasteride 5 milliGRAM(s) Oral daily  folic acid 1 milliGRAM(s) Oral daily  hydrALAZINE 75 milliGRAM(s) Oral <User Schedule>  hydrALAZINE 50 milliGRAM(s) Oral <User Schedule>  melatonin 5 milliGRAM(s) Oral at bedtime  metoprolol succinate ER 50 milliGRAM(s) Oral two times a day  metroNIDAZOLE  IVPB      metroNIDAZOLE  IVPB 500 milliGRAM(s) IV Intermittent every 8 hours  nortriptyline 75 milliGRAM(s) Oral at bedtime  oxyCODONE  ER Tablet 20 milliGRAM(s) Oral every 8 hours  pantoprazole    Tablet 40 milliGRAM(s) Oral before breakfast  polyethylene glycol 3350 17 Gram(s) Oral daily  simvastatin 10 milliGRAM(s) Oral at bedtime    MEDICATIONS  (PRN):  acetaminophen     Tablet .. 650 milliGRAM(s) Oral every 6 hours PRN Temp greater or equal to 38C (100.4F), Mild Pain (1 - 3), Moderate Pain (4 - 6)  famotidine    Tablet 20 milliGRAM(s) Oral daily PRN acid reflux  polyethylene glycol 3350 17 Gram(s) Oral daily PRN Constipation      -------------------------------------------------------------------------------  LABS:                        10.2   7.43  )-----------( 219      ( 13 Aug 2022 10:13 )             31.4     08-13    132<L>  |  98  |  25<H>  ----------------------------<  121<H>  4.8   |  26  |  1.08    Ca    8.5      13 Aug 2022 10:13  Phos  3.4     08-13  Mg     1.8     08-13    TPro  6.2  /  Alb  2.6<L>  /  TBili  0.3  /  DBili  x   /  AST  47<H>  /  ALT  24  /  AlkPhos  126<H>  08-13    LIVER FUNCTIONS - ( 13 Aug 2022 10:13 )  Alb: 2.6 g/dL / Pro: 6.2 g/dL / ALK PHOS: 126 U/L / ALT: 24 U/L / AST: 47 U/L / GGT: x               CAPILLARY BLOOD GLUCOSE          Culture - Body Fluid with Gram Stain (collected 11 Aug 2022 12:20)  Source: Bile Bile  Gram Stain (11 Aug 2022 15:49):    No organisms seen    Rare WBC's  Final Report (13 Aug 2022 13:46):    Rare Lactobacillus rhamnosus      COVID-19 PCR: NotDetec (10 Aug 2022 15:20)  COVID-19 PCR: NotDetec (07 Aug 2022 15:25)      RADIOLOGY & ADDITIONAL TESTS: Reviewed.   LUPE KESSLER, 72y, Male  MRN-[id number]  Patient is a 72y old  Male who presents with a chief complaint of acute cholecystitis vs. choledocholithiasis (13 Aug 2022 19:11)    OVERNIGHT EVENTS: GERMAINE    SUBJECTIVE: Mr. Kessler was seen at bedside this morning. He endorsed feeling very tired but otherwise no new complaints. Denied n/v/d, fever/chills, SOB, chest pain.    12 Point ROS Negative unless noted otherwise above.  -------------------------------------------------------------------------------  VITAL SIGNS:  Vital Signs Last 24 Hrs  T(C): 36.6 (14 Aug 2022 05:41), Max: 36.9 (13 Aug 2022 15:00)  T(F): 97.8 (14 Aug 2022 05:41), Max: 98.5 (13 Aug 2022 15:00)  HR: 62 (14 Aug 2022 05:41) (62 - 77)  BP: 142/61 (14 Aug 2022 05:41) (135/74 - 150/83)  RR: 19 (14 Aug 2022 05:41) (18 - 19)  SpO2: 97% (14 Aug 2022 05:41) (96% - 98%) RA    I&O's Summary  12 Aug 2022 07:01  -  13 Aug 2022 07:00  --------------------------------------------------------  IN: 616.5 mL / OUT: 120 mL / NET: 496.5 mL    13 Aug 2022 07:01  -  14 Aug 2022 06:34  --------------------------------------------------------  IN: 0 mL / OUT: 60 mL / NET: -60 mL    PHYSICAL EXAM:   General: NAD, sleeping comfortably   HEENT: NC/AT, PERRLA, EOMI, no scleral icterus  Neck: Supple  Respiratory: CTA B/L. No w/r/r.   Cardiovascular: RRR, normal S1 and S2, no m/r/g.   Gastrointestinal: +BS, no tenderness to palpation.  Cholecystostomy drain in R flank w/o erythema, fluctuance or drainage. Drain outputting black/brown fluid. No guarding or rebound tenderness, palpable intrathecal device  Extremities: wwp; no cyanosis, clubbing or edema.   Vascular: Pulses equal and strong throughout.   Neurological: AAOx3, no CN deficits, strength and sensation intact throughout.     ALLERGIES:  Altace (Unknown)  penicillin (Unknown)    MEDICATIONS  (STANDING):  apixaban 5 milliGRAM(s) Oral every 12 hours  cefTRIAXone   IVPB      cefTRIAXone   IVPB 2000 milliGRAM(s) IV Intermittent every 24 hours  chlorhexidine 2% Cloths 1 Application(s) Topical <User Schedule>  doxazosin 4 milliGRAM(s) Oral at bedtime  finasteride 5 milliGRAM(s) Oral daily  folic acid 1 milliGRAM(s) Oral daily  hydrALAZINE 75 milliGRAM(s) Oral <User Schedule>  hydrALAZINE 50 milliGRAM(s) Oral <User Schedule>  melatonin 5 milliGRAM(s) Oral at bedtime  metoprolol succinate ER 50 milliGRAM(s) Oral two times a day  metroNIDAZOLE  IVPB      metroNIDAZOLE  IVPB 500 milliGRAM(s) IV Intermittent every 8 hours  nortriptyline 75 milliGRAM(s) Oral at bedtime  oxyCODONE  ER Tablet 20 milliGRAM(s) Oral every 8 hours  pantoprazole    Tablet 40 milliGRAM(s) Oral before breakfast  polyethylene glycol 3350 17 Gram(s) Oral daily  simvastatin 10 milliGRAM(s) Oral at bedtime    MEDICATIONS  (PRN):  acetaminophen     Tablet .. 650 milliGRAM(s) Oral every 6 hours PRN Temp greater or equal to 38C (100.4F), Mild Pain (1 - 3), Moderate Pain (4 - 6)  famotidine    Tablet 20 milliGRAM(s) Oral daily PRN acid reflux  polyethylene glycol 3350 17 Gram(s) Oral daily PRN Constipation      -------------------------------------------------------------------------------  LABS:                        10.2   7.43  )-----------( 219      ( 13 Aug 2022 10:13 )             31.4     08-13    132<L>  |  98  |  25<H>  ----------------------------<  121<H>  4.8   |  26  |  1.08    Ca    8.5      13 Aug 2022 10:13  Phos  3.4     08-13  Mg     1.8     08-13    TPro  6.2  /  Alb  2.6<L>  /  TBili  0.3  /  DBili  x   /  AST  47<H>  /  ALT  24  /  AlkPhos  126<H>  08-13    LIVER FUNCTIONS - ( 13 Aug 2022 10:13 )  Alb: 2.6 g/dL / Pro: 6.2 g/dL / ALK PHOS: 126 U/L / ALT: 24 U/L / AST: 47 U/L / GGT: x           Culture - Body Fluid with Gram Stain (collected 11 Aug 2022 12:20)  Source: Bile Bile  Gram Stain (11 Aug 2022 15:49):    No organisms seen    Rare WBC's  Final Report (13 Aug 2022 13:46):    Rare Lactobacillus rhamnosus    COVID-19 PCR: NotDetec (10 Aug 2022 15:20)    RADIOLOGY & ADDITIONAL TESTS: Reviewed.

## 2022-08-14 NOTE — PROGRESS NOTE ADULT - PROBLEM SELECTOR PLAN 7
Pt w/ hx of spinal fusion and s/p failed intrathecal morphine pump which caused bradycardia   Home med: oxycontin 20mg TID, oxycodone 15mg TID  - c/w home oxycontin 20mg q8  - holding oxycodone. can resume if needed Pt w/ hx of spinal fusion and s/p failed intrathecal morphine pump which caused bradycardia   Home med: oxycontin 20mg TID, oxycodone 15mg TID  - holding oxycodone. can resume if needed

## 2022-08-14 NOTE — PROGRESS NOTE ADULT - PROBLEM SELECTOR PLAN 5
--RESOLVED  Pt w/ new pulmonary edema s/p FFP infusion for elevated INR. Concern for flash pulmonary edema vs. TRALI vs. TACO. TTE demonstrating left ventricular function appears moderately reduced with regional wall motion abnormalities, estimated EF approx. 35%. Patient diuresed well with IV lasix and pulmonary edema resolved.

## 2022-08-14 NOTE — PROGRESS NOTE ADULT - SUBJECTIVE AND OBJECTIVE BOX
INTERVAL HPI/OVERNIGHT EVENTS:    STATUS POST:      POST OPERATIVE DAY #:     SUBJECTIVE:      apixaban 5 milliGRAM(s) Oral every 12 hours  cefTRIAXone   IVPB      cefTRIAXone   IVPB 2000 milliGRAM(s) IV Intermittent every 24 hours  doxazosin 4 milliGRAM(s) Oral at bedtime  hydrALAZINE 75 milliGRAM(s) Oral <User Schedule>  hydrALAZINE 50 milliGRAM(s) Oral <User Schedule>  metoprolol succinate ER 50 milliGRAM(s) Oral two times a day  metroNIDAZOLE  IVPB      metroNIDAZOLE  IVPB 500 milliGRAM(s) IV Intermittent every 8 hours      Vital Signs Last 24 Hrs  T(C): 36.6 (14 Aug 2022 05:41), Max: 36.9 (13 Aug 2022 15:00)  T(F): 97.8 (14 Aug 2022 05:41), Max: 98.5 (13 Aug 2022 15:00)  HR: 62 (14 Aug 2022 05:41) (62 - 77)  BP: 142/61 (14 Aug 2022 05:41) (135/74 - 150/83)  BP(mean): --  RR: 19 (14 Aug 2022 05:41) (18 - 19)  SpO2: 97% (14 Aug 2022 05:41) (96% - 98%)    Parameters below as of 14 Aug 2022 05:41  Patient On (Oxygen Delivery Method): room air      I&O's Detail    12 Aug 2022 07:01  -  13 Aug 2022 07:00  --------------------------------------------------------  IN:    IV PiggyBack: 516.5 mL    IV PiggyBack: 100 mL  Total IN: 616.5 mL    OUT:    Indwelling Catheter - Urethral (mL): 70 mL    T-Tube (mL): 50 mL  Total OUT: 120 mL    Total NET: 496.5 mL      13 Aug 2022 07:01  -  14 Aug 2022 06:43  --------------------------------------------------------  IN:  Total IN: 0 mL    OUT:    T-Tube (mL): 60 mL  Total OUT: 60 mL    Total NET: -60 mL          General: NAD, resting comfortably in bed  C/V: NSR  Pulm: Nonlabored breathing, no respiratory distress  Abd: soft, NT/ND.  Extrem: WWP, no edema, SCDs in place  Drains:  Barber:      LABS:                        10.2   7.43  )-----------( 219      ( 13 Aug 2022 10:13 )             31.4     08-13    132<L>  |  98  |  25<H>  ----------------------------<  121<H>  4.8   |  26  |  1.08    Ca    8.5      13 Aug 2022 10:13  Phos  3.4     08-13  Mg     1.8     08-13    TPro  6.2  /  Alb  2.6<L>  /  TBili  0.3  /  DBili  x   /  AST  47<H>  /  ALT  24  /  AlkPhos  126<H>  08-13          RADIOLOGY & ADDITIONAL STUDIES:   INTERVAL HPI/OVERNIGHT EVENTS: GERMAINE, VSS.    SUBJECTIVE: Patient seen and examined at bedside with chief. He reports improved abdominal pain and denies n/v, sob, cp. Able to tolerate PO with no issues.      apixaban 5 milliGRAM(s) Oral every 12 hours  cefTRIAXone   IVPB      cefTRIAXone   IVPB 2000 milliGRAM(s) IV Intermittent every 24 hours  doxazosin 4 milliGRAM(s) Oral at bedtime  hydrALAZINE 75 milliGRAM(s) Oral <User Schedule>  hydrALAZINE 50 milliGRAM(s) Oral <User Schedule>  metoprolol succinate ER 50 milliGRAM(s) Oral two times a day  metroNIDAZOLE  IVPB      metroNIDAZOLE  IVPB 500 milliGRAM(s) IV Intermittent every 8 hours      Vital Signs Last 24 Hrs  T(C): 36.6 (14 Aug 2022 05:41), Max: 36.9 (13 Aug 2022 15:00)  T(F): 97.8 (14 Aug 2022 05:41), Max: 98.5 (13 Aug 2022 15:00)  HR: 62 (14 Aug 2022 05:41) (62 - 77)  BP: 142/61 (14 Aug 2022 05:41) (135/74 - 150/83)  BP(mean): --  RR: 19 (14 Aug 2022 05:41) (18 - 19)  SpO2: 97% (14 Aug 2022 05:41) (96% - 98%)    Parameters below as of 14 Aug 2022 05:41  Patient On (Oxygen Delivery Method): room air      I&O's Detail    12 Aug 2022 07:01  -  13 Aug 2022 07:00  --------------------------------------------------------  IN:    IV PiggyBack: 516.5 mL    IV PiggyBack: 100 mL  Total IN: 616.5 mL    OUT:    Indwelling Catheter - Urethral (mL): 70 mL    T-Tube (mL): 50 mL  Total OUT: 120 mL    Total NET: 496.5 mL      13 Aug 2022 07:01  -  14 Aug 2022 06:43  --------------------------------------------------------  IN:  Total IN: 0 mL    OUT:    T-Tube (mL): 60 mL  Total OUT: 60 mL    Total NET: -60 mL          General: NAD, resting comfortably in bed  C/V: NSR  Pulm: Nonlabored breathing, no respiratory distress  Abd: soft, NT/ND. Drain with dark bilious contents  Extrem: WWP, no edema, SCDs in place        LABS:                        10.2   7.43  )-----------( 219      ( 13 Aug 2022 10:13 )             31.4     08-13    132<L>  |  98  |  25<H>  ----------------------------<  121<H>  4.8   |  26  |  1.08    Ca    8.5      13 Aug 2022 10:13  Phos  3.4     08-13  Mg     1.8     08-13    TPro  6.2  /  Alb  2.6<L>  /  TBili  0.3  /  DBili  x   /  AST  47<H>  /  ALT  24  /  AlkPhos  126<H>  08-13          RADIOLOGY & ADDITIONAL STUDIES:

## 2022-08-14 NOTE — PROGRESS NOTE ADULT - PROBLEM SELECTOR PLAN 6
On Eliquis for AC at home. s/p IVC filter placement. Pacemaker was implanted in 2004, s/p recent PPM interrogation. CHADSVASC 4 (CHF, AAA, Age, HTN)  - c/w Eliquis 5mg BID   - c/w Toprol 50mg BID for rate control

## 2022-08-15 ENCOUNTER — TRANSCRIPTION ENCOUNTER (OUTPATIENT)
Age: 72
End: 2022-08-15

## 2022-08-15 DIAGNOSIS — I48.11 LONGSTANDING PERSISTENT ATRIAL FIBRILLATION: ICD-10-CM

## 2022-08-15 DIAGNOSIS — I50.21 ACUTE SYSTOLIC (CONGESTIVE) HEART FAILURE: ICD-10-CM

## 2022-08-15 DIAGNOSIS — I21.A1 MYOCARDIAL INFARCTION TYPE 2: ICD-10-CM

## 2022-08-15 LAB
ALBUMIN SERPL ELPH-MCNC: 2.6 G/DL — LOW (ref 3.3–5)
ALP SERPL-CCNC: 136 U/L — HIGH (ref 40–120)
ALT FLD-CCNC: 35 U/L — SIGNIFICANT CHANGE UP (ref 10–45)
ANION GAP SERPL CALC-SCNC: 7 MMOL/L — SIGNIFICANT CHANGE UP (ref 5–17)
ANION GAP SERPL CALC-SCNC: 9 MMOL/L — SIGNIFICANT CHANGE UP (ref 5–17)
AST SERPL-CCNC: 56 U/L — HIGH (ref 10–40)
BILIRUB SERPL-MCNC: 0.3 MG/DL — SIGNIFICANT CHANGE UP (ref 0.2–1.2)
BUN SERPL-MCNC: 12 MG/DL — SIGNIFICANT CHANGE UP (ref 7–23)
BUN SERPL-MCNC: 14 MG/DL — SIGNIFICANT CHANGE UP (ref 7–23)
CALCIUM SERPL-MCNC: 8.7 MG/DL — SIGNIFICANT CHANGE UP (ref 8.4–10.5)
CALCIUM SERPL-MCNC: 9 MG/DL — SIGNIFICANT CHANGE UP (ref 8.4–10.5)
CHLORIDE SERPL-SCNC: 97 MMOL/L — SIGNIFICANT CHANGE UP (ref 96–108)
CHLORIDE SERPL-SCNC: 97 MMOL/L — SIGNIFICANT CHANGE UP (ref 96–108)
CO2 SERPL-SCNC: 24 MMOL/L — SIGNIFICANT CHANGE UP (ref 22–31)
CO2 SERPL-SCNC: 27 MMOL/L — SIGNIFICANT CHANGE UP (ref 22–31)
CREAT SERPL-MCNC: 1.14 MG/DL — SIGNIFICANT CHANGE UP (ref 0.5–1.3)
CREAT SERPL-MCNC: 1.15 MG/DL — SIGNIFICANT CHANGE UP (ref 0.5–1.3)
EGFR: 67 ML/MIN/1.73M2 — SIGNIFICANT CHANGE UP
EGFR: 68 ML/MIN/1.73M2 — SIGNIFICANT CHANGE UP
GLUCOSE SERPL-MCNC: 118 MG/DL — HIGH (ref 70–99)
GLUCOSE SERPL-MCNC: 137 MG/DL — HIGH (ref 70–99)
HCT VFR BLD CALC: 31.3 % — LOW (ref 39–50)
HGB BLD-MCNC: 9.9 G/DL — LOW (ref 13–17)
MAGNESIUM SERPL-MCNC: 1.8 MG/DL — SIGNIFICANT CHANGE UP (ref 1.6–2.6)
MCHC RBC-ENTMCNC: 28.3 PG — SIGNIFICANT CHANGE UP (ref 27–34)
MCHC RBC-ENTMCNC: 31.6 GM/DL — LOW (ref 32–36)
MCV RBC AUTO: 89.4 FL — SIGNIFICANT CHANGE UP (ref 80–100)
NRBC # BLD: 0 /100 WBCS — SIGNIFICANT CHANGE UP (ref 0–0)
PHOSPHATE SERPL-MCNC: 3.1 MG/DL — SIGNIFICANT CHANGE UP (ref 2.5–4.5)
PLATELET # BLD AUTO: 252 K/UL — SIGNIFICANT CHANGE UP (ref 150–400)
POTASSIUM SERPL-MCNC: 4.4 MMOL/L — SIGNIFICANT CHANGE UP (ref 3.5–5.3)
POTASSIUM SERPL-MCNC: 5 MMOL/L — SIGNIFICANT CHANGE UP (ref 3.5–5.3)
POTASSIUM SERPL-SCNC: 4.4 MMOL/L — SIGNIFICANT CHANGE UP (ref 3.5–5.3)
POTASSIUM SERPL-SCNC: 5 MMOL/L — SIGNIFICANT CHANGE UP (ref 3.5–5.3)
PROT SERPL-MCNC: 6.9 G/DL — SIGNIFICANT CHANGE UP (ref 6–8.3)
RBC # BLD: 3.5 M/UL — LOW (ref 4.2–5.8)
RBC # FLD: 13.4 % — SIGNIFICANT CHANGE UP (ref 10.3–14.5)
SODIUM SERPL-SCNC: 130 MMOL/L — LOW (ref 135–145)
SODIUM SERPL-SCNC: 131 MMOL/L — LOW (ref 135–145)
WBC # BLD: 8.27 K/UL — SIGNIFICANT CHANGE UP (ref 3.8–10.5)
WBC # FLD AUTO: 8.27 K/UL — SIGNIFICANT CHANGE UP (ref 3.8–10.5)

## 2022-08-15 PROCEDURE — 99233 SBSQ HOSP IP/OBS HIGH 50: CPT

## 2022-08-15 PROCEDURE — 99233 SBSQ HOSP IP/OBS HIGH 50: CPT | Mod: GC

## 2022-08-15 RX ORDER — CEFTRIAXONE 500 MG/1
2 INJECTION, POWDER, FOR SOLUTION INTRAMUSCULAR; INTRAVENOUS EVERY 24 HOURS
Refills: 0 | Status: DISCONTINUED | OUTPATIENT
Start: 2022-08-15 | End: 2022-08-15

## 2022-08-15 RX ORDER — METRONIDAZOLE 500 MG
500 TABLET ORAL EVERY 8 HOURS
Refills: 0 | Status: DISCONTINUED | OUTPATIENT
Start: 2022-08-15 | End: 2022-08-16

## 2022-08-15 RX ORDER — VALSARTAN 80 MG/1
40 TABLET ORAL EVERY 12 HOURS
Refills: 0 | Status: DISCONTINUED | OUTPATIENT
Start: 2022-08-15 | End: 2022-08-16

## 2022-08-15 RX ORDER — LOPERAMIDE HCL 2 MG
2 TABLET ORAL ONCE
Refills: 0 | Status: COMPLETED | OUTPATIENT
Start: 2022-08-15 | End: 2022-08-15

## 2022-08-15 RX ORDER — CEFTRIAXONE 500 MG/1
2000 INJECTION, POWDER, FOR SOLUTION INTRAMUSCULAR; INTRAVENOUS EVERY 24 HOURS
Refills: 0 | Status: DISCONTINUED | OUTPATIENT
Start: 2022-08-15 | End: 2022-08-16

## 2022-08-15 RX ORDER — VALSARTAN 80 MG/1
40 TABLET ORAL
Refills: 0 | Status: DISCONTINUED | OUTPATIENT
Start: 2022-08-15 | End: 2022-08-15

## 2022-08-15 RX ADMIN — Medication 650 MILLIGRAM(S): at 20:22

## 2022-08-15 RX ADMIN — OXYCODONE HYDROCHLORIDE 20 MILLIGRAM(S): 5 TABLET ORAL at 21:08

## 2022-08-15 RX ADMIN — OXYCODONE HYDROCHLORIDE 15 MILLIGRAM(S): 5 TABLET ORAL at 18:32

## 2022-08-15 RX ADMIN — OXYCODONE HYDROCHLORIDE 20 MILLIGRAM(S): 5 TABLET ORAL at 05:49

## 2022-08-15 RX ADMIN — CHLORHEXIDINE GLUCONATE 1 APPLICATION(S): 213 SOLUTION TOPICAL at 06:11

## 2022-08-15 RX ADMIN — Medication 50 MILLIGRAM(S): at 22:02

## 2022-08-15 RX ADMIN — Medication 650 MILLIGRAM(S): at 06:57

## 2022-08-15 RX ADMIN — FINASTERIDE 5 MILLIGRAM(S): 5 TABLET, FILM COATED ORAL at 11:14

## 2022-08-15 RX ADMIN — CEFTRIAXONE 100 MILLIGRAM(S): 500 INJECTION, POWDER, FOR SOLUTION INTRAMUSCULAR; INTRAVENOUS at 18:32

## 2022-08-15 RX ADMIN — Medication 5 MILLIGRAM(S): at 22:02

## 2022-08-15 RX ADMIN — OXYCODONE HYDROCHLORIDE 20 MILLIGRAM(S): 5 TABLET ORAL at 06:44

## 2022-08-15 RX ADMIN — OXYCODONE HYDROCHLORIDE 20 MILLIGRAM(S): 5 TABLET ORAL at 22:08

## 2022-08-15 RX ADMIN — Medication 650 MILLIGRAM(S): at 07:50

## 2022-08-15 RX ADMIN — OXYCODONE HYDROCHLORIDE 15 MILLIGRAM(S): 5 TABLET ORAL at 18:48

## 2022-08-15 RX ADMIN — APIXABAN 5 MILLIGRAM(S): 2.5 TABLET, FILM COATED ORAL at 05:49

## 2022-08-15 RX ADMIN — VALSARTAN 40 MILLIGRAM(S): 80 TABLET ORAL at 19:01

## 2022-08-15 RX ADMIN — Medication 75 MILLIGRAM(S): at 06:09

## 2022-08-15 RX ADMIN — Medication 100 MILLIGRAM(S): at 22:02

## 2022-08-15 RX ADMIN — SIMVASTATIN 10 MILLIGRAM(S): 20 TABLET, FILM COATED ORAL at 22:02

## 2022-08-15 RX ADMIN — Medication 50 MILLIGRAM(S): at 11:15

## 2022-08-15 RX ADMIN — OXYCODONE HYDROCHLORIDE 20 MILLIGRAM(S): 5 TABLET ORAL at 13:16

## 2022-08-15 RX ADMIN — Medication 650 MILLIGRAM(S): at 20:52

## 2022-08-15 RX ADMIN — NORTRIPTYLINE HYDROCHLORIDE 75 MILLIGRAM(S): 10 CAPSULE ORAL at 22:02

## 2022-08-15 RX ADMIN — OXYCODONE HYDROCHLORIDE 20 MILLIGRAM(S): 5 TABLET ORAL at 13:46

## 2022-08-15 RX ADMIN — PANTOPRAZOLE SODIUM 40 MILLIGRAM(S): 20 TABLET, DELAYED RELEASE ORAL at 06:10

## 2022-08-15 RX ADMIN — Medication 2 MILLIGRAM(S): at 16:31

## 2022-08-15 RX ADMIN — APIXABAN 5 MILLIGRAM(S): 2.5 TABLET, FILM COATED ORAL at 18:32

## 2022-08-15 RX ADMIN — Medication 50 MILLIGRAM(S): at 19:01

## 2022-08-15 RX ADMIN — Medication 1 MILLIGRAM(S): at 11:14

## 2022-08-15 RX ADMIN — Medication 4 MILLIGRAM(S): at 22:02

## 2022-08-15 NOTE — DISCHARGE NOTE PROVIDER - CARE PROVIDER_API CALL
Rocky Doyle  SURGERY  [address] [place] [address]  Phone: [phone]  Fax: [phone]  Follow Up Time:    Rocky Doyle  SURGERY  [address] [place] [address]  Phone: [phone]  Fax: [phone]  Follow Up Time:     Luisa Santillan)  Cardiovascular Disease; Internal Medicine  [address], [address]  [place] [address]  Phone: [phone]  Fax: [phone]  Follow Up Time:    Rocky Doyle  SURGERY  [address] [place] [address]  Phone: [phone]  Fax: [phone]  Follow Up Time:     Guille Pastor  CARDIOLOGY  [address] [address]  Phone: [phone]  Fax: [phone]  Follow Up Time:

## 2022-08-15 NOTE — PROGRESS NOTE ADULT - PROBLEM SELECTOR PLAN 9
F: none - tolerating PO  E: replete as needed  N: pureed   DVT ppx: eliquis  Dispo: MICU --> [facility]    CODE STATUS: FULL CODE

## 2022-08-15 NOTE — PROGRESS NOTE ADULT - PROBLEM SELECTOR PLAN 4
Pt presented to [facility] ED w/ "stabbing" epigastric pain and "crushing" chest pain. CT revealed distended gall bladder with gall stones, jeannette-cholecystic fluid, dilated CBD to 2cm and CBD stone. RUQ US results: Gallstone impacted at the gallbladder neck with associated gallbladder wall thickening and positive sonographic Parnell's sign, compatible with acute cholecystitis. s/p percutaneous cholycystostomy 8/11 w/ improvement in abdominal pain. Pt stable and WBC downtrending.     Drain output 8/14 120cc.    - completed CTX 2g Q24hrs  - completed IV flagyl Q8hrs  - continue to trend CBC and monitor fever curve  - f/u IR if pt can leave w/ drain w/ surgery f/u outpatient Pt presented to [facility] ED w/ "stabbing" epigastric pain and "crushing" chest pain. CT revealed distended gall bladder with gall stones, jeannette-cholecystic fluid, dilated CBD to 2cm and CBD stone. RUQ US results: Gallstone impacted at the gallbladder neck with associated gallbladder wall thickening and positive sonographic Parnell's sign, compatible with acute cholecystitis. s/p percutaneous cholycystostomy 8/11 w/ improvement in abdominal pain. Pt stable and WBC downtrending.     - completed CTX 2g Q24hrs  - completed IV flagyl Q8hrs  - continue to trend CBC and monitor fever curve  - f/u IR if pt can leave w/ drain w/ surgery f/u outpatient Pt presented to [place] ED w/ "stabbing" epigastric pain and "crushing" chest pain. CT revealed distended gall bladder with gall stones, jeannette-cholecystic fluid, dilated CBD to 2cm and CBD stone. RUQ US results: Gallstone impacted at the gallbladder neck with associated gallbladder wall thickening and positive sonographic Parnell's sign, compatible with acute cholecystitis. s/p percutaneous cholycystostomy 8/11 w/ improvement in abdominal pain. Pt stable and WBC downtrending.     Cholecystostomy tube with 120 cc dark bilious output in past 24 hr, 60 cc output in the prior 24h with same appearance.  - completed CTX 2g Q24hrs  - completed IV flagyl Q8hrs  - continue to trend CBC and monitor fever curve  - f/u IR if pt can leave w/ drain w/ surgery f/u outpatient

## 2022-08-15 NOTE — DISCHARGE NOTE PROVIDER - HOSPITAL COURSE
#Discharge: do not delete    Patient is __ yo M/F with past medical history of _____ presented with _____, found to have _____ (one liner)    Hospital course  73 y/o male with complicated past medical history of colon mass s/p partial colectomy (2017), appendectomy, hernia repair, aortic and iliac aneurysm surgery (2017) s/p stent placement, HTN, HLD, paroxysmal atrial fibrillation on Eliquis, blood clotting disorder s/p IVC filter placement, right knee ligament repair, pacemaker implant (2004) s/p recent ppm interrogation on Eliquis, herniated disc and related surgery in 5248-1966 complicated by spinal fusion, local hematoma, foot drop with chronic pain s/p failed intrathecal morphine pump which caused bradycardia and left hip prosthesis. He presents as a transfer from [facility] where he initially presented for "crushing" chest pain, and "stabbing" epigastric pain. No associated nausea/vomiting, fever, or jaundice. Afebrile, VSS on presentation to [place] [facility].  Cardiac workup was negative at that hospital. CT revealed distended gall bladder with gall stones, jeannette-cholecystic fluid, dilated CBD to 2cm and CBD stone. Patient was planned to go to for cholecystectomy however, due to elevated INR, patient was given FFP. After transfusion, rapid response was called for tachypnea and hypertension with SBP to 220s. Patient mental status altered, and with new pulmonary edema. Concern for flash pulmonary edema 2/2 hypertensive emergency vs TRALI vs TACO. TTE demonstrating left ventricular function appears moderately reduced with regional wall motion abnormalities, estimated EF approx. 35%. The mid-distal anteroseptum, basal-mid anterolateral and basal-mid anterior walls appear hypokinetic to akinetic. Concerning for takotsubo cardiomyopathy. Patient started on nicardopine drip, and BP better controlled. Patient diuresed well with IV lasix and pulmonary edema resolved. Patient started on ceftriaxone and flagyl for cholecystitis. Repeat TTEs w/ improving LV function. Percutaneous cholecystostomy performed by IR 8/11. Tolerated procedure well. Patient weened off nicardipine drip onto home hydralazine, and stable for transfer to floor.         Patient was discharged to: (home/[facility]/acute rehab/hospice, etc, and with what services – home health PT/RN? Home O2?)    New medications:   Changes to old medications:  Medications that were stopped:    Items to follow up as outpatient:    Physical exam at the time of discharge:       #Discharge: do not delete    72M w/ multiple medical problems PMH including Atrial fibrillation on Eliquis, PPM (2004), HTN, HLD, R hemicoloectomy for colon mass (2017), AAA s/p EVAR (2016) for R iliac artery aneurysm, possible coagulopathy s/p IVC filter placement, chronic back pain w/ remote spinal fusion and intrathecal pump, who was transferred from [facility] 8/7/22 w/ acute cholecystitis c/b new onset heart failure s/p FFP and hypertensive emergency now POD#4 percutaneous cholecystostomy    Hospital course  71 y/o male with complicated past medical history of colon mass s/p partial colectomy (2017), appendectomy, hernia repair, aortic and iliac aneurysm surgery (2017) s/p stent placement, HTN, HLD, paroxysmal atrial fibrillation on Eliquis, blood clotting disorder s/p IVC filter placement, right knee ligament repair, pacemaker implant (2004) s/p recent ppm interrogation on Eliquis, herniated disc and related surgery in 2411-1288 complicated by spinal fusion, local hematoma, foot drop with chronic pain s/p failed intrathecal morphine pump which caused bradycardia and left hip prosthesis. He presents as a transfer from [facility] where he initially presented for "crushing" chest pain, and "stabbing" epigastric pain. No associated nausea/vomiting, fever, or jaundice. Afebrile, VSS on presentation to [address] [facility] ED.  Cardiac workup was negative at that hospital. CT revealed distended gall bladder with gall stones, jeannette-cholecystic fluid, dilated CBD to 2cm and CBD stone. Patient was planned to go to for cholecystectomy however, due to elevated INR, patient was given FFP. After transfusion, rapid response was called for tachypnea and hypertension with SBP to 220s. Patient mental status altered, and with new pulmonary edema. Concern for flash pulmonary edema 2/2 hypertensive emergency vs TRALI vs TACO. TTE demonstrating left ventricular function appears moderately reduced with regional wall motion abnormalities, estimated EF approx. 35%. The mid-distal anteroseptum, basal-mid anterolateral and basal-mid anterior walls appear hypokinetic to akinetic. Concerning for takotsubo cardiomyopathy. Patient started on nicardopine drip, and BP better controlled. Patient diuresed well with IV lasix and pulmonary edema resolved. Patient started on ceftriaxone and flagyl for cholecystitis. Repeat TTEs w/ improving LV function. Percutaneous cholecystostomy performed by IR 8/11. Tolerated procedure well. Patient weened off nicardipine drip onto home hydralazine, and stable for transfer to floor.         Patient was discharged to: (home/[facility]/acute rehab/hospice, etc, and with what services – home health PT/RN? Home O2?)    New medications:   Changes to old medications:  Medications that were stopped:    Items to follow up as outpatient:    Physical exam at the time of discharge:       #Discharge: do not delete    72M w/ multiple medical problems PMH including Atrial fibrillation on Eliquis, PPM (2004), HTN, HLD, R hemicoloectomy for colon mass (2017), AAA s/p EVAR (2016) for R iliac artery aneurysm, possible coagulopathy s/p IVC filter placement, chronic back pain w/ remote spinal fusion and intrathecal pump, who was transferred from [facility] 8/7/22 w/ acute cholecystitis c/b new onset heart failure s/p FFP and hypertensive emergency s/p percutaneous cholecystostomy    Hospital course  73 y/o male with complicated past medical history of colon mass s/p partial colectomy (2017), appendectomy, hernia repair, aortic and iliac aneurysm surgery (2017) s/p stent placement, HTN, HLD, paroxysmal atrial fibrillation on Eliquis, blood clotting disorder s/p IVC filter placement, right knee ligament repair, pacemaker implant (2004) s/p recent ppm interrogation on Eliquis, herniated disc and related surgery in 1302-2047 complicated by spinal fusion, local hematoma, foot drop with chronic pain s/p failed intrathecal morphine pump which caused bradycardia and left hip prosthesis. He presents as a transfer from [facility] where he initially presented for "crushing" chest pain, and "stabbing" epigastric pain. No associated nausea/vomiting, fever, or jaundice. Afebrile, VSS on presentation to [facility].  Cardiac workup was negative at that hospital. CT revealed distended gall bladder with gall stones, jeannette-cholecystic fluid, dilated CBD to 2cm and CBD stone. Patient was planned to go to for cholecystectomy however, due to elevated INR, patient was given FFP. After transfusion, rapid response was called for tachypnea and hypertension with SBP to 220s. Patient mental status altered, and with new pulmonary edema. Concern for flash pulmonary edema 2/2 hypertensive emergency vs TRALI vs TACO. TTE demonstrating left ventricular function appears moderately reduced with regional wall motion abnormalities, estimated EF approx. 35%. The mid-distal anteroseptum, basal-mid anterolateral and basal-mid anterior walls appear hypokinetic to akinetic. Concerning for takotsubo cardiomyopathy. Patient started on nicardopine drip, and BP better controlled. Patient diuresed well with IV lasix and pulmonary edema resolved. Patient started on ceftriaxone and flagyl for cholecystitis. Repeat TTEs w/ improving LV function. Percutaneous cholecystostomy performed by IR 8/11. Tolerated procedure well. Patient weened off nicardipine drip onto home hydralazine, and stable for transfer to floor.         Patient was discharged to: [facility]    New medications:   Changes to old medications:  Medications that were stopped:    Items to follow up as outpatient:    Physical exam at the time of discharge:       #Discharge: do not delete    72M w/ multiple medical problems PMH including Atrial fibrillation on Eliquis, PPM (2004), HTN, HLD, R hemicoloectomy for colon mass (2017), AAA s/p EVAR (2016) for R iliac artery aneurysm, possible coagulopathy s/p IVC filter placement, chronic back pain w/ remote spinal fusion and intrathecal pump, who was transferred from [facility] 8/7/22 w/ acute cholecystitis c/b new onset heart failure s/p FFP and hypertensive emergency s/p percutaneous cholecystostomy    Hospital course  73 y/o male with complicated past medical history of colon mass s/p partial colectomy (2017), appendectomy, hernia repair, aortic and iliac aneurysm surgery (2017) s/p stent placement, HTN, HLD, paroxysmal atrial fibrillation on Eliquis, blood clotting disorder s/p IVC filter placement, right knee ligament repair, pacemaker implant (2004) s/p recent ppm interrogation on Eliquis, herniated disc and related surgery in 2501-0703 complicated by spinal fusion, local hematoma, foot drop with chronic pain s/p failed intrathecal morphine pump which caused bradycardia and left hip prosthesis. He presented as a transfer from [facility] where he initially presented for "crushing" chest pain, and "stabbing" epigastric pain. No associated nausea/vomiting, fever, or jaundice. Afebrile, VSS on presentation to [facility].  Cardiac workup was negative at that hospital. CT revealed distended gall bladder with gall stones, jeannette-cholecystic fluid, dilated CBD to 2cm and CBD stone. Patient was planned to go to for cholecystectomy however, due to elevated INR, patient was given FFP. After transfusion, rapid response was called for tachypnea and hypertension with SBP to 220s. Patient mental status altered, and with new pulmonary edema. Concern for flash pulmonary edema 2/2 hypertensive emergency vs TRALI vs TACO. TTE demonstrated left ventricular function w/moderately reduced with regional wall motion abnormalities, estimated EF approx. 35%. The mid-distal anteroseptum, basal-mid anterolateral and basal-mid anterior walls appear hypokinetic to akinetic. Concerning for takotsubo cardiomyopathy. Patient was started on nicardopine drip, and BP was better controlled. Patient was diuresed well with IV lasix and pulmonary edema resolved. Patient started on ceftriaxone and flagyl for cholecystitis. Repeat TTEs w/ improving LV function. Percutaneous cholecystostomy performed by IR 8/11. Tolerated procedure well. Patient weened off nicardipine drip onto home hydralazine. Patient medically optimized for discharge to [facility].     #Stress Induced Cardiomyopathy  Noted to have elevated cardiac biomarkers and TTE with regional wall abnormalities and newly depressed EF 35% without ischemic ECG changes.    Unlikely Type 1 MI s/p recent stress test that was negative. Likely stress induced Takotsubo's CM.  - Last TTE 8/12 showed that LVEEF was grossly unchanged, but was a poor quality study. Please obtain another TTE for LV function assessment outpatient.  - F/u w/ Cardiology outpatient ___    #Hypertensive Emergency  BPs still elevated in the 160s/70-80s on Hydralazine and Toprol. s/p cardine gtt and up titration of hydralazine and pain control.   - c/w Hydralazine 75mg in AM, 25mg in PM  - c/w Toprol 50mg BID  - start Valsartan 40mg BID    #Acute Cholecystitis  Pt presented to [facility] w/ "stabbing" epigastric pain and "crushing" chest pain. CT revealed distended gall bladder with gall stones, jeannette-cholecystic fluid, dilated CBD to 2cm and CBD stone. RUQ US results: Gallstone impacted at the gallbladder neck with associated gallbladder wall thickening and positive sonographic Parnell's sign, compatible with acute cholecystitis. s/p percutaneous cholycystostomy 8/11 w/ improvement in abdominal pain. Pt stable and WBC downtrending.     - continue w/ Cefpedoxime until 8/17  - continue w/ Flagyl until 8/17  - Pt has outpatient followup appointment with Dr. Rocky Doyle      - f/u IR if pt can leave w/ drain w/ surgery f/u outpatien      Patient was discharged to: [facility]    New medications:   Changes to old medications:  Medications that were stopped:    Items to follow up as outpatient:    Physical exam at the time of discharge:       #Discharge: do not delete    72M w/ multiple medical problems PMH including Atrial fibrillation on Eliquis, PPM (2004), HTN, HLD, R hemicoloectomy for colon mass (2017), AAA s/p EVAR (2016) for R iliac artery aneurysm, possible coagulopathy s/p IVC filter placement, chronic back pain w/ remote spinal fusion and intrathecal pump, who was transferred from [facility] 8/7/22 w/ acute cholecystitis c/b new onset heart failure s/p FFP and hypertensive emergency s/p percutaneous cholecystostomy    Hospital course  73 y/o male with complicated past medical history of colon mass s/p partial colectomy (2017), appendectomy, hernia repair, aortic and iliac aneurysm surgery (2017) s/p stent placement, HTN, HLD, paroxysmal atrial fibrillation on Eliquis, blood clotting disorder s/p IVC filter placement, right knee ligament repair, pacemaker implant (2004) s/p recent ppm interrogation on Eliquis, herniated disc and related surgery in 1279-5215 complicated by spinal fusion, local hematoma, foot drop with chronic pain s/p failed intrathecal morphine pump which caused bradycardia and left hip prosthesis. He presented as a transfer from [facility] where he initially presented for "crushing" chest pain, and "stabbing" epigastric pain. No associated nausea/vomiting, fever, or jaundice. Afebrile, VSS on presentation to [facility] ED.  Cardiac workup was negative at that hospital. CT revealed distended gall bladder with gall stones, jeannette-cholecystic fluid, dilated CBD to 2cm and CBD stone. Patient was planned to go to for cholecystectomy however, due to elevated INR, patient was given FFP. After transfusion, rapid response was called for tachypnea and hypertension with SBP to 220s. Patient mental status altered, and with new pulmonary edema. Concern for flash pulmonary edema 2/2 hypertensive emergency vs TRALI vs TACO. TTE demonstrated left ventricular function w/moderately reduced with regional wall motion abnormalities, estimated EF approx. 35%. The mid-distal anteroseptum, basal-mid anterolateral and basal-mid anterior walls appear hypokinetic to akinetic. Concerning for takotsubo cardiomyopathy. Patient was started on nicardopine drip, and BP was better controlled. Patient was diuresed well with IV lasix and pulmonary edema resolved. Patient started on ceftriaxone and flagyl for cholecystitis. Repeat TTEs w/ improving LV function. Percutaneous cholecystostomy performed by IR 8/11. Tolerated procedure well. Patient weened off nicardipine drip onto home hydralazine. Patient medically optimized for discharge to [facility].     #Stress Induced Cardiomyopathy  Noted to have elevated cardiac biomarkers and TTE with regional wall abnormalities and newly depressed EF 35% without ischemic ECG changes.    Unlikely Type 1 MI s/p recent stress test that was negative. Likely stress induced Takotsubo's CM.  - Last TTE 8/12 showed that LVEEF was grossly unchanged, but was a poor quality study. Please obtain another TTE for LV function assessment outpatient.  - F/u w/ Dr. Luisa Santillan Cardiology outpatient (Dr. Santillan will call patient to schedule appointment within 2 weeks)    #Hypertensive Emergency/HTN/Persistent Hypertension  BPs w/ persistently elevated BP  in the 160s/70-80s on Hydralazine and Toprol. s/p cardine gtt and up titration of hydralazine and pain control.   - c/w Hydralazine 75mg in AM, 25mg in PM  - c/w Toprol 50mg BID  - started Valsartan 40mg BID  - continue w/ pain control as below    #Acute Cholecystitis  Pt presented to [facility] ED w/ "stabbing" epigastric pain and "crushing" chest pain. CT revealed distended gall bladder with gall stones, jeannette-cholecystic fluid, dilated CBD to 2cm and CBD stone. RUQ US results: Gallstone impacted at the gallbladder neck with associated gallbladder wall thickening and positive sonographic Parnell's sign, compatible with acute cholecystitis. s/p percutaneous cholycystostomy 8/11 w/ improvement in abdominal pain. Pt stable and WBC downtrending.     - Received IV Ceftriaxone and IV Flagyl inpatient  - continue w/ Cefpedoxime until 8/17 - 400mg PO BID  - continue w/ Flagyl until 8/17 - 500mg PO q8  - continue to monitor right flank drain output  - Pt has outpatient followup appointment with Dr. Rocky Doyle in 2weeks for planning of cholecystectomy and drain removal    #Chronic back pain.   Pt w/ hx of spinal fusion and s/p failed intrathecal morphine pump which caused bradycardia   Home med: oxycontin 20mg TID, oxycodone 15mg TID  - continue w/ oxycontin 20mg TID   - continue w/ oxycodone 15mg TID    Patient was discharged to: [facility]    New medications: Flagyl 500mg PO q8 for one more day (ends 8/17) and Cefpedoxime 400mg PO BID for one more day (ends 8/17), Valsartan 40mg BID  Changes to old medications: none  Medications that were stopped: none    Items to follow up as outpatient:    Physical exam at the time of discharge:    General: NAD, sleeping comfortably   HEENT: NC/AT, PERRLA, EOMI, no scleral icterus  Neck: Supple  Respiratory: CTA B/L. No w/r/r.   Cardiovascular: RRR, normal S1 and S2, no m/r/g.   Gastrointestinal: +BS, no tenderness to palpation.  Cholecystostomy drain in R flank w/o erythema, fluctuance or drainage. Drain outputting black/brown fluid. No guarding or rebound tenderness, palpable intrathecal device  Extremities: wwp; no cyanosis, clubbing or edema.   Vascular: Pulses equal and strong throughout.   Neurological: AAOx3, no CN deficits, strength and sensation intact throughout   #Discharge: do not delete    72M w/ multiple medical problems PMH including Atrial fibrillation on Eliquis, PPM (2004), HTN, HLD, R hemicoloectomy for colon mass (2017), AAA s/p EVAR (2016) for R iliac artery aneurysm, possible coagulopathy s/p IVC filter placement, chronic back pain w/ remote spinal fusion and intrathecal pump, who was transferred from [facility] 8/7/22 w/ acute cholecystitis c/b new onset heart failure s/p FFP and hypertensive emergency s/p percutaneous cholecystostomy    Hospital course  73 y/o male with complicated past medical history of colon mass s/p partial colectomy (2017), appendectomy, hernia repair, aortic and iliac aneurysm surgery (2017) s/p stent placement, HTN, HLD, paroxysmal atrial fibrillation on Eliquis, blood clotting disorder s/p IVC filter placement, right knee ligament repair, pacemaker implant (2004) s/p recent ppm interrogation on Eliquis, herniated disc and related surgery in 1014-5163 complicated by spinal fusion, local hematoma, foot drop with chronic pain s/p failed intrathecal morphine pump which caused bradycardia and left hip prosthesis. He presented as a transfer from [facility] where he initially presented for "crushing" chest pain, and "stabbing" epigastric pain. No associated nausea/vomiting, fever, or jaundice. Afebrile, VSS on presentation to [place] ED.  Cardiac workup was negative at that hospital. CT revealed distended gall bladder with gall stones, jeannette-cholecystic fluid, dilated CBD to 2cm and CBD stone. Patient was planned to go to for cholecystectomy however, due to elevated INR, patient was given FFP. After transfusion, rapid response was called for tachypnea and hypertension with SBP to 220s. Patient mental status altered, and with new pulmonary edema. Concern for flash pulmonary edema 2/2 hypertensive emergency vs TRALI vs TACO. TTE demonstrated left ventricular function w/moderately reduced with regional wall motion abnormalities, estimated EF approx. 35%. The mid-distal anteroseptum, basal-mid anterolateral and basal-mid anterior walls appear hypokinetic to akinetic. Concerning for takotsubo cardiomyopathy. Patient was started on nicardopine drip, and BP was better controlled. Patient was diuresed well with IV lasix and pulmonary edema resolved. Patient started on ceftriaxone and flagyl for cholecystitis. Repeat TTEs w/ improving LV function. Percutaneous cholecystostomy performed by IR 8/11. Tolerated procedure well. Patient weened off nicardipine drip onto home hydralazine. Patient medically optimized for discharge to [facility].     #Stress Induced Cardiomyopathy  Noted to have elevated cardiac biomarkers and TTE with regional wall abnormalities and newly depressed EF 35% without ischemic ECG changes.    Unlikely Type 1 MI s/p recent stress test that was negative. Likely stress induced Takotsubo's CM.  - Last TTE 8/12 showed that LVEEF was grossly unchanged, but was a poor quality study. Please obtain another TTE for LV function assessment outpatient.  - F/u w/ Dr. Pastor Cardiology outpatient     #Hypertensive Emergency/HTN/Persistent Hypertension  BPs w/ persistently elevated BP  in the 160s/70-80s on Hydralazine and Toprol. s/p cardine gtt and up titration of hydralazine and pain control.   - c/w Hydralazine 75mg in AM, 25mg in PM  - c/w Toprol 50mg BID  - Please start Entresto 24mg/26mg BID and followup with cardiology Dr. Pastor upon discharge  - continue w/ pain control as below    #Acute Cholecystitis  Pt presented to [place] ED w/ "stabbing" epigastric pain and "crushing" chest pain. CT revealed distended gall bladder with gall stones, jeannette-cholecystic fluid, dilated CBD to 2cm and CBD stone. RUQ US results: Gallstone impacted at the gallbladder neck with associated gallbladder wall thickening and positive sonographic Parnell's sign, compatible with acute cholecystitis. s/p percutaneous cholycystostomy 8/11 w/ improvement in abdominal pain. Pt stable and WBC downtrending.     - Received IV Ceftriaxone and IV Flagyl inpatient  - continue w/ Cefpedoxime until 8/17 - 400mg PO BID  - continue w/ Flagyl until 8/17 - 500mg PO q8  - continue to monitor right flank drain output  - Pt should followup with Dr. Rocky Doyle in 2weeks for planning of cholecystectomy and drain removal    #Chronic back pain.   Pt w/ hx of spinal fusion and s/p failed intrathecal morphine pump which caused bradycardia   Home med: oxycontin 20mg TID, oxycodone 15mg TID  - continue w/ oxycontin 20mg TID   - continue w/ oxycodone 15mg TID    Patient was discharged to: [facility]    New medications: Flagyl 500mg PO q8 for one more day (ends 8/17) and Cefpedoxime 400mg PO BID for one more day (ends 8/17), Entresto 24mg/26mg BID  Changes to old medications: none  Medications that were stopped: none    Items to follow up as outpatient:    Physical exam at the time of discharge:    General: NAD, sleeping comfortably   HEENT: NC/AT, PERRLA, EOMI, no scleral icterus  Neck: Supple  Respiratory: CTA B/L. No w/r/r.   Cardiovascular: RRR, normal S1 and S2, no m/r/g.   Gastrointestinal: +BS, no tenderness to palpation.  Cholecystostomy drain in R flank w/o erythema, fluctuance or drainage. Drain outputting black/brown fluid. No guarding or rebound tenderness, palpable intrathecal device  Extremities: wwp; no cyanosis, clubbing or edema.   Vascular: Pulses equal and strong throughout.   Neurological: AAOx3, no CN deficits, strength and sensation intact throughout

## 2022-08-15 NOTE — DISCHARGE NOTE PROVIDER - NSDCFUADDAPPT_GEN_ALL_CORE_FT
Please bring your Insurance card, Photo ID and Discharge paperwork to the following appointment:    (1) Please follow up with your Cardiology Provider, Dr. Guille Pastor at [address] [place] [address] on 09/20/2022 at 3:45pm. Please note that the office will contact you for an earlier appointment once available.    Appointment was scheduled by Ms. TELLY Ramos, Referral Coordinator.

## 2022-08-15 NOTE — PROGRESS NOTE ADULT - SUBJECTIVE AND OBJECTIVE BOX
SUBJECTIVE: Doing well this AM. NAEON. Denies n/v. Endorses f/bm. Denies cp/sob. Pain is well controlled. Ambulating as tolerated. Tolerating diet.      MEDICATIONS  (STANDING):  apixaban 5 milliGRAM(s) Oral every 12 hours  cefTRIAXone   IVPB 2000 milliGRAM(s) IV Intermittent every 24 hours  chlorhexidine 2% Cloths 1 Application(s) Topical <User Schedule>  doxazosin 4 milliGRAM(s) Oral at bedtime  finasteride 5 milliGRAM(s) Oral daily  folic acid 1 milliGRAM(s) Oral daily  hydrALAZINE 75 milliGRAM(s) Oral <User Schedule>  hydrALAZINE 50 milliGRAM(s) Oral <User Schedule>  melatonin 5 milliGRAM(s) Oral at bedtime  metoprolol succinate ER 50 milliGRAM(s) Oral two times a day  metroNIDAZOLE  IVPB 500 milliGRAM(s) IV Intermittent every 8 hours  nortriptyline 75 milliGRAM(s) Oral at bedtime  oxyCODONE  ER Tablet 20 milliGRAM(s) Oral every 8 hours  pantoprazole    Tablet 40 milliGRAM(s) Oral before breakfast  simvastatin 10 milliGRAM(s) Oral at bedtime  valsartan 40 milliGRAM(s) Oral two times a day    MEDICATIONS  (PRN):  acetaminophen     Tablet .. 650 milliGRAM(s) Oral every 6 hours PRN Temp greater or equal to 38C (100.4F), Mild Pain (1 - 3), Moderate Pain (4 - 6)  famotidine    Tablet 20 milliGRAM(s) Oral daily PRN acid reflux  oxyCODONE    IR 15 milliGRAM(s) Oral three times a day PRN Severe Pain (7 - 10)      Vital Signs Last 24 Hrs  T(C): 37 (15 Aug 2022 12:06), Max: 37 (15 Aug 2022 12:06)  T(F): 98.6 (15 Aug 2022 12:06), Max: 98.6 (15 Aug 2022 12:06)  HR: 79 (15 Aug 2022 12:06) (75 - 80)  BP: 165/78 (15 Aug 2022 12:06) (138/84 - 165/78)  BP(mean): --  RR: 17 (15 Aug 2022 12:06) (17 - 19)  SpO2: 98% (15 Aug 2022 12:06) (97% - 98%)    Parameters below as of 15 Aug 2022 12:06  Patient On (Oxygen Delivery Method): room air        Physical Exam:  General: NAD, resting comfortably in bed  Pulmonary: Nonlabored breathing, no respiratory distress  Cardiovascular: NSR  Abdominal: soft, NT/ND, Perc yonis in place w/ bilious output  Extremities: WWP, normal strength  Neuro: A/O x 3, CNs II-XII grossly intact, no focal deficits    I&O's Summary    14 Aug 2022 07:01  -  15 Aug 2022 07:00  --------------------------------------------------------  IN: 0 mL / OUT: 120 mL / NET: -120 mL        LABS:                        9.9    8.27  )-----------( 252      ( 15 Aug 2022 10:31 )             31.3     08-15    130<L>  |  97  |  12  ----------------------------<  118<H>  5.0   |  24  |  1.14    Ca    9.0      15 Aug 2022 15:26  Phos  3.1     08-15  Mg     1.8     08-15    TPro  6.9  /  Alb  2.6<L>  /  TBili  0.3  /  DBili  x   /  AST  56<H>  /  ALT  35  /  AlkPhos  136<H>  08-15        CAPILLARY BLOOD GLUCOSE        LIVER FUNCTIONS - ( 15 Aug 2022 15:26 )  Alb: 2.6 g/dL / Pro: 6.9 g/dL / ALK PHOS: 136 U/L / ALT: 35 U/L / AST: 56 U/L / GGT: x             RADIOLOGY & ADDITIONAL STUDIES:

## 2022-08-15 NOTE — PROGRESS NOTE ADULT - SUBJECTIVE AND OBJECTIVE BOX
INTERVAL HPI/OVERNIGHT EVENTS:    SUBJECTIVE: Patient seen and examined at bedside.    OBJECTIVE:    VITAL SIGNS:  ICU Vital Signs Last 24 Hrs  T(C): 37 (15 Aug 2022 12:06), Max: 37 (15 Aug 2022 12:06)  T(F): 98.6 (15 Aug 2022 12:06), Max: 98.6 (15 Aug 2022 12:06)  HR: 79 (15 Aug 2022 12:06) (67 - 80)  BP: 165/78 (15 Aug 2022 12:06) (128/81 - 165/78)  BP(mean): --  ABP: --  ABP(mean): --  RR: 17 (15 Aug 2022 12:06) (17 - 19)  SpO2: 98% (15 Aug 2022 12:06) (97% - 98%)    O2 Parameters below as of 15 Aug 2022 12:06  Patient On (Oxygen Delivery Method): room air              08-14 @ 07:01  -  08-15 @ 07:00  --------------------------------------------------------  IN: 0 mL / OUT: 120 mL / NET: -120 mL      CAPILLARY BLOOD GLUCOSE          PHYSICAL EXAM:    General: WDWN ; NAD  HEENT: NC/AT; PERRL, anicteric sclera  Neck: supple, no JVD  Respiratory: CTA B/L; no W/R/R  Cardiovascular: +S1/S2; RRR; no M/R/G  Gastrointestinal: soft, NT/ND; +BS x4  Extremities: WWP; 2+ peripheral pulses B/L; no LE edema  Skin: normal color and turgor; no rash  Neurological:     MEDICATIONS:  MEDICATIONS  (STANDING):  apixaban 5 milliGRAM(s) Oral every 12 hours  chlorhexidine 2% Cloths 1 Application(s) Topical <User Schedule>  doxazosin 4 milliGRAM(s) Oral at bedtime  finasteride 5 milliGRAM(s) Oral daily  folic acid 1 milliGRAM(s) Oral daily  hydrALAZINE 75 milliGRAM(s) Oral <User Schedule>  hydrALAZINE 50 milliGRAM(s) Oral <User Schedule>  melatonin 5 milliGRAM(s) Oral at bedtime  metoprolol succinate ER 50 milliGRAM(s) Oral two times a day  nortriptyline 75 milliGRAM(s) Oral at bedtime  oxyCODONE  ER Tablet 20 milliGRAM(s) Oral every 8 hours  pantoprazole    Tablet 40 milliGRAM(s) Oral before breakfast  polyethylene glycol 3350 17 Gram(s) Oral daily  simvastatin 10 milliGRAM(s) Oral at bedtime    MEDICATIONS  (PRN):  acetaminophen     Tablet .. 650 milliGRAM(s) Oral every 6 hours PRN Temp greater or equal to 38C (100.4F), Mild Pain (1 - 3), Moderate Pain (4 - 6)  famotidine    Tablet 20 milliGRAM(s) Oral daily PRN acid reflux  oxyCODONE    IR 15 milliGRAM(s) Oral three times a day PRN Severe Pain (7 - 10)  polyethylene glycol 3350 17 Gram(s) Oral daily PRN Constipation      ALLERGIES:  Allergies    Altace (Unknown)  penicillin (Unknown)    Intolerances        LABS:                        9.9    8.27  )-----------( 252      ( 15 Aug 2022 10:31 )             31.3     08-15    131<L>  |  97  |  14  ----------------------------<  137<H>  4.4   |  27  |  1.15    Ca    8.7      15 Aug 2022 10:31  Phos  3.1     08-15  Mg     1.8     08-15    TPro  6.0  /  Alb  2.3<L>  /  TBili  0.3  /  DBili  x   /  AST  65<H>  /  ALT  34  /  AlkPhos  126<H>  08-14    LIVER FUNCTIONS - ( 14 Aug 2022 07:52 )  Alb: 2.3 g/dL / Pro: 6.0 g/dL / ALK PHOS: 126 U/L / ALT: 34 U/L / AST: 65 U/L / GGT: x                     RADIOLOGY & ADDITIONAL TESTS: Reviewed.   INTERVAL HPI/OVERNIGHT EVENTS: Telemetry - sinus tachycardia with APCs. Off nicardipine gtt and increased hydralazine dosing. s/p Perc cholecystotomy.    SUBJECTIVE: Patient seen and examined at bedside. Denies chest pain, SOB, abdominal pain, change in mental status.    OBJECTIVE:    VITAL SIGNS:  ICU Vital Signs Last 24 Hrs  T(C): 37 (15 Aug 2022 12:06), Max: 37 (15 Aug 2022 12:06)  T(F): 98.6 (15 Aug 2022 12:06), Max: 98.6 (15 Aug 2022 12:06)  HR: 79 (15 Aug 2022 12:06) (67 - 80)  BP: 165/78 (15 Aug 2022 12:06) (128/81 - 165/78)  BP(mean): --  ABP: --  ABP(mean): --  RR: 17 (15 Aug 2022 12:06) (17 - 19)  SpO2: 98% (15 Aug 2022 12:06) (97% - 98%)    O2 Parameters below as of 15 Aug 2022 12:06  Patient On (Oxygen Delivery Method): room air              08-14 @ 07:01  -  08-15 @ 07:00  --------------------------------------------------------  IN: 0 mL / OUT: 120 mL / NET: -120 mL      CAPILLARY BLOOD GLUCOSE    PHYSICAL EXAM:  GENERAL: Elderly  male in NAD, speaks in full sentences, no signs of respiratory distress  HEAD:  Atraumatic, Normocephalic  EYES: EOMI, PERRLA, conjunctiva and sclera clear  NECK: Supple  CHEST/LUNG: Fine bibasilar crackles  HEART: Regular rate and rhythm; No murmurs;   ABDOMEN: s/p cholecystotomy drain.   EXTREMITIES:  2+ Peripheral Pulses, 1-2+ edema   PSYCH: AAOx3  NEUROLOGY: non-focal; moves all extremities.  SKIN: No rashes or lesions    MEDICATIONS:  MEDICATIONS  (STANDING):  apixaban 5 milliGRAM(s) Oral every 12 hours  chlorhexidine 2% Cloths 1 Application(s) Topical <User Schedule>  doxazosin 4 milliGRAM(s) Oral at bedtime  finasteride 5 milliGRAM(s) Oral daily  folic acid 1 milliGRAM(s) Oral daily  hydrALAZINE 75 milliGRAM(s) Oral <User Schedule>  hydrALAZINE 50 milliGRAM(s) Oral <User Schedule>  melatonin 5 milliGRAM(s) Oral at bedtime  metoprolol succinate ER 50 milliGRAM(s) Oral two times a day  nortriptyline 75 milliGRAM(s) Oral at bedtime  oxyCODONE  ER Tablet 20 milliGRAM(s) Oral every 8 hours  pantoprazole    Tablet 40 milliGRAM(s) Oral before breakfast  polyethylene glycol 3350 17 Gram(s) Oral daily  simvastatin 10 milliGRAM(s) Oral at bedtime    MEDICATIONS  (PRN):  acetaminophen     Tablet .. 650 milliGRAM(s) Oral every 6 hours PRN Temp greater or equal to 38C (100.4F), Mild Pain (1 - 3), Moderate Pain (4 - 6)  famotidine    Tablet 20 milliGRAM(s) Oral daily PRN acid reflux  oxyCODONE    IR 15 milliGRAM(s) Oral three times a day PRN Severe Pain (7 - 10)  polyethylene glycol 3350 17 Gram(s) Oral daily PRN Constipation      ALLERGIES:  Allergies    Altace (Unknown)  penicillin (Unknown)    Intolerances        LABS:                        9.9    8.27  )-----------( 252      ( 15 Aug 2022 10:31 )             31.3     08-15    131<L>  |  97  |  14  ----------------------------<  137<H>  4.4   |  27  |  1.15    Ca    8.7      15 Aug 2022 10:31  Phos  3.1     08-15  Mg     1.8     08-15    TPro  6.0  /  Alb  2.3<L>  /  TBili  0.3  /  DBili  x   /  AST  65<H>  /  ALT  34  /  AlkPhos  126<H>  08-14    LIVER FUNCTIONS - ( 14 Aug 2022 07:52 )  Alb: 2.3 g/dL / Pro: 6.0 g/dL / ALK PHOS: 126 U/L / ALT: 34 U/L / AST: 65 U/L / GGT: x                     RADIOLOGY & ADDITIONAL TESTS: Reviewed.   INTERVAL HPI/OVERNIGHT EVENTS: Telemetry - sinus tachycardia with APCs. Off nicardipine gtt and increased hydralazine dosing. s/p Perc cholecystotomy.    SUBJECTIVE: Patient seen and examined at bedside. Denies chest pain, SOB, abdominal pain, change in mental status.    OBJECTIVE:    VITAL SIGNS:  ICU Vital Signs Last 24 Hrs  T(C): 37 (15 Aug 2022 12:06), Max: 37 (15 Aug 2022 12:06)  T(F): 98.6 (15 Aug 2022 12:06), Max: 98.6 (15 Aug 2022 12:06)  HR: 79 (15 Aug 2022 12:06) (67 - 80)  BP: 165/78 (15 Aug 2022 12:06) (128/81 - 165/78)  RR: 17 (15 Aug 2022 12:06) (17 - 19)  SpO2: 98% (15 Aug 2022 12:06) (97% - 98%)    O2 Parameters below as of 15 Aug 2022 12:06  Patient On (Oxygen Delivery Method): room air              08-14 @ 07:01  -  08-15 @ 07:00  --------------------------------------------------------  IN: 0 mL / OUT: 120 mL / NET: -120 mL      CAPILLARY BLOOD GLUCOSE    PHYSICAL EXAM:  GENERAL: Elderly  male in NAD, speaks in full sentences, no signs of respiratory distress  HEAD:  Atraumatic, Normocephalic  EYES: EOMI, PERRLA, conjunctiva and sclera clear  NECK: Supple  CHEST/LUNG: Fine bibasilar crackles  HEART: Regular rate and rhythm; No murmurs;   ABDOMEN: s/p cholecystotomy drain.   EXTREMITIES:  2+ Peripheral Pulses, 1-2+ edema   PSYCH: AAOx3  NEUROLOGY: non-focal; moves all extremities.  SKIN: No rashes or lesions    MEDICATIONS:  MEDICATIONS  (STANDING):  apixaban 5 milliGRAM(s) Oral every 12 hours  chlorhexidine 2% Cloths 1 Application(s) Topical <User Schedule>  doxazosin 4 milliGRAM(s) Oral at bedtime  finasteride 5 milliGRAM(s) Oral daily  folic acid 1 milliGRAM(s) Oral daily  hydrALAZINE 75 milliGRAM(s) Oral <User Schedule>  hydrALAZINE 50 milliGRAM(s) Oral <User Schedule>  melatonin 5 milliGRAM(s) Oral at bedtime  metoprolol succinate ER 50 milliGRAM(s) Oral two times a day  nortriptyline 75 milliGRAM(s) Oral at bedtime  oxyCODONE  ER Tablet 20 milliGRAM(s) Oral every 8 hours  pantoprazole    Tablet 40 milliGRAM(s) Oral before breakfast  polyethylene glycol 3350 17 Gram(s) Oral daily  simvastatin 10 milliGRAM(s) Oral at bedtime    MEDICATIONS  (PRN):  acetaminophen     Tablet .. 650 milliGRAM(s) Oral every 6 hours PRN Temp greater or equal to 38C (100.4F), Mild Pain (1 - 3), Moderate Pain (4 - 6)  famotidine    Tablet 20 milliGRAM(s) Oral daily PRN acid reflux  oxyCODONE    IR 15 milliGRAM(s) Oral three times a day PRN Severe Pain (7 - 10)  polyethylene glycol 3350 17 Gram(s) Oral daily PRN Constipation      ALLERGIES:  Allergies    Altace (Unknown)  penicillin (Unknown)    Intolerances        LABS:                        9.9    8.27  )-----------( 252      ( 15 Aug 2022 10:31 )             31.3     08-15    131<L>  |  97  |  14  ----------------------------<  137<H>  4.4   |  27  |  1.15    Ca    8.7      15 Aug 2022 10:31  Phos  3.1     08-15  Mg     1.8     08-15    TPro  6.0  /  Alb  2.3<L>  /  TBili  0.3  /  DBili  x   /  AST  65<H>  /  ALT  34  /  AlkPhos  126<H>  08-14    LIVER FUNCTIONS - ( 14 Aug 2022 07:52 )  Alb: 2.3 g/dL / Pro: 6.0 g/dL / ALK PHOS: 126 U/L / ALT: 34 U/L / AST: 65 U/L / GGT: x                     RADIOLOGY & ADDITIONAL TESTS: Reviewed.

## 2022-08-15 NOTE — PROGRESS NOTE ADULT - PROBLEM SELECTOR PLAN 3
Pt w/ Hx hypertension.  - c/w plan as above

## 2022-08-15 NOTE — PROGRESS NOTE ADULT - PROBLEM SELECTOR PLAN 1
During admission, pt noted to have elevated cardiac biomarkers and TTE w/ regional wall abnormality. Newly depressed EF 35% w/o ischemic changes. Cardiology consulted. Type I MI less likely as recent stress test negative. Repeat TTE no change in EF.   - c/w daily EKGS  - f/u outpatient w/ repeat ECHO for newly diagnosed reduced EF
During admission, pt noted to have elevated cardiac biomarkers and TTE w/ regional wall abnormality. Newly depressed EF 35% w/o ischemic changes. Cardiology consulted. Type I MI less likely as recent stress test negative. Repeat TTE no change in EF.   - c/w daily EKGS  - f/u outpatient w/ repeat ECHO for newly diagnosed reduced EF  - f/u Cardiology recs
During admission, pt noted to have elevated cardiac biomarkers and TTE w/ regional wall abnormality. Newly depressed EF 35% w/o ischemic changes. Cardiology consulted. Type I MI less likely as recent stress test negative. Repeat TTE no change in EF.   - c/w daily EKGS  - f/u outpatient w/ repeat ECHO for newly diagnosed reduced EF

## 2022-08-15 NOTE — PROGRESS NOTE ADULT - SUBJECTIVE AND OBJECTIVE BOX
72M with complicated PMH who presented as transfer from [facility] on 8/7/2022 with acute cholecystitis s/p percutaneous cholecystostomy tube placement by Dr. Gonzales on 8/11/2022.    Dressing c/d/i  Cholecystostomy tube with 120 cc dark bilious output in past 24 hr, 60 cc output in the prior 24h with same appearance   Flushed easily with 2-3 cc NS  Continue to monitor output

## 2022-08-15 NOTE — PROGRESS NOTE ADULT - SUBJECTIVE AND OBJECTIVE BOX
Patient seen and examine, hospital course reviewed.  No significant pain.  Tolerating diet well.  AFVSS  Mild TTP RUQ  Cholecystostomy tube in place, draining bile.        No leukocytosis    Imp: Acute cholecystitis, recent cardiac decompensation, s/p percutaneous cholecystostomy.    Rec: OK to discharge on Cefpodoxime/flagyl for 3 more days  f/u with Dr. Doyle in office  Explained rationale for delaying cholecystectomy until his cardiac issues have been completely dealt with .

## 2022-08-15 NOTE — PROGRESS NOTE ADULT - SUBJECTIVE AND OBJECTIVE BOX
OVERNIGHT EVENTS: GERMAINE    SUBJECTIVE / INTERVAL HPI: Patient seen and examined at bedside. No new complaints, endorses 1 BM overnight. ROS otherwise negative.    VITAL SIGNS:  Vital Signs Last 24 Hrs  T(C): 37 (15 Aug 2022 12:06), Max: 37 (15 Aug 2022 12:06)  T(F): 98.6 (15 Aug 2022 12:06), Max: 98.6 (15 Aug 2022 12:06)  HR: 79 (15 Aug 2022 12:06) (67 - 80)  BP: 165/78 (15 Aug 2022 12:06) (128/81 - 165/78)  BP(mean): --  RR: 17 (15 Aug 2022 12:06) (17 - 19)  SpO2: 98% (15 Aug 2022 12:06) (97% - 98%)    Parameters below as of 15 Aug 2022 12:06  Patient On (Oxygen Delivery Method): room air      I&O's Summary    14 Aug 2022 07:01  -  15 Aug 2022 07:00  --------------------------------------------------------  IN: 0 mL / OUT: 120 mL / NET: -120 mL        PHYSICAL EXAM:    General: NAD, sleeping comfortably   HEENT: NC/AT, PERRLA, EOMI, no scleral icterus  Neck: Supple  Respiratory: CTA B/L. No w/r/r.   Cardiovascular: RRR, normal S1 and S2, no m/r/g.   Gastrointestinal: +BS, no tenderness to palpation.  Cholecystostomy drain in R flank w/o erythema, fluctuance or drainage. Drain outputting black/brown fluid. No guarding or rebound tenderness, palpable intrathecal device  Extremities: wwp; no cyanosis, clubbing or edema.   Vascular: Pulses equal and strong throughout.   Neurological: AAOx3, no CN deficits, strength and sensation intact throughout.     MEDICATIONS:  MEDICATIONS  (STANDING):  apixaban 5 milliGRAM(s) Oral every 12 hours  chlorhexidine 2% Cloths 1 Application(s) Topical <User Schedule>  doxazosin 4 milliGRAM(s) Oral at bedtime  finasteride 5 milliGRAM(s) Oral daily  folic acid 1 milliGRAM(s) Oral daily  hydrALAZINE 75 milliGRAM(s) Oral <User Schedule>  hydrALAZINE 50 milliGRAM(s) Oral <User Schedule>  melatonin 5 milliGRAM(s) Oral at bedtime  metoprolol succinate ER 50 milliGRAM(s) Oral two times a day  nortriptyline 75 milliGRAM(s) Oral at bedtime  oxyCODONE  ER Tablet 20 milliGRAM(s) Oral every 8 hours  pantoprazole    Tablet 40 milliGRAM(s) Oral before breakfast  polyethylene glycol 3350 17 Gram(s) Oral daily  simvastatin 10 milliGRAM(s) Oral at bedtime    MEDICATIONS  (PRN):  acetaminophen     Tablet .. 650 milliGRAM(s) Oral every 6 hours PRN Temp greater or equal to 38C (100.4F), Mild Pain (1 - 3), Moderate Pain (4 - 6)  famotidine    Tablet 20 milliGRAM(s) Oral daily PRN acid reflux  oxyCODONE    IR 15 milliGRAM(s) Oral three times a day PRN Severe Pain (7 - 10)  polyethylene glycol 3350 17 Gram(s) Oral daily PRN Constipation      ALLERGIES:  Allergies    Altace (Unknown)  penicillin (Unknown)    Intolerances        LABS:                        9.9    8.27  )-----------( 252      ( 15 Aug 2022 10:31 )             31.3     08-15    131<L>  |  97  |  14  ----------------------------<  137<H>  4.4   |  27  |  1.15    Ca    8.7      15 Aug 2022 10:31  Phos  3.1     08-15  Mg     1.8     08-15    TPro  6.0  /  Alb  2.3<L>  /  TBili  0.3  /  DBili  x   /  AST  65<H>  /  ALT  34  /  AlkPhos  126<H>  08-14        CAPILLARY BLOOD GLUCOSE          RADIOLOGY & ADDITIONAL TESTS: Reviewed.

## 2022-08-15 NOTE — PROGRESS NOTE ADULT - ASSESSMENT
73 y/o male with complicated medical history presents with 1 day history of symptomatic acute cholecystitis vs. choledocholithiasis. Now s/p 3U FFP i/s/o elevated INR. Pt then was noted to be hypertensive, with lung infiltrates and hypoxia, elevated troponins and transferred to MICU for further management. Pt transiently on cardene drip for pressure control. Pt still having abdominal pain despite antibiotic therapy indicative of medical management failure. Now s/p perc yonis on 8/11 w/ significant improvement in his abdominal pain.    No acute surgical intervention at this time  Please have f/u with Dr. Rocky Doyle in 2 weeks after discharge for planning of cholecystectomy  will sign off at this time. Please reconsult as necessary for clinical changes or concerns.

## 2022-08-15 NOTE — DISCHARGE NOTE PROVIDER - ATTENDING DISCHARGE PHYSICAL EXAMINATION:
General: NAD, sleeping comfortably   HEENT: NC/AT, PERRLA, EOMI, no scleral icterus  Neck: Supple  Respiratory: CTA B/L. No w/r/r.   Cardiovascular: RRR, normal S1 and S2, no m/r/g.   Gastrointestinal: +BS, no tenderness to palpation.  Cholecystostomy drain in R flank w/o erythema, fluctuance or drainage. Drain outputting black/brown fluid. No guarding or rebound tenderness, palpable intrathecal device  Extremities: wwp; no cyanosis, clubbing or edema.   Vascular: Pulses equal and strong throughout.   Neurological: AAOx3, no CN deficits, strength and sensation intact throughout

## 2022-08-15 NOTE — DISCHARGE NOTE PROVIDER - NSDCFUSCHEDAPPT_GEN_ALL_CORE_FT
Guille Pastor  [facility] Physician [facility]  [facility] [address]  Scheduled Appointment: 09/20/2022

## 2022-08-15 NOTE — PROGRESS NOTE ADULT - PROBLEM SELECTOR PLAN 2
In MICU, 8/9 rapid response was called for hypertensive emergency as patient desatted to 90% requiring 4L NC, with /121 and . On exam patient awake and alert, AAOx3, mentating well and protecting airway.  EKG done without ischemic changes. Barber catheter was placed with 1L immediate output. Nicardipine drip started to control BP, BPs dropped to 90s/60s, Nicardipine drip dc'ed. Goal SBP b/t 140-160 within 24hrs. BP normalized; restarted home medications and medically optimized for RMFs    Home meds: Hydralazine 50mg AM and 25mg PM. Metoprolol 50mg BID    Started on Hydralazine 75mg AM and 25mg PM to maintain BPs.  - Pt w/ allergy to ramipril. Deferred ACE at this time  - c/w inpatient hydralazine regimen - can reduce to home regimen if patient tolerating
In MICU, 8/9 rapid response was called for hypertensive emergency as patient desatted to 90% requiring 4L NC, with /121 and . On exam patient awake and alert, AAOx3, mentating well and protecting airway.  EKG done without ischemic changes. Barber catheter was placed with 1L immediate output. Nicardipine drip started to control BP, BPs dropped to 90s/60s, Nicardipine drip dc'ed. Goal SBP b/t 140-160 within 24hrs. BP normalized; restarted home medications and medically optimized for RMFs    Home meds: Hydralazine 50mg AM and 25mg PM. Metoprolol 50mg BID    Started on Hydralazine 75mg AM and 50mg PM to maintain BPs.  - Pt w/ allergy to ramipril. Deferred ACE at this time  - c/w inpatient hydralazine regimen - can reduce to home regimen if patient tolerating
In MICU, 8/9 rapid response was called for hypertensive emergency as patient desatted to 90% requiring 4L NC, with /121 and . On exam patient awake and alert, AAOx3, mentating well and protecting airway.  EKG done without ischemic changes. Barber catheter was placed with 1L immediate output. Nicardipine drip started to control BP, BPs dropped to 90s/60s, Nicardipine drip dc'ed. Goal SBP b/t 140-160 within 24hrs. BP normalized; restarted home medications and medically optimized for RMFs    Home meds: Hydralazine 50mg AM and 25mg PM. Metoprolol 50mg BID    Started on Hydralazine 75mg AM and 25mg PM to maintain BPs.  - Pt w/ allergy to ramipril. Deferred ACE at this time  - c/w inpatient hydralazine regimen - can reduce to home regimen if patient tolerating

## 2022-08-15 NOTE — PROGRESS NOTE ADULT - ASSESSMENT
72M w/ complicated PMH including depression/anxiety, Atrial fibrillation on Eliquis, PPM (2004), HTN, HLD, R hemicoloectomy for colon mass (2017), AAA s/p EVAR (2016) for R iliac artery aneurysm, possible coagulopathy s/p IVC filter placement, chronic back pain w/ remote spinal fusion and intrathecal pump, who was transferred from [facility] 8/7/22 w/ acute cholecystitis. Cardiology consulted for perioperative risk stratification c/b new onset heart failure s/p FFP and hypertensive emergency now POD#4 percutaneous cholecystostomy.    #Hypertensive Emergency  BPs still elevated in the 160s/70-80s on Hydralazine and Toprol. s/p cardine gtt and up titration of hydralazine and pain control.   - c/w Hydralazine 75mg in AM, 25mg in PM  - c/w Toprol 50mg BID  - start Valsartan 40mg BID    #Stress Induced Cardiomyopathy  Noted to have elevated cardiac biomarkers and TTE with RWA and newly depressed EF 35% without ischemic ECG changes.    Unlikely Type 1 MI s/p recent stress test that was negative. Likely stress induced Takotsubo's CM.  - Last TTE 8/12 showed that LVEEF was grossly unchanged, but was a poor quality study. Please obtain another TTE for LV function assessment.  - Daily EKGs  - Recommend ruling out obstructive CAD with CCTA vs LHC after repeat TTE if EF not improved.   - Diuresis as per primary team     #Atrial Fibrillation  On Eliquis for AC at home. CHADSVASC 4 (CHF, AAA, Age, HTN)  - Can reinitiate Eliquis 5mg BID for AC  - Rate control with Toprol 50 BID    #Perioperative Risk Stratification  - EKG: NSR @83 bpm, 1st degree AV block, RBBB, unchanged form admission  - ECHO: normal LV,  RV function borderline reduced, PASP 48 mmHg, mildly dilated aortic root.  - Stress test about 1 1/2 years ago and it was normal per patient (Dr. Gonzales and Dr. Lemos (EP) as an outpatient.   - Uses cane s/p multiple back surgeries which limits his functional capacity METS~4  - Patient initially intermediate risk for intermediate risk procedure, however subsequent flash pulmonary edema 2/2 TRALI vs hypertensive emergency - takotsubo's stress CM - newly depressed EF 35% and RWA, now elevated to high risk for intermediate risk procedure.  - POD#4 - percutaneous cholecystostomy with cardiac anesthesia.  - no anginal equivalents postoperatively, perc drain in place.       72M w/ complicated PMH including depression/anxiety, Atrial fibrillation on Eliquis, PPM (2004), HTN, HLD, R hemicoloectomy for colon mass (2017), AAA s/p EVAR (2016) for R iliac artery aneurysm, possible coagulopathy s/p IVC filter placement, chronic back pain w/ remote spinal fusion and intrathecal pump, who was transferred from [facility] 8/7/22 w/ acute cholecystitis. Cardiology consulted for perioperative risk stratification c/b new onset heart failure s/p FFP and hypertensive emergency now POD#4 percutaneous cholecystostomy.    #Hypertensive Emergency  BPs still elevated in the 160s/70-80s on Hydralazine and Toprol. s/p cardine gtt and up titration of hydralazine and pain control.   - c/w Hydralazine 75mg in AM, 25mg in PM  - c/w Toprol 50mg BID  - start Valsartan 40mg BID    #Stress Induced Cardiomyopathy  Noted to have elevated cardiac biomarkers and TTE with RWA and newly depressed EF 35% without ischemic ECG changes.    Unlikely Type 1 MI s/p recent stress test that was negative. Likely stress induced Takotsubo's CM.  - Last TTE 8/12 showed that LVEEF was grossly unchanged, but was a poor quality study. Please obtain another TTE for LV function assessment.  - Daily EKGs  - Recommend ruling out obstructive CAD with CCTA vs LHC after repeat TTE if EF not improved.   - Diuresis as per primary team   - Will need Cardiology follow up as outpatient    #Atrial Fibrillation  On Eliquis for AC at home. CHADSVASC 4 (CHF, AAA, Age, HTN)  - Can reinitiate Eliquis 5mg BID for AC  - Rate control with Toprol 50 BID    #Perioperative Risk Stratification  - EKG: NSR @83 bpm, 1st degree AV block, RBBB, unchanged form admission  - ECHO: normal LV,  RV function borderline reduced, PASP 48 mmHg, mildly dilated aortic root.  - Stress test about 1 1/2 years ago and it was normal per patient (Dr. Gonzales and Dr. Lemos (EP) as an outpatient.   - Uses cane s/p multiple back surgeries which limits his functional capacity METS~4  - Patient initially intermediate risk for intermediate risk procedure, however subsequent flash pulmonary edema 2/2 TRALI vs hypertensive emergency - takotsubo's stress CM - newly depressed EF 35% and RWA, now elevated to high risk for intermediate risk procedure.  - POD#4 - percutaneous cholecystostomy with cardiac anesthesia.  - no anginal equivalents postoperatively, perc drain in place.    Case discussed with Cardiology consult attending.

## 2022-08-15 NOTE — PROGRESS NOTE ADULT - ASSESSMENT
72M w/ multiple medical problems PMH including Atrial fibrillation on Eliquis, PPM (2004), HTN, HLD, R hemicoloectomy for colon mass (2017), AAA s/p EVAR (2016) for R iliac artery aneurysm, possible coagulopathy s/p IVC filter placement, chronic back pain w/ remote spinal fusion and intrathecal pump, who was transferred from [facility] 8/7/22 w/ acute cholecystitis c/b new onset heart failure s/p FFP and hypertensive emergency now POD#4 percutaneous cholecystostomy.

## 2022-08-15 NOTE — DISCHARGE NOTE PROVIDER - CARE PROVIDERS DIRECT ADDRESSES
,DirectAddress_Unknown ,DirectAddress_Unknown,[email or web address] ,DirectAddress_Unknown,[email or web address]

## 2022-08-15 NOTE — PROGRESS NOTE ADULT - PROBLEM SELECTOR PLAN 7
Pt w/ hx of spinal fusion and s/p failed intrathecal morphine pump which caused bradycardia   Home med: oxycontin 20mg TID, oxycodone 15mg TID  - c/w oxycontin 20mg TID standing  - c/w oxycodone 15mg TID PRN

## 2022-08-15 NOTE — DISCHARGE NOTE PROVIDER - PROVIDER TOKENS
PROVIDER:[TOKEN:[[id number]]] PROVIDER:[TOKEN:[[id number]]],PROVIDER:[TOKEN:[[id number]]] PROVIDER:[TOKEN:[[id number]]],PROVIDER:[TOKEN:[[id number]]]

## 2022-08-15 NOTE — DISCHARGE NOTE PROVIDER - NSDCMRMEDTOKEN_GEN_ALL_CORE_FT
ALPRAZolam 1 mg oral tablet: 1 tab(s) orally once a day (at bedtime), As needed, insomnia  B-12 Resin 1000 mcg oral tablet: 1 tab(s) orally once a week  Cardura 4 mg oral tablet: 1 tab(s) orally once a day  Eliquis 5 mg oral tablet: 1 tab(s) orally once a day  folic acid 1 mg oral tablet: 1 tab(s) orally once a day  hydrALAZINE 10 mg oral tablet: 50 milligram(s) orally 1 time a day + 25 milligrams orally 1 time a day  omeprazole 20 mg oral delayed release capsule: 1 cap(s) orally once a day  oxyCODONE 15 mg oral tablet: 1 tab(s) orally every 4 hours, As needed, Moderate Pain (4 - 6) MDD:6  oxyCODONE 30 mg oral tablet, extended release: 1 tab(s) orally every 8 hours, As Needed -for severe pain MDD:3  OxyCONTIN 20 mg oral tablet, extended release: 20 milligram(s) orally every 8 hours  Pamelor 75 mg oral capsule: 1 cap(s) orally once a day (at bedtime)  Proscar 5 mg oral tablet: 1 tab(s) orally once a day  simvastatin 5 mg oral tablet: 1 tab(s) orally once a day (at bedtime)  Toprol-XL 50 mg oral tablet, extended release: 1 tab(s) orally 2 times a day  Trintellix 20 mg oral tablet: 1 tab(s) orally once a day  Valium 2 mg oral tablet: 1 tab(s) orally once a day (at bedtime), As Needed - for insomnia   ALPRAZolam 1 mg oral tablet: 1 tab(s) orally once a day (at bedtime), As needed, insomnia  B-12 Resin 1000 mcg oral tablet: 1 tab(s) orally once a week  Cardura 4 mg oral tablet: 1 tab(s) orally once a day  Eliquis 5 mg oral tablet: 1 tab(s) orally once a day  folic acid 1 mg oral tablet: 1 tab(s) orally once a day  hydrALAZINE 10 mg oral tablet: 50 milligram(s) orally 1 time a day + 25 milligrams orally 1 time a day  omeprazole 20 mg oral delayed release capsule: 1 cap(s) orally once a day  oxyCODONE 15 mg oral tablet: 1 tab(s) orally every 4 hours, As needed, Moderate Pain (4 - 6) MDD:6  oxyCODONE 30 mg oral tablet, extended release: 1 tab(s) orally every 8 hours, As Needed -for severe pain MDD:3  OxyCONTIN 20 mg oral tablet, extended release: 20 milligram(s) orally every 8 hours  Pamelor 75 mg oral capsule: 1 cap(s) orally once a day (at bedtime)  Proscar 5 mg oral tablet: 1 tab(s) orally once a day  simvastatin 5 mg oral tablet: 1 tab(s) orally once a day (at bedtime)  Toprol-XL 50 mg oral tablet, extended release: 1 tab(s) orally 2 times a day  Trintellix 20 mg oral tablet: 1 tab(s) orally once a day  Valium 2 mg oral tablet: 1 tab(s) orally once a day (at bedtime), As Needed - for insomnia  valsartan 40 mg oral tablet: 1 tab(s) orally every 12 hours   ALPRAZolam 1 mg oral tablet: 1 tab(s) orally once a day (at bedtime), As needed, insomnia  B-12 Resin 1000 mcg oral tablet: 1 tab(s) orally once a week  Cardura 4 mg oral tablet: 1 tab(s) orally once a day  Eliquis 5 mg oral tablet: 1 tab(s) orally once a day  folic acid 1 mg oral tablet: 1 tab(s) orally once a day  hydrALAZINE 10 mg oral tablet: 50 milligram(s) orally 1 time a day + 25 milligrams orally 1 time a day  omeprazole 20 mg oral delayed release capsule: 1 cap(s) orally once a day  oxyCODONE 15 mg oral tablet: 1 tab(s) orally every 4 hours, As needed, Moderate Pain (4 - 6) MDD:6  oxyCODONE 30 mg oral tablet, extended release: 1 tab(s) orally every 8 hours, As Needed -for severe pain MDD:3  OxyCONTIN 20 mg oral tablet, extended release: 20 milligram(s) orally every 8 hours  Pamelor 75 mg oral capsule: 1 cap(s) orally once a day (at bedtime)  Proscar 5 mg oral tablet: 1 tab(s) orally once a day  sacubitril-valsartan 24 mg-26 mg oral tablet: 1 tab(s) orally 2 times a day  simvastatin 5 mg oral tablet: 1 tab(s) orally once a day (at bedtime)  Toprol-XL 50 mg oral tablet, extended release: 1 tab(s) orally 2 times a day  Trintellix 20 mg oral tablet: 1 tab(s) orally once a day  Valium 2 mg oral tablet: 1 tab(s) orally once a day (at bedtime), As Needed - for insomnia   ALPRAZolam 1 mg oral tablet: 1 tab(s) orally once a day (at bedtime), As needed, insomnia  B-12 Resin 1000 mcg oral tablet: 1 tab(s) orally once a week  Cardura 4 mg oral tablet: 1 tab(s) orally once a day  Eliquis 5 mg oral tablet: 1 tab(s) orally once a day  Entresto 24 mg-26 mg oral tablet: 1 tab(s) orally 2 times a day  folic acid 1 mg oral tablet: 1 tab(s) orally once a day  hydrALAZINE 10 mg oral tablet: 50 milligram(s) orally 1 time a day + 25 milligrams orally 1 time a day  omeprazole 20 mg oral delayed release capsule: 1 cap(s) orally once a day  oxyCODONE 15 mg oral tablet: 1 tab(s) orally every 4 hours, As needed, Moderate Pain (4 - 6) MDD:6  oxyCODONE 30 mg oral tablet, extended release: 1 tab(s) orally every 8 hours, As Needed -for severe pain MDD:3  OxyCONTIN 20 mg oral tablet, extended release: 20 milligram(s) orally every 8 hours  Pamelor 75 mg oral capsule: 1 cap(s) orally once a day (at bedtime)  Proscar 5 mg oral tablet: 1 tab(s) orally once a day  sacubitril-valsartan 24 mg-26 mg oral tablet: 1 tab(s) orally 2 times a day  simvastatin 5 mg oral tablet: 1 tab(s) orally once a day (at bedtime)  Toprol-XL 50 mg oral tablet, extended release: 1 tab(s) orally 2 times a day  Trintellix 20 mg oral tablet: 1 tab(s) orally once a day  Valium 2 mg oral tablet: 1 tab(s) orally once a day (at bedtime), As Needed - for insomnia

## 2022-08-16 ENCOUNTER — TRANSCRIPTION ENCOUNTER (OUTPATIENT)
Age: 72
End: 2022-08-16

## 2022-08-16 VITALS
TEMPERATURE: 98 F | SYSTOLIC BLOOD PRESSURE: 163 MMHG | HEART RATE: 70 BPM | OXYGEN SATURATION: 96 % | RESPIRATION RATE: 18 BRPM | DIASTOLIC BLOOD PRESSURE: 75 MMHG

## 2022-08-16 LAB
ALBUMIN SERPL ELPH-MCNC: 2.3 G/DL — LOW (ref 3.3–5)
ALP SERPL-CCNC: 110 U/L — SIGNIFICANT CHANGE UP (ref 40–120)
ALT FLD-CCNC: 28 U/L — SIGNIFICANT CHANGE UP (ref 10–45)
ANION GAP SERPL CALC-SCNC: 10 MMOL/L — SIGNIFICANT CHANGE UP (ref 5–17)
ANISOCYTOSIS BLD QL: SLIGHT — SIGNIFICANT CHANGE UP
AST SERPL-CCNC: 40 U/L — SIGNIFICANT CHANGE UP (ref 10–40)
BASOPHILS # BLD AUTO: 0 K/UL — SIGNIFICANT CHANGE UP (ref 0–0.2)
BASOPHILS NFR BLD AUTO: 0 % — SIGNIFICANT CHANGE UP (ref 0–2)
BILIRUB SERPL-MCNC: 0.2 MG/DL — SIGNIFICANT CHANGE UP (ref 0.2–1.2)
BUN SERPL-MCNC: 11 MG/DL — SIGNIFICANT CHANGE UP (ref 7–23)
CALCIUM SERPL-MCNC: 8.6 MG/DL — SIGNIFICANT CHANGE UP (ref 8.4–10.5)
CHLORIDE SERPL-SCNC: 97 MMOL/L — SIGNIFICANT CHANGE UP (ref 96–108)
CO2 SERPL-SCNC: 25 MMOL/L — SIGNIFICANT CHANGE UP (ref 22–31)
CREAT SERPL-MCNC: 1.18 MG/DL — SIGNIFICANT CHANGE UP (ref 0.5–1.3)
EGFR: 65 ML/MIN/1.73M2 — SIGNIFICANT CHANGE UP
EOSINOPHIL # BLD AUTO: 0.38 K/UL — SIGNIFICANT CHANGE UP (ref 0–0.5)
EOSINOPHIL NFR BLD AUTO: 5.2 % — SIGNIFICANT CHANGE UP (ref 0–6)
GIANT PLATELETS BLD QL SMEAR: PRESENT — SIGNIFICANT CHANGE UP
GLUCOSE SERPL-MCNC: 126 MG/DL — HIGH (ref 70–99)
HCT VFR BLD CALC: 30.7 % — LOW (ref 39–50)
HGB BLD-MCNC: 9.8 G/DL — LOW (ref 13–17)
LYMPHOCYTES # BLD AUTO: 1.15 K/UL — SIGNIFICANT CHANGE UP (ref 1–3.3)
LYMPHOCYTES # BLD AUTO: 15.6 % — SIGNIFICANT CHANGE UP (ref 13–44)
MACROCYTES BLD QL: SLIGHT — SIGNIFICANT CHANGE UP
MAGNESIUM SERPL-MCNC: 1.8 MG/DL — SIGNIFICANT CHANGE UP (ref 1.6–2.6)
MANUAL SMEAR VERIFICATION: SIGNIFICANT CHANGE UP
MCHC RBC-ENTMCNC: 28.9 PG — SIGNIFICANT CHANGE UP (ref 27–34)
MCHC RBC-ENTMCNC: 31.9 GM/DL — LOW (ref 32–36)
MCV RBC AUTO: 90.6 FL — SIGNIFICANT CHANGE UP (ref 80–100)
METAMYELOCYTES # FLD: 0.9 % — HIGH (ref 0–0)
MICROCYTES BLD QL: SLIGHT — SIGNIFICANT CHANGE UP
MONOCYTES # BLD AUTO: 0.64 K/UL — SIGNIFICANT CHANGE UP (ref 0–0.9)
MONOCYTES NFR BLD AUTO: 8.7 % — SIGNIFICANT CHANGE UP (ref 2–14)
MYELOCYTES NFR BLD: 2.6 % — HIGH (ref 0–0)
NEUTROPHILS # BLD AUTO: 4.94 K/UL — SIGNIFICANT CHANGE UP (ref 1.8–7.4)
NEUTROPHILS NFR BLD AUTO: 67 % — SIGNIFICANT CHANGE UP (ref 43–77)
OVALOCYTES BLD QL SMEAR: SLIGHT — SIGNIFICANT CHANGE UP
PHOSPHATE SERPL-MCNC: 3.1 MG/DL — SIGNIFICANT CHANGE UP (ref 2.5–4.5)
PLAT MORPH BLD: ABNORMAL
PLATELET # BLD AUTO: 278 K/UL — SIGNIFICANT CHANGE UP (ref 150–400)
POLYCHROMASIA BLD QL SMEAR: SLIGHT — SIGNIFICANT CHANGE UP
POTASSIUM SERPL-MCNC: 4 MMOL/L — SIGNIFICANT CHANGE UP (ref 3.5–5.3)
POTASSIUM SERPL-SCNC: 4 MMOL/L — SIGNIFICANT CHANGE UP (ref 3.5–5.3)
PROT SERPL-MCNC: 6.1 G/DL — SIGNIFICANT CHANGE UP (ref 6–8.3)
RBC # BLD: 3.39 M/UL — LOW (ref 4.2–5.8)
RBC # FLD: 13.5 % — SIGNIFICANT CHANGE UP (ref 10.3–14.5)
RBC BLD AUTO: ABNORMAL
SARS-COV-2 RNA SPEC QL NAA+PROBE: NEGATIVE — SIGNIFICANT CHANGE UP
SCHISTOCYTES BLD QL AUTO: SLIGHT — SIGNIFICANT CHANGE UP
SODIUM SERPL-SCNC: 132 MMOL/L — LOW (ref 135–145)
SPHEROCYTES BLD QL SMEAR: SLIGHT — SIGNIFICANT CHANGE UP
WBC # BLD: 7.38 K/UL — SIGNIFICANT CHANGE UP (ref 3.8–10.5)
WBC # FLD AUTO: 7.38 K/UL — SIGNIFICANT CHANGE UP (ref 3.8–10.5)

## 2022-08-16 PROCEDURE — 93005 ELECTROCARDIOGRAM TRACING: CPT

## 2022-08-16 PROCEDURE — 86900 BLOOD TYPING SEROLOGIC ABO: CPT

## 2022-08-16 PROCEDURE — 80048 BASIC METABOLIC PNL TOTAL CA: CPT

## 2022-08-16 PROCEDURE — 97110 THERAPEUTIC EXERCISES: CPT

## 2022-08-16 PROCEDURE — 97116 GAIT TRAINING THERAPY: CPT

## 2022-08-16 PROCEDURE — 82962 GLUCOSE BLOOD TEST: CPT

## 2022-08-16 PROCEDURE — 97161 PT EVAL LOW COMPLEX 20 MIN: CPT

## 2022-08-16 PROCEDURE — 86901 BLOOD TYPING SEROLOGIC RH(D): CPT

## 2022-08-16 PROCEDURE — 93308 TTE F-UP OR LMTD: CPT

## 2022-08-16 PROCEDURE — 85610 PROTHROMBIN TIME: CPT

## 2022-08-16 PROCEDURE — 82570 ASSAY OF URINE CREATININE: CPT

## 2022-08-16 PROCEDURE — U0005: CPT

## 2022-08-16 PROCEDURE — C1729: CPT

## 2022-08-16 PROCEDURE — 84100 ASSAY OF PHOSPHORUS: CPT

## 2022-08-16 PROCEDURE — U0003: CPT

## 2022-08-16 PROCEDURE — 83735 ASSAY OF MAGNESIUM: CPT

## 2022-08-16 PROCEDURE — 83880 ASSAY OF NATRIURETIC PEPTIDE: CPT

## 2022-08-16 PROCEDURE — 87635 SARS-COV-2 COVID-19 AMP PRB: CPT

## 2022-08-16 PROCEDURE — 87070 CULTURE OTHR SPECIMN AEROBIC: CPT

## 2022-08-16 PROCEDURE — 82803 BLOOD GASES ANY COMBINATION: CPT

## 2022-08-16 PROCEDURE — 85730 THROMBOPLASTIN TIME PARTIAL: CPT

## 2022-08-16 PROCEDURE — 94660 CPAP INITIATION&MGMT: CPT

## 2022-08-16 PROCEDURE — 99239 HOSP IP/OBS DSCHRG MGMT >30: CPT

## 2022-08-16 PROCEDURE — 83930 ASSAY OF BLOOD OSMOLALITY: CPT

## 2022-08-16 PROCEDURE — C1769: CPT

## 2022-08-16 PROCEDURE — 83935 ASSAY OF URINE OSMOLALITY: CPT

## 2022-08-16 PROCEDURE — 87075 CULTR BACTERIA EXCEPT BLOOD: CPT

## 2022-08-16 PROCEDURE — 87040 BLOOD CULTURE FOR BACTERIA: CPT

## 2022-08-16 PROCEDURE — 71045 X-RAY EXAM CHEST 1 VIEW: CPT

## 2022-08-16 PROCEDURE — 83605 ASSAY OF LACTIC ACID: CPT

## 2022-08-16 PROCEDURE — 93306 TTE W/DOPPLER COMPLETE: CPT

## 2022-08-16 PROCEDURE — 93321 DOPPLER ECHO F-UP/LMTD STD: CPT

## 2022-08-16 PROCEDURE — 87205 SMEAR GRAM STAIN: CPT

## 2022-08-16 PROCEDURE — 76705 ECHO EXAM OF ABDOMEN: CPT

## 2022-08-16 PROCEDURE — 47490 INCISION OF GALLBLADDER: CPT

## 2022-08-16 PROCEDURE — 82553 CREATINE MB FRACTION: CPT

## 2022-08-16 PROCEDURE — 97530 THERAPEUTIC ACTIVITIES: CPT

## 2022-08-16 PROCEDURE — 36415 COLL VENOUS BLD VENIPUNCTURE: CPT

## 2022-08-16 PROCEDURE — 86850 RBC ANTIBODY SCREEN: CPT

## 2022-08-16 PROCEDURE — 84484 ASSAY OF TROPONIN QUANT: CPT

## 2022-08-16 PROCEDURE — 85025 COMPLETE CBC W/AUTO DIFF WBC: CPT

## 2022-08-16 PROCEDURE — P9059: CPT

## 2022-08-16 PROCEDURE — 82248 BILIRUBIN DIRECT: CPT

## 2022-08-16 PROCEDURE — 80076 HEPATIC FUNCTION PANEL: CPT

## 2022-08-16 PROCEDURE — 99233 SBSQ HOSP IP/OBS HIGH 50: CPT

## 2022-08-16 PROCEDURE — 85027 COMPLETE CBC AUTOMATED: CPT

## 2022-08-16 PROCEDURE — 82550 ASSAY OF CK (CPK): CPT

## 2022-08-16 PROCEDURE — 84300 ASSAY OF URINE SODIUM: CPT

## 2022-08-16 PROCEDURE — 80053 COMPREHEN METABOLIC PANEL: CPT

## 2022-08-16 PROCEDURE — 36430 TRANSFUSION BLD/BLD COMPNT: CPT

## 2022-08-16 RX ORDER — SACUBITRIL AND VALSARTAN 24; 26 MG/1; MG/1
1 TABLET, FILM COATED ORAL
Qty: 0 | Refills: 0 | DISCHARGE
Start: 2022-08-16

## 2022-08-16 RX ORDER — VALSARTAN 80 MG/1
1 TABLET ORAL
Qty: 0 | Refills: 0 | DISCHARGE
Start: 2022-08-16

## 2022-08-16 RX ORDER — SACUBITRIL AND VALSARTAN 24; 26 MG/1; MG/1
1 TABLET, FILM COATED ORAL
Qty: 60 | Refills: 0
Start: 2022-08-16 | End: 2022-09-14

## 2022-08-16 RX ADMIN — Medication 100 MILLIGRAM(S): at 06:13

## 2022-08-16 RX ADMIN — Medication 75 MILLIGRAM(S): at 07:07

## 2022-08-16 RX ADMIN — CHLORHEXIDINE GLUCONATE 1 APPLICATION(S): 213 SOLUTION TOPICAL at 06:40

## 2022-08-16 RX ADMIN — APIXABAN 5 MILLIGRAM(S): 2.5 TABLET, FILM COATED ORAL at 06:14

## 2022-08-16 RX ADMIN — OXYCODONE HYDROCHLORIDE 20 MILLIGRAM(S): 5 TABLET ORAL at 14:57

## 2022-08-16 RX ADMIN — OXYCODONE HYDROCHLORIDE 20 MILLIGRAM(S): 5 TABLET ORAL at 06:13

## 2022-08-16 RX ADMIN — FINASTERIDE 5 MILLIGRAM(S): 5 TABLET, FILM COATED ORAL at 13:16

## 2022-08-16 RX ADMIN — Medication 1 MILLIGRAM(S): at 13:16

## 2022-08-16 RX ADMIN — OXYCODONE HYDROCHLORIDE 20 MILLIGRAM(S): 5 TABLET ORAL at 07:12

## 2022-08-16 RX ADMIN — VALSARTAN 40 MILLIGRAM(S): 80 TABLET ORAL at 06:14

## 2022-08-16 RX ADMIN — Medication 50 MILLIGRAM(S): at 10:15

## 2022-08-16 RX ADMIN — PANTOPRAZOLE SODIUM 40 MILLIGRAM(S): 20 TABLET, DELAYED RELEASE ORAL at 06:14

## 2022-08-16 RX ADMIN — OXYCODONE HYDROCHLORIDE 15 MILLIGRAM(S): 5 TABLET ORAL at 10:52

## 2022-08-16 NOTE — PROGRESS NOTE ADULT - ASSESSMENT
72M PMH including depression/anxiety, Atrial fibrillation on Eliquis, PPM (2004), HTN, HLD, R hemicoloectomy for colon mass (2017), AAA s/p EVAR (2016) for R iliac artery aneurysm, possible coagulopathy s/p IVC filter placement, chronic back pain w/ remote spinal fusion and intrathecal pump, who was transferred from [facility] 8/7/22 w/ acute cholecystitis. Cardiology consulted for perioperative risk stratification c/b new onset heart failure s/p FFP and hypertensive emergency now POD#5 percutaneous cholecystostomy on regional medical floors pending JESSIKA placement.    #Hypertensive Emergency  BPs still elevated in the 150s/90s on Hydralazine and Toprol. s/p cardine gtt and up titration of hydralazine and pain control.   - c/w Hydralazine 75mg in AM, 25mg in PM  - c/w Toprol 50mg BID  - Valsartan 40mg BID - transition to Entresto 24/26 mg BID (in the setting reduced EF on last TTE)    #Stress Induced Cardiomyopathy  Noted to have elevated cardiac biomarkers and TTE with RWA and newly depressed EF 35% without ischemic ECG changes.    Unlikely Type 1 MI s/p recent stress test that was negative. Likely stress induced Takotsubo's CM.  - Last TTE 8/12 showed that LVEEF was grossly unchanged, but was a poor quality study. Please obtain another TTE for LV function assessment.  - Daily EKGs  - Recommend ruling out obstructive CAD with CCTA vs LHC after repeat TTE if EF not improved.   - Valsartan 40mg BID - transition to Entresto 24/26 mg BID if unable to obtain echocardiogram prior to discharge.  - Toprol 50mg BID  - Will need Cardiology follow up as outpatient - Dr Pastor    #Atrial Fibrillation  On Eliquis for AC at home. CHADSVASC 4 (CHF, AAA, Age, HTN)  - c/w Eliquis 5mg BID for AC  - Rate control with Toprol 50 BID    #Perioperative Risk Stratification  - EKG: NSR @83 bpm, 1st degree AV block, RBBB, unchanged form admission  - ECHO: normal LV,  RV function borderline reduced, PASP 48 mmHg, mildly dilated aortic root.  - Stress test about 1 1/2 years ago and it was normal per patient (Dr. Gonzales and Dr. Lemos (EP) as an outpatient.   - Uses cane s/p multiple back surgeries which limits his functional capacity METS~4  - Patient initially intermediate risk for intermediate risk procedure, however subsequent flash pulmonary edema 2/2 TRALI vs hypertensive emergency - takotsubo's stress CM - newly depressed EF 35% and RWA, now elevated to high risk for intermediate risk procedure.  - POD#5 - percutaneous cholecystostomy with cardiac anesthesia.  - no anginal equivalents postoperatively, perc drain in place.    Case discussed with Cardiology consult attending.

## 2022-08-16 NOTE — PROGRESS NOTE ADULT - REASON FOR ADMISSION
acute cholecystitis vs. choledocholithiasis

## 2022-08-16 NOTE — PROGRESS NOTE ADULT - SUBJECTIVE AND OBJECTIVE BOX
INTERVAL HPI/OVERNIGHT EVENTS:    SUBJECTIVE: Patient seen and examined at bedside.    OBJECTIVE:    VITAL SIGNS:  ICU Vital Signs Last 24 Hrs  T(C): 36.4 (16 Aug 2022 05:37), Max: 36.8 (15 Aug 2022 20:45)  T(F): 97.6 (16 Aug 2022 05:37), Max: 98.2 (15 Aug 2022 20:45)  HR: 62 (16 Aug 2022 07:01) (62 - 66)  BP: 151/93 (16 Aug 2022 07:01) (123/75 - 190/88)  BP(mean): --  ABP: --  ABP(mean): --  RR: 18 (16 Aug 2022 05:37) (18 - 18)  SpO2: 98% (16 Aug 2022 05:37) (98% - 98%)    O2 Parameters below as of 16 Aug 2022 05:37  Patient On (Oxygen Delivery Method): room air              08-15 @ 07:01  -  08-16 @ 07:00  --------------------------------------------------------  IN: 0 mL / OUT: 450 mL / NET: -450 mL    08-16 @ 07:01  -  08-16 @ 12:14  --------------------------------------------------------  IN: 0 mL / OUT: 120 mL / NET: -120 mL      CAPILLARY BLOOD GLUCOSE          PHYSICAL EXAM:    General: WDWN ; NAD  HEENT: NC/AT; PERRL, anicteric sclera  Neck: supple, no JVD  Respiratory: CTA B/L; no W/R/R  Cardiovascular: +S1/S2; RRR; no M/R/G  Gastrointestinal: soft, NT/ND; +BS x4  Extremities: WWP; 2+ peripheral pulses B/L; no LE edema  Skin: normal color and turgor; no rash  Neurological:     MEDICATIONS:  MEDICATIONS  (STANDING):  apixaban 5 milliGRAM(s) Oral every 12 hours  cefTRIAXone   IVPB 2000 milliGRAM(s) IV Intermittent every 24 hours  chlorhexidine 2% Cloths 1 Application(s) Topical <User Schedule>  doxazosin 4 milliGRAM(s) Oral at bedtime  finasteride 5 milliGRAM(s) Oral daily  folic acid 1 milliGRAM(s) Oral daily  hydrALAZINE 75 milliGRAM(s) Oral <User Schedule>  hydrALAZINE 50 milliGRAM(s) Oral <User Schedule>  melatonin 5 milliGRAM(s) Oral at bedtime  metoprolol succinate ER 50 milliGRAM(s) Oral two times a day  metroNIDAZOLE  IVPB 500 milliGRAM(s) IV Intermittent every 8 hours  nortriptyline 75 milliGRAM(s) Oral at bedtime  oxyCODONE  ER Tablet 20 milliGRAM(s) Oral every 8 hours  pantoprazole    Tablet 40 milliGRAM(s) Oral before breakfast  simvastatin 10 milliGRAM(s) Oral at bedtime  valsartan 40 milliGRAM(s) Oral every 12 hours    MEDICATIONS  (PRN):  acetaminophen     Tablet .. 650 milliGRAM(s) Oral every 6 hours PRN Temp greater or equal to 38C (100.4F), Mild Pain (1 - 3), Moderate Pain (4 - 6)  famotidine    Tablet 20 milliGRAM(s) Oral daily PRN acid reflux  oxyCODONE    IR 15 milliGRAM(s) Oral three times a day PRN Severe Pain (7 - 10)      ALLERGIES:  Allergies    Altace (Unknown)  penicillin (Unknown)    Intolerances        LABS:                        9.8    7.38  )-----------( 278      ( 16 Aug 2022 07:44 )             30.7     08-16    132<L>  |  97  |  11  ----------------------------<  126<H>  4.0   |  25  |  1.18    Ca    8.6      16 Aug 2022 07:44  Phos  3.1     08-16  Mg     1.8     08-16    TPro  6.1  /  Alb  2.3<L>  /  TBili  0.2  /  DBili  x   /  AST  40  /  ALT  28  /  AlkPhos  110  08-16    LIVER FUNCTIONS - ( 16 Aug 2022 07:44 )  Alb: 2.3 g/dL / Pro: 6.1 g/dL / ALK PHOS: 110 U/L / ALT: 28 U/L / AST: 40 U/L / GGT: x                     RADIOLOGY & ADDITIONAL TESTS: Reviewed.   INTERVAL HPI/OVERNIGHT EVENTS: GERMAINE    SUBJECTIVE: Patient seen and examined at bedside. No complaints overnight, denies chest pain, SOB, abdominal pain, changes in mental status.    OBJECTIVE:    VITAL SIGNS:  ICU Vital Signs Last 24 Hrs  T(C): 36.4 (16 Aug 2022 05:37), Max: 36.8 (15 Aug 2022 20:45)  T(F): 97.6 (16 Aug 2022 05:37), Max: 98.2 (15 Aug 2022 20:45)  HR: 62 (16 Aug 2022 07:01) (62 - 66)  BP: 151/93 (16 Aug 2022 07:01) (123/75 - 190/88)  RR: 18 (16 Aug 2022 05:37) (18 - 18)  SpO2: 98% (16 Aug 2022 05:37) (98% - 98%)    O2 Parameters below as of 16 Aug 2022 05:37  Patient On (Oxygen Delivery Method): room air              08-15 @ 07:01  -  08-16 @ 07:00  --------------------------------------------------------  IN: 0 mL / OUT: 450 mL / NET: -450 mL    08-16 @ 07:01  -  08-16 @ 12:14  --------------------------------------------------------  IN: 0 mL / OUT: 120 mL / NET: -120 mL      CAPILLARY BLOOD GLUCOSE          PHYSICAL EXAM:    PHYSICAL EXAM:  GENERAL: Elderly  male in NAD, speaks in full sentences, no signs of respiratory distress  HEAD:  Atraumatic, Normocephalic  EYES: EOMI, PERRLA, conjunctiva and sclera clear  NECK: Supple  CHEST/LUNG: Fine bibasilar crackles  HEART: Regular rate and rhythm; No murmurs;   ABDOMEN: s/p cholecystotomy drain.   EXTREMITIES:  2+ Peripheral Pulses, 1-2+ edema   PSYCH: AAOx3  NEUROLOGY: non-focal; moves all extremities.  SKIN: Stasis dermatiitis    MEDICATIONS:  MEDICATIONS  (STANDING):  apixaban 5 milliGRAM(s) Oral every 12 hours  cefTRIAXone   IVPB 2000 milliGRAM(s) IV Intermittent every 24 hours  chlorhexidine 2% Cloths 1 Application(s) Topical <User Schedule>  doxazosin 4 milliGRAM(s) Oral at bedtime  finasteride 5 milliGRAM(s) Oral daily  folic acid 1 milliGRAM(s) Oral daily  hydrALAZINE 75 milliGRAM(s) Oral <User Schedule>  hydrALAZINE 50 milliGRAM(s) Oral <User Schedule>  melatonin 5 milliGRAM(s) Oral at bedtime  metoprolol succinate ER 50 milliGRAM(s) Oral two times a day  metroNIDAZOLE  IVPB 500 milliGRAM(s) IV Intermittent every 8 hours  nortriptyline 75 milliGRAM(s) Oral at bedtime  oxyCODONE  ER Tablet 20 milliGRAM(s) Oral every 8 hours  pantoprazole    Tablet 40 milliGRAM(s) Oral before breakfast  simvastatin 10 milliGRAM(s) Oral at bedtime  valsartan 40 milliGRAM(s) Oral every 12 hours    MEDICATIONS  (PRN):  acetaminophen     Tablet .. 650 milliGRAM(s) Oral every 6 hours PRN Temp greater or equal to 38C (100.4F), Mild Pain (1 - 3), Moderate Pain (4 - 6)  famotidine    Tablet 20 milliGRAM(s) Oral daily PRN acid reflux  oxyCODONE    IR 15 milliGRAM(s) Oral three times a day PRN Severe Pain (7 - 10)      ALLERGIES:  Allergies    Altace (Unknown)  penicillin (Unknown)    Intolerances        LABS:                        9.8    7.38  )-----------( 278      ( 16 Aug 2022 07:44 )             30.7     08-16    132<L>  |  97  |  11  ----------------------------<  126<H>  4.0   |  25  |  1.18    Ca    8.6      16 Aug 2022 07:44  Phos  3.1     08-16  Mg     1.8     08-16    TPro  6.1  /  Alb  2.3<L>  /  TBili  0.2  /  DBili  x   /  AST  40  /  ALT  28  /  AlkPhos  110  08-16    LIVER FUNCTIONS - ( 16 Aug 2022 07:44 )  Alb: 2.3 g/dL / Pro: 6.1 g/dL / ALK PHOS: 110 U/L / ALT: 28 U/L / AST: 40 U/L / GGT: x                     RADIOLOGY & ADDITIONAL TESTS: Reviewed.

## 2022-08-16 NOTE — PROGRESS NOTE ADULT - PROVIDER SPECIALTY LIST ADULT
Cardiology
Surgery
Surgery
Cardiology
Colorectal Surgery
Electrophysiology
Internal Medicine
Internal Medicine
Intervent Radiology
MICU
MICU
Surgery
Cardiology
Hospitalist
Intervent Radiology
Pain Medicine
MICU
MICU
Internal Medicine

## 2022-08-16 NOTE — DISCHARGE NOTE NURSING/CASE MANAGEMENT/SOCIAL WORK - PATIENT PORTAL LINK FT
You can access the FollowMyHealth Patient Portal offered by [facility] by registering at the following website: [email or web address]. By joining [facility]’s FollowMyHealth portal, you will also be able to view your health information using other applications (apps) compatible with our system.

## 2022-08-16 NOTE — DISCHARGE NOTE NURSING/CASE MANAGEMENT/SOCIAL WORK - NSDCPEFALRISK_GEN_ALL_CORE
For information on Fall & Injury Prevention, visit: [email or web address] OR  [email or web address] OR  https://www.cdc.gov/steadi/patient.html

## 2022-08-16 NOTE — DISCHARGE NOTE NURSING/CASE MANAGEMENT/SOCIAL WORK - NSDCPEPT PROEDHF_GEN_ALL_CORE
Call primary care provider for follow up after discharge/Activities as tolerated/Low salt diet/Monitor weight daily/Report signs and symptoms to primary care provider Unna Boot Text: An Unna boot was placed to help immobilize the limb and facilitate more rapid healing.

## 2022-08-16 NOTE — PROGRESS NOTE ADULT - SUBJECTIVE AND OBJECTIVE BOX
72M with complicated PMH who presented as transfer from [facility] on 8/7/2022 with acute cholecystitis s/p percutaneous cholecystostomy tube placement by Dr. Gonzales on 8/11/2022.    Dressing c/d/i  Cholecystostomy tube with 120 cc dark bilious output in past 24 hr, 120 cc output in the prior 24 hr  Flushed easily with 2-3 cc NS  Continue to monitor output

## 2022-08-16 NOTE — PROGRESS NOTE ADULT - ATTENDING COMMENTS
Initial attending contact date  8/9/22    . See fellow note written above for details. I reviewed the fellow documentation. I have personally seen and examined this patient. I reviewed vitals, labs, medications, cardiac studies, and additional imaging. I agree with the above fellow's findings and plans Initial attending contact date  8/12/22   . See fellow note written above for details. I reviewed the fellow documentation. I have personally seen and examined this patient. I reviewed vitals, labs, medications, cardiac studies, and additional imaging. I agree with the above fellow's findings and plans as written above with the following additions/statements.  as written above with the following additions/statements.    72M w/ complicated PMH including depression/anxiety, atrial fibrillation on Eliquis, PPM (2004), HTN, HLD, R hemicolectomy for colon mass (2017), AAA s/p EVAR (2016) for R iliac artery aneurysm, possible coagulopathy s/p IVC filter placement, chronic back pain w/ remote spinal fusion and intrathecal pump, who was transferred from [facility] 8/7/22 w/ acute cholecystitis  -Cardiology initially consulted for pre-op risk assessment, deemed to be int risk for int risk procedure given prior ECHO with normal LVEF  -EKG NSR with 1st degree, RBBB  -In the interim, with hypertensive emergency leading to pulm edema/acute HF with BNP in 6000s, CXR consistent with bl effusions, PVC  - ECHO with newly depressed EF ~ 30% with anteroapical ballooning/hypokinesis with inferobasal hyperkinesis consistent with likely takotsubo/stress induced CM  -Pt denies active CP   -EKG remains unchanged with RBBB without new ischemic changes   -trop trend:  .15 - .16 - .10   -Mild elevation in cardio biomarkers expected in setting of hypertensive emergency/acute HF/stress induced CM  Unlikely type 1 MI/ACS/ acute coronary plaque rupture given nonischemic changes on repeat EKGs. However can only definitely r/o underlying CAD with CCTA vs cath which can be pursued if EF does not improve or new ischemic changes on ekg or significant elevation in troponin. Supposedly with nuclear stress test 1-2 years ago- reportedly normal   - now s/p percut cholecystostomy drain with significant clinical improvement  -Would considered dc'ing hydral given soft BP, prior elevation in BP likely due to severe abd pain. Cont metoprolol   -Beside ECHO appears with improved LVEF  -Pls obtain official limited ECHO to re-eval EF  -Cont eliquis for A fib
Initial attending contact date  8/9/22    . See fellow note written above for details. I reviewed the fellow documentation. I have personally seen and examined this patient. I reviewed vitals, labs, medications, cardiac studies, and additional imaging. I agree with the above fellow's findings and plans as written above with the following additions/statements.    72M w/ complicated PMH including depression/anxiety, atrial fibrillation on Eliquis, PPM (2004), HTN, HLD, R hemicolectomy for colon mass (2017), AAA s/p EVAR (2016) for R iliac artery aneurysm, possible coagulopathy s/p IVC filter placement, chronic back pain w/ remote spinal fusion and intrathecal pump, who [facility] w/ acute cholecystitis  -Cardiology initially consulted for pre-op risk assessment, deemed to be int risk for int risk procedure given prior ECHO with normal LVEF  -EKG NSR with 1st degree, RBBB  -In the interim, with hypertensive emergency leading to pulm edema/acute HF with BNP in 6000s, CXR consistent with bl effusions, PVC  - ECHO with newly depressed EF ~ 30% with anteroapical ballooning/hypokinesis with inferobasal hyperkinesis consistent with likely takotsubo/stress induced CM  -Pt denies active CP   -EKG remains unchanged with RBBB without new ischemic changes   -trop trend:  .15 - .16 - .10   -Mild elevation in cardio biomarkers expected in setting of hypertensive emergency/acute HF/stress induced CM  Unlikely type 1 MI/ACS/ acute coronary plaque rupture given nonischemic changes on repeat EKGs. However can only definitely r/o underlying CAD with CCTA vs cath which can be pursued if EF does not improve or new ischemic changes on ekg or significant elevation in troponin. Supposedly with nuclear stress test 1-2 years ago- reportedly normal   -Cont IV heparin for known parox A fib  -Given recent events with now newly depressed EF, pt would be considered high risk. Would favor percut drainage which scheduled for today  -With persistent elevated BP, likely exacerbated by abd pain. Agree with restarting nicardipine drip to avoid recurrent pulm edema   -Cont metoprolol 50 mg bid. Given resolved CLAUDIO would start losartan 50 mg qd for added BP control  -Plan to repeat echo when clinical status improves
Transferred from outside hospital for cholecystectomy complicated by acute decompensation with new cardiomyopathy, hypertension and encephalopathy. Suspect sepsis induced cardiomyopathy with NSTEMI. All parameters are improved. Pending yonis tube placement by IR. C/w abx. C/w pain control as he is on chronic opiates. Aggressive BP control. PT. OT. OOB.
patient seen and examined. Now s/p percutaneous cholecystostomy. Not a surgical candidate secondary to ongoing cardiac issues. Percutaneous cholecystostomy to temporize GB and potential ongoing source of sepsis/pain. ACS will continue to follow. Discussed with Dr. Bellamy this AM.
Initial attending contact date  8/9/22    . See fellow note written above for details. I reviewed the fellow documentation. I have personally seen and examined this patient. I reviewed vitals, labs, medications, cardiac studies, and additional imaging. I agree with the above fellow's findings and plans as written above with the following additions/statements.      72M w/ complicated PMH including depression/anxiety, atrial fibrillation on Eliquis, PPM (2004), HTN, HLD, R hemicoloectomy for colon mass (2017), AAA s/p EVAR (2016) for R iliac artery aneurysm, possible coagulopathy s/p IVC filter placement, chronic back pain w/ remote spinal fusion and intrathecal pump, who was transferred from [facility] 8/7/22 w/ acute cholecystitis  -Cardiology initially consulted for pre-op risk assessment, deemed to be int risk for int risk procedure given prior ECHO with normal LVEF  -EKG NSR with 1st degree, RBBB  -In the interim, with hypertensive emergency leading to pulm edema/acute HF with BNP in 6000s, CXR consistent with bl effusions, PVC  - ECHO with newly depressed EF ~ 30% with anteroapical ballooning/hypokinesis with inferobasal hyperkinesis consistent with likely takotsubo/stress induced CM  -Pt denies active CP   -EKG remains unchanged with RBBB without new ischemic changes   -trop trend:  .15 - .16 - .10   -Mild elevation in cardio biomarkers expected in setting of hypertensive emergency/acute HF/stress induced CM  Unlikely type 1 MI/ACS/ acute coronary plaque rupture given nonischemic changes on repeat EKGs. However can only definitely r/o underlying CAD with CCTA vs cath which can be pursued if EF does not improve or new ischemic changes on ekg or significant elevation in troponin. Supposedly with nuclear stress test 1-2 years ago- reportedly normal   -IV diuresis per primary team   -Cont IV heparin for known parox A fib  -Given recent events with now newly depressed EF, pt would be considered high risk. Would favor percut drainage vs surgery at this time
Transferred from outside hospital for cholecystectomy complicated by acute decompensation with new cardiomyopathy, hypertension and encephalopathy. Suspect sepsis induced cardiomyopathy with NSTEMI. All parameters are improved. S/p yonis tube placement by IR. C/w abx. C/w pain control as he is on chronic opiates. Has transitioned to a PO BP regimen. PT. OT. OOB. Floor eligible.
Initial attending contact date  8/12/22   . See fellow note written above for details. I reviewed the fellow documentation. I have personally seen and examined this patient. I reviewed vitals, labs, medications, cardiac studies, and additional imaging. I agree with the above fellow's findings and plans as written above with the following additions/statements    72M w/ complicated PMH including depression/anxiety, atrial fibrillation on Eliquis, PPM (2004), HTN, HLD, R hemicolectomy for colon mass (2017), AAA s/p EVAR (2016) for R iliac artery aneurysm, possible coagulopathy s/p IVC filter placement, chronic back pain w/ remote spinal fusion and intrathecal pump, who was transferred from [facility] 8/7/22 w/ acute cholecystitis  -Cardiology initially consulted for pre-op risk assessment, deemed to be int risk for int risk procedure given prior ECHO with normal LVEF  -EKG NSR with 1st degree, RBBB  -In the interim, with hypertensive emergency leading to pulm edema/acute HF with BNP in 6000s, CXR consistent with bl effusions, PVC  - ECHO with newly depressed EF ~ 30% with anteroapical ballooning/hypokinesis with inferobasal hyperkinesis consistent with likely takotsubo/stress induced CM  -Pt denies active CP   -EKG remains unchanged with RBBB without new ischemic changes   -trop trend:  .15 - .16 - .10   -Mild elevation in cardio biomarkers expected in setting of hypertensive emergency/acute HF/stress induced CM  Unlikely type 1 MI/ACS/ acute coronary plaque rupture given nonischemic changes on repeat EKGs. However can only definitely r/o underlying CAD with CCTA vs cath which can be pursued if EF does not improve or new ischemic changes on ekg or significant elevation in troponin. Supposedly with nuclear stress test 1-2 years ago- reportedly normal   -Repeat ECHO with contrast for better eval of LVEF if able  -If  unable to obtain echo, dc valsartan and start entresto 24/26 bid   -Cont eliquis for A fib
Transferred from outside hospital for cholecystectomy complicated by acute decompensation with new cardiomyopathy, hypertension and encephalopathy. Suspect sepsis induced cardiomyopathy with NSTEMI. All parameters are improved. Will get yonis tube placement by IR. C/w abx. C/w pain control as he is on chronic opiates. BP control with PO meds.
Initial attending contact date  8/12/22   . See fellow note written above for details. I reviewed the fellow documentation. I have personally seen and examined this patient. I reviewed vitals, labs, medications, cardiac studies, and additional imaging. I agree with the above fellow's findings and plans as written above with the following additions/statements    72M w/ complicated PMH including depression/anxiety, atrial fibrillation on Eliquis, PPM (2004), HTN, HLD, R hemicolectomy for colon mass (2017), AAA s/p EVAR (2016) for R iliac artery aneurysm, possible coagulopathy s/p IVC filter placement, chronic back pain w/ remote spinal fusion and intrathecal pump, who was transferred from [facility] 8/7/22 w/ acute cholecystitis  -Cardiology initially consulted for pre-op risk assessment, deemed to be int risk for int risk procedure given prior ECHO with normal LVEF  -EKG NSR with 1st degree, RBBB  -In the interim, with hypertensive emergency leading to pulm edema/acute HF with BNP in 6000s, CXR consistent with bl effusions, PVC  - ECHO with newly depressed EF ~ 30% with anteroapical ballooning/hypokinesis with inferobasal hyperkinesis consistent with likely takotsubo/stress induced CM  -Pt denies active CP   -EKG remains unchanged with RBBB without new ischemic changes   -trop trend:  .15 - .16 - .10   -Mild elevation in cardio biomarkers expected in setting of hypertensive emergency/acute HF/stress induced CM  Unlikely type 1 MI/ACS/ acute coronary plaque rupture given nonischemic changes on repeat EKGs. However can only definitely r/o underlying CAD with CCTA vs cath which can be pursued if EF does not improve or new ischemic changes on ekg or significant elevation in troponin. Supposedly with nuclear stress test 1-2 years ago- reportedly normal   -Repeat ECHO with contrast for better eval of LVEF  -Cont eliquis for A fib  -BP elevated: start valsartan 40 mg po bid
Transferred from outside hospital for cholecystectomy complicated by acute decompensation with new cardiomyopathy, hypertension and encephalopathy. Doubt TACO as only a small volume of blood products was transfused; does have apical ballooning on bedside POCUS which can also lead to cardiomyopathy in an acute infectious state. Troponins are rising; will treat as NSTEMI with hep gtt but will hold anti platelets due to possible OR versus IR placement of drainage tube.  Abx to cover intraabdominal pathogens.
72M w/ multiple medical problems PMH including Atrial fibrillation on Eliquis, PPM (2004), HTN, HLD, R hemicoloectomy for colon mass (2017), AAA s/p EVAR (2016) for R iliac artery aneurysm, possible coagulopathy s/p IVC filter placement, chronic back pain w/ remote spinal fusion and intrathecal pump, who was transferred from [facility] 8/7/22 w/ acute cholecystitis c/b new onset heart failure s/p FFP and hypertensive emergency now POD#4 percutaneous cholecystostomy.    Labs and imaging reviewed    Problem List  #Acute Cholecystitis - Cont PO Abx x3d Cef/Flagyl, Drain can be followed outpatient  #Type 2 NSTEMI - f/u TTE while awaiting placement, if not performed would defer to outpatient  #Acute Systolic Heart Failure - 2/2 stress vs ischemia? will need outpatient work up, continue GDMT  #HTN Emergency - caused flash pulmonary edema, now resolving, continue BP control,  #Chronic persistent Afib    Rest of problems as above, medically ready for discharge pending placement at rehab facility.
Medically ready for discharge to [facility]   PT recommended [facility]   Patient lives at home with Wife   Currently needing 1 person assist   If wife feels she can assist him at home patient can potentially dced home , however ideally dc to [facility] is more appropriate and safer for the patient

## 2022-08-17 ENCOUNTER — NON-APPOINTMENT (OUTPATIENT)
Age: 72
End: 2022-08-17

## 2022-08-20 ENCOUNTER — INPATIENT (INPATIENT)
Facility: HOSPITAL | Age: 72
LOS: 16 days | Discharge: HOME HEALTH | DRG: 408 | End: 2022-09-06
Attending: SURGERY | Admitting: SURGERY
Payer: MEDICARE

## 2022-08-20 VITALS
OXYGEN SATURATION: 96 % | SYSTOLIC BLOOD PRESSURE: 105 MMHG | DIASTOLIC BLOOD PRESSURE: 66 MMHG | WEIGHT: 199.96 LBS | HEART RATE: 76 BPM | TEMPERATURE: 98 F | RESPIRATION RATE: 18 BRPM | HEIGHT: 69 IN

## 2022-08-20 DIAGNOSIS — Z98.890 OTHER SPECIFIED POSTPROCEDURAL STATES: Chronic | ICD-10-CM

## 2022-08-20 DIAGNOSIS — R58 HEMORRHAGE, NOT ELSEWHERE CLASSIFIED: ICD-10-CM

## 2022-08-20 DIAGNOSIS — D68.59 OTHER PRIMARY THROMBOPHILIA: ICD-10-CM

## 2022-08-20 DIAGNOSIS — Z41.9 ENCOUNTER FOR PROCEDURE FOR PURPOSES OTHER THAN REMEDYING HEALTH STATE, UNSPECIFIED: Chronic | ICD-10-CM

## 2022-08-20 DIAGNOSIS — N40.0 BENIGN PROSTATIC HYPERPLASIA WITHOUT LOWER URINARY TRACT SYMPTOMS: ICD-10-CM

## 2022-08-20 DIAGNOSIS — F41.8 OTHER SPECIFIED ANXIETY DISORDERS: ICD-10-CM

## 2022-08-20 DIAGNOSIS — Z95.0 PRESENCE OF CARDIAC PACEMAKER: ICD-10-CM

## 2022-08-20 DIAGNOSIS — Z96.642 PRESENCE OF LEFT ARTIFICIAL HIP JOINT: Chronic | ICD-10-CM

## 2022-08-20 DIAGNOSIS — Z43.4 ENCOUNTER FOR ATTENTION TO OTHER ARTIFICIAL OPENINGS OF DIGESTIVE TRACT: ICD-10-CM

## 2022-08-20 DIAGNOSIS — Z79.01 LONG TERM (CURRENT) USE OF ANTICOAGULANTS: ICD-10-CM

## 2022-08-20 DIAGNOSIS — I51.81 TAKOTSUBO SYNDROME: ICD-10-CM

## 2022-08-20 DIAGNOSIS — I25.10 ATHEROSCLEROTIC HEART DISEASE OF NATIVE CORONARY ARTERY WITHOUT ANGINA PECTORIS: ICD-10-CM

## 2022-08-20 DIAGNOSIS — Z86.718 PERSONAL HISTORY OF OTHER VENOUS THROMBOSIS AND EMBOLISM: ICD-10-CM

## 2022-08-20 DIAGNOSIS — I48.20 CHRONIC ATRIAL FIBRILLATION, UNSPECIFIED: ICD-10-CM

## 2022-08-20 DIAGNOSIS — G93.41 METABOLIC ENCEPHALOPATHY: ICD-10-CM

## 2022-08-20 DIAGNOSIS — T45.515A ADVERSE EFFECT OF ANTICOAGULANTS, INITIAL ENCOUNTER: ICD-10-CM

## 2022-08-20 DIAGNOSIS — E78.5 HYPERLIPIDEMIA, UNSPECIFIED: ICD-10-CM

## 2022-08-20 DIAGNOSIS — T85.838A HEMORRHAGE DUE TO OTHER INTERNAL PROSTHETIC DEVICES, IMPLANTS AND GRAFTS, INITIAL ENCOUNTER: ICD-10-CM

## 2022-08-20 DIAGNOSIS — K80.46 CALCULUS OF BILE DUCT WITH ACUTE AND CHRONIC CHOLECYSTITIS WITHOUT OBSTRUCTION: ICD-10-CM

## 2022-08-20 DIAGNOSIS — Z88.8 ALLERGY STATUS TO OTHER DRUGS, MEDICAMENTS AND BIOLOGICAL SUBSTANCES: ICD-10-CM

## 2022-08-20 DIAGNOSIS — I10 ESSENTIAL (PRIMARY) HYPERTENSION: ICD-10-CM
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
## 2022-08-20 DIAGNOSIS — R10.9 UNSPECIFIED ABDOMINAL PAIN: ICD-10-CM
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
## 2022-08-20 DIAGNOSIS — Z95.0 PRESENCE OF CARDIAC PACEMAKER: Chronic | ICD-10-CM

## 2022-08-20 DIAGNOSIS — E87.1 HYPO-OSMOLALITY AND HYPONATREMIA: ICD-10-CM

## 2022-08-20 DIAGNOSIS — J43.8 OTHER EMPHYSEMA: ICD-10-CM

## 2022-08-20 DIAGNOSIS — T40.605A ADVERSE EFFECT OF UNSPECIFIED NARCOTICS, INITIAL ENCOUNTER: ICD-10-CM

## 2022-08-20 DIAGNOSIS — Z98.1 ARTHRODESIS STATUS: ICD-10-CM

## 2022-08-20 DIAGNOSIS — Z95.828 PRESENCE OF OTHER VASCULAR IMPLANTS AND GRAFTS: ICD-10-CM

## 2022-08-20 DIAGNOSIS — Z96.642 PRESENCE OF LEFT ARTIFICIAL HIP JOINT: ICD-10-CM

## 2022-08-20 DIAGNOSIS — Z88.0 ALLERGY STATUS TO PENICILLIN: ICD-10-CM
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
## 2022-08-20 DIAGNOSIS — T85.840A PAIN DUE TO NERVOUS SYSTEM PROSTHETIC DEVICES, IMPLANTS AND GRAFTS, INITIAL ENCOUNTER: ICD-10-CM

## 2022-08-20 DIAGNOSIS — D68.32 HEMORRHAGIC DISORDER DUE TO EXTRINSIC CIRCULATING ANTICOAGULANTS: ICD-10-CM

## 2022-08-20 LAB
ALBUMIN SERPL ELPH-MCNC: 2.8 G/DL — LOW (ref 3.3–5)
ALP SERPL-CCNC: 98 U/L — SIGNIFICANT CHANGE UP (ref 40–120)
ALT FLD-CCNC: 19 U/L — SIGNIFICANT CHANGE UP (ref 10–45)
ANION GAP SERPL CALC-SCNC: 7 MMOL/L — SIGNIFICANT CHANGE UP (ref 5–17)
APTT BLD: 34.7 SEC — SIGNIFICANT CHANGE UP (ref 27.5–35.5)
AST SERPL-CCNC: 25 U/L — SIGNIFICANT CHANGE UP (ref 10–40)
BASOPHILS # BLD AUTO: 0.07 K/UL — SIGNIFICANT CHANGE UP (ref 0–0.2)
BASOPHILS NFR BLD AUTO: 0.7 % — SIGNIFICANT CHANGE UP (ref 0–2)
BILIRUB SERPL-MCNC: 0.3 MG/DL — SIGNIFICANT CHANGE UP (ref 0.2–1.2)
BLD GP AB SCN SERPL QL: NEGATIVE — SIGNIFICANT CHANGE UP
BUN SERPL-MCNC: 11 MG/DL — SIGNIFICANT CHANGE UP (ref 7–23)
CALCIUM SERPL-MCNC: 8.6 MG/DL — SIGNIFICANT CHANGE UP (ref 8.4–10.5)
CHLORIDE SERPL-SCNC: 98 MMOL/L — SIGNIFICANT CHANGE UP (ref 96–108)
CO2 SERPL-SCNC: 26 MMOL/L — SIGNIFICANT CHANGE UP (ref 22–31)
CREAT SERPL-MCNC: 1.39 MG/DL — HIGH (ref 0.5–1.3)
EGFR: 54 ML/MIN/1.73M2 — LOW
EOSINOPHIL # BLD AUTO: 0.35 K/UL — SIGNIFICANT CHANGE UP (ref 0–0.5)
EOSINOPHIL NFR BLD AUTO: 3.6 % — SIGNIFICANT CHANGE UP (ref 0–6)
GLUCOSE SERPL-MCNC: 101 MG/DL — HIGH (ref 70–99)
HCT VFR BLD CALC: 29.8 % — LOW (ref 39–50)
HGB BLD-MCNC: 9.7 G/DL — LOW (ref 13–17)
IMM GRANULOCYTES NFR BLD AUTO: 0.7 % — SIGNIFICANT CHANGE UP (ref 0–1.5)
INR BLD: 1.1 — SIGNIFICANT CHANGE UP (ref 0.88–1.16)
LYMPHOCYTES # BLD AUTO: 1.25 K/UL — SIGNIFICANT CHANGE UP (ref 1–3.3)
LYMPHOCYTES # BLD AUTO: 12.9 % — LOW (ref 13–44)
MCHC RBC-ENTMCNC: 29 PG — SIGNIFICANT CHANGE UP (ref 27–34)
MCHC RBC-ENTMCNC: 32.6 GM/DL — SIGNIFICANT CHANGE UP (ref 32–36)
MCV RBC AUTO: 89 FL — SIGNIFICANT CHANGE UP (ref 80–100)
MONOCYTES # BLD AUTO: 1.09 K/UL — HIGH (ref 0–0.9)
MONOCYTES NFR BLD AUTO: 11.2 % — SIGNIFICANT CHANGE UP (ref 2–14)
NEUTROPHILS # BLD AUTO: 6.87 K/UL — SIGNIFICANT CHANGE UP (ref 1.8–7.4)
NEUTROPHILS NFR BLD AUTO: 70.9 % — SIGNIFICANT CHANGE UP (ref 43–77)
NRBC # BLD: 0 /100 WBCS — SIGNIFICANT CHANGE UP (ref 0–0)
PLATELET # BLD AUTO: 392 K/UL — SIGNIFICANT CHANGE UP (ref 150–400)
POTASSIUM SERPL-MCNC: 4.7 MMOL/L — SIGNIFICANT CHANGE UP (ref 3.5–5.3)
POTASSIUM SERPL-SCNC: 4.7 MMOL/L — SIGNIFICANT CHANGE UP (ref 3.5–5.3)
PROT SERPL-MCNC: 6.9 G/DL — SIGNIFICANT CHANGE UP (ref 6–8.3)
PROTHROM AB SERPL-ACNC: 13.1 SEC — SIGNIFICANT CHANGE UP (ref 10.5–13.4)
RBC # BLD: 3.35 M/UL — LOW (ref 4.2–5.8)
RBC # FLD: 13.6 % — SIGNIFICANT CHANGE UP (ref 10.3–14.5)
RH IG SCN BLD-IMP: POSITIVE — SIGNIFICANT CHANGE UP
SARS-COV-2 RNA SPEC QL NAA+PROBE: NEGATIVE — SIGNIFICANT CHANGE UP
SODIUM SERPL-SCNC: 131 MMOL/L — LOW (ref 135–145)
WBC # BLD: 9.7 K/UL — SIGNIFICANT CHANGE UP (ref 3.8–10.5)
WBC # FLD AUTO: 9.7 K/UL — SIGNIFICANT CHANGE UP (ref 3.8–10.5)

## 2022-08-20 PROCEDURE — 74177 CT ABD & PELVIS W/CONTRAST: CPT | Mod: 26,MA

## 2022-08-20 PROCEDURE — 99285 EMERGENCY DEPT VISIT HI MDM: CPT

## 2022-08-20 PROCEDURE — 93010 ELECTROCARDIOGRAM REPORT: CPT

## 2022-08-20 RX ORDER — MORPHINE SULFATE 50 MG/1
4 CAPSULE, EXTENDED RELEASE ORAL ONCE
Refills: 0 | Status: DISCONTINUED | OUTPATIENT
Start: 2022-08-20 | End: 2022-08-20

## 2022-08-20 RX ORDER — NORTRIPTYLINE HYDROCHLORIDE 10 MG/1
75 CAPSULE ORAL AT BEDTIME
Refills: 0 | Status: DISCONTINUED | OUTPATIENT
Start: 2022-08-20 | End: 2022-08-27

## 2022-08-20 RX ORDER — HYDRALAZINE HCL 50 MG
25 TABLET ORAL DAILY
Refills: 0 | Status: DISCONTINUED | OUTPATIENT
Start: 2022-08-21 | End: 2022-08-22

## 2022-08-20 RX ORDER — FINASTERIDE 5 MG/1
5 TABLET, FILM COATED ORAL DAILY
Refills: 0 | Status: DISCONTINUED | OUTPATIENT
Start: 2022-08-20 | End: 2022-09-06

## 2022-08-20 RX ORDER — METOPROLOL TARTRATE 50 MG
50 TABLET ORAL EVERY 12 HOURS
Refills: 0 | Status: DISCONTINUED | OUTPATIENT
Start: 2022-08-20 | End: 2022-09-06

## 2022-08-20 RX ORDER — PANTOPRAZOLE SODIUM 20 MG/1
40 TABLET, DELAYED RELEASE ORAL DAILY
Refills: 0 | Status: DISCONTINUED | OUTPATIENT
Start: 2022-08-20 | End: 2022-09-01

## 2022-08-20 RX ORDER — DIAZEPAM 5 MG
2 TABLET ORAL DAILY
Refills: 0 | Status: DISCONTINUED | OUTPATIENT
Start: 2022-08-20 | End: 2022-08-26

## 2022-08-20 RX ORDER — DOXAZOSIN MESYLATE 4 MG
4 TABLET ORAL AT BEDTIME
Refills: 0 | Status: DISCONTINUED | OUTPATIENT
Start: 2022-08-20 | End: 2022-09-06

## 2022-08-20 RX ORDER — HYDRALAZINE HCL 50 MG
50 TABLET ORAL DAILY
Refills: 0 | Status: DISCONTINUED | OUTPATIENT
Start: 2022-08-21 | End: 2022-08-21

## 2022-08-20 RX ORDER — ONDANSETRON 8 MG/1
4 TABLET, FILM COATED ORAL EVERY 6 HOURS
Refills: 0 | Status: DISCONTINUED | OUTPATIENT
Start: 2022-08-20 | End: 2022-09-06

## 2022-08-20 RX ORDER — SIMVASTATIN 20 MG/1
5 TABLET, FILM COATED ORAL AT BEDTIME
Refills: 0 | Status: DISCONTINUED | OUTPATIENT
Start: 2022-08-20 | End: 2022-09-06

## 2022-08-20 RX ORDER — OXYCODONE HYDROCHLORIDE 5 MG/1
30 TABLET ORAL EVERY 4 HOURS
Refills: 0 | Status: DISCONTINUED | OUTPATIENT
Start: 2022-08-20 | End: 2022-08-26

## 2022-08-20 RX ORDER — OXYCODONE HYDROCHLORIDE 5 MG/1
20 TABLET ORAL EVERY 8 HOURS
Refills: 0 | Status: DISCONTINUED | OUTPATIENT
Start: 2022-08-20 | End: 2022-08-26

## 2022-08-20 RX ORDER — ALPRAZOLAM 0.25 MG
1 TABLET ORAL AT BEDTIME
Refills: 0 | Status: DISCONTINUED | OUTPATIENT
Start: 2022-08-20 | End: 2022-08-26

## 2022-08-20 RX ORDER — OXYCODONE HYDROCHLORIDE 5 MG/1
15 TABLET ORAL EVERY 4 HOURS
Refills: 0 | Status: DISCONTINUED | OUTPATIENT
Start: 2022-08-20 | End: 2022-08-26

## 2022-08-20 RX ORDER — SODIUM CHLORIDE 9 MG/ML
1000 INJECTION, SOLUTION INTRAVENOUS
Refills: 0 | Status: DISCONTINUED | OUTPATIENT
Start: 2022-08-20 | End: 2022-08-21

## 2022-08-20 RX ORDER — HEPARIN SODIUM 5000 [USP'U]/ML
1600 INJECTION INTRAVENOUS; SUBCUTANEOUS
Qty: 25000 | Refills: 0 | Status: DISCONTINUED | OUTPATIENT
Start: 2022-08-20 | End: 2022-08-21

## 2022-08-20 RX ADMIN — NORTRIPTYLINE HYDROCHLORIDE 75 MILLIGRAM(S): 10 CAPSULE ORAL at 21:57

## 2022-08-20 RX ADMIN — OXYCODONE HYDROCHLORIDE 20 MILLIGRAM(S): 5 TABLET ORAL at 22:58

## 2022-08-20 RX ADMIN — SODIUM CHLORIDE 60 MILLILITER(S): 9 INJECTION, SOLUTION INTRAVENOUS at 19:52

## 2022-08-20 RX ADMIN — HEPARIN SODIUM 16 UNIT(S)/HR: 5000 INJECTION INTRAVENOUS; SUBCUTANEOUS at 23:15

## 2022-08-20 RX ADMIN — SIMVASTATIN 5 MILLIGRAM(S): 20 TABLET, FILM COATED ORAL at 21:58

## 2022-08-20 RX ADMIN — Medication 4 MILLIGRAM(S): at 21:58

## 2022-08-20 RX ADMIN — OXYCODONE HYDROCHLORIDE 20 MILLIGRAM(S): 5 TABLET ORAL at 21:57

## 2022-08-20 NOTE — H&P ADULT - NSHPPHYSICALEXAM_GEN_ALL_CORE
GEN: NAD, resting comfortably in bed  CV: NSR   Pulm: no respiratory distress RA   Abd: soft, NT, ND, no rebound or guarding, perc yonis in place with small amount of bilious output, small amout of dark red blood on dressing, removed without further bleeding, no blood in tubing, no ecchymosis or hematoma  Ext: WWP, no edema   Neuro: A&Ox3, moves all ext.   Psych: affect appropriate

## 2022-08-20 NOTE — PATIENT PROFILE ADULT - FALL HARM RISK - HARM RISK INTERVENTIONS

## 2022-08-20 NOTE — ED ADULT TRIAGE NOTE - CHIEF COMPLAINT QUOTE
Pt presents for blood in post op abdominal drain. Sent for admission by MD Doyle. Pt reports he was scheduled for gall bladder removal last week, procedure cancelled d/t htn, sepsis, and possible acs. Pt reports he takes eliquis, last nights dose was held. Pt denies dizziness, lightheadedness, denies vomiting, denies black or bloody stools. BIBA from [facility] and rehab. Pt presents for blood in biliary drain. Sent for admission by MD Doyle. Pt reports he was scheduled for gall bladder removal last week, procedure cancelled d/t htn, sepsis, and possible acs. Pt reports he takes eliquis, last nights dose was held. Pt denies dizziness, lightheadedness, denies vomiting, denies black or bloody stools. BIBA from [facility] and rehab.

## 2022-08-20 NOTE — ED PROVIDER NOTE - CLINICAL SUMMARY MEDICAL DECISION MAKING FREE TEXT BOX
Here with bleeding around site of cholecystostomy tube.  No blood seen within tubing at this time.  Patient notes mild distention and soreness that is new, will check CT scan to evaluate for new hematoma/collection and plan for surgical consult.

## 2022-08-20 NOTE — H&P ADULT - ASSESSMENT
71 y/o M with colon mass s/p partial colectomy (2017), appendectomy, hernia repair, aortic and iliac aneurysm surgery (2017) s/p stent placement, HTN, HLD, paroxysmal atrial fibrillation on Eliquis, blood clotting disorder s/p IVC filter placement, right knee ligament repair, pacemaker implant (2004) s/p recent ppm interrogation on Eliquis, herniated disc and related surgery in [id number] complicated by spinal fusion, local hematoma, foot drop with chronic pain s/p failed intrathecal morphine pump, recently admitted to [facility] on 08/07 with acute cholecystitis c/b cardiomyopathy s/p percutaneous cholecystostomy tube by IR who presented for concerns of bleeding at the perc yonis tube site In ED, afebrile, HD stable, wbc 9.7, Hb 9.7 stable from 9.5 at discharge; LFTs wnl. PE with some blood at the dressing site but none in tubing, no hematoma or ecchymosis surrounding drain site. CTAP with percutaneous cholecystostomy catheter in collapsed gallbladder, confirmed with radiology department verbally. Plan for admission with interval cholecystectomy once cleared from cardiac perspective.    [facility], Team 4, General Surgery, Dr. Doyle   Regular diet  Aggressive pain control   Nausea control PRN  IVF@60cc/hr   Home meds as appropriate  F/u output perc yonis  OOBA/SCDs/IS  Likley hep gtt for afib once discussion with cardiology   Cardiology clearance and optimization   AM Labs    Plan discussed with attending and chief resident.   ____________________________________________________  WILLIAM Mcdaniel - Resident   Surgery

## 2022-08-20 NOTE — ED ADULT NURSE REASSESSMENT NOTE - NS ED NURSE REASSESS COMMENT FT1
Received report from MERON Lynch. Received patient in stretcher. AOX4. Vital signs as noted in flowsheet.  Patient denies chest pain, pain, discomfort, shortness of breath, difficulty breathing and any form of distress not noted. Patient oriented to ED area. Plan of care discussed and verbalized understanding. All needs attended. Fall risk precautions maintained. Purposeful proactive hourly rounding in progress. No active bleeding to cholecystostomy tube R side of torso. Draining yellow to green output.

## 2022-08-20 NOTE — ED PROVIDER NOTE - OBJECTIVE STATEMENT
72M w/ multiple medical problems PMH including Atrial fibrillation on Eliquis, PPM (2004), HTN, HLD, R hemicoloectomy for colon mass (2017), AAA s/p EVAR (2016) for R iliac artery aneurysm, possible coagulopathy s/p IVC filter placement, chronic back pain w/ remote spinal fusion and intrathecal pump, who was transferred from [facility] 8/7/22 w/ acute cholecystitis c/b new onset heart failure s/p FFP and hypertensive emergency s/p percutaneous cholecystostomy, returns to the ER today at the advice of his surgeon Dr. Doyle for reevaluation as for the past 24 hours they have noted bleeding at the site of the cholecystostomy.  Patient also notes that for the past 2 days the area around site has been more "sore".  No fevers or chills no nausea vomiting diarrhea no constipation.  Unsure if he saw drops of blood within the tube. Eliquis held last night but happened again today, dressings red with blood, so came into the ED for eval.   currently in rehab facility

## 2022-08-20 NOTE — ED ADULT NURSE NOTE - CHIEF COMPLAINT QUOTE
Pt presents for blood in biliary drain. Sent for admission by MD Doyle. Pt reports he was scheduled for gall bladder removal last week, procedure cancelled d/t htn, sepsis, and possible acs. Pt reports he takes eliquis, last nights dose was held. Pt denies dizziness, lightheadedness, denies vomiting, denies black or bloody stools. BIBA from [facility] and rehab.

## 2022-08-20 NOTE — PATIENT PROFILE ADULT - DOES PATIENT HAVE ADVANCE DIRECTIVE
----- Message from Willow Blood MD sent at 4/20/2020 11:23 AM CDT -----  Please call patient with abnormal result    
Pt aware. Still anemic- she will start liquid iron  
Yes

## 2022-08-20 NOTE — ED ADULT NURSE NOTE - OBJECTIVE STATEMENT
The patient is a 72y Male complaining of post op complication. Pt BIBA from Health [facility]. Pt is A&O x 3, speaking in full sentences, ambulatory with walker at baseline. Pt sent to ED by Dr Fields for preop testing for planned cholecystectomy. Pt p/w biliary drainage bag, dark green fluid present on bag, pink drainage on gauze around insertion site. Pt endorses pain and intermittent bleeding from drain insertion site, no active bleeding during assessment. Pt denies CP, SOB, dizziness, fevers, N/V/D, blood in stools, urinary symptoms.

## 2022-08-20 NOTE — CHART NOTE - NSCHARTNOTEFT_GEN_A_CORE
Spoke with radiology at length, multiple calls over approximately 4 hours to ask for updated read. Resident unable to reach Dr. White. Contacted Dr. Starr, attending for confirmation that original read was incorrect. Per Dr. Starr, attending radiologist overread. CBD measures 15mm, percutaneous cholecystostomy tube in collapsed GB without surrounding hematoma. No signs of obstruction or perforation.  Plan for documentation by radiology resident tonight and updated/addended report in the AM. Will have T4 follow up addendum on 8/21.

## 2022-08-20 NOTE — ED PROVIDER NOTE - TOBACCO USE
From: Celine Nicolas  To: Esthela Robert MD  Sent: 1/3/2019 8:02 AM CST  Subject: Prescription Question    I was not able to reply to the Prior Authorization message sent to me. .. I really need you to advocate for me with the insurance on this one.  I w
See Prior Auth TE dated 1/2/19 for PA information. Replied to patient's message with status update on PA.
Unknown if ever smoked

## 2022-08-20 NOTE — H&P ADULT - HISTORY OF PRESENT ILLNESS
71 y/o M with colon mass s/p partial colectomy (2017), appendectomy, hernia repair, aortic and iliac aneurysm surgery (2017) s/p stent placement, HTN, HLD, paroxysmal atrial fibrillation on Eliquis, blood clotting disorder s/p IVC filter placement, right knee ligament repair, pacemaker implant (2004) s/p recent ppm interrogation on Eliquis, herniated disc and related surgery in [id number] complicated by spinal fusion, local hematoma, foot drop with chronic pain s/p failed intrathecal morphine pump which caused bradycardia and left hip prosthesis. Patient was recently admitted to [facility] on 08/07 with acute cholecystitis. During that admission patient developed takotsubo cardiomyopathy requiring MICU care. Because patient condition at that time, and not medically optimized, patient was not deemed a surgical candidate for cholecystectomy, and underwent a percutaneous cholecystostomy tube by IR. Patient tolerated the procedure well and was subsequently discharged to [facility]. 3 days ago patient was noted to have blood on the dressings covering the perc yonis, and called Dr. Doyle to update him; per family member Dr. Doyle advised them to stop the eliquis and continue to monitor the site for any bleeding. Patient stopped taking the Eliquis but next morning dressing was saturated with blood again, for which they called Dr. Doyle again and he instructed them to come to [facility] ED. Per rehab the drain was consistently putting out 300cc/d and did not have any blood in the tubing. On presentation, patient afebrile, HD stable, wbc 9.7, Hb 9.7 stable from 9.5 at discharge; LFTs wnl, Cr slightly elevated from discharge. On PE dressing noted to have some blood, on removal of dressing no obvious sign of bleeding and/or hematoma, abdomen is soft, NT/ND. Patient denied fever/nausea/vomit/diarrhea/pruritus. CTAP with percutaneous cholecystostomy catheter in collapsed gallbladder.

## 2022-08-20 NOTE — ED PROVIDER NOTE - PHYSICAL EXAMINATION
CONSTITUTIONAL: Well-appearing; in no apparent distress.   HEAD: Normocephalic; atraumatic.   EYES:  conjunctiva and sclera clear  ENT: normal nose; no rhinorrhea; normal pharynx with no erythema or lesions.   NECK: Supple; non-tender;   CARDIOVASCULAR: Normal S1, S2; no murmurs, rubs, or gallops. Regular rate and rhythm.   RESPIRATORY: Breathing easily; breath sounds clear and equal bilaterally; no wheezes, rhonchi, or rales.  GI: Soft; non-distended; cholecystostomy in place in RUQ, non tender to palpation, draining green fluid, dressing bloody, no blood in tubing  EXT: No cyanosis or edema; N/V intact  SKIN: Normal for age and race; warm; dry; good turgor; no apparent lesions or rash.   NEURO: A & O x 3; face symmetric; grossly unremarkable.   PSYCHOLOGICAL: The patient’s mood and manner are appropriate.

## 2022-08-20 NOTE — ED PROVIDER NOTE - HIV OFFER
Add 96355 Cpt? (Important Note: In 2017 The Use Of 32249 Is Being Tracked By Cms To Determine Future Global Period Reimbursement For Global Periods): yes
Detail Level: Detailed
Opt out

## 2022-08-21 LAB
ALBUMIN SERPL ELPH-MCNC: 3 G/DL — LOW (ref 3.3–5)
ALP SERPL-CCNC: 91 U/L — SIGNIFICANT CHANGE UP (ref 40–120)
ALT FLD-CCNC: 17 U/L — SIGNIFICANT CHANGE UP (ref 10–45)
ANION GAP SERPL CALC-SCNC: 8 MMOL/L — SIGNIFICANT CHANGE UP (ref 5–17)
APTT BLD: 101.2 SEC — HIGH (ref 27.5–35.5)
APTT BLD: 168.6 SEC — CRITICAL HIGH (ref 27.5–35.5)
APTT BLD: 193.9 SEC — CRITICAL HIGH (ref 27.5–35.5)
AST SERPL-CCNC: 23 U/L — SIGNIFICANT CHANGE UP (ref 10–40)
BILIRUB SERPL-MCNC: 0.4 MG/DL — SIGNIFICANT CHANGE UP (ref 0.2–1.2)
BUN SERPL-MCNC: 11 MG/DL — SIGNIFICANT CHANGE UP (ref 7–23)
CALCIUM SERPL-MCNC: 8.6 MG/DL — SIGNIFICANT CHANGE UP (ref 8.4–10.5)
CHLORIDE SERPL-SCNC: 98 MMOL/L — SIGNIFICANT CHANGE UP (ref 96–108)
CO2 SERPL-SCNC: 27 MMOL/L — SIGNIFICANT CHANGE UP (ref 22–31)
CREAT SERPL-MCNC: 1.18 MG/DL — SIGNIFICANT CHANGE UP (ref 0.5–1.3)
EGFR: 65 ML/MIN/1.73M2 — SIGNIFICANT CHANGE UP
GLUCOSE SERPL-MCNC: 95 MG/DL — SIGNIFICANT CHANGE UP (ref 70–99)
HCT VFR BLD CALC: 30.5 % — LOW (ref 39–50)
HGB BLD-MCNC: 10.1 G/DL — LOW (ref 13–17)
MAGNESIUM SERPL-MCNC: 1.7 MG/DL — SIGNIFICANT CHANGE UP (ref 1.6–2.6)
MCHC RBC-ENTMCNC: 28.9 PG — SIGNIFICANT CHANGE UP (ref 27–34)
MCHC RBC-ENTMCNC: 33.1 GM/DL — SIGNIFICANT CHANGE UP (ref 32–36)
MCV RBC AUTO: 87.4 FL — SIGNIFICANT CHANGE UP (ref 80–100)
NRBC # BLD: 0 /100 WBCS — SIGNIFICANT CHANGE UP (ref 0–0)
PHOSPHATE SERPL-MCNC: 2.8 MG/DL — SIGNIFICANT CHANGE UP (ref 2.5–4.5)
PLATELET # BLD AUTO: 347 K/UL — SIGNIFICANT CHANGE UP (ref 150–400)
POTASSIUM SERPL-MCNC: 4.1 MMOL/L — SIGNIFICANT CHANGE UP (ref 3.5–5.3)
POTASSIUM SERPL-SCNC: 4.1 MMOL/L — SIGNIFICANT CHANGE UP (ref 3.5–5.3)
PROT SERPL-MCNC: 7.1 G/DL — SIGNIFICANT CHANGE UP (ref 6–8.3)
RBC # BLD: 3.49 M/UL — LOW (ref 4.2–5.8)
RBC # FLD: 13.4 % — SIGNIFICANT CHANGE UP (ref 10.3–14.5)
SODIUM SERPL-SCNC: 133 MMOL/L — LOW (ref 135–145)
WBC # BLD: 7.78 K/UL — SIGNIFICANT CHANGE UP (ref 3.8–10.5)
WBC # FLD AUTO: 7.78 K/UL — SIGNIFICANT CHANGE UP (ref 3.8–10.5)

## 2022-08-21 PROCEDURE — 99223 1ST HOSP IP/OBS HIGH 75: CPT

## 2022-08-21 PROCEDURE — 99222 1ST HOSP IP/OBS MODERATE 55: CPT

## 2022-08-21 RX ORDER — HYDRALAZINE HCL 50 MG
50 TABLET ORAL EVERY 24 HOURS
Refills: 0 | Status: DISCONTINUED | OUTPATIENT
Start: 2022-08-22 | End: 2022-08-22

## 2022-08-21 RX ORDER — HEPARIN SODIUM 5000 [USP'U]/ML
1000 INJECTION INTRAVENOUS; SUBCUTANEOUS
Qty: 25000 | Refills: 0 | Status: DISCONTINUED | OUTPATIENT
Start: 2022-08-21 | End: 2022-08-24

## 2022-08-21 RX ORDER — HYDRALAZINE HCL 50 MG
25 TABLET ORAL EVERY 24 HOURS
Refills: 0 | Status: DISCONTINUED | OUTPATIENT
Start: 2022-08-22 | End: 2022-08-22

## 2022-08-21 RX ORDER — HEPARIN SODIUM 5000 [USP'U]/ML
1400 INJECTION INTRAVENOUS; SUBCUTANEOUS
Qty: 25000 | Refills: 0 | Status: DISCONTINUED | OUTPATIENT
Start: 2022-08-21 | End: 2022-08-21

## 2022-08-21 RX ORDER — HYDRALAZINE HCL 50 MG
50 TABLET ORAL EVERY 24 HOURS
Refills: 0 | Status: DISCONTINUED | OUTPATIENT
Start: 2022-08-21 | End: 2022-08-21

## 2022-08-21 RX ORDER — SODIUM,POTASSIUM PHOSPHATES 278-250MG
1 POWDER IN PACKET (EA) ORAL ONCE
Refills: 0 | Status: COMPLETED | OUTPATIENT
Start: 2022-08-21 | End: 2022-08-21

## 2022-08-21 RX ORDER — MAGNESIUM SULFATE 500 MG/ML
1 VIAL (ML) INJECTION ONCE
Refills: 0 | Status: COMPLETED | OUTPATIENT
Start: 2022-08-21 | End: 2022-08-21

## 2022-08-21 RX ORDER — HEPARIN SODIUM 5000 [USP'U]/ML
1200 INJECTION INTRAVENOUS; SUBCUTANEOUS
Qty: 25000 | Refills: 0 | Status: DISCONTINUED | OUTPATIENT
Start: 2022-08-21 | End: 2022-08-21

## 2022-08-21 RX ADMIN — OXYCODONE HYDROCHLORIDE 20 MILLIGRAM(S): 5 TABLET ORAL at 21:14

## 2022-08-21 RX ADMIN — Medication 4 MILLIGRAM(S): at 21:15

## 2022-08-21 RX ADMIN — OXYCODONE HYDROCHLORIDE 20 MILLIGRAM(S): 5 TABLET ORAL at 06:30

## 2022-08-21 RX ADMIN — SIMVASTATIN 5 MILLIGRAM(S): 20 TABLET, FILM COATED ORAL at 21:14

## 2022-08-21 RX ADMIN — Medication 50 MILLIGRAM(S): at 18:57

## 2022-08-21 RX ADMIN — HEPARIN SODIUM 1200 UNIT(S)/HR: 5000 INJECTION INTRAVENOUS; SUBCUTANEOUS at 16:54

## 2022-08-21 RX ADMIN — PANTOPRAZOLE SODIUM 40 MILLIGRAM(S): 20 TABLET, DELAYED RELEASE ORAL at 12:01

## 2022-08-21 RX ADMIN — Medication 1 PACKET(S): at 12:02

## 2022-08-21 RX ADMIN — OXYCODONE HYDROCHLORIDE 15 MILLIGRAM(S): 5 TABLET ORAL at 17:22

## 2022-08-21 RX ADMIN — FINASTERIDE 5 MILLIGRAM(S): 5 TABLET, FILM COATED ORAL at 12:02

## 2022-08-21 RX ADMIN — Medication 100 GRAM(S): at 12:01

## 2022-08-21 RX ADMIN — NORTRIPTYLINE HYDROCHLORIDE 75 MILLIGRAM(S): 10 CAPSULE ORAL at 21:14

## 2022-08-21 RX ADMIN — SODIUM CHLORIDE 60 MILLILITER(S): 9 INJECTION, SOLUTION INTRAVENOUS at 05:32

## 2022-08-21 RX ADMIN — OXYCODONE HYDROCHLORIDE 20 MILLIGRAM(S): 5 TABLET ORAL at 05:32

## 2022-08-21 RX ADMIN — OXYCODONE HYDROCHLORIDE 20 MILLIGRAM(S): 5 TABLET ORAL at 22:10

## 2022-08-21 RX ADMIN — OXYCODONE HYDROCHLORIDE 20 MILLIGRAM(S): 5 TABLET ORAL at 14:58

## 2022-08-21 RX ADMIN — Medication 25 MILLIGRAM(S): at 23:59

## 2022-08-21 RX ADMIN — Medication 50 MILLIGRAM(S): at 05:32

## 2022-08-21 RX ADMIN — Medication 50 MILLIGRAM(S): at 05:34

## 2022-08-21 NOTE — PROGRESS NOTE ADULT - SUBJECTIVE AND OBJECTIVE BOX
SUBJECTIVE: Pt seen and examined at bedside this am by surgery team. Pt on heparin drip. PTT this morning was elevated. Heparin drip was held for one hour, rate decreased, and drip restarted. No acute complaints. Denies f/n/v/cp/sob.    Vital Signs Last 24 Hrs  T(C): 36.4 (21 Aug 2022 08:45), Max: 36.8 (20 Aug 2022 11:53)  T(F): 97.6 (21 Aug 2022 08:45), Max: 98.3 (20 Aug 2022 15:02)  HR: 65 (21 Aug 2022 08:45) (63 - 77)  BP: 133/64 (21 Aug 2022 08:45) (105/66 - 148/60)  BP(mean): --  RR: 18 (21 Aug 2022 08:45) (16 - 18)  SpO2: 97% (21 Aug 2022 08:45) (95% - 98%)    Parameters below as of 21 Aug 2022 08:45  Patient On (Oxygen Delivery Method): room air        PHYSICAL EXAM:   Gen: Awake, alert, NAD, resting comfortably   CV: NSR  Pulm: no respiratory distress on RA  Abd: soft, ND, NTTP, no rebound or guarding, perc choley tube x bilious appearing, small amount of dark red blood on dressing, dressing changed  Ext: WWP, no edema, SCDs in place     I&O's Detail    20 Aug 2022 07:01  -  21 Aug 2022 07:00  --------------------------------------------------------  IN:    Heparin: 144 mL    Lactated Ringers: 600 mL  Total IN: 744 mL    OUT:    Drain (mL): 60 mL    Voided (mL): 500 mL  Total OUT: 560 mL    Total NET: 184 mL      21 Aug 2022 07:01  -  21 Aug 2022 11:52  --------------------------------------------------------  IN:  Total IN: 0 mL    OUT:    Voided (mL): 500 mL  Total OUT: 500 mL    Total NET: -500 mL          LABS:                        10.1   7.78  )-----------( 347      ( 21 Aug 2022 05:43 )             30.5     08-21    133<L>  |  98  |  11  ----------------------------<  95  4.1   |  27  |  1.18    Ca    8.6      21 Aug 2022 05:43  Phos  2.8     08-21  Mg     1.7     08-21    TPro  7.1  /  Alb  3.0<L>  /  TBili  0.4  /  DBili  x   /  AST  23  /  ALT  17  /  AlkPhos  91  08-21    LIVER FUNCTIONS - ( 21 Aug 2022 05:43 )  Alb: 3.0 g/dL / Pro: 7.1 g/dL / ALK PHOS: 91 U/L / ALT: 17 U/L / AST: 23 U/L / GGT: x           PT/INR - ( 20 Aug 2022 12:43 )   PT: 13.1 sec;   INR: 1.10          PTT - ( 21 Aug 2022 05:30 )  PTT:193.9 sec  CAPILLARY BLOOD GLUCOSE               RADIOLOGY & ADDITIONAL STUDIES:    There is a cholecystostomy tube terminating in a decompressed/collapsed   gallbladder.    There is moderate CBD dilatation measuring up to 1.5 cm, unchanged since   7/14/2020.        IMPRESSION:    Moderate central biliary ductal dilatation with percutaneous catheter   within the gallbladder fossa with findings of apparent recent   cholecystectomy.    Small right pleural effusion

## 2022-08-21 NOTE — CONSULT NOTE ADULT - ASSESSMENT
72M with complex PMH including colon mass s/p partial colectomy (2017),  HTN, HLD, pAF on Eliquis, ?blood clotting disorder s/p IVC filter placement, PPM (2004), herniated disc and related surgery in [id number] complicated by spinal fusion, foot drop with chronic pain s/p failed intrathecal morphine pump, and recent history of newly reduced EF w/concern for stress induced CM, now presenting for elective cholecystectomy.       #Pre-operative risk assessment  -Given pt's recent complicated admission with discharge only four days prior, with events including HTN urgency and acute EF drop (with suspicion for stress induced CM with ischemia less likely but not excluded), patient remains at least intermediate to high risk for intermediate risk elective surgery  -No signs/symptoms of active ischemia, and EKG is unchanged from prior  -Would obtain formal TTE with definity given difficulty in assessing LV function on prior studies  -If EF remains depressed, or if patient has new signs/symptoms of ischemia, will consider further ischemic evaluation with CTA vs angiography  -Medication management jeannette-op: Continue beta blocker, hold ACE/ARB/ARNI, reasonable to bridge DOAC with heparin given questionable history of clotting disorder (although suspect this was likely a DVT)    Attending to see in AM 72M with complex PMH including colon mass s/p partial colectomy (2017),  HTN, HLD, pAF on Eliquis, ?blood clotting disorder s/p IVC filter placement, PPM (2004), herniated disc and related surgery in 3747-5602 complicated by spinal fusion, foot drop with chronic pain s/p failed intrathecal morphine pump, and recent history of newly reduced EF w/concern for stress induced CM, now presenting for elective cholecystectomy.       #Pre-operative risk assessment  -Given pt's recent complicated admission with discharge only four days prior, with events including HTN urgency and acute EF drop (with suspicion for stress induced CM with ischemia less likely but not excluded), patient remains at least intermediate to high risk for intermediate risk elective surgery  -No signs/symptoms of active ischemia, and EKG is unchanged from prior  -Would obtain CCTA   -Likely will require further ischemic evaluation with angiography  -Medication management jeannette-op: Continue beta blocker, hold ACE/ARB/ARNI, reasonable to bridge DOAC with heparin given questionable history of clotting disorder (although suspect this was likely a DVT)    Attending to see in AM

## 2022-08-21 NOTE — CONSULT NOTE ADULT - ASSESSMENT
A/P:72M with complex PMH including colon mass s/p partial colectomy (2017),  HTN, HLD, pAF on Eliquis, ?blood clotting disorder s/p IVC filter placement, PPM (2004), herniated disc and related surgery in 6614-1052 complicated by spinal fusion, foot drop with chronic pain s/p failed intrathecal morphine pump, and recent history of newly reduced EF w/concern for stress induced CM s/p percutaneous cholecystostomy tube by IR who presented for concerns of bleeding at the perc yonis tube site  presenting for elective cholecystectomy.    #Plan for elective cholecystectomy  -Cardiology consult appreciated ; Pt is for coronary CT  -Continue pain medication and antiemetic prn     #HTN/HLD  #Paroxsymal Afib  #PPM   - Continue Hepartin drip and monitor PTT  -Continue Metoprolol , statin and Hydralazine     #DISPO   - As per ACS team

## 2022-08-21 NOTE — CONSULT NOTE ADULT - ATTENDING COMMENTS
Pt is a 73 y/o man c colona mass s/p partial colectomy 2017, HTN, HLP, p-afib, PPM, herniated disc now for elective yonis.    Agree with assessment that pt's EF WNL and then with stress decreases  cardiac CTA would be a good test to eval for severely obstructive dx in this patient for lap yonis

## 2022-08-21 NOTE — CONSULT NOTE ADULT - SUBJECTIVE AND OBJECTIVE BOX
GASTROENTEROLOGY CONSULT NOTE  HPI:  73 yo M, PMHx Colon Mass s/p partial colectomy (2017), appendectomy, hernia repair, aortic and iliac aneurysm surgery (2017) s/p stent placement, HTN, HLD, pAfib on Eliquis, VTE/thrombophilia  s/p IVC filter placement, right knee ligament repair, PPM (2004, unknown indication), herniated disc and related surgery in [id number] complicated by spinal fusion, local hematoma, foot drop with chronic pain s/p failed intrathecal morphine pump, and left hip prosthesis.   Who presented for concern over cholecystotomy tube malfunction,   Patient admitted 08/07/22 with acute cholecystitis, w/ course c/b new cardiomyopathy with critical illness, thus surgery deferred and underwent PTC, subsequently discharged to [facility].   3 days PTA, noted blood near perc yonis (on AC), thus sought care in ED  Chart Review: "Per rehab the drain was consistently putting out 300cc/d and did not have any blood in the tubing."   At bedside, wife and patient deny abd pain, report tubing appears sufficient currently, and that the dressing was just changed.    GI was consulted for consideration of EUS/ERCP in setting of dilated CBD and inability to undergo MRCP due to incompatible PPM    Allergies    Altace (Unknown)  penicillin (Unknown)    Intolerances    Home Medications:  ALPRAZolam 1 mg oral tablet: 1 tab(s) orally once a day (at bedtime), As needed, insomnia (07 Aug 2022 21:10)  Cardura 4 mg oral tablet: 1 tab(s) orally once a day (07 Aug 2022 21:10)  Eliquis 5 mg oral tablet: 1 tab(s) orally once a day (07 Aug 2022 21:10)  folic acid 1 mg oral tablet: 1 tab(s) orally once a day (07 Aug 2022 21:10)  hydrALAZINE 10 mg oral tablet: 50 milligram(s) orally 1 time a day + 25 milligrams orally 1 time a day (07 Aug 2022 21:10)  omeprazole 20 mg oral delayed release capsule: 1 cap(s) orally once a day (07 Aug 2022 21:10)  OxyCONTIN 20 mg oral tablet, extended release: 20 milligram(s) orally every 8 hours (07 Aug 2022 21:10)  Pamelor 75 mg oral capsule: 1 cap(s) orally once a day (at bedtime) (07 Aug 2022 21:10)  Proscar 5 mg oral tablet: 1 tab(s) orally once a day (07 Aug 2022 21:10)  sacubitril-valsartan 24 mg-26 mg oral tablet: 1 tab(s) orally 2 times a day (16 Aug 2022 13:44)  simvastatin 5 mg oral tablet: 1 tab(s) orally once a day (at bedtime) (07 Aug 2022 21:10)  Toprol-XL 50 mg oral tablet, extended release: 1 tab(s) orally 2 times a day (07 Aug 2022 21:10)  Trintellix 20 mg oral tablet: 1 tab(s) orally once a day (07 Aug 2022 21:10)  Valium 2 mg oral tablet: 1 tab(s) orally once a day (at bedtime), As Needed - for insomnia (07 Aug 2022 21:10)    MEDICATIONS:  MEDICATIONS  (STANDING):  doxazosin 4 milliGRAM(s) Oral at bedtime  finasteride 5 milliGRAM(s) Oral daily  heparin  Infusion. 1200 Unit(s)/Hr (12 mL/Hr) IV Continuous <Continuous>  hydrALAZINE 50 milliGRAM(s) Oral daily  hydrALAZINE 25 milliGRAM(s) Oral daily  lactated ringers. 1000 milliLiter(s) (60 mL/Hr) IV Continuous <Continuous>  metoprolol succinate ER 50 milliGRAM(s) Oral every 12 hours  nortriptyline 75 milliGRAM(s) Oral at bedtime  oxyCODONE  ER Tablet 20 milliGRAM(s) Oral every 8 hours  pantoprazole  Injectable 40 milliGRAM(s) IV Push daily  simvastatin 5 milliGRAM(s) Oral at bedtime    MEDICATIONS  (PRN):  ALPRAZolam 1 milliGRAM(s) Oral at bedtime PRN insomnia  diazepam    Tablet 2 milliGRAM(s) Oral daily PRN insomnia  ondansetron Injectable 4 milliGRAM(s) IV Push every 6 hours PRN Nausea and/or Vomiting  oxyCODONE    IR 15 milliGRAM(s) Oral every 4 hours PRN Moderate Pain (4 - 6)  oxyCODONE    IR 30 milliGRAM(s) Oral every 4 hours PRN Severe Pain (7 - 10)    PAST MEDICAL & SURGICAL HISTORY:  AAA (abdominal aortic aneurysm)  HTN (hypertension)  Cardiomyopathy  Hyperlipidemia  ONOFRE (dyspnea on exertion)  DVT (deep venous thrombosis)      Pulmonary emphysema  COPD      Cardiac arrhythmia      Aneurysm of aortic root      Depression  anxiety      Anemia      Pelvic mass      Pacemaker  medtronic      History of kidney surgery      History of back surgery  multiple, lumbar      Surgery, elective  multiple neck surgery, cervical      S/P hip replacement, left      S/P arthroscopy of right knee      S/P AAA (abdominal aortic aneurysm) repair  with right iliac aneurysm endovascular repair      S/P carpal tunnel release      Surgery, elective  Cardiac Stent x4      Surgery, elective  Intrathecal pump placement        FAMILY HISTORY: HTN    SOCIAL HISTORY:  Tobacco:denies  Alcohol: denies  Illicit Drugs: denies    REVIEW OF SYSTEMS:  All other 10 review of systems is negative unless indicated above.    Vital Signs Last 24 Hrs  T(C): 36.3 (21 Aug 2022 16:30), Max: 36.8 (20 Aug 2022 20:41)  T(F): 97.3 (21 Aug 2022 16:30), Max: 98.3 (20 Aug 2022 20:41)  HR: 63 (21 Aug 2022 16:30) (63 - 70)  BP: 135/75 (21 Aug 2022 16:30) (115/71 - 163/75)  BP(mean): --  RR: 17 (21 Aug 2022 16:30) (16 - 18)  SpO2: 100% (21 Aug 2022 16:30) (97% - 100%)    Parameters below as of 21 Aug 2022 16:30  Patient On (Oxygen Delivery Method): room air        08-20 @ 07:01 - 08-21 @ 07:00  --------------------------------------------------------  IN: 744 mL / OUT: 560 mL / NET: 184 mL    08-21 @ 07:01 - 08-21 @ 19:35  --------------------------------------------------------  IN: 1394 mL / OUT: 1110 mL / NET: 284 mL        PHYSICAL EXAM:    General: lying in bed, in no acute distress  HEENT: Neck supple, mmm, no jvd  Lungs: Normal respiratory effort, no intercostal retractions  Cardiovascular: regular rate  Abdomen: Soft, non-tender non-distended; No rebound or guarding, PTC in place with dressing overlying, cdi  Extremities: wwp, no cce  Neurological: PIMENTEL, speech fluent  Skin: Warm and dry. No obvious rash    LABS:                        10.1   7.78  )-----------( 347      ( 21 Aug 2022 05:43 )             30.5     08-21    133<L>  |  98  |  11  ----------------------------<  95  4.1   |  27  |  1.18    Ca    8.6      21 Aug 2022 05:43  Phos  2.8     08-21  Mg     1.7     08-21    TPro  7.1  /  Alb  3.0<L>  /  TBili  0.4  /  DBili  x   /  AST  23  /  ALT  17  /  AlkPhos  91  08-21        PT/INR - ( 20 Aug 2022 12:43 )   PT: 13.1 sec;   INR: 1.10          PTT - ( 21 Aug 2022 14:37 )  PTT:168.6 sec    RADIOLOGY & ADDITIONAL STUDIES:     Reviewed

## 2022-08-21 NOTE — CONSULT NOTE ADULT - SUBJECTIVE AND OBJECTIVE BOX
Pt seen and evaluated at bedside.  In brief summary the pt is 72M with complex PMH including colon mass s/p partial colectomy (2017),  HTN, HLD, pAF on Eliquis, ?blood clotting disorder s/p IVC filter placement, PPM (2004), herniated disc and related surgery in [id number] complicated by spinal fusion, foot drop with chronic pain s/p failed intrathecal morphine pump, and recent history of newly reduced EF w/concern for stress induced CM, now presenting for elective cholecystectomy.     PAST MEDICAL & SURGICAL HISTORY:  Abdominal aortic aneurysm)  HTN  Cardiomyopathy  Hyperlipidemia  DVT  Pulmonary emphysema  COPD  Cardiac arrhythmia  Aneurysm of aortic root  Depression  anxiety  Anemia  Pelvic mass  Pacemaker  medtronic  History of kidney surgery  History of back surgery  multiple, lumbar  Surgery, elective  multiple neck surgery, cervical  S/P hip replacement, left  S/P arthroscopy of right knee  S/P AAA (abdominal aortic aneurysm) repair  with right iliac aneurysm endovascular repair  S/P carpal tunnel release  Cardiac Stent x4  Intrathecal pump placement    Home Medications:  ALPRAZolam 1 mg oral tablet: 1 tab(s) orally once a day (at bedtime), As needed, insomnia (07 Aug 2022 21:10)  Cardura 4 mg oral tablet: 1 tab(s) orally once a day (07 Aug 2022 21:10)  Eliquis 5 mg oral tablet: 1 tab(s) orally once a day (07 Aug 2022 21:10)  folic acid 1 mg oral tablet: 1 tab(s) orally once a day (07 Aug 2022 21:10)  hydrALAZINE 10 mg oral tablet: 50 milligram(s) orally 1 time a day + 25 milligrams orally 1 time a day (07 Aug 2022 21:10)  omeprazole 20 mg oral delayed release capsule: 1 cap(s) orally once a day (07 Aug 2022 21:10)  OxyCONTIN 20 mg oral tablet, extended release: 20 milligram(s) orally every 8 hours (07 Aug 2022 21:10)  Pamelor 75 mg oral capsule: 1 cap(s) orally once a day (at bedtime) (07 Aug 2022 21:10)  Proscar 5 mg oral tablet: 1 tab(s) orally once a day (07 Aug 2022 21:10)  sacubitril-valsartan 24 mg-26 mg oral tablet: 1 tab(s) orally 2 times a day (16 Aug 2022 13:44)  simvastatin 5 mg oral tablet: 1 tab(s) orally once a day (at bedtime) (07 Aug 2022 21:10)  Toprol-XL 50 mg oral tablet, extended release: 1 tab(s) orally 2 times a day (07 Aug 2022 21:10)  Trintellix 20 mg oral tablet: 1 tab(s) orally once a day (07 Aug 2022 21:10)  Valium 2 mg oral tablet: 1 tab(s) orally once a day (at bedtime), As Needed - for insomnia (07 Aug 2022 21:10)      Allergies  Altace (Unknown)  penicillin (Unknown)          VITAL SIGNS, INS/OUTS (last 24 hours):  Vital Signs Last 24 Hrs  T(C): 36.5 (21 Aug 2022 12:39), Max: 36.8 (20 Aug 2022 15:02)  T(F): 97.7 (21 Aug 2022 12:39), Max: 98.3 (20 Aug 2022 15:02)  HR: 67 (21 Aug 2022 12:39) (63 - 77)  BP: 163/75 (21 Aug 2022 12:39) (106/71 - 163/75)  BP(mean): --  RR: 17 (21 Aug 2022 12:39) (16 - 18)  SpO2: 97% (21 Aug 2022 12:39) (95% - 98%)    PHYSICAL EXAM:  NAD layining in bed  CTA b/l no wheezing or crackles  NL S1,S2 no mumurs   soft NT/ND + BS no rebound or guarding     BASIC LABS:                        10.1   7.78  )-----------( 347      ( 21 Aug 2022 05:43 )             30.5     08-21    133<L>  |  98  |  11  ----------------------------<  95  4.1   |  27  |  1.18    Ca    8.6      21 Aug 2022 05:43  Phos  2.8     08-21  Mg     1.7     08-21    TPro  7.1  /  Alb  3.0<L>  /  TBili  0.4  /  DBili  x   /  AST  23  /  ALT  17  /  AlkPhos  91  08-21    PT/INR - ( 20 Aug 2022 12:43 )   PT: 13.1 sec;   INR: 1.10          PTT - ( 21 Aug 2022 05:30 )  PTT:193.9 sec    CURRENT MEDICATIONS:   ALPRAZolam 1 milliGRAM(s) Oral at bedtime PRN  diazepam    Tablet 2 milliGRAM(s) Oral daily PRN  doxazosin 4 milliGRAM(s) Oral at bedtime  finasteride 5 milliGRAM(s) Oral daily  heparin  Infusion 1400 Unit(s)/Hr IV Continuous <Continuous>  hydrALAZINE 50 milliGRAM(s) Oral daily  hydrALAZINE 25 milliGRAM(s) Oral daily  lactated ringers. 1000 milliLiter(s) IV Continuous <Continuous>  metoprolol succinate ER 50 milliGRAM(s) Oral every 12 hours  nortriptyline 75 milliGRAM(s) Oral at bedtime  ondansetron Injectable 4 milliGRAM(s) IV Push every 6 hours PRN  oxyCODONE    IR 15 milliGRAM(s) Oral every 4 hours PRN  oxyCODONE    IR 30 milliGRAM(s) Oral every 4 hours PRN  oxyCODONE  ER Tablet 20 milliGRAM(s) Oral every 8 hours  pantoprazole  Injectable 40 milliGRAM(s) IV Push daily  simvastatin 5 milliGRAM(s) Oral at bedtime        A/P:

## 2022-08-21 NOTE — PROGRESS NOTE ADULT - ASSESSMENT
71 y/o M with colon mass s/p partial colectomy (2017), appendectomy, hernia repair, aortic and iliac aneurysm surgery (2017) s/p stent placement, HTN, HLD, paroxysmal atrial fibrillation on Eliquis, blood clotting disorder s/p IVC filter placement, right knee ligament repair, pacemaker implant (2004) s/p recent ppm interrogation on Eliquis, herniated disc and related surgery in [id number] complicated by spinal fusion, local hematoma, foot drop with chronic pain s/p failed intrathecal morphine pump, recently admitted to [facility] on 08/07 with acute cholecystitis c/b cardiomyopathy s/p percutaneous cholecystostomy tube by IR who presented for concerns of bleeding at the perc yonis tube site. In ED, afebrile, HD stable, wbc 9.7, Hb 9.7 stable from 9.5 at discharge; LFTs wnl. PE with some blood at the dressing site but none in tubing, no hematoma or ecchymosis surrounding drain site. CTAP with percutaneous cholecystostomy catheter in collapsed gallbladder, confirmed with radiology department verbally. Plan for admission with interval cholecystectomy once cleared from cardiac perspective.    Regular DASH diet  Aggressive pain control   Nausea control PRN  Hep gtt  IVF@60cc/hr   Home meds as appropriate  OOBA/SCDs/IS  Cardiology recommending Coronary CTA   AM Labs

## 2022-08-21 NOTE — CONSULT NOTE ADULT - SUBJECTIVE AND OBJECTIVE BOX
HPI:  73 y/o male with complicated past medical history of colon mass s/p partial colectomy (2017), appendectomy, hernia repair, aortic and iliac aneurysm surgery (2017) s/p stent placement, HTN, HLD, paroxysmal atrial fibrillation on Eliquis, blood clotting disorder s/p IVC filter placement, right knee ligament repair, pacemaker implant (2004) s/p recent ppm interrogation on Eliquis, herniated disc and related surgery in [id number] complicated by spinal fusion, local hematoma, foot drop with chronic pain s/p failed intrathecal morphine pump which caused bradycardia and left hip prosthesis. Patient had recent prolonged admission (discharged 4 days ago) where he was found to have hypertensive urgency and newly reduced EF 30% (from 60% one day prior) w/evidence of stress induced CM requiring admission to MICU. Surgery was cancelled and per yonis tube placed by IR. Although initially recommended for CCTA vs cath for ischemic eval, patient ultimately discharged to rehab w/Cardiology follow-up. At rehab, he was noted to have bleeding around perc yonis tube and was sent back to [facility].  CTAP with percutaneous cholecystostomy catheter in collapsed gallbladder. Patient admitted to surgery service for planned cholecystectomy.     Cardiology consulted for risk stratification.   Patient currently denies CP, SOB, leg swelling.       ROS: A 10-point review of systems was otherwise negative.    PAST MEDICAL & SURGICAL HISTORY:  AAA (abdominal aortic aneurysm)      HTN (hypertension)      Cardiomyopathy      Hyperlipidemia      ONOFRE (dyspnea on exertion)      DVT (deep venous thrombosis)      Pulmonary emphysema  COPD      Cardiac arrhythmia      Aneurysm of aortic root      Depression  anxiety      Anemia      Pelvic mass      Pacemaker  medtronic      History of kidney surgery      History of back surgery  multiple, lumbar      Surgery, elective  multiple neck surgery, cervical      S/P hip replacement, left      S/P arthroscopy of right knee      S/P AAA (abdominal aortic aneurysm) repair  with right iliac aneurysm endovascular repair      S/P carpal tunnel release      Surgery, elective  Cardiac Stent x4      Surgery, elective  Intrathecal pump placement          SOCIAL HISTORY:  FAMILY HISTORY:      ALLERGIES: 	  Altace (Unknown)  penicillin (Unknown)            MEDICATIONS:  ALPRAZolam 1 milliGRAM(s) Oral at bedtime PRN  diazepam    Tablet 2 milliGRAM(s) Oral daily PRN  doxazosin 4 milliGRAM(s) Oral at bedtime  finasteride 5 milliGRAM(s) Oral daily  heparin  Infusion 1600 Unit(s)/Hr IV Continuous <Continuous>  hydrALAZINE 50 milliGRAM(s) Oral daily  hydrALAZINE 25 milliGRAM(s) Oral daily  lactated ringers. 1000 milliLiter(s) IV Continuous <Continuous>  metoprolol succinate ER 50 milliGRAM(s) Oral every 12 hours  nortriptyline 75 milliGRAM(s) Oral at bedtime  ondansetron Injectable 4 milliGRAM(s) IV Push every 6 hours PRN  oxyCODONE    IR 15 milliGRAM(s) Oral every 4 hours PRN  oxyCODONE    IR 30 milliGRAM(s) Oral every 4 hours PRN  oxyCODONE  ER Tablet 20 milliGRAM(s) Oral every 8 hours  pantoprazole  Injectable 40 milliGRAM(s) IV Push daily  simvastatin 5 milliGRAM(s) Oral at bedtime      HOME MEDICATIONS:  ALPRAZolam 1 mg oral tablet: 1 tab(s) orally once a day (at bedtime), As needed, insomnia  Cardura 4 mg oral tablet: 1 tab(s) orally once a day  Eliquis 5 mg oral tablet: 1 tab(s) orally once a day  folic acid 1 mg oral tablet: 1 tab(s) orally once a day  hydrALAZINE 10 mg oral tablet: 50 milligram(s) orally 1 time a day + 25 milligrams orally 1 time a day  omeprazole 20 mg oral delayed release capsule: 1 cap(s) orally once a day  OxyCONTIN 20 mg oral tablet, extended release: 20 milligram(s) orally every 8 hours  Pamelor 75 mg oral capsule: 1 cap(s) orally once a day (at bedtime)  Proscar 5 mg oral tablet: 1 tab(s) orally once a day  sacubitril-valsartan 24 mg-26 mg oral tablet: 1 tab(s) orally 2 times a day  simvastatin 5 mg oral tablet: 1 tab(s) orally once a day (at bedtime)  Toprol-XL 50 mg oral tablet, extended release: 1 tab(s) orally 2 times a day  Trintellix 20 mg oral tablet: 1 tab(s) orally once a day  Valium 2 mg oral tablet: 1 tab(s) orally once a day (at bedtime), As Needed - for insomnia      PHYSICAL EXAM:  Height (cm): 175.3 (08-20 @ 11:53)  Weight (kg): 90.7 (08-20 @ 11:53)  BMI (kg/m2): 29.5 (08-20 @ 11:53)  BSA (m2): 2.07 (08-20 @ 11:53)    I/O Summary 24H        T(F): 98.1 (08-20-22 @ 22:21), Max: 98.3 (08-20-22 @ 15:02)  HR: 63 (08-20-22 @ 22:21) (63 - 77)  BP: 148/60 (08-20-22 @ 22:21) (105/66 - 148/60)  BP(mean): --  ABP: --  ABP(mean): --  RR: 17 (08-20-22 @ 22:21) (16 - 18)  SpO2: 97% (08-20-22 @ 22:21) (95% - 98%)    GEN: Awake, comfortable. NAD.   HEENT: NCAT, PERRL, EOMI. Mucosa moist. No JVD.   RESP: CTA b/l  CV: RRR, normal s1/s2. No m/r/g.  ABD: Soft, NTND. BS+ Perc yonis tube in place with minimal dry serosanguinous drainage on dressing  EXT: Warm. No edema, clubbing, or cyanosis.   NEURO: AAOx3. No focal deficits.      	  LABS:	 	    CARDIAC MARKERS:              CBC 08-20-22 @ 12:43                        9.7    9.70  )-----------( 392                   29.8       Hgb trend: 9.7 <--   WBC trend: 9.70 <--     CMP 08-20-22 @ 12:43    131<L>  |  98  |  11  ----------------------------<  101<H>  4.7   |  26  |  1.39<H>    Ca    8.6      08-20-22 @ 12:43    TPro  6.9  /  Alb  2.8<L>  /  TBili  0.3  /  DBili  x   /  AST  25  /  ALT  19  /  AlkPhos  98  08-20      Serum Cr trend: 1.39 <--   proBNP:   Lipid Profile:   HgA1c:   TSH:     TELEMETRY: 	    ECG: NSR, IVCD, unchanged from 8/12   	  RADIOLOGY:   ECHO:  < from: TTE Echo Limited or F/U (08.12.22 @ 14:15) >   1. Limited study obtained for evaluation of left ventricular function.   2. Technically difficult study.   3. Moderately reduced left ventricular systolic function.   4. Compared to the previous TTE performed on 8/9/2022, LVEF is grossly   unchanged.      < end of copied text >  < from: TTE Limited Echo w/o Cont (08.09.22 @ 15:37) >   1. Limited study obtained for evaluation of left ventricular function.   2. Left ventricular function assessment is limited by poor endocardial   visualization despite use of LV contrast. Overall left ventricular   functionappears moderately reduced with regional wall motion   abnormalities, estimated EF approx. 35%. The mid-distal anteroseptum,   basal-mid anterolateral and basal-mid anterior walls appear hypokinetic   to akinetic depending on the view.   3. Mild aortic regurgitation.   4. Mildly dilated aortic root. Moderately dilated ascending aorta.   5. Compared to the previous TTE performed on 8/8/2022, current study is   limited; left ventricular function appears less vigorous with wall motion   abnormalities described above. Aortic dilatation as described.    < end of copied text >    < from: TTE Echo Complete w/o Contrast w/ Doppler (08.08.22 @ 15:01) >   1. Normal left ventricular size and systolic function.   2. The right ventricle is normal in size. Right ventricular systolic   function is borderline reduced.   3. No significant valvular disease.   4. Pulmonary hypertension present, pulmonary artery systolic pressure is   48 mmHg.   5. No pericardial effusion.   6. The aortic root is mildly dilated. The aortic root measures 3.90 cm   at level of the sinuses of Valsalva (normal 3.1-3.7 cm for men, 2.7-3.3   cm for women).    < end of copied text >    STRESS:  CATH:

## 2022-08-21 NOTE — CONSULT NOTE ADULT - ASSESSMENT
71 yo M, PMHx Colon Mass s/p partial colectomy (2017), appendectomy, hernia repair, aortic and iliac aneurysm surgery (2017) s/p stent placement, HTN, HLD, pAfib on Eliquis, VTE/thrombophilia  s/p IVC filter placement, right knee ligament repair, PPM (2004, unknown indication), herniated disc and related surgery in [id number] complicated by spinal fusion, local hematoma, foot drop with chronic pain s/p failed intrathecal morphine pump, and left hip prosthesis.   Who presented for concern over cholecystotomy tube malfunction,    GI was consulted for consideration of EUS/ERCP in setting of dilated CBD and inability to undergo MRCP due to incompatible PPM    Cholecystitis s/p PTC  #Chronic Dilated CBD, improved, receiving opioids  #Normal LFT's  #Inability to undergo MRI due to incompatible PPM    Recommendations:  Will need careful review of imaging, prior without Rad mention of suspicious lesion  Will discuss utility of EUS / ERCP with advanced team  Noted Cardiac workup underway  Please place patient on bowel regimen - large volume stool noted on CT, on opioid therapy    Thank you for the courtesy of this consult. We will follow along with you.    Aroldo Jones M.D.  Gastroenterology Fellow  Pager: [phone]

## 2022-08-22 LAB
ALBUMIN SERPL ELPH-MCNC: 2.5 G/DL — LOW (ref 3.3–5)
ALP SERPL-CCNC: 82 U/L — SIGNIFICANT CHANGE UP (ref 40–120)
ALT FLD-CCNC: 13 U/L — SIGNIFICANT CHANGE UP (ref 10–45)
ANION GAP SERPL CALC-SCNC: 8 MMOL/L — SIGNIFICANT CHANGE UP (ref 5–17)
APTT BLD: 88.8 SEC — HIGH (ref 27.5–35.5)
APTT BLD: 91.8 SEC — HIGH (ref 27.5–35.5)
AST SERPL-CCNC: 17 U/L — SIGNIFICANT CHANGE UP (ref 10–40)
BILIRUB SERPL-MCNC: 0.4 MG/DL — SIGNIFICANT CHANGE UP (ref 0.2–1.2)
BUN SERPL-MCNC: 10 MG/DL — SIGNIFICANT CHANGE UP (ref 7–23)
CALCIUM SERPL-MCNC: 8.2 MG/DL — LOW (ref 8.4–10.5)
CHLORIDE SERPL-SCNC: 98 MMOL/L — SIGNIFICANT CHANGE UP (ref 96–108)
CO2 SERPL-SCNC: 26 MMOL/L — SIGNIFICANT CHANGE UP (ref 22–31)
CREAT SERPL-MCNC: 1.19 MG/DL — SIGNIFICANT CHANGE UP (ref 0.5–1.3)
EGFR: 64 ML/MIN/1.73M2 — SIGNIFICANT CHANGE UP
GLUCOSE SERPL-MCNC: 112 MG/DL — HIGH (ref 70–99)
HCT VFR BLD CALC: 27.4 % — LOW (ref 39–50)
HGB BLD-MCNC: 8.8 G/DL — LOW (ref 13–17)
MAGNESIUM SERPL-MCNC: 2 MG/DL — SIGNIFICANT CHANGE UP (ref 1.6–2.6)
MCHC RBC-ENTMCNC: 28.6 PG — SIGNIFICANT CHANGE UP (ref 27–34)
MCHC RBC-ENTMCNC: 32.1 GM/DL — SIGNIFICANT CHANGE UP (ref 32–36)
MCV RBC AUTO: 89 FL — SIGNIFICANT CHANGE UP (ref 80–100)
NRBC # BLD: 0 /100 WBCS — SIGNIFICANT CHANGE UP (ref 0–0)
NT-PROBNP SERPL-SCNC: 844 PG/ML — HIGH (ref 0–300)
PHOSPHATE SERPL-MCNC: 2.7 MG/DL — SIGNIFICANT CHANGE UP (ref 2.5–4.5)
PLATELET # BLD AUTO: 340 K/UL — SIGNIFICANT CHANGE UP (ref 150–400)
POTASSIUM SERPL-MCNC: 3.9 MMOL/L — SIGNIFICANT CHANGE UP (ref 3.5–5.3)
POTASSIUM SERPL-SCNC: 3.9 MMOL/L — SIGNIFICANT CHANGE UP (ref 3.5–5.3)
PROT SERPL-MCNC: 6.4 G/DL — SIGNIFICANT CHANGE UP (ref 6–8.3)
RBC # BLD: 3.08 M/UL — LOW (ref 4.2–5.8)
RBC # FLD: 13.3 % — SIGNIFICANT CHANGE UP (ref 10.3–14.5)
SODIUM SERPL-SCNC: 132 MMOL/L — LOW (ref 135–145)
WBC # BLD: 7.56 K/UL — SIGNIFICANT CHANGE UP (ref 3.8–10.5)
WBC # FLD AUTO: 7.56 K/UL — SIGNIFICANT CHANGE UP (ref 3.8–10.5)

## 2022-08-22 PROCEDURE — 71045 X-RAY EXAM CHEST 1 VIEW: CPT | Mod: 26

## 2022-08-22 PROCEDURE — 93308 TTE F-UP OR LMTD: CPT | Mod: 26

## 2022-08-22 PROCEDURE — 99233 SBSQ HOSP IP/OBS HIGH 50: CPT

## 2022-08-22 PROCEDURE — 93321 DOPPLER ECHO F-UP/LMTD STD: CPT | Mod: 26

## 2022-08-22 PROCEDURE — 99232 SBSQ HOSP IP/OBS MODERATE 35: CPT | Mod: GC

## 2022-08-22 PROCEDURE — 75574 CT ANGIO HRT W/3D IMAGE: CPT | Mod: 26

## 2022-08-22 RX ORDER — METOPROLOL TARTRATE 50 MG
50 TABLET ORAL ONCE
Refills: 0 | Status: COMPLETED | OUTPATIENT
Start: 2022-08-22 | End: 2022-08-22

## 2022-08-22 RX ORDER — HYDRALAZINE HCL 50 MG
37.5 TABLET ORAL EVERY 8 HOURS
Refills: 0 | Status: DISCONTINUED | OUTPATIENT
Start: 2022-08-22 | End: 2022-08-23

## 2022-08-22 RX ORDER — SODIUM CHLORIDE 9 MG/ML
1000 INJECTION, SOLUTION INTRAVENOUS
Refills: 0 | Status: DISCONTINUED | OUTPATIENT
Start: 2022-08-22 | End: 2022-08-22

## 2022-08-22 RX ADMIN — OXYCODONE HYDROCHLORIDE 20 MILLIGRAM(S): 5 TABLET ORAL at 13:05

## 2022-08-22 RX ADMIN — OXYCODONE HYDROCHLORIDE 15 MILLIGRAM(S): 5 TABLET ORAL at 04:07

## 2022-08-22 RX ADMIN — OXYCODONE HYDROCHLORIDE 15 MILLIGRAM(S): 5 TABLET ORAL at 10:31

## 2022-08-22 RX ADMIN — OXYCODONE HYDROCHLORIDE 15 MILLIGRAM(S): 5 TABLET ORAL at 05:10

## 2022-08-22 RX ADMIN — OXYCODONE HYDROCHLORIDE 20 MILLIGRAM(S): 5 TABLET ORAL at 22:41

## 2022-08-22 RX ADMIN — OXYCODONE HYDROCHLORIDE 20 MILLIGRAM(S): 5 TABLET ORAL at 13:30

## 2022-08-22 RX ADMIN — Medication 50 MILLIGRAM(S): at 10:31

## 2022-08-22 RX ADMIN — OXYCODONE HYDROCHLORIDE 20 MILLIGRAM(S): 5 TABLET ORAL at 06:35

## 2022-08-22 RX ADMIN — Medication 50 MILLIGRAM(S): at 05:32

## 2022-08-22 RX ADMIN — Medication 37.5 MILLIGRAM(S): at 22:41

## 2022-08-22 RX ADMIN — SIMVASTATIN 5 MILLIGRAM(S): 20 TABLET, FILM COATED ORAL at 22:42

## 2022-08-22 RX ADMIN — Medication 4 MILLIGRAM(S): at 22:41

## 2022-08-22 RX ADMIN — Medication 64.25 MILLIMOLE(S): at 15:37

## 2022-08-22 RX ADMIN — PANTOPRAZOLE SODIUM 40 MILLIGRAM(S): 20 TABLET, DELAYED RELEASE ORAL at 11:33

## 2022-08-22 RX ADMIN — FINASTERIDE 5 MILLIGRAM(S): 5 TABLET, FILM COATED ORAL at 11:33

## 2022-08-22 RX ADMIN — OXYCODONE HYDROCHLORIDE 20 MILLIGRAM(S): 5 TABLET ORAL at 05:28

## 2022-08-22 RX ADMIN — NORTRIPTYLINE HYDROCHLORIDE 75 MILLIGRAM(S): 10 CAPSULE ORAL at 22:42

## 2022-08-22 RX ADMIN — OXYCODONE HYDROCHLORIDE 15 MILLIGRAM(S): 5 TABLET ORAL at 11:30

## 2022-08-22 RX ADMIN — SODIUM CHLORIDE 90 MILLILITER(S): 9 INJECTION, SOLUTION INTRAVENOUS at 10:27

## 2022-08-22 RX ADMIN — HEPARIN SODIUM 10 UNIT(S)/HR: 5000 INJECTION INTRAVENOUS; SUBCUTANEOUS at 18:56

## 2022-08-22 RX ADMIN — Medication 50 MILLIGRAM(S): at 22:41

## 2022-08-22 NOTE — PROGRESS NOTE ADULT - SUBJECTIVE AND OBJECTIVE BOX
INTERVAL HPI/OVERNIGHT EVENTS:    SUBJECTIVE: Patient seen and examined at bedside.    OBJECTIVE:    VITAL SIGNS:  ICU Vital Signs Last 24 Hrs  T(C): 36.7 (22 Aug 2022 13:40), Max: 36.9 (21 Aug 2022 20:25)  T(F): 98.1 (22 Aug 2022 13:40), Max: 98.5 (21 Aug 2022 20:25)  HR: 67 (22 Aug 2022 15:47) (62 - 72)  BP: 100/65 (22 Aug 2022 15:47) (100/65 - 168/81)  BP(mean): --  ABP: --  ABP(mean): --  RR: 17 (22 Aug 2022 13:40) (17 - 18)  SpO2: 97% (22 Aug 2022 13:40) (96% - 100%)    O2 Parameters below as of 22 Aug 2022 13:40  Patient On (Oxygen Delivery Method): room air              08-21 @ 07:01 - 08-22 @ 07:00  --------------------------------------------------------  IN: 1858 mL / OUT: 1440 mL / NET: 418 mL    08-22 @ 07:01  -  08-22 @ 15:53  --------------------------------------------------------  IN: 240 mL / OUT: 400 mL / NET: -160 mL      CAPILLARY BLOOD GLUCOSE          PHYSICAL EXAM:    General: WDWN ; NAD  HEENT: NC/AT; PERRL, anicteric sclera  Neck: supple, no JVD  Respiratory: CTA B/L; no W/R/R  Cardiovascular: +S1/S2; RRR; no M/R/G  Gastrointestinal: soft, NT/ND; +BS x4  Extremities: WWP; 2+ peripheral pulses B/L; no LE edema  Skin: normal color and turgor; no rash  Neurological:     MEDICATIONS:  MEDICATIONS  (STANDING):  doxazosin 4 milliGRAM(s) Oral at bedtime  finasteride 5 milliGRAM(s) Oral daily  heparin  Infusion 1000 Unit(s)/Hr (10 mL/Hr) IV Continuous <Continuous>  hydrALAZINE 37.5 milliGRAM(s) Oral every 8 hours  metoprolol succinate ER 50 milliGRAM(s) Oral every 12 hours  nortriptyline 75 milliGRAM(s) Oral at bedtime  oxyCODONE  ER Tablet 20 milliGRAM(s) Oral every 8 hours  pantoprazole  Injectable 40 milliGRAM(s) IV Push daily  simvastatin 5 milliGRAM(s) Oral at bedtime    MEDICATIONS  (PRN):  ALPRAZolam 1 milliGRAM(s) Oral at bedtime PRN insomnia  diazepam    Tablet 2 milliGRAM(s) Oral daily PRN insomnia  ondansetron Injectable 4 milliGRAM(s) IV Push every 6 hours PRN Nausea and/or Vomiting  oxyCODONE    IR 15 milliGRAM(s) Oral every 4 hours PRN Moderate Pain (4 - 6)  oxyCODONE    IR 30 milliGRAM(s) Oral every 4 hours PRN Severe Pain (7 - 10)      ALLERGIES:  Allergies    Altace (Unknown)  penicillin (Unknown)    Intolerances        LABS:                        8.8    7.56  )-----------( 340      ( 22 Aug 2022 06:17 )             27.4     08-22    132<L>  |  98  |  10  ----------------------------<  112<H>  3.9   |  26  |  1.19    Ca    8.2<L>      22 Aug 2022 06:17  Phos  2.7     08-22  Mg     2.0     08-22    TPro  6.4  /  Alb  2.5<L>  /  TBili  0.4  /  DBili  x   /  AST  17  /  ALT  13  /  AlkPhos  82  08-22    LIVER FUNCTIONS - ( 22 Aug 2022 06:17 )  Alb: 2.5 g/dL / Pro: 6.4 g/dL / ALK PHOS: 82 U/L / ALT: 13 U/L / AST: 17 U/L / GGT: x           PTT - ( 22 Aug 2022 12:00 )  PTT:88.8 sec          RADIOLOGY & ADDITIONAL TESTS: Reviewed.   INTERVAL HPI/OVERNIGHT EVENTS: GERMAINE    SUBJECTIVE: Patient seen and examined at bedside. Denies chest pain, SOB, abdominal pain, changes in mental status, weakness, numbness.    OBJECTIVE:    VITAL SIGNS:  ICU Vital Signs Last 24 Hrs  T(C): 36.7 (22 Aug 2022 13:40), Max: 36.9 (21 Aug 2022 20:25)  T(F): 98.1 (22 Aug 2022 13:40), Max: 98.5 (21 Aug 2022 20:25)  HR: 67 (22 Aug 2022 15:47) (62 - 72)  BP: 100/65 (22 Aug 2022 15:47) (100/65 - 168/81)  RR: 17 (22 Aug 2022 13:40) (17 - 18)  SpO2: 97% (22 Aug 2022 13:40) (96% - 100%)    O2 Parameters below as of 22 Aug 2022 13:40  Patient On (Oxygen Delivery Method): room air    08-21 @ 07:01 - 08-22 @ 07:00  --------------------------------------------------------  IN: 1858 mL / OUT: 1440 mL / NET: 418 mL    08-22 @ 07:01  -  08-22 @ 15:53  --------------------------------------------------------  IN: 240 mL / OUT: 400 mL / NET: -160 mL      CAPILLARY BLOOD GLUCOSE      PHYSICAL EXAM:  GEN: Awake, comfortable. NAD.   HEENT: NCAT, PERRL, EOMI. Mucosa moist. No JVD.   RESP: CTA b/l  CV: RRR, normal s1/s2. No m/r/g.  ABD: Soft, NTND. BS+ Perc yonis tube in place with minimal dry serosanguinous drainage on dressing  EXT: Warm. No edema, clubbing, or cyanosis.   NEURO: AAOx3. No focal deficits.    MEDICATIONS:  MEDICATIONS  (STANDING):  doxazosin 4 milliGRAM(s) Oral at bedtime  finasteride 5 milliGRAM(s) Oral daily  heparin  Infusion 1000 Unit(s)/Hr (10 mL/Hr) IV Continuous <Continuous>  hydrALAZINE 37.5 milliGRAM(s) Oral every 8 hours  metoprolol succinate ER 50 milliGRAM(s) Oral every 12 hours  nortriptyline 75 milliGRAM(s) Oral at bedtime  oxyCODONE  ER Tablet 20 milliGRAM(s) Oral every 8 hours  pantoprazole  Injectable 40 milliGRAM(s) IV Push daily  simvastatin 5 milliGRAM(s) Oral at bedtime    MEDICATIONS  (PRN):  ALPRAZolam 1 milliGRAM(s) Oral at bedtime PRN insomnia  diazepam    Tablet 2 milliGRAM(s) Oral daily PRN insomnia  ondansetron Injectable 4 milliGRAM(s) IV Push every 6 hours PRN Nausea and/or Vomiting  oxyCODONE    IR 15 milliGRAM(s) Oral every 4 hours PRN Moderate Pain (4 - 6)  oxyCODONE    IR 30 milliGRAM(s) Oral every 4 hours PRN Severe Pain (7 - 10)      ALLERGIES:  Allergies    Altace (Unknown)  penicillin (Unknown)    Intolerances        LABS:                        8.8    7.56  )-----------( 340      ( 22 Aug 2022 06:17 )             27.4     08-22    132<L>  |  98  |  10  ----------------------------<  112<H>  3.9   |  26  |  1.19    Ca    8.2<L>      22 Aug 2022 06:17  Phos  2.7     08-22  Mg     2.0     08-22    TPro  6.4  /  Alb  2.5<L>  /  TBili  0.4  /  DBili  x   /  AST  17  /  ALT  13  /  AlkPhos  82  08-22    LIVER FUNCTIONS - ( 22 Aug 2022 06:17 )  Alb: 2.5 g/dL / Pro: 6.4 g/dL / ALK PHOS: 82 U/L / ALT: 13 U/L / AST: 17 U/L / GGT: x           PTT - ( 22 Aug 2022 12:00 )  PTT:88.8 sec          RADIOLOGY & ADDITIONAL TESTS: Reviewed.

## 2022-08-22 NOTE — PROGRESS NOTE ADULT - ASSESSMENT
2 yo M, PMHx Colon Mass s/p partial colectomy (2017), appendectomy, hernia repair, aortic and iliac aneurysm surgery (2017) s/p stent placement, HTN, HLD, pAfib on Eliquis, VTE/thrombophilia  s/p IVC filter placement, right knee ligament repair, PPM (2004, unknown indication), herniated disc and related surgery in [id number] complicated by spinal fusion, local hematoma, foot drop with chronic pain s/p failed intrathecal morphine pump, and left hip prosthesis.   Who presented for concern over cholecystotomy tube malfunction,    GI was consulted for consideration of EUS/ERCP in setting of dilated CBD and inability to undergo MRCP due to incompatible PPM    Cholecystitis s/p PTC  #Chronic Dilated CBD, improved, receiving opioids  #Inability to undergo MRI due to incompatible PPM  - Noted Cardiac workup underway  - given normal LTs unlikely biliary obstruction  - if still a concern may obtain tube study/anterograde cholangiogram  - bowel regimen for large volume stool noted on CT, on opioid therapy  - avoid opiates    Recommendations discussed with primary team  Plan discussed with GI service attending    Prasad Cervantes MD  PGY-6 GI fellow  Pager: [phone]

## 2022-08-22 NOTE — PROGRESS NOTE ADULT - ASSESSMENT
73 y/o M with colon mass s/p partial colectomy (2017), appendectomy, hernia repair, aortic and iliac aneurysm surgery (2017) s/p stent placement, HTN, HLD, paroxysmal atrial fibrillation on Eliquis, blood clotting disorder s/p IVC filter placement, right knee ligament repair, pacemaker implant (2004) s/p recent ppm interrogation on Eliquis, herniated disc and related surgery in [id number] complicated by spinal fusion, local hematoma, foot drop with chronic pain s/p failed intrathecal morphine pump, recently admitted to [facility] on 08/07 with acute cholecystitis c/b cardiomyopathy s/p percutaneous cholecystostomy tube by IR who presented for concerns of bleeding at the perc yonis tube site. In ED, afebrile, HD stable, wbc 9.7, Hb 9.7 stable from 9.5 at discharge; LFTs wnl. PE with some blood at the dressing site but none in tubing, no hematoma or ecchymosis surrounding drain site. CTAP with percutaneous cholecystostomy catheter in collapsed gallbladder, confirmed with radiology department verbally. Plan for admission with interval cholecystectomy once cleared from cardiac perspective.    CT angiogram - today  DASH diet - soft and bite sized  Pain/nausea control  Home meds as appropriate  Hep gtt/OOBA/SCDs/IS  Cardiology clearance and optimization   AM Labs

## 2022-08-22 NOTE — PROGRESS NOTE ADULT - SUBJECTIVE AND OBJECTIVE BOX
GASTROENTEROLOGY PROGRESS NOTE  Patient seen and examined at bedside. Denied significant abd pain. PTC drain with normal bile.     PERTINENT REVIEW OF SYSTEMS:  As noted above    Allergies    Altace (Unknown)  penicillin (Unknown)    Intolerances      MEDICATIONS:  MEDICATIONS  (STANDING):  doxazosin 4 milliGRAM(s) Oral at bedtime  finasteride 5 milliGRAM(s) Oral daily  heparin  Infusion 1000 Unit(s)/Hr (10 mL/Hr) IV Continuous <Continuous>  hydrALAZINE 25 milliGRAM(s) Oral every 24 hours  hydrALAZINE 50 milliGRAM(s) Oral every 24 hours  lactated ringers. 1000 milliLiter(s) (90 mL/Hr) IV Continuous <Continuous>  metoprolol succinate ER 50 milliGRAM(s) Oral every 12 hours  nortriptyline 75 milliGRAM(s) Oral at bedtime  oxyCODONE  ER Tablet 20 milliGRAM(s) Oral every 8 hours  pantoprazole  Injectable 40 milliGRAM(s) IV Push daily  simvastatin 5 milliGRAM(s) Oral at bedtime  sodium phosphate IVPB 21 milliMole(s) IV Intermittent once    MEDICATIONS  (PRN):  ALPRAZolam 1 milliGRAM(s) Oral at bedtime PRN insomnia  diazepam    Tablet 2 milliGRAM(s) Oral daily PRN insomnia  ondansetron Injectable 4 milliGRAM(s) IV Push every 6 hours PRN Nausea and/or Vomiting  oxyCODONE    IR 15 milliGRAM(s) Oral every 4 hours PRN Moderate Pain (4 - 6)  oxyCODONE    IR 30 milliGRAM(s) Oral every 4 hours PRN Severe Pain (7 - 10)    Vital Signs Last 24 Hrs  T(C): 36.7 (22 Aug 2022 10:20), Max: 36.9 (21 Aug 2022 20:25)  T(F): 98.1 (22 Aug 2022 10:20), Max: 98.5 (21 Aug 2022 20:25)  HR: 63 (22 Aug 2022 10:20) (63 - 72)  BP: 142/89 (22 Aug 2022 10:20) (110/67 - 163/75)  BP(mean): --  RR: 17 (22 Aug 2022 10:20) (17 - 18)  SpO2: 98% (22 Aug 2022 10:20) (96% - 100%)    Parameters below as of 22 Aug 2022 10:20  Patient On (Oxygen Delivery Method): room air        08-21 @ 07:01 - 08-22 @ 07:00  --------------------------------------------------------  IN: 1858 mL / OUT: 1440 mL / NET: 418 mL    08-22 @ 07:01 - 08-22 @ 11:54  --------------------------------------------------------  IN: 240 mL / OUT: 400 mL / NET: -160 mL      PHYSICAL EXAM:    General: Well developed; in no acute distress  HEENT: MMM, conjunctiva and sclera clear  Gastrointestinal: Soft non-tender non-distended; PTC drain in place  Skin: Warm and dry. No obvious rash    LABS:                        8.8    7.56  )-----------( 340      ( 22 Aug 2022 06:17 )             27.4     08-22    132<L>  |  98  |  10  ----------------------------<  112<H>  3.9   |  26  |  1.19    Ca    8.2<L>      22 Aug 2022 06:17  Phos  2.7     08-22  Mg     2.0     08-22    TPro  6.4  /  Alb  2.5<L>  /  TBili  0.4  /  DBili  x   /  AST  17  /  ALT  13  /  AlkPhos  82  08-22    PT/INR - ( 20 Aug 2022 12:43 )   PT: 13.1 sec;   INR: 1.10          PTT - ( 22 Aug 2022 06:17 )  PTT:91.8 sec                  RADIOLOGY & ADDITIONAL STUDIES:  Reviewed

## 2022-08-22 NOTE — PROGRESS NOTE ADULT - SUBJECTIVE AND OBJECTIVE BOX
Patient is a 72y old  Male who presents with a chief complaint of bleeding around perc yonis site (22 Aug 2022 15:53)    INTERVAL EVENTS:  - had BM this AM   - No dysuria, dyspnea, or angina   - Urinating without difficulty this morning     SUBJECTIVE:  Patient was seen and examined at bedside.  Review of systems: No fever, chills, HA, CP, dyspnea, nausea or vomiting, dysuria, LE edema. Rest of 12 point Review of systems negative unless otherwise documented elsewhere in note.     Diet, DASH/TLC:   Sodium & Cholesterol Restricted (08-22-22 @ 13:04) [Active]      MEDICATIONS:  MEDICATIONS  (STANDING):  doxazosin 4 milliGRAM(s) Oral at bedtime  finasteride 5 milliGRAM(s) Oral daily  heparin  Infusion 1000 Unit(s)/Hr (10 mL/Hr) IV Continuous <Continuous>  hydrALAZINE 37.5 milliGRAM(s) Oral every 8 hours  metoprolol succinate ER 50 milliGRAM(s) Oral every 12 hours  nortriptyline 75 milliGRAM(s) Oral at bedtime  oxyCODONE  ER Tablet 20 milliGRAM(s) Oral every 8 hours  pantoprazole  Injectable 40 milliGRAM(s) IV Push daily  simvastatin 5 milliGRAM(s) Oral at bedtime    MEDICATIONS  (PRN):  ALPRAZolam 1 milliGRAM(s) Oral at bedtime PRN insomnia  diazepam    Tablet 2 milliGRAM(s) Oral daily PRN insomnia  ondansetron Injectable 4 milliGRAM(s) IV Push every 6 hours PRN Nausea and/or Vomiting  oxyCODONE    IR 15 milliGRAM(s) Oral every 4 hours PRN Moderate Pain (4 - 6)  oxyCODONE    IR 30 milliGRAM(s) Oral every 4 hours PRN Severe Pain (7 - 10)    Allergies    Altace (Unknown)  penicillin (Unknown)    Intolerances        OBJECTIVE:  Vital Signs Last 24 Hrs  T(C): 36.8 (22 Aug 2022 21:41), Max: 36.8 (22 Aug 2022 05:17)  T(F): 98.3 (22 Aug 2022 21:41), Max: 98.3 (22 Aug 2022 21:41)  HR: 62 (22 Aug 2022 21:41) (62 - 72)  BP: 166/91 (22 Aug 2022 21:41) (100/65 - 168/81)  BP(mean): --  RR: 18 (22 Aug 2022 21:41) (17 - 18)  SpO2: 97% (22 Aug 2022 21:41) (96% - 98%)    Parameters below as of 22 Aug 2022 21:41  Patient On (Oxygen Delivery Method): room air      I&O's Summary    21 Aug 2022 07:01  -  22 Aug 2022 07:00  --------------------------------------------------------  IN: 1858 mL / OUT: 1440 mL / NET: 418 mL    22 Aug 2022 07:01  -  23 Aug 2022 00:31  --------------------------------------------------------  IN: 240 mL / OUT: 830 mL / NET: -590 mL        PHYSICAL EXAM:  Gen: Reclining in bed at time of exam, appears stated age  HEENT: NCAT, MMM, clear OP  Neck: supple, trachea at midline  CV: RRR, +S1/S2  Pulm: adequate respiratory effort, no increase in work of breathing  Abd: soft, ND  Skin: warm and dry  Ext: WWP, no LE edema  Neuro: AOx3, no gross focal neurological deficits  Psych: affect and behavior appropriate, pleasant at time of interview    LABS:                        8.8    7.56  )-----------( 340      ( 22 Aug 2022 06:17 )             27.4     08-22    132<L>  |  98  |  10  ----------------------------<  112<H>  3.9   |  26  |  1.19    Ca    8.2<L>      22 Aug 2022 06:17  Phos  2.7     08-22  Mg     2.0     08-22    TPro  6.4  /  Alb  2.5<L>  /  TBili  0.4  /  DBili  x   /  AST  17  /  ALT  13  /  AlkPhos  82  08-22    LIVER FUNCTIONS - ( 22 Aug 2022 06:17 )  Alb: 2.5 g/dL / Pro: 6.4 g/dL / ALK PHOS: 82 U/L / ALT: 13 U/L / AST: 17 U/L / GGT: x           PTT - ( 22 Aug 2022 12:00 )  PTT:88.8 sec  CAPILLARY BLOOD GLUCOSE            MICRODATA:      RADIOLOGY/OTHER STUDIES:

## 2022-08-22 NOTE — PROGRESS NOTE ADULT - ASSESSMENT
72M with complex PMH including colon mass s/p partial colectomy (2017),  HTN, HLD, pAF on Eliquis, ?blood clotting disorder s/p IVC filter placement, PPM (2004), herniated disc and related surgery in [id number] complicated by spinal fusion, foot drop with chronic pain s/p failed intrathecal morphine pump, and recent history of newly reduced EF w/concern for stress induced CM, now presenting for elective cholecystectomy.       #Pre-operative risk assessment  -Given pt's recent complicated admission with discharge only four days prior, with events including HTN urgency and acute EF drop (with suspicion for stress induced CM with ischemia less likely but not excluded), patient remains at least intermediate to high risk for intermediate risk elective surgery.  - No signs/symptoms of active ischemia, and EKG is unchanged from prior  - CCTA with nonobstructive CAD  - TTE 8/22: Borderline normal left ventricular systolic function. Left ventricular ejection fraction is 50-55%.  - Transition to Hydralazine 37.5 mg TID 8/22  - c/w Toprol 50mg BID, holding Entresto 24-26mg  - Plan to start on Toprol 100mg PO  and Losartan 50mg for discharge post-operatively    #Atrial Fibrillation  CHADSVAC 5 points  - Rate control with Toprol 50mg BID for now  - Recommend Heparin gtt for anticoagulation bridge perioperatively     Plan discussed with cardiology consult attending.

## 2022-08-22 NOTE — PROGRESS NOTE ADULT - SUBJECTIVE AND OBJECTIVE BOX
SUBJECTIVE: Patient seen and examined bedside by chief resident. This morning, he feels well. No nausea or vomiting. Passing flatus and having BMs. No acute complaints.      doxazosin 4 milliGRAM(s) Oral at bedtime  heparin  Infusion 1000 Unit(s)/Hr IV Continuous <Continuous>  hydrALAZINE 25 milliGRAM(s) Oral every 24 hours  hydrALAZINE 50 milliGRAM(s) Oral every 24 hours  metoprolol succinate ER 50 milliGRAM(s) Oral every 12 hours      Vital Signs Last 24 Hrs  T(C): 36.8 (22 Aug 2022 05:17), Max: 36.9 (21 Aug 2022 20:25)  T(F): 98.2 (22 Aug 2022 05:17), Max: 98.5 (21 Aug 2022 20:25)  HR: 72 (22 Aug 2022 05:17) (63 - 72)  BP: 110/67 (22 Aug 2022 05:17) (110/67 - 163/75)  BP(mean): --  RR: 18 (22 Aug 2022 05:17) (17 - 18)  SpO2: 96% (22 Aug 2022 05:17) (96% - 100%)    Parameters below as of 22 Aug 2022 05:17  Patient On (Oxygen Delivery Method): room air      I&O's Detail    21 Aug 2022 07:01  -  22 Aug 2022 07:00  --------------------------------------------------------  IN:    Heparin: 80 mL    Heparin: 98 mL    Heparin Infusion: 60 mL    Lactated Ringers: 900 mL    Lactated Ringers: 240 mL    Oral Fluid: 480 mL  Total IN: 1858 mL    OUT:    Drain (mL): 40 mL    Voided (mL): 1400 mL  Total OUT: 1440 mL    Total NET: 418 mL          General: NAD, resting comfortably in bed  C/V: NSR  Pulm: Nonlabored breathing, no respiratory distress  Abd: soft, mildly tender to palpation. ND  Extrem: WWP, no edema, SCDs in place        LABS:                        8.8    7.56  )-----------( 340      ( 22 Aug 2022 06:17 )             27.4     08-22    132<L>  |  98  |  10  ----------------------------<  112<H>  3.9   |  26  |  1.19    Ca    8.2<L>      22 Aug 2022 06:17  Phos  2.7     08-22  Mg     2.0     08-22    TPro  6.4  /  Alb  2.5<L>  /  TBili  0.4  /  DBili  x   /  AST  17  /  ALT  13  /  AlkPhos  82  08-22    PT/INR - ( 20 Aug 2022 12:43 )   PT: 13.1 sec;   INR: 1.10          PTT - ( 22 Aug 2022 06:17 )  PTT:91.8 sec      RADIOLOGY & ADDITIONAL STUDIES:

## 2022-08-22 NOTE — PROGRESS NOTE ADULT - ASSESSMENT
71 y/o male with history of colon mass s/p partial colectomy (2017), appendectomy, hernia repair, aortic and iliac aneurysm surgery (2017) s/p stent placement, HTN, HLD, paroxysmal atrial fibrillation on Eliquis, Right LE DVT >5 years ago s/p IVC filter placement, right knee ligament repair, , herniated disc and related surgery in [id number] complicated by spinal fusion, local hematoma, foot drop with chronic pain s/p failed intrathecal morphine pump which caused bradycardia (s/p pacemaker implant [2004]) and left hip prosthesis.   On last admission, had newly reduced EF w/concern for stress induced CM s/p percutaneous cholecystostomy tube by IR, now presenting for bleeding at the perc yonis tube site getting evaluated for elective cholecystectomy.    # Cholecystitis s/p Percutaneous cholecystostomy   Plan for elective cholecystectomy; Defer assessment of jeannette-operative cardiac risk, and need for further cardiac evaluation to cardiology consult service.   Continue pain and nausea control     # Chronic pain, opiate dependence   Substantial opiate regimen as outpatient. Pain control while inpatient per primary team   [ ] recommend standing miralax while on current pain regimen.     #Paroxsymal Afib  # s/p PPM   - Defer recs re: Anti-coagulation to cardiology consult   -Continue Metoprolol , statin    #HTN   Continue metoprolol and hydralazine  Ensure adequate pain control   If SBP >180 mmHg persistently, [ ] Get bladder scan [ ] if pain well-controlled, and bladder scan shows no urinary retention, can use labetalol 10mg IV push if HR >60 bpm or hydralazine IV 5mg    # BPH - continue doxazosin, finasteride. Low threshold for repeating bladder scan (if new tachycardia, elevated BP)      #Hyponatremia   Sodium, Serum: 132 mmol/L (08-22-22 @ 06:17)  Sodium, Serum: 133 mmol/L (08-21-22 @ 05:43)  f/u urine Cr, Na, osm    DVT ppx - already on heparin drip

## 2022-08-23 DIAGNOSIS — G89.29 OTHER CHRONIC PAIN: ICD-10-CM

## 2022-08-23 DIAGNOSIS — G93.41 METABOLIC ENCEPHALOPATHY: ICD-10-CM

## 2022-08-23 DIAGNOSIS — Z79.01 LONG TERM (CURRENT) USE OF ANTICOAGULANTS: ICD-10-CM

## 2022-08-23 DIAGNOSIS — A41.9 SEPSIS, UNSPECIFIED ORGANISM: ICD-10-CM

## 2022-08-23 DIAGNOSIS — R07.89 OTHER CHEST PAIN: ICD-10-CM

## 2022-08-23 DIAGNOSIS — E78.5 HYPERLIPIDEMIA, UNSPECIFIED: ICD-10-CM

## 2022-08-23 DIAGNOSIS — Z88.8 ALLERGY STATUS TO OTHER DRUGS, MEDICAMENTS AND BIOLOGICAL SUBSTANCES STATUS: ICD-10-CM

## 2022-08-23 DIAGNOSIS — K81.9 CHOLECYSTITIS, UNSPECIFIED: ICD-10-CM

## 2022-08-23 DIAGNOSIS — I50.9 HEART FAILURE, UNSPECIFIED: ICD-10-CM

## 2022-08-23 DIAGNOSIS — K80.10 CALCULUS OF GALLBLADDER WITH CHRONIC CHOLECYSTITIS WITHOUT OBSTRUCTION: ICD-10-CM

## 2022-08-23 DIAGNOSIS — K83.8 OTHER SPECIFIED DISEASES OF BILIARY TRACT: ICD-10-CM

## 2022-08-23 DIAGNOSIS — Z97.8 PRESENCE OF OTHER SPECIFIED DEVICES: ICD-10-CM

## 2022-08-23 DIAGNOSIS — J43.9 EMPHYSEMA, UNSPECIFIED: ICD-10-CM

## 2022-08-23 DIAGNOSIS — E87.1 HYPO-OSMOLALITY AND HYPONATREMIA: ICD-10-CM

## 2022-08-23 DIAGNOSIS — I16.1 HYPERTENSIVE EMERGENCY: ICD-10-CM

## 2022-08-23 DIAGNOSIS — I44.0 ATRIOVENTRICULAR BLOCK, FIRST DEGREE: ICD-10-CM

## 2022-08-23 DIAGNOSIS — I45.10 UNSPECIFIED RIGHT BUNDLE-BRANCH BLOCK: ICD-10-CM

## 2022-08-23 DIAGNOSIS — Z95.828 PRESENCE OF OTHER VASCULAR IMPLANTS AND GRAFTS: ICD-10-CM

## 2022-08-23 DIAGNOSIS — Z95.5 PRESENCE OF CORONARY ANGIOPLASTY IMPLANT AND GRAFT: ICD-10-CM

## 2022-08-23 DIAGNOSIS — R79.1 ABNORMAL COAGULATION PROFILE: ICD-10-CM

## 2022-08-23 DIAGNOSIS — N40.0 BENIGN PROSTATIC HYPERPLASIA WITHOUT LOWER URINARY TRACT SYMPTOMS: ICD-10-CM
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
## 2022-08-23 DIAGNOSIS — I21.4 NON-ST ELEVATION (NSTEMI) MYOCARDIAL INFARCTION: ICD-10-CM

## 2022-08-23 DIAGNOSIS — I11.0 HYPERTENSIVE HEART DISEASE WITH HEART FAILURE: ICD-10-CM

## 2022-08-23 DIAGNOSIS — Z79.82 LONG TERM (CURRENT) USE OF ASPIRIN: ICD-10-CM

## 2022-08-23 DIAGNOSIS — M21.379 FOOT DROP, UNSPECIFIED FOOT: ICD-10-CM

## 2022-08-23 DIAGNOSIS — T40.2X5A ADVERSE EFFECT OF OTHER OPIOIDS, INITIAL ENCOUNTER: ICD-10-CM
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
## 2022-08-23 DIAGNOSIS — I48.0 PAROXYSMAL ATRIAL FIBRILLATION: ICD-10-CM

## 2022-08-23 DIAGNOSIS — Z95.0 PRESENCE OF CARDIAC PACEMAKER: ICD-10-CM

## 2022-08-23 DIAGNOSIS — Z86.718 PERSONAL HISTORY OF OTHER VENOUS THROMBOSIS AND EMBOLISM: ICD-10-CM
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
## 2022-08-23 DIAGNOSIS — J81.0 ACUTE PULMONARY EDEMA: ICD-10-CM

## 2022-08-23 DIAGNOSIS — I48.20 CHRONIC ATRIAL FIBRILLATION, UNSPECIFIED: ICD-10-CM

## 2022-08-23 DIAGNOSIS — M54.9 DORSALGIA, UNSPECIFIED: ICD-10-CM

## 2022-08-23 DIAGNOSIS — Z88.0 ALLERGY STATUS TO PENICILLIN: ICD-10-CM

## 2022-08-23 DIAGNOSIS — I51.81 TAKOTSUBO SYNDROME: ICD-10-CM

## 2022-08-23 DIAGNOSIS — I27.20 PULMONARY HYPERTENSION, UNSPECIFIED: ICD-10-CM

## 2022-08-23 DIAGNOSIS — I10 ESSENTIAL (PRIMARY) HYPERTENSION: ICD-10-CM

## 2022-08-23 DIAGNOSIS — F41.8 OTHER SPECIFIED ANXIETY DISORDERS: ICD-10-CM

## 2022-08-23 LAB
ALBUMIN SERPL ELPH-MCNC: 2.7 G/DL — LOW (ref 3.3–5)
ALP SERPL-CCNC: 82 U/L — SIGNIFICANT CHANGE UP (ref 40–120)
ALT FLD-CCNC: 11 U/L — SIGNIFICANT CHANGE UP (ref 10–45)
ANION GAP SERPL CALC-SCNC: 9 MMOL/L — SIGNIFICANT CHANGE UP (ref 5–17)
APPEARANCE UR: CLEAR — SIGNIFICANT CHANGE UP
APTT BLD: 57 SEC — HIGH (ref 27.5–35.5)
APTT BLD: 66.7 SEC — HIGH (ref 27.5–35.5)
APTT BLD: 72.8 SEC — HIGH (ref 27.5–35.5)
AST SERPL-CCNC: 15 U/L — SIGNIFICANT CHANGE UP (ref 10–40)
BILIRUB SERPL-MCNC: 0.4 MG/DL — SIGNIFICANT CHANGE UP (ref 0.2–1.2)
BILIRUB UR-MCNC: NEGATIVE — SIGNIFICANT CHANGE UP
BUN SERPL-MCNC: 7 MG/DL — SIGNIFICANT CHANGE UP (ref 7–23)
CALCIUM SERPL-MCNC: 8.5 MG/DL — SIGNIFICANT CHANGE UP (ref 8.4–10.5)
CHLORIDE SERPL-SCNC: 96 MMOL/L — SIGNIFICANT CHANGE UP (ref 96–108)
CHLORIDE UR-SCNC: 46 MMOL/L — SIGNIFICANT CHANGE UP
CO2 SERPL-SCNC: 27 MMOL/L — SIGNIFICANT CHANGE UP (ref 22–31)
COLOR SPEC: YELLOW — SIGNIFICANT CHANGE UP
CREAT ?TM UR-MCNC: 24 MG/DL — SIGNIFICANT CHANGE UP
CREAT SERPL-MCNC: 1.2 MG/DL — SIGNIFICANT CHANGE UP (ref 0.5–1.3)
DIFF PNL FLD: NEGATIVE — SIGNIFICANT CHANGE UP
EGFR: 64 ML/MIN/1.73M2 — SIGNIFICANT CHANGE UP
GLUCOSE SERPL-MCNC: 101 MG/DL — HIGH (ref 70–99)
GLUCOSE UR QL: NEGATIVE — SIGNIFICANT CHANGE UP
HCT VFR BLD CALC: 29.2 % — LOW (ref 39–50)
HGB BLD-MCNC: 9.4 G/DL — LOW (ref 13–17)
KETONES UR-MCNC: NEGATIVE — SIGNIFICANT CHANGE UP
LEUKOCYTE ESTERASE UR-ACNC: NEGATIVE — SIGNIFICANT CHANGE UP
MAGNESIUM SERPL-MCNC: 1.7 MG/DL — SIGNIFICANT CHANGE UP (ref 1.6–2.6)
MCHC RBC-ENTMCNC: 28.6 PG — SIGNIFICANT CHANGE UP (ref 27–34)
MCHC RBC-ENTMCNC: 32.2 GM/DL — SIGNIFICANT CHANGE UP (ref 32–36)
MCV RBC AUTO: 88.8 FL — SIGNIFICANT CHANGE UP (ref 80–100)
NITRITE UR-MCNC: NEGATIVE — SIGNIFICANT CHANGE UP
NRBC # BLD: 0 /100 WBCS — SIGNIFICANT CHANGE UP (ref 0–0)
OSMOLALITY SERPL: 275 MOSM/KG — LOW (ref 280–301)
OSMOLALITY UR: 189 MOSM/KG — LOW (ref 300–900)
PH UR: 7 — SIGNIFICANT CHANGE UP (ref 5–8)
PHOSPHATE SERPL-MCNC: 3.4 MG/DL — SIGNIFICANT CHANGE UP (ref 2.5–4.5)
PLATELET # BLD AUTO: 312 K/UL — SIGNIFICANT CHANGE UP (ref 150–400)
POTASSIUM SERPL-MCNC: 4 MMOL/L — SIGNIFICANT CHANGE UP (ref 3.5–5.3)
POTASSIUM SERPL-SCNC: 4 MMOL/L — SIGNIFICANT CHANGE UP (ref 3.5–5.3)
POTASSIUM UR-SCNC: 13 MMOL/L — SIGNIFICANT CHANGE UP
PROT SERPL-MCNC: 6.5 G/DL — SIGNIFICANT CHANGE UP (ref 6–8.3)
PROT UR-MCNC: NEGATIVE MG/DL — SIGNIFICANT CHANGE UP
RBC # BLD: 3.29 M/UL — LOW (ref 4.2–5.8)
RBC # FLD: 13.2 % — SIGNIFICANT CHANGE UP (ref 10.3–14.5)
SODIUM SERPL-SCNC: 132 MMOL/L — LOW (ref 135–145)
SODIUM UR-SCNC: 62 MMOL/L — SIGNIFICANT CHANGE UP
SP GR SPEC: <=1.005 — SIGNIFICANT CHANGE UP (ref 1–1.03)
UROBILINOGEN FLD QL: 0.2 E.U./DL — SIGNIFICANT CHANGE UP
WBC # BLD: 7.32 K/UL — SIGNIFICANT CHANGE UP (ref 3.8–10.5)
WBC # FLD AUTO: 7.32 K/UL — SIGNIFICANT CHANGE UP (ref 3.8–10.5)

## 2022-08-23 PROCEDURE — 99447 NTRPROF PH1/NTRNET/EHR 11-20: CPT

## 2022-08-23 PROCEDURE — 99232 SBSQ HOSP IP/OBS MODERATE 35: CPT

## 2022-08-23 PROCEDURE — 99233 SBSQ HOSP IP/OBS HIGH 50: CPT

## 2022-08-23 PROCEDURE — 47531 INJECTION FOR CHOLANGIOGRAM: CPT

## 2022-08-23 RX ORDER — POLYETHYLENE GLYCOL 3350 17 G/17G
17 POWDER, FOR SOLUTION ORAL
Refills: 0 | Status: DISCONTINUED | OUTPATIENT
Start: 2022-08-23 | End: 2022-09-05

## 2022-08-23 RX ORDER — HYDRALAZINE HCL 50 MG
50 TABLET ORAL EVERY 8 HOURS
Refills: 0 | Status: DISCONTINUED | OUTPATIENT
Start: 2022-08-23 | End: 2022-08-29

## 2022-08-23 RX ORDER — MAGNESIUM SULFATE 500 MG/ML
1 VIAL (ML) INJECTION ONCE
Refills: 0 | Status: COMPLETED | OUTPATIENT
Start: 2022-08-23 | End: 2022-08-23

## 2022-08-23 RX ADMIN — OXYCODONE HYDROCHLORIDE 15 MILLIGRAM(S): 5 TABLET ORAL at 09:17

## 2022-08-23 RX ADMIN — OXYCODONE HYDROCHLORIDE 20 MILLIGRAM(S): 5 TABLET ORAL at 00:30

## 2022-08-23 RX ADMIN — FINASTERIDE 5 MILLIGRAM(S): 5 TABLET, FILM COATED ORAL at 10:19

## 2022-08-23 RX ADMIN — OXYCODONE HYDROCHLORIDE 20 MILLIGRAM(S): 5 TABLET ORAL at 23:31

## 2022-08-23 RX ADMIN — NORTRIPTYLINE HYDROCHLORIDE 75 MILLIGRAM(S): 10 CAPSULE ORAL at 22:33

## 2022-08-23 RX ADMIN — Medication 4 MILLIGRAM(S): at 22:32

## 2022-08-23 RX ADMIN — Medication 100 GRAM(S): at 10:19

## 2022-08-23 RX ADMIN — OXYCODONE HYDROCHLORIDE 15 MILLIGRAM(S): 5 TABLET ORAL at 09:35

## 2022-08-23 RX ADMIN — Medication 1 MILLIGRAM(S): at 23:58

## 2022-08-23 RX ADMIN — Medication 50 MILLIGRAM(S): at 10:21

## 2022-08-23 RX ADMIN — OXYCODONE HYDROCHLORIDE 20 MILLIGRAM(S): 5 TABLET ORAL at 06:38

## 2022-08-23 RX ADMIN — PANTOPRAZOLE SODIUM 40 MILLIGRAM(S): 20 TABLET, DELAYED RELEASE ORAL at 10:17

## 2022-08-23 RX ADMIN — Medication 37.5 MILLIGRAM(S): at 05:09

## 2022-08-23 RX ADMIN — OXYCODONE HYDROCHLORIDE 15 MILLIGRAM(S): 5 TABLET ORAL at 19:10

## 2022-08-23 RX ADMIN — OXYCODONE HYDROCHLORIDE 20 MILLIGRAM(S): 5 TABLET ORAL at 05:07

## 2022-08-23 RX ADMIN — SIMVASTATIN 5 MILLIGRAM(S): 20 TABLET, FILM COATED ORAL at 22:33

## 2022-08-23 RX ADMIN — Medication 50 MILLIGRAM(S): at 22:31

## 2022-08-23 RX ADMIN — OXYCODONE HYDROCHLORIDE 20 MILLIGRAM(S): 5 TABLET ORAL at 22:31

## 2022-08-23 RX ADMIN — OXYCODONE HYDROCHLORIDE 15 MILLIGRAM(S): 5 TABLET ORAL at 19:40

## 2022-08-23 RX ADMIN — OXYCODONE HYDROCHLORIDE 20 MILLIGRAM(S): 5 TABLET ORAL at 13:40

## 2022-08-23 RX ADMIN — Medication 50 MILLIGRAM(S): at 22:36

## 2022-08-23 RX ADMIN — Medication 37.5 MILLIGRAM(S): at 13:52

## 2022-08-23 RX ADMIN — OXYCODONE HYDROCHLORIDE 20 MILLIGRAM(S): 5 TABLET ORAL at 13:10

## 2022-08-23 NOTE — PROGRESS NOTE ADULT - SUBJECTIVE AND OBJECTIVE BOX
Patient is a 72y old  Male who presents with a chief complaint of bleeding around perc yonis site (23 Aug 2022 17:21)    INTERVAL EVENTS:  - cholecystostomy tube study today   - urinating without any difficulty or dysyuria     SUBJECTIVE:  Patient was seen and examined at bedside.  Review of systems: No fever, chills, HA, CP, dyspnea, nausea or vomiting, dysuria, LE edema. Rest of 12 point Review of systems negative unless otherwise documented elsewhere in note.     Diet, DASH/TLC:   Sodium & Cholesterol Restricted  Soft and Bite Sized (SOFTBTSZ) (08-23-22 @ 17:40) [Active]  Diet, NPO after Midnight:      NPO Start Date: 23-Aug-2022,   NPO Start Time: 23:59 (08-23-22 @ 06:00) [Active]      MEDICATIONS:  MEDICATIONS  (STANDING):  doxazosin 4 milliGRAM(s) Oral at bedtime  finasteride 5 milliGRAM(s) Oral daily  heparin  Infusion 1000 Unit(s)/Hr (10 mL/Hr) IV Continuous <Continuous>  hydrALAZINE 50 milliGRAM(s) Oral every 8 hours  metoprolol succinate ER 50 milliGRAM(s) Oral every 12 hours  nortriptyline 75 milliGRAM(s) Oral at bedtime  oxyCODONE  ER Tablet 20 milliGRAM(s) Oral every 8 hours  pantoprazole  Injectable 40 milliGRAM(s) IV Push daily  polyethylene glycol 3350 17 Gram(s) Oral two times a day  simvastatin 5 milliGRAM(s) Oral at bedtime    MEDICATIONS  (PRN):  ALPRAZolam 1 milliGRAM(s) Oral at bedtime PRN insomnia  diazepam    Tablet 2 milliGRAM(s) Oral daily PRN insomnia  ondansetron Injectable 4 milliGRAM(s) IV Push every 6 hours PRN Nausea and/or Vomiting  oxyCODONE    IR 15 milliGRAM(s) Oral every 4 hours PRN Moderate Pain (4 - 6)  oxyCODONE    IR 30 milliGRAM(s) Oral every 4 hours PRN Severe Pain (7 - 10)      Allergies    Altace (Unknown)  penicillin (Unknown)    Intolerances        OBJECTIVE:  Vital Signs Last 24 Hrs  T(C): 36.9 (23 Aug 2022 21:10), Max: 36.9 (23 Aug 2022 21:10)  T(F): 98.5 (23 Aug 2022 21:10), Max: 98.5 (23 Aug 2022 21:10)  HR: 70 (23 Aug 2022 21:10) (63 - 75)  BP: 161/84 (23 Aug 2022 21:10) (132/81 - 179/80)  BP(mean): --  RR: 18 (23 Aug 2022 21:10) (17 - 18)  SpO2: 96% (23 Aug 2022 21:10) (96% - 97%)    Parameters below as of 23 Aug 2022 21:10  Patient On (Oxygen Delivery Method): room air      I&O's Summary    22 Aug 2022 07:01  -  23 Aug 2022 07:00  --------------------------------------------------------  IN: 240 mL / OUT: 1420 mL / NET: -1180 mL    23 Aug 2022 07:01  -  23 Aug 2022 23:13  --------------------------------------------------------  IN: 390 mL / OUT: 980 mL / NET: -590 mL        PHYSICAL EXAM:  Gen: Reclining in bed at time of exam, appears stated age  HEENT: NCAT, MMM, clear OP  Neck: supple, trachea at midline  CV: RRR, +S1/S2  Pulm: adequate respiratory effort, no increase in work of breathing  Abd: soft, ND  Skin: warm and dry  Ext: WWP, no LE edema  Neuro: AOx3, no gross focal neurological deficits  Psych: affect and behavior appropriate, pleasant at time of interview    LABS:                        9.4    7.32  )-----------( 312      ( 23 Aug 2022 07:02 )             29.2     08-23    132<L>  |  96  |  7   ----------------------------<  101<H>  4.0   |  27  |  1.20    Ca    8.5      23 Aug 2022 07:02  Phos  3.4     08-23  Mg     1.7     08-23    TPro  6.5  /  Alb  2.7<L>  /  TBili  0.4  /  DBili  x   /  AST  15  /  ALT  11  /  AlkPhos  82  08-23    LIVER FUNCTIONS - ( 23 Aug 2022 07:02 )  Alb: 2.7 g/dL / Pro: 6.5 g/dL / ALK PHOS: 82 U/L / ALT: 11 U/L / AST: 15 U/L / GGT: x           PTT - ( 23 Aug 2022 18:52 )  PTT:57.0 sec  CAPILLARY BLOOD GLUCOSE        Urinalysis Basic - ( 23 Aug 2022 06:39 )    Color: Yellow / Appearance: Clear / SG: <=1.005 / pH: x  Gluc: x / Ketone: NEGATIVE  / Bili: Negative / Urobili: 0.2 E.U./dL   Blood: x / Protein: NEGATIVE mg/dL / Nitrite: NEGATIVE   Leuk Esterase: NEGATIVE / RBC: x / WBC x   Sq Epi: x / Non Sq Epi: x / Bacteria: x        MICRODATA:      RADIOLOGY/OTHER STUDIES:

## 2022-08-23 NOTE — PHYSICAL THERAPY INITIAL EVALUATION ADULT - GAIT DEVIATIONS NOTED, PT EVAL
decreased urban/increased time in double stance/decreased step length/decreased weight-shifting ability

## 2022-08-23 NOTE — PROGRESS NOTE ADULT - ASSESSMENT
72M with complex PMH including colon mass s/p partial colectomy (2017),  HTN, HLD, pAF (on Eliquis), ?blood clotting disorder s/p IVC filter placement, PPM (2004), herniated disc and related surgery in [id number] complicated by spinal fusion, foot drop with chronic pain s/p failed intrathecal morphine pump, and recent history of newly reduced EF w/concern for stress induced CM, now presenting for elective cholecystectomy (will undergo ERCP prior)    #Pre-operative risk assessment  -Given pt's recent complicated admission with discharge only four days prior, with events including HTN urgency and acute EF drop (with suspicion for stress induced CM with ischemia less likely but not excluded), patient remains at least intermediate to high risk for intermediate risk elective surgery.  - No signs/symptoms of active ischemia, and EKG is unchanged from prior  - CCTA with nonobstructive CAD  - TTE 8/22: Borderline normal left ventricular systolic function. Left ventricular ejection fraction is 50-55%.  - Increase from 37.5 mg TID to Hydralazine 50 mg TID (8/23)  - c/w Toprol 50mg BID, holding Entresto 24-26mg  - Plan to start on Toprol 100mg PO  and Losartan 50mg for discharge post-operatively    #Atrial Fibrillation  CHADSVAC 5 points  - Rate control with Toprol 50mg BID for now  - Recommend Heparin gtt for anticoagulation bridge perioperatively     Plan discussed with cardiology consult attending.

## 2022-08-23 NOTE — PROGRESS NOTE ADULT - ASSESSMENT
73 y/o M with colon mass s/p partial colectomy (2017), appendectomy, hernia repair, aortic and iliac aneurysm surgery (2017) s/p stent placement, HTN, HLD, paroxysmal atrial fibrillation on Eliquis, blood clotting disorder s/p IVC filter placement, right knee ligament repair, pacemaker implant (2004) s/p recent ppm interrogation on Eliquis, herniated disc and related surgery in [id number] complicated by spinal fusion, local hematoma, foot drop with chronic pain s/p failed intrathecal morphine pump, recently admitted to [facility] on 08/07 with acute cholecystitis c/b cardiomyopathy s/p percutaneous cholecystostomy tube by IR who presented for concerns of bleeding at the perc yonis tube site. In ED, afebrile, HD stable, wbc 9.7, Hb 9.7 stable from 9.5 at discharge; LFTs wnl. PE with some blood at the dressing site but none in tubing, no hematoma or ecchymosis surrounding drain site. CTAP with percutaneous cholecystostomy catheter in collapsed gallbladder, confirmed with radiology department verbally. Plan for admission with interval cholecystectomy once cleared from cardiac perspective.    PCT tube study w/IR  DASH diet - soft and bite sized  Pain/nausea control  Home meds as appropriate  Hep gtt/OOBA/SCDs/IS  Cardiology clearance and optimization   AM Labs  OR 8/24

## 2022-08-23 NOTE — PROGRESS NOTE ADULT - SUBJECTIVE AND OBJECTIVE BOX
SUBJECTIVE: Patient seen and examined bedside by chief resident. This morning, he feels well; his pain is well-controlled. No nausea or vomiting. Passing flatus and having BMs. No acute complaints.    doxazosin 4 milliGRAM(s) Oral at bedtime  heparin  Infusion 1000 Unit(s)/Hr IV Continuous <Continuous>  hydrALAZINE 37.5 milliGRAM(s) Oral every 8 hours  metoprolol succinate ER 50 milliGRAM(s) Oral every 12 hours      Vital Signs Last 24 Hrs  T(C): 36.8 (23 Aug 2022 05:05), Max: 36.8 (22 Aug 2022 21:41)  T(F): 98.2 (23 Aug 2022 05:05), Max: 98.3 (22 Aug 2022 21:41)  HR: 64 (23 Aug 2022 05:05) (62 - 67)  BP: 179/80 (23 Aug 2022 05:05) (100/65 - 179/80)  BP(mean): --  RR: 17 (23 Aug 2022 05:05) (17 - 18)  SpO2: 97% (23 Aug 2022 05:05) (96% - 98%)    Parameters below as of 23 Aug 2022 05:05  Patient On (Oxygen Delivery Method): room air      I&O's Detail    22 Aug 2022 07:01  -  23 Aug 2022 07:00  --------------------------------------------------------  IN:    Oral Fluid: 240 mL  Total IN: 240 mL    OUT:    Drain (mL): 30 mL    Voided (mL): 1390 mL  Total OUT: 1420 mL    Total NET: -1180 mL          General: NAD, resting comfortably in bed  C/V: NSR  Pulm: Nonlabored breathing, no respiratory distress  Abd: soft, NT/ND. Percutaneous cholecystostomy tube in place.   Extrem: WWP, no edema, SCDs in place        LABS:                        9.4    7.32  )-----------( 312      ( 23 Aug 2022 07:02 )             29.2     08-23    132<L>  |  96  |  7   ----------------------------<  101<H>  4.0   |  27  |  1.20    Ca    8.5      23 Aug 2022 07:02  Phos  3.4     08-23  Mg     1.7     08-23    TPro  6.5  /  Alb  2.7<L>  /  TBili  0.4  /  DBili  x   /  AST  15  /  ALT  11  /  AlkPhos  82  08-23    PTT - ( 23 Aug 2022 07:02 )  PTT:66.7 sec  Urinalysis Basic - ( 23 Aug 2022 06:39 )    Color: Yellow / Appearance: Clear / SG: <=1.005 / pH: x  Gluc: x / Ketone: NEGATIVE  / Bili: Negative / Urobili: 0.2 E.U./dL   Blood: x / Protein: NEGATIVE mg/dL / Nitrite: NEGATIVE   Leuk Esterase: NEGATIVE / RBC: x / WBC x   Sq Epi: x / Non Sq Epi: x / Bacteria: x        RADIOLOGY & ADDITIONAL STUDIES:

## 2022-08-23 NOTE — CONSULT NOTE ADULT - SUBJECTIVE AND OBJECTIVE BOX
71 y/o M with colon mass s/p partial colectomy (2017), appendectomy, hernia repair, aortic and iliac aneurysm surgery (2017) s/p stent placement, HTN, HLD, paroxysmal atrial fibrillation on Eliquis, blood clotting disorder s/p IVC filter placement, right knee ligament repair, pacemaker implant (2004) s/p recent ppm interrogation, herniated disc and related surgery in [id number] complicated by spinal fusion, local hematoma, foot drop with chronic pain s/p failed intrathecal morphine pump, recently admitted to [facility] on 08/07 with acute cholecystitis c/b cardiomyopathy s/p percutaneous cholecystostomy tube by IR 8/11/22 who presented for concerns of bleeding at the perc yonis tube site (some blood at the dressing site but none in tubing, no hematoma or ecchymosis surrounding drain site). CTAP with percutaneous cholecystostomy catheter in collapsed gallbladder. Plan for admission with interval cholecystectomy once cleared from cardiac perspective. Per rehab the drain was consistently putting out 300cc/d bilious fluid and did not have any blood in the tubing. Pt also with chronically dilated CBD (at least since 7/2020 per imaging), with normal LFTs. IR consulted for cholangiogram for pre-surgical planning.     Clinical History: RUQ ABDOMINAL PAIN    Handoff    MEWS Score    AAA (abdominal aortic aneurysm)    HTN (hypertension)    Cardiomyopathy    Hyperlipidemia    ONOFRE (dyspnea on exertion)    DVT (deep venous thrombosis)    Pulmonary emphysema    Cardiac arrhythmia    Aneurysm of aortic root    Depression    Anemia    Pelvic mass    RUQ abdominal pain    Cholecystitis    Paroxysmal atrial fibrillation    Hypertension    Hyponatremia    BPH (benign prostatic hyperplasia)    Pacemaker    History of kidney surgery    History of back surgery    Surgery, elective    S/P hip replacement, left    S/P arthroscopy of right knee    S/P AAA (abdominal aortic aneurysm) repair    S/P carpal tunnel release    Surgery, elective    Surgery, elective    ABDOMINAL PAIN    3    Room Service Assist    Cholecystostomy care    [email or web address]        Meds:ALPRAZolam 1 milliGRAM(s) Oral at bedtime PRN  diazepam    Tablet 2 milliGRAM(s) Oral daily PRN  doxazosin 4 milliGRAM(s) Oral at bedtime  finasteride 5 milliGRAM(s) Oral daily  heparin  Infusion 1000 Unit(s)/Hr IV Continuous <Continuous>  hydrALAZINE 37.5 milliGRAM(s) Oral every 8 hours  metoprolol succinate ER 50 milliGRAM(s) Oral every 12 hours  nortriptyline 75 milliGRAM(s) Oral at bedtime  ondansetron Injectable 4 milliGRAM(s) IV Push every 6 hours PRN  oxyCODONE    IR 15 milliGRAM(s) Oral every 4 hours PRN  oxyCODONE    IR 30 milliGRAM(s) Oral every 4 hours PRN  oxyCODONE  ER Tablet 20 milliGRAM(s) Oral every 8 hours  pantoprazole  Injectable 40 milliGRAM(s) IV Push daily  polyethylene glycol 3350 17 Gram(s) Oral two times a day  simvastatin 5 milliGRAM(s) Oral at bedtime      Allergies:Altace (Unknown)  penicillin (Unknown)        Labs:                           9.4    7.32  )-----------( 312      ( 23 Aug 2022 07:02 )             29.2     PTT - ( 23 Aug 2022 07:02 )  PTT:66.7 sec  08-23    132<L>  |  96  |  7   ----------------------------<  101<H>  4.0   |  27  |  1.20    Ca    8.5      23 Aug 2022 07:02  Phos  3.4     08-23  Mg     1.7     08-23    TPro  6.5  /  Alb  2.7<L>  /  TBili  0.4  /  DBili  x   /  AST  15  /  ALT  11  /  AlkPhos  82  08-23          Imaging Findings: There is a cholecystostomy tube terminating in a decompressed/collapsed gallbladder.    There is moderate CBD dilatation measuring up to 1.5 cm, unchanged since 7/14/2020.

## 2022-08-23 NOTE — PHYSICAL THERAPY INITIAL EVALUATION ADULT - PERTINENT HX OF CURRENT PROBLEM, REHAB EVAL
71 y/o M with colon mass s/p partial colectomy (2017), appendectomy, hernia repair, aortic and iliac aneurysm surgery (2017) s/p stent placement, HTN, HLD, paroxysmal atrial fibrillation on Eliquis, blood clotting disorder s/p IVC filter placement, right knee ligament repair, pacemaker implant (2004) s/p recent ppm interrogation on Eliquis, herniated disc and related surgery in [id number] complicated by spinal fusion, local hematoma, foot drop with chronic pain s/p failed intrathecal morphine pump, recently admitted to [facility] on 08/07 with acute cholecystitis c/b cardiomyopathy s/p percutaneous cholecystostomy tube by IR who presented for concerns of bleeding at the perc yonis tube site. In ED, afebrile, HD stable, wbc 9.7, Hb 9.7 stable from 9.5 at discharge; LFTs wnl. PE with some blood at the dressing site but none in tubing, no hematoma or ecchymosis surrounding drain site. CTAP with percutaneous cholecystostomy catheter in collapsed gallbladder, confirmed with radiology department verbally. Plan for admission with interval cholecystectomy once cleared from cardiac perspective.

## 2022-08-23 NOTE — PHYSICAL THERAPY INITIAL EVALUATION ADULT - ADDITIONAL COMMENTS
pt comes from [facility] where he states he was ambulating w/ assist and RW. Prior to last admission lived at home w/ his wife who is unable to fully care for him 2/2 rib fxs. owns RW, WC for ambulation. has a ramp to enter the home. no home health aide

## 2022-08-23 NOTE — PROGRESS NOTE ADULT - SUBJECTIVE AND OBJECTIVE BOX
INTERVAL HPI/OVERNIGHT EVENTS: GERMAINE    SUBJECTIVE: Patient seen and examined at bedside. Denies chest pain, SOB, abdominal pain, changes in mental status.    OBJECTIVE:    VITAL SIGNS:  ICU Vital Signs Last 24 Hrs  T(C): 36.4 (23 Aug 2022 13:02), Max: 36.8 (22 Aug 2022 21:41)  T(F): 97.6 (23 Aug 2022 13:02), Max: 98.3 (22 Aug 2022 21:41)  HR: 67 (23 Aug 2022 13:02) (62 - 75)  BP: 138/67 (23 Aug 2022 13:02) (100/65 - 179/80)  RR: 17 (23 Aug 2022 13:02) (17 - 18)  SpO2: 97% (23 Aug 2022 13:02) (96% - 97%)    O2 Parameters below as of 23 Aug 2022 13:02  Patient On (Oxygen Delivery Method): room air    08-22 @ 07:01 - 08-23 @ 07:00  --------------------------------------------------------  IN: 240 mL / OUT: 1420 mL / NET: -1180 mL    08-23 @ 07:01  -  08-23 @ 15:05  --------------------------------------------------------  IN: 360 mL / OUT: 765 mL / NET: -405 mL      CAPILLARY BLOOD GLUCOSE      PHYSICAL EXAM:  GEN: Awake, comfortable. NAD.   HEENT: NCAT, PERRL, EOMI. Mucosa moist. No JVD.   RESP: CTA b/l  CV: RRR, normal s1/s2. No m/r/g.  ABD: Soft, NTND. BS+ Perc yonis tube in place with minimal dry serosanguinous drainage on dressing  EXT: Warm. No edema, clubbing, or cyanosis.   NEURO: AAOx3. No focal deficits.    MEDICATIONS:  MEDICATIONS  (STANDING):  doxazosin 4 milliGRAM(s) Oral at bedtime  finasteride 5 milliGRAM(s) Oral daily  heparin  Infusion 1000 Unit(s)/Hr (10 mL/Hr) IV Continuous <Continuous>  hydrALAZINE 37.5 milliGRAM(s) Oral every 8 hours  metoprolol succinate ER 50 milliGRAM(s) Oral every 12 hours  nortriptyline 75 milliGRAM(s) Oral at bedtime  oxyCODONE  ER Tablet 20 milliGRAM(s) Oral every 8 hours  pantoprazole  Injectable 40 milliGRAM(s) IV Push daily  polyethylene glycol 3350 17 Gram(s) Oral two times a day  simvastatin 5 milliGRAM(s) Oral at bedtime    MEDICATIONS  (PRN):  ALPRAZolam 1 milliGRAM(s) Oral at bedtime PRN insomnia  diazepam    Tablet 2 milliGRAM(s) Oral daily PRN insomnia  ondansetron Injectable 4 milliGRAM(s) IV Push every 6 hours PRN Nausea and/or Vomiting  oxyCODONE    IR 15 milliGRAM(s) Oral every 4 hours PRN Moderate Pain (4 - 6)  oxyCODONE    IR 30 milliGRAM(s) Oral every 4 hours PRN Severe Pain (7 - 10)      ALLERGIES:  Allergies    Altace (Unknown)  penicillin (Unknown)    Intolerances        LABS:                        9.4    7.32  )-----------( 312      ( 23 Aug 2022 07:02 )             29.2     08-23    132<L>  |  96  |  7   ----------------------------<  101<H>  4.0   |  27  |  1.20    Ca    8.5      23 Aug 2022 07:02  Phos  3.4     08-23  Mg     1.7     08-23    TPro  6.5  /  Alb  2.7<L>  /  TBili  0.4  /  DBili  x   /  AST  15  /  ALT  11  /  AlkPhos  82  08-23    LIVER FUNCTIONS - ( 23 Aug 2022 07:02 )  Alb: 2.7 g/dL / Pro: 6.5 g/dL / ALK PHOS: 82 U/L / ALT: 11 U/L / AST: 15 U/L / GGT: x           PTT - ( 23 Aug 2022 11:57 )  PTT:72.8 sec  Urinalysis Basic - ( 23 Aug 2022 06:39 )    Color: Yellow / Appearance: Clear / SG: <=1.005 / pH: x  Gluc: x / Ketone: NEGATIVE  / Bili: Negative / Urobili: 0.2 E.U./dL   Blood: x / Protein: NEGATIVE mg/dL / Nitrite: NEGATIVE   Leuk Esterase: NEGATIVE / RBC: x / WBC x   Sq Epi: x / Non Sq Epi: x / Bacteria: x            RADIOLOGY & ADDITIONAL TESTS: Reviewed.

## 2022-08-23 NOTE — CONSULT NOTE ADULT - ASSESSMENT
Assessment: 73 y/o M s/p percutaneous cholecystostomy tube by IR 8/11/22 who presented for concerns of bleeding at the perc yonis tube site. CT showing drain in appropriate position, with adequate output. IR consulted for cholangiogram for pre-surgical planning. Case reviewed with Dr. Gonzales, plan for cholangiogram with local anesthesia.     Recommendations: Please ensure Covid swab is up to date (within last 72 hours).    Communicated with: primary team T4 surgery

## 2022-08-23 NOTE — PROGRESS NOTE ADULT - ASSESSMENT
2 yo M, PMHx Colon Mass s/p partial colectomy (2017), appendectomy, hernia repair, aortic and iliac aneurysm surgery (2017) s/p stent placement, HTN, HLD, pAfib on Eliquis, VTE/thrombophilia  s/p IVC filter placement, right knee ligament repair, PPM (2004, unknown indication), herniated disc and related surgery in [id number] complicated by spinal fusion, local hematoma, foot drop with chronic pain s/p failed intrathecal morphine pump, and left hip prosthesis.   Who presented for concern over cholecystotomy tube malfunction,    GI was consulted for consideration of EUS/ERCP in setting of dilated CBD and inability to undergo MRCP due to incompatible PPM    Cholecystitis s/p PTC  # Non-obstructive CBD stones seen on tube study  normal LTs and VS  Planned for CCY. Surgery requesting ERCP prior to CCY  Non-obstructive CAD, Intermediate to high cardiac risk per cards.   - Keep NPO past midnight for possible ERCP tomorrow  - Hold heparin gtt at midnight.    Recommendations discussed with primary team  Plan discussed with adv GI service attending    Prasad Cervantes MD  PGY-6 GI fellow  Pager: [phone]

## 2022-08-23 NOTE — PROGRESS NOTE ADULT - SUBJECTIVE AND OBJECTIVE BOX
GASTROENTEROLOGY PROGRESS NOTE  Patient seen and examined at bedside. Denied any abdominal pain.     PERTINENT REVIEW OF SYSTEMS:  As noted above    Allergies    Altace (Unknown)  penicillin (Unknown)    Intolerances      MEDICATIONS:  MEDICATIONS  (STANDING):  doxazosin 4 milliGRAM(s) Oral at bedtime  finasteride 5 milliGRAM(s) Oral daily  heparin  Infusion 1000 Unit(s)/Hr (10 mL/Hr) IV Continuous <Continuous>  hydrALAZINE 50 milliGRAM(s) Oral every 8 hours  metoprolol succinate ER 50 milliGRAM(s) Oral every 12 hours  nortriptyline 75 milliGRAM(s) Oral at bedtime  oxyCODONE  ER Tablet 20 milliGRAM(s) Oral every 8 hours  pantoprazole  Injectable 40 milliGRAM(s) IV Push daily  polyethylene glycol 3350 17 Gram(s) Oral two times a day  simvastatin 5 milliGRAM(s) Oral at bedtime    MEDICATIONS  (PRN):  ALPRAZolam 1 milliGRAM(s) Oral at bedtime PRN insomnia  diazepam    Tablet 2 milliGRAM(s) Oral daily PRN insomnia  ondansetron Injectable 4 milliGRAM(s) IV Push every 6 hours PRN Nausea and/or Vomiting  oxyCODONE    IR 15 milliGRAM(s) Oral every 4 hours PRN Moderate Pain (4 - 6)  oxyCODONE    IR 30 milliGRAM(s) Oral every 4 hours PRN Severe Pain (7 - 10)    Vital Signs Last 24 Hrs  T(C): 36.5 (23 Aug 2022 16:04), Max: 36.8 (22 Aug 2022 21:41)  T(F): 97.7 (23 Aug 2022 16:04), Max: 98.3 (22 Aug 2022 21:41)  HR: 63 (23 Aug 2022 16:04) (62 - 75)  BP: 132/81 (23 Aug 2022 16:04) (132/81 - 179/80)  BP(mean): --  RR: 17 (23 Aug 2022 16:04) (17 - 18)  SpO2: 96% (23 Aug 2022 16:04) (96% - 97%)    Parameters below as of 23 Aug 2022 16:04  Patient On (Oxygen Delivery Method): room air        08-22 @ 07:01  -  08-23 @ 07:00  --------------------------------------------------------  IN: 240 mL / OUT: 1420 mL / NET: -1180 mL    08-23 @ 07:01  - 08-23 @ 17:22  --------------------------------------------------------  IN: 380 mL / OUT: 780 mL / NET: -400 mL      PHYSICAL EXAM:    General: Well developed; in no acute distress  HEENT: MMM, conjunctiva and sclera clear  Gastrointestinal: Soft non-tender non-distended; PEC in place  Skin: Warm and dry. No obvious rash    LABS:                        9.4    7.32  )-----------( 312      ( 23 Aug 2022 07:02 )             29.2     08-23    132<L>  |  96  |  7   ----------------------------<  101<H>  4.0   |  27  |  1.20    Ca    8.5      23 Aug 2022 07:02  Phos  3.4     08-23  Mg     1.7     08-23    TPro  6.5  /  Alb  2.7<L>  /  TBili  0.4  /  DBili  x   /  AST  15  /  ALT  11  /  AlkPhos  82  08-23    PTT - ( 23 Aug 2022 11:57 )  PTT:72.8 sec      Urinalysis Basic - ( 23 Aug 2022 06:39 )    Color: Yellow / Appearance: Clear / SG: <=1.005 / pH: x  Gluc: x / Ketone: NEGATIVE  / Bili: Negative / Urobili: 0.2 E.U./dL   Blood: x / Protein: NEGATIVE mg/dL / Nitrite: NEGATIVE   Leuk Esterase: NEGATIVE / RBC: x / WBC x   Sq Epi: x / Non Sq Epi: x / Bacteria: x                RADIOLOGY & ADDITIONAL STUDIES:  Reviewed

## 2022-08-23 NOTE — PROGRESS NOTE ADULT - ASSESSMENT
73 y/o male with history of colon mass s/p partial colectomy (2017), appendectomy, hernia repair, aortic and iliac aneurysm surgery (2017) s/p stent placement, HTN, HLD, paroxysmal atrial fibrillation on Eliquis, Right LE DVT >5 years ago s/p IVC filter placement, right knee ligament repair, , herniated disc and related surgery in [id number] complicated by spinal fusion, local hematoma, foot drop with chronic pain s/p failed intrathecal morphine pump which caused bradycardia (s/p pacemaker implant [2004]) and left hip prosthesis.   On last admission, had newly reduced EF w/concern for stress induced CM s/p percutaneous cholecystostomy tube by IR, now presenting for bleeding at the perc yonis tube site getting evaluated for elective cholecystectomy.    # Cholecystitis s/p Percutaneous cholecystostomy   Plan for elective cholecystectomy; Defer assessment of jeannette-operative cardiac risk, and need for further cardiac evaluation to cardiology consult service.   Continue pain and nausea control     # Chronic pain, opiate dependence   Substantial opiate regimen as outpatient. Pain control while inpatient per primary team   [ ] recommend standing miralax while on current pain regimen.     #Paroxsymal Afib  # s/p PPM   - Defer recs re: Anti-coagulation to cardiology consult   -Continue Metoprolol , statin    #HTN   Continue metoprolol and hydralazine  Ensure adequate pain control   If SBP >180 mmHg persistently, [ ] Get bladder scan [ ] if pain well-controlled, and bladder scan shows no urinary retention, can use labetalol 10mg IV push if HR >60 bpm or hydralazine IV 5mg    # BPH - continue doxazosin, finasteride. Low threshold for repeating bladder scan (if new tachycardia, elevated BP)      #Hyponatremia   Sodium, Serum: 132 mmol/L (08-23-22 @ 07:02)  Sodium, Serum: 132 mmol/L (08-22-22 @ 06:17)  mild hyponatremia   Lara >30 ; possible component of SIADH from pain ; Pain control ; continue to trend Serum Na while inpatient     DVT ppx - already on heparin drip

## 2022-08-24 LAB
ALBUMIN SERPL ELPH-MCNC: 2.5 G/DL — LOW (ref 3.3–5)
ALP SERPL-CCNC: 79 U/L — SIGNIFICANT CHANGE UP (ref 40–120)
ALT FLD-CCNC: 10 U/L — SIGNIFICANT CHANGE UP (ref 10–45)
ANION GAP SERPL CALC-SCNC: 8 MMOL/L — SIGNIFICANT CHANGE UP (ref 5–17)
APTT BLD: 34.7 SEC — SIGNIFICANT CHANGE UP (ref 27.5–35.5)
APTT BLD: 49.5 SEC — HIGH (ref 27.5–35.5)
AST SERPL-CCNC: 19 U/L — SIGNIFICANT CHANGE UP (ref 10–40)
BILIRUB DIRECT SERPL-MCNC: <0.2 MG/DL — SIGNIFICANT CHANGE UP (ref 0–0.3)
BILIRUB INDIRECT FLD-MCNC: SIGNIFICANT CHANGE UP (ref 0.2–1)
BILIRUB SERPL-MCNC: 0.4 MG/DL — SIGNIFICANT CHANGE UP (ref 0.2–1.2)
BLD GP AB SCN SERPL QL: NEGATIVE — SIGNIFICANT CHANGE UP
BUN SERPL-MCNC: 7 MG/DL — SIGNIFICANT CHANGE UP (ref 7–23)
CALCIUM SERPL-MCNC: 8.5 MG/DL — SIGNIFICANT CHANGE UP (ref 8.4–10.5)
CHLORIDE SERPL-SCNC: 99 MMOL/L — SIGNIFICANT CHANGE UP (ref 96–108)
CO2 SERPL-SCNC: 26 MMOL/L — SIGNIFICANT CHANGE UP (ref 22–31)
CREAT SERPL-MCNC: 1.08 MG/DL — SIGNIFICANT CHANGE UP (ref 0.5–1.3)
EGFR: 72 ML/MIN/1.73M2 — SIGNIFICANT CHANGE UP
GLUCOSE SERPL-MCNC: 98 MG/DL — SIGNIFICANT CHANGE UP (ref 70–99)
HCT VFR BLD CALC: 26.4 % — LOW (ref 39–50)
HGB BLD-MCNC: 8.4 G/DL — LOW (ref 13–17)
INR BLD: 1.15 — SIGNIFICANT CHANGE UP (ref 0.88–1.16)
MAGNESIUM SERPL-MCNC: 1.9 MG/DL — SIGNIFICANT CHANGE UP (ref 1.6–2.6)
MCHC RBC-ENTMCNC: 28.2 PG — SIGNIFICANT CHANGE UP (ref 27–34)
MCHC RBC-ENTMCNC: 31.8 GM/DL — LOW (ref 32–36)
MCV RBC AUTO: 88.6 FL — SIGNIFICANT CHANGE UP (ref 80–100)
NRBC # BLD: 0 /100 WBCS — SIGNIFICANT CHANGE UP (ref 0–0)
PHOSPHATE SERPL-MCNC: 3.3 MG/DL — SIGNIFICANT CHANGE UP (ref 2.5–4.5)
PLATELET # BLD AUTO: 332 K/UL — SIGNIFICANT CHANGE UP (ref 150–400)
POTASSIUM SERPL-MCNC: 4.2 MMOL/L — SIGNIFICANT CHANGE UP (ref 3.5–5.3)
POTASSIUM SERPL-SCNC: 4.2 MMOL/L — SIGNIFICANT CHANGE UP (ref 3.5–5.3)
PROT SERPL-MCNC: 6.4 G/DL — SIGNIFICANT CHANGE UP (ref 6–8.3)
PROTHROM AB SERPL-ACNC: 13.7 SEC — HIGH (ref 10.5–13.4)
RBC # BLD: 2.98 M/UL — LOW (ref 4.2–5.8)
RBC # FLD: 13.3 % — SIGNIFICANT CHANGE UP (ref 10.3–14.5)
RH IG SCN BLD-IMP: POSITIVE — SIGNIFICANT CHANGE UP
SARS-COV-2 RNA SPEC QL NAA+PROBE: SIGNIFICANT CHANGE UP
SODIUM SERPL-SCNC: 133 MMOL/L — LOW (ref 135–145)
WBC # BLD: 7.16 K/UL — SIGNIFICANT CHANGE UP (ref 3.8–10.5)
WBC # FLD AUTO: 7.16 K/UL — SIGNIFICANT CHANGE UP (ref 3.8–10.5)

## 2022-08-24 PROCEDURE — 99233 SBSQ HOSP IP/OBS HIGH 50: CPT

## 2022-08-24 PROCEDURE — 99232 SBSQ HOSP IP/OBS MODERATE 35: CPT

## 2022-08-24 RX ORDER — SODIUM CHLORIDE 9 MG/ML
1000 INJECTION, SOLUTION INTRAVENOUS
Refills: 0 | Status: DISCONTINUED | OUTPATIENT
Start: 2022-08-24 | End: 2022-08-25

## 2022-08-24 RX ORDER — HEPARIN SODIUM 5000 [USP'U]/ML
1000 INJECTION INTRAVENOUS; SUBCUTANEOUS
Qty: 25000 | Refills: 0 | Status: DISCONTINUED | OUTPATIENT
Start: 2022-08-24 | End: 2022-08-24

## 2022-08-24 RX ORDER — HEPARIN SODIUM 5000 [USP'U]/ML
10 INJECTION INTRAVENOUS; SUBCUTANEOUS
Qty: 25000 | Refills: 0 | Status: DISCONTINUED | OUTPATIENT
Start: 2022-08-24 | End: 2022-08-24

## 2022-08-24 RX ADMIN — Medication 50 MILLIGRAM(S): at 10:22

## 2022-08-24 RX ADMIN — OXYCODONE HYDROCHLORIDE 20 MILLIGRAM(S): 5 TABLET ORAL at 21:44

## 2022-08-24 RX ADMIN — Medication 50 MILLIGRAM(S): at 06:14

## 2022-08-24 RX ADMIN — HEPARIN SODIUM 12 UNIT(S)/HR: 5000 INJECTION INTRAVENOUS; SUBCUTANEOUS at 23:34

## 2022-08-24 RX ADMIN — OXYCODONE HYDROCHLORIDE 20 MILLIGRAM(S): 5 TABLET ORAL at 13:50

## 2022-08-24 RX ADMIN — Medication 50 MILLIGRAM(S): at 21:43

## 2022-08-24 RX ADMIN — Medication 1 MILLIGRAM(S): at 22:57

## 2022-08-24 RX ADMIN — Medication 4 MILLIGRAM(S): at 21:43

## 2022-08-24 RX ADMIN — HEPARIN SODIUM 10 UNIT(S)/HR: 5000 INJECTION INTRAVENOUS; SUBCUTANEOUS at 18:23

## 2022-08-24 RX ADMIN — OXYCODONE HYDROCHLORIDE 20 MILLIGRAM(S): 5 TABLET ORAL at 13:21

## 2022-08-24 RX ADMIN — OXYCODONE HYDROCHLORIDE 15 MILLIGRAM(S): 5 TABLET ORAL at 18:29

## 2022-08-24 RX ADMIN — Medication 50 MILLIGRAM(S): at 13:00

## 2022-08-24 RX ADMIN — NORTRIPTYLINE HYDROCHLORIDE 75 MILLIGRAM(S): 10 CAPSULE ORAL at 21:43

## 2022-08-24 RX ADMIN — OXYCODONE HYDROCHLORIDE 15 MILLIGRAM(S): 5 TABLET ORAL at 15:00

## 2022-08-24 RX ADMIN — SODIUM CHLORIDE 130 MILLILITER(S): 9 INJECTION, SOLUTION INTRAVENOUS at 06:13

## 2022-08-24 RX ADMIN — OXYCODONE HYDROCHLORIDE 15 MILLIGRAM(S): 5 TABLET ORAL at 14:26

## 2022-08-24 RX ADMIN — SIMVASTATIN 5 MILLIGRAM(S): 20 TABLET, FILM COATED ORAL at 21:43

## 2022-08-24 RX ADMIN — FINASTERIDE 5 MILLIGRAM(S): 5 TABLET, FILM COATED ORAL at 10:22

## 2022-08-24 RX ADMIN — OXYCODONE HYDROCHLORIDE 15 MILLIGRAM(S): 5 TABLET ORAL at 19:00

## 2022-08-24 RX ADMIN — OXYCODONE HYDROCHLORIDE 20 MILLIGRAM(S): 5 TABLET ORAL at 22:44

## 2022-08-24 RX ADMIN — PANTOPRAZOLE SODIUM 40 MILLIGRAM(S): 20 TABLET, DELAYED RELEASE ORAL at 10:22

## 2022-08-24 NOTE — DIETITIAN INITIAL EVALUATION ADULT - OTHER CALCULATIONS
lbs. %%. IBW used to calculate energy needs due to pt's current body weight exceeding 120% of IBW. Needs adjusted for planned yonis/procedure

## 2022-08-24 NOTE — PROGRESS NOTE ADULT - SUBJECTIVE AND OBJECTIVE BOX
SUBJECTIVE: Pt seen and examined by chief resident. Pt is doing well, resting comfortably on bed. Lethargic. Deneis abdominal pain. No nausea or vomiting. No complaints at this time.    Vital Signs Last 24 Hrs  T(C): 36.6 (24 Aug 2022 05:02), Max: 36.9 (23 Aug 2022 21:10)  T(F): 97.8 (24 Aug 2022 05:02), Max: 98.5 (23 Aug 2022 21:10)  HR: 62 (24 Aug 2022 05:02) (62 - 75)  BP: 134/82 (24 Aug 2022 05:02) (132/81 - 161/84)  BP(mean): --  RR: 18 (24 Aug 2022 05:02) (17 - 18)  SpO2: 96% (24 Aug 2022 05:02) (96% - 97%)    Parameters below as of 24 Aug 2022 05:02  Patient On (Oxygen Delivery Method): room air        I&O's Summary    23 Aug 2022 07:01  -  24 Aug 2022 07:00  --------------------------------------------------------  IN: 690 mL / OUT: 1710 mL / NET: -1020 mL        Physical Exam:  General Appearance: Appears well, NAD  Pulmonary: Nonlabored breathing, no respiratory distress  Abdomen: Soft, nondisteded, nontender, perc yonis tube in place with minimal bilious output  Extremities: WWP, SCD's in place     LABS:                        8.4    7.16  )-----------( 332      ( 24 Aug 2022 06:37 )             26.4     08-23    132<L>  |  96  |  7   ----------------------------<  101<H>  4.0   |  27  |  1.20    Ca    8.5      23 Aug 2022 07:02  Phos  3.4     08-23  Mg     1.7     08-23    TPro  6.5  /  Alb  2.7<L>  /  TBili  0.4  /  DBili  x   /  AST  15  /  ALT  11  /  AlkPhos  82  08-23    PT/INR - ( 24 Aug 2022 06:37 )   PT: 13.7 sec;   INR: 1.15          PTT - ( 24 Aug 2022 06:37 )  PTT:34.7 sec  Urinalysis Basic - ( 23 Aug 2022 06:39 )    Color: Yellow / Appearance: Clear / SG: <=1.005 / pH: x  Gluc: x / Ketone: NEGATIVE  / Bili: Negative / Urobili: 0.2 E.U./dL   Blood: x / Protein: NEGATIVE mg/dL / Nitrite: NEGATIVE   Leuk Esterase: NEGATIVE / RBC: x / WBC x   Sq Epi: x / Non Sq Epi: x / Bacteria: x

## 2022-08-24 NOTE — PROGRESS NOTE ADULT - ASSESSMENT
73 y/o male with history of colon mass s/p partial colectomy (2017), appendectomy, hernia repair, aortic and iliac aneurysm surgery (2017) s/p stent placement, HTN, HLD, paroxysmal atrial fibrillation on Eliquis, Right LE DVT >5 years ago s/p IVC filter placement, right knee ligament repair, , herniated disc and related surgery in [id number] complicated by spinal fusion, local hematoma, foot drop with chronic pain s/p failed intrathecal morphine pump which caused bradycardia (s/p pacemaker implant [2004]) and left hip prosthesis.   On last admission, had newly reduced EF w/concern for stress induced CM s/p percutaneous cholecystostomy tube by IR, now presenting for bleeding at the perc yonis tube site getting evaluated for elective cholecystectomy.    # Cholecystitis s/p Percutaneous cholecystostomy   Plan for ERCP and elective cholecystectomy; Defer assessment of jeannette-operative cardiac risk, and need for further cardiac evaluation to cardiology consult service.   Continue pain and nausea control     # Chronic pain, opiate dependence   Substantial opiate regimen as outpatient. Pain control while inpatient per primary team    standing miralax while on current pain regimen.     #Paroxsymal Afib  # s/p PPM   - Defer recs re: Anti-coagulation to cardiology consult   -Continue Metoprolol , statin    #HTN   Continue metoprolol and hydralazine  Ensure adequate pain control   If SBP >180 mmHg persistently, [ ] Get bladder scan [ ] if pain well-controlled, and bladder scan shows no urinary retention, can use labetalol 10mg IV push if HR >60 bpm or hydralazine IV 5mg  [ ] If BP remains elevated tomorrow, continue starting starting losartan (original plan was to start on discharge as part of GDMT for HF)     # BPH - continue doxazosin, finasteride. Low threshold for repeating bladder scan (if new tachycardia, elevated BP)          #Hyponatremia   Sodium, Serum: 133 mmol/L (08-24-22 @ 07:02)  Sodium, Serum: 132 mmol/L (08-22-22 @ 06:17)  mild hyponatremia   Lara >30 ; possible component of SIADH from pain ; Pain control ; continue to trend Serum Na while inpatient     DVT ppx - already on heparin drip

## 2022-08-24 NOTE — PROGRESS NOTE ADULT - SUBJECTIVE AND OBJECTIVE BOX
Patient is a 72y old  Male who presents with a chief complaint of bleeding around perc yonis site (24 Aug 2022 18:14)    INTERVAL EVENTS:  urinating without difficulty this AM  no nausea, no vomiting   last bowel movement yesterday    SUBJECTIVE:  Patient was seen and examined at bedside.  Review of systems: No fever, chills, CP, dyspnea, nausea or vomiting, dysuria, LE edema. Rest of 12 point Review of systems negative unless otherwise documented elsewhere in note.     Diet, NPO after Midnight:      NPO Start Date: 24-Aug-2022,   NPO Start Time: 23:59 (08-24-22 @ 16:58) [Active]      MEDICATIONS:  MEDICATIONS  (STANDING):  doxazosin 4 milliGRAM(s) Oral at bedtime  finasteride 5 milliGRAM(s) Oral daily  hydrALAZINE 50 milliGRAM(s) Oral every 8 hours  lactated ringers. 1000 milliLiter(s) (75 mL/Hr) IV Continuous <Continuous>  metoprolol succinate ER 50 milliGRAM(s) Oral every 12 hours  nortriptyline 75 milliGRAM(s) Oral at bedtime  oxyCODONE  ER Tablet 20 milliGRAM(s) Oral every 8 hours  pantoprazole  Injectable 40 milliGRAM(s) IV Push daily  polyethylene glycol 3350 17 Gram(s) Oral two times a day  simvastatin 5 milliGRAM(s) Oral at bedtime    MEDICATIONS  (PRN):  ALPRAZolam 1 milliGRAM(s) Oral at bedtime PRN insomnia  diazepam    Tablet 2 milliGRAM(s) Oral daily PRN insomnia  ondansetron Injectable 4 milliGRAM(s) IV Push every 6 hours PRN Nausea and/or Vomiting  oxyCODONE    IR 15 milliGRAM(s) Oral every 4 hours PRN Moderate Pain (4 - 6)  oxyCODONE    IR 30 milliGRAM(s) Oral every 4 hours PRN Severe Pain (7 - 10)      Allergies    Altace (Unknown)  penicillin (Unknown)    Intolerances        OBJECTIVE:  Vital Signs Last 24 Hrs  T(C): 36.6 (24 Aug 2022 20:31), Max: 36.7 (24 Aug 2022 14:42)  T(F): 97.8 (24 Aug 2022 20:31), Max: 98.1 (24 Aug 2022 14:42)  HR: 67 (24 Aug 2022 22:52) (62 - 67)  BP: 156/87 (24 Aug 2022 22:52) (134/82 - 170/90)  BP(mean): --  RR: 17 (24 Aug 2022 22:52) (17 - 18)  SpO2: 97% (24 Aug 2022 22:52) (96% - 98%)    Parameters below as of 24 Aug 2022 20:31  Patient On (Oxygen Delivery Method): room air      I&O's Summary    23 Aug 2022 07:01  -  24 Aug 2022 07:00  --------------------------------------------------------  IN: 690 mL / OUT: 1710 mL / NET: -1020 mL    24 Aug 2022 07:01  -  25 Aug 2022 00:42  --------------------------------------------------------  IN: 2097 mL / OUT: 1265 mL / NET: 832 mL          PHYSICAL EXAM:  Gen: Reclining in bed at time of exam, appears stated age  HEENT: NCAT, MMM, clear OP  Neck: trachea at midline  CV: RRR, +S1/S2  Pulm: adequate respiratory effort, no increase in work of breathing  Abd: soft, ND; cholecystostomy in place, with bilious drainage  Skin: warm and dry  Ext: WWP, no LE edema  Neuro: AOx3, no gross focal neurological deficits  Psych: affect and behavior appropriate, pleasant at time of interview    LABS:                        8.4    7.16  )-----------( 332      ( 24 Aug 2022 06:37 )             26.4     08-24    133<L>  |  99  |  7   ----------------------------<  98  4.2   |  26  |  1.08    Ca    8.5      24 Aug 2022 06:37  Phos  3.3     08-24  Mg     1.9     08-24    TPro  6.4  /  Alb  2.5<L>  /  TBili  0.4  /  DBili  <0.2  /  AST  19  /  ALT  10  /  AlkPhos  79  08-24    LIVER FUNCTIONS - ( 24 Aug 2022 06:37 )  Alb: 2.5 g/dL / Pro: 6.4 g/dL / ALK PHOS: 79 U/L / ALT: 10 U/L / AST: 19 U/L / GGT: x           PT/INR - ( 24 Aug 2022 06:37 )   PT: 13.7 sec;   INR: 1.15          PTT - ( 24 Aug 2022 23:07 )  PTT:49.5 sec  CAPILLARY BLOOD GLUCOSE        Urinalysis Basic - ( 23 Aug 2022 06:39 )    Color: Yellow / Appearance: Clear / SG: <=1.005 / pH: x  Gluc: x / Ketone: NEGATIVE  / Bili: Negative / Urobili: 0.2 E.U./dL   Blood: x / Protein: NEGATIVE mg/dL / Nitrite: NEGATIVE   Leuk Esterase: NEGATIVE / RBC: x / WBC x   Sq Epi: x / Non Sq Epi: x / Bacteria: x        MICRODATA:      RADIOLOGY/OTHER STUDIES:

## 2022-08-24 NOTE — DIETITIAN INITIAL EVALUATION ADULT - OTHER INFO
71 y/o male with history of colon mass s/p partial colectomy (2017), appendectomy, hernia repair, aortic and iliac aneurysm surgery (2017) s/p stent placement, HTN, HLD, paroxysmal atrial fibrillation on Eliquis, Right LE DVT >5 years ago s/p IVC filter placement, right knee ligament repair, , herniated disc and related surgery in [id number] complicated by spinal fusion, local hematoma, foot drop with chronic pain s/p failed intrathecal morphine pump which caused bradycardia (s/p pacemaker implant [2004]) and left hip prosthesis. On last admission, had newly reduced EF w/concern for stress induced CM s/p percutaneous cholecystostomy tube by IR, now presenting for bleeding at the perc yonis tube site getting evaluated for elective cholecystectomy. Planned for ERCP today, and then OR 8/26.    Pt seen resting in bed, was sleeping, but able wake yet still limited information obtained d/t fatigue. Appears well nourished. Pt was NPO at time of assessment, now to start on DASH, soft and bite sized diet this afternoon. Noted generalized pain 5/10, no n/v/d/c. +BM 8/22. Skin: Leonardo 17, no PU or edema. RD to follow per protocol.

## 2022-08-24 NOTE — DIETITIAN INITIAL EVALUATION ADULT - ADD RECOMMEND
1. DASH + Soft and bite sized diet  2. Pending yonis, rec low fat diet  3. BM and pain regimen per team  4. Monitor BMP, lytes, replete prn

## 2022-08-24 NOTE — PROGRESS NOTE ADULT - SUBJECTIVE AND OBJECTIVE BOX
GASTROENTEROLOGY PROGRESS NOTE  Patient seen and examined at bedside. Pt remains pain free. LT remain normal    PERTINENT REVIEW OF SYSTEMS:  As noted above    Allergies    Altace (Unknown)  penicillin (Unknown)    Intolerances      MEDICATIONS:  MEDICATIONS  (STANDING):  doxazosin 4 milliGRAM(s) Oral at bedtime  finasteride 5 milliGRAM(s) Oral daily  heparin  Infusion 1000 Unit(s)/Hr (10 mL/Hr) IV Continuous <Continuous>  hydrALAZINE 50 milliGRAM(s) Oral every 8 hours  lactated ringers. 1000 milliLiter(s) (75 mL/Hr) IV Continuous <Continuous>  metoprolol succinate ER 50 milliGRAM(s) Oral every 12 hours  nortriptyline 75 milliGRAM(s) Oral at bedtime  oxyCODONE  ER Tablet 20 milliGRAM(s) Oral every 8 hours  pantoprazole  Injectable 40 milliGRAM(s) IV Push daily  polyethylene glycol 3350 17 Gram(s) Oral two times a day  simvastatin 5 milliGRAM(s) Oral at bedtime    MEDICATIONS  (PRN):  ALPRAZolam 1 milliGRAM(s) Oral at bedtime PRN insomnia  diazepam    Tablet 2 milliGRAM(s) Oral daily PRN insomnia  ondansetron Injectable 4 milliGRAM(s) IV Push every 6 hours PRN Nausea and/or Vomiting  oxyCODONE    IR 15 milliGRAM(s) Oral every 4 hours PRN Moderate Pain (4 - 6)  oxyCODONE    IR 30 milliGRAM(s) Oral every 4 hours PRN Severe Pain (7 - 10)    Vital Signs Last 24 Hrs  T(C): 36.4 (24 Aug 2022 16:55), Max: 36.9 (23 Aug 2022 21:10)  T(F): 97.5 (24 Aug 2022 16:55), Max: 98.5 (23 Aug 2022 21:10)  HR: 67 (24 Aug 2022 16:55) (62 - 70)  BP: 168/84 (24 Aug 2022 16:55) (134/82 - 170/90)  BP(mean): --  RR: 18 (24 Aug 2022 16:55) (17 - 18)  SpO2: 97% (24 Aug 2022 16:55) (96% - 98%)    Parameters below as of 24 Aug 2022 16:55  Patient On (Oxygen Delivery Method): room air        08-23 @ 07:01  -  08-24 @ 07:00  --------------------------------------------------------  IN: 690 mL / OUT: 1710 mL / NET: -1020 mL    08-24 @ 07:01 - 08-24 @ 18:14  --------------------------------------------------------  IN: 1360 mL / OUT: 1015 mL / NET: 345 mL      PHYSICAL EXAM:    General: Well developed; in no acute distress  HEENT: MMM, conjunctiva and sclera clear  Gastrointestinal: Soft non-tender non-distended; No rebound or guarding. PTC drain in place  Skin: Warm and dry. No obvious rash    LABS:                        8.4    7.16  )-----------( 332      ( 24 Aug 2022 06:37 )             26.4     08-24    133<L>  |  99  |  7   ----------------------------<  98  4.2   |  26  |  1.08    Ca    8.5      24 Aug 2022 06:37  Phos  3.3     08-24  Mg     1.9     08-24    TPro  6.4  /  Alb  2.5<L>  /  TBili  0.4  /  DBili  <0.2  /  AST  19  /  ALT  10  /  AlkPhos  79  08-24    PT/INR - ( 24 Aug 2022 06:37 )   PT: 13.7 sec;   INR: 1.15          PTT - ( 24 Aug 2022 06:37 )  PTT:34.7 sec      Urinalysis Basic - ( 23 Aug 2022 06:39 )    Color: Yellow / Appearance: Clear / SG: <=1.005 / pH: x  Gluc: x / Ketone: NEGATIVE  / Bili: Negative / Urobili: 0.2 E.U./dL   Blood: x / Protein: NEGATIVE mg/dL / Nitrite: NEGATIVE   Leuk Esterase: NEGATIVE / RBC: x / WBC x   Sq Epi: x / Non Sq Epi: x / Bacteria: x                RADIOLOGY & ADDITIONAL STUDIES:  Reviewed

## 2022-08-24 NOTE — DIETITIAN INITIAL EVALUATION ADULT - PERTINENT MEDS FT
MEDICATIONS  (STANDING):  doxazosin 4 milliGRAM(s) Oral at bedtime  finasteride 5 milliGRAM(s) Oral daily  hydrALAZINE 50 milliGRAM(s) Oral every 8 hours  lactated ringers. 1000 milliLiter(s) (75 mL/Hr) IV Continuous <Continuous>  metoprolol succinate ER 50 milliGRAM(s) Oral every 12 hours  nortriptyline 75 milliGRAM(s) Oral at bedtime  oxyCODONE  ER Tablet 20 milliGRAM(s) Oral every 8 hours  pantoprazole  Injectable 40 milliGRAM(s) IV Push daily  polyethylene glycol 3350 17 Gram(s) Oral two times a day  simvastatin 5 milliGRAM(s) Oral at bedtime    MEDICATIONS  (PRN):  ALPRAZolam 1 milliGRAM(s) Oral at bedtime PRN insomnia  diazepam    Tablet 2 milliGRAM(s) Oral daily PRN insomnia  ondansetron Injectable 4 milliGRAM(s) IV Push every 6 hours PRN Nausea and/or Vomiting  oxyCODONE    IR 15 milliGRAM(s) Oral every 4 hours PRN Moderate Pain (4 - 6)  oxyCODONE    IR 30 milliGRAM(s) Oral every 4 hours PRN Severe Pain (7 - 10)

## 2022-08-24 NOTE — DIETITIAN INITIAL EVALUATION ADULT - NS FNS DIET ORDER
Diet, DASH/TLC:   Sodium & Cholesterol Restricted  Soft and Bite Sized ([id number]) (08-23-22 @ 17:40)

## 2022-08-24 NOTE — DIETITIAN INITIAL EVALUATION ADULT - ENTER TO (ML/KG)
Shift Summary:    Vitals with min/max:       Vital Last Value 24 Hour Range   Temperature 98.6 °F (37 °C) (09/26/21 1700) Temp  Min: 97.8 °F (36.6 °C)  Max: 98.8 °F (37.1 °C)   Pulse 84 (09/26/21 1800) Pulse  Min: 77  Max: 112   Respiratory (!) 29 (09/26/21 1800) Resp  Min: 20  Max: 29   Non-Invasive  Blood Pressure (!) 155/70 (09/26/21 1800) BP  Min: 134/64  Max: 159/69   Pulse Oximetry 94 % (09/26/21 1800) SpO2  Min: 86 %  Max: 97 %   Arterial   Blood Pressure   No data recorded        Neuro status: A&O x  4    Cardiac: A-fib chronic, controlled, on Coumadin, generalized edema, Lasix gtt@5mg/h, BLE edema     Respiratory: Oxygen bi-pap 10/5- 50% FIO2    : Voiding:  external female device, urine tea color, lasix gtt    GI: Bowel Tones:   Hypoactive, N/V ones, Zofran PRN    Incisions & Skin: bruising, ABD wound    Diet: cardiac diet, poor appetite, not eating t this time d/t continuous bi-pap    Activity:  As tolerated, resting in bed    I&O last 12 hours: see flow sheet    Neurovascular: denies numbness and tingling    Restraints: no    Pain: denies    Current infusions: Lasix gtt at 5mg/h    Pertinent labs: see flow sheet    MD communications: Dr. Lu updated on Pt in the unit, Zaroxolyn added     Patient or family concerns: son at the bedside updated on Pt condition    Plan of care/upcoming events:  Continue with bi-pap and Lasix gtt         35

## 2022-08-24 NOTE — PROGRESS NOTE ADULT - ASSESSMENT
2 yo M, PMHx Colon Mass s/p partial colectomy (2017), appendectomy, hernia repair, aortic and iliac aneurysm surgery (2017) s/p stent placement, HTN, HLD, pAfib on Eliquis, VTE/thrombophilia  s/p IVC filter placement, right knee ligament repair, PPM (2004, unknown indication), herniated disc and related surgery in [id number] complicated by spinal fusion, local hematoma, foot drop with chronic pain s/p failed intrathecal morphine pump, and left hip prosthesis.   Who presented for concern over cholecystotomy tube malfunction,    GI was consulted for consideration of EUS/ERCP in setting of dilated CBD and inability to undergo MRCP due to incompatible PPM    Cholecystitis s/p PTC drain by IR  # Non-obstructive CBD stones seen on tube study  normal LTs and VS  Planned for CCY. Surgery requesting ERCP prior to CCY  Non-obstructive CAD, Intermediate to high cardiac risk per cards.   - Keep NPO past midnight for possible ERCP tomorrow  - may continue hepatin gtt now and hold at midnight.    Recommendations discussed with primary team  Plan discussed with adv GI service attending    Prasad Cervantes MD  PGY-6 GI fellow  Pager: [phone]

## 2022-08-24 NOTE — PROGRESS NOTE ADULT - ASSESSMENT
73 y/o M with colon mass s/p partial colectomy (2017), appendectomy, hernia repair, aortic and iliac aneurysm surgery (2017) s/p stent placement, HTN, HLD, paroxysmal atrial fibrillation on Eliquis, blood clotting disorder s/p IVC filter placement, right knee ligament repair, pacemaker implant (2004) s/p recent ppm interrogation on Eliquis, herniated disc and related surgery in [id number] complicated by spinal fusion, local hematoma, foot drop with chronic pain s/p failed intrathecal morphine pump, recently admitted to [facility] on 08/07 with acute cholecystitis c/b cardiomyopathy s/p percutaneous cholecystostomy tube by IR who presented for concerns of bleeding at the perc yonis tube site. In ED, afebrile, HD stable, wbc 9.7, Hb 9.7 stable from 9.5 at discharge; LFTs wnl. PE with some blood at the dressing site but none in tubing, no hematoma or ecchymosis surrounding drain site. CTAP with percutaneous cholecystostomy catheter in collapsed gallbladder, confirmed with radiology department verbally. Plan for admission with interval cholecystectomy once cleared from cardiac perspective.    Regular diet, NPO@MN  Pain/nausea control  Home meds as appropriate  Hep gtt/OOBA/SCDs/IS  Cardiology clearance and optimization   AM Labs  Dispo: [facility] v HPT  OR 8/24

## 2022-08-24 NOTE — DIETITIAN INITIAL EVALUATION ADULT - PERTINENT LABORATORY DATA
08-24    133<L>  |  99  |  7   ----------------------------<  98  4.2   |  26  |  1.08    Ca    8.5      24 Aug 2022 06:37  Phos  3.3     08-24  Mg     1.9     08-24    TPro  6.4  /  Alb  2.5<L>  /  TBili  0.4  /  DBili  <0.2  /  AST  19  /  ALT  10  /  AlkPhos  79  08-24

## 2022-08-25 ENCOUNTER — TRANSCRIPTION ENCOUNTER (OUTPATIENT)
Age: 72
End: 2022-08-25

## 2022-08-25 LAB
ALBUMIN SERPL ELPH-MCNC: 2.3 G/DL — LOW (ref 3.3–5)
ALBUMIN SERPL ELPH-MCNC: 3 G/DL — LOW (ref 3.3–5)
ALP SERPL-CCNC: 84 U/L — SIGNIFICANT CHANGE UP (ref 40–120)
ALP SERPL-CCNC: 85 U/L — SIGNIFICANT CHANGE UP (ref 40–120)
ALT FLD-CCNC: 10 U/L — SIGNIFICANT CHANGE UP (ref 10–45)
ALT FLD-CCNC: 11 U/L — SIGNIFICANT CHANGE UP (ref 10–45)
ANION GAP SERPL CALC-SCNC: 5 MMOL/L — SIGNIFICANT CHANGE UP (ref 5–17)
ANION GAP SERPL CALC-SCNC: 6 MMOL/L — SIGNIFICANT CHANGE UP (ref 5–17)
APTT BLD: 35.4 SEC — SIGNIFICANT CHANGE UP (ref 27.5–35.5)
AST SERPL-CCNC: 15 U/L — SIGNIFICANT CHANGE UP (ref 10–40)
AST SERPL-CCNC: 21 U/L — SIGNIFICANT CHANGE UP (ref 10–40)
BILIRUB SERPL-MCNC: 0.4 MG/DL — SIGNIFICANT CHANGE UP (ref 0.2–1.2)
BILIRUB SERPL-MCNC: 0.5 MG/DL — SIGNIFICANT CHANGE UP (ref 0.2–1.2)
BUN SERPL-MCNC: 7 MG/DL — SIGNIFICANT CHANGE UP (ref 7–23)
BUN SERPL-MCNC: 8 MG/DL — SIGNIFICANT CHANGE UP (ref 7–23)
CALCIUM SERPL-MCNC: 9 MG/DL — SIGNIFICANT CHANGE UP (ref 8.4–10.5)
CALCIUM SERPL-MCNC: 9.1 MG/DL — SIGNIFICANT CHANGE UP (ref 8.4–10.5)
CHLORIDE SERPL-SCNC: 97 MMOL/L — SIGNIFICANT CHANGE UP (ref 96–108)
CHLORIDE SERPL-SCNC: 98 MMOL/L — SIGNIFICANT CHANGE UP (ref 96–108)
CO2 SERPL-SCNC: 26 MMOL/L — SIGNIFICANT CHANGE UP (ref 22–31)
CO2 SERPL-SCNC: 28 MMOL/L — SIGNIFICANT CHANGE UP (ref 22–31)
CREAT SERPL-MCNC: 1.16 MG/DL — SIGNIFICANT CHANGE UP (ref 0.5–1.3)
CREAT SERPL-MCNC: 1.18 MG/DL — SIGNIFICANT CHANGE UP (ref 0.5–1.3)
EGFR: 65 ML/MIN/1.73M2 — SIGNIFICANT CHANGE UP
EGFR: 67 ML/MIN/1.73M2 — SIGNIFICANT CHANGE UP
GLUCOSE BLDC GLUCOMTR-MCNC: 107 MG/DL — HIGH (ref 70–99)
GLUCOSE SERPL-MCNC: 102 MG/DL — HIGH (ref 70–99)
GLUCOSE SERPL-MCNC: 93 MG/DL — SIGNIFICANT CHANGE UP (ref 70–99)
HCT VFR BLD CALC: 29.8 % — LOW (ref 39–50)
HCT VFR BLD CALC: 31.9 % — LOW (ref 39–50)
HGB BLD-MCNC: 10.1 G/DL — LOW (ref 13–17)
HGB BLD-MCNC: 9.5 G/DL — LOW (ref 13–17)
LACTATE SERPL-SCNC: 0.9 MMOL/L — SIGNIFICANT CHANGE UP (ref 0.5–2)
MAGNESIUM SERPL-MCNC: 1.9 MG/DL — SIGNIFICANT CHANGE UP (ref 1.6–2.6)
MAGNESIUM SERPL-MCNC: 2 MG/DL — SIGNIFICANT CHANGE UP (ref 1.6–2.6)
MCHC RBC-ENTMCNC: 28.2 PG — SIGNIFICANT CHANGE UP (ref 27–34)
MCHC RBC-ENTMCNC: 29.1 PG — SIGNIFICANT CHANGE UP (ref 27–34)
MCHC RBC-ENTMCNC: 31.7 GM/DL — LOW (ref 32–36)
MCHC RBC-ENTMCNC: 31.9 GM/DL — LOW (ref 32–36)
MCV RBC AUTO: 89.1 FL — SIGNIFICANT CHANGE UP (ref 80–100)
MCV RBC AUTO: 91.1 FL — SIGNIFICANT CHANGE UP (ref 80–100)
NRBC # BLD: 0 /100 WBCS — SIGNIFICANT CHANGE UP (ref 0–0)
NRBC # BLD: 0 /100 WBCS — SIGNIFICANT CHANGE UP (ref 0–0)
PHOSPHATE SERPL-MCNC: 3.2 MG/DL — SIGNIFICANT CHANGE UP (ref 2.5–4.5)
PHOSPHATE SERPL-MCNC: 3.2 MG/DL — SIGNIFICANT CHANGE UP (ref 2.5–4.5)
PLATELET # BLD AUTO: 323 K/UL — SIGNIFICANT CHANGE UP (ref 150–400)
PLATELET # BLD AUTO: 327 K/UL — SIGNIFICANT CHANGE UP (ref 150–400)
POTASSIUM SERPL-MCNC: 4.2 MMOL/L — SIGNIFICANT CHANGE UP (ref 3.5–5.3)
POTASSIUM SERPL-MCNC: 4.9 MMOL/L — SIGNIFICANT CHANGE UP (ref 3.5–5.3)
POTASSIUM SERPL-SCNC: 4.2 MMOL/L — SIGNIFICANT CHANGE UP (ref 3.5–5.3)
POTASSIUM SERPL-SCNC: 4.9 MMOL/L — SIGNIFICANT CHANGE UP (ref 3.5–5.3)
PROT SERPL-MCNC: 7 G/DL — SIGNIFICANT CHANGE UP (ref 6–8.3)
PROT SERPL-MCNC: 7.4 G/DL — SIGNIFICANT CHANGE UP (ref 6–8.3)
RBC # BLD: 3.27 M/UL — LOW (ref 4.2–5.8)
RBC # BLD: 3.58 M/UL — LOW (ref 4.2–5.8)
RBC # FLD: 13.4 % — SIGNIFICANT CHANGE UP (ref 10.3–14.5)
RBC # FLD: 13.7 % — SIGNIFICANT CHANGE UP (ref 10.3–14.5)
SARS-COV-2 RNA SPEC QL NAA+PROBE: SIGNIFICANT CHANGE UP
SODIUM SERPL-SCNC: 129 MMOL/L — LOW (ref 135–145)
SODIUM SERPL-SCNC: 131 MMOL/L — LOW (ref 135–145)
WBC # BLD: 6.42 K/UL — SIGNIFICANT CHANGE UP (ref 3.8–10.5)
WBC # BLD: 7.41 K/UL — SIGNIFICANT CHANGE UP (ref 3.8–10.5)
WBC # FLD AUTO: 6.42 K/UL — SIGNIFICANT CHANGE UP (ref 3.8–10.5)
WBC # FLD AUTO: 7.41 K/UL — SIGNIFICANT CHANGE UP (ref 3.8–10.5)

## 2022-08-25 PROCEDURE — 99233 SBSQ HOSP IP/OBS HIGH 50: CPT

## 2022-08-25 PROCEDURE — 70450 CT HEAD/BRAIN W/O DYE: CPT | Mod: 26

## 2022-08-25 PROCEDURE — 99232 SBSQ HOSP IP/OBS MODERATE 35: CPT

## 2022-08-25 PROCEDURE — 99221 1ST HOSP IP/OBS SF/LOW 40: CPT

## 2022-08-25 RX ORDER — SODIUM CHLORIDE 9 MG/ML
1000 INJECTION, SOLUTION INTRAVENOUS
Refills: 0 | Status: DISCONTINUED | OUTPATIENT
Start: 2022-08-25 | End: 2022-08-28

## 2022-08-25 RX ORDER — SODIUM CHLORIDE 9 MG/ML
1000 INJECTION INTRAMUSCULAR; INTRAVENOUS; SUBCUTANEOUS
Refills: 0 | Status: DISCONTINUED | OUTPATIENT
Start: 2022-08-25 | End: 2022-08-25

## 2022-08-25 RX ORDER — HEPARIN SODIUM 5000 [USP'U]/ML
1000 INJECTION INTRAVENOUS; SUBCUTANEOUS
Qty: 25000 | Refills: 0 | Status: DISCONTINUED | OUTPATIENT
Start: 2022-08-25 | End: 2022-08-26

## 2022-08-25 RX ORDER — SODIUM CHLORIDE 9 MG/ML
2 INJECTION INTRAMUSCULAR; INTRAVENOUS; SUBCUTANEOUS
Refills: 0 | Status: COMPLETED | OUTPATIENT
Start: 2022-08-25 | End: 2022-08-26

## 2022-08-25 RX ADMIN — Medication 50 MILLIGRAM(S): at 21:18

## 2022-08-25 RX ADMIN — OXYCODONE HYDROCHLORIDE 20 MILLIGRAM(S): 5 TABLET ORAL at 13:54

## 2022-08-25 RX ADMIN — NORTRIPTYLINE HYDROCHLORIDE 75 MILLIGRAM(S): 10 CAPSULE ORAL at 21:17

## 2022-08-25 RX ADMIN — Medication 50 MILLIGRAM(S): at 13:53

## 2022-08-25 RX ADMIN — SODIUM CHLORIDE 2 GRAM(S): 9 INJECTION INTRAMUSCULAR; INTRAVENOUS; SUBCUTANEOUS at 21:16

## 2022-08-25 RX ADMIN — Medication 1 MILLIGRAM(S): at 23:10

## 2022-08-25 RX ADMIN — SIMVASTATIN 5 MILLIGRAM(S): 20 TABLET, FILM COATED ORAL at 21:16

## 2022-08-25 RX ADMIN — OXYCODONE HYDROCHLORIDE 20 MILLIGRAM(S): 5 TABLET ORAL at 22:00

## 2022-08-25 RX ADMIN — SODIUM CHLORIDE 75 MILLILITER(S): 9 INJECTION, SOLUTION INTRAVENOUS at 12:36

## 2022-08-25 RX ADMIN — OXYCODONE HYDROCHLORIDE 20 MILLIGRAM(S): 5 TABLET ORAL at 21:20

## 2022-08-25 RX ADMIN — HEPARIN SODIUM 10 UNIT(S)/HR: 5000 INJECTION INTRAVENOUS; SUBCUTANEOUS at 17:21

## 2022-08-25 RX ADMIN — OXYCODONE HYDROCHLORIDE 15 MILLIGRAM(S): 5 TABLET ORAL at 19:00

## 2022-08-25 RX ADMIN — OXYCODONE HYDROCHLORIDE 15 MILLIGRAM(S): 5 TABLET ORAL at 20:01

## 2022-08-25 RX ADMIN — SODIUM CHLORIDE 80 MILLILITER(S): 9 INJECTION, SOLUTION INTRAVENOUS at 23:11

## 2022-08-25 RX ADMIN — SODIUM CHLORIDE 2 GRAM(S): 9 INJECTION INTRAMUSCULAR; INTRAVENOUS; SUBCUTANEOUS at 18:53

## 2022-08-25 RX ADMIN — OXYCODONE HYDROCHLORIDE 30 MILLIGRAM(S): 5 TABLET ORAL at 12:30

## 2022-08-25 RX ADMIN — Medication 4 MILLIGRAM(S): at 21:17

## 2022-08-25 RX ADMIN — PANTOPRAZOLE SODIUM 40 MILLIGRAM(S): 20 TABLET, DELAYED RELEASE ORAL at 12:36

## 2022-08-25 RX ADMIN — Medication 50 MILLIGRAM(S): at 11:31

## 2022-08-25 RX ADMIN — FINASTERIDE 5 MILLIGRAM(S): 5 TABLET, FILM COATED ORAL at 12:36

## 2022-08-25 RX ADMIN — OXYCODONE HYDROCHLORIDE 20 MILLIGRAM(S): 5 TABLET ORAL at 14:43

## 2022-08-25 RX ADMIN — Medication 50 MILLIGRAM(S): at 06:50

## 2022-08-25 RX ADMIN — SODIUM CHLORIDE 80 MILLILITER(S): 9 INJECTION, SOLUTION INTRAVENOUS at 17:41

## 2022-08-25 RX ADMIN — OXYCODONE HYDROCHLORIDE 30 MILLIGRAM(S): 5 TABLET ORAL at 11:31

## 2022-08-25 NOTE — CONSULT NOTE ADULT - TIME BILLING
Pt is a 73 y/o man c colona mass s/p partial colectomy 2017, HTN, HLP, p-afib, PPM, herniated disc now for elective yonis.
review of patient information including recent vital signs, labs, imaging, and notes; assessing, examining patient; updating patient/family; discussion and coordination of care with multidisciplinary team.

## 2022-08-25 NOTE — CONSULT NOTE ADULT - SUBJECTIVE AND OBJECTIVE BOX
STROKE CODE CONSULT NOTE    Last known well time/Time of onset of symptoms: appr 0830 am on 08/25    HPI: 73 y/o M with colon mass s/p partial colectomy (2017), appendectomy, hernia repair, aortic and iliac aneurysm surgery (2017) s/p stent placement, HTN, HLD, paroxysmal atrial fibrillation on Eliquis, blood clotting disorder s/p IVC filter placement, right knee ligament repair, pacemaker implant (2004) s/p recent ppm interrogation on Eliquis, herniated disc and related surgery in [id number] complicated by spinal fusion, local hematoma, foot drop with chronic pain s/p failed intrathecal morphine pump which caused bradycardia and left hip prosthesis. Pt was recently admitted to [facility] on 08/07 for acute cholecystitis hospital course c/b Takotsubo cardiomyopathy requiring MICU care, hence was not a surgical candidate @ that point of time, D/C'd to [facility] post Per Yonis by IR. Pt was sent back to [facility] for bleeding around perc yonis tube, CTAP with percutaneous cholecystostomy catheter in collapsed gallbladder, was admitted under surgery with a plan for ERCP on 08/25 Pre cholecystectomy on 08/26. Pt with Hx of chronic opioid use disorder (on chronic oxycodone with a tentative plan to switch to dilaudid), on heparin gtt for afib with PTT goal of 60-90 per primary team, not therapeutic yet was @ his baseline @ 08:30am on 08/25.    Pt sent to Endo suite for ERCP. There pt noted to be AAOX4, but noted by anesthesiologist to be minimally interactive, lethargic during the consent process and when asked to sign the name, pt was unable to sign, instead omer a drooping line and hence stroke code was called. Pt initially examined by the stroke team @ endo suite, where pt looked AAO x 4, but refused to participate in much of the exam 2/2 back pain. Noted to be following commands on and off, moves B/L UE, refusal to move b/l LE 2// back pain, noted to be bending @ knee level and wiggling his toes inconsistently. States he chose not to even thou he can. Pt was taken to imaging on 3rd floor, where pt noted to be much more awake, alert, oriented X4, able to state that he is in the hospital for his gall bladder issues and Non con CTh was done which was negative for any acute intracranial pathology. Unable to obtain CTA H/N 2/2 lack of functioning adequate size IV access, attempted by primary team to obtain functioning IV access while on CT table, initially unsuccessful and was attempted with U/S which was unsuccessful. Pt @ that point of time agitated, yelling @ team members, refusing further IV attempts, CTAs and refusing further care @ that point of time. Pt also noted to be not responding to verbal commands briefly, but responded to sternal rub screaming and yelling. Pt made aware extensively, given his unresponsive episodes the importance of obtaining CTA H/N adn CTP, but pt refusing any further treatment @ that point. Case D/W Dr. Madison, given his non localising symptoms , challenging exam 2/2 pts non participatory status and return of pt to baseline status, deferred CTA H/N and CTP 2/2 pt refusal and low suspicion for LVO. Pt not a tPA candidate 2/2 non focalising and rapidly improving symptoms. Pt was upgraded to tele floor per primary team and was noted to be ambulating to his bed from stretcher with assistance which is his baseline 2/2 back pain and spinal fusion.        T(C): 36.4 (08-25-22 @ 10:54), Max: 36.7 (08-24-22 @ 14:42)  HR: 60 (08-25-22 @ 10:54) (60 - 68)  BP: 161/81 (08-25-22 @ 10:54) (111/72 - 170/90)  RR: 18 (08-25-22 @ 10:54) (17 - 18)  SpO2: 99% (08-25-22 @ 10:54) (97% - 99%)    PAST MEDICAL & SURGICAL HISTORY:  AAA (abdominal aortic aneurysm)  HTN (hypertension)  Cardiomyopathy  Hyperlipidemia  ONOFRE (dyspnea on exertion)  DVT (deep venous thrombosis)  Pulmonary emphysema  COPD  Cardiac arrhythmia  Aneurysm of aortic root  Depression  anxiety  Anemia  Pelvic mass  Pacemaker  medtronic  History of kidney surgery  History of back surgery  multiple, lumbar  Surgery, elective  multiple neck surgery, cervical  S/P hip replacement, left  S/P arthroscopy of right knee  S/P AAA (abdominal aortic aneurysm) repair  with right iliac aneurysm endovascular repair  S/P carpal tunnel release  Surgery, elective  Cardiac Stent x4  Surgery, elective  Intrathecal pump placement        ROS: Unable to obtain 2/2 pts non participator status.        MEDICATIONS  (STANDING):  doxazosin 4 milliGRAM(s) Oral at bedtime  finasteride 5 milliGRAM(s) Oral daily  hydrALAZINE 50 milliGRAM(s) Oral every 8 hours  lactated ringers. 1000 milliLiter(s) (75 mL/Hr) IV Continuous <Continuous>  metoprolol succinate ER 50 milliGRAM(s) Oral every 12 hours  nortriptyline 75 milliGRAM(s) Oral at bedtime  oxyCODONE  ER Tablet 20 milliGRAM(s) Oral every 8 hours  pantoprazole  Injectable 40 milliGRAM(s) IV Push daily  polyethylene glycol 3350 17 Gram(s) Oral two times a day  simvastatin 5 milliGRAM(s) Oral at bedtime    MEDICATIONS  (PRN):  ALPRAZolam 1 milliGRAM(s) Oral at bedtime PRN insomnia  diazepam    Tablet 2 milliGRAM(s) Oral daily PRN insomnia  ondansetron Injectable 4 milliGRAM(s) IV Push every 6 hours PRN Nausea and/or Vomiting  oxyCODONE    IR 15 milliGRAM(s) Oral every 4 hours PRN Moderate Pain (4 - 6)  oxyCODONE    IR 30 milliGRAM(s) Oral every 4 hours PRN Severe Pain (7 - 10)    Allergies    Altace (Unknown)  penicillin (Unknown)        Vital Signs Last 24 Hrs  T(C): 36.4 (25 Aug 2022 10:54), Max: 36.7 (24 Aug 2022 14:42)  T(F): 97.6 (25 Aug 2022 10:54), Max: 98.1 (24 Aug 2022 14:42)  HR: 60 (25 Aug 2022 10:54) (60 - 68)  BP: 161/81 (25 Aug 2022 10:54) (111/72 - 170/90)  BP(mean): --  RR: 18 (25 Aug 2022 10:54) (17 - 18)  SpO2: 99% (25 Aug 2022 10:54) (97% - 99%)    Parameters below as of 25 Aug 2022 10:54  Patient On (Oxygen Delivery Method): room air      Physical exam:  Constitutional: No acute distress, conversant, agitated @ times.    Neurologic:  -Mental status: Awake, alert, oriented to person, place, and time. Speech is fluent with intact naming, repetition, and comprehension, no dysarthria. Recent and remote memory intact. Follows commands. Attention/concentration intact. Fund of knowledge appropriate.  -Cranial nerves:   II: Visual fields are full to confrontation.  III, IV, VI: Extraocular movements are intact without nystagmus. Pupils equally round and reactive to light  V:  Facial sensation V1-V3 equal and intact   VII: Face is symmetric with normal eye closure and smile  VIII: Hearing is bilaterally intact to finger rub  IX, X: Uvula is midline and soft palate rises symmetrically  XII: Tongue protrudes midline  Motor: Normal bulk and tone. Moves B/L UE antigravity with good  strength. B/L LE refusal to lift off the bed, noted to be wiggling his toes.  Sensation: Intact to light touch bilaterally. Responds to noxious X4.  Coordination: unabel to perform as pt refused to participate.  Gait: ambulated with assistance.    NIHSS: 7    Fingerstick Blood Glucose: CAPILLARY BLOOD GLUCOSE  POCT Blood Glucose.: 107 mg/dL (25 Aug 2022 09:29)    LABS:                        10.1   7.41  )-----------( 323      ( 25 Aug 2022 10:01 )             31.9     08-25    131<L>  |  97  |  8   ----------------------------<  102<H>  4.9   |  28  |  1.16    Ca    9.1      25 Aug 2022 10:01  Phos  3.2     08-25  Mg     1.9     08-25    TPro  7.4  /  Alb  3.0<L>  /  TBili  0.5  /  DBili  x   /  AST  21  /  ALT  11  /  AlkPhos  85  08-25    PT/INR - ( 24 Aug 2022 06:37 )   PT: 13.7 sec;   INR: 1.15          PTT - ( 25 Aug 2022 06:48 )  PTT:35.4 sec          RADIOLOGY & ADDITIONAL STUDIES:    CT Brain Stroke Protocol (08.25.22 @ 10:29)   IMPRESSION: No intracranial hemorrhage or acute transcortical infarct.            -----------------------------------------------------------------------------------------------------------------  IV-tPA (Y/N):    No                            Reason IV-tPA not given: Non focal and rapidly improving symptoms   STROKE CODE CONSULT NOTE    Last known well time/Time of onset of symptoms: appr 0830 am on 08/25    HPI: 73 y/o M with colon mass s/p partial colectomy (2017), appendectomy, hernia repair, aortic and iliac aneurysm surgery (2017) s/p stent placement, HTN, HLD, paroxysmal atrial fibrillation on Eliquis, blood clotting disorder s/p IVC filter placement, right knee ligament repair, pacemaker implant (2004) s/p recent ppm interrogation on Eliquis, herniated disc and related surgery in [id number] complicated by spinal fusion, local hematoma, foot drop with chronic pain s/p failed intrathecal morphine pump which caused bradycardia and left hip prosthesis. Pt was recently admitted to [facility] on 08/07 for acute cholecystitis hospital course c/b Takotsubo cardiomyopathy requiring MICU care, hence was not a surgical candidate @ that point of time, D/C'd to [facility] post Per Yonis by IR. Pt was sent back to [facility] for bleeding around perc yonis tube, CTAP with percutaneous cholecystostomy catheter in collapsed gallbladder, was admitted under surgery with a plan for ERCP on 08/25 Pre cholecystectomy on 08/26. Pt with Hx of chronic opioid use disorder (on chronic oxycodone with a tentative plan to switch to dilaudid), on heparin gtt for afib with PTT goal of 60-90 per primary team, not therapeutic yet was @ his baseline @ 08:30am on 08/25.    Pt sent to Endo suite for ERCP. There pt noted to be AAOX4, but noted by anesthesiologist to be minimally interactive, lethargic during the consent process and when asked to sign the name, pt was unable to sign, instead omer a drooping line and hence stroke code was called. Pt initially examined by the stroke team @ endo suite, where pt looked AAO x 4, but refused to participate in much of the exam 2/2 back pain. Noted to be following commands on and off, moves B/L UE, refusal to move b/l LE 2// back pain, noted to be bending @ knee level and wiggling his toes inconsistently. States he chose not to even thou he can. Pt was taken to imaging on 3rd floor, where pt noted to be much more awake, alert, oriented X4, able to state that he is in the hospital for his gall bladder issues and Non con CTh was done which was negative for any acute intracranial pathology. Unable to obtain CTA H/N 2/2 lack of functioning adequate size IV access, attempted by primary team to obtain functioning IV access while on CT table, initially unsuccessful and was attempted with U/S which was unsuccessful. Pt @ that point of time agitated, yelling @ team members, refusing further IV attempts, CTAs and refusing further care @ that point of time. Pt also noted to be not responding to verbal commands briefly, but responded to sternal rub screaming and yelling. Pt made aware extensively, given his unresponsive episodes the importance of obtaining CTA H/N adn CTP, but pt refusing any further treatment @ that point. Case D/W Dr. Madison, given his non localising symptoms , challenging exam 2/2 pts non participatory status and return of pt to baseline status, deferred CTA H/N and CTP 2/2 pt refusal and low suspicion for LVO. Pt not a tPA candidate 2/2 non focalising and rapidly improving symptoms. Pt was upgraded to tele floor per primary team and was noted to be ambulating to his bed from stretcher with assistance which is his baseline 2/2 back pain and spinal fusion.    Later when evaluated by , pt appeared calm and co operative, stated he was able to vividly remember what happened with non focal exam.        T(C): 36.4 (08-25-22 @ 10:54), Max: 36.7 (08-24-22 @ 14:42)  HR: 60 (08-25-22 @ 10:54) (60 - 68)  BP: 161/81 (08-25-22 @ 10:54) (111/72 - 170/90)  RR: 18 (08-25-22 @ 10:54) (17 - 18)  SpO2: 99% (08-25-22 @ 10:54) (97% - 99%)    PAST MEDICAL & SURGICAL HISTORY:  AAA (abdominal aortic aneurysm)  HTN (hypertension)  Cardiomyopathy  Hyperlipidemia  ONOFRE (dyspnea on exertion)  DVT (deep venous thrombosis)  Pulmonary emphysema  COPD  Cardiac arrhythmia  Aneurysm of aortic root  Depression  anxiety  Anemia  Pelvic mass  Pacemaker  medtronic  History of kidney surgery  History of back surgery  multiple, lumbar  Surgery, elective  multiple neck surgery, cervical  S/P hip replacement, left  S/P arthroscopy of right knee  S/P AAA (abdominal aortic aneurysm) repair  with right iliac aneurysm endovascular repair  S/P carpal tunnel release  Surgery, elective  Cardiac Stent x4  Surgery, elective  Intrathecal pump placement        ROS: Unable to obtain 2/2 pts non participator status.        MEDICATIONS  (STANDING):  doxazosin 4 milliGRAM(s) Oral at bedtime  finasteride 5 milliGRAM(s) Oral daily  hydrALAZINE 50 milliGRAM(s) Oral every 8 hours  lactated ringers. 1000 milliLiter(s) (75 mL/Hr) IV Continuous <Continuous>  metoprolol succinate ER 50 milliGRAM(s) Oral every 12 hours  nortriptyline 75 milliGRAM(s) Oral at bedtime  oxyCODONE  ER Tablet 20 milliGRAM(s) Oral every 8 hours  pantoprazole  Injectable 40 milliGRAM(s) IV Push daily  polyethylene glycol 3350 17 Gram(s) Oral two times a day  simvastatin 5 milliGRAM(s) Oral at bedtime    MEDICATIONS  (PRN):  ALPRAZolam 1 milliGRAM(s) Oral at bedtime PRN insomnia  diazepam    Tablet 2 milliGRAM(s) Oral daily PRN insomnia  ondansetron Injectable 4 milliGRAM(s) IV Push every 6 hours PRN Nausea and/or Vomiting  oxyCODONE    IR 15 milliGRAM(s) Oral every 4 hours PRN Moderate Pain (4 - 6)  oxyCODONE    IR 30 milliGRAM(s) Oral every 4 hours PRN Severe Pain (7 - 10)    Allergies    Altace (Unknown)  penicillin (Unknown)        Vital Signs Last 24 Hrs  T(C): 36.4 (25 Aug 2022 10:54), Max: 36.7 (24 Aug 2022 14:42)  T(F): 97.6 (25 Aug 2022 10:54), Max: 98.1 (24 Aug 2022 14:42)  HR: 60 (25 Aug 2022 10:54) (60 - 68)  BP: 161/81 (25 Aug 2022 10:54) (111/72 - 170/90)  BP(mean): --  RR: 18 (25 Aug 2022 10:54) (17 - 18)  SpO2: 99% (25 Aug 2022 10:54) (97% - 99%)    Parameters below as of 25 Aug 2022 10:54  Patient On (Oxygen Delivery Method): room air      Physical exam:  Constitutional: No acute distress, conversant, agitated @ times.    Neurologic:  -Mental status: Awake, alert, oriented to person, place, and time. Speech is fluent with intact naming, repetition, and comprehension, no dysarthria. Recent and remote memory intact. Follows commands. Attention/concentration intact. Fund of knowledge appropriate.  -Cranial nerves:   II: Visual fields are full to confrontation.  III, IV, VI: Extraocular movements are intact without nystagmus. Pupils equally round and reactive to light  V:  Facial sensation V1-V3 equal and intact   VII: Face is symmetric with normal eye closure and smile  VIII: Hearing is bilaterally intact to finger rub  IX, X: Uvula is midline and soft palate rises symmetrically  XII: Tongue protrudes midline  Motor: Normal bulk and tone. Moves B/L UE antigravity with good  strength. B/L LE refusal to lift off the bed, noted to be wiggling his toes.  Sensation: Intact to light touch bilaterally. Responds to noxious X4.  Coordination: unabel to perform as pt refused to participate.  Gait: ambulated with assistance.    NIHSS: 7    Fingerstick Blood Glucose: CAPILLARY BLOOD GLUCOSE  POCT Blood Glucose.: 107 mg/dL (25 Aug 2022 09:29)    LABS:                        10.1   7.41  )-----------( 323      ( 25 Aug 2022 10:01 )             31.9     08-25    131<L>  |  97  |  8   ----------------------------<  102<H>  4.9   |  28  |  1.16    Ca    9.1      25 Aug 2022 10:01  Phos  3.2     08-25  Mg     1.9     08-25    TPro  7.4  /  Alb  3.0<L>  /  TBili  0.5  /  DBili  x   /  AST  21  /  ALT  11  /  AlkPhos  85  08-25    PT/INR - ( 24 Aug 2022 06:37 )   PT: 13.7 sec;   INR: 1.15          PTT - ( 25 Aug 2022 06:48 )  PTT:35.4 sec          RADIOLOGY & ADDITIONAL STUDIES:    CT Brain Stroke Protocol (08.25.22 @ 10:29)   IMPRESSION: No intracranial hemorrhage or acute transcortical infarct.            -----------------------------------------------------------------------------------------------------------------  IV-tPA (Y/N):    No                            Reason IV-tPA not given: Non focal and rapidly improving symptoms

## 2022-08-25 NOTE — PROGRESS NOTE ADULT - ASSESSMENT
72M with complex PMH including colon mass s/p partial colectomy (2017),  HTN, HLD, pAF (on Eliquis), ?blood clotting disorder s/p IVC filter placement, PPM (2004), herniated disc and related surgery in [id number] complicated by spinal fusion, foot drop with chronic pain s/p failed intrathecal morphine pump, and recent history of newly reduced EF w/concern for stress induced CM, now presenting for elective cholecystectomy (with planned ERCP prior) - procedure aborted after Stroke code called for AMS.     #Pre-operative Risk Assessment  Cardiology following on recent admission for jeannette-operative risk stratification for cholecystectomy, hospital course complicated by HTN emergency and acute EF drop (with suspicion for stress induced CM)now s/p CCTA with nonobstructive CAD. Patient was deemed intermediate to high risk for intermediate risk elective surgery (Cholecystectomy with pre-op ERCP). 8/25 Procedure cancelled after stroke code called in endoscopy suit, patient now refusing further intervention.  - CCTA: nonobstructive CAD  - TTE 8/22: Borderline normal left ventricular systolic function. Left ventricular ejection fraction is 50-55%.  - Hydralazine 50 mg TID  - c/w Toprol 50mg BID - can transition to Toprol 100mg QD for discharge.  - Can hold off on re-initiation of Entresto 24-26 given improvement in ejection fraction.  - Not currently on first line antihypertensive therapy - can start Losartan 50mg or Nifedipine 30mg QD for discharge post-operatively  - Outpatient follow up with primary care physician and cardiology    #Atrial Fibrillation  CHADSVAC 5 points  - Rate control with Toprol 50mg BID for now  - Can restart Eliquis 5 mg BID when cleared from surgical standpoint.    Plan discussed with cardiology consult attending, cardiology will sign off, please re-consult with any further questions.

## 2022-08-25 NOTE — PROGRESS NOTE ADULT - ASSESSMENT
71 y/o male with history of colon mass s/p partial colectomy (2017), appendectomy, hernia repair, aortic and iliac aneurysm surgery (2017) s/p stent placement, HTN, HLD, paroxysmal atrial fibrillation on Eliquis, Right LE DVT >5 years ago s/p IVC filter placement, right knee ligament repair, , herniated disc and related surgery in [id number] complicated by spinal fusion, local hematoma, foot drop with chronic pain s/p failed intrathecal morphine pump which caused bradycardia (s/p pacemaker implant [2004]) and left hip prosthesis.   On last admission, had newly reduced EF w/concern for stress induced CM s/p percutaneous cholecystostomy tube by IR, now presenting for bleeding at the perc yonis tube site getting evaluated for elective cholecystectomy.    # Cholecystitis s/p Percutaneous cholecystostomy   Plan for ERCP and elective cholecystectomy; Defer assessment of jeannette-operative cardiac risk, and need for further cardiac evaluation to cardiology consult service.   Continue pain and nausea control     # Chronic pain, opiate dependence   Substantial opiate regimen as outpatient. Pain control while inpatient per primary team    standing miralax while on current pain regimen.     # Paroxsymal Afib  # s/p PPM   - Defer recs re: Anti-coagulation to cardiology consult   -Continue Metoprolol , statin    #HTN   Continue metoprolol and hydralazine  Ensure adequate pain control   If SBP >180 mmHg persistently, [ ] Get bladder scan [ ] if pain well-controlled, and bladder scan shows no urinary retention, can use labetalol 10mg IV push if HR >60 bpm or hydralazine IV 5mg  [ ] If BP remains elevated tomorrow, consider starting losartan (original plan was to start on discharge as part of GDMT for HF)     # BPH - continue doxazosin, finasteride. Low threshold for repeating bladder scan (if new tachycardia, elevated BP)      #Hyponatremia   Sodium, Serum: 132 mmol/L (08-25-22 @ 23:43)  Sodium, Serum: 131 mmol/L (08-25-22 @ 10:01)  Lara >30 ; possible component of SIADH from pain ; Pain control ;   Check CMP with serum osmolality tomorrow after salt tabs     DVT ppx - already on heparin drip

## 2022-08-25 NOTE — CONSULT NOTE ADULT - ASSESSMENT
Assessment and Recommendations :     Assessment :     72M with complex PMH including colon mass s/p partial colectomy (2017),  HTN, HLD, pAF (on Eliquis), ?blood clotting disorder s/p IVC filter placement, PPM (2004), herniated disc and related surgery in [id number] complicated by spinal fusion, foot drop with chronic pain s/p failed intrathecal morphine pump, and recent history of newly reduced EF w/concern for stress induced CM, now presenting for elective cholecystectomy (with planned ERCP prior) - procedure aborted after Stroke code called for AMS. Stroke code CTH with no acute intracranial pathology and unable to obtain CTA H/N and CTP due to pt refusal. Pt with no functioning adequate size IV access and when attempted by primary team, unsuccesful initially, even with U/S guided attempt, pt refused further IV access attempts CTs and      Assessment and Recommendations :     Assessment :     72M with complex PMH including colon mass s/p partial colectomy (2017),  HTN, HLD, pAF (on Eliquis), ?blood clotting disorder s/p IVC filter placement, PPM (2004), herniated disc and related surgery in [id number] complicated by spinal fusion, foot drop with chronic pain s/p failed intrathecal morphine pump, and recent history of newly reduced EF w/concern for stress induced CM, now presenting for elective cholecystectomy (with planned ERCP prior) - procedure aborted after Stroke code called for AMS. Stroke code CTH with no acute intracranial pathology and unable to obtain CTA H/N and CTP due to pt refusal. Pt with no functioning adequate size IV access and when attempted by primary team, unsuccesful initially, even with U/S guided attempt, pt refused further IV access attempts CTs and deferred @ this point of time 2/2 low suspicion of stroke.     - Would defer CTA /CTP at this point of time.  - No further stroke work up needed.  - If further episodes occur/ change in neuro status - would recommend further imaging with CT/CTA/MRI and EEG.  - Rest of the care per primary team.      The above mentioned plan was D/W Dr. Madison.     Assessment and Recommendations :     Assessment :     72M with complex PMH including colon mass s/p partial colectomy (2017),  HTN, HLD, pAF (on Eliquis), ?blood clotting disorder s/p IVC filter placement, PPM (2004), herniated disc and related surgery in [id number] complicated by spinal fusion, foot drop with chronic pain s/p failed intrathecal morphine pump, and recent history of newly reduced EF w/concern for stress induced CM, now presenting for elective cholecystectomy (with planned ERCP prior) - procedure aborted after Stroke code called for AMS. Stroke code CTH with no acute intracranial pathology and unable to obtain CTA H/N and CTP due to pt refusal. Pt with no functioning adequate size IV access and when attempted by primary team, unsuccesful initially, even with U/S guided attempt, pt refused further IV access attempts CTs and deferred @ this point of time 2/2 low suspicion of stroke.     - Would defer CTA /CTP at this point of time.  - No further stroke work up needed.  - If further episodes occur/ change in neuro status - would recommend further imaging with CT/CTA/MRI and EEG.  - Rest of the care per primary team.  - Stroke Neuro would like to sign off at this point of time.  - Please call us or re consult us if needed.      The above mentioned plan was D/W Dr. Madison.

## 2022-08-25 NOTE — CHART NOTE - NSCHARTNOTEFT_GEN_A_CORE
Patient was in Endoscopy unit and noted to be lethargic while Anesthesia was evaluating pre-ERCP    Code Stroke Called overhead, CS Team as well as Surgical Team arrived at bedside for eval.    Patient headed to Radiology for urgent Neuroimaging    Please keep NPO    Will continue to follow    Aroldo Jones M.D.  Gastroenterology Fellow  Pager: [phone] Patient was in Endoscopy unit and noted to be lethargic while Anesthesia was evaluating pre-ERCP    Code Stroke Called overhead, CS Team as well as Surgical Team arrived at bedside for eval.    Patient headed to Radiology for urgent Neuroimaging    Please keep NPO at MN for re-attempt at ERCP tomorrow    Will continue to follow    Aroldo Jones M.D.  Gastroenterology Fellow  Pager: [phone]

## 2022-08-25 NOTE — CHART NOTE - NSCHARTNOTEFT_GEN_A_CORE
Team notified of stroke code called on patient while in Endosuite. Team immediately to bedside. Upon arrival, patient agitated but A&Ox4, minimally interactive intermittently yelling and swearing at team members. Per anesthesiologist, patient was A&Ox3 but appeared very tired during consent process. When asked to sign name, the patient was unable to complete a signature and omer an drooping line. Patient immediately examined with stroke team at bedside but refused to participate in much of exam 2/2 back pain. Refusal included leg and arm raise which was extremely limiting to assess for presence of focal deficits. Of the exam that was completed, no slurred speech, symmetric smile and eyebrow raise, strong b/l finger squeeze, able to wiggle toes of both lower ext, sensation intact throughout. Patient brought to CT scan and noncontrast CT scan completed with no signs of ischemic or hemorrhagic stroke upon initial review by attending radiologist and stroke team. Per stroke team, decision was made to preform contrast scan to look for large vessel occlusion. However, patient without functional and adequate IV gauge. Attempt at IV made without success on CT table. US obtained and further IV attempts made, however, during process patient complained of significant back/neck pain and refused further treatment. Patient counseled extensively on need for IV access to assess for stroke fully, but refused further treatment by team members. Refused ERCP. Refused surgery at this time. Given patient refusal, further attempts at IV placement were halted and patient was transported to telemetry floor where he demonstrated his ability to walk to his bed from a chair.     Stat labs obtained at the time of initial stroke code were significant for WBC wnl, Na 131(129), LA 0.9. Additional labs and imaging were unable to be obtained 2/2 patient refusal.     Per discussion with stroke NP and attending, no need for further imaging unless change in mental status. Low suspicion for stroke. No contraindications to continuation of blood thinners.     **Upon patient arrival in telemetry unit, RN determined that both IVs in R arm are unusable and refuses further IV placement at this time. As a result, patient is unable to receive heparin gtt. Patient aware of risks related to cessation of AC in the setting of Afib.     Attending notified regarding events. Patient will await discussion with attending regarding further treatment planning.    I was personally present for the duration of events.     Plan discussed with attending.   ____________________________________________________  KP Portelli - Resident   Surgery Team notified of stroke code called on patient while in Endosuite. Team immediately to bedside. Upon arrival, patient agitated but A&Ox4, minimally interactive intermittently yelling and swearing at team members. Per anesthesiologist, patient was A&Ox3 but appeared very tired during consent process. When asked to sign name, the patient was unable to complete a signature and omer an drooping line. Patient immediately examined with stroke team at bedside but refused to participate in much of exam 2/2 back pain. Refusal included leg and arm raise which was extremely limiting to assess for presence of focal deficits. Of the exam that was completed, no slurred speech, symmetric smile and eyebrow raise, strong b/l finger squeeze, able to wiggle toes of both lower ext, sensation intact throughout. Patient brought to CT scan and noncontrast CT scan completed with no signs of ischemic or hemorrhagic stroke upon initial review by attending radiologist and stroke team. Per stroke team, decision was made to preform contrast scan to look for large vessel occlusion. However, patient without functional and adequate IV gauge. Attempt at IV made without success on CT table. US obtained and further IV attempts made, however, during process patient complained of significant back/neck pain and refused further treatment. Patient counseled extensively on need for IV access to assess for stroke fully, but refused further treatment by team members. Refused ERCP. Refused surgery at this time. Given patient refusal, further attempts at IV placement were halted and patient was transported to telemetry floor where he demonstrated his ability to walk to his bed from a chair. Intermittently during exam patient would become unresponsive with open mouth and closed eyes and upon sternal rub would scream profanities and state that he was purposefully ignoring the team. Unclear if the unresponsiveness was true lack of consciousness vs. unwillingness to participate in exam. VSS throughout.     Stat labs obtained at the time of initial stroke code were significant for WBC wnl, Na 131(129), LA 0.9. Additional labs and imaging were unable to be obtained 2/2 patient refusal.     Per discussion with stroke NP and attending, no need for further imaging unless change in mental status. Low suspicion for stroke. No contraindications to continuation of blood thinners.     **Upon patient arrival in telemetry unit, RN determined that both IVs in R arm are unusable and refuses further IV placement at this time. As a result, patient is unable to receive heparin gtt. Patient aware of risks related to cessation of AC in the setting of Afib.     Attending notified regarding events. Patient will await discussion with attending regarding further treatment planning.    I was personally present for the duration of events.     Plan discussed with attending.   ____________________________________________________   Violeta - Resident   Surgery

## 2022-08-25 NOTE — PROGRESS NOTE ADULT - ASSESSMENT
71 y/o M with colon mass s/p partial colectomy (2017), appendectomy, hernia repair, aortic and iliac aneurysm surgery (2017) s/p stent placement, HTN, HLD, paroxysmal atrial fibrillation on Eliquis, blood clotting disorder s/p IVC filter placement, right knee ligament repair, pacemaker implant (2004) s/p recent ppm interrogation on Eliquis, herniated disc and related surgery in [id number] complicated by spinal fusion, local hematoma, foot drop with chronic pain s/p failed intrathecal morphine pump, recently admitted to [facility] on 08/07 with acute cholecystitis c/b cardiomyopathy s/p percutaneous cholecystostomy tube by IR who presented for concerns of bleeding at the perc yonis tube site. In ED, afebrile, HD stable, wbc 9.7, Hb 9.7 stable from 9.5 at discharge; LFTs wnl. PE with some blood at the dressing site but none in tubing, no hematoma or ecchymosis surrounding drain site. CTAP with percutaneous cholecystostomy catheter in collapsed gallbladder, confirmed with radiology department verbally. Plan for admission with interval cholecystectomy once cleared from cardiac perspective.    NPO/IVF  Pain/nausea control  Home meds as appropriate  Hep gtt/OOBA/SCDs/IS  Cardiology clearance and optimization   AM Labs  Dispo: JESSIKA SAINI  ERCP today

## 2022-08-25 NOTE — PROGRESS NOTE ADULT - SUBJECTIVE AND OBJECTIVE BOX
Patient is a 72y old  Male who presents with a chief complaint of bleeding around perc yonis site (25 Aug 2022 18:19)    INTERVAL EVENTS:  -patient had stroke code called while in endoscopy suite; reported to be somnolent prior to ERCP; mental status returned to baseline when aroused. Evaluated by stroke team. Low suspicion for neurologic event     SUBJECTIVE:  Patient was seen and examined at bedside.  Review of systems: No fever, chills, dizziness, HA, Changes in vision, CP, dyspnea, nausea or vomiting, dysuria, changes in bowel movements, LE edema. Rest of 12 point Review of systems negative unless otherwise documented elsewhere in note.     Diet, NPO:   Except Medications (08-26-22 @ 01:10) [Active]    MEDICATIONS:  MEDICATIONS  (STANDING):  dextrose 5% + sodium chloride 0.9%. 1000 milliLiter(s) (80 mL/Hr) IV Continuous <Continuous>  doxazosin 4 milliGRAM(s) Oral at bedtime  finasteride 5 milliGRAM(s) Oral daily  hydrALAZINE 50 milliGRAM(s) Oral every 8 hours  metoprolol succinate ER 50 milliGRAM(s) Oral every 12 hours  nortriptyline 75 milliGRAM(s) Oral at bedtime  oxyCODONE  ER Tablet 20 milliGRAM(s) Oral every 8 hours  pantoprazole  Injectable 40 milliGRAM(s) IV Push daily  polyethylene glycol 3350 17 Gram(s) Oral two times a day  simvastatin 5 milliGRAM(s) Oral at bedtime    MEDICATIONS  (PRN):  ALPRAZolam 1 milliGRAM(s) Oral at bedtime PRN insomnia  diazepam    Tablet 2 milliGRAM(s) Oral daily PRN insomnia  ondansetron Injectable 4 milliGRAM(s) IV Push every 6 hours PRN Nausea and/or Vomiting  oxyCODONE    IR 15 milliGRAM(s) Oral every 4 hours PRN Moderate Pain (4 - 6)  oxyCODONE    IR 30 milliGRAM(s) Oral every 4 hours PRN Severe Pain (7 - 10)      Allergies    Altace (Unknown)  penicillin (Unknown)    Intolerances        OBJECTIVE:  Vital Signs Last 24 Hrs  T(C): 36.4 (26 Aug 2022 00:59), Max: 36.5 (25 Aug 2022 05:00)  T(F): 97.6 (26 Aug 2022 00:59), Max: 97.7 (25 Aug 2022 05:00)  HR: 64 (26 Aug 2022 00:59) (60 - 70)  BP: 140/64 (26 Aug 2022 00:59) (111/72 - 180/92)  BP(mean): --  RR: 16 (26 Aug 2022 00:59) (16 - 18)  SpO2: 99% (26 Aug 2022 00:59) (97% - 100%)    Parameters below as of 26 Aug 2022 00:59  Patient On (Oxygen Delivery Method): room air      I&O's Summary    24 Aug 2022 07:01  -  25 Aug 2022 07:00  --------------------------------------------------------  IN: 2397 mL / OUT: 1665 mL / NET: 732 mL    25 Aug 2022 07:01  -  26 Aug 2022 04:03  --------------------------------------------------------  IN: 2075 mL / OUT: 1312.5 mL / NET: 762.5 mL    PHYSICAL EXAM:  Gen: Reclining in bed at time of exam, appears stated age  HEENT: NCAT, clear OP   Neck: trachea at midline  CV: RRR, +S1/S2  Pulm: adequate respiratory effort, no increase in work of breathing  Abd: soft, ND ; cholecystostomy tube in place   Skin: warm and dry,   Ext: WWP, no LE edema  Neuro: AOx3 when aroused;   Psych: affect and behavior appropriate, pleasant at time of interview    LABS:                        10.1   7.41  )-----------( 323      ( 25 Aug 2022 10:01 )             31.9     08-25    132<L>  |  99  |  9   ----------------------------<  132<H>  4.5   |  25  |  1.12    Ca    8.6      25 Aug 2022 23:43  Phos  3.2     08-25  Mg     1.9     08-25    TPro  6.4  /  Alb  2.2<L>  /  TBili  0.4  /  DBili  x   /  AST  14  /  ALT  9<L>  /  AlkPhos  79  08-25    LIVER FUNCTIONS - ( 25 Aug 2022 23:43 )  Alb: 2.2 g/dL / Pro: 6.4 g/dL / ALK PHOS: 79 U/L / ALT: 9 U/L / AST: 14 U/L / GGT: x           PT/INR - ( 24 Aug 2022 06:37 )   PT: 13.7 sec;   INR: 1.15          PTT - ( 25 Aug 2022 06:48 )  PTT:35.4 sec  CAPILLARY BLOOD GLUCOSE      POCT Blood Glucose.: 107 mg/dL (25 Aug 2022 09:29)        MICRODATA:      RADIOLOGY/OTHER STUDIES:

## 2022-08-25 NOTE — PROGRESS NOTE ADULT - SUBJECTIVE AND OBJECTIVE BOX
SUBJECTIVE: Pt seen and examined by chief resident. Pt is doing well, resting comfortably on bed. Denies abdominal pain. No complaints at this time.    Vital Signs Last 24 Hrs  T(C): 36.5 (25 Aug 2022 05:00), Max: 36.7 (24 Aug 2022 14:42)  T(F): 97.7 (25 Aug 2022 05:00), Max: 98.1 (24 Aug 2022 14:42)  HR: 68 (25 Aug 2022 05:00) (62 - 68)  BP: 111/72 (25 Aug 2022 05:00) (111/72 - 170/90)  BP(mean): --  RR: 17 (25 Aug 2022 05:00) (17 - 18)  SpO2: 97% (25 Aug 2022 05:00) (96% - 98%)    Parameters below as of 25 Aug 2022 05:00  Patient On (Oxygen Delivery Method): room air        I&O's Summary    23 Aug 2022 07:01  -  24 Aug 2022 07:00  --------------------------------------------------------  IN: 690 mL / OUT: 1710 mL / NET: -1020 mL    24 Aug 2022 07:01  -  25 Aug 2022 06:49  --------------------------------------------------------  IN: 2397 mL / OUT: 1665 mL / NET: 732 mL        Physical Exam:  General Appearance: Appears well, NAD  Pulmonary: Nonlabored breathing, no respiratory distress  Abdomen: Soft, nondisteded, nontender, perc yonis with bilious output  Extremities: WWP, SCD's in place     LABS:                        8.4    7.16  )-----------( 332      ( 24 Aug 2022 06:37 )             26.4     08-24    133<L>  |  99  |  7   ----------------------------<  98  4.2   |  26  |  1.08    Ca    8.5      24 Aug 2022 06:37  Phos  3.3     08-24  Mg     1.9     08-24    TPro  6.4  /  Alb  2.5<L>  /  TBili  0.4  /  DBili  <0.2  /  AST  19  /  ALT  10  /  AlkPhos  79  08-24    PT/INR - ( 24 Aug 2022 06:37 )   PT: 13.7 sec;   INR: 1.15          PTT - ( 24 Aug 2022 23:07 )  PTT:49.5 sec

## 2022-08-25 NOTE — PROGRESS NOTE ADULT - SUBJECTIVE AND OBJECTIVE BOX
INTERVAL HPI/OVERNIGHT EVENTS: Stroke code called while patient for ERCP pre-cholecystectomy - procedure subsequently cancelled.    SUBJECTIVE: Patient seen and examined at bedside. Denies chest pain, SOB, abdominal pain improved. No fever chills, nausea and vomiting.    OBJECTIVE:    VITAL SIGNS:  ICU Vital Signs Last 24 Hrs  T(C): 36.4 (25 Aug 2022 10:54), Max: 36.7 (24 Aug 2022 14:42)  T(F): 97.6 (25 Aug 2022 10:54), Max: 98.1 (24 Aug 2022 14:42)  HR: 60 (25 Aug 2022 10:54) (60 - 68)  BP: 161/81 (25 Aug 2022 10:54) (111/72 - 170/90)  RR: 18 (25 Aug 2022 10:54) (17 - 18)  SpO2: 99% (25 Aug 2022 10:54) (97% - 99%)    O2 Parameters below as of 25 Aug 2022 10:54  Patient On (Oxygen Delivery Method): room air      08-24 @ 07:01 - 08-25 @ 07:00  --------------------------------------------------------  IN: 2397 mL / OUT: 1665 mL / NET: 732 mL    08-25 @ 07:01 - 08-25 @ 12:54  --------------------------------------------------------  IN: 125 mL / OUT: 450 mL / NET: -325 mL      CAPILLARY BLOOD GLUCOSE      POCT Blood Glucose.: 107 mg/dL (25 Aug 2022 09:29)      PHYSICAL EXAM:    GEN: Elderly  male laying in bed in NAD.  HEENT: NCAT, PERRL, EOMI. Mucosa moist. No JVD.   RESP: Fine crackles at the bases  CV: RRR, normal s1/s2. No m/r/g.  ABD: Soft, NTND. BS+ Perc yonis tube in place with minimal dry serosanguinous drainage on dressing  EXT: Warm. No edema, clubbing, or cyanosis.   NEURO: AAOx3. No focal deficits.      MEDICATIONS:  MEDICATIONS  (STANDING):  doxazosin 4 milliGRAM(s) Oral at bedtime  finasteride 5 milliGRAM(s) Oral daily  hydrALAZINE 50 milliGRAM(s) Oral every 8 hours  lactated ringers. 1000 milliLiter(s) (75 mL/Hr) IV Continuous <Continuous>  metoprolol succinate ER 50 milliGRAM(s) Oral every 12 hours  nortriptyline 75 milliGRAM(s) Oral at bedtime  oxyCODONE  ER Tablet 20 milliGRAM(s) Oral every 8 hours  pantoprazole  Injectable 40 milliGRAM(s) IV Push daily  polyethylene glycol 3350 17 Gram(s) Oral two times a day  simvastatin 5 milliGRAM(s) Oral at bedtime    MEDICATIONS  (PRN):  ALPRAZolam 1 milliGRAM(s) Oral at bedtime PRN insomnia  diazepam    Tablet 2 milliGRAM(s) Oral daily PRN insomnia  ondansetron Injectable 4 milliGRAM(s) IV Push every 6 hours PRN Nausea and/or Vomiting  oxyCODONE    IR 15 milliGRAM(s) Oral every 4 hours PRN Moderate Pain (4 - 6)  oxyCODONE    IR 30 milliGRAM(s) Oral every 4 hours PRN Severe Pain (7 - 10)      ALLERGIES:  Allergies    Altace (Unknown)  penicillin (Unknown)    Intolerances        LABS:                        10.1   7.41  )-----------( 323      ( 25 Aug 2022 10:01 )             31.9     08-25    131<L>  |  97  |  8   ----------------------------<  102<H>  4.9   |  28  |  1.16    Ca    9.1      25 Aug 2022 10:01  Phos  3.2     08-25  Mg     1.9     08-25    TPro  7.4  /  Alb  3.0<L>  /  TBili  0.5  /  DBili  x   /  AST  21  /  ALT  11  /  AlkPhos  85  08-25    LIVER FUNCTIONS - ( 25 Aug 2022 10:01 )  Alb: 3.0 g/dL / Pro: 7.4 g/dL / ALK PHOS: 85 U/L / ALT: 11 U/L / AST: 21 U/L / GGT: x           PT/INR - ( 24 Aug 2022 06:37 )   PT: 13.7 sec;   INR: 1.15          PTT - ( 25 Aug 2022 06:48 )  PTT:35.4 sec          RADIOLOGY & ADDITIONAL TESTS: Reviewed.

## 2022-08-26 LAB
ALBUMIN SERPL ELPH-MCNC: 2.2 G/DL — LOW (ref 3.3–5)
ALBUMIN SERPL ELPH-MCNC: 2.3 G/DL — LOW (ref 3.3–5)
ALP SERPL-CCNC: 74 U/L — SIGNIFICANT CHANGE UP (ref 40–120)
ALP SERPL-CCNC: 79 U/L — SIGNIFICANT CHANGE UP (ref 40–120)
ALT FLD-CCNC: 8 U/L — LOW (ref 10–45)
ALT FLD-CCNC: 9 U/L — LOW (ref 10–45)
ANION GAP SERPL CALC-SCNC: 6 MMOL/L — SIGNIFICANT CHANGE UP (ref 5–17)
ANION GAP SERPL CALC-SCNC: 8 MMOL/L — SIGNIFICANT CHANGE UP (ref 5–17)
APTT BLD: 34.1 SEC — SIGNIFICANT CHANGE UP (ref 27.5–35.5)
AST SERPL-CCNC: 12 U/L — SIGNIFICANT CHANGE UP (ref 10–40)
AST SERPL-CCNC: 14 U/L — SIGNIFICANT CHANGE UP (ref 10–40)
BILIRUB SERPL-MCNC: 0.3 MG/DL — SIGNIFICANT CHANGE UP (ref 0.2–1.2)
BILIRUB SERPL-MCNC: 0.4 MG/DL — SIGNIFICANT CHANGE UP (ref 0.2–1.2)
BUN SERPL-MCNC: 8 MG/DL — SIGNIFICANT CHANGE UP (ref 7–23)
BUN SERPL-MCNC: 9 MG/DL — SIGNIFICANT CHANGE UP (ref 7–23)
CALCIUM SERPL-MCNC: 8.3 MG/DL — LOW (ref 8.4–10.5)
CALCIUM SERPL-MCNC: 8.6 MG/DL — SIGNIFICANT CHANGE UP (ref 8.4–10.5)
CHLORIDE SERPL-SCNC: 103 MMOL/L — SIGNIFICANT CHANGE UP (ref 96–108)
CHLORIDE SERPL-SCNC: 99 MMOL/L — SIGNIFICANT CHANGE UP (ref 96–108)
CO2 SERPL-SCNC: 25 MMOL/L — SIGNIFICANT CHANGE UP (ref 22–31)
CO2 SERPL-SCNC: 25 MMOL/L — SIGNIFICANT CHANGE UP (ref 22–31)
CREAT SERPL-MCNC: 1.12 MG/DL — SIGNIFICANT CHANGE UP (ref 0.5–1.3)
CREAT SERPL-MCNC: 1.14 MG/DL — SIGNIFICANT CHANGE UP (ref 0.5–1.3)
EGFR: 68 ML/MIN/1.73M2 — SIGNIFICANT CHANGE UP
EGFR: 69 ML/MIN/1.73M2 — SIGNIFICANT CHANGE UP
GLUCOSE SERPL-MCNC: 125 MG/DL — HIGH (ref 70–99)
GLUCOSE SERPL-MCNC: 132 MG/DL — HIGH (ref 70–99)
HCT VFR BLD CALC: 24.6 % — LOW (ref 39–50)
HGB BLD-MCNC: 7.9 G/DL — LOW (ref 13–17)
INR BLD: 1.11 — SIGNIFICANT CHANGE UP (ref 0.88–1.16)
MAGNESIUM SERPL-MCNC: 1.7 MG/DL — SIGNIFICANT CHANGE UP (ref 1.6–2.6)
MCHC RBC-ENTMCNC: 28.6 PG — SIGNIFICANT CHANGE UP (ref 27–34)
MCHC RBC-ENTMCNC: 32.1 GM/DL — SIGNIFICANT CHANGE UP (ref 32–36)
MCV RBC AUTO: 89.1 FL — SIGNIFICANT CHANGE UP (ref 80–100)
NRBC # BLD: 0 /100 WBCS — SIGNIFICANT CHANGE UP (ref 0–0)
OSMOLALITY SERPL: 275 MOSM/KG — LOW (ref 280–301)
OSMOLALITY SERPL: 281 MOSM/KG — SIGNIFICANT CHANGE UP (ref 280–301)
PHOSPHATE SERPL-MCNC: 3.3 MG/DL — SIGNIFICANT CHANGE UP (ref 2.5–4.5)
PLATELET # BLD AUTO: 323 K/UL — SIGNIFICANT CHANGE UP (ref 150–400)
POTASSIUM SERPL-MCNC: 4 MMOL/L — SIGNIFICANT CHANGE UP (ref 3.5–5.3)
POTASSIUM SERPL-MCNC: 4.5 MMOL/L — SIGNIFICANT CHANGE UP (ref 3.5–5.3)
POTASSIUM SERPL-SCNC: 4 MMOL/L — SIGNIFICANT CHANGE UP (ref 3.5–5.3)
POTASSIUM SERPL-SCNC: 4.5 MMOL/L — SIGNIFICANT CHANGE UP (ref 3.5–5.3)
PROT SERPL-MCNC: 6 G/DL — SIGNIFICANT CHANGE UP (ref 6–8.3)
PROT SERPL-MCNC: 6.4 G/DL — SIGNIFICANT CHANGE UP (ref 6–8.3)
PROTHROM AB SERPL-ACNC: 13.2 SEC — SIGNIFICANT CHANGE UP (ref 10.5–13.4)
RBC # BLD: 2.76 M/UL — LOW (ref 4.2–5.8)
RBC # FLD: 13.4 % — SIGNIFICANT CHANGE UP (ref 10.3–14.5)
SODIUM SERPL-SCNC: 132 MMOL/L — LOW (ref 135–145)
SODIUM SERPL-SCNC: 134 MMOL/L — LOW (ref 135–145)
WBC # BLD: 6.2 K/UL — SIGNIFICANT CHANGE UP (ref 3.8–10.5)
WBC # FLD AUTO: 6.2 K/UL — SIGNIFICANT CHANGE UP (ref 3.8–10.5)

## 2022-08-26 PROCEDURE — 99233 SBSQ HOSP IP/OBS HIGH 50: CPT

## 2022-08-26 PROCEDURE — 43264 ERCP REMOVE DUCT CALCULI: CPT

## 2022-08-26 PROCEDURE — 74328 X-RAY BILE DUCT ENDOSCOPY: CPT | Mod: 26

## 2022-08-26 PROCEDURE — 43262 ENDO CHOLANGIOPANCREATOGRAPH: CPT | Mod: 59

## 2022-08-26 DEVICE — BLLN EXTRCTR PRO RX-S 12-15MM: Type: IMPLANTABLE DEVICE | Status: FUNCTIONAL

## 2022-08-26 DEVICE — HYDRATOME 44: Type: IMPLANTABLE DEVICE | Status: FUNCTIONAL

## 2022-08-26 RX ORDER — DIAZEPAM 5 MG
2 TABLET ORAL DAILY
Refills: 0 | Status: DISCONTINUED | OUTPATIENT
Start: 2022-08-26 | End: 2022-08-27

## 2022-08-26 RX ORDER — MAGNESIUM SULFATE 500 MG/ML
1 VIAL (ML) INJECTION ONCE
Refills: 0 | Status: COMPLETED | OUTPATIENT
Start: 2022-08-26 | End: 2022-08-26

## 2022-08-26 RX ORDER — HYDRALAZINE HCL 50 MG
10 TABLET ORAL ONCE
Refills: 0 | Status: COMPLETED | OUTPATIENT
Start: 2022-08-26 | End: 2022-08-26

## 2022-08-26 RX ORDER — OXYCODONE HYDROCHLORIDE 5 MG/1
20 TABLET ORAL EVERY 8 HOURS
Refills: 0 | Status: DISCONTINUED | OUTPATIENT
Start: 2022-08-26 | End: 2022-08-27

## 2022-08-26 RX ORDER — ALPRAZOLAM 0.25 MG
1 TABLET ORAL AT BEDTIME
Refills: 0 | Status: DISCONTINUED | OUTPATIENT
Start: 2022-08-26 | End: 2022-08-27

## 2022-08-26 RX ORDER — OXYCODONE HYDROCHLORIDE 5 MG/1
30 TABLET ORAL EVERY 4 HOURS
Refills: 0 | Status: DISCONTINUED | OUTPATIENT
Start: 2022-08-26 | End: 2022-08-27

## 2022-08-26 RX ORDER — SODIUM CHLORIDE 9 MG/ML
1000 INJECTION, SOLUTION INTRAVENOUS
Refills: 0 | Status: DISCONTINUED | OUTPATIENT
Start: 2022-08-26 | End: 2022-08-27

## 2022-08-26 RX ORDER — OXYCODONE HYDROCHLORIDE 5 MG/1
15 TABLET ORAL EVERY 4 HOURS
Refills: 0 | Status: DISCONTINUED | OUTPATIENT
Start: 2022-08-26 | End: 2022-08-27

## 2022-08-26 RX ADMIN — PANTOPRAZOLE SODIUM 40 MILLIGRAM(S): 20 TABLET, DELAYED RELEASE ORAL at 11:39

## 2022-08-26 RX ADMIN — OXYCODONE HYDROCHLORIDE 20 MILLIGRAM(S): 5 TABLET ORAL at 13:22

## 2022-08-26 RX ADMIN — FINASTERIDE 5 MILLIGRAM(S): 5 TABLET, FILM COATED ORAL at 11:39

## 2022-08-26 RX ADMIN — Medication 50 MILLIGRAM(S): at 21:50

## 2022-08-26 RX ADMIN — NORTRIPTYLINE HYDROCHLORIDE 75 MILLIGRAM(S): 10 CAPSULE ORAL at 21:50

## 2022-08-26 RX ADMIN — Medication 50 MILLIGRAM(S): at 11:39

## 2022-08-26 RX ADMIN — Medication 50 MILLIGRAM(S): at 13:21

## 2022-08-26 RX ADMIN — OXYCODONE HYDROCHLORIDE 20 MILLIGRAM(S): 5 TABLET ORAL at 22:30

## 2022-08-26 RX ADMIN — OXYCODONE HYDROCHLORIDE 20 MILLIGRAM(S): 5 TABLET ORAL at 21:50

## 2022-08-26 RX ADMIN — Medication 100 GRAM(S): at 08:40

## 2022-08-26 RX ADMIN — SODIUM CHLORIDE 2 GRAM(S): 9 INJECTION INTRAMUSCULAR; INTRAVENOUS; SUBCUTANEOUS at 01:05

## 2022-08-26 RX ADMIN — OXYCODONE HYDROCHLORIDE 30 MILLIGRAM(S): 5 TABLET ORAL at 23:33

## 2022-08-26 RX ADMIN — Medication 4 MILLIGRAM(S): at 21:49

## 2022-08-26 RX ADMIN — SIMVASTATIN 5 MILLIGRAM(S): 20 TABLET, FILM COATED ORAL at 21:49

## 2022-08-26 RX ADMIN — Medication 50 MILLIGRAM(S): at 06:12

## 2022-08-26 RX ADMIN — Medication 10 MILLIGRAM(S): at 23:02

## 2022-08-26 RX ADMIN — OXYCODONE HYDROCHLORIDE 20 MILLIGRAM(S): 5 TABLET ORAL at 07:10

## 2022-08-26 RX ADMIN — OXYCODONE HYDROCHLORIDE 20 MILLIGRAM(S): 5 TABLET ORAL at 06:13

## 2022-08-26 RX ADMIN — OXYCODONE HYDROCHLORIDE 20 MILLIGRAM(S): 5 TABLET ORAL at 14:00

## 2022-08-26 RX ADMIN — Medication 1 MILLIGRAM(S): at 23:46

## 2022-08-26 NOTE — CHART NOTE - NSCHARTNOTEFT_GEN_A_CORE
Patient is s/p ERCP performed in Endoscopy    ERCP Findings:    The duodenoscope was passed into the second portion of the duodenum. The major  papilla was identified and appeared unremarkable. The papilla and bile duct were  readily cannulated. The CBD measured about 15 mm proximally and tapered  naturally. Using a pulsed cut setting, a sphincterotomy was performed without  complications. A balloon was used to perform a sweep of the bile duct. Multiple  black stones (>10) and debris were removed. The final cholangiogram was  unremarkable.    Impressions:  Choledocholithiasis. Successful ERCP to remove multiple small and medium sized  stones in the patient's CBD.    Plan:  Return to floor for further management  Clear liquids today.    Aroldo Jones M.D.  Gastroenterology Fellow  Pager: [phone]

## 2022-08-26 NOTE — PROGRESS NOTE ADULT - SUBJECTIVE AND OBJECTIVE BOX
Patient is a 72y old  Male who presents with a chief complaint of bleeding around perc yonis site (26 Aug 2022 07:58)    INTERVAL EVENTS:  - s/p ERCP this afternoon which showed choledocholithiasis; s/p removal of stones from CBD  - tolerating diet   - no nausea  - urinating without dysuria    SUBJECTIVE:  Patient was seen and examined at bedside.    Review of systems: No fever, chills, dizziness, HA, Changes in vision, CP, dyspnea, nausea or vomiting, dysuria, changes in bowel movements, LE edema. Rest of 12 point Review of systems negative unless otherwise documented elsewhere in note.     Diet, Clear Liquid:   DASH/TLC Sodium & Cholesterol Restricted (DASH) (08-26-22 @ 19:52) [Active]      MEDICATIONS:  MEDICATIONS  (STANDING):  dextrose 5% + sodium chloride 0.9%. 1000 milliLiter(s) (80 mL/Hr) IV Continuous <Continuous>  doxazosin 4 milliGRAM(s) Oral at bedtime  finasteride 5 milliGRAM(s) Oral daily  hydrALAZINE 50 milliGRAM(s) Oral every 8 hours  metoprolol succinate ER 50 milliGRAM(s) Oral every 12 hours  nortriptyline 75 milliGRAM(s) Oral at bedtime  oxyCODONE  ER Tablet 20 milliGRAM(s) Oral every 8 hours  pantoprazole  Injectable 40 milliGRAM(s) IV Push daily  polyethylene glycol 3350 17 Gram(s) Oral two times a day  simvastatin 5 milliGRAM(s) Oral at bedtime    MEDICATIONS  (PRN):  ALPRAZolam 1 milliGRAM(s) Oral at bedtime PRN insomnia  diazepam    Tablet 2 milliGRAM(s) Oral daily PRN insomnia  ondansetron Injectable 4 milliGRAM(s) IV Push every 6 hours PRN Nausea and/or Vomiting  oxyCODONE    IR 15 milliGRAM(s) Oral every 4 hours PRN Moderate Pain (4 - 6)  oxyCODONE    IR 30 milliGRAM(s) Oral every 4 hours PRN Severe Pain (7 - 10)      Allergies    Altace (Unknown)  penicillin (Unknown)    Intolerances        OBJECTIVE:  Vital Signs Last 24 Hrs  T(C): 36.6 (27 Aug 2022 02:03), Max: 36.6 (27 Aug 2022 02:03)  T(F): 97.9 (27 Aug 2022 02:03), Max: 97.9 (27 Aug 2022 02:03)  HR: 88 (27 Aug 2022 02:03) (60 - 94)  BP: 129/65 (27 Aug 2022 02:03) (111/57 - 191/91)  BP(mean): --  RR: 15 (27 Aug 2022 02:03) (15 - 22)  SpO2: 95% (27 Aug 2022 02:03) (94% - 97%)    Parameters below as of 27 Aug 2022 02:03  Patient On (Oxygen Delivery Method): room air      I&O's Summary    25 Aug 2022 07:01  -  26 Aug 2022 07:00  --------------------------------------------------------  IN: 2315 mL / OUT: 1322.5 mL / NET: 992.5 mL    26 Aug 2022 07:01  -  27 Aug 2022 04:46  --------------------------------------------------------  IN: 1615 mL / OUT: 2205 mL / NET: -590 mL      PHYSICAL EXAM:  Gen: Reclining in bed at time of exam, appears stated age  HEENT: NCAT, clear OP   Neck: trachea at midline  CV: RRR, +S1/S2  Pulm: adequate respiratory effort, no increase in work of breathing  Abd: soft, ND ; old intrathecal pump in left abdomen, palpable;  cholecystostomy tube in place this morning   Skin: warm and dry,   Ext: WWP, no LE edema  Neuro: AOx3 when aroused;   Psych: affect and behavior appropriate, pleasant at time of interview    LABS:                        7.9    6.20  )-----------( 323      ( 26 Aug 2022 06:16 )             24.6     08-26    134<L>  |  103  |  8   ----------------------------<  125<H>  4.0   |  25  |  1.14    Ca    8.3<L>      26 Aug 2022 06:16  Phos  3.3     08-26  Mg     1.7     08-26    TPro  6.0  /  Alb  2.3<L>  /  TBili  0.3  /  DBili  x   /  AST  12  /  ALT  8<L>  /  AlkPhos  74  08-26    LIVER FUNCTIONS - ( 26 Aug 2022 06:16 )  Alb: 2.3 g/dL / Pro: 6.0 g/dL / ALK PHOS: 74 U/L / ALT: 8 U/L / AST: 12 U/L / GGT: x           PT/INR - ( 26 Aug 2022 06:16 )   PT: 13.2 sec;   INR: 1.11          PTT - ( 26 Aug 2022 06:16 )  PTT:34.1 sec  CAPILLARY BLOOD GLUCOSE            MICRODATA:      RADIOLOGY/OTHER STUDIES:

## 2022-08-26 NOTE — PROGRESS NOTE ADULT - ASSESSMENT
73 y/o male with history of colon mass s/p partial colectomy (2017), appendectomy, hernia repair, aortic and iliac aneurysm surgery (2017) s/p stent placement, HTN, HLD, paroxysmal atrial fibrillation on Eliquis, Right LE DVT >5 years ago s/p IVC filter placement, right knee ligament repair, , herniated disc and related surgery in [id number] complicated by spinal fusion, local hematoma, foot drop with chronic pain s/p failed intrathecal morphine pump which caused bradycardia (s/p pacemaker implant [2004]) and left hip prosthesis.   On last admission, had newly reduced EF w/concern for stress induced CM s/p percutaneous cholecystostomy tube by IR, now presenting for bleeding at the perc yonis tube site getting evaluated for elective cholecystectomy.    # Cholecystitis s/p Percutaneous cholecystostomy   Underwent ERCP with removal of stones from CBD on 8.26.22  Plan for elective cholecystectomy; Defer assessment of jeannette-operative cardiac risk, and need for further cardiac evaluation to cardiology consult service.   Continue pain and nausea control     # Chronic pain, opiate dependence   Substantial opiate regimen as outpatient. Pain control while inpatient per primary team    standing miralax while on current pain regimen.     # Paroxsymal Afib  # s/p PPM   - Defer recs re: Anti-coagulation to cardiology consult   -Continue Metoprolol , statin    #HTN   Continue metoprolol and hydralazine  Ensure adequate pain control   If SBP >180 mmHg persistently, [ ] Get bladder scan [ ] if pain well-controlled, and bladder scan shows no urinary retention, can use labetalol 10mg IV push if HR >60 bpm or hydralazine IV 5mg  discharge BP med recommendations per cardiology consult     # BPH - continue doxazosin, finasteride. Low threshold for repeating bladder scan (if new tachycardia, elevated BP)      #Hyponatremia   Sodium, Serum: 134 mmol/L (08-26-22 @ 06:16)  Sodium, Serum: 132 mmol/L (08-25-22 @ 23:43)  Mild, improving.   Lara >40 on last urine studies suggestive of component of SIADH.     DVT ppx - already on heparin drip

## 2022-08-26 NOTE — PROGRESS NOTE ADULT - ASSESSMENT
73 y/o M with colon mass s/p partial colectomy (2017), appendectomy, hernia repair, aortic and iliac aneurysm surgery (2017) s/p stent placement, HTN, HLD, paroxysmal atrial fibrillation on Eliquis, blood clotting disorder s/p IVC filter placement, right knee ligament repair, pacemaker implant (2004) s/p recent ppm interrogation on Eliquis, herniated disc and related surgery in [id number] complicated by spinal fusion, local hematoma, foot drop with chronic pain s/p failed intrathecal morphine pump, recently admitted to [facility] on 08/07 with acute cholecystitis c/b cardiomyopathy s/p percutaneous cholecystostomy tube by IR who presented for concerns of bleeding at the perc yonis tube site. In ED, afebrile, HD stable, wbc 9.7, Hb 9.7 stable from 9.5 at discharge; LFTs wnl. PE with some blood at the dressing site but none in tubing, no hematoma or ecchymosis surrounding drain site. CTAP with percutaneous cholecystostomy catheter in collapsed gallbladder, confirmed with radiology department verbally. Plan for admission with interval cholecystectomy once cleared from cardiac perspective.    NPO/IVF  Pain/nausea control  Home meds as appropriate  Hep gtt/OOBA/SCDs/IS  Cardiology clearance and optimization   AM Labs  Dispo: JESSIKA v HPT  ERCP 8/26

## 2022-08-26 NOTE — PROGRESS NOTE ADULT - SUBJECTIVE AND OBJECTIVE BOX
SUBJECTIVE: Patient seen and examined bedside by chief resident. This morning, he feels well; his pain is well-controlled. No nausea or vomiting. Passing flatus and having BMs. No acute complaints.      doxazosin 4 milliGRAM(s) Oral at bedtime  hydrALAZINE 50 milliGRAM(s) Oral every 8 hours  metoprolol succinate ER 50 milliGRAM(s) Oral every 12 hours      Vital Signs Last 24 Hrs  T(C): 36.3 (26 Aug 2022 04:58), Max: 36.5 (25 Aug 2022 09:49)  T(F): 97.3 (26 Aug 2022 04:58), Max: 97.7 (25 Aug 2022 21:08)  HR: 60 (26 Aug 2022 04:58) (60 - 70)  BP: 111/57 (26 Aug 2022 04:58) (111/57 - 180/92)  BP(mean): --  RR: 16 (26 Aug 2022 04:58) (16 - 18)  SpO2: 96% (26 Aug 2022 04:58) (96% - 100%)    Parameters below as of 26 Aug 2022 04:58  Patient On (Oxygen Delivery Method): room air      I&O's Detail    25 Aug 2022 07:01  -  26 Aug 2022 07:00  --------------------------------------------------------  IN:    dextrose 5% + sodium chloride 0.9%: 1080 mL    Heparin: 65 mL    Lactated Ringers: 300 mL    Oral Fluid: 870 mL  Total IN: 2315 mL    OUT:    Drain (mL): 22.5 mL    Voided (mL): 1300 mL  Total OUT: 1322.5 mL    Total NET: 992.5 mL          General: NAD, resting comfortably in bed  C/V: NSR  Pulm: Nonlabored breathing, no respiratory distress  Abd: soft, NT/ND. Percutaneous cholecystectomy tube draining bilious fluid.  Extrem: WWP, no edema, SCDs in place        LABS:                        7.9    6.20  )-----------( 323      ( 26 Aug 2022 06:16 )             24.6     08-26    134<L>  |  103  |  8   ----------------------------<  125<H>  4.0   |  25  |  1.14    Ca    8.3<L>      26 Aug 2022 06:16  Phos  3.3     08-26  Mg     1.7     08-26    TPro  6.0  /  Alb  2.3<L>  /  TBili  0.3  /  DBili  x   /  AST  12  /  ALT  8<L>  /  AlkPhos  74  08-26    PT/INR - ( 26 Aug 2022 06:16 )   PT: 13.2 sec;   INR: 1.11          PTT - ( 26 Aug 2022 06:16 )  PTT:34.1 sec      RADIOLOGY & ADDITIONAL STUDIES:

## 2022-08-27 LAB
ALBUMIN SERPL ELPH-MCNC: 2.1 G/DL — LOW (ref 3.3–5)
ALBUMIN SERPL ELPH-MCNC: 2.3 G/DL — LOW (ref 3.3–5)
ALP SERPL-CCNC: 696 U/L — HIGH (ref 40–120)
ALP SERPL-CCNC: 708 U/L — HIGH (ref 40–120)
ALT FLD-CCNC: 52 U/L — HIGH (ref 10–45)
ALT FLD-CCNC: 79 U/L — HIGH (ref 10–45)
AMMONIA BLD-MCNC: 13 UMOL/L — SIGNIFICANT CHANGE UP (ref 11–55)
AMYLASE P1 CFR SERPL: 41 U/L — SIGNIFICANT CHANGE UP (ref 25–125)
ANION GAP SERPL CALC-SCNC: 6 MMOL/L — SIGNIFICANT CHANGE UP (ref 5–17)
ANION GAP SERPL CALC-SCNC: 8 MMOL/L — SIGNIFICANT CHANGE UP (ref 5–17)
APTT BLD: 31.5 SEC — SIGNIFICANT CHANGE UP (ref 27.5–35.5)
AST SERPL-CCNC: 123 U/L — HIGH (ref 10–40)
AST SERPL-CCNC: 180 U/L — HIGH (ref 10–40)
BASOPHILS # BLD AUTO: 0.04 K/UL — SIGNIFICANT CHANGE UP (ref 0–0.2)
BASOPHILS NFR BLD AUTO: 0.8 % — SIGNIFICANT CHANGE UP (ref 0–2)
BILIRUB SERPL-MCNC: 1.6 MG/DL — HIGH (ref 0.2–1.2)
BILIRUB SERPL-MCNC: 1.9 MG/DL — HIGH (ref 0.2–1.2)
BUN SERPL-MCNC: 7 MG/DL — SIGNIFICANT CHANGE UP (ref 7–23)
BUN SERPL-MCNC: 7 MG/DL — SIGNIFICANT CHANGE UP (ref 7–23)
CALCIUM SERPL-MCNC: 8.3 MG/DL — LOW (ref 8.4–10.5)
CALCIUM SERPL-MCNC: 8.4 MG/DL — SIGNIFICANT CHANGE UP (ref 8.4–10.5)
CHLORIDE SERPL-SCNC: 101 MMOL/L — SIGNIFICANT CHANGE UP (ref 96–108)
CHLORIDE SERPL-SCNC: 101 MMOL/L — SIGNIFICANT CHANGE UP (ref 96–108)
CO2 SERPL-SCNC: 25 MMOL/L — SIGNIFICANT CHANGE UP (ref 22–31)
CO2 SERPL-SCNC: 26 MMOL/L — SIGNIFICANT CHANGE UP (ref 22–31)
CREAT SERPL-MCNC: 1.05 MG/DL — SIGNIFICANT CHANGE UP (ref 0.5–1.3)
CREAT SERPL-MCNC: 1.07 MG/DL — SIGNIFICANT CHANGE UP (ref 0.5–1.3)
EGFR: 73 ML/MIN/1.73M2 — SIGNIFICANT CHANGE UP
EGFR: 75 ML/MIN/1.73M2 — SIGNIFICANT CHANGE UP
EOSINOPHIL # BLD AUTO: 0.19 K/UL — SIGNIFICANT CHANGE UP (ref 0–0.5)
EOSINOPHIL NFR BLD AUTO: 3.9 % — SIGNIFICANT CHANGE UP (ref 0–6)
GLUCOSE SERPL-MCNC: 120 MG/DL — HIGH (ref 70–99)
GLUCOSE SERPL-MCNC: 125 MG/DL — HIGH (ref 70–99)
HCT VFR BLD CALC: 24 % — LOW (ref 39–50)
HCT VFR BLD CALC: 25.3 % — LOW (ref 39–50)
HGB BLD-MCNC: 7.7 G/DL — LOW (ref 13–17)
HGB BLD-MCNC: 8.1 G/DL — LOW (ref 13–17)
IMM GRANULOCYTES NFR BLD AUTO: 0.2 % — SIGNIFICANT CHANGE UP (ref 0–1.5)
LIDOCAIN IGE QN: 18 U/L — SIGNIFICANT CHANGE UP (ref 7–60)
LYMPHOCYTES # BLD AUTO: 0.77 K/UL — LOW (ref 1–3.3)
LYMPHOCYTES # BLD AUTO: 16 % — SIGNIFICANT CHANGE UP (ref 13–44)
MAGNESIUM SERPL-MCNC: 1.8 MG/DL — SIGNIFICANT CHANGE UP (ref 1.6–2.6)
MCHC RBC-ENTMCNC: 28.4 PG — SIGNIFICANT CHANGE UP (ref 27–34)
MCHC RBC-ENTMCNC: 28.5 PG — SIGNIFICANT CHANGE UP (ref 27–34)
MCHC RBC-ENTMCNC: 32 GM/DL — SIGNIFICANT CHANGE UP (ref 32–36)
MCHC RBC-ENTMCNC: 32.1 GM/DL — SIGNIFICANT CHANGE UP (ref 32–36)
MCV RBC AUTO: 88.6 FL — SIGNIFICANT CHANGE UP (ref 80–100)
MCV RBC AUTO: 89.1 FL — SIGNIFICANT CHANGE UP (ref 80–100)
MONOCYTES # BLD AUTO: 0.76 K/UL — SIGNIFICANT CHANGE UP (ref 0–0.9)
MONOCYTES NFR BLD AUTO: 15.8 % — HIGH (ref 2–14)
NEUTROPHILS # BLD AUTO: 3.05 K/UL — SIGNIFICANT CHANGE UP (ref 1.8–7.4)
NEUTROPHILS NFR BLD AUTO: 63.3 % — SIGNIFICANT CHANGE UP (ref 43–77)
NRBC # BLD: 0 /100 WBCS — SIGNIFICANT CHANGE UP (ref 0–0)
NRBC # BLD: 0 /100 WBCS — SIGNIFICANT CHANGE UP (ref 0–0)
PHOSPHATE SERPL-MCNC: 3.4 MG/DL — SIGNIFICANT CHANGE UP (ref 2.5–4.5)
PLATELET # BLD AUTO: 277 K/UL — SIGNIFICANT CHANGE UP (ref 150–400)
PLATELET # BLD AUTO: 290 K/UL — SIGNIFICANT CHANGE UP (ref 150–400)
POTASSIUM SERPL-MCNC: 3.8 MMOL/L — SIGNIFICANT CHANGE UP (ref 3.5–5.3)
POTASSIUM SERPL-MCNC: 3.8 MMOL/L — SIGNIFICANT CHANGE UP (ref 3.5–5.3)
POTASSIUM SERPL-SCNC: 3.8 MMOL/L — SIGNIFICANT CHANGE UP (ref 3.5–5.3)
POTASSIUM SERPL-SCNC: 3.8 MMOL/L — SIGNIFICANT CHANGE UP (ref 3.5–5.3)
PROT SERPL-MCNC: 5.8 G/DL — LOW (ref 6–8.3)
PROT SERPL-MCNC: 6 G/DL — SIGNIFICANT CHANGE UP (ref 6–8.3)
RBC # BLD: 2.71 M/UL — LOW (ref 4.2–5.8)
RBC # BLD: 2.84 M/UL — LOW (ref 4.2–5.8)
RBC # FLD: 13.4 % — SIGNIFICANT CHANGE UP (ref 10.3–14.5)
RBC # FLD: 13.5 % — SIGNIFICANT CHANGE UP (ref 10.3–14.5)
SODIUM SERPL-SCNC: 133 MMOL/L — LOW (ref 135–145)
SODIUM SERPL-SCNC: 134 MMOL/L — LOW (ref 135–145)
WBC # BLD: 4.62 K/UL — SIGNIFICANT CHANGE UP (ref 3.8–10.5)
WBC # BLD: 4.99 K/UL — SIGNIFICANT CHANGE UP (ref 3.8–10.5)
WBC # FLD AUTO: 4.62 K/UL — SIGNIFICANT CHANGE UP (ref 3.8–10.5)
WBC # FLD AUTO: 4.99 K/UL — SIGNIFICANT CHANGE UP (ref 3.8–10.5)

## 2022-08-27 PROCEDURE — 70450 CT HEAD/BRAIN W/O DYE: CPT | Mod: 26

## 2022-08-27 PROCEDURE — 99233 SBSQ HOSP IP/OBS HIGH 50: CPT | Mod: GC

## 2022-08-27 PROCEDURE — 99232 SBSQ HOSP IP/OBS MODERATE 35: CPT

## 2022-08-27 PROCEDURE — 99233 SBSQ HOSP IP/OBS HIGH 50: CPT

## 2022-08-27 RX ORDER — HEPARIN SODIUM 5000 [USP'U]/ML
1000 INJECTION INTRAVENOUS; SUBCUTANEOUS
Qty: 25000 | Refills: 0 | Status: DISCONTINUED | OUTPATIENT
Start: 2022-08-27 | End: 2022-08-27

## 2022-08-27 RX ORDER — OXYCODONE HYDROCHLORIDE 5 MG/1
15 TABLET ORAL EVERY 4 HOURS
Refills: 0 | Status: DISCONTINUED | OUTPATIENT
Start: 2022-08-27 | End: 2022-09-02

## 2022-08-27 RX ORDER — OXYCODONE HYDROCHLORIDE 5 MG/1
20 TABLET ORAL EVERY 8 HOURS
Refills: 0 | Status: DISCONTINUED | OUTPATIENT
Start: 2022-08-27 | End: 2022-09-02

## 2022-08-27 RX ORDER — MAGNESIUM SULFATE 500 MG/ML
1 VIAL (ML) INJECTION ONCE
Refills: 0 | Status: COMPLETED | OUTPATIENT
Start: 2022-08-27 | End: 2022-08-27

## 2022-08-27 RX ORDER — HYDRALAZINE HCL 50 MG
10 TABLET ORAL EVERY 6 HOURS
Refills: 0 | Status: DISCONTINUED | OUTPATIENT
Start: 2022-08-27 | End: 2022-09-06

## 2022-08-27 RX ORDER — HEPARIN SODIUM 5000 [USP'U]/ML
1000 INJECTION INTRAVENOUS; SUBCUTANEOUS
Qty: 25000 | Refills: 0 | Status: DISCONTINUED | OUTPATIENT
Start: 2022-08-27 | End: 2022-08-30

## 2022-08-27 RX ORDER — POTASSIUM CHLORIDE 20 MEQ
20 PACKET (EA) ORAL ONCE
Refills: 0 | Status: COMPLETED | OUTPATIENT
Start: 2022-08-27 | End: 2022-08-27

## 2022-08-27 RX ADMIN — SIMVASTATIN 5 MILLIGRAM(S): 20 TABLET, FILM COATED ORAL at 23:31

## 2022-08-27 RX ADMIN — Medication 50 MILLIGRAM(S): at 22:43

## 2022-08-27 RX ADMIN — OXYCODONE HYDROCHLORIDE 20 MILLIGRAM(S): 5 TABLET ORAL at 07:30

## 2022-08-27 RX ADMIN — OXYCODONE HYDROCHLORIDE 20 MILLIGRAM(S): 5 TABLET ORAL at 06:30

## 2022-08-27 RX ADMIN — OXYCODONE HYDROCHLORIDE 20 MILLIGRAM(S): 5 TABLET ORAL at 18:47

## 2022-08-27 RX ADMIN — Medication 4 MILLIGRAM(S): at 23:31

## 2022-08-27 RX ADMIN — FINASTERIDE 5 MILLIGRAM(S): 5 TABLET, FILM COATED ORAL at 12:04

## 2022-08-27 RX ADMIN — HEPARIN SODIUM 10 UNIT(S)/HR: 5000 INJECTION INTRAVENOUS; SUBCUTANEOUS at 20:12

## 2022-08-27 RX ADMIN — SODIUM CHLORIDE 80 MILLILITER(S): 9 INJECTION, SOLUTION INTRAVENOUS at 16:08

## 2022-08-27 RX ADMIN — Medication 20 MILLIEQUIVALENT(S): at 10:39

## 2022-08-27 RX ADMIN — OXYCODONE HYDROCHLORIDE 30 MILLIGRAM(S): 5 TABLET ORAL at 05:59

## 2022-08-27 RX ADMIN — PANTOPRAZOLE SODIUM 40 MILLIGRAM(S): 20 TABLET, DELAYED RELEASE ORAL at 12:04

## 2022-08-27 RX ADMIN — Medication 50 MILLIGRAM(S): at 06:30

## 2022-08-27 RX ADMIN — OXYCODONE HYDROCHLORIDE 30 MILLIGRAM(S): 5 TABLET ORAL at 00:30

## 2022-08-27 RX ADMIN — OXYCODONE HYDROCHLORIDE 30 MILLIGRAM(S): 5 TABLET ORAL at 04:59

## 2022-08-27 RX ADMIN — Medication 50 MILLIGRAM(S): at 10:39

## 2022-08-27 RX ADMIN — Medication 100 GRAM(S): at 10:39

## 2022-08-27 RX ADMIN — OXYCODONE HYDROCHLORIDE 20 MILLIGRAM(S): 5 TABLET ORAL at 16:09

## 2022-08-27 RX ADMIN — Medication 10 MILLIGRAM(S): at 18:47

## 2022-08-27 RX ADMIN — SODIUM CHLORIDE 80 MILLILITER(S): 9 INJECTION, SOLUTION INTRAVENOUS at 01:39

## 2022-08-27 RX ADMIN — POLYETHYLENE GLYCOL 3350 17 GRAM(S): 17 POWDER, FOR SOLUTION ORAL at 06:30

## 2022-08-27 NOTE — PROGRESS NOTE ADULT - ASSESSMENT
71 yo M, PMHx Colon Mass s/p partial colectomy (2017), appendectomy, hernia repair, aortic and iliac aneurysm surgery (2017) s/p stent placement, HTN, HLD,   pAfib on Eliquis, VTE/thrombophilia  s/p IVC filter placement, right knee ligament repair, PPM (2004, unknown indication), herniated disc and related surgery in [id number] complicated by spinal fusion, local hematoma, foot drop with chronic pain s/p failed intrathecal morphine pump, and left hip prosthesis.   Who presented for concern over cholecystotomy tube malfunction,    Now s/p ERCP on 8.26 with successful removal of multiple small and medium sized stones in the patient's CBD.  No signs/sx to indicate pancreatitis.  Elevated liver enzymes likely due to ERCP related manipulation of CBD.    #Cholecystitis s/p PTC  #Choledocholithiasis  -S/p ERCP with removal of CBD stones  -Continue to monitor  -Neurologic workup per primary team  -Continue to monitor liver enzymes  -Please page GI fellow on call with new epigastric pain    Chaz Randle DO, FACP  Gastroenterology Fellow  Pager: [phone]

## 2022-08-27 NOTE — CONSULT NOTE ADULT - SUBJECTIVE AND OBJECTIVE BOX
Neurology Stroke Progress Note    INTERVAL HPI/OVERNIGHT EVENTS:  71 y/o M with colon mass s/p partial colectomy (2017), appendectomy, hernia repair, aortic and iliac aneurysm surgery (2017) s/p stent placement, HTN, HLD, paroxysmal atrial fibrillation on Eliquis, blood clotting disorder s/p IVC filter placement, right knee ligament repair, pacemaker implant (2004) s/p recent ppm interrogation on Eliquis, herniated disc and related surgery in [id number] complicated by spinal fusion, local hematoma, foot drop with chronic pain s/p failed intrathecal morphine pump which caused bradycardia and left hip prosthesis. Pt was recently admitted to [facility] on 08/07 for acute cholecystitis hospital course c/b Takotsubo cardiomyopathy requiring MICU care, hence was not a surgical candidate @ that point of time, s/p perc yonis by IR. Pt was sent back to [facility] for bleeding around perc yonis tube, pt admitted and s/p ERCP on 8/25. Stroke code called in Endo suite where patient was minimally responsive, CT head negative, and pt did not want to participate in exam but pt moving all 4 extremities.    Stroke neurology called again on 8/27 for continued lethargy.  Patient seen and examined. Per nursing staff pt is much better today in terms of alertness compared to yesterday. Pt states he feels fine, just a bit tired. Followed all commands.     MEDICATIONS  (STANDING):  dextrose 5% + sodium chloride 0.9%. 1000 milliLiter(s) (80 mL/Hr) IV Continuous <Continuous>  doxazosin 4 milliGRAM(s) Oral at bedtime  finasteride 5 milliGRAM(s) Oral daily  hydrALAZINE 50 milliGRAM(s) Oral every 8 hours  metoprolol succinate ER 50 milliGRAM(s) Oral every 12 hours  oxyCODONE  ER Tablet 20 milliGRAM(s) Oral every 8 hours  pantoprazole  Injectable 40 milliGRAM(s) IV Push daily  polyethylene glycol 3350 17 Gram(s) Oral two times a day  simvastatin 5 milliGRAM(s) Oral at bedtime    MEDICATIONS  (PRN):  ondansetron Injectable 4 milliGRAM(s) IV Push every 6 hours PRN Nausea and/or Vomiting      Allergies    Altace (Unknown)  penicillin (Unknown)    Intolerances        Vital Signs Last 24 Hrs  T(C): 36.9 (27 Aug 2022 08:59), Max: 37 (27 Aug 2022 06:02)  T(F): 98.4 (27 Aug 2022 08:59), Max: 98.6 (27 Aug 2022 06:02)  HR: 60 (27 Aug 2022 18:01) (60 - 94)  BP: 180/81 (27 Aug 2022 18:01) (112/61 - 191/91)  BP(mean): 122 (27 Aug 2022 18:01) (118 - 122)  RR: 16 (27 Aug 2022 18:01) (13 - 22)  SpO2: 96% (27 Aug 2022 18:01) (94% - 99%)    Parameters below as of 27 Aug 2022 18:01  Patient On (Oxygen Delivery Method): room air        Physical exam:  General: No acute distress, awake and alert  Eyes: Anicteric sclerae, moist conjunctivae, see below for CNs  Neck: trachea midline,  Cardiovascular: Regular rate and rhythm, no murmurs  Pulmonary: Anterior breath sounds clear bilaterally No use of accessory muscles  GI: Abdomen soft, non-distended, non-tender  Extremities: no edema    Neurologic:  -Mental status: Awake, alert, oriented to person, place, and time. Speech is fluent with intact naming, repetition, and comprehension, no dysarthria. Recent and remote memory intact. Follows commands. Attention/concentration intact. Fund of knowledge appropriate.  -Cranial nerves:   II: Visual fields are full to confrontation.  III, IV, VI: Extraocular movements are intact without nystagmus. Pupils equally round and reactive to light  V:  Facial sensation V1-V3 equal and intact   VII: Face is symmetric with normal eye closure and smile  VIII: Hearing is bilaterally intact to finger rub  IX, X: Uvula is midline and soft palate rises symmetrically  XI: Head turning and shoulder shrug are intact.  XII: Tongue protrudes midline  Motor: Normal bulk and tone. No pronator drift. Strength bilateral upper extremity 5/5, bilateral lower extremities 5/5.  Rapid alternating movements intact and symmetric  Sensation: Intact to light touch bilaterally. No neglect or extinction on double simultaneous testing.  Coordination: No dysmetria on finger-to-nose and heel-to-shin bilaterally  Reflexes: Downgoing toes bilaterally   Gait: Narrow gait and steady    LABS:                        8.1    4.99  )-----------( 277      ( 27 Aug 2022 12:26 )             25.3     08-27    133<L>  |  101  |  7   ----------------------------<  125<H>  3.8   |  26  |  1.05    Ca    8.4      27 Aug 2022 12:26  Phos  3.4     08-27  Mg     1.8     08-27    TPro  6.0  /  Alb  2.3<L>  /  TBili  1.9<H>  /  DBili  x   /  AST  180<H>  /  ALT  79<H>  /  AlkPhos  708<H>  08-27    PT/INR - ( 26 Aug 2022 06:16 )   PT: 13.2 sec;   INR: 1.11          PTT - ( 27 Aug 2022 06:16 )  PTT:31.5 sec      RADIOLOGY & ADDITIONAL TESTS:     Neurology Stroke Progress Note    INTERVAL HPI/OVERNIGHT EVENTS:  73 y/o M with colon mass s/p partial colectomy (2017), appendectomy, hernia repair, aortic and iliac aneurysm surgery (2017) s/p stent placement, HTN, HLD, paroxysmal atrial fibrillation on Eliquis, blood clotting disorder s/p IVC filter placement, right knee ligament repair, pacemaker implant (2004) s/p recent ppm interrogation on Eliquis, herniated disc and related surgery in [id number] complicated by spinal fusion, local hematoma, foot drop with chronic pain s/p failed intrathecal morphine pump which caused bradycardia and left hip prosthesis. Pt was recently admitted to [facility] on 08/07 for acute cholecystitis hospital course c/b Takotsubo cardiomyopathy requiring MICU care, hence was not a surgical candidate @ that point of time, s/p perc yonis by IR. Pt was sent back to [facility] for bleeding around perc yonis tube, pt admitted and s/p ERCP on 8/25. Stroke code called in Endo suite where patient was minimally responsive, CT head negative, and pt did not want to participate in exam but pt moving all 4 extremities.    Stroke neurology called again on 8/27 for continued lethargy.  Patient seen and examined. Per nursing staff pt is much better today in terms of alertness compared to yesterday. Pt states he feels fine, just a bit tired. Followed all commands. When asked if his left side is weaker than his right, he said it has been like that for "a long time"    MEDICATIONS  (STANDING):  dextrose 5% + sodium chloride 0.9%. 1000 milliLiter(s) (80 mL/Hr) IV Continuous <Continuous>  doxazosin 4 milliGRAM(s) Oral at bedtime  finasteride 5 milliGRAM(s) Oral daily  hydrALAZINE 50 milliGRAM(s) Oral every 8 hours  metoprolol succinate ER 50 milliGRAM(s) Oral every 12 hours  oxyCODONE  ER Tablet 20 milliGRAM(s) Oral every 8 hours  pantoprazole  Injectable 40 milliGRAM(s) IV Push daily  polyethylene glycol 3350 17 Gram(s) Oral two times a day  simvastatin 5 milliGRAM(s) Oral at bedtime    MEDICATIONS  (PRN):  ondansetron Injectable 4 milliGRAM(s) IV Push every 6 hours PRN Nausea and/or Vomiting      Allergies    Altace (Unknown)  penicillin (Unknown)    Intolerances        Vital Signs Last 24 Hrs  T(C): 36.9 (27 Aug 2022 08:59), Max: 37 (27 Aug 2022 06:02)  T(F): 98.4 (27 Aug 2022 08:59), Max: 98.6 (27 Aug 2022 06:02)  HR: 60 (27 Aug 2022 18:01) (60 - 94)  BP: 180/81 (27 Aug 2022 18:01) (112/61 - 191/91)  BP(mean): 122 (27 Aug 2022 18:01) (118 - 122)  RR: 16 (27 Aug 2022 18:01) (13 - 22)  SpO2: 96% (27 Aug 2022 18:01) (94% - 99%)    Parameters below as of 27 Aug 2022 18:01  Patient On (Oxygen Delivery Method): room air        Physical exam:  General: No acute distress, awake and alert  Eyes: Anicteric sclerae, moist conjunctivae, see below for CNs  Neck: trachea midline    Neurologic:  -Mental status: Lethargic but wakens easily, oriented to person, place, and time. Speech is fluent with intact naming, follows commands. Slight dysarthria but also has no teeth..  -Cranial nerves:   II: Visual fields are full to confrontation.  III, IV, VI: Extraocular movements are intact without nystagmus. Pupils equally round and reactive to light  V:  Facial sensation V1-V3 equal and intact   VII: Face is symmetric   Motor: Normal bulk and tone. No pronator drift. Strength bilateral upper extremity 5/5, bilateral lower extremities 5/5. Maybe some slight left finger curling. Bilateral asterixis  Sensation: Intact to light touch bilaterally. No neglect or extinction on double simultaneous testing.  Coordination: No dysmetria on finger-to-nose bilaterally  Reflexes: Downgoing toes bilaterally   Gait: Narrow gait and steady    LABS:                        8.1    4.99  )-----------( 277      ( 27 Aug 2022 12:26 )             25.3     08-27    133<L>  |  101  |  7   ----------------------------<  125<H>  3.8   |  26  |  1.05    Ca    8.4      27 Aug 2022 12:26  Phos  3.4     08-27  Mg     1.8     08-27    TPro  6.0  /  Alb  2.3<L>  /  TBili  1.9<H>  /  DBili  x   /  AST  180<H>  /  ALT  79<H>  /  AlkPhos  708<H>  08-27    PT/INR - ( 26 Aug 2022 06:16 )   PT: 13.2 sec;   INR: 1.11          PTT - ( 27 Aug 2022 06:16 )  PTT:31.5 sec      RADIOLOGY & ADDITIONAL TESTS:

## 2022-08-27 NOTE — PROGRESS NOTE ADULT - ASSESSMENT
73 y/o male with history of colon mass s/p partial colectomy (2017), appendectomy, hernia repair, aortic and iliac aneurysm surgery (2017) s/p stent placement, HTN, HLD, paroxysmal atrial fibrillation on Eliquis, Right LE DVT >5 years ago s/p IVC filter placement, right knee ligament repair, , herniated disc and related surgery in [id number] complicated by spinal fusion, local hematoma, foot drop with chronic pain s/p failed intrathecal morphine pump which caused bradycardia (s/p pacemaker implant [2004]) and left hip prosthesis. On last admission, had newly reduced EF w/concern for stress induced CM s/p percutaneous cholecystostomy tube by IR, now presenting for bleeding at the perc yonis tube site getting evaluated for elective cholecystectomy. Being upgraded to SICU for further monitoring.    #Cholecystitis s/p Percutaneous cholecystostomy   Underwent ERCP with removal of stones from CBD on 8.26.22  Plan for elective cholecystectomy; Defer assessment of jeannette-operative cardiac risk, and need for further cardiac evaluation to cardiology consult service.   Continue pain and nausea control     #Chronic pain, opiate dependence   Substantial opiate regimen as outpatient. Pain control while inpatient per primary team   Standing miralax while on current pain regimen.     #Paroxsymal Afib  #S/p PPM   Defer recs re: Anti-coagulation to cardiology consult   Continue Metoprolol , statin  heparin gtt    #HTN   Continue metoprolol and hydralazine  Ensure adequate pain control   If SBP >180 mmHg persistently, [ ] Get bladder scan [ ] if pain well-controlled, and bladder scan shows no urinary retention, can use labetalol 10mg IV push if HR >60 bpm or hydralazine IV 5mg  discharge BP med recommendations per cardiology consult     #BPH   Continue doxazosin  Continue finasteride  Low threshold for repeating bladder scan (if new tachycardia, elevated BP)      #Hyponatremia   Sodium, Serum: 134 mmol/L (08-26-22 @ 06:16)  Sodium, Serum: 132 mmol/L (08-25-22 @ 23:43)  Mild, improving.   Lara >40 on last urine studies suggestive of component of SIADH.     Dispo: upgrade to SICU

## 2022-08-27 NOTE — CONSULT NOTE ADULT - SUBJECTIVE AND OBJECTIVE BOX
HPI:  71 y/o M with colon mass s/p partial colectomy (2017), appendectomy, hernia repair, aortic and iliac aneurysm surgery (2017) s/p stent placement, HTN, HLD, paroxysmal atrial fibrillation on Eliquis, blood clotting disorder s/p IVC filter placement, right knee ligament repair, pacemaker implant (2004) s/p recent ppm interrogation on Eliquis, herniated disc and related surgery in [id number] complicated by spinal fusion, local hematoma, foot drop with chronic pain s/p failed intrathecal morphine pump which caused bradycardia and left hip prosthesis. Patient was recently admitted to [facility] on 08/07 with acute cholecystitis. During that admission patient developed takotsubo cardiomyopathy requiring MICU care. Because patient condition at that time, and not medically optimized, patient was not deemed a surgical candidate for cholecystectomy, and underwent a percutaneous cholecystostomy tube by IR. Patient tolerated the procedure well and was subsequently discharged to [facility]. 3 days ago patient was noted to have blood on the dressings covering the perc yonis, and called Dr. Doyle to update him; per family member Dr. Doyle advised them to stop the eliquis and continue to monitor the site for any bleeding. Patient stopped taking the Eliquis but next morning dressing was saturated with blood again, for which they called Dr. Doyel again and he instructed them to come to [facility] ED. Per rehab the drain was consistently putting out 300cc/d and did not have any blood in the tubing. On presentation, patient afebrile, HD stable, wbc 9.7, Hb 9.7 stable from 9.5 at discharge; LFTs wnl, Cr slightly elevated from discharge. On PE dressing noted to have some blood, on removal of dressing no obvious sign of bleeding and/or hematoma, abdomen is soft, NT/ND. Patient denied fever/nausea/vomit/diarrhea/pruritus. CTAP with percutaneous cholecystostomy catheter in collapsed gallbladder.          (20 Aug 2022 19:20)      PAST MEDICAL & SURGICAL HISTORY:  AAA (abdominal aortic aneurysm)      HTN (hypertension)      Cardiomyopathy      Hyperlipidemia      ONOFRE (dyspnea on exertion)      DVT (deep venous thrombosis)      Pulmonary emphysema  COPD      Cardiac arrhythmia      Aneurysm of aortic root      Depression  anxiety      Anemia      Pelvic mass      Pacemaker  medtronic      History of kidney surgery      History of back surgery  multiple, lumbar      Surgery, elective  multiple neck surgery, cervical      S/P hip replacement, left      S/P arthroscopy of right knee      S/P AAA (abdominal aortic aneurysm) repair  with right iliac aneurysm endovascular repair      S/P carpal tunnel release      Surgery, elective  Cardiac Stent x4      Surgery, elective  Intrathecal pump placement          ROS: See HPI    MEDICATIONS  (STANDING):  dextrose 5% + sodium chloride 0.9%. 1000 milliLiter(s) (80 mL/Hr) IV Continuous <Continuous>  doxazosin 4 milliGRAM(s) Oral at bedtime  finasteride 5 milliGRAM(s) Oral daily  hydrALAZINE 50 milliGRAM(s) Oral every 8 hours  metoprolol succinate ER 50 milliGRAM(s) Oral every 12 hours  pantoprazole  Injectable 40 milliGRAM(s) IV Push daily  polyethylene glycol 3350 17 Gram(s) Oral two times a day  simvastatin 5 milliGRAM(s) Oral at bedtime    MEDICATIONS  (PRN):  ondansetron Injectable 4 milliGRAM(s) IV Push every 6 hours PRN Nausea and/or Vomiting      Allergies    Altace (Unknown)  penicillin (Unknown)    Intolerances        SOCIAL HISTORY:  Smoke: Never Smoker  EtOH: occasional    FAMILY HISTORY:      Vital Signs Last 24 Hrs  T(C): 36.9 (27 Aug 2022 08:59), Max: 37 (27 Aug 2022 06:02)  T(F): 98.4 (27 Aug 2022 08:59), Max: 98.6 (27 Aug 2022 06:02)  HR: 60 (27 Aug 2022 08:59) (60 - 94)  BP: 156/75 (27 Aug 2022 08:59) (112/61 - 191/91)  BP(mean): --  RR: 16 (27 Aug 2022 08:59) (15 - 22)  SpO2: 97% (27 Aug 2022 08:59) (94% - 97%)    Parameters below as of 27 Aug 2022 08:59  Patient On (Oxygen Delivery Method): room air        PHYSICAL EXAM    Gen: NAD   Neuro: A&oX3 no neuro deficits  HEENT:   CV: RRR reg s1s2 no M  Pulm: CTA B/L no w/w/r  Abd: Soft, NT/ND  Ext: No C/C/E  Skin: No rashes erythema or ecchymosis  MSK: No joint swelling noted  Psych: Normal affect     LABS:                        7.7    4.62  )-----------( 290      ( 27 Aug 2022 06:16 )             24.0     08-27    134<L>  |  101  |  7   ----------------------------<  120<H>  3.8   |  25  |  1.07    Ca    8.3<L>      27 Aug 2022 06:16  Phos  3.4     08-27  Mg     1.8     08-27    TPro  5.8<L>  /  Alb  2.1<L>  /  TBili  1.6<H>  /  DBili  x   /  AST  123<H>  /  ALT  52<H>  /  AlkPhos  696<H>  08-27    PT/INR - ( 26 Aug 2022 06:16 )   PT: 13.2 sec;   INR: 1.11          PTT - ( 27 Aug 2022 06:16 )  PTT:31.5 sec      RADIOLOGY & ADDITIONAL STUDIES:    Assessment and Plan:  71 y/o M with colon mass s/p partial colectomy (2017), appendectomy, hernia repair, aortic and iliac aneurysm surgery (2017) s/p stent placement, HTN, HLD, paroxysmal atrial fibrillation on Eliquis, blood clotting disorder s/p IVC filter placement, right knee ligament repair, pacemaker implant (2004) s/p recent ppm interrogation on Eliquis, herniated disc and related surgery in [id number] complicated by spinal fusion, local hematoma, foot drop with chronic pain s/p failed intrathecal morphine pump, recently admitted to [facility] on 08/07 with acute cholecystitis c/b cardiomyopathy s/p percutaneous cholecystostomy tube by IR who presented for concerns of bleeding at the perc yonis tube site. In ED, afebrile, HD stable, wbc 9.7, Hb 9.7 stable from 9.5 at discharge; LFTs wnl. PE with some blood at the dressing site but none in tubing, no hematoma or ecchymosis surrounding drain site. CTAP with percutaneous cholecystostomy catheter in collapsed gallbladder, confirmed with radiology department verbally. Plan for admission with interval cholecystectomy once cleared from cardiac perspective. Now s/p ERCP with removal of several small and medium sized stones. AMS on 8/25 ct head negative. Recurrent AMS on 8/27 therefore transferred to SICU.     Neuro: Holding Pain meds. AMS: S/p ct head: negative Appreciate neuro reccs will get MRI check ammonia level.   CV: HD stable hx of cardiomyopathy 8/22 Echo:  ef 55% D5 @80 HTN: Cont metoprolol, Hydralazine 50 q8, Zocor 5 qhs  Pulm: Satting well on NC  GI: NPO 2* AMS, Acute choley: s/p Perc Cholecystoscopy tube, ERCP. Protonix, Miralax   : Voids check UA. Cardura, Proscar   ID:  WBC 4 - afebrile  off Abx.   Endo: ISS  PPx: SCD SQH   Lines: PIV   Wounds: Perc Choley tube,   PT/OT: Not ordered   HPI:  73 y/o M with colon mass s/p partial colectomy (2017), appendectomy, hernia repair, aortic and iliac aneurysm surgery (2017) s/p stent placement, HTN, HLD, paroxysmal atrial fibrillation on Eliquis, blood clotting disorder s/p IVC filter placement, right knee ligament repair, pacemaker implant (2004) s/p recent ppm interrogation on Eliquis, herniated disc and related surgery in [id number] complicated by spinal fusion, local hematoma, foot drop with chronic pain s/p failed intrathecal morphine pump which caused bradycardia and left hip prosthesis. Patient was recently admitted to [facility] on 08/07 with acute cholecystitis. During that admission patient developed takotsubo cardiomyopathy requiring MICU care. Because patient condition at that time, and not medically optimized, patient was not deemed a surgical candidate for cholecystectomy, and underwent a percutaneous cholecystostomy tube by IR. Patient tolerated the procedure well and was subsequently discharged to [facility]. 3 days ago patient was noted to have blood on the dressings covering the perc yonis, and called Dr. Doyle to update him; per family member Dr. Doyle advised them to stop the eliquis and continue to monitor the site for any bleeding. Patient stopped taking the Eliquis but next morning dressing was saturated with blood again, for which they called Dr. Doyle again and he instructed them to come to [facility] ED. Per rehab the drain was consistently putting out 300cc/d and did not have any blood in the tubing. On presentation, patient afebrile, HD stable, wbc 9.7, Hb 9.7 stable from 9.5 at discharge; LFTs wnl, Cr slightly elevated from discharge. On PE dressing noted to have some blood, on removal of dressing no obvious sign of bleeding and/or hematoma, abdomen is soft, NT/ND. Patient denied fever/nausea/vomit/diarrhea/pruritus. CTAP with percutaneous cholecystostomy catheter in collapsed gallbladder.          (20 Aug 2022 19:20)      PAST MEDICAL & SURGICAL HISTORY:  AAA (abdominal aortic aneurysm)      HTN (hypertension)      Cardiomyopathy      Hyperlipidemia      ONOFRE (dyspnea on exertion)      DVT (deep venous thrombosis)      Pulmonary emphysema  COPD      Cardiac arrhythmia      Aneurysm of aortic root      Depression  anxiety      Anemia      Pelvic mass      Pacemaker  medtronic      History of kidney surgery      History of back surgery  multiple, lumbar      Surgery, elective  multiple neck surgery, cervical      S/P hip replacement, left      S/P arthroscopy of right knee      S/P AAA (abdominal aortic aneurysm) repair  with right iliac aneurysm endovascular repair      S/P carpal tunnel release      Surgery, elective  Cardiac Stent x4      Surgery, elective  Intrathecal pump placement          ROS: See HPI    MEDICATIONS  (STANDING):  dextrose 5% + sodium chloride 0.9%. 1000 milliLiter(s) (80 mL/Hr) IV Continuous <Continuous>  doxazosin 4 milliGRAM(s) Oral at bedtime  finasteride 5 milliGRAM(s) Oral daily  hydrALAZINE 50 milliGRAM(s) Oral every 8 hours  metoprolol succinate ER 50 milliGRAM(s) Oral every 12 hours  pantoprazole  Injectable 40 milliGRAM(s) IV Push daily  polyethylene glycol 3350 17 Gram(s) Oral two times a day  simvastatin 5 milliGRAM(s) Oral at bedtime    MEDICATIONS  (PRN):  ondansetron Injectable 4 milliGRAM(s) IV Push every 6 hours PRN Nausea and/or Vomiting      Allergies    Altace (Unknown)  penicillin (Unknown)    Intolerances        SOCIAL HISTORY:  Smoke: Never Smoker  EtOH: occasional    FAMILY HISTORY:      Vital Signs Last 24 Hrs  T(C): 36.9 (27 Aug 2022 08:59), Max: 37 (27 Aug 2022 06:02)  T(F): 98.4 (27 Aug 2022 08:59), Max: 98.6 (27 Aug 2022 06:02)  HR: 60 (27 Aug 2022 08:59) (60 - 94)  BP: 156/75 (27 Aug 2022 08:59) (112/61 - 191/91)  BP(mean): --  RR: 16 (27 Aug 2022 08:59) (15 - 22)  SpO2: 97% (27 Aug 2022 08:59) (94% - 97%)    Parameters below as of 27 Aug 2022 08:59  Patient On (Oxygen Delivery Method): room air        PHYSICAL EXAM    Gen: NAD   Neuro: A&oX3 no neuro deficits  HEENT:   CV: RRR reg s1s2 no M  Pulm: CTA B/L no w/w/r  Abd: Soft, NT/ND  Ext: No C/C/E  Skin: No rashes erythema or ecchymosis  MSK: No joint swelling noted  Psych: slight inappropriate affect and paranoid ideation    LABS:                        7.7    4.62  )-----------( 290      ( 27 Aug 2022 06:16 )             24.0     08-27    134<L>  |  101  |  7   ----------------------------<  120<H>  3.8   |  25  |  1.07    Ca    8.3<L>      27 Aug 2022 06:16  Phos  3.4     08-27  Mg     1.8     08-27    TPro  5.8<L>  /  Alb  2.1<L>  /  TBili  1.6<H>  /  DBili  x   /  AST  123<H>  /  ALT  52<H>  /  AlkPhos  696<H>  08-27    PT/INR - ( 26 Aug 2022 06:16 )   PT: 13.2 sec;   INR: 1.11          PTT - ( 27 Aug 2022 06:16 )  PTT:31.5 sec      RADIOLOGY & ADDITIONAL STUDIES:    Assessment and Plan:  73 y/o M with colon mass s/p partial colectomy (2017), appendectomy, hernia repair, aortic and iliac aneurysm surgery (2017) s/p stent placement, HTN, HLD, paroxysmal atrial fibrillation on Eliquis, blood clotting disorder s/p IVC filter placement, right knee ligament repair, pacemaker implant (2004) s/p recent ppm interrogation on Eliquis, herniated disc and related surgery in [id number] complicated by spinal fusion, local hematoma, foot drop with chronic pain s/p failed intrathecal morphine pump, recently admitted to [facility] on 08/07 with acute cholecystitis c/b cardiomyopathy s/p percutaneous cholecystostomy tube by IR who presented for concerns of bleeding at the perc yonis tube site. In ED, afebrile, HD stable, wbc 9.7, Hb 9.7 stable from 9.5 at discharge; LFTs wnl. PE with some blood at the dressing site but none in tubing, no hematoma or ecchymosis surrounding drain site. CTAP with percutaneous cholecystostomy catheter in collapsed gallbladder, confirmed with radiology department verbally. Plan for admission with interval cholecystectomy once cleared from cardiac perspective. Now s/p ERCP with removal of several small and medium sized stones. AMS on 8/25 ct head negative. Recurrent AMS on 8/27 therefore transferred to SICU.     Neuro: Holding Pain meds. AMS: S/p ct head: negative Appreciate neuro reccs will get MRI check ammonia level.   CV: HD stable hx of cardiomyopathy 8/22 Echo:  ef 55% D5 @80 HTN: Cont metoprolol, Hydralazine 50 q8, Zocor 5 qhs  Pulm: Satting well on NC  GI: NPO 2* AMS, Acute choley: s/p Perc Cholecystoscopy tube, ERCP. Protonix, Miralax   : Voids check UA. Cardura, Proscar   ID:  WBC 4 - afebrile  off Abx.   Endo: ISS  PPx: SCD SQH   Lines: PIV   Wounds: Perc Choley tube,   PT/OT: Not ordered

## 2022-08-27 NOTE — CONSULT NOTE ADULT - ASSESSMENT
71 y/o M with colon mass s/p partial colectomy (2017), appendectomy, hernia repair, aortic and iliac aneurysm surgery (2017) s/p stent placement, HTN, HLD, paroxysmal atrial fibrillation on Eliquis, blood clotting disorder s/p IVC filter placement, right knee ligament repair, pacemaker implant (2004) s/p recent ppm interrogation on Eliquis, herniated disc and related surgery in [id number] complicated by spinal fusion, local hematoma, foot drop with chronic pain s/p failed intrathecal morphine pump which caused bradycardia and left hip prosthesis. Pt was recently admitted to [facility] on 08/07 for acute cholecystitis hospital course c/b Takotsubo cardiomyopathy requiring MICU care, hence was not a surgical candidate @ that point of time, s/p perc yonis by IR. Pt was sent back to [facility] for bleeding around perc yonis tube, pt admitted and s/p ERCP on 8/25. Stroke code called in Endo suite where patient was minimally responsive, CT head negative, and pt did not want to participate in exam but pt moving all 4 extremities.    Stroke neurology called again on 8/27 for continued lethargy.    -Reasonable to obtain repeat CT head 73 y/o M with colon mass s/p partial colectomy (2017), appendectomy, hernia repair, aortic and iliac aneurysm surgery (2017) s/p stent placement, HTN, HLD, paroxysmal atrial fibrillation on Eliquis, blood clotting disorder s/p IVC filter placement, right knee ligament repair, pacemaker implant (2004) s/p recent ppm interrogation on Eliquis, herniated disc and related surgery in [id number] complicated by spinal fusion, local hematoma, foot drop with chronic pain s/p failed intrathecal morphine pump which caused bradycardia and left hip prosthesis. Pt was recently admitted to [facility] on 08/07 for acute cholecystitis hospital course c/b Takotsubo cardiomyopathy requiring MICU care, hence was not a surgical candidate @ that point of time, s/p perc yonis by IR. Pt was sent back to [facility] for bleeding around perc yonis tube, pt admitted and s/p ERCP on 8/25. Stroke code called in Endo suite where patient was minimally responsive, CT head negative, and pt did not want to participate in exam but pt moving all 4 extremities.    Stroke neurology called again on 8/27 for continued lethargy.    -Reasonable to obtain repeat CT head, though low likelihood of bleed  -if CT head stable, ok to start heparin drip for anticoagulation for afib  -unable to obtain MRI brain as pt has a PPM  -ammonia level noted to be normal at 13  -Would recommend decreasing sedating meds - pt is on oxy and two benzos, would recommend decreasing/stopping if possible to see if mental status changes    Pt seen and discussed with Dr. Lezama 71 y/o M with colon mass s/p partial colectomy (2017), appendectomy, hernia repair, aortic and iliac aneurysm surgery (2017) s/p stent placement, HTN, HLD, paroxysmal atrial fibrillation on Eliquis, blood clotting disorder s/p IVC filter placement, right knee ligament repair, pacemaker implant (2004) s/p recent ppm interrogation on Eliquis, herniated disc and related surgery in [id number] complicated by spinal fusion, local hematoma, foot drop with chronic pain s/p failed intrathecal morphine pump which caused bradycardia and left hip prosthesis. Pt was recently admitted to [facility] on 08/07 for acute cholecystitis hospital course c/b Takotsubo cardiomyopathy requiring MICU care, hence was not a surgical candidate @ that point of time, s/p perc yonis by IR. Pt was sent back to [facility] for bleeding around perc yonis tube, pt admitted and s/p ERCP on 8/25. Stroke code called in Endo suite where patient was minimally responsive, CT head negative, and pt did not want to participate in exam but pt moving all 4 extremities.    Stroke neurology called again on 8/27 for continued lethargy, though improved since the day prior.    -Reasonable to obtain repeat CT head, though low likelihood of bleed  -if CT head stable, ok to start heparin drip for anticoagulation for afib  -unable to obtain MRI brain as pt has a PPM  -ammonia level noted to be normal at 13  -Would recommend decreasing sedating meds - pt is on oxy and two benzos, would recommend decreasing/stopping if possible to see if mental status changes    Pt seen and discussed with Dr. Lezama

## 2022-08-27 NOTE — PROGRESS NOTE ADULT - SUBJECTIVE AND OBJECTIVE BOX
Pt seen and examined at bedside. Pt fully participatory and describes no difficulties tolterating PO.  Also denies abdominal discomfort following ERCP.  Afebrile overnight otherwise.    Ongoing concerns by surgical team/IM team regarding lethargy and difficulty participation in exam.  Stroke team evaluated pt and care escalated to ICU with plans for MRI brain.    Allergies    Altace (Unknown)  penicillin (Unknown)    Intolerances    MEDICATIONS:  MEDICATIONS  (STANDING):  dextrose 5% + sodium chloride 0.9%. 1000 milliLiter(s) (80 mL/Hr) IV Continuous <Continuous>  doxazosin 4 milliGRAM(s) Oral at bedtime  finasteride 5 milliGRAM(s) Oral daily  hydrALAZINE 50 milliGRAM(s) Oral every 8 hours  metoprolol succinate ER 50 milliGRAM(s) Oral every 12 hours  pantoprazole  Injectable 40 milliGRAM(s) IV Push daily  polyethylene glycol 3350 17 Gram(s) Oral two times a day  simvastatin 5 milliGRAM(s) Oral at bedtime    MEDICATIONS  (PRN):  ondansetron Injectable 4 milliGRAM(s) IV Push every 6 hours PRN Nausea and/or Vomiting      Vital Signs Last 24 Hrs  T(C): 36.9 (27 Aug 2022 08:59), Max: 37 (27 Aug 2022 06:02)  T(F): 98.4 (27 Aug 2022 08:59), Max: 98.6 (27 Aug 2022 06:02)  HR: 60 (27 Aug 2022 08:59) (60 - 94)  BP: 156/75 (27 Aug 2022 08:59) (112/61 - 191/91)  BP(mean): --  RR: 16 (27 Aug 2022 08:59) (15 - 22)  SpO2: 97% (27 Aug 2022 08:59) (94% - 97%)    Parameters below as of 27 Aug 2022 08:59  Patient On (Oxygen Delivery Method): room air        08-26 @ 07:01  -  08-27 @ 07:00  --------------------------------------------------------  IN: 1935 mL / OUT: 2670 mL / NET: -735 mL        PHYSICAL EXAM:    General: No acute distress  HEENT: MMM, conjunctiva and sclera clear  Gastrointestinal: Soft non-tender non-distended. No rebound or guarding  Skin: Warm and dry. No obvious rash    LABS:  CBC Full  -  ( 27 Aug 2022 12:26 )  WBC Count : 4.99 K/uL  RBC Count : 2.84 M/uL  Hemoglobin : 8.1 g/dL  Hematocrit : 25.3 %  Platelet Count - Automated : 277 K/uL  Mean Cell Volume : 89.1 fl  Mean Cell Hemoglobin : 28.5 pg  Mean Cell Hemoglobin Concentration : 32.0 gm/dL  Auto Neutrophil # : 3.05 K/uL  Auto Lymphocyte # : 0.77 K/uL  Auto Monocyte # : 0.76 K/uL  Auto Eosinophil # : 0.19 K/uL  Auto Basophil # : 0.04 K/uL  Auto Neutrophil % : 63.3 %  Auto Lymphocyte % : 16.0 %  Auto Monocyte % : 15.8 %  Auto Eosinophil % : 3.9 %  Auto Basophil % : 0.8 %    08-27    133<L>  |  101  |  7   ----------------------------<  125<H>  3.8   |  26  |  1.05    Ca    8.4      27 Aug 2022 12:26  Phos  3.4     08-27  Mg     1.8     08-27    TPro  6.0  /  Alb  2.3<L>  /  TBili  1.9<H>  /  DBili  x   /  AST  180<H>  /  ALT  79<H>  /  AlkPhos  708<H>  08-27    PT/INR - ( 26 Aug 2022 06:16 )   PT: 13.2 sec;   INR: 1.11          PTT - ( 27 Aug 2022 06:16 )  PTT:31.5 sec

## 2022-08-27 NOTE — PROGRESS NOTE ADULT - ASSESSMENT
71 y/o M with colon mass s/p partial colectomy (2017), appendectomy, hernia repair, aortic and iliac aneurysm surgery (2017) s/p stent placement, HTN, HLD, paroxysmal atrial fibrillation on Eliquis, blood clotting disorder s/p IVC filter placement, right knee ligament repair, pacemaker implant (2004) s/p recent ppm interrogation on Eliquis, herniated disc and related surgery in [id number] complicated by spinal fusion, local hematoma, foot drop with chronic pain s/p failed intrathecal morphine pump, recently admitted to [facility] on 08/07 with acute cholecystitis c/b cardiomyopathy s/p percutaneous cholecystostomy tube by IR who presented for concerns of bleeding at the perc yonis tube site. In ED, afebrile, HD stable, wbc 9.7, Hb 9.7 stable from 9.5 at discharge; LFTs wnl. PE with some blood at the dressing site but none in tubing, no hematoma or ecchymosis surrounding drain site. CTAP with percutaneous cholecystostomy catheter in collapsed gallbladder, confirmed with radiology department verbally. Plan for admission with interval cholecystectomy once cleared from cardiac perspective. Now s/p ERCP with removal of several small and medium sized stones    MRI per Neuro  Dash CLD/IVF  Pain/nausea control  Home meds as appropriate  Hep gtt/OOBA/SCDs/IS  Cardiology clearance and optimization   AM Labs  Dispo: JESSIKA SAINI  ERCP 8/26  Transfer to SICU

## 2022-08-27 NOTE — PROGRESS NOTE ADULT - SUBJECTIVE AND OBJECTIVE BOX
STATUS POST:  ERCP w/stone removal     SUBJECTIVE: Patient seen and examined bedside by chief resident. Patient is lethargic and difficult to arouse today. He denies any abdominal pain at this time.    doxazosin 4 milliGRAM(s) Oral at bedtime  hydrALAZINE 50 milliGRAM(s) Oral every 8 hours  metoprolol succinate ER 50 milliGRAM(s) Oral every 12 hours      Vital Signs Last 24 Hrs  T(C): 36.9 (27 Aug 2022 08:59), Max: 37 (27 Aug 2022 06:02)  T(F): 98.4 (27 Aug 2022 08:59), Max: 98.6 (27 Aug 2022 06:02)  HR: 60 (27 Aug 2022 08:59) (60 - 94)  BP: 156/75 (27 Aug 2022 08:59) (112/61 - 191/91)  BP(mean): --  RR: 16 (27 Aug 2022 08:59) (15 - 22)  SpO2: 97% (27 Aug 2022 08:59) (94% - 97%)    Parameters below as of 27 Aug 2022 08:59  Patient On (Oxygen Delivery Method): room air      I&O's Detail    26 Aug 2022 07:01  -  27 Aug 2022 07:00  --------------------------------------------------------  IN:    dextrose 5% + sodium chloride 0.9%: 1060 mL    Lactated Ringers: 775 mL    Oral Fluid: 100 mL  Total IN: 1935 mL    OUT:    Drain (mL): 70 mL    Intermittent Catheterization - Urethral (mL): 1575 mL    Voided (mL): 1025 mL  Total OUT: 2670 mL    Total NET: -735 mL      Neuro: Lethargic. Uncooperative with exam. Str R>L.  HEENT: NCAT. Mucous membranes moist. EOMI  Respiratory: CTA b/l. no respiratory distress  Cardiovascular: NSR, RRR  Gastrointestinal: soft, NT/ND. No rebound/guarding  Extremities: (-) edema b/l. SCDs in place.          LABS:                        7.7    4.62  )-----------( 290      ( 27 Aug 2022 06:16 )             24.0     08-27    134<L>  |  101  |  7   ----------------------------<  120<H>  3.8   |  25  |  1.07    Ca    8.3<L>      27 Aug 2022 06:16  Phos  3.4     08-27  Mg     1.8     08-27    TPro  5.8<L>  /  Alb  2.1<L>  /  TBili  1.6<H>  /  DBili  x   /  AST  123<H>  /  ALT  52<H>  /  AlkPhos  696<H>  08-27    PT/INR - ( 26 Aug 2022 06:16 )   PT: 13.2 sec;   INR: 1.11          PTT - ( 27 Aug 2022 06:16 )  PTT:31.5 sec      RADIOLOGY & ADDITIONAL STUDIES:

## 2022-08-27 NOTE — PROGRESS NOTE ADULT - SUBJECTIVE AND OBJECTIVE BOX
Pt seen and examined at bedside.  NAEO. Passing flatus. Tolerating PO without issue. No new abd distress    Allergies    Altace (Unknown)  penicillin (Unknown)    Intolerances        MEDICATIONS:  MEDICATIONS  (STANDING):  dextrose 5% + sodium chloride 0.9%. 1000 milliLiter(s) (80 mL/Hr) IV Continuous <Continuous>  doxazosin 4 milliGRAM(s) Oral at bedtime  finasteride 5 milliGRAM(s) Oral daily  hydrALAZINE 50 milliGRAM(s) Oral every 8 hours  metoprolol succinate ER 50 milliGRAM(s) Oral every 12 hours  nortriptyline 75 milliGRAM(s) Oral at bedtime  oxyCODONE  ER Tablet 20 milliGRAM(s) Oral every 8 hours  pantoprazole  Injectable 40 milliGRAM(s) IV Push daily  polyethylene glycol 3350 17 Gram(s) Oral two times a day  simvastatin 5 milliGRAM(s) Oral at bedtime    MEDICATIONS  (PRN):  ALPRAZolam 1 milliGRAM(s) Oral at bedtime PRN insomnia  diazepam    Tablet 2 milliGRAM(s) Oral daily PRN insomnia  ondansetron Injectable 4 milliGRAM(s) IV Push every 6 hours PRN Nausea and/or Vomiting  oxyCODONE    IR 15 milliGRAM(s) Oral every 4 hours PRN Moderate Pain (4 - 6)  oxyCODONE    IR 30 milliGRAM(s) Oral every 4 hours PRN Severe Pain (7 - 10)      Vital Signs Last 24 Hrs  T(C): 36.9 (27 Aug 2022 08:59), Max: 37 (27 Aug 2022 06:02)  T(F): 98.4 (27 Aug 2022 08:59), Max: 98.6 (27 Aug 2022 06:02)  HR: 60 (27 Aug 2022 08:59) (60 - 94)  BP: 156/75 (27 Aug 2022 08:59) (112/61 - 191/91)  BP(mean): --  RR: 16 (27 Aug 2022 08:59) (15 - 22)  SpO2: 97% (27 Aug 2022 08:59) (94% - 97%)    Parameters below as of 27 Aug 2022 08:59  Patient On (Oxygen Delivery Method): room air        08-26 @ 07:01  -  08-27 @ 07:00  --------------------------------------------------------  IN: 1935 mL / OUT: 2670 mL / NET: -735 mL        PHYSICAL EXAM:    General: No acute distress  HEENT: MMM, conjunctiva and sclera clear  Gastrointestinal: Soft non-tender non-distended. No rebound or guarding  Skin: Warm and dry. No obvious rash    LABS:  CBC Full  -  ( 27 Aug 2022 06:16 )  WBC Count : 4.62 K/uL  RBC Count : 2.71 M/uL  Hemoglobin : 7.7 g/dL  Hematocrit : 24.0 %  Platelet Count - Automated : 290 K/uL  Mean Cell Volume : 88.6 fl  Mean Cell Hemoglobin : 28.4 pg  Mean Cell Hemoglobin Concentration : 32.1 gm/dL  Auto Neutrophil # : x  Auto Lymphocyte # : x  Auto Monocyte # : x  Auto Eosinophil # : x  Auto Basophil # : x  Auto Neutrophil % : x  Auto Lymphocyte % : x  Auto Monocyte % : x  Auto Eosinophil % : x  Auto Basophil % : x    08-27    134<L>  |  101  |  7   ----------------------------<  120<H>  3.8   |  25  |  1.07    Ca    8.3<L>      27 Aug 2022 06:16  Phos  3.4     08-27  Mg     1.8     08-27    TPro  5.8<L>  /  Alb  2.1<L>  /  TBili  1.6<H>  /  DBili  x   /  AST  123<H>  /  ALT  52<H>  /  AlkPhos  696<H>  08-27    PT/INR - ( 26 Aug 2022 06:16 )   PT: 13.2 sec;   INR: 1.11          PTT - ( 27 Aug 2022 06:16 )  PTT:31.5 sec                  RADIOLOGY & ADDITIONAL STUDIES:

## 2022-08-27 NOTE — PROGRESS NOTE ADULT - SUBJECTIVE AND OBJECTIVE BOX
Patient seen and examined at bedside this morning.  Mentation waxes and wanes. Opens eyes to voice, responsive but not consistently. Discussed with surgery, patient being upgraded to SICU for further monitoring.    Vitals:  T 98.4 HR 60 /75 RR 16 spo2 97% room air     PE  General: Awake  HENENT: PERRLA EOMI   Cardio S1 S2 RRR no m/r/g   Pulm CTA bilaterally no wheezes/crackles  Abdomen Soft NT ND   Neuro AOx3    MEDICATIONS  (STANDING):  dextrose 5% + sodium chloride 0.9%. 1000 milliLiter(s) (80 mL/Hr) IV Continuous <Continuous>  doxazosin 4 milliGRAM(s) Oral at bedtime  finasteride 5 milliGRAM(s) Oral daily  hydrALAZINE 50 milliGRAM(s) Oral every 8 hours  metoprolol succinate ER 50 milliGRAM(s) Oral every 12 hours  nortriptyline 75 milliGRAM(s) Oral at bedtime  oxyCODONE  ER Tablet 20 milliGRAM(s) Oral every 8 hours  pantoprazole  Injectable 40 milliGRAM(s) IV Push daily  polyethylene glycol 3350 17 Gram(s) Oral two times a day  simvastatin 5 milliGRAM(s) Oral at bedtime    MEDICATIONS  (PRN):  ALPRAZolam 1 milliGRAM(s) Oral at bedtime PRN insomnia  diazepam    Tablet 2 milliGRAM(s) Oral daily PRN insomnia  ondansetron Injectable 4 milliGRAM(s) IV Push every 6 hours PRN Nausea and/or Vomiting  oxyCODONE    IR 15 milliGRAM(s) Oral every 4 hours PRN Moderate Pain (4 - 6)  oxyCODONE    IR 30 milliGRAM(s) Oral every 4 hours PRN Severe Pain (7 - 10)    Ins: 20409  Outs: 33040    Labs: WBC 4.99 Hgb 8.1 Hct 25.3 platelets 277  BMP: Na 134 K+ 3.8 chloride 101 bicarb 25 BUN 7 creatinine 1.07

## 2022-08-27 NOTE — CHART NOTE - NSCHARTNOTEFT_GEN_A_CORE
Patient seen and examined at bedside during morning rounds, POD1 s/p ERCP with sphincterotomy and removal of several cbd stones.  Afebrile, HR 80s, SBP 130s, saturating well on RA. On exam he initially appeared obtunded and very difficult to arouse on sternal rub. With increased stimulation he opened eyes to voice, and was awake oriented x2, able to squeeze fingers bilateral and wiggle toes b/l but did not participate any further in examination. Given his recent similar episode which prompted a stroke code 8/25 (CT head and CTA head/neck negative), stroke team was called to bedside immediately. On their evaluation he became increasingly responsive but still not at baseline. Abdominal exam soft, nondistended, mild RUQ tenderness, no rebound, no guarding. Tbili elevated 1.6 (0.3),  (74), AST//52 (12/8).     After discussion with Dr. Lezama from neurology team, concern for encephalopathy with differential diagnosis including metabolic etiology, sepsis in the setting of recent instrumentation an increased LFTS, hepatic, medication induced (oxycodone), and ischemic/hemorrhaghic neurologic event.     Plan:  -Patient discussed with Dr. Lezama, Dr. Marcano, and Dr. Chacko. Given that patient has multiple comorbidities and risk factors for metabolic, hepatic, septic and ishemic encephalopathy, decision made to place him in SICU for further monitoring of symptoms and clinical status.  -Repeat CBC/CMP/ammonia, amylase  -Repeat CT head (pt is not MR compatible at this time due pacemaker), will plan to restart heparin ggt if normal  -f/u GI  -Rest of care per SICU team

## 2022-08-27 NOTE — CHART NOTE - NSCHARTNOTEFT_GEN_A_CORE
Patient was seen in the AM by the surgery team. Due to patient lethargy and difficulty participating with examination. Due to previous baseline, it was unclear if the patient had a cerebrovascular event. The decision was made to contact the stroke team for second opinion. The stroke team evaluated the patient alongside with the surgery team and note strength R>L although the patient also noted that this was normal. Furthermore, the patient was much more participatory upon second exam. Each team discussed the findings with their attendings and the decision was made to proceed with an MRI and upgrade to SICU level of care in the setting of unclear neurologic function.

## 2022-08-27 NOTE — PROGRESS NOTE ADULT - ASSESSMENT
2 yo M, PMHx Colon Mass s/p partial colectomy (2017), appendectomy, hernia repair, aortic and iliac aneurysm surgery (2017) s/p stent placement, HTN, HLD, pAfib on Eliquis, VTE/thrombophilia  s/p IVC filter placement, right knee ligament repair, PPM (2004, unknown indication), herniated disc and related surgery in [id number] complicated by spinal fusion, local hematoma, foot drop with chronic pain s/p failed intrathecal morphine pump, and left hip prosthesis.   Who presented for concern over cholecystotomy tube malfunction,    GI was consulted for suspected choledocholithiasis and inability to undergo MRCP due to incompatible PPM now s/p ERCP without complication. Multiple small and medium sized stones in the patient's CBD.  No signs/sx to suggest acute pancreatitis.  Liver enzymes elevated today following CBD manipulation.    #Choledocholithiasis s/p ERCP with multiple stones removed    PENDING

## 2022-08-28 LAB
ALBUMIN SERPL ELPH-MCNC: 2.7 G/DL — LOW (ref 3.3–5)
ALP SERPL-CCNC: 622 U/L — HIGH (ref 40–120)
ALT FLD-CCNC: 103 U/L — HIGH (ref 10–45)
ANION GAP SERPL CALC-SCNC: 6 MMOL/L — SIGNIFICANT CHANGE UP (ref 5–17)
APTT BLD: 61.6 SEC — HIGH (ref 27.5–35.5)
APTT BLD: 64.5 SEC — HIGH (ref 27.5–35.5)
APTT BLD: 71.5 SEC — HIGH (ref 27.5–35.5)
AST SERPL-CCNC: 172 U/L — HIGH (ref 10–40)
BILIRUB SERPL-MCNC: 1 MG/DL — SIGNIFICANT CHANGE UP (ref 0.2–1.2)
BUN SERPL-MCNC: 6 MG/DL — LOW (ref 7–23)
CALCIUM SERPL-MCNC: 8.4 MG/DL — SIGNIFICANT CHANGE UP (ref 8.4–10.5)
CHLORIDE SERPL-SCNC: 101 MMOL/L — SIGNIFICANT CHANGE UP (ref 96–108)
CO2 SERPL-SCNC: 25 MMOL/L — SIGNIFICANT CHANGE UP (ref 22–31)
CREAT SERPL-MCNC: 1.02 MG/DL — SIGNIFICANT CHANGE UP (ref 0.5–1.3)
EGFR: 78 ML/MIN/1.73M2 — SIGNIFICANT CHANGE UP
GLUCOSE SERPL-MCNC: 106 MG/DL — HIGH (ref 70–99)
HCT VFR BLD CALC: 25.6 % — LOW (ref 39–50)
HGB BLD-MCNC: 8.5 G/DL — LOW (ref 13–17)
MAGNESIUM SERPL-MCNC: 1.8 MG/DL — SIGNIFICANT CHANGE UP (ref 1.6–2.6)
MCHC RBC-ENTMCNC: 28.5 PG — SIGNIFICANT CHANGE UP (ref 27–34)
MCHC RBC-ENTMCNC: 33.2 GM/DL — SIGNIFICANT CHANGE UP (ref 32–36)
MCV RBC AUTO: 85.9 FL — SIGNIFICANT CHANGE UP (ref 80–100)
NRBC # BLD: 0 /100 WBCS — SIGNIFICANT CHANGE UP (ref 0–0)
PHOSPHATE SERPL-MCNC: 2.8 MG/DL — SIGNIFICANT CHANGE UP (ref 2.5–4.5)
PLATELET # BLD AUTO: 256 K/UL — SIGNIFICANT CHANGE UP (ref 150–400)
POTASSIUM SERPL-MCNC: 3.9 MMOL/L — SIGNIFICANT CHANGE UP (ref 3.5–5.3)
POTASSIUM SERPL-SCNC: 3.9 MMOL/L — SIGNIFICANT CHANGE UP (ref 3.5–5.3)
PROT SERPL-MCNC: 6.2 G/DL — SIGNIFICANT CHANGE UP (ref 6–8.3)
RBC # BLD: 2.98 M/UL — LOW (ref 4.2–5.8)
RBC # FLD: 13.2 % — SIGNIFICANT CHANGE UP (ref 10.3–14.5)
SARS-COV-2 RNA SPEC QL NAA+PROBE: SIGNIFICANT CHANGE UP
SODIUM SERPL-SCNC: 132 MMOL/L — LOW (ref 135–145)
WBC # BLD: 4.76 K/UL — SIGNIFICANT CHANGE UP (ref 3.8–10.5)
WBC # FLD AUTO: 4.76 K/UL — SIGNIFICANT CHANGE UP (ref 3.8–10.5)

## 2022-08-28 PROCEDURE — 99233 SBSQ HOSP IP/OBS HIGH 50: CPT | Mod: GC

## 2022-08-28 PROCEDURE — 36000 PLACE NEEDLE IN VEIN: CPT

## 2022-08-28 PROCEDURE — 99232 SBSQ HOSP IP/OBS MODERATE 35: CPT

## 2022-08-28 PROCEDURE — 76937 US GUIDE VASCULAR ACCESS: CPT | Mod: 26

## 2022-08-28 RX ORDER — POTASSIUM PHOSPHATE, MONOBASIC POTASSIUM PHOSPHATE, DIBASIC 236; 224 MG/ML; MG/ML
15 INJECTION, SOLUTION INTRAVENOUS ONCE
Refills: 0 | Status: COMPLETED | OUTPATIENT
Start: 2022-08-28 | End: 2022-08-28

## 2022-08-28 RX ORDER — MAGNESIUM SULFATE 500 MG/ML
1 VIAL (ML) INJECTION ONCE
Refills: 0 | Status: COMPLETED | OUTPATIENT
Start: 2022-08-28 | End: 2022-08-28

## 2022-08-28 RX ORDER — SODIUM CHLORIDE 9 MG/ML
1000 INJECTION, SOLUTION INTRAVENOUS
Refills: 0 | Status: DISCONTINUED | OUTPATIENT
Start: 2022-08-28 | End: 2022-08-29

## 2022-08-28 RX ADMIN — POTASSIUM PHOSPHATE, MONOBASIC POTASSIUM PHOSPHATE, DIBASIC 63.75 MILLIMOLE(S): 236; 224 INJECTION, SOLUTION INTRAVENOUS at 06:33

## 2022-08-28 RX ADMIN — Medication 50 MILLIGRAM(S): at 05:18

## 2022-08-28 RX ADMIN — OXYCODONE HYDROCHLORIDE 20 MILLIGRAM(S): 5 TABLET ORAL at 07:53

## 2022-08-28 RX ADMIN — OXYCODONE HYDROCHLORIDE 20 MILLIGRAM(S): 5 TABLET ORAL at 01:10

## 2022-08-28 RX ADMIN — OXYCODONE HYDROCHLORIDE 20 MILLIGRAM(S): 5 TABLET ORAL at 08:48

## 2022-08-28 RX ADMIN — Medication 50 MILLIGRAM(S): at 21:21

## 2022-08-28 RX ADMIN — Medication 4 MILLIGRAM(S): at 21:22

## 2022-08-28 RX ADMIN — SIMVASTATIN 5 MILLIGRAM(S): 20 TABLET, FILM COATED ORAL at 21:22

## 2022-08-28 RX ADMIN — Medication 50 MILLIGRAM(S): at 09:08

## 2022-08-28 RX ADMIN — Medication 10 MILLIGRAM(S): at 11:14

## 2022-08-28 RX ADMIN — Medication 100 GRAM(S): at 05:32

## 2022-08-28 RX ADMIN — Medication 50 MILLIGRAM(S): at 14:10

## 2022-08-28 RX ADMIN — FINASTERIDE 5 MILLIGRAM(S): 5 TABLET, FILM COATED ORAL at 11:13

## 2022-08-28 RX ADMIN — OXYCODONE HYDROCHLORIDE 20 MILLIGRAM(S): 5 TABLET ORAL at 15:16

## 2022-08-28 RX ADMIN — Medication 10 MILLIGRAM(S): at 01:24

## 2022-08-28 RX ADMIN — OXYCODONE HYDROCHLORIDE 20 MILLIGRAM(S): 5 TABLET ORAL at 01:30

## 2022-08-28 RX ADMIN — PANTOPRAZOLE SODIUM 40 MILLIGRAM(S): 20 TABLET, DELAYED RELEASE ORAL at 11:13

## 2022-08-28 RX ADMIN — OXYCODONE HYDROCHLORIDE 20 MILLIGRAM(S): 5 TABLET ORAL at 21:21

## 2022-08-28 RX ADMIN — OXYCODONE HYDROCHLORIDE 20 MILLIGRAM(S): 5 TABLET ORAL at 22:00

## 2022-08-28 RX ADMIN — OXYCODONE HYDROCHLORIDE 20 MILLIGRAM(S): 5 TABLET ORAL at 14:09

## 2022-08-28 RX ADMIN — SODIUM CHLORIDE 80 MILLILITER(S): 9 INJECTION, SOLUTION INTRAVENOUS at 06:32

## 2022-08-28 NOTE — PROGRESS NOTE ADULT - ASSESSMENT
73 y/o M with colon mass s/p partial colectomy (2017), appendectomy, hernia repair, aortic and iliac aneurysm surgery (2017) s/p stent placement, HTN, HLD, paroxysmal atrial fibrillation on Eliquis, blood clotting disorder s/p IVC filter placement, right knee ligament repair, pacemaker implant (2004) s/p recent ppm interrogation on Eliquis, herniated disc and related surgery in [id number] complicated by spinal fusion, local hematoma, foot drop with chronic pain s/p failed intrathecal morphine pump, recently admitted to [facility] on 08/07 with acute cholecystitis c/b cardiomyopathy s/p percutaneous cholecystostomy tube by IR who presented for concerns of bleeding at the perc yonis tube site. In ED, afebrile, HD stable, wbc 9.7, Hb 9.7 stable from 9.5 at discharge; LFTs wnl. PE with some blood at the dressing site but none in tubing, no hematoma or ecchymosis surrounding drain site. CTAP with percutaneous cholecystostomy catheter in collapsed gallbladder, confirmed with radiology department verbally. Plan for admission with interval cholecystectomy once cleared from cardiac perspective. Now s/p ERCP with removal of several small and medium sized stones. AMS on 8/25 ct head negative. Recurrent AMS on 8/27 therefore transferred to SICU. AMS improved this AM.      Neuro: Oxycodone 20 Q8 WITH 15 PRN AMS: S/p ct head: negative Appreciate neuro recs will get MRI following coordination of compatibility.    CV: HD stable hx of cardiomyopathy 8/22 Echo: EF 55% D5 @80 HTN: Cont metoprolol, Hydralazine 50 q8, Hydralazine 10 PRN. Zocor 5 qhs Pacemaker not compatible with MRI, Holding entrestro 2* improved cardiac function.   Pulm: Satting well on NC  GI: Regular diet, NPO MN 2* AMS, Acute cholecystitis: s/p Perc Cholecystoscopy tube, ERCP. Protonix, Miralax   : Voids check UA. Cardura, Proscar Bladder scan and straight cath q6   ID:  WBC 4 - afebrile  off Abx.   Endo: ISS  PPx: SCD   Lines: PIV   Wounds: Perc Choley tube,   PT/OT: Not ordered

## 2022-08-28 NOTE — PROGRESS NOTE ADULT - SUBJECTIVE AND OBJECTIVE BOX
SUBJECTIVE:  Pt seen and examined at bedside this am by surgery team. Pt is back to baseline mental status. AMS yesterday possible d/t medications. Pain well controlled.  Denies f/n/v/cp/sob.        Vital Signs Last 24 Hrs  T(C): 36.5 (28 Aug 2022 09:49), Max: 36.9 (28 Aug 2022 04:44)  T(F): 97.7 (28 Aug 2022 09:49), Max: 98.5 (28 Aug 2022 04:44)  HR: 69 (28 Aug 2022 11:00) (60 - 75)  BP: 190/130 (28 Aug 2022 11:00) (121/61 - 190/130)  BP(mean): 155 (28 Aug 2022 11:00) (84 - 155)  RR: 18 (28 Aug 2022 11:00) (13 - 22)  SpO2: 99% (28 Aug 2022 11:00) (95% - 99%)    Parameters below as of 28 Aug 2022 11:00  Patient On (Oxygen Delivery Method): room air        PHYSICAL EXAM:   Gen: Awake, alert, NAD, resting comfortably   CV: NSR  Pulm: no respiratory distress on RA  Abd: soft, ND, appropriately tender , no rebound or guarding, RUQ drain bilious, palpable mass in L quadrant consistent w/ implanted pain pump   Ext: WWP, no edema, SCDs in place     I&O's Detail    27 Aug 2022 07:01  -  28 Aug 2022 07:00  --------------------------------------------------------  IN:    dextrose 5% + sodium chloride 0.9%: 1120 mL    Heparin: 120 mL  Total IN: 1240 mL    OUT:    Drain (mL): 140 mL    Intermittent Catheterization - Urethral (mL): 850 mL    Voided (mL): 825 mL  Total OUT: 1815 mL    Total NET: -575 mL      28 Aug 2022 07:01  -  28 Aug 2022 13:23  --------------------------------------------------------  IN:    dextrose 5% + sodium chloride 0.9%: 400 mL    Heparin: 50 mL  Total IN: 450 mL    OUT:    Voided (mL): 775 mL  Total OUT: 775 mL    Total NET: -325 mL          LABS:                        8.5    4.76  )-----------( 256      ( 28 Aug 2022 03:13 )             25.6     08-28    132<L>  |  101  |  6<L>  ----------------------------<  106<H>  3.9   |  25  |  1.02    Ca    8.4      28 Aug 2022 03:13  Phos  2.8     08-28  Mg     1.8     08-28    TPro  6.2  /  Alb  2.7<L>  /  TBili  1.0  /  DBili  x   /  AST  172<H>  /  ALT  103<H>  /  AlkPhos  622<H>  08-28    LIVER FUNCTIONS - ( 28 Aug 2022 03:13 )  Alb: 2.7 g/dL / Pro: 6.2 g/dL / ALK PHOS: 622 U/L / ALT: 103 U/L / AST: 172 U/L / GGT: x           PTT - ( 28 Aug 2022 08:46 )  PTT:71.5 sec  CAPILLARY BLOOD GLUCOSE                   RADIOLOGY & ADDITIONAL STUDIES:

## 2022-08-28 NOTE — PROGRESS NOTE ADULT - ASSESSMENT
73 yo M, PMHx Colon Mass s/p partial colectomy (2017), appendectomy, hernia repair, aortic and iliac aneurysm surgery (2017) s/p stent placement, HTN, HLD,   pAfib on Eliquis, VTE/thrombophilia  s/p IVC filter placement, right knee ligament repair, PPM (2004, unknown indication), herniated disc and related surgery in [id number] complicated by spinal fusion, local hematoma, foot drop with chronic pain s/p failed intrathecal morphine pump, and left hip prosthesis.   Who presented for concern over cholecystotomy tube malfunction,    Now s/p ERCP on 8.26 with successful removal of multiple small and medium sized stones in the patient's CBD.  No signs/sx to indicate pancreatitis.  Elevated liver enzymes likely due to ERCP related manipulation of CBD, now overall downtrending.     #Cholecystitis s/p PTC  #Choledocholithiasis  -S/p ERCP with removal of CBD stones  -Neurologic workup per primary team  -Continue to monitor liver enzymes for continued downtrend    Gastroenterology service will sign off. Pt will follow up with GI clinic as outpt. We will assist with appointment  Case discussed with attending physician and advanced endoscopy attending  Please recall as needed with any gastroenterological questions    Thank you for this consult.     Chaz Randle DO, FACP  Gastroenterology Fellow  Pager: [phone]

## 2022-08-28 NOTE — PROVIDER CONTACT NOTE (CRITICAL VALUE NOTIFICATION) - ACTION/TREATMENT ORDERED:
Troponin draw later in the day.
hold heparin drip for one hour then restart it with the rate of 14ml

## 2022-08-28 NOTE — PROGRESS NOTE ADULT - SUBJECTIVE AND OBJECTIVE BOX
INTERVAL/OVERNIGHT EVENTS: 8/28: am labs collected early, repleted  ON: hep gtt restarted 10cc, Drain dressing replaced. 's, hydral given, 2300 voided 250cc w , w/o suprapubic TTP/discomfort, no straight cath. 3am aPTT 64.5    SUBJECTIVE: Patient without specific complaint this morning. Inquiring about diet today. Pain is well controlled. Denies nausea, vomiting.         Neurologic Medications  ondansetron Injectable 4 milliGRAM(s) IV Push every 6 hours PRN Nausea and/or Vomiting  oxyCODONE    IR 15 milliGRAM(s) Oral every 4 hours PRN Moderate Pain (4 - 6)  oxyCODONE  ER Tablet 20 milliGRAM(s) Oral every 8 hours    Respiratory Medications    Cardiovascular Medications  doxazosin 4 milliGRAM(s) Oral at bedtime  hydrALAZINE 50 milliGRAM(s) Oral every 8 hours  hydrALAZINE Injectable 10 milliGRAM(s) IV Push every 6 hours PRN SBP> 160  metoprolol succinate ER 50 milliGRAM(s) Oral every 12 hours    Gastrointestinal Medications  dextrose 5% + sodium chloride 0.9%. 1000 milliLiter(s) IV Continuous <Continuous>  pantoprazole  Injectable 40 milliGRAM(s) IV Push daily  polyethylene glycol 3350 17 Gram(s) Oral two times a day    Genitourinary Medications    Hematologic/Oncologic Medications  heparin  Infusion 1000 Unit(s)/Hr IV Continuous <Continuous>    Antimicrobial/Immunologic Medications    Endocrine/Metabolic Medications  finasteride 5 milliGRAM(s) Oral daily  simvastatin 5 milliGRAM(s) Oral at bedtime    Topical/Other Medications      MEDICATIONS  (PRN):  hydrALAZINE Injectable 10 milliGRAM(s) IV Push every 6 hours PRN SBP> 160  ondansetron Injectable 4 milliGRAM(s) IV Push every 6 hours PRN Nausea and/or Vomiting  oxyCODONE    IR 15 milliGRAM(s) Oral every 4 hours PRN Moderate Pain (4 - 6)      I&O's Detail    27 Aug 2022 07:01  -  28 Aug 2022 07:00  --------------------------------------------------------  IN:    dextrose 5% + sodium chloride 0.9%: 1120 mL    Heparin: 120 mL  Total IN: 1240 mL    OUT:    Drain (mL): 140 mL    Intermittent Catheterization - Urethral (mL): 850 mL    Voided (mL): 825 mL  Total OUT: 1815 mL    Total NET: -575 mL      28 Aug 2022 07:01  -  28 Aug 2022 09:42  --------------------------------------------------------  IN:  Total IN: 0 mL    OUT:    Voided (mL): 425 mL  Total OUT: 425 mL    Total NET: -425 mL          Vital Signs Last 24 Hrs  T(C): 36.9 (28 Aug 2022 04:44), Max: 36.9 (28 Aug 2022 04:44)  T(F): 98.5 (28 Aug 2022 04:44), Max: 98.5 (28 Aug 2022 04:44)  HR: 75 (28 Aug 2022 08:00) (60 - 75)  BP: 160/74 (28 Aug 2022 08:00) (121/61 - 189/84)  BP(mean): 107 (28 Aug 2022 08:00) (84 - 123)  RR: 16 (28 Aug 2022 08:00) (13 - 22)  SpO2: 97% (28 Aug 2022 08:00) (95% - 99%)    Parameters below as of 28 Aug 2022 08:00  Patient On (Oxygen Delivery Method): room air        GENERAL: NAD, resting comfortably in bed  HEENT: NCAT, MMM  C/V: Normal rate, normal peripheral perfusion  PULM: Nonlabored breathing, no respiratory distress,   ABD: Soft, ND, well healed laparotomy scar, biliary drain in place, green bile  EXTREM: WWP, no edema, SCDs in place  NEURO: No focal deficits    LABS:                        8.5    4.76  )-----------( 256      ( 28 Aug 2022 03:13 )             25.6     08-28    132<L>  |  101  |  6<L>  ----------------------------<  106<H>  3.9   |  25  |  1.02    Ca    8.4      28 Aug 2022 03:13  Phos  2.8     08-28  Mg     1.8     08-28    TPro  6.2  /  Alb  2.7<L>  /  TBili  1.0  /  DBili  x   /  AST  172<H>  /  ALT  103<H>  /  AlkPhos  622<H>  08-28    PTT - ( 28 Aug 2022 03:13 )  PTT:64.5 sec      RADIOLOGY & ADDITIONAL STUDIES:     INTERVAL/OVERNIGHT EVENTS: 8/28: am labs collected early, repleted  ON: hep gtt restarted 10cc, Drain dressing replaced. 's, hydral given, 2300 voided 250cc w , w/o suprapubic TTP/discomfort, no straight cath. 3am aPTT 64.5    SUBJECTIVE: Patient without specific complaint this morning. Inquiring about diet today. Pain is well controlled. Denies nausea, vomiting.         Neurologic Medications  ondansetron Injectable 4 milliGRAM(s) IV Push every 6 hours PRN Nausea and/or Vomiting  oxyCODONE    IR 15 milliGRAM(s) Oral every 4 hours PRN Moderate Pain (4 - 6)  oxyCODONE  ER Tablet 20 milliGRAM(s) Oral every 8 hours    Respiratory Medications    Cardiovascular Medications  doxazosin 4 milliGRAM(s) Oral at bedtime  hydrALAZINE 50 milliGRAM(s) Oral every 8 hours  hydrALAZINE Injectable 10 milliGRAM(s) IV Push every 6 hours PRN SBP> 160  metoprolol succinate ER 50 milliGRAM(s) Oral every 12 hours    Gastrointestinal Medications  dextrose 5% + sodium chloride 0.9%. 1000 milliLiter(s) IV Continuous <Continuous>  pantoprazole  Injectable 40 milliGRAM(s) IV Push daily  polyethylene glycol 3350 17 Gram(s) Oral two times a day    Genitourinary Medications    Hematologic/Oncologic Medications  heparin  Infusion 1000 Unit(s)/Hr IV Continuous <Continuous>    Antimicrobial/Immunologic Medications    Endocrine/Metabolic Medications  finasteride 5 milliGRAM(s) Oral daily  simvastatin 5 milliGRAM(s) Oral at bedtime    Topical/Other Medications      MEDICATIONS  (PRN):  hydrALAZINE Injectable 10 milliGRAM(s) IV Push every 6 hours PRN SBP> 160  ondansetron Injectable 4 milliGRAM(s) IV Push every 6 hours PRN Nausea and/or Vomiting  oxyCODONE    IR 15 milliGRAM(s) Oral every 4 hours PRN Moderate Pain (4 - 6)      I&O's Detail    27 Aug 2022 07:01  -  28 Aug 2022 07:00  --------------------------------------------------------  IN:    dextrose 5% + sodium chloride 0.9%: 1120 mL    Heparin: 120 mL  Total IN: 1240 mL    OUT:    Drain (mL): 140 mL    Intermittent Catheterization - Urethral (mL): 850 mL    Voided (mL): 825 mL  Total OUT: 1815 mL    Total NET: -575 mL      28 Aug 2022 07:01  -  28 Aug 2022 09:42  --------------------------------------------------------  IN:  Total IN: 0 mL    OUT:    Voided (mL): 425 mL  Total OUT: 425 mL    Total NET: -425 mL          Vital Signs Last 24 Hrs  T(C): 36.9 (28 Aug 2022 04:44), Max: 36.9 (28 Aug 2022 04:44)  T(F): 98.5 (28 Aug 2022 04:44), Max: 98.5 (28 Aug 2022 04:44)  HR: 75 (28 Aug 2022 08:00) (60 - 75)  BP: 160/74 (28 Aug 2022 08:00) (121/61 - 189/84)  BP(mean): 107 (28 Aug 2022 08:00) (84 - 123)  RR: 16 (28 Aug 2022 08:00) (13 - 22)  SpO2: 97% (28 Aug 2022 08:00) (95% - 99%)    Parameters below as of 28 Aug 2022 08:00  Patient On (Oxygen Delivery Method): room air        GENERAL: NAD, resting comfortably in bed  HEENT: NCAT, MMM  C/V: Normal rate, normal peripheral perfusion S1 S2  PULM: Nonlabored breathing, no respiratory distress, lungs clear  ABD: Soft, ND, well healed laparotomy scar, biliary drain in place, green bile  EXTREM: WWP, no edema, SCDs in place  NEURO: No focal deficits  SKIN: no rash  PSYCH: affect appropriate today  MSK: no joint swelling    LABS:                        8.5    4.76  )-----------( 256      ( 28 Aug 2022 03:13 )             25.6     08-28    132<L>  |  101  |  6<L>  ----------------------------<  106<H>  3.9   |  25  |  1.02    Ca    8.4      28 Aug 2022 03:13  Phos  2.8     08-28  Mg     1.8     08-28    TPro  6.2  /  Alb  2.7<L>  /  TBili  1.0  /  DBili  x   /  AST  172<H>  /  ALT  103<H>  /  AlkPhos  622<H>  08-28    PTT - ( 28 Aug 2022 03:13 )  PTT:64.5 sec      RADIOLOGY & ADDITIONAL STUDIES:

## 2022-08-28 NOTE — PROGRESS NOTE ADULT - SUBJECTIVE AND OBJECTIVE BOX
Pt seen and examined at bedside.  Baseline mentation. Denies fever, rigors, nausea, vomiting, abd pain.  Allergies    Altace (Unknown)  penicillin (Unknown)    Intolerances        MEDICATIONS:  MEDICATIONS  (STANDING):  dextrose 5% + sodium chloride 0.9%. 1000 milliLiter(s) (80 mL/Hr) IV Continuous <Continuous>  doxazosin 4 milliGRAM(s) Oral at bedtime  finasteride 5 milliGRAM(s) Oral daily  heparin  Infusion 1000 Unit(s)/Hr (10 mL/Hr) IV Continuous <Continuous>  hydrALAZINE 50 milliGRAM(s) Oral every 8 hours  metoprolol succinate ER 50 milliGRAM(s) Oral every 12 hours  oxyCODONE  ER Tablet 20 milliGRAM(s) Oral every 8 hours  pantoprazole  Injectable 40 milliGRAM(s) IV Push daily  polyethylene glycol 3350 17 Gram(s) Oral two times a day  simvastatin 5 milliGRAM(s) Oral at bedtime    MEDICATIONS  (PRN):  hydrALAZINE Injectable 10 milliGRAM(s) IV Push every 6 hours PRN SBP> 160  ondansetron Injectable 4 milliGRAM(s) IV Push every 6 hours PRN Nausea and/or Vomiting  oxyCODONE    IR 15 milliGRAM(s) Oral every 4 hours PRN Moderate Pain (4 - 6)      Vital Signs Last 24 Hrs  T(C): 36.5 (28 Aug 2022 09:49), Max: 36.9 (28 Aug 2022 04:44)  T(F): 97.7 (28 Aug 2022 09:49), Max: 98.5 (28 Aug 2022 04:44)  HR: 63 (28 Aug 2022 14:00) (60 - 77)  BP: 145/77 (28 Aug 2022 14:00) (106/61 - 190/130)  BP(mean): 105 (28 Aug 2022 14:00) (79 - 155)  RR: 18 (28 Aug 2022 15:00) (13 - 23)  SpO2: 98% (28 Aug 2022 14:00) (95% - 99%)    Parameters below as of 28 Aug 2022 15:00  Patient On (Oxygen Delivery Method): room air        08-27 @ 07:01  -  08-28 @ 07:00  --------------------------------------------------------  IN: 1240 mL / OUT: 1815 mL / NET: -575 mL    08-28 @ 07:01  -  08-28 @ 14:50  --------------------------------------------------------  IN: 720 mL / OUT: 1075 mL / NET: -355 mL        PHYSICAL EXAM:    General: No acute distress  HEENT: MMM, conjunctiva and sclera clear  Gastrointestinal: Soft non-tender non-distended. No rebound or guarding. RUQ drain bilious. Pain pump palpated in L quadrant (known implanted pain pump)  Skin: Warm and dry. No obvious rash    LABS:  CBC Full  -  ( 28 Aug 2022 03:13 )  WBC Count : 4.76 K/uL  RBC Count : 2.98 M/uL  Hemoglobin : 8.5 g/dL  Hematocrit : 25.6 %  Platelet Count - Automated : 256 K/uL  Mean Cell Volume : 85.9 fl  Mean Cell Hemoglobin : 28.5 pg  Mean Cell Hemoglobin Concentration : 33.2 gm/dL  Auto Neutrophil # : x  Auto Lymphocyte # : x  Auto Monocyte # : x  Auto Eosinophil # : x  Auto Basophil # : x  Auto Neutrophil % : x  Auto Lymphocyte % : x  Auto Monocyte % : x  Auto Eosinophil % : x  Auto Basophil % : x    08-28    132<L>  |  101  |  6<L>  ----------------------------<  106<H>  3.9   |  25  |  1.02    Ca    8.4      28 Aug 2022 03:13  Phos  2.8     08-28  Mg     1.8     08-28    TPro  6.2  /  Alb  2.7<L>  /  TBili  1.0  /  DBili  x   /  AST  172<H>  /  ALT  103<H>  /  AlkPhos  622<H>  08-28    PTT - ( 28 Aug 2022 08:46 )  PTT:71.5 sec

## 2022-08-28 NOTE — PROGRESS NOTE ADULT - ASSESSMENT
73 y/o M with colon mass s/p partial colectomy (2017), appendectomy, hernia repair, aortic and iliac aneurysm surgery (2017) s/p stent placement, HTN, HLD, paroxysmal atrial fibrillation on Eliquis, blood clotting disorder s/p IVC filter placement, right knee ligament repair, pacemaker implant (2004) s/p recent ppm interrogation on Eliquis, herniated disc and related surgery in [id number] complicated by spinal fusion, local hematoma, foot drop with chronic pain s/p failed intrathecal morphine pump, recently admitted to [facility] on 08/07 with acute cholecystitis c/b cardiomyopathy s/p percutaneous cholecystostomy tube by IR who presented for concerns of bleeding at the perc yonis tube site. In ED, afebrile, HD stable, wbc 9.7, Hb 9.7 stable from 9.5 at discharge; LFTs wnl. PE with some blood at the dressing site but none in tubing, no hematoma or ecchymosis surrounding drain site. CTAP with percutaneous cholecystostomy catheter in collapsed gallbladder, confirmed with radiology department verbally. Plan for admission with interval cholecystectomy once cleared from cardiac perspective. Now s/p ERCP with removal of several small and medium sized stones      Pre op for tomorrow (NPO, labs)  f/u MRI   Hep gtt/OOBA/SCDs/IS  Cardiology clearance and optimization   Rest of care per ICU team

## 2022-08-28 NOTE — PROGRESS NOTE ADULT - ASSESSMENT
71 y/o M with colon mass s/p partial colectomy (2017), appendectomy, hernia repair, aortic and iliac aneurysm surgery (2017) s/p stent placement, HTN, HLD, paroxysmal atrial fibrillation on Eliquis, blood clotting disorder s/p IVC filter placement, right knee ligament repair, pacemaker implant (2004) s/p recent ppm interrogation on Eliquis, herniated disc and related surgery in [id number] complicated by spinal fusion, local hematoma, foot drop with chronic pain s/p failed intrathecal morphine pump which caused bradycardia and left hip prosthesis. Pt was recently admitted to [facility] on 08/07 for acute cholecystitis hospital course c/b Takotsubo cardiomyopathy requiring MICU care, hence was not a surgical candidate @ that point of time, s/p perc yonis by IR. Pt was sent back to [facility] for bleeding around perc yonis tube, pt admitted and s/p ERCP on 8/25. Stroke code called in Endo suite where patient was minimally responsive, CT head negative, and pt did not want to participate in exam but pt moving all 4 extremities.  Stroke neurology called again on 8/27 for continued lethargy, though improved since the day prior. Some sedating meds stopped, pt with improving mental status    -CT head done 8/27 negative for acute infarct or bleed  -continue heparin drip/AC for afib  INCOMPLETE 73 y/o M with colon mass s/p partial colectomy (2017), appendectomy, hernia repair, aortic and iliac aneurysm surgery (2017) s/p stent placement, HTN, HLD, paroxysmal atrial fibrillation on Eliquis, blood clotting disorder s/p IVC filter placement, right knee ligament repair, pacemaker implant (2004) s/p recent ppm interrogation on Eliquis, herniated disc and related surgery in [phone] complicated by spinal fusion, local hematoma, foot drop with chronic pain s/p failed intrathecal morphine pump which caused bradycardia and left hip prosthesis. Pt was recently admitted to [facility] on 08/07 for acute cholecystitis hospital course c/b Takotsubo cardiomyopathy requiring MICU care, hence was not a surgical candidate @ that point of time, s/p perc yonis by IR. Pt was sent back to [facility] for bleeding around perc yonis tube, pt admitted and s/p ERCP on 8/25. Stroke code called in Endo suite where patient was minimally responsive, CT head negative, and pt did not want to participate in exam but pt moving all 4 extremities.  Stroke neurology called again on 8/27 for continued lethargy, though improved since the day prior. Some sedating meds (benzos) stopped, pt with improving mental status    -CT head done 8/27 negative for acute infarct or bleed  -continue heparin drip/AC for afib  -if pt having continued pain, would recommend considering adding duloxetine to help with neuropathic pain  -please reconsult stroke neurology with further questions    -discussed with Dr. Lezama 73 y/o M with colon mass s/p partial colectomy (2017), appendectomy, hernia repair, aortic and iliac aneurysm surgery (2017) s/p stent placement, HTN, HLD, paroxysmal atrial fibrillation on Eliquis, blood clotting disorder s/p IVC filter placement, right knee ligament repair, pacemaker implant (2004) s/p recent ppm interrogation on Eliquis, herniated disc and related surgery in [id number] complicated by spinal fusion, local hematoma, foot drop with chronic pain s/p failed intrathecal morphine pump which caused bradycardia and left hip prosthesis. Pt was recently admitted to [facility] on 08/07 for acute cholecystitis hospital course c/b Takotsubo cardiomyopathy requiring MICU care, hence was not a surgical candidate @ that point of time, s/p perc yonis by IR. Pt was sent back to [facility] for bleeding around perc yonis tube, pt admitted and s/p ERCP on 8/25. Stroke code called in Endo suite where patient was minimally responsive, CT head negative, and pt did not want to participate in exam but pt moving all 4 extremities.  Stroke neurology called again on 8/27 for continued lethargy, though improved since the day prior. Some sedating meds (benzos) stopped, pt with improving mental status    -CT head done 8/27 negative for acute infarct or bleed  -continue heparin drip/AC for afib  -given improvement in symptoms, no need for MRI at this time  -if pt having continued pain, would recommend considering adding duloxetine to help with neuropathic pain  -please reconsult stroke neurology with further questions    -discussed with Dr. Lezama

## 2022-08-28 NOTE — PROGRESS NOTE ADULT - SUBJECTIVE AND OBJECTIVE BOX
Neurology Stroke Progress Note    INTERVAL HPI/OVERNIGHT EVENTS:  Yesterday pt moved to the SICU for closer monitoring  Patient seen and examined. Pt more awake today. Says that he got better sleep last night.     MEDICATIONS  (STANDING):  dextrose 5% + sodium chloride 0.9%. 1000 milliLiter(s) (80 mL/Hr) IV Continuous <Continuous>  doxazosin 4 milliGRAM(s) Oral at bedtime  finasteride 5 milliGRAM(s) Oral daily  heparin  Infusion 1000 Unit(s)/Hr (10 mL/Hr) IV Continuous <Continuous>  hydrALAZINE 50 milliGRAM(s) Oral every 8 hours  metoprolol succinate ER 50 milliGRAM(s) Oral every 12 hours  oxyCODONE  ER Tablet 20 milliGRAM(s) Oral every 8 hours  pantoprazole  Injectable 40 milliGRAM(s) IV Push daily  polyethylene glycol 3350 17 Gram(s) Oral two times a day  simvastatin 5 milliGRAM(s) Oral at bedtime    MEDICATIONS  (PRN):  hydrALAZINE Injectable 10 milliGRAM(s) IV Push every 6 hours PRN SBP> 160  ondansetron Injectable 4 milliGRAM(s) IV Push every 6 hours PRN Nausea and/or Vomiting  oxyCODONE    IR 15 milliGRAM(s) Oral every 4 hours PRN Moderate Pain (4 - 6)      Allergies    Altace (Unknown)  penicillin (Unknown)    Intolerances        Vital Signs Last 24 Hrs  T(C): 36.9 (28 Aug 2022 04:44), Max: 36.9 (27 Aug 2022 08:59)  T(F): 98.5 (28 Aug 2022 04:44), Max: 98.5 (28 Aug 2022 04:44)  HR: 75 (28 Aug 2022 08:00) (60 - 75)  BP: 160/74 (28 Aug 2022 08:00) (121/61 - 189/84)  BP(mean): 107 (28 Aug 2022 08:00) (84 - 123)  RR: 16 (28 Aug 2022 08:00) (13 - 22)  SpO2: 97% (28 Aug 2022 08:00) (95% - 99%)    Parameters below as of 28 Aug 2022 08:00  Patient On (Oxygen Delivery Method): room air        Physical exam:  General: No acute distress, awake and alert  Eyes: Anicteric sclerae, moist conjunctivae, see below for CNs  Neck: trachea midline,    Neurologic:  -Mental status: Awake, alert, oriented to person, place, and time. Speech is fluent with intact naming, repetition, and comprehension, no dysarthria. Follows commands.   -Cranial nerves:   II: Visual fields are full to confrontation.  III, IV, VI: Extraocular movements are intact without nystagmus.   V:  Facial sensation V1-V3 equal and intact   VII: Face is symmetric   Motor: Normal bulk and tone. No pronator drift. Strength bilateral upper extremity 5/5, bilateral lower extremities 5/5.  Sensation: Intact to light touch bilaterally.   Coordination: No dysmetria on finger-to-nose    LABS:                        8.5    4.76  )-----------( 256      ( 28 Aug 2022 03:13 )             25.6     08-28    132<L>  |  101  |  6<L>  ----------------------------<  106<H>  3.9   |  25  |  1.02    Ca    8.4      28 Aug 2022 03:13  Phos  2.8     08-28  Mg     1.8     08-28    TPro  6.2  /  Alb  2.7<L>  /  TBili  1.0  /  DBili  x   /  AST  172<H>  /  ALT  103<H>  /  AlkPhos  622<H>  08-28    PTT - ( 28 Aug 2022 03:13 )  PTT:64.5 sec      RADIOLOGY & ADDITIONAL TESTS:    < from: CT Head No Cont (08.27.22 @ 17:02) >  IMPRESSION: No acute intracranial hemorrhage, mass effect, or CT evidence   of recent transcortical infarction.    < end of copied text >

## 2022-08-29 ENCOUNTER — TRANSCRIPTION ENCOUNTER (OUTPATIENT)
Age: 72
End: 2022-08-29

## 2022-08-29 LAB
ALBUMIN SERPL ELPH-MCNC: 2.6 G/DL — LOW (ref 3.3–5)
ALP SERPL-CCNC: 440 U/L — HIGH (ref 40–120)
ALT FLD-CCNC: 61 U/L — HIGH (ref 10–45)
ANION GAP SERPL CALC-SCNC: 7 MMOL/L — SIGNIFICANT CHANGE UP (ref 5–17)
APTT BLD: 65.3 SEC — HIGH (ref 27.5–35.5)
AST SERPL-CCNC: 62 U/L — HIGH (ref 10–40)
BILIRUB SERPL-MCNC: 0.5 MG/DL — SIGNIFICANT CHANGE UP (ref 0.2–1.2)
BLD GP AB SCN SERPL QL: NEGATIVE — SIGNIFICANT CHANGE UP
BUN SERPL-MCNC: 6 MG/DL — LOW (ref 7–23)
CALCIUM SERPL-MCNC: 8.4 MG/DL — SIGNIFICANT CHANGE UP (ref 8.4–10.5)
CHLORIDE SERPL-SCNC: 103 MMOL/L — SIGNIFICANT CHANGE UP (ref 96–108)
CO2 SERPL-SCNC: 25 MMOL/L — SIGNIFICANT CHANGE UP (ref 22–31)
CREAT SERPL-MCNC: 1.15 MG/DL — SIGNIFICANT CHANGE UP (ref 0.5–1.3)
EGFR: 67 ML/MIN/1.73M2 — SIGNIFICANT CHANGE UP
GLUCOSE SERPL-MCNC: 107 MG/DL — HIGH (ref 70–99)
HCT VFR BLD CALC: 24.8 % — LOW (ref 39–50)
HGB BLD-MCNC: 8.1 G/DL — LOW (ref 13–17)
INR BLD: 1.1 — SIGNIFICANT CHANGE UP (ref 0.88–1.16)
MAGNESIUM SERPL-MCNC: 1.8 MG/DL — SIGNIFICANT CHANGE UP (ref 1.6–2.6)
MCHC RBC-ENTMCNC: 28.2 PG — SIGNIFICANT CHANGE UP (ref 27–34)
MCHC RBC-ENTMCNC: 32.7 GM/DL — SIGNIFICANT CHANGE UP (ref 32–36)
MCV RBC AUTO: 86.4 FL — SIGNIFICANT CHANGE UP (ref 80–100)
NRBC # BLD: 0 /100 WBCS — SIGNIFICANT CHANGE UP (ref 0–0)
PHOSPHATE SERPL-MCNC: 3.2 MG/DL — SIGNIFICANT CHANGE UP (ref 2.5–4.5)
PLATELET # BLD AUTO: 260 K/UL — SIGNIFICANT CHANGE UP (ref 150–400)
POTASSIUM SERPL-MCNC: 4 MMOL/L — SIGNIFICANT CHANGE UP (ref 3.5–5.3)
POTASSIUM SERPL-SCNC: 4 MMOL/L — SIGNIFICANT CHANGE UP (ref 3.5–5.3)
PROT SERPL-MCNC: 6.4 G/DL — SIGNIFICANT CHANGE UP (ref 6–8.3)
PROTHROM AB SERPL-ACNC: 13.1 SEC — SIGNIFICANT CHANGE UP (ref 10.5–13.4)
RBC # BLD: 2.87 M/UL — LOW (ref 4.2–5.8)
RBC # FLD: 13.3 % — SIGNIFICANT CHANGE UP (ref 10.3–14.5)
RH IG SCN BLD-IMP: POSITIVE — SIGNIFICANT CHANGE UP
SODIUM SERPL-SCNC: 135 MMOL/L — SIGNIFICANT CHANGE UP (ref 135–145)
WBC # BLD: 4.99 K/UL — SIGNIFICANT CHANGE UP (ref 3.8–10.5)
WBC # FLD AUTO: 4.99 K/UL — SIGNIFICANT CHANGE UP (ref 3.8–10.5)

## 2022-08-29 PROCEDURE — 72126 CT NECK SPINE W/DYE: CPT | Mod: 26

## 2022-08-29 PROCEDURE — 99233 SBSQ HOSP IP/OBS HIGH 50: CPT | Mod: GC

## 2022-08-29 PROCEDURE — 72129 CT CHEST SPINE W/DYE: CPT | Mod: 26

## 2022-08-29 PROCEDURE — 72132 CT LUMBAR SPINE W/DYE: CPT | Mod: 26

## 2022-08-29 RX ORDER — LANOLIN ALCOHOL/MO/W.PET/CERES
3 CREAM (GRAM) TOPICAL AT BEDTIME
Refills: 0 | Status: DISCONTINUED | OUTPATIENT
Start: 2022-08-29 | End: 2022-09-06

## 2022-08-29 RX ORDER — HYDRALAZINE HCL 50 MG
50 TABLET ORAL EVERY 12 HOURS
Refills: 0 | Status: DISCONTINUED | OUTPATIENT
Start: 2022-08-29 | End: 2022-09-06

## 2022-08-29 RX ORDER — MAGNESIUM SULFATE 500 MG/ML
2 VIAL (ML) INJECTION ONCE
Refills: 0 | Status: COMPLETED | OUTPATIENT
Start: 2022-08-29 | End: 2022-08-29

## 2022-08-29 RX ADMIN — Medication 10 MILLIGRAM(S): at 10:28

## 2022-08-29 RX ADMIN — PANTOPRAZOLE SODIUM 40 MILLIGRAM(S): 20 TABLET, DELAYED RELEASE ORAL at 11:53

## 2022-08-29 RX ADMIN — Medication 50 MILLIGRAM(S): at 19:45

## 2022-08-29 RX ADMIN — Medication 50 MILLIGRAM(S): at 05:46

## 2022-08-29 RX ADMIN — Medication 1 DROP(S): at 22:40

## 2022-08-29 RX ADMIN — OXYCODONE HYDROCHLORIDE 20 MILLIGRAM(S): 5 TABLET ORAL at 14:20

## 2022-08-29 RX ADMIN — OXYCODONE HYDROCHLORIDE 20 MILLIGRAM(S): 5 TABLET ORAL at 23:54

## 2022-08-29 RX ADMIN — OXYCODONE HYDROCHLORIDE 20 MILLIGRAM(S): 5 TABLET ORAL at 06:31

## 2022-08-29 RX ADMIN — SODIUM CHLORIDE 80 MILLILITER(S): 9 INJECTION, SOLUTION INTRAVENOUS at 02:01

## 2022-08-29 RX ADMIN — Medication 50 MILLIGRAM(S): at 09:16

## 2022-08-29 RX ADMIN — Medication 25 GRAM(S): at 11:52

## 2022-08-29 RX ADMIN — Medication 4 MILLIGRAM(S): at 22:41

## 2022-08-29 RX ADMIN — OXYCODONE HYDROCHLORIDE 20 MILLIGRAM(S): 5 TABLET ORAL at 14:50

## 2022-08-29 RX ADMIN — SIMVASTATIN 5 MILLIGRAM(S): 20 TABLET, FILM COATED ORAL at 22:41

## 2022-08-29 RX ADMIN — OXYCODONE HYDROCHLORIDE 15 MILLIGRAM(S): 5 TABLET ORAL at 22:16

## 2022-08-29 RX ADMIN — OXYCODONE HYDROCHLORIDE 15 MILLIGRAM(S): 5 TABLET ORAL at 01:12

## 2022-08-29 RX ADMIN — OXYCODONE HYDROCHLORIDE 15 MILLIGRAM(S): 5 TABLET ORAL at 21:16

## 2022-08-29 RX ADMIN — Medication 50 MILLIGRAM(S): at 22:41

## 2022-08-29 RX ADMIN — OXYCODONE HYDROCHLORIDE 15 MILLIGRAM(S): 5 TABLET ORAL at 00:27

## 2022-08-29 RX ADMIN — FINASTERIDE 5 MILLIGRAM(S): 5 TABLET, FILM COATED ORAL at 11:53

## 2022-08-29 RX ADMIN — SODIUM CHLORIDE 80 MILLILITER(S): 9 INJECTION, SOLUTION INTRAVENOUS at 09:16

## 2022-08-29 RX ADMIN — OXYCODONE HYDROCHLORIDE 20 MILLIGRAM(S): 5 TABLET ORAL at 05:46

## 2022-08-29 NOTE — CONSULT NOTE ADULT - NS ATTEND AMEND GEN_ALL_CORE FT
Metabolic encephalopathy, HTN, chronic cholecytitis, chronic pain  physical as above  CT so far unrevealing; could not get MRI because of uncertainty re PPM compatibility  suspect contribution to encephalopathy of pain medication and GA  continue metoprolol and hydralazine  follow off antibiotics  observe in ICU  decision making of high complexity
The patient is a 72-year-old gentleman with a complex medical history who was admitted 8/28 for cholecystectomy with planned ERCP prior.  Stroke code was called for altered mental status prior to the patient undergoing ERCP because of which the procedure was canceled.  Initially, the patient was unresponsive.  He did arouse to sternal rub but refused to participate in the exam.  CT head was unremarkable.  CTA/P was deferred due to patient refusal as well as the fact that he had returned to baseline.  On further questioning, the patient recalls the entirety of the events and reports that he was simply "sick of doctors poking him like a pin cushion".  Overall, suspicion for neurologic event is low.  Will defer further stroke/neuro eval.  If he has recurrent episodes of altered mental status/unresponsiveness, repeat imaging/MRI and EEG could be considered.
72y old Male with a history of colon mass s/p partial colectomy, aortic and iliac aneurysm surgery (2017), HTN, HLD, paroxysmal afib on Eliquis, pacemaker implant (2004), multiple orthopedic surgeries including spine and right hip, intrathecal morphine pump placement (2004) reported to be inactive and not in use scheduled for laparoscopic cholecystectomy tomorrow. Pt evaluated for pain control. Chart reviewed, Medications/allergies reviewed. Pain control options discussed. We will consider a comprehensive medical management and titrate to pain control and side effects. We will follow up. Dr. Richardson

## 2022-08-29 NOTE — CONSULT NOTE ADULT - CONSULT REQUESTED DATE/TIME
25-Aug-2022 13:14
27-Aug-2022 12:28
23-Aug-2022 11:42
29-Aug-2022 11:15
21-Aug-2022 01:08
21-Aug-2022 13:05
21-Aug-2022 19:35
27-Aug-2022 10:00

## 2022-08-29 NOTE — PROGRESS NOTE ADULT - ASSESSMENT
73 y/o M with colon mass s/p partial colectomy (2017), appendectomy, hernia repair, aortic and iliac aneurysm surgery (2017) s/p stent placement, HTN, HLD, paroxysmal atrial fibrillation on Eliquis, blood clotting disorder s/p IVC filter placement, right knee ligament repair, pacemaker implant (2004) s/p recent ppm interrogation on Eliquis, herniated disc and related surgery in [id number] complicated by spinal fusion, local hematoma, foot drop with chronic pain s/p failed intrathecal morphine pump, recently admitted to [facility] on 08/07 with acute cholecystitis c/b cardiomyopathy s/p percutaneous cholecystostomy tube by IR who presented for concerns of bleeding at the perc yonis tube site. In ED, afebrile, HD stable, wbc 9.7, Hb 9.7 stable from 9.5 at discharge; LFTs wnl. PE with some blood at the dressing site but none in tubing, no hematoma or ecchymosis surrounding drain site. CTAP with percutaneous cholecystostomy catheter in collapsed gallbladder, confirmed with radiology department verbally. Plan for admission with interval cholecystectomy once cleared from cardiac perspective. Now s/p ERCP with removal of several small and medium sized stones. AMS on 8/25 ct head negative. Recurrent AMS on 8/27 therefore transferred to SICU.     Neuro: Oxycodone 20 Q8 WITH 15 PRN AMS: S/p ct head: negative Appreciate neuro reccs will get MRI check ammonia level.   CV: HD stable hx of cardiomyopathy 8/22 Echo:  ef 55% D5 @80 HTN: Cont metoprolol, Hydralazine 50 q8, Hydralaizine 10 PRN. Zocor 5 qhs Pacemaker not compatible with MRI, Holding entrestro 2* improved cardiac function.   Pulm: Satting well on NC  GI: NPO 2* AMS, Acute choley: s/p Perc Cholecystoscopy tube, ERCP. Protonix, Miralax   : Voids check UA. Cardura, Proscar Bladder scan and straight cath q6   ID:  WBC 4 - afebrile  off Abx.   Endo: ISS  PPx: SCD   Lines: PIV   Wounds: Perc Yonis tube,   PT/OT: Not ordered   71 y/o M with colon mass s/p partial colectomy (2017), appendectomy, hernia repair, aortic and iliac aneurysm surgery (2017) s/p stent placement, HTN, HLD, paroxysmal atrial fibrillation on Eliquis, blood clotting disorder s/p IVC filter placement, right knee ligament repair, pacemaker implant (2004) s/p recent ppm interrogation on Eliquis, herniated disc and related surgery in [id number] complicated by spinal fusion, local hematoma, foot drop with chronic pain s/p failed intrathecal morphine pump, recently admitted to [facility] on 08/07 with acute cholecystitis c/b cardiomyopathy s/p percutaneous cholecystostomy tube by IR who presented for concerns of bleeding at the perc yonis tube site. In ED, afebrile, HD stable, wbc 9.7, Hb 9.7 stable from 9.5 at discharge; LFTs wnl. PE with some blood at the dressing site but none in tubing, no hematoma or ecchymosis surrounding drain site. CTAP with percutaneous cholecystostomy catheter in collapsed gallbladder, confirmed with radiology department verbally. Plan for admission with interval cholecystectomy once cleared from cardiac perspective. Now s/p ERCP with removal of several small and medium sized stones. AMS on 8/25 ct head negative. Recurrent AMS on 8/27 therefore transferred to SICU.     Neuro: Oxycodone 20 Q8 WITH 15 PRN AMS: S/p ct head: negative Appreciate neuro reccs will get MRI check ammonia level.   CV: HD stable hx of cardiomyopathy 8/22 Echo:  ef 55% D5 @80 HTN: Cont metoprolol, Hydralazine 50 q8, Hydralaizine 10 PRN. Zocor 5 qhs Pacemaker not compatible with MRI, Holding entrestro 2* improved cardiac function.   Pulm: Satting well on NC  GI: NPO 2* AMS, Acute choley: s/p Perc Cholecystoscopy tube, ERCP. Protonix, Miralax   : Voids check UA. Cardura, Proscar Bladder scan and straight cath q6   ID:  WBC 4 - afebrile  off Abx.   Endo: ISS  PPx: SCD   Lines: PIV   Wounds: Perc Yonis tube,   PT/OT: Not ordered   71 y/o M with colon mass s/p partial colectomy (2017), appendectomy, hernia repair, aortic and iliac aneurysm surgery (2017) s/p stent placement, HTN, HLD, paroxysmal atrial fibrillation on Eliquis, blood clotting disorder s/p IVC filter, right knee ligament repair, pacemaker implant (2004) s/p recent ppm interrogation, herniated disc and related surgery in [id number] complicated by spinal fusion, local hematoma, foot drop with chronic pain s/p failed intrathecal morphine pump, recently admitted to [facility] on 08/07 with acute cholecystitis c/b cardiomyopathy s/p percutaneous cholecystostomy tube by IR. pt readmitted 8/20 for concerns of bleeding at the perc yonis tube site. s/p ERCP 8/26 with removal of several small and medium sized stones, transferred to SICU 8/27 for transient altered mental status, suspicious for narcotic intoxication (resolved),  pt now pending cholecystectomy.       Neuro: OxyContin ER 20 Q8h standing with OxyIR 15 q4h PRN. Pain svc consult requested. transient altered mental status S/p negative Head CT. neuro consulted. no need for MRI anymore.    CV: hx of cardiomyopathy 8/22 Echo:  EF 55%,  cont toprol 50 bid, Hydralazine 50 q8h,  Zocor 5 qhs, Holding entresto  Pulm: no resp distress on nasal canula.   GI: Protonix, Miralax   : Voids check UA. Cardura, Proscar Bladder scan and straight cath q6   ID:  WBC 4 - afebrile  off Abx.   Endo: ISS  PPx: SCD   Lines: PIV   Wounds: Perc Yonis tube,   PT/OT: Not ordered   71 y/o M with colon mass s/p partial colectomy (2017), appendectomy, hernia repair, aortic and iliac aneurysm surgery (2017) s/p stent placement, HTN, HLD, paroxysmal atrial fibrillation on Eliquis, blood clotting disorder s/p IVC filter, right knee ligament repair, pacemaker implant (2004) s/p recent ppm interrogation, herniated disc and related surgery in [id number] complicated by spinal fusion, local hematoma, foot drop with chronic pain s/p failed intrathecal morphine pump, recently admitted to [facility] on 08/07 with acute cholecystitis c/b cardiomyopathy s/p percutaneous cholecystostomy tube by IR. pt readmitted 8/20 for concerns of bleeding at the perc yonis tube site. s/p ERCP 8/26 with removal of several small and medium sized stones, transferred to SICU 8/27 for transient altered mental status, suspicious for narcotic intoxication (resolved),  pt now pending cholecystectomy.       Neuro: OxyContin ER 20 Q8h standing with OxyIR 15 q4h PRN. Pain svc consult requested. transient altered mental status S/p negative Head CT. neuro consulted. no need for MRI anymore.    CV: hx of cardiomyopathy 8/22 Echo:  EF 55%,  cont toprol 50 bid, Hydralazine 50 q8h,  Zocor 5 qhs, Holding entresto  Pulm: no resp distress on room air.   GI: DASH diet. cont protonix, miralax. NPO after MN for cholecystectomy tomorrow.   : Voids, cont cardura, Proscar  ID:  not on abx   Endo: ISS  PPx: SCD, not on SQH.   Lines: PIV   Wounds: Perc Yonis tube,   PT/OT: ordered 8/29

## 2022-08-29 NOTE — CONSULT NOTE ADULT - REASON FOR ADMISSION
bleeding around perc yonis site

## 2022-08-29 NOTE — CONSULT NOTE ADULT - CONSULT REASON
Pre-op risk stratification
bleeding around perc yonis site
Pt s/p ERCP with AMS. CT scan negative.
request for cholangiogram
Dilated CBD
abdominal pain
stroke Code
continued lethargy
Never

## 2022-08-29 NOTE — CHART NOTE - NSCHARTNOTEFT_GEN_A_CORE
Admitting Diagnosis:   Patient is a 72y old  Male who presents with a chief complaint of bleeding around perc yonis site (29 Aug 2022 09:24)    PAST MEDICAL & SURGICAL HISTORY:  AAA (abdominal aortic aneurysm)  HTN (hypertension)  Cardiomyopathy  Hyperlipidemia  ONOFRE (dyspnea on exertion)  DVT (deep venous thrombosis)  Pulmonary emphysema  COPD  Cardiac arrhythmia  Aneurysm of aortic root  Depression  anxiety  Anemia  Pelvic mass  Pacemaker  medtronic  History of kidney surgery  History of back surgery  multiple, lumbar  Surgery, elective  multiple neck surgery, cervical  S/P hip replacement, left  S/P arthroscopy of right knee  S/P AAA (abdominal aortic aneurysm) repair  with right iliac aneurysm endovascular repair  S/P carpal tunnel release  Surgery, elective  Cardiac Stent x4  Surgery, elective  Intrathecal pump placement    Current Nutrition Order:   Diet, NPO after Midnight:      NPO Start Date: 29-Aug-2022,   NPO Start Time: 23:59 (08-29-22 @ 10:20)  Diet, NPO after Midnight:      NPO Start Date: 28-Aug-2022,   NPO Start Time: 23:59 (08-28-22 @ 13:02)    PO Intake: Good (%) [   ]  Fair (50-75%) [   ] Poor (<25%) [   ] - N/A NPO     GI Issues:   no n/v  Last +BM 8/25, passing gas   No abdominal distention, pt endorses abdominal discomfort at RUQ yonis tube site     Pain: pt with complaints of general pain, pain at RUQ yonis tube site; team is aware    Skin Integrity:  bobby 19   No pressure injuries   No edema    Labs:   08-29    135  |  103  |  6<L>  ----------------------------<  107<H>  4.0   |  25  |  1.15    Ca    8.4      29 Aug 2022 06:07  Phos  3.2     08-29  Mg     1.8     08-29    TPro  6.4  /  Alb  2.6<L>  /  TBili  0.5  /  DBili  x   /  AST  62<H>  /  ALT  61<H>  /  AlkPhos  440<H>  08-29    Medications:  MEDICATIONS  (STANDING):  doxazosin 4 milliGRAM(s) Oral at bedtime  finasteride 5 milliGRAM(s) Oral daily  heparin  Infusion 1000 Unit(s)/Hr (10 mL/Hr) IV Continuous <Continuous>  hydrALAZINE 50 milliGRAM(s) Oral every 8 hours  magnesium sulfate  IVPB 2 Gram(s) IV Intermittent once  metoprolol succinate ER 50 milliGRAM(s) Oral every 12 hours  oxyCODONE  ER Tablet 20 milliGRAM(s) Oral every 8 hours  pantoprazole  Injectable 40 milliGRAM(s) IV Push daily  polyethylene glycol 3350 17 Gram(s) Oral two times a day  simvastatin 5 milliGRAM(s) Oral at bedtime    MEDICATIONS  (PRN):  hydrALAZINE Injectable 10 milliGRAM(s) IV Push every 6 hours PRN SBP> 160  ondansetron Injectable 4 milliGRAM(s) IV Push every 6 hours PRN Nausea and/or Vomiting  oxyCODONE    IR 15 milliGRAM(s) Oral every 4 hours PRN Moderate Pain (4 - 6)    Admitted Anthropometrics:   Ht: 5'9  Wt: 199lbs   BMI: 29.3   IBW: 160lbs   %IBW: 124    Weight Change:   8/20: 199lbs - admit wt  8/29: 198lbs   **1lb wt loss, not significant. Please continue to trend wts weekly to assess for changes.     Nutrition Focused Physical Exam: Completed [   ]  Not Pertinent [ x ]    Estimated energy needs:   Kcal: 1450-1812kcal/day (based on 20-25kcal/kg)   Pro: 87-102g/day (based on 1.2-1.4g/kg)  mL: 2175-2537mL/day (based on 30-35mL/kg)     Subjective:   71 y/o M with colon mass s/p partial colectomy (2017), appendectomy, hernia repair, aortic and iliac aneurysm surgery (2017) s/p stent placement, HTN, HLD, paroxysmal atrial fibrillation on Eliquis, blood clotting disorder s/p IVC filter, right knee ligament repair, pacemaker implant (2004) s/p recent ppm interrogation, herniated disc and related surgery in [id number] complicated by spinal fusion, local hematoma, foot drop with chronic pain s/p failed intrathecal morphine pump, recently admitted to [facility] on 08/07 with acute cholecystitis c/b cardiomyopathy s/p percutaneous cholecystostomy tube by IR. pt readmitted 8/20 for concerns of bleeding at the perc yonis tube site. s/p ERCP 8/26 with removal of several small and medium sized stones, transferred to SICU 8/27 for transient altered mental status, suspicious for narcotic intoxication (resolved),  pt now pending cholecystectomy.     Pt seen at bedside for followup. Denies nausea/vomiting at this time. Reports last bowel movement 8/25. Pt is passing gas. Discussed current diet order DASH w/ Soft & Bite Sized (pt's order at time of assessment, though of note, pt is now NPO pending procedure); reinforced diet education regarding importance of PO intake; amenable to education. DASH/TLC diet education deferred in the setting of pt with poor PO intake, addressing nutrition status more pressing at this time. RD will provide in-depth edu once PO intake has improved. Pt reports poor PO intake because the textured diet makes him lose his appetite, able to complete <50% of meals. Pt agreeable to supplementing with ensure TID. Made aware RD remains available. RD to follow up per protocol. See nutrition recommendations below.     Previous Nutrition Diagnosis: Inadequate Energy intake RT inadequate PO intake, inability to meet EER.     Active [ x ]  Resolved [   ]    Goal: 1. Diet is to be advanced to DASH/TLC (texture per SLP) pending medical feasibility. 2. Pt will meet >75% EER via PO intake and supplementation.     Recommendations:   1. Pending medical feasibility, advance diet to DASH/TLC, texture per SLP   >>Add Ensure Enlive TID (1050kcal, 60g pro)   2. Monitor %PO intake, diet tolerance.   >>Encourage PO intake as appropriate   3. Monitor lytes, replete prn. Monitor BG.   4. Trend weekly wts. Monitor skin integrity, s/sx GI distress.   5. Pain/BM regimen per team   6. RD diet edu prn.   7. RD to remain available for additional nutrition interventions as needed.     Education: deferred i.s.o. poor intake, RD will followup to provide DASH/TLC diet at more appropriate time.     Risk Level: High [ x ] Moderate [   ] Low [   ]

## 2022-08-29 NOTE — CONSULT NOTE ADULT - SUBJECTIVE AND OBJECTIVE BOX
Pain Management Consult Note - [facility] [phone]    Chief Complaint: abdominal pain     HPI: Patient seen and examined today, alert and awake in bed. This is a 71 y/o male PMH colon mass s/p partial colectomy, aortic and iliac aneurysm surgery (2017), HTN, HLD, paroxysmal afib on eliquis, pacemaker implant (2004), multiple orthopedic surgeries including spine and right hip, intrathecal morphine pump placement (2004) reported to be inactive and not in use scheduled for laparoscopic cholecystectomy tomorrow. Patient reports a history of chronic neck, back and hip pain but reports endorsing no abdominal pain at this time. Patient reports endorsing abdominal pain with movements such as coughing. At home, patient reports taking oxycontin 20 mg po tid and oxycodone 15 mg po q8h prn prescribed by his PCP for many years. Patient remains on home dose regimen and reports adequate pain control. Patient is istop positive for oxycontin 20 mg po tid, oxycodone 15 mg po q8h last filled 8/2022 prescribed by julian crespo. Patient denies any side effects from current pain regimen.    Pain is ___ sharp ____dull ___burning ___achy ___ Intensity: _X___ mild ___mod ___severe     Location ___X_surgical site ____cervical _____lumbar _X___abd ____upper ext____lower ext    Worse with __X__activity __X__movement _____physical therapy___ Rest    Improved with __X__medication ____rest ____physical therapy    ROS: Const:  _N__febrile   Eyes:___ENT:___CV: _N__chest pain  Resp: __N__sob  GI:_N__nausea __N_vomiting _N__abd pain ___npo ___clears __full diet __bm  :___ Musk: _Y__pain ___spasm  Skin:___ Neuro:  _N__sedation___confusion__N_ numbness ___weakness __N_paresth  Psych:__anxiety  Endo:___ Heme:___Allergy:_________, _Y_all others reviewed and negative    PAST MEDICAL & SURGICAL HISTORY:  AAA (abdominal aortic aneurysm)  HTN (hypertension)  Cardiomyopathy  Hyperlipidemia  ONOFRE (dyspnea on exertion)  DVT (deep venous thrombosis)  Pulmonary emphysema  COPD  Cardiac arrhythmia  Aneurysm of aortic root  Depression  anxiety  Anemia  Pelvic mass  Pacemaker  medtronic  History of kidney surgery  History of back surgery  multiple, lumbar  Surgery, elective  multiple neck surgery, cervical  S/P hip replacement, left  S/P arthroscopy of right knee  S/P AAA (abdominal aortic aneurysm) repair  with right iliac aneurysm endovascular repair  S/P carpal tunnel release  Surgery, elective  Cardiac Stent x4  Surgery, elective  Intrathecal pump placement    SH: _N__Tobacco   _N__Alcohol                          FH:FAMILY HISTORY: NO PERTINENT MEDICAL HISTORY NOTED IN FIRST DEGREE RELATIVES    doxazosin 4 milliGRAM(s) Oral at bedtime  finasteride 5 milliGRAM(s) Oral daily  heparin  Infusion 1000 Unit(s)/Hr IV Continuous <Continuous>  hydrALAZINE 50 milliGRAM(s) Oral every 8 hours  hydrALAZINE Injectable 10 milliGRAM(s) IV Push every 6 hours PRN  metoprolol succinate ER 50 milliGRAM(s) Oral every 12 hours  ondansetron Injectable 4 milliGRAM(s) IV Push every 6 hours PRN  oxyCODONE    IR 15 milliGRAM(s) Oral every 4 hours PRN  oxyCODONE  ER Tablet 20 milliGRAM(s) Oral every 8 hours  pantoprazole  Injectable 40 milliGRAM(s) IV Push daily  polyethylene glycol 3350 17 Gram(s) Oral two times a day  simvastatin 5 milliGRAM(s) Oral at bedtime      T(C): 36.6 (08-29-22 @ 09:00), Max: 37.1 (08-28-22 @ 14:59)  HR: 63 (08-29-22 @ 11:00) (60 - 72)  BP: 169/81 (08-29-22 @ 11:00) (116/71 - 187/95)  RR: 14 (08-29-22 @ 11:00) (12 - 24)  SpO2: 96% (08-29-22 @ 11:00) (96% - 99%)  Wt(kg): --    T(C): 36.6 (08-29-22 @ 09:00), Max: 37.1 (08-28-22 @ 14:59)  HR: 63 (08-29-22 @ 11:00) (60 - 72)  BP: 169/81 (08-29-22 @ 11:00) (116/71 - 187/95)  RR: 14 (08-29-22 @ 11:00) (12 - 24)  SpO2: 96% (08-29-22 @ 11:00) (96% - 99%)  Wt(kg): --    T(C): 36.6 (08-29-22 @ 09:00), Max: 37.1 (08-28-22 @ 14:59)  HR: 63 (08-29-22 @ 11:00) (60 - 72)  BP: 169/81 (08-29-22 @ 11:00) (116/71 - 187/95)  RR: 14 (08-29-22 @ 11:00) (12 - 24)  SpO2: 96% (08-29-22 @ 11:00) (96% - 99%)  Wt(kg): --    PHYSICAL EXAM:  Gen Appearance: __x_no acute distress _x__appropriate        Neuro: ___SILT feet____ EOM Intact Psych: AAOX_3, __x_mood/affect appropriate        Eyes: __x_conjunctiva WNL  _____x Pupils equal and round        ENT: __x_ears and nose atraumatic__x_ Hearing grossly intact        Neck: _x__trachea midline, no visible masses ___thyroid without palpable mass    Resp: __x_Nml WOB____No tactile fremitus ___clear to auscultation    Cardio: __x_extremities free from edema ____pedal pulses palpable    GI/Abdomen: ___soft _____ Nontender____x__Nondistended_____HSM    Lymphatic: ___no palpable nodes in neck  ___no palpable nodes calves and feet    Skin/Wound: ___Incision, __x_Dressing c/d/i,   ____surrounding tissues soft,  ___drain/chest tube present____    Muscular: EHL ___/5  Gastrocnemius___/5    _x__absent clubbing/cyanosis      ASSESSMENT: This is a 72y old Male with a history of coln mass s/p partial colectomy, aortic and iliac aneurysm surgery (2017), HTN, HLD, paroxysmal afib on eliquis, pacemaker implant (2004), multiple orthopedic surgeries including spine and right hip, intrathecal morphine pump placement (2004) reported to be inactive and not in use scheduled for laparoscopic cholecystectomy tomorrow.     Recommended Treatment PLAN:  1. Patient reports no abdominal pain at this time. Consider continuing with home dose regimen Oxycontin 20 mg PO TID, Oxycodone 15 mg PO q8h PRN for now  2. As per [employer] rep, intra-thecal pump was placed in 2004 and records indicate is still active. However, [employer] representative noted that the pump normally needs to be replaced every 7 years which this patient's has not, making it unlikely that the intra-thecal pump is currently working and likely has been turned off by patient's pain provider  HOLD ALL OPIOIDS FOR SEDATION, RR<10, O2SAT <93%, SBP <96  Plan discussed with Dr. Richardson

## 2022-08-29 NOTE — PROGRESS NOTE ADULT - SUBJECTIVE AND OBJECTIVE BOX
ON:       SUBJECTIVE:    MEDICATIONS  (STANDING):  dextrose 5% + sodium chloride 0.9%. 1000 milliLiter(s) (80 mL/Hr) IV Continuous <Continuous>  doxazosin 4 milliGRAM(s) Oral at bedtime  finasteride 5 milliGRAM(s) Oral daily  heparin  Infusion 1000 Unit(s)/Hr (10 mL/Hr) IV Continuous <Continuous>  hydrALAZINE 50 milliGRAM(s) Oral every 8 hours  metoprolol succinate ER 50 milliGRAM(s) Oral every 12 hours  oxyCODONE  ER Tablet 20 milliGRAM(s) Oral every 8 hours  pantoprazole  Injectable 40 milliGRAM(s) IV Push daily  polyethylene glycol 3350 17 Gram(s) Oral two times a day  simvastatin 5 milliGRAM(s) Oral at bedtime    MEDICATIONS  (PRN):  hydrALAZINE Injectable 10 milliGRAM(s) IV Push every 6 hours PRN SBP> 160  ondansetron Injectable 4 milliGRAM(s) IV Push every 6 hours PRN Nausea and/or Vomiting  oxyCODONE    IR 15 milliGRAM(s) Oral every 4 hours PRN Moderate Pain (4 - 6)      Drips:     ICU Vital Signs Last 24 Hrs  T(C): 36.6 (29 Aug 2022 09:00), Max: 37.1 (28 Aug 2022 14:59)  T(F): 97.8 (29 Aug 2022 09:00), Max: 98.8 (28 Aug 2022 14:59)  HR: 60 (29 Aug 2022 09:00) (60 - 77)  BP: 183/87 (29 Aug 2022 09:00) (106/61 - 190/130)  BP(mean): 125 (29 Aug 2022 09:00) (79 - 155)  ABP: --  ABP(mean): --  RR: 20 (29 Aug 2022 09:00) (12 - 24)  SpO2: 96% (29 Aug 2022 09:00) (96% - 99%)    O2 Parameters below as of 29 Aug 2022 09:00  Patient On (Oxygen Delivery Method): room air            Physical Exam:  General: NAD  HEENT: NC/AT, EOMI, PERRLA, normal hearing, no oral lesions, neck supple w/o LAD  Pulmonary: Nonlabored breathing, no respiratory distress, CTA-B  Cardiovascular: NSR, no murmurs  Abdominal: soft, NT/ND, +BS, no organomegaly  Extremities: WWP, 5/5 strength x 4, no clubbing/cyanosis/edema  Neuro: A/O x3, CNs II-XII grossly intact, normal motor/sensation, no focal deficits  Pulses: palpable distal pulses    Lines/tubes/drains:  Barber:	      Vent settings:      I&O's Summary    28 Aug 2022 07:01  -  29 Aug 2022 07:00  --------------------------------------------------------  IN: 2100 mL / OUT: 2510 mL / NET: -410 mL    29 Aug 2022 07:01  -  29 Aug 2022 09:24  --------------------------------------------------------  IN: 180 mL / OUT: 400 mL / NET: -220 mL        LABS:                        8.1    4.99  )-----------( 260      ( 29 Aug 2022 06:07 )             24.8     08-29    135  |  103  |  6<L>  ----------------------------<  107<H>  4.0   |  25  |  1.15    Ca    8.4      29 Aug 2022 06:07  Phos  3.2     08-29  Mg     1.8     08-29    TPro  6.4  /  Alb  2.6<L>  /  TBili  0.5  /  DBili  x   /  AST  62<H>  /  ALT  61<H>  /  AlkPhos  440<H>  08-29    PT/INR - ( 29 Aug 2022 06:07 )   PT: 13.1 sec;   INR: 1.10          PTT - ( 29 Aug 2022 06:07 )  PTT:65.3 sec    CAPILLARY BLOOD GLUCOSE        LIVER FUNCTIONS - ( 29 Aug 2022 06:07 )  Alb: 2.6 g/dL / Pro: 6.4 g/dL / ALK PHOS: 440 U/L / ALT: 61 U/L / AST: 62 U/L / GGT: x             Cultures:    RADIOLOGY & ADDITIONAL STUDIES:     S: has pain to RUQ yonis tube site. otherwise No new issues/events overnight, no new med c/o    O: ICU Vital Signs Last 24 Hrs  T(F): 97.8 (08-29-22 @ 09:00), Max: 98.8 (08-28-22 @ 14:59)  HR: 60 (08-29-22 @ 10:00) (60 - 77)  BP: 176/87 (08-29-22 @ 10:00) (106/61 - 187/95)  BP(mean): 122 (08-29-22 @ 10:00) (79 - 129)  ABP: --  RR: 13 (08-29-22 @ 10:00) (12 - 24)  SpO2: 97% (08-29-22 @ 10:00) (96% - 99%)    PHYSICAL EXAM:   Neurological: AAOx3, CNII-XII intact,  strength 5/5 b/l  ENT: mucus membrane moist  Cardiovascular: RRR  Respiratory: CTA  Gastrointestinal: soft,  ND, BS+, RUQ yonis tube bilious drainage. no blood. slight tender on palp of site.   Extremities: warm, no dependent edema  Vascular: no cyanosis/erythema  Skin: no rashes  MSK: no joint swelling.     LABS:    08-29    135  |  103  |  6<L>  ----------------------------<  107<H>  4.0   |  25  |  1.15    Ca    8.4      29 Aug 2022 06:07  Phos  3.2     08-29  Mg     1.8     08-29    TPro  6.4  /  Alb  2.6<L>  /  TBili  0.5  /  DBili  x   /  AST  62<H>  /  ALT  61<H>  /  AlkPhos  440<H>  08-29  LIVER FUNCTIONS - ( 29 Aug 2022 06:07 )  Alb: 2.6 g/dL / Pro: 6.4 g/dL / ALK PHOS: 440 U/L / ALT: 61 U/L / AST: 62 U/L / GGT: x                               8.1    4.99  )-----------( 260      ( 29 Aug 2022 06:07 )             24.8   PT/INR - ( 29 Aug 2022 06:07 )   PT: 13.1 sec;   INR: 1.10          PTT - ( 29 Aug 2022 06:07 )  PTT:65.3 sec  CAPILLARY BLOOD GLUCOSE        MEDICATIONS  (STANDING):  doxazosin 4 milliGRAM(s) Oral at bedtime  finasteride 5 milliGRAM(s) Oral daily  heparin  Infusion 1000 Unit(s)/Hr (10 mL/Hr) IV Continuous <Continuous>  hydrALAZINE 50 milliGRAM(s) Oral every 8 hours  magnesium sulfate  IVPB 2 Gram(s) IV Intermittent once  metoprolol succinate ER 50 milliGRAM(s) Oral every 12 hours  oxyCODONE  ER Tablet 20 milliGRAM(s) Oral every 8 hours  pantoprazole  Injectable 40 milliGRAM(s) IV Push daily  polyethylene glycol 3350 17 Gram(s) Oral two times a day  simvastatin 5 milliGRAM(s) Oral at bedtime    MEDICATIONS  (PRN):  hydrALAZINE Injectable 10 milliGRAM(s) IV Push every 6 hours PRN SBP> 160  ondansetron Injectable 4 milliGRAM(s) IV Push every 6 hours PRN Nausea and/or Vomiting  oxyCODONE    IR 15 milliGRAM(s) Oral every 4 hours PRN Moderate Pain (4 - 6)      Barber:	  [ x] None	[ ] Daily Barber Order Placed	   Indication:	  [ ] Strict I and O's    [ ] Obstruction     [ ] Incontinence + Stage 3 or 4 Decubitus  Central Line:  [x ] None	   [ ]  Medication / TPN Administration     [ ] No Peripheral IV

## 2022-08-30 ENCOUNTER — RESULT REVIEW (OUTPATIENT)
Age: 72
End: 2022-08-30

## 2022-08-30 ENCOUNTER — TRANSCRIPTION ENCOUNTER (OUTPATIENT)
Age: 72
End: 2022-08-30

## 2022-08-30 DIAGNOSIS — K81.2 ACUTE CHOLECYSTITIS WITH CHRONIC CHOLECYSTITIS: ICD-10-CM

## 2022-08-30 LAB
ALBUMIN SERPL ELPH-MCNC: 2.4 G/DL — LOW (ref 3.3–5)
ALBUMIN SERPL ELPH-MCNC: 2.4 G/DL — LOW (ref 3.3–5)
ALP SERPL-CCNC: 297 U/L — HIGH (ref 40–120)
ALP SERPL-CCNC: 340 U/L — HIGH (ref 40–120)
ALT FLD-CCNC: 35 U/L — SIGNIFICANT CHANGE UP (ref 10–45)
ALT FLD-CCNC: 39 U/L — SIGNIFICANT CHANGE UP (ref 10–45)
ANION GAP SERPL CALC-SCNC: 10 MMOL/L — SIGNIFICANT CHANGE UP (ref 5–17)
ANION GAP SERPL CALC-SCNC: 13 MMOL/L — SIGNIFICANT CHANGE UP (ref 5–17)
APTT BLD: 48.7 SEC — HIGH (ref 27.5–35.5)
AST SERPL-CCNC: 31 U/L — SIGNIFICANT CHANGE UP (ref 10–40)
AST SERPL-CCNC: 38 U/L — SIGNIFICANT CHANGE UP (ref 10–40)
BILIRUB SERPL-MCNC: 0.5 MG/DL — SIGNIFICANT CHANGE UP (ref 0.2–1.2)
BILIRUB SERPL-MCNC: 0.5 MG/DL — SIGNIFICANT CHANGE UP (ref 0.2–1.2)
BUN SERPL-MCNC: 6 MG/DL — LOW (ref 7–23)
BUN SERPL-MCNC: 8 MG/DL — SIGNIFICANT CHANGE UP (ref 7–23)
CALCIUM SERPL-MCNC: 8.3 MG/DL — LOW (ref 8.4–10.5)
CALCIUM SERPL-MCNC: 8.6 MG/DL — SIGNIFICANT CHANGE UP (ref 8.4–10.5)
CHLORIDE SERPL-SCNC: 100 MMOL/L — SIGNIFICANT CHANGE UP (ref 96–108)
CHLORIDE SERPL-SCNC: 98 MMOL/L — SIGNIFICANT CHANGE UP (ref 96–108)
CO2 SERPL-SCNC: 21 MMOL/L — LOW (ref 22–31)
CO2 SERPL-SCNC: 23 MMOL/L — SIGNIFICANT CHANGE UP (ref 22–31)
CREAT SERPL-MCNC: 1.2 MG/DL — SIGNIFICANT CHANGE UP (ref 0.5–1.3)
CREAT SERPL-MCNC: 1.2 MG/DL — SIGNIFICANT CHANGE UP (ref 0.5–1.3)
EGFR: 64 ML/MIN/1.73M2 — SIGNIFICANT CHANGE UP
EGFR: 64 ML/MIN/1.73M2 — SIGNIFICANT CHANGE UP
GLUCOSE BLDC GLUCOMTR-MCNC: 100 MG/DL — HIGH (ref 70–99)
GLUCOSE BLDC GLUCOMTR-MCNC: 127 MG/DL — HIGH (ref 70–99)
GLUCOSE SERPL-MCNC: 120 MG/DL — HIGH (ref 70–99)
GLUCOSE SERPL-MCNC: 93 MG/DL — SIGNIFICANT CHANGE UP (ref 70–99)
HCT VFR BLD CALC: 23.8 % — LOW (ref 39–50)
HCT VFR BLD CALC: 25.5 % — LOW (ref 39–50)
HGB BLD-MCNC: 7.8 G/DL — LOW (ref 13–17)
HGB BLD-MCNC: 8.2 G/DL — LOW (ref 13–17)
INR BLD: 1.07 — SIGNIFICANT CHANGE UP (ref 0.88–1.16)
MAGNESIUM SERPL-MCNC: 1.7 MG/DL — SIGNIFICANT CHANGE UP (ref 1.6–2.6)
MAGNESIUM SERPL-MCNC: 1.9 MG/DL — SIGNIFICANT CHANGE UP (ref 1.6–2.6)
MCHC RBC-ENTMCNC: 28.1 PG — SIGNIFICANT CHANGE UP (ref 27–34)
MCHC RBC-ENTMCNC: 28.2 PG — SIGNIFICANT CHANGE UP (ref 27–34)
MCHC RBC-ENTMCNC: 32.2 GM/DL — SIGNIFICANT CHANGE UP (ref 32–36)
MCHC RBC-ENTMCNC: 32.8 GM/DL — SIGNIFICANT CHANGE UP (ref 32–36)
MCV RBC AUTO: 85.6 FL — SIGNIFICANT CHANGE UP (ref 80–100)
MCV RBC AUTO: 87.6 FL — SIGNIFICANT CHANGE UP (ref 80–100)
NRBC # BLD: 0 /100 WBCS — SIGNIFICANT CHANGE UP (ref 0–0)
NRBC # BLD: 0 /100 WBCS — SIGNIFICANT CHANGE UP (ref 0–0)
PHOSPHATE SERPL-MCNC: 3.3 MG/DL — SIGNIFICANT CHANGE UP (ref 2.5–4.5)
PHOSPHATE SERPL-MCNC: 3.7 MG/DL — SIGNIFICANT CHANGE UP (ref 2.5–4.5)
PLATELET # BLD AUTO: 258 K/UL — SIGNIFICANT CHANGE UP (ref 150–400)
PLATELET # BLD AUTO: 258 K/UL — SIGNIFICANT CHANGE UP (ref 150–400)
POTASSIUM SERPL-MCNC: 4 MMOL/L — SIGNIFICANT CHANGE UP (ref 3.5–5.3)
POTASSIUM SERPL-MCNC: 4.1 MMOL/L — SIGNIFICANT CHANGE UP (ref 3.5–5.3)
POTASSIUM SERPL-SCNC: 4 MMOL/L — SIGNIFICANT CHANGE UP (ref 3.5–5.3)
POTASSIUM SERPL-SCNC: 4.1 MMOL/L — SIGNIFICANT CHANGE UP (ref 3.5–5.3)
PROT SERPL-MCNC: 6.3 G/DL — SIGNIFICANT CHANGE UP (ref 6–8.3)
PROT SERPL-MCNC: 6.5 G/DL — SIGNIFICANT CHANGE UP (ref 6–8.3)
PROTHROM AB SERPL-ACNC: 12.7 SEC — SIGNIFICANT CHANGE UP (ref 10.5–13.4)
RBC # BLD: 2.78 M/UL — LOW (ref 4.2–5.8)
RBC # BLD: 2.91 M/UL — LOW (ref 4.2–5.8)
RBC # FLD: 13.2 % — SIGNIFICANT CHANGE UP (ref 10.3–14.5)
RBC # FLD: 13.2 % — SIGNIFICANT CHANGE UP (ref 10.3–14.5)
SODIUM SERPL-SCNC: 131 MMOL/L — LOW (ref 135–145)
SODIUM SERPL-SCNC: 134 MMOL/L — LOW (ref 135–145)
WBC # BLD: 5.46 K/UL — SIGNIFICANT CHANGE UP (ref 3.8–10.5)
WBC # BLD: 6.86 K/UL — SIGNIFICANT CHANGE UP (ref 3.8–10.5)
WBC # FLD AUTO: 5.46 K/UL — SIGNIFICANT CHANGE UP (ref 3.8–10.5)
WBC # FLD AUTO: 6.86 K/UL — SIGNIFICANT CHANGE UP (ref 3.8–10.5)

## 2022-08-30 PROCEDURE — 88300 SURGICAL PATH GROSS: CPT | Mod: 26

## 2022-08-30 PROCEDURE — 93971 EXTREMITY STUDY: CPT | Mod: 26,LT

## 2022-08-30 PROCEDURE — 62365 REMOVE SPINE INFUSION DEVICE: CPT

## 2022-08-30 PROCEDURE — 88304 TISSUE EXAM BY PATHOLOGIST: CPT | Mod: 26

## 2022-08-30 PROCEDURE — 99233 SBSQ HOSP IP/OBS HIGH 50: CPT | Mod: GC

## 2022-08-30 DEVICE — LIGATING CLIPS WECK HEMOLOK POLYMER MEDIUM-LARGE (GREEN) 6: Type: IMPLANTABLE DEVICE | Status: FUNCTIONAL

## 2022-08-30 RX ORDER — DEXTROSE 50 % IN WATER 50 %
25 SYRINGE (ML) INTRAVENOUS ONCE
Refills: 0 | Status: DISCONTINUED | OUTPATIENT
Start: 2022-08-30 | End: 2022-09-06

## 2022-08-30 RX ORDER — SODIUM CHLORIDE 9 MG/ML
1000 INJECTION, SOLUTION INTRAVENOUS
Refills: 0 | Status: DISCONTINUED | OUTPATIENT
Start: 2022-08-30 | End: 2022-09-06

## 2022-08-30 RX ORDER — DEXTROSE 50 % IN WATER 50 %
12.5 SYRINGE (ML) INTRAVENOUS ONCE
Refills: 0 | Status: DISCONTINUED | OUTPATIENT
Start: 2022-08-30 | End: 2022-09-06

## 2022-08-30 RX ORDER — DEXTROSE 50 % IN WATER 50 %
15 SYRINGE (ML) INTRAVENOUS ONCE
Refills: 0 | Status: DISCONTINUED | OUTPATIENT
Start: 2022-08-30 | End: 2022-09-06

## 2022-08-30 RX ORDER — MAGNESIUM SULFATE 500 MG/ML
1 VIAL (ML) INJECTION ONCE
Refills: 0 | Status: COMPLETED | OUTPATIENT
Start: 2022-08-30 | End: 2022-08-30

## 2022-08-30 RX ORDER — HYDROMORPHONE HYDROCHLORIDE 2 MG/ML
0.5 INJECTION INTRAMUSCULAR; INTRAVENOUS; SUBCUTANEOUS ONCE
Refills: 0 | Status: DISCONTINUED | OUTPATIENT
Start: 2022-08-30 | End: 2022-08-30

## 2022-08-30 RX ORDER — GLUCAGON INJECTION, SOLUTION 0.5 MG/.1ML
1 INJECTION, SOLUTION SUBCUTANEOUS ONCE
Refills: 0 | Status: DISCONTINUED | OUTPATIENT
Start: 2022-08-30 | End: 2022-09-06

## 2022-08-30 RX ORDER — INSULIN LISPRO 100/ML
VIAL (ML) SUBCUTANEOUS
Refills: 0 | Status: DISCONTINUED | OUTPATIENT
Start: 2022-08-30 | End: 2022-09-06

## 2022-08-30 RX ORDER — HYDROMORPHONE HYDROCHLORIDE 2 MG/ML
0.5 INJECTION INTRAMUSCULAR; INTRAVENOUS; SUBCUTANEOUS EVERY 4 HOURS
Refills: 0 | Status: DISCONTINUED | OUTPATIENT
Start: 2022-08-30 | End: 2022-08-31

## 2022-08-30 RX ORDER — CEFOTETAN DISODIUM 1 G
2 VIAL (EA) INJECTION EVERY 12 HOURS
Refills: 0 | Status: DISCONTINUED | OUTPATIENT
Start: 2022-08-31 | End: 2022-08-31

## 2022-08-30 RX ADMIN — Medication 1 DROP(S): at 06:11

## 2022-08-30 RX ADMIN — Medication 50 MILLIGRAM(S): at 07:00

## 2022-08-30 RX ADMIN — Medication 100 GRAM(S): at 20:35

## 2022-08-30 RX ADMIN — SIMVASTATIN 5 MILLIGRAM(S): 20 TABLET, FILM COATED ORAL at 21:52

## 2022-08-30 RX ADMIN — OXYCODONE HYDROCHLORIDE 20 MILLIGRAM(S): 5 TABLET ORAL at 06:42

## 2022-08-30 RX ADMIN — Medication 4 MILLIGRAM(S): at 21:51

## 2022-08-30 RX ADMIN — OXYCODONE HYDROCHLORIDE 20 MILLIGRAM(S): 5 TABLET ORAL at 21:51

## 2022-08-30 RX ADMIN — OXYCODONE HYDROCHLORIDE 15 MILLIGRAM(S): 5 TABLET ORAL at 21:05

## 2022-08-30 RX ADMIN — PANTOPRAZOLE SODIUM 40 MILLIGRAM(S): 20 TABLET, DELAYED RELEASE ORAL at 12:07

## 2022-08-30 RX ADMIN — OXYCODONE HYDROCHLORIDE 15 MILLIGRAM(S): 5 TABLET ORAL at 20:35

## 2022-08-30 RX ADMIN — POLYETHYLENE GLYCOL 3350 17 GRAM(S): 17 POWDER, FOR SOLUTION ORAL at 23:21

## 2022-08-30 RX ADMIN — Medication 10 MILLIGRAM(S): at 10:53

## 2022-08-30 RX ADMIN — Medication 50 MILLIGRAM(S): at 10:21

## 2022-08-30 RX ADMIN — OXYCODONE HYDROCHLORIDE 20 MILLIGRAM(S): 5 TABLET ORAL at 22:21

## 2022-08-30 RX ADMIN — Medication 50 MILLIGRAM(S): at 21:52

## 2022-08-30 RX ADMIN — OXYCODONE HYDROCHLORIDE 20 MILLIGRAM(S): 5 TABLET ORAL at 00:24

## 2022-08-30 RX ADMIN — Medication 10 MILLIGRAM(S): at 04:01

## 2022-08-30 RX ADMIN — Medication 10 MILLIGRAM(S): at 21:08

## 2022-08-30 RX ADMIN — HYDROMORPHONE HYDROCHLORIDE 0.5 MILLIGRAM(S): 2 INJECTION INTRAMUSCULAR; INTRAVENOUS; SUBCUTANEOUS at 19:29

## 2022-08-30 RX ADMIN — Medication 50 MILLIGRAM(S): at 20:36

## 2022-08-30 RX ADMIN — OXYCODONE HYDROCHLORIDE 20 MILLIGRAM(S): 5 TABLET ORAL at 06:12

## 2022-08-30 RX ADMIN — HYDROMORPHONE HYDROCHLORIDE 0.5 MILLIGRAM(S): 2 INJECTION INTRAMUSCULAR; INTRAVENOUS; SUBCUTANEOUS at 19:15

## 2022-08-30 NOTE — PRE-ANESTHESIA EVALUATION ADULT - NSANTHADDINFOFT_GEN_ALL_CORE
On preoperative examination of the patient, he was extremely lethargic. While AAOx4, the patient was unable to sign his name or lower his mask on his own. A stroke code was called at that time.
Discussed with patient plan for GA/ETT, possible remain intubated to ICU

## 2022-08-30 NOTE — BRIEF OPERATIVE NOTE - OPERATION/FINDINGS
Removal of LLQ pain pump. No issues.
L abdominal pump removed by neurosurgery. Abdomen entered in LUQ via Veress needle and Optiview entry using 5mm port. Additional x3 8mm ports placed under laparoscopic visualization. GB found to be densely adherent to liver bed and duodenum. Extensive adhesiolysis performed. Critical view of safety identified and cystic artery clipped x3 and transected with robotic scissor. Cystic artery identified using ICG green and clipped distally and cut proximally. Proximal end oversewn in running fashion with 5-0 PDS. Small bleeder in the GB fossa oversewn with 5-0 prolene. GB dissected off of fossa using hook cautery. Percutaneous cholecystostomy tube removed under laparoscopic visualization. GB removed using laparoscopic bag. Capsulectomy performed and RENATA x1 left in subq space. Subcutaneous tissue closed with 3-0 Vicryl. Skin closed with 4-0 Monocryl and Dermabond.

## 2022-08-30 NOTE — PROGRESS NOTE ADULT - SUBJECTIVE AND OBJECTIVE BOX
Pain Management Progress Note - [facility] [phone]    HPI: Patient seen and examined today. Patient reports endorsing no pain this AM. Patient to go for OR procedure today. Patient denies side effects from current pain regimen.     Pertinent PMH: Pain at: ___Back ___Neck___Knee ___Hip ___Shoulder ___X Opioid tolerance    Pain is ___X sharp ____dull ___burning ___achy ___ Intensity: _X___ mild ____mod ____severe     Location ___X__surgical site _____cervical _____lumbar ___X_abd _____upper ext____lower ext    Worse with __X__activity ___X_movement _____physical therapy___ Rest    Improved with _X___medication __X__rest ____physical therapy    PAST MEDICAL & SURGICAL HISTORY:  AAA (abdominal aortic aneurysm)  HTN (hypertension)  Cardiomyopathy  Hyperlipidemia  ONOFRE (dyspnea on exertion)  DVT (deep venous thrombosis)  Pulmonary emphysema  COPD  Cardiac arrhythmia  Aneurysm of aortic root  Depression  anxiety  Anemia  Pelvic mass  Pacemaker  medtronic  History of kidney surgery  History of back surgery  multiple, lumbar  Surgery, elective  multiple neck surgery, cervical  S/P hip replacement, left  S/P arthroscopy of right knee  S/P AAA (abdominal aortic aneurysm) repair  with right iliac aneurysm endovascular repair  S/P carpal tunnel release  Surgery, elective  Cardiac Stent x4  Surgery, elective  Intrathecal pump placement    MEDICATIONS:  melatonin  artificial tears (preservative free) Ophthalmic Solution  hydrALAZINE  magnesium sulfate  IVPB  dextrose 5% + sodium chloride 0.9%.  magnesium sulfate  IVPB  potassium phosphate IVPB  heparin  Infusion  oxyCODONE    IR  hydrALAZINE Injectable  oxyCODONE  ER Tablet  heparin  Infusion.  potassium chloride    Tablet ER  magnesium sulfate  IVPB  hydrALAZINE Injectable  lactated ringers.  oxyCODONE  ER Tablet  oxyCODONE    IR  oxyCODONE    IR  diazepam    Tablet  ALPRAZolam  magnesium sulfate  IVPB  dextrose 5% + sodium chloride 0.9%.  sodium chloride  sodium chloride 0.9%.  heparin  Infusion  heparin  Infusion  heparin  Infusion  lactated ringers.  hydrALAZINE  polyethylene glycol 3350  magnesium sulfate  IVPB  hydrALAZINE  metoprolol tartrate  sodium phosphate IVPB  lactated ringers.  hydrALAZINE  hydrALAZINE  hydrALAZINE  hydrALAZINE  heparin  Infusion  heparin  Infusion.  potassium phosphate / sodium phosphate Powder (PHOS-NaK)  magnesium sulfate  IVPB  heparin  Infusion  heparin  Infusion  ondansetron Injectable  diazepam    Tablet  metoprolol succinate ER  simvastatin  finasteride  nortriptyline  oxyCODONE  ER Tablet  oxyCODONE    IR  oxyCODONE    IR  pantoprazole  Injectable  doxazosin  ALPRAZolam  lactated ringers.  morphine  - Injectable    ROS: Const:  N___febrile   Eyes:___ENT:___CV: _N__chest pain  Resp: __N__sob  GI:_N__nausea __N_vomiting __N__abd pain __Y_npo ___clears ___full diet __bm  :___ Musk: __Y_pain ___spasm  Skin:___ Neuro:  __N_sedation_N__confusion___N_ numbness ___weakness _N__paresthesia  Psych:___anxiety  Endo:___ Heme:___Allergy:_ALTACE, PCN__      08-30 @ 04:251.20 mg/dL  Hemoglobin: 7.8 g/dL (08-30 @ 04:25)  Hemoglobin: 8.1 g/dL (08-29 @ 06:07)  T(C): 36.8 (08-30-22 @ 09:00), Max: 37 (08-30-22 @ 05:18)  HR: 60 (08-30-22 @ 11:00) (60 - 82)  BP: 154/75 (08-30-22 @ 11:00) (86/57 - 178/92)  RR: 17 (08-30-22 @ 11:00) (12 - 40)  SpO2: 95% (08-30-22 @ 11:00) (91% - 100%)  Wt(kg): --     PHYSICAL EXAM:  Gen Appearance: __X_no acute distress __X_appropriate       Neuro: ___SILT feet____ EOM Intact Psych: AAOX3__, _X__mood/affect appropriate        Eyes: __X_conjunctiva WNL  ____X_ Pupils equal and round        ENT: __X_ears and nose atraumatic___X Hearing grossly intact        Neck: _X__trachea midline, no visible masses ___thyroid without palpable mass    Resp: _X__Nml WOB____No tactile fremitus ___clear to auscultation    Cardio: _X__extremities free from edema ____pedal pulses palpable    GI/Abdomen: ___soft _____ Nontender____X__Nondistended_____HSM    Lymphatic: ___no palpable nodes in neck  ___no palpable nodes calves and feet    Skin/Wound: ___Incision, _X__Dressing c/d/i,   ____surrounding tissues soft,  ___drain/chest tube present____    Muscular: EHL ___/5  Gastrocnemius___/5    __X_absent clubbing/cyanosis         ASSESSMENT:  This is a 72y old Male with a history of colon mass s/p partial colectomy, aortic and iliac aneurysm surgery (2017), HTN, HLD, paroxysmal afib on eliquis, pacemaker implant (2004), multiple orthopedic surgeries including spine and right hip, intrathecal morphine pump placement (2004) reported to be inactive and not in use scheduled for laparoscopic cholecystectomy today.     Recommended Treatment PLAN:  1. Patient reports no abdominal pain at this time. Consider continuing with home dose regimen Oxycontin 20 mg PO TID, Oxycodone 15 mg PO q8h PRN severe pain post-operatively   2. Consider jeannette-incisional infiltration of longer acting anesthetic post-operatively  HOLD ALL OPIOIDS FOR SEDATION, RR<10, O2SAT <93%, SBP <96  Plan discussed with Dr. Richardson

## 2022-08-30 NOTE — PRE-ANESTHESIA EVALUATION ADULT - NSANTHOSAYNRD_GEN_A_CORE
No. EDUIN screening performed.  STOP BANG Legend: 0-2 = LOW Risk; 3-4 = INTERMEDIATE Risk; 5-8 = HIGH Risk

## 2022-08-30 NOTE — PROGRESS NOTE ADULT - ASSESSMENT
Assessment: 71 y/o M with colon mass s/p partial colectomy (2017), appendectomy, hernia repair, aortic and iliac aneurysm surgery (2017) s/p stent placement, HTN, HLD, paroxysmal atrial fibrillation on Eliquis, blood clotting disorder s/p IVC filter, right knee ligament repair, pacemaker implant (2004) s/p recent ppm interrogation, herniated disc and related surgery in [id number] complicated by spinal fusion, local hematoma, foot drop with chronic pain s/p failed intrathecal morphine pump, recently admitted to [facility] on 08/07 with acute cholecystitis c/b cardiomyopathy s/p percutaneous cholecystostomy tube by IR. pt readmitted 8/20 for concerns of bleeding at the perc yonis tube site. s/p ERCP 8/26 with removal of several small and medium sized stones, transferred to SICU 8/27 for transient altered mental status, suspicious for narcotic intoxication (resolved) with negative work up. Plan for cholecystectomy and intrathecal morphine pump today in OR.     Plan:   Neuro: Transient encephalopathic event- improved, CTH negative, Neuro following; Melatonin qHS   Pain: Pain team following; Continue OxyContin ER 20mg q8h with Oxy IR 15mg q4h PRN; Plan for removal of intrathecal morphine pump today with neurosx   HENT: Refresh eye gtt.  CV: HX of cardiomyopathy; 8/22 Echo: LVEF 55%- cont Toprol XL 50 bid, Hydralazine 50 q8h,  Zocor 5 qhs, Holding entresto, ARB   Pulm: RA  GI: NPO for cholecystectomy; cont protonix, miralax   : Voids, cont cardura, Proscar  ID:  not on abx   Endo: ISS  PPx: SCD, not on SQH.   Lines: PIV   Wounds: Perc Yonis tube  PT/OT: ordered 8/29

## 2022-08-30 NOTE — BRIEF OPERATIVE NOTE - NSICDXBRIEFPROCEDURE_GEN_ALL_CORE_FT
PROCEDURES:  Removal, pain pump 30-Aug-2022 16:04:19  Charity Maya  
PROCEDURES:  Robot-assisted cholecystectomy 30-Aug-2022 19:06:17  Ariadna Mcdaniel

## 2022-08-30 NOTE — PROGRESS NOTE ADULT - SUBJECTIVE AND OBJECTIVE BOX
24 Hour Events: Hypotensive, changed Hydralazine dose with improvement in SBP. Neurosurgery team consulted for removal of intrathecal pump, recommending CT spine which was completed yesterday. Overnight no acute events, NPO at midnight for cholecystectomy today      PAST MEDICAL & SURGICAL HISTORY:  AAA (abdominal aortic aneurysm)  HTN (hypertension)  Cardiomyopathy  Hyperlipidemia  ONOFRE (dyspnea on exertion)  DVT (deep venous thrombosis)  Pulmonary emphysema-COPD  Cardiac arrhythmia  Aneurysm of aortic root  Depression-anxiety  Anemia  Pelvic mass  Pacemaker-medtronic  History of kidney surgery  History of back surgery-multiple, lumbar  Surgery, elective-multiple neck surgery, cervical  S/P hip replacement, left  S/P arthroscopy of right knee  S/P AAA (abdominal aortic aneurysm) repair-with right iliac aneurysm endovascular repair  S/P carpal tunnel release  Surgery, elective-Cardiac Stent x4  Surgery, elective-Intrathecal pump placement    Allergies  Altace (Unknown)  penicillin (Unknown)      MEDICATIONS  (STANDING):  artificial tears (preservative free) Ophthalmic Solution 1 Drop(s) Both EYES two times a day  doxazosin 4 milliGRAM(s) Oral at bedtime  finasteride 5 milliGRAM(s) Oral daily  heparin  Infusion 1000 Unit(s)/Hr (10 mL/Hr) IV Continuous <Continuous>  hydrALAZINE 50 milliGRAM(s) Oral every 12 hours  melatonin 3 milliGRAM(s) Oral at bedtime  metoprolol succinate ER 50 milliGRAM(s) Oral every 12 hours  oxyCODONE  ER Tablet 20 milliGRAM(s) Oral every 8 hours  pantoprazole  Injectable 40 milliGRAM(s) IV Push daily  polyethylene glycol 3350 17 Gram(s) Oral two times a day  simvastatin 5 milliGRAM(s) Oral at bedtime    MEDICATIONS  (PRN):  hydrALAZINE Injectable 10 milliGRAM(s) IV Push every 6 hours PRN SBP> 160  ondansetron Injectable 4 milliGRAM(s) IV Push every 6 hours PRN Nausea and/or Vomiting  oxyCODONE    IR 15 milliGRAM(s) Oral every 4 hours PRN Moderate Pain (4 - 6)      Physical Exam:   General: Well apperaing, resting comfortably in bed in no acute distress   Neuro: Grossly intact bilaterally   HEENT: Normocephalic, atraumatic, trachea midline, no JVD   Chest:   Heart: Regular S1/S2, no murmurs rubs or gallops   Lungs: Unlabored breathing on ***; Clear to auscultation bilaterally, no adventitious sounds   Abdomen: Soft, non-distended, normoactive bowel sounds throughout, no tenderness to palpation in all 4 quadrants   Upper Extremities: No edema, freely mobile bilaterally   Lower Extremities: No edema, SCDs in place, feet warm bilaterally   Skin: Warm, non-diaphoretic throughout       Labs:                         7.8    5.46  )-----------( 258      ( 30 Aug 2022 04:25 )             23.8     08-30    131<L>  |  98  |  6<L>  ----------------------------<  93  4.0   |  23  |  1.20    Ca    8.6      30 Aug 2022 04:25  Phos  3.3     08-30  Mg     1.9     08-30    TPro  6.5  /  Alb  2.4<L>  /  TBili  0.5  /  DBili  x   /  AST  31  /  ALT  39  /  AlkPhos  340<H>  08-30    PT/INR - ( 30 Aug 2022 04:25 )   PT: 12.7 sec;   INR: 1.07     PTT - ( 30 Aug 2022 04:25 )  PTT:48.7 sec    COVID-19 PCR: NotDetec (28 Aug 2022 12:45)  COVID-19 PCR: NotDetec (25 Aug 2022 07:35)  COVID-19 PCR: NotDetec (23 Aug 2022 17:22)  COVID-19 PCR: Negative (20 Aug 2022 12:43)  COVID-19 PCR: Negative (16 Aug 2022 08:37)  COVID-19 PCR: NotDetec (10 Aug 2022 15:20)  COVID-19 PCR: NotDetec (07 Aug 2022 15:25)      Vital Signs:   Vital Signs Last 24 Hrs  T(C): 37 (30 Aug 2022 05:18), Max: 37 (30 Aug 2022 05:18)  T(F): 98.6 (30 Aug 2022 05:18), Max: 98.6 (30 Aug 2022 05:18)  HR: 69 (30 Aug 2022 07:00) (60 - 82)  BP: 169/85 (30 Aug 2022 07:00) (86/57 - 187/95)  BP(mean): 116 (30 Aug 2022 07:00) (67 - 129)  RR: 17 (30 Aug 2022 07:00) (12 - 40)  SpO2: 95% (30 Aug 2022 07:00) (91% - 100%)    Parameters below as of 30 Aug 2022 07:00  Patient On (Oxygen Delivery Method): room air      Input/Output:   I&O's Detail    29 Aug 2022 07:01  -  30 Aug 2022 07:00  --------------------------------------------------------  IN:    dextrose 5% + sodium chloride 0.9%: 240 mL    Heparin: 250 mL    IV PiggyBack: 50 mL    Oral Fluid: 815 mL  Total IN: 1355 mL    OUT:    Drain (mL): 12 mL    Voided (mL): 2100 mL  Total OUT: 2112 mL    Total NET: -757 mL        Daily     Daily      24 Hour Events: Hypotensive, changed Hydralazine dose with improvement in SBP. Neurosurgery team consulted for removal of intrathecal pump, recommending CT spine which was completed yesterday. Overnight no acute events, NPO at midnight for cholecystectomy today      PAST MEDICAL & SURGICAL HISTORY:  AAA (abdominal aortic aneurysm)  HTN (hypertension)  Cardiomyopathy  Hyperlipidemia  ONOFRE (dyspnea on exertion)  DVT (deep venous thrombosis)  Pulmonary emphysema-COPD  Cardiac arrhythmia  Aneurysm of aortic root  Depression-anxiety  Anemia  Pelvic mass  Pacemaker-medtronic  History of kidney surgery  History of back surgery-multiple, lumbar  Surgery, elective-multiple neck surgery, cervical  S/P hip replacement, left  S/P arthroscopy of right knee  S/P AAA (abdominal aortic aneurysm) repair-with right iliac aneurysm endovascular repair  S/P carpal tunnel release  Surgery, elective-Cardiac Stent x4  Surgery, elective-Intrathecal pump placement    Allergies  Altace (Unknown)  penicillin (Unknown)      MEDICATIONS  (STANDING):  artificial tears (preservative free) Ophthalmic Solution 1 Drop(s) Both EYES two times a day  doxazosin 4 milliGRAM(s) Oral at bedtime  finasteride 5 milliGRAM(s) Oral daily  heparin  Infusion 1000 Unit(s)/Hr (10 mL/Hr) IV Continuous <Continuous>  hydrALAZINE 50 milliGRAM(s) Oral every 12 hours  melatonin 3 milliGRAM(s) Oral at bedtime  metoprolol succinate ER 50 milliGRAM(s) Oral every 12 hours  oxyCODONE  ER Tablet 20 milliGRAM(s) Oral every 8 hours  pantoprazole  Injectable 40 milliGRAM(s) IV Push daily  polyethylene glycol 3350 17 Gram(s) Oral two times a day  simvastatin 5 milliGRAM(s) Oral at bedtime    MEDICATIONS  (PRN):  hydrALAZINE Injectable 10 milliGRAM(s) IV Push every 6 hours PRN SBP> 160  ondansetron Injectable 4 milliGRAM(s) IV Push every 6 hours PRN Nausea and/or Vomiting  oxyCODONE    IR 15 milliGRAM(s) Oral every 4 hours PRN Moderate Pain (4 - 6)      Physical Exam:   General: Elderly male, resting comfortably in bed in no acute distress   Neuro: Grossly intact bilaterally   HEENT: Normocephalic, atraumatic, trachea midline, no JVD   Chest: Equal rise and fall of chest   Heart: Regular S1/S2, no murmurs rubs or gallops   Lungs: Unlabored breathing on RA; Clear to auscultation bilaterally, no adventitious sounds   Abdomen: Soft, non-distended, normoactive bowel sounds throughout, no tenderness to palpation in all 4 quadrants   Upper Extremities: Non-pitting edema of LUE from wrist to elbow, RUE without edema and well healed scar near elbow, freely mobile bilaterally   Lower Extremities: No edema, SCDs in place, feet warm bilaterally   Skin: Warm, non-diaphoretic throughout       Labs:                         7.8    5.46  )-----------( 258      ( 30 Aug 2022 04:25 )             23.8     08-30    131<L>  |  98  |  6<L>  ----------------------------<  93  4.0   |  23  |  1.20    Ca    8.6      30 Aug 2022 04:25  Phos  3.3     08-30  Mg     1.9     08-30    TPro  6.5  /  Alb  2.4<L>  /  TBili  0.5  /  DBili  x   /  AST  31  /  ALT  39  /  AlkPhos  340<H>  08-30    PT/INR - ( 30 Aug 2022 04:25 )   PT: 12.7 sec;   INR: 1.07     PTT - ( 30 Aug 2022 04:25 )  PTT:48.7 sec    COVID-19 PCR: NotDetec (28 Aug 2022 12:45)  COVID-19 PCR: NotDetec (25 Aug 2022 07:35)  COVID-19 PCR: NotDetec (23 Aug 2022 17:22)  COVID-19 PCR: Negative (20 Aug 2022 12:43)  COVID-19 PCR: Negative (16 Aug 2022 08:37)  COVID-19 PCR: NotDetec (10 Aug 2022 15:20)  COVID-19 PCR: NotDetec (07 Aug 2022 15:25)      Vital Signs:   Vital Signs Last 24 Hrs  T(C): 37 (30 Aug 2022 05:18), Max: 37 (30 Aug 2022 05:18)  T(F): 98.6 (30 Aug 2022 05:18), Max: 98.6 (30 Aug 2022 05:18)  HR: 69 (30 Aug 2022 07:00) (60 - 82)  BP: 169/85 (30 Aug 2022 07:00) (86/57 - 187/95)  BP(mean): 116 (30 Aug 2022 07:00) (67 - 129)  RR: 17 (30 Aug 2022 07:00) (12 - 40)  SpO2: 95% (30 Aug 2022 07:00) (91% - 100%)    Parameters below as of 30 Aug 2022 07:00  Patient On (Oxygen Delivery Method): room air      Input/Output:   I&O's Detail    29 Aug 2022 07:01  -  30 Aug 2022 07:00  --------------------------------------------------------  IN:    dextrose 5% + sodium chloride 0.9%: 240 mL    Heparin: 250 mL    IV PiggyBack: 50 mL    Oral Fluid: 815 mL  Total IN: 1355 mL    OUT:    Drain (mL): 12 mL    Voided (mL): 2100 mL  Total OUT: 2112 mL    Total NET: -757 mL        Daily     Daily

## 2022-08-30 NOTE — PROGRESS NOTE ADULT - NUTRITIONAL ASSESSMENT
Diet, NPO after Midnight:      NPO Start Date: 29-Aug-2022,   NPO Start Time: 23:59 (08-29-22 @ 10:20) [Active]  Diet, DASH/TLC:   Sodium & Cholesterol Restricted  Soft and Bite Sized ([id number]) (08-28-22 @ 07:59) [Active]

## 2022-08-30 NOTE — PRE-ANESTHESIA EVALUATION ADULT - NS MD HP INPLANTS MED DEV
Pacemaker, Cardiac Stent x4, Intrathecal pump, Left hip, lumbar spine, cervical spine,/Prosthetic device(s)
Pacemaker, Cardiac Stent x4, Intrathecal pump, Left hip, lumbar spine, cervical spine,/Pacemaker/Pain pump/Prosthetic device(s)

## 2022-08-31 LAB
A1C WITH ESTIMATED AVERAGE GLUCOSE RESULT: 5.2 % — SIGNIFICANT CHANGE UP (ref 4–5.6)
ALBUMIN SERPL ELPH-MCNC: 2.8 G/DL — LOW (ref 3.3–5)
ALP SERPL-CCNC: 287 U/L — HIGH (ref 40–120)
ALT FLD-CCNC: 34 U/L — SIGNIFICANT CHANGE UP (ref 10–45)
ANION GAP SERPL CALC-SCNC: 9 MMOL/L — SIGNIFICANT CHANGE UP (ref 5–17)
APTT BLD: 31.4 SEC — SIGNIFICANT CHANGE UP (ref 27.5–35.5)
APTT BLD: 39.6 SEC — HIGH (ref 27.5–35.5)
AST SERPL-CCNC: 31 U/L — SIGNIFICANT CHANGE UP (ref 10–40)
BILIRUB SERPL-MCNC: 0.4 MG/DL — SIGNIFICANT CHANGE UP (ref 0.2–1.2)
BUN SERPL-MCNC: 10 MG/DL — SIGNIFICANT CHANGE UP (ref 7–23)
CALCIUM SERPL-MCNC: 8.4 MG/DL — SIGNIFICANT CHANGE UP (ref 8.4–10.5)
CHLORIDE SERPL-SCNC: 97 MMOL/L — SIGNIFICANT CHANGE UP (ref 96–108)
CO2 SERPL-SCNC: 26 MMOL/L — SIGNIFICANT CHANGE UP (ref 22–31)
CREAT SERPL-MCNC: 1.09 MG/DL — SIGNIFICANT CHANGE UP (ref 0.5–1.3)
EGFR: 72 ML/MIN/1.73M2 — SIGNIFICANT CHANGE UP
ESTIMATED AVERAGE GLUCOSE: 103 MG/DL — SIGNIFICANT CHANGE UP (ref 68–114)
GLUCOSE BLDC GLUCOMTR-MCNC: 104 MG/DL — HIGH (ref 70–99)
GLUCOSE BLDC GLUCOMTR-MCNC: 108 MG/DL — HIGH (ref 70–99)
GLUCOSE BLDC GLUCOMTR-MCNC: 116 MG/DL — HIGH (ref 70–99)
GLUCOSE BLDC GLUCOMTR-MCNC: 97 MG/DL — SIGNIFICANT CHANGE UP (ref 70–99)
GLUCOSE SERPL-MCNC: 96 MG/DL — SIGNIFICANT CHANGE UP (ref 70–99)
HCT VFR BLD CALC: 25.8 % — LOW (ref 39–50)
HGB BLD-MCNC: 8.4 G/DL — LOW (ref 13–17)
INR BLD: 1.1 — SIGNIFICANT CHANGE UP (ref 0.88–1.16)
MAGNESIUM SERPL-MCNC: 2 MG/DL — SIGNIFICANT CHANGE UP (ref 1.6–2.6)
MCHC RBC-ENTMCNC: 28.1 PG — SIGNIFICANT CHANGE UP (ref 27–34)
MCHC RBC-ENTMCNC: 32.6 GM/DL — SIGNIFICANT CHANGE UP (ref 32–36)
MCV RBC AUTO: 86.3 FL — SIGNIFICANT CHANGE UP (ref 80–100)
NRBC # BLD: 0 /100 WBCS — SIGNIFICANT CHANGE UP (ref 0–0)
PHOSPHATE SERPL-MCNC: 4 MG/DL — SIGNIFICANT CHANGE UP (ref 2.5–4.5)
PLATELET # BLD AUTO: 280 K/UL — SIGNIFICANT CHANGE UP (ref 150–400)
POTASSIUM SERPL-MCNC: 4.1 MMOL/L — SIGNIFICANT CHANGE UP (ref 3.5–5.3)
POTASSIUM SERPL-SCNC: 4.1 MMOL/L — SIGNIFICANT CHANGE UP (ref 3.5–5.3)
PROT SERPL-MCNC: 6.6 G/DL — SIGNIFICANT CHANGE UP (ref 6–8.3)
PROTHROM AB SERPL-ACNC: 13.1 SEC — SIGNIFICANT CHANGE UP (ref 10.5–13.4)
RBC # BLD: 2.99 M/UL — LOW (ref 4.2–5.8)
RBC # FLD: 13.3 % — SIGNIFICANT CHANGE UP (ref 10.3–14.5)
SODIUM SERPL-SCNC: 132 MMOL/L — LOW (ref 135–145)
WBC # BLD: 6.6 K/UL — SIGNIFICANT CHANGE UP (ref 3.8–10.5)
WBC # FLD AUTO: 6.6 K/UL — SIGNIFICANT CHANGE UP (ref 3.8–10.5)

## 2022-08-31 PROCEDURE — 74018 RADEX ABDOMEN 1 VIEW: CPT | Mod: 26

## 2022-08-31 PROCEDURE — 70450 CT HEAD/BRAIN W/O DYE: CPT | Mod: 26

## 2022-08-31 PROCEDURE — 99233 SBSQ HOSP IP/OBS HIGH 50: CPT | Mod: GC

## 2022-08-31 RX ORDER — HEPARIN SODIUM 5000 [USP'U]/ML
1000 INJECTION INTRAVENOUS; SUBCUTANEOUS
Qty: 25000 | Refills: 0 | Status: DISCONTINUED | OUTPATIENT
Start: 2022-08-31 | End: 2022-09-03

## 2022-08-31 RX ORDER — VANCOMYCIN HCL 1 G
1000 VIAL (EA) INTRAVENOUS ONCE
Refills: 0 | Status: DISCONTINUED | OUTPATIENT
Start: 2022-08-31 | End: 2022-08-31

## 2022-08-31 RX ORDER — LIDOCAINE 4 G/100G
1 CREAM TOPICAL EVERY 24 HOURS
Refills: 0 | Status: DISCONTINUED | OUTPATIENT
Start: 2022-08-31 | End: 2022-09-06

## 2022-08-31 RX ORDER — ACETAMINOPHEN 500 MG
650 TABLET ORAL EVERY 6 HOURS
Refills: 0 | Status: DISCONTINUED | OUTPATIENT
Start: 2022-08-31 | End: 2022-09-06

## 2022-08-31 RX ORDER — NORTRIPTYLINE HYDROCHLORIDE 10 MG/1
75 CAPSULE ORAL AT BEDTIME
Refills: 0 | Status: DISCONTINUED | OUTPATIENT
Start: 2022-08-31 | End: 2022-09-06

## 2022-08-31 RX ORDER — SACUBITRIL AND VALSARTAN 24; 26 MG/1; MG/1
1 TABLET, FILM COATED ORAL EVERY 12 HOURS
Refills: 0 | Status: DISCONTINUED | OUTPATIENT
Start: 2022-08-31 | End: 2022-09-06

## 2022-08-31 RX ORDER — CEFOTETAN DISODIUM 1 G
2 VIAL (EA) INJECTION ONCE
Refills: 0 | Status: COMPLETED | OUTPATIENT
Start: 2022-08-31 | End: 2022-08-31

## 2022-08-31 RX ORDER — SODIUM CHLORIDE 9 MG/ML
1000 INJECTION, SOLUTION INTRAVENOUS
Refills: 0 | Status: DISCONTINUED | OUTPATIENT
Start: 2022-08-31 | End: 2022-08-31

## 2022-08-31 RX ORDER — VORTIOXETINE 5 MG/1
20 TABLET, FILM COATED ORAL EVERY 24 HOURS
Refills: 0 | Status: DISCONTINUED | OUTPATIENT
Start: 2022-08-31 | End: 2022-09-06

## 2022-08-31 RX ADMIN — HYDROMORPHONE HYDROCHLORIDE 0.5 MILLIGRAM(S): 2 INJECTION INTRAMUSCULAR; INTRAVENOUS; SUBCUTANEOUS at 07:01

## 2022-08-31 RX ADMIN — OXYCODONE HYDROCHLORIDE 20 MILLIGRAM(S): 5 TABLET ORAL at 13:58

## 2022-08-31 RX ADMIN — OXYCODONE HYDROCHLORIDE 20 MILLIGRAM(S): 5 TABLET ORAL at 06:43

## 2022-08-31 RX ADMIN — FINASTERIDE 5 MILLIGRAM(S): 5 TABLET, FILM COATED ORAL at 11:47

## 2022-08-31 RX ADMIN — SACUBITRIL AND VALSARTAN 1 TABLET(S): 24; 26 TABLET, FILM COATED ORAL at 19:31

## 2022-08-31 RX ADMIN — LIDOCAINE 1 PATCH: 4 CREAM TOPICAL at 21:45

## 2022-08-31 RX ADMIN — NORTRIPTYLINE HYDROCHLORIDE 75 MILLIGRAM(S): 10 CAPSULE ORAL at 22:34

## 2022-08-31 RX ADMIN — HEPARIN SODIUM 10 UNIT(S)/HR: 5000 INJECTION INTRAVENOUS; SUBCUTANEOUS at 12:15

## 2022-08-31 RX ADMIN — LIDOCAINE 1 PATCH: 4 CREAM TOPICAL at 19:27

## 2022-08-31 RX ADMIN — HYDROMORPHONE HYDROCHLORIDE 0.5 MILLIGRAM(S): 2 INJECTION INTRAMUSCULAR; INTRAVENOUS; SUBCUTANEOUS at 17:00

## 2022-08-31 RX ADMIN — OXYCODONE HYDROCHLORIDE 15 MILLIGRAM(S): 5 TABLET ORAL at 21:02

## 2022-08-31 RX ADMIN — Medication 1 DROP(S): at 17:45

## 2022-08-31 RX ADMIN — Medication 10 MILLIGRAM(S): at 08:05

## 2022-08-31 RX ADMIN — HYDROMORPHONE HYDROCHLORIDE 0.5 MILLIGRAM(S): 2 INJECTION INTRAMUSCULAR; INTRAVENOUS; SUBCUTANEOUS at 16:27

## 2022-08-31 RX ADMIN — OXYCODONE HYDROCHLORIDE 20 MILLIGRAM(S): 5 TABLET ORAL at 23:30

## 2022-08-31 RX ADMIN — OXYCODONE HYDROCHLORIDE 20 MILLIGRAM(S): 5 TABLET ORAL at 06:13

## 2022-08-31 RX ADMIN — Medication 100 GRAM(S): at 14:01

## 2022-08-31 RX ADMIN — HYDROMORPHONE HYDROCHLORIDE 0.5 MILLIGRAM(S): 2 INJECTION INTRAMUSCULAR; INTRAVENOUS; SUBCUTANEOUS at 01:32

## 2022-08-31 RX ADMIN — Medication 1 DROP(S): at 05:11

## 2022-08-31 RX ADMIN — OXYCODONE HYDROCHLORIDE 15 MILLIGRAM(S): 5 TABLET ORAL at 09:11

## 2022-08-31 RX ADMIN — LIDOCAINE 1 PATCH: 4 CREAM TOPICAL at 09:06

## 2022-08-31 RX ADMIN — SIMVASTATIN 5 MILLIGRAM(S): 20 TABLET, FILM COATED ORAL at 22:33

## 2022-08-31 RX ADMIN — POLYETHYLENE GLYCOL 3350 17 GRAM(S): 17 POWDER, FOR SOLUTION ORAL at 13:43

## 2022-08-31 RX ADMIN — HYDROMORPHONE HYDROCHLORIDE 0.5 MILLIGRAM(S): 2 INJECTION INTRAMUSCULAR; INTRAVENOUS; SUBCUTANEOUS at 11:51

## 2022-08-31 RX ADMIN — Medication 10 MILLIGRAM(S): at 15:00

## 2022-08-31 RX ADMIN — Medication 3 MILLIGRAM(S): at 22:33

## 2022-08-31 RX ADMIN — OXYCODONE HYDROCHLORIDE 15 MILLIGRAM(S): 5 TABLET ORAL at 05:40

## 2022-08-31 RX ADMIN — HYDROMORPHONE HYDROCHLORIDE 0.5 MILLIGRAM(S): 2 INJECTION INTRAMUSCULAR; INTRAVENOUS; SUBCUTANEOUS at 07:16

## 2022-08-31 RX ADMIN — Medication 650 MILLIGRAM(S): at 15:00

## 2022-08-31 RX ADMIN — OXYCODONE HYDROCHLORIDE 20 MILLIGRAM(S): 5 TABLET ORAL at 22:32

## 2022-08-31 RX ADMIN — SODIUM CHLORIDE 80 MILLILITER(S): 9 INJECTION, SOLUTION INTRAVENOUS at 05:12

## 2022-08-31 RX ADMIN — OXYCODONE HYDROCHLORIDE 15 MILLIGRAM(S): 5 TABLET ORAL at 09:58

## 2022-08-31 RX ADMIN — LIDOCAINE 1 PATCH: 4 CREAM TOPICAL at 09:05

## 2022-08-31 RX ADMIN — Medication 50 MILLIGRAM(S): at 09:05

## 2022-08-31 RX ADMIN — Medication 50 MILLIGRAM(S): at 22:32

## 2022-08-31 RX ADMIN — OXYCODONE HYDROCHLORIDE 15 MILLIGRAM(S): 5 TABLET ORAL at 21:55

## 2022-08-31 RX ADMIN — OXYCODONE HYDROCHLORIDE 15 MILLIGRAM(S): 5 TABLET ORAL at 05:10

## 2022-08-31 RX ADMIN — OXYCODONE HYDROCHLORIDE 20 MILLIGRAM(S): 5 TABLET ORAL at 14:45

## 2022-08-31 RX ADMIN — Medication 100 GRAM(S): at 02:30

## 2022-08-31 RX ADMIN — HYDROMORPHONE HYDROCHLORIDE 0.5 MILLIGRAM(S): 2 INJECTION INTRAMUSCULAR; INTRAVENOUS; SUBCUTANEOUS at 12:20

## 2022-08-31 RX ADMIN — Medication 50 MILLIGRAM(S): at 19:31

## 2022-08-31 RX ADMIN — PANTOPRAZOLE SODIUM 40 MILLIGRAM(S): 20 TABLET, DELAYED RELEASE ORAL at 11:47

## 2022-08-31 RX ADMIN — OXYCODONE HYDROCHLORIDE 15 MILLIGRAM(S): 5 TABLET ORAL at 14:42

## 2022-08-31 RX ADMIN — HYDROMORPHONE HYDROCHLORIDE 0.5 MILLIGRAM(S): 2 INJECTION INTRAMUSCULAR; INTRAVENOUS; SUBCUTANEOUS at 01:02

## 2022-08-31 RX ADMIN — Medication 650 MILLIGRAM(S): at 14:00

## 2022-08-31 RX ADMIN — OXYCODONE HYDROCHLORIDE 15 MILLIGRAM(S): 5 TABLET ORAL at 01:53

## 2022-08-31 RX ADMIN — Medication 50 MILLIGRAM(S): at 07:02

## 2022-08-31 RX ADMIN — VORTIOXETINE 20 MILLIGRAM(S): 5 TABLET, FILM COATED ORAL at 19:31

## 2022-08-31 RX ADMIN — Medication 4 MILLIGRAM(S): at 22:33

## 2022-08-31 NOTE — PROGRESS NOTE ADULT - ASSESSMENT
Assessment: 71 y/o M with colon mass s/p partial colectomy (2017), appendectomy, hernia repair, aortic and iliac aneurysm surgery (2017) s/p stent placement, HTN, HLD, paroxysmal atrial fibrillation on Eliquis, blood clotting disorder s/p IVC filter, right knee ligament repair, pacemaker implant (2004) s/p recent ppm interrogation, herniated disc and related surgery in [id number] complicated by spinal fusion, local hematoma, foot drop with chronic pain s/p failed intrathecal morphine pump, recently admitted to [facility] on 08/07 with acute cholecystitis c/b cardiomyopathy s/p percutaneous cholecystostomy tube by IR. pt readmitted 8/20 for concerns of bleeding at the perc yonis tube site. s/p ERCP 8/26 with removal of several small and medium sized stones, transferred to SICU 8/27 for transient altered mental status, suspicious for narcotic intoxication (resolved) with negative work up. Now s/p RA cholecystectomy and intrathecal morphine pump 8/30/22. Will continue to monitor for acute delirium post-procedure whilst controlling his pain.     Plan:   Neuro: Transient encephalopathic event- improved, CTH negative, Neuro following; Melatonin qHS, Home Pamelor qHS   Pain: Pain team following; Continue PRN IV dilaudid 0.5 q4h for breakthrough; OxyContin ER 20mg q8h with Oxy IR 15mg q4h PRN; s/p removal of intrathecal morphine pump 8/31- RENATA drain w/o suction   HENT: Refresh eye gtt.  CV: HX of cardiomyopathy; 8/22 Echo: LVEF 55%- cont Toprol XL 50 bid, Hydralazine 50 q8h,  Zocor 5 qhs, Entresto; Holding ARB   Pulm: RA  GI: low fat CLD; cont protonix, miralax   : Barber, cont cardura, Proscar  ID:  not on abx   Endo: ISS  PPx: SCD, not on SQH.   Lines: PIV   Wounds: RA port sites, RENATA drain with NO SUCTION   PT/OT: ordered 8/29

## 2022-08-31 NOTE — PROGRESS NOTE ADULT - SUBJECTIVE AND OBJECTIVE BOX
24 Hour Events: s/p OR for RA cholecystectomy and removal of intrathecal morphine pump. Transferred back to SICU extubated. Post-op labs within normal limits, pain controlled, remained hemodynamically stable throughout night.       PAST MEDICAL & SURGICAL HISTORY:  AAA (abdominal aortic aneurysm)  HTN (hypertension)  Cardiomyopathy  Hyperlipidemia  ONOFRE (dyspnea on exertion)  DVT (deep venous thrombosis)  Pulmonary emphysema-COPD  Cardiac arrhythmia  Aneurysm of aortic root  Depression-anxiety  Anemia  Pelvic mass  Pacemaker-medtronic  History of kidney surgery  History of back surgery-multiple, lumbar  Surgery, elective-multiple neck surgery, cervical  S/P hip replacement, left  S/P arthroscopy of right knee  S/P AAA (abdominal aortic aneurysm) repair-with right iliac aneurysm endovascular repair  S/P carpal tunnel release  Surgery, elective-Cardiac Stent x4  Surgery, elective-Intrathecal pump placement      Allergies  Altace (Unknown)  penicillin (Unknown)      MEDICATIONS  (STANDING):  artificial tears (preservative free) Ophthalmic Solution 1 Drop(s) Both EYES two times a day  cefoTEtan  IVPB 2 Gram(s) IV Intermittent once  dextrose 5%. 1000 milliLiter(s) (50 mL/Hr) IV Continuous <Continuous>  dextrose 5%. 1000 milliLiter(s) (100 mL/Hr) IV Continuous <Continuous>  dextrose 50% Injectable 25 Gram(s) IV Push once  dextrose 50% Injectable 12.5 Gram(s) IV Push once  dextrose 50% Injectable 25 Gram(s) IV Push once  doxazosin 4 milliGRAM(s) Oral at bedtime  finasteride 5 milliGRAM(s) Oral daily  glucagon  Injectable 1 milliGRAM(s) IntraMuscular once  heparin  Infusion 1000 Unit(s)/Hr (10 mL/Hr) IV Continuous <Continuous>  hydrALAZINE 50 milliGRAM(s) Oral every 12 hours  insulin lispro (ADMELOG) corrective regimen sliding scale   SubCutaneous Before meals and at bedtime  lidocaine   4% Patch 1 Patch Transdermal every 24 hours  lidocaine   4% Patch 1 Patch Transdermal every 24 hours  melatonin 3 milliGRAM(s) Oral at bedtime  metoprolol succinate ER 50 milliGRAM(s) Oral every 12 hours  nortriptyline 75 milliGRAM(s) Oral at bedtime  oxyCODONE  ER Tablet 20 milliGRAM(s) Oral every 8 hours  pantoprazole  Injectable 40 milliGRAM(s) IV Push daily  polyethylene glycol 3350 17 Gram(s) Oral two times a day  sacubitril 24 mG/valsartan 26 mG 1 Tablet(s) Oral every 12 hours  simvastatin 5 milliGRAM(s) Oral at bedtime    MEDICATIONS  (PRN):  dextrose Oral Gel 15 Gram(s) Oral once PRN Blood Glucose LESS THAN 70 milliGRAM(s)/deciliter  hydrALAZINE Injectable 10 milliGRAM(s) IV Push every 6 hours PRN SBP> 160  HYDROmorphone  Injectable 0.5 milliGRAM(s) IV Push every 4 hours PRN Severe Pain (7 - 10)  ondansetron Injectable 4 milliGRAM(s) IV Push every 6 hours PRN Nausea and/or Vomiting  oxyCODONE    IR 15 milliGRAM(s) Oral every 4 hours PRN Moderate Pain (4 - 6)      Physical Exam:   General: Elderly male, resting comfortably in bed in no acute distress   Neuro: Grossly intact bilaterally; A&Ox3 (person, place, time-year)  HEENT: Normocephalic, atraumatic, trachea midline, no JVD   Chest: Equal rise and fall of chest   Heart: Regular S1/S2, no murmurs rubs or gallops   Lungs: Unlabored breathing on RA; Clear to auscultation bilaterally, no adventitious sounds   Abdomen: Softly distended, port sites without erythema, exudate, RENATA with serosang output   Upper Extremities: Non-pitting edema of LUE from wrist to elbow, RUE without edema and well healed scar near elbow, freely mobile bilaterally   Lower Extremities: No edema, SCDs in place, feet warm bilaterally   Skin: Warm, non-diaphoretic throughout       Labs:                         8.4    6.60  )-----------( 280      ( 31 Aug 2022 05:40 )             25.8     08-31    132<L>  |  97  |  10  ----------------------------<  96  4.1   |  26  |  1.09    Ca    8.4      31 Aug 2022 05:40  Phos  4.0     08-31  Mg     2.0     08-31    TPro  6.6  /  Alb  2.8<L>  /  TBili  0.4  /  DBili  x   /  AST  31  /  ALT  34  /  AlkPhos  287<H>  08-31    CAPILLARY BLOOD GLUCOSE  POCT Blood Glucose.: 97 mg/dL (31 Aug 2022 11:04)  POCT Blood Glucose.: 108 mg/dL (31 Aug 2022 05:22)  POCT Blood Glucose.: 127 mg/dL (30 Aug 2022 21:54)    PT/INR - ( 31 Aug 2022 05:40 )   PT: 13.1 sec;   INR: 1.10     PTT - ( 31 Aug 2022 05:40 )  PTT:31.4 sec    COVID-19 PCR: NotDetec (28 Aug 2022 12:45)  COVID-19 PCR: NotDetec (25 Aug 2022 07:35)  COVID-19 PCR: NotDetec (23 Aug 2022 17:22)  COVID-19 PCR: Negative (20 Aug 2022 12:43)  COVID-19 PCR: Negative (16 Aug 2022 08:37)  COVID-19 PCR: NotDetec (10 Aug 2022 15:20)  COVID-19 PCR: NotDetec (07 Aug 2022 15:25)      Vital Signs:   Vital Signs Last 24 Hrs  T(C): 36.9 (31 Aug 2022 12:00), Max: 36.9 (31 Aug 2022 12:00)  T(F): 98.4 (31 Aug 2022 12:00), Max: 98.4 (31 Aug 2022 12:00)  HR: 63 (31 Aug 2022 12:00) (60 - 81)  BP: 157/75 (31 Aug 2022 12:00) (111/61 - 194/94)  BP(mean): 107 (31 Aug 2022 12:00) (81 - 140)  RR: 17 (31 Aug 2022 12:00) (11 - 26)  SpO2: 95% (31 Aug 2022 12:00) (92% - 99%)    Parameters below as of 31 Aug 2022 12:00  Patient On (Oxygen Delivery Method): room air      Input/Output:   I&O's Detail    30 Aug 2022 07:01  -  31 Aug 2022 07:00  --------------------------------------------------------  IN:    Heparin: 10 mL    IV PiggyBack: 100 mL    Lactated Ringers: 400 mL    Oral Fluid: 420 mL  Total IN: 930 mL    OUT:    Drain (mL): 222 mL    Indwelling Catheter - Urethral (mL): 950 mL    Intermittent Catheterization - Urethral (mL): 30 mL    Voided (mL): 1050 mL  Total OUT: 2252 mL    Total NET: -1322 mL      31 Aug 2022 07:01  -  31 Aug 2022 12:51  --------------------------------------------------------  IN:  Total IN: 0 mL    OUT:    Drain (mL): 100 mL    Indwelling Catheter - Urethral (mL): 335 mL  Total OUT: 435 mL    Total NET: -435 mL        Daily Height in cm: 175.26 (30 Aug 2022 13:35)    Daily     Height (cm): 175.3 (08-30-22 @ 13:35)  Weight (kg): 90.7 (08-30-22 @ 13:35)  BMI (kg/m2): 29.5 (08-30-22 @ 13:35)  BSA (m2): 2.07 (08-30-22 @ 13:35) 24 Hour Events: s/p OR for RA cholecystectomy and removal of intrathecal morphine pump. Transferred back to SICU extubated. Post-op labs within normal limits, pain controlled, remained hemodynamically stable throughout night.       PAST MEDICAL & SURGICAL HISTORY:  AAA (abdominal aortic aneurysm)  HTN (hypertension)  Cardiomyopathy  Hyperlipidemia  ONOFRE (dyspnea on exertion)  DVT (deep venous thrombosis)  Pulmonary emphysema-COPD  Cardiac arrhythmia  Aneurysm of aortic root  Depression-anxiety  Anemia  Pelvic mass  Pacemaker-medtronic  History of kidney surgery  History of back surgery-multiple, lumbar  Surgery, elective-multiple neck surgery, cervical  S/P hip replacement, left  S/P arthroscopy of right knee  S/P AAA (abdominal aortic aneurysm) repair-with right iliac aneurysm endovascular repair  S/P carpal tunnel release  Surgery, elective-Cardiac Stent x4  Surgery, elective-Intrathecal pump placement      Allergies  Altace (Unknown)  penicillin (Unknown)      MEDICATIONS  (STANDING):  artificial tears (preservative free) Ophthalmic Solution 1 Drop(s) Both EYES two times a day  cefoTEtan  IVPB 2 Gram(s) IV Intermittent once  dextrose 5%. 1000 milliLiter(s) (50 mL/Hr) IV Continuous <Continuous>  dextrose 5%. 1000 milliLiter(s) (100 mL/Hr) IV Continuous <Continuous>  dextrose 50% Injectable 25 Gram(s) IV Push once  dextrose 50% Injectable 12.5 Gram(s) IV Push once  dextrose 50% Injectable 25 Gram(s) IV Push once  doxazosin 4 milliGRAM(s) Oral at bedtime  finasteride 5 milliGRAM(s) Oral daily  glucagon  Injectable 1 milliGRAM(s) IntraMuscular once  heparin  Infusion 1000 Unit(s)/Hr (10 mL/Hr) IV Continuous <Continuous>  hydrALAZINE 50 milliGRAM(s) Oral every 12 hours  insulin lispro (ADMELOG) corrective regimen sliding scale   SubCutaneous Before meals and at bedtime  lidocaine   4% Patch 1 Patch Transdermal every 24 hours  lidocaine   4% Patch 1 Patch Transdermal every 24 hours  melatonin 3 milliGRAM(s) Oral at bedtime  metoprolol succinate ER 50 milliGRAM(s) Oral every 12 hours  nortriptyline 75 milliGRAM(s) Oral at bedtime  oxyCODONE  ER Tablet 20 milliGRAM(s) Oral every 8 hours  pantoprazole  Injectable 40 milliGRAM(s) IV Push daily  polyethylene glycol 3350 17 Gram(s) Oral two times a day  sacubitril 24 mG/valsartan 26 mG 1 Tablet(s) Oral every 12 hours  simvastatin 5 milliGRAM(s) Oral at bedtime    MEDICATIONS  (PRN):  dextrose Oral Gel 15 Gram(s) Oral once PRN Blood Glucose LESS THAN 70 milliGRAM(s)/deciliter  hydrALAZINE Injectable 10 milliGRAM(s) IV Push every 6 hours PRN SBP> 160  HYDROmorphone  Injectable 0.5 milliGRAM(s) IV Push every 4 hours PRN Severe Pain (7 - 10)  ondansetron Injectable 4 milliGRAM(s) IV Push every 6 hours PRN Nausea and/or Vomiting  oxyCODONE    IR 15 milliGRAM(s) Oral every 4 hours PRN Moderate Pain (4 - 6)      Physical Exam:   General: Elderly male, resting comfortably in bed in no acute distress   Neuro: Grossly intact bilaterally; A&Ox3 (person, place, time-year)  HEENT: Normocephalic, atraumatic, trachea midline, no JVD   Chest: Equal rise and fall of chest   Heart: Regular S1/S2, no murmurs rubs or gallops   Lungs: Unlabored breathing on RA; Clear to auscultation bilaterally, no adventitious sounds   Abdomen: Softly distended, port sites without erythema, exudate, RENATA with serosang output   Upper Extremities: Non-pitting edema of LUE from wrist to elbow, RUE without edema and well healed scar near elbow, freely mobile bilaterally   Lower Extremities: No edema, SCDs in place, feet warm bilaterally   Skin: Warm, non-diaphoretic throughout  Psych: some paranoid ideation (thought his wife was being stripped of her clothes)      Labs:                         8.4    6.60  )-----------( 280      ( 31 Aug 2022 05:40 )             25.8     08-31    132<L>  |  97  |  10  ----------------------------<  96  4.1   |  26  |  1.09    Ca    8.4      31 Aug 2022 05:40  Phos  4.0     08-31  Mg     2.0     08-31    TPro  6.6  /  Alb  2.8<L>  /  TBili  0.4  /  DBili  x   /  AST  31  /  ALT  34  /  AlkPhos  287<H>  08-31    CAPILLARY BLOOD GLUCOSE  POCT Blood Glucose.: 97 mg/dL (31 Aug 2022 11:04)  POCT Blood Glucose.: 108 mg/dL (31 Aug 2022 05:22)  POCT Blood Glucose.: 127 mg/dL (30 Aug 2022 21:54)    PT/INR - ( 31 Aug 2022 05:40 )   PT: 13.1 sec;   INR: 1.10     PTT - ( 31 Aug 2022 05:40 )  PTT:31.4 sec    COVID-19 PCR: NotDetec (28 Aug 2022 12:45)  COVID-19 PCR: NotDetec (25 Aug 2022 07:35)  COVID-19 PCR: NotDetec (23 Aug 2022 17:22)  COVID-19 PCR: Negative (20 Aug 2022 12:43)  COVID-19 PCR: Negative (16 Aug 2022 08:37)  COVID-19 PCR: NotDetec (10 Aug 2022 15:20)  COVID-19 PCR: NotDetec (07 Aug 2022 15:25)      Vital Signs:   Vital Signs Last 24 Hrs  T(C): 36.9 (31 Aug 2022 12:00), Max: 36.9 (31 Aug 2022 12:00)  T(F): 98.4 (31 Aug 2022 12:00), Max: 98.4 (31 Aug 2022 12:00)  HR: 63 (31 Aug 2022 12:00) (60 - 81)  BP: 157/75 (31 Aug 2022 12:00) (111/61 - 194/94)  BP(mean): 107 (31 Aug 2022 12:00) (81 - 140)  RR: 17 (31 Aug 2022 12:00) (11 - 26)  SpO2: 95% (31 Aug 2022 12:00) (92% - 99%)    Parameters below as of 31 Aug 2022 12:00  Patient On (Oxygen Delivery Method): room air      Input/Output:   I&O's Detail    30 Aug 2022 07:01  -  31 Aug 2022 07:00  --------------------------------------------------------  IN:    Heparin: 10 mL    IV PiggyBack: 100 mL    Lactated Ringers: 400 mL    Oral Fluid: 420 mL  Total IN: 930 mL    OUT:    Drain (mL): 222 mL    Indwelling Catheter - Urethral (mL): 950 mL    Intermittent Catheterization - Urethral (mL): 30 mL    Voided (mL): 1050 mL  Total OUT: 2252 mL    Total NET: -1322 mL      31 Aug 2022 07:01  -  31 Aug 2022 12:51  --------------------------------------------------------  IN:  Total IN: 0 mL    OUT:    Drain (mL): 100 mL    Indwelling Catheter - Urethral (mL): 335 mL  Total OUT: 435 mL    Total NET: -435 mL        Daily Height in cm: 175.26 (30 Aug 2022 13:35)    Daily     Height (cm): 175.3 (08-30-22 @ 13:35)  Weight (kg): 90.7 (08-30-22 @ 13:35)  BMI (kg/m2): 29.5 (08-30-22 @ 13:35)  BSA (m2): 2.07 (08-30-22 @ 13:35)

## 2022-08-31 NOTE — PROGRESS NOTE ADULT - SUBJECTIVE AND OBJECTIVE BOX
NEUROSURGERY CONSULT FOLLOW UP NOTE    HPI:  71 y/o M with colon mass s/p partial colectomy (2017), appendectomy, hernia repair, aortic and iliac aneurysm surgery (2017) s/p stent placement, HTN, HLD, paroxysmal atrial fibrillation on Eliquis, blood clotting disorder s/p IVC filter placement, right knee ligament repair, pacemaker implant (2004) s/p recent ppm interrogation on Eliquis, herniated disc and related surgery in [id number] complicated by spinal fusion, local hematoma, foot drop with chronic pain s/p failed intrathecal morphine pump which caused bradycardia and left hip prosthesis. Patient was recently admitted to [facility] on 08/07 with acute cholecystitis. During that admission patient developed takotsubo cardiomyopathy requiring MICU care. Because patient condition at that time, and not medically optimized, patient was not deemed a surgical candidate for cholecystectomy, and underwent a percutaneous cholecystostomy tube by IR. Patient tolerated the procedure well and was subsequently discharged to [facility]. 3 days ago patient was noted to have blood on the dressings covering the perc yonis, and called Dr. Doyle to update him; per family member Dr. Doyle advised them to stop the eliquis and continue to monitor the site for any bleeding. Patient stopped taking the Eliquis but next morning dressing was saturated with blood again, for which they called Dr. Doyle again and he instructed them to come to [facility] ED. Per rehab the drain was consistently putting out 300cc/d and did not have any blood in the tubing. On presentation, patient afebrile, HD stable, wbc 9.7, Hb 9.7 stable from 9.5 at discharge; LFTs wnl, Cr slightly elevated from discharge. On PE dressing noted to have some blood, on removal of dressing no obvious sign of bleeding and/or hematoma, abdomen is soft, NT/ND. Patient denied fever/nausea/vomit/diarrhea/pruritus. CTAP with percutaneous cholecystostomy catheter in collapsed gallbladder.          (20 Aug 2022 19:20)    OVERNIGHT EVENTS: POD1 removal of ventral portion of intrathecal pain pump. Episode of confusion during the day, ?delirium vs narcotic induced vs. anesthesia related now improved. Complained of acute headache during the today and abdominal RENATA with light-pink tinged with concern for possible mixture of CSF. RENATA drain take off thumbprint suction and now to gravity. CTH obtained for acute headache, negative for acute pathology such as subdural hematoma.       Vital Signs Last 24 Hrs  T(C): 36.9 (31 Aug 2022 12:00), Max: 36.9 (31 Aug 2022 12:00)  T(F): 98.4 (31 Aug 2022 12:00), Max: 98.4 (31 Aug 2022 12:00)  HR: 67 (31 Aug 2022 14:00) (60 - 81)  BP: 173/82 (31 Aug 2022 14:00) (111/61 - 194/94)  BP(mean): 118 (31 Aug 2022 14:00) (81 - 140)  RR: 18 (31 Aug 2022 14:00) (13 - 26)  SpO2: 96% (31 Aug 2022 14:00) (92% - 99%)    Parameters below as of 31 Aug 2022 14:00  Patient On (Oxygen Delivery Method): room air        I&O's Summary    30 Aug 2022 07:01  -  31 Aug 2022 07:00  --------------------------------------------------------  IN: 930 mL / OUT: 2252 mL / NET: -1322 mL    31 Aug 2022 07:01  -  31 Aug 2022 16:55  --------------------------------------------------------  IN: 0 mL / OUT: 660 mL / NET: -660 mL        PHYSICAL EXAM:  General: NAD, pt is comfortably sitting up in bed, on room air  HEENT: CN II-XII grossly intact, PERRL 3mm briskly reactive, EOMI b/l, +right nasolabial fold flattening, tongue midline, neck FROM  Cardiovascular: RRR, normal S1 and S2   Respiratory: lungs CTAB, no wheezing, rhonchi, or crackles   GI: normoactive BS to auscultation, abd soft, NTND   Neuro: A&Ox3, No aphasia, speech clear, no dysmetria, no pronator drift. Follows commands.  PIMENTEL x4 spontaneously, 5/5 strength in all extremities throughout except right HF 4+/5 (pain limited) SILT throughout   Extremities: distal pulses 2+ x4  Wound/incision: +LLQ abdominal incision with dermabond and monocryl in place  Drain: +RENATA drain to gravity with pink-tinged fluid.       LABS:                        8.4    6.60  )-----------( 280      ( 31 Aug 2022 05:40 )             25.8     08-31    132<L>  |  97  |  10  ----------------------------<  96  4.1   |  26  |  1.09    Ca    8.4      31 Aug 2022 05:40  Phos  4.0     08-31  Mg     2.0     08-31    TPro  6.6  /  Alb  2.8<L>  /  TBili  0.4  /  DBili  x   /  AST  31  /  ALT  34  /  AlkPhos  287<H>  08-31    PT/INR - ( 31 Aug 2022 05:40 )   PT: 13.1 sec;   INR: 1.10          PTT - ( 31 Aug 2022 05:40 )  PTT:31.4 sec        CAPILLARY BLOOD GLUCOSE      POCT Blood Glucose.: 116 mg/dL (31 Aug 2022 16:43)  POCT Blood Glucose.: 97 mg/dL (31 Aug 2022 11:04)  POCT Blood Glucose.: 108 mg/dL (31 Aug 2022 05:22)  POCT Blood Glucose.: 127 mg/dL (30 Aug 2022 21:54)      Drug Levels: [] N/A    CSF Analysis: [] N/A      Allergies    Altace (Unknown)  penicillin (Unknown)    Intolerances      MEDICATIONS:  Antibiotics:    Neuro:  acetaminophen     Tablet .. 650 milliGRAM(s) Oral every 6 hours  HYDROmorphone  Injectable 0.5 milliGRAM(s) IV Push every 4 hours PRN  melatonin 3 milliGRAM(s) Oral at bedtime  nortriptyline 75 milliGRAM(s) Oral at bedtime  ondansetron Injectable 4 milliGRAM(s) IV Push every 6 hours PRN  oxyCODONE    IR 15 milliGRAM(s) Oral every 4 hours PRN  oxyCODONE  ER Tablet 20 milliGRAM(s) Oral every 8 hours  vortioxetine 20 milliGRAM(s) Oral every 24 hours    Anticoagulation:  heparin  Infusion 1000 Unit(s)/Hr IV Continuous <Continuous>    OTHER:  artificial tears (preservative free) Ophthalmic Solution 1 Drop(s) Both EYES two times a day  dextrose 50% Injectable 25 Gram(s) IV Push once  dextrose 50% Injectable 12.5 Gram(s) IV Push once  dextrose 50% Injectable 25 Gram(s) IV Push once  dextrose Oral Gel 15 Gram(s) Oral once PRN  doxazosin 4 milliGRAM(s) Oral at bedtime  finasteride 5 milliGRAM(s) Oral daily  glucagon  Injectable 1 milliGRAM(s) IntraMuscular once  hydrALAZINE 50 milliGRAM(s) Oral every 12 hours  hydrALAZINE Injectable 10 milliGRAM(s) IV Push every 6 hours PRN  insulin lispro (ADMELOG) corrective regimen sliding scale   SubCutaneous Before meals and at bedtime  lidocaine   4% Patch 1 Patch Transdermal every 24 hours  lidocaine   4% Patch 1 Patch Transdermal every 24 hours  metoprolol succinate ER 50 milliGRAM(s) Oral every 12 hours  pantoprazole  Injectable 40 milliGRAM(s) IV Push daily  polyethylene glycol 3350 17 Gram(s) Oral two times a day  sacubitril 24 mG/valsartan 26 mG 1 Tablet(s) Oral every 12 hours  simvastatin 5 milliGRAM(s) Oral at bedtime    IVF:  dextrose 5%. 1000 milliLiter(s) IV Continuous <Continuous>  dextrose 5%. 1000 milliLiter(s) IV Continuous <Continuous>    CULTURES:    RADIOLOGY & ADDITIONAL TESTS:  < from: CT Head No Cont (08.27.22 @ 17:02) >    FINDINGS:  INTRA-AXIAL: No intracranial mass effect, acute hemorrhage, midline shift   or acute transcortical infarct is seen. Patchy periventricular and   subcortical white matter hypodensities, nonspecific but may be seen with   chronic microvascular disease. Old lacunar infarcts left midbrain, left   lentiform nucleus and left subinsular region, unchanged.  EXTRA-AXIAL: No extra-axial fluid collection is present.  VENTRICLES AND SULCI: Parenchymal volume is commensurate with patient   age. No hydrocephalus.  VISUALIZED SINUSES: No air-fluid levels are identified.  VISUALIZED MASTOIDS: Clear.  CALVARIUM: No fracture.  MISCELLANEOUS: Replacement of the native lenses bilaterally. Status post   anterior and posterior cervical fusion seen on  imaging.    IMPRESSION: No acute intracranial hemorrhage, mass effect, or CT evidence   of recent transcortical infarction.    < end of copied text >        ASSESSMENT:   71 y/o M with colon mass s/p partial colectomy (2017), appendectomy, hernia repair, aortic and iliac aneurysm surgery (2017) s/p stent placement, HTN, HLD, paroxysmal atrial fibrillation on Eliquis, blood clotting disorder s/p IVC filter, right knee ligament repair, pacemaker implant (2004) s/p recent ppm interrogation, herniated disc and related surgery in [id number] complicated by spinal fusion, local hematoma, foot drop with chronic pain s/p failed intrathecal morphine pump, recently admitted to [facility] on 08/07 with acute cholecystitis c/b cardiomyopathy s/p percutaneous cholecystostomy tube by IR. pt readmitted 8/20 for concerns of bleeding at the perc yonis tube site. s/p ERCP 8/26 with removal of several small and medium sized stones, transferred to SICU 8/27 for transient altered mental status, suspicious for narcotic intoxication (resolved) with negative work up. Now s/p RA cholecystectomy and ventral removal of intrathecal morphine pump 8/30/22    PLAN  - Care per primary team  - Pain control per pain management  - Delirium precautions  - CTH 8/31 obtained for acute headache revealing no pathologic findings, imaging reviewed with Dr. D'Amico.   - Please keep RENATA to gravity (no suction)  - Monitor for positional headaches     Assessment and plan discussed with Dr. D'Amico     Please call neurosurgery consult phone at [phone] with any concerns or new acute neurological changes.

## 2022-08-31 NOTE — PROGRESS NOTE ADULT - SUBJECTIVE AND OBJECTIVE BOX
Pain Management Progress Note - [facility] [phone]    HPI: Patient seen and examined today. Patient reports endorsing some pain to the abdomen this AM. Patient reports pain medications help to relieve the pain, including Dilaudid IVP. Patient denies side effects from current pain regimen.     Pertinent PMH: Pain at: ___Back ___Neck___Knee ___Hip ___Shoulder _X__ Opioid tolerance    Pain is __X_ sharp ____dull ___burning ___achy ___ Intensity: ____ mild __X__mod ___X_severe     Location __X___surgical site _____cervical _____lumbar __X__abd _____upper ext____lower ext    Worse with _X___activity _X___movement _____physical therapy___ Rest    Improved with ___X_medication ___X_rest ____physical therapy    PAST MEDICAL & SURGICAL HISTORY:  AAA (abdominal aortic aneurysm)  HTN (hypertension)  Cardiomyopathy  Hyperlipidemia  ONOFRE (dyspnea on exertion)  DVT (deep venous thrombosis)  Pulmonary emphysema  COPD  Cardiac arrhythmia  Aneurysm of aortic root  Depression  anxiety  Anemia  Pelvic mass  Pacemaker  medtronic  History of kidney surgery  History of back surgery  multiple, lumbar  Surgery, elective  multiple neck surgery, cervical  S/P hip replacement, left  S/P arthroscopy of right knee  S/P AAA (abdominal aortic aneurysm) repair  with right iliac aneurysm endovascular repair  S/P carpal tunnel release  Surgery, elective  Cardiac Stent x4  Surgery, elective  Intrathecal pump placement    MEDICATIONS:  heparin  Infusion  nortriptyline  sacubitril 24 mG/valsartan 26 mG  cefoTEtan  IVPB  lidocaine   4% Patch  lidocaine   4% Patch  lactated ringers.  HYDROmorphone  Injectable  magnesium sulfate  IVPB  glucagon  Injectable  dextrose 5%.  dextrose 50% Injectable  dextrose 50% Injectable  dextrose 50% Injectable  dextrose 5%.  dextrose Oral Gel  insulin lispro (ADMELOG) corrective regimen sliding scale  cefoTEtan  IVPB  HYDROmorphone  Injectable  melatonin  artificial tears (preservative free) Ophthalmic Solution  hydrALAZINE  magnesium sulfate  IVPB  dextrose 5% + sodium chloride 0.9%.  magnesium sulfate  IVPB  potassium phosphate IVPB  heparin  Infusion  oxyCODONE    IR  hydrALAZINE Injectable  oxyCODONE  ER Tablet  heparin  Infusion.  potassium chloride    Tablet ER  magnesium sulfate  IVPB  hydrALAZINE Injectable  lactated ringers.  oxyCODONE  ER Tablet  oxyCODONE    IR  oxyCODONE    IR  diazepam    Tablet  ALPRAZolam  magnesium sulfate  IVPB  dextrose 5% + sodium chloride 0.9%.  sodium chloride  sodium chloride 0.9%.  heparin  Infusion  heparin  Infusion  heparin  Infusion  lactated ringers.  hydrALAZINE  polyethylene glycol 3350  magnesium sulfate  IVPB  hydrALAZINE  metoprolol tartrate  sodium phosphate IVPB  lactated ringers.  hydrALAZINE  hydrALAZINE  hydrALAZINE  hydrALAZINE  heparin  Infusion  heparin  Infusion.  potassium phosphate / sodium phosphate Powder (PHOS-NaK)  magnesium sulfate  IVPB  heparin  Infusion  heparin  Infusion  ondansetron Injectable  diazepam    Tablet  metoprolol succinate ER  simvastatin  finasteride  nortriptyline  oxyCODONE  ER Tablet  oxyCODONE    IR  oxyCODONE    IR  pantoprazole  Injectable  hydrALAZINE  hydrALAZINE  doxazosin  ALPRAZolam  lactated ringers.  morphine  - Injectable    ROS: Const:  N___febrile   Eyes:___ENT:___CV: __N_chest pain  Resp: __N__sob  GI:___Nnausea __N_vomiting __Y__abd pain ___npo ___clears ___full diet __bm  :___ Musk: _Y__pain ___spasm  Skin:___ Neuro:  __N_sedation___confusion___N_ numbness ___weakness _N__paresthesia  Psych:___anxiety  Endo:___ Heme:___Allergy:__ALTACE, PCN_      08-31 @ 05:401.09 mg/dL  08-30 @ 19:301.20 mg/dL  Hemoglobin: 8.4 g/dL (08-31 @ 05:40)  Hemoglobin: 8.2 g/dL (08-30 @ 19:30)  Hemoglobin: 7.8 g/dL (08-30 @ 04:25)  T(C): 36.9 (08-31-22 @ 12:00), Max: 36.9 (08-31-22 @ 12:00)  HR: 62 (08-31-22 @ 13:00) (60 - 81)  BP: 168/81 (08-31-22 @ 13:00) (111/61 - 194/94)  RR: 15 (08-31-22 @ 13:00) (13 - 26)  SpO2: 94% (08-31-22 @ 13:00) (92% - 99%)  Wt(kg): --     PHYSICAL EXAM:  Gen Appearance: _X__no acute distress __X_appropriate       Neuro: ___SILT feet____ EOM Intact Psych: AAOX_3_, X___mood/affect appropriate        Eyes: X___conjunctiva WNL  __X___ Pupils equal and round        ENT: _X__ears and nose atraumatic_X__ Hearing grossly intact        Neck: __X_trachea midline, no visible masses ___thyroid without palpable mass    Resp: __X_Nml WOB____No tactile fremitus ___clear to auscultation    Cardio: _X__extremities free from edema ____pedal pulses palpable    GI/Abdomen: ___soft _____ Nontender___X___Nondistended_____HSM    Lymphatic: ___no palpable nodes in neck  ___no palpable nodes calves and feet    Skin/Wound: ___Incision, ___Dressing c/d/i,   ____surrounding tissues soft,  ___drain/chest tube present____    Muscular: EHL ___/5  Gastrocnemius___/5    __X_absent clubbing/cyanosis         ASSESSMENT:  This is a 72y old Male with a history of colon mass s/p partial colectomy, aortic and iliac aneurysm surgery (2017), HTN, HLD, paroxysmal afib on eliquis, pacemaker implant (2004), multiple orthopedic surgeries including spine and right hip, intrathecal morphine pump placement (2004) reported to be inactive and not in use POD 1 S/P  laparoscopic cholecystectomy and removal of intra-thecal morphine pump.    Recommended Treatment PLAN:       Pain Management Progress Note - [facility] [phone]    HPI: Patient seen and examined today. Patient reports endorsing some pain to the abdomen this AM. Patient reports pain medications help to relieve the pain, including Dilaudid IVP. Patient denies side effects from current pain regimen.     Pertinent PMH: Pain at: ___Back ___Neck___Knee ___Hip ___Shoulder _X__ Opioid tolerance    Pain is __X_ sharp ____dull ___burning ___achy ___ Intensity: ____ mild __X__mod ___X_severe     Location __X___surgical site _____cervical _____lumbar __X__abd _____upper ext____lower ext    Worse with _X___activity _X___movement _____physical therapy___ Rest    Improved with ___X_medication ___X_rest ____physical therapy    PAST MEDICAL & SURGICAL HISTORY:  AAA (abdominal aortic aneurysm)  HTN (hypertension)  Cardiomyopathy  Hyperlipidemia  ONOFRE (dyspnea on exertion)  DVT (deep venous thrombosis)  Pulmonary emphysema  COPD  Cardiac arrhythmia  Aneurysm of aortic root  Depression  anxiety  Anemia  Pelvic mass  Pacemaker  medtronic  History of kidney surgery  History of back surgery  multiple, lumbar  Surgery, elective  multiple neck surgery, cervical  S/P hip replacement, left  S/P arthroscopy of right knee  S/P AAA (abdominal aortic aneurysm) repair  with right iliac aneurysm endovascular repair  S/P carpal tunnel release  Surgery, elective  Cardiac Stent x4  Surgery, elective  Intrathecal pump placement    MEDICATIONS:  heparin  Infusion  nortriptyline  sacubitril 24 mG/valsartan 26 mG  cefoTEtan  IVPB  lidocaine   4% Patch  lidocaine   4% Patch  lactated ringers.  HYDROmorphone  Injectable  magnesium sulfate  IVPB  glucagon  Injectable  dextrose 5%.  dextrose 50% Injectable  dextrose 50% Injectable  dextrose 50% Injectable  dextrose 5%.  dextrose Oral Gel  insulin lispro (ADMELOG) corrective regimen sliding scale  cefoTEtan  IVPB  HYDROmorphone  Injectable  melatonin  artificial tears (preservative free) Ophthalmic Solution  hydrALAZINE  magnesium sulfate  IVPB  dextrose 5% + sodium chloride 0.9%.  magnesium sulfate  IVPB  potassium phosphate IVPB  heparin  Infusion  oxyCODONE    IR  hydrALAZINE Injectable  oxyCODONE  ER Tablet  heparin  Infusion.  potassium chloride    Tablet ER  magnesium sulfate  IVPB  hydrALAZINE Injectable  lactated ringers.  oxyCODONE  ER Tablet  oxyCODONE    IR  oxyCODONE    IR  diazepam    Tablet  ALPRAZolam  magnesium sulfate  IVPB  dextrose 5% + sodium chloride 0.9%.  sodium chloride  sodium chloride 0.9%.  heparin  Infusion  heparin  Infusion  heparin  Infusion  lactated ringers.  hydrALAZINE  polyethylene glycol 3350  magnesium sulfate  IVPB  hydrALAZINE  metoprolol tartrate  sodium phosphate IVPB  lactated ringers.  hydrALAZINE  hydrALAZINE  hydrALAZINE  hydrALAZINE  heparin  Infusion  heparin  Infusion.  potassium phosphate / sodium phosphate Powder (PHOS-NaK)  magnesium sulfate  IVPB  heparin  Infusion  heparin  Infusion  ondansetron Injectable  diazepam    Tablet  metoprolol succinate ER  simvastatin  finasteride  nortriptyline  oxyCODONE  ER Tablet  oxyCODONE    IR  oxyCODONE    IR  pantoprazole  Injectable  hydrALAZINE  hydrALAZINE  doxazosin  ALPRAZolam  lactated ringers.  morphine  - Injectable    ROS: Const:  N___febrile   Eyes:___ENT:___CV: __N_chest pain  Resp: __N__sob  GI:___Nnausea __N_vomiting __Y__abd pain ___npo ___clears ___full diet __bm  :___ Musk: _Y__pain ___spasm  Skin:___ Neuro:  __N_sedation___confusion___N_ numbness ___weakness _N__paresthesia  Psych:___anxiety  Endo:___ Heme:___Allergy:__ALTACE, PCN_      08-31 @ 05:401.09 mg/dL  08-30 @ 19:301.20 mg/dL  Hemoglobin: 8.4 g/dL (08-31 @ 05:40)  Hemoglobin: 8.2 g/dL (08-30 @ 19:30)  Hemoglobin: 7.8 g/dL (08-30 @ 04:25)  T(C): 36.9 (08-31-22 @ 12:00), Max: 36.9 (08-31-22 @ 12:00)  HR: 62 (08-31-22 @ 13:00) (60 - 81)  BP: 168/81 (08-31-22 @ 13:00) (111/61 - 194/94)  RR: 15 (08-31-22 @ 13:00) (13 - 26)  SpO2: 94% (08-31-22 @ 13:00) (92% - 99%)  Wt(kg): --     PHYSICAL EXAM:  Gen Appearance: _X__no acute distress __X_appropriate       Neuro: ___SILT feet____ EOM Intact Psych: AAOX_3_, X___mood/affect appropriate        Eyes: X___conjunctiva WNL  __X___ Pupils equal and round        ENT: _X__ears and nose atraumatic_X__ Hearing grossly intact        Neck: __X_trachea midline, no visible masses ___thyroid without palpable mass    Resp: __X_Nml WOB____No tactile fremitus ___clear to auscultation    Cardio: _X__extremities free from edema ____pedal pulses palpable    GI/Abdomen: ___soft _____ Nontender___X___Nondistended_____HSM    Lymphatic: ___no palpable nodes in neck  ___no palpable nodes calves and feet    Skin/Wound: ___Incision, ___Dressing c/d/i,   ____surrounding tissues soft,  ___drain/chest tube present____    Muscular: EHL ___/5  Gastrocnemius___/5    __X_absent clubbing/cyanosis         ASSESSMENT:  This is a 72y old Male with a history of colon mass s/p partial colectomy, aortic and iliac aneurysm surgery (2017), HTN, HLD, paroxysmal afib on eliquis, pacemaker implant (2004), multiple orthopedic surgeries including spine and right hip, intrathecal morphine pump placement (2004) reported to be inactive and not in use POD 1 S/P  laparoscopic cholecystectomy and removal of intra-thecal morphine pump.    Recommended Treatment PLAN:  1. Consider continuing home-dose pain regimen  2. Consider continuing Dilaudid 0.5 mg IVP q4h PRN breakthrough pain  3. Consider continuing daily lidocaine patches to abdomen  4. Consider starting Tylenol 650 mg PO q6h PRN mild pain  HOLD ALL OPIOIDS FOR SEDATION, RR<10, O2SAT <93%, SBP <96  Plan discussed with Dr. Richardson     Pain Management Progress Note - [facility] [phone]    HPI: Patient seen and examined today. Patient reports endorsing some pain to the abdomen this AM. Patient reports pain medications help to relieve the pain, including Dilaudid IVP. Patient denies side effects from current pain regimen.     Pertinent PMH: Pain at: ___Back ___Neck___Knee ___Hip ___Shoulder _X__ Opioid tolerance    Pain is __X_ sharp ____dull ___burning ___achy ___ Intensity: ____ mild __X__mod ___X_severe     Location __X___surgical site _____cervical _____lumbar __X__abd _____upper ext____lower ext    Worse with _X___activity _X___movement _____physical therapy___ Rest    Improved with ___X_medication ___X_rest ____physical therapy    PAST MEDICAL & SURGICAL HISTORY:  AAA (abdominal aortic aneurysm)  HTN (hypertension)  Cardiomyopathy  Hyperlipidemia  ONOFRE (dyspnea on exertion)  DVT (deep venous thrombosis)  Pulmonary emphysema  COPD  Cardiac arrhythmia  Aneurysm of aortic root  Depression  anxiety  Anemia  Pelvic mass  Pacemaker  medtronic  History of kidney surgery  History of back surgery  multiple, lumbar  Surgery, elective  multiple neck surgery, cervical  S/P hip replacement, left  S/P arthroscopy of right knee  S/P AAA (abdominal aortic aneurysm) repair  with right iliac aneurysm endovascular repair  S/P carpal tunnel release  Surgery, elective  Cardiac Stent x4  Surgery, elective  Intrathecal pump placement    MEDICATIONS:  heparin  Infusion  nortriptyline  sacubitril 24 mG/valsartan 26 mG  cefoTEtan  IVPB  lidocaine   4% Patch  lactated ringers.  HYDROmorphone  Injectable  magnesium sulfate  IVPB  glucagon  Injectable  dextrose 5%.  dextrose 5%.  dextrose Oral Gel  insulin lispro (ADMELOG) corrective regimen sliding scale  cefoTEtan  IVPB  HYDROmorphone  Injectable  melatonin  artificial tears (preservative free) Ophthalmic Solution  hydrALAZINE  magnesium sulfate  IVPB  dextrose 5% + sodium chloride 0.9%.  magnesium sulfate  IVPB  potassium phosphate IVPB  heparin  Infusion  oxyCODONE    IR  hydrALAZINE Injectable  oxyCODONE  ER Tablet  heparin  Infusion.  potassium chloride    Tablet ER  magnesium sulfate  IVPB  hydrALAZINE Injectable  lactated ringers.  oxyCODONE  ER Tablet  oxyCODONE    IR  diazepam    Tablet  ALPRAZolam  magnesium sulfate  IVPB  dextrose 5% + sodium chloride 0.9%.  sodium chloride  sodium chloride 0.9%.  heparin  Infusion  lactated ringers.  hydrALAZINE  polyethylene glycol 3350  magnesium sulfate  IVPB  hydrALAZINE  metoprolol tartrate  sodium phosphate IVPB  lactated ringers.  hydrALAZINE  heparin  Infusion.  potassium phosphate / sodium phosphate Powder (PHOS-NaK)  magnesium sulfate  IVPB  heparin  Infusion  heparin  Infusion  ondansetron Injectable  diazepam    Tablet  metoprolol succinate ER  simvastatin  finasteride  nortriptyline  oxyCODONE  ER Tablet  hydrALAZINE  hydrALAZINE  doxazosin  ALPRAZolam  lactated ringers.  morphine  - Injectable    ROS: Const:  N___febrile   Eyes:___ENT:___CV: __N_chest pain  Resp: __N__sob  GI:___Nnausea __N_vomiting __Y__abd pain ___npo ___clears ___full diet __bm  :___ Musk: _Y__pain ___spasm  Skin:___ Neuro:  __N_sedation___confusion___N_ numbness ___weakness _N__paresthesia  Psych:_N__anxiety  Endo:__N_ Heme:__N_Allergy:__ALTACE, PCN_      08-31 @ 05:401.09 mg/dL  08-30 @ 19:301.20 mg/dL  Hemoglobin: 8.4 g/dL (08-31 @ 05:40)  Hemoglobin: 8.2 g/dL (08-30 @ 19:30)  Hemoglobin: 7.8 g/dL (08-30 @ 04:25)  T(C): 36.9 (08-31-22 @ 12:00), Max: 36.9 (08-31-22 @ 12:00)  HR: 62 (08-31-22 @ 13:00) (60 - 81)  BP: 168/81 (08-31-22 @ 13:00) (111/61 - 194/94)  RR: 15 (08-31-22 @ 13:00) (13 - 26)  SpO2: 94% (08-31-22 @ 13:00) (92% - 99%)  Wt(kg): --     PHYSICAL EXAM:  Gen Appearance: _X__no acute distress __X_appropriate       Neuro: ___SILT feet____ EOM Intact Psych: AAOX_3_, X___mood/affect appropriate        Eyes: X___conjunctiva WNL  __X___ Pupils equal and round        ENT: _X__ears and nose atraumatic_X__ Hearing grossly intact        Neck: __X_trachea midline, no visible masses ___thyroid without palpable mass    Resp: __X_Nml WOB____No tactile fremitus ___clear to auscultation    Cardio: _X__extremities free from edema ____pedal pulses palpable    GI/Abdomen: ___soft _____ Nontender___X___Nondistended_____HSM    Lymphatic: ___no palpable nodes in neck  ___no palpable nodes calves and feet    Skin/Wound: ___Incision, ___Dressing c/d/i,   ____surrounding tissues soft,  ___drain/chest tube present____    Muscular: EHL ___/5  Gastrocnemius___/5    __X_absent clubbing/cyanosis         ASSESSMENT:  This is a 72y old Male with a history of colon mass s/p partial colectomy, aortic and iliac aneurysm surgery (2017), HTN, HLD, paroxysmal afib on eliquis, pacemaker implant (2004), multiple orthopedic surgeries including spine and right hip, intrathecal morphine pump placement (2004) reported to be inactive and not in use POD 1 S/P  laparoscopic cholecystectomy and removal of intra-thecal morphine pump, with reports of moderate back pain.    Recommended Treatment PLAN:  1. Consider continuing home-dose pain regimen. Oxycontin 20 mg PO TID, Oxycodone 15 mg PO q8h PRN severe pain  2. Consider continuing Dilaudid 0.5 mg IVP q4h PRN breakthrough pain  3. Consider continuing daily lidocaine patches to abdomen  4. Consider starting Tylenol 650 mg PO q6h PRN mild pain  HOLD ALL OPIOIDS FOR SEDATION, RR<10, O2SAT <93%, SBP <96  Plan discussed with Dr. Richardson

## 2022-08-31 NOTE — CHART NOTE - NSCHARTNOTEFT_GEN_A_CORE
LLQ abdominal incision with monocryl and dermabond in place, no signs of erythema or fluctuance, abdomen non-tender to palpation. J[P drain with light pink tinged fluid with concern for possible serous fluid mixed with CSF. Spoke with SICU resident and discussed taking RENATA off self-suction and leaving to gravity per Dr. D'Amico's request.     Monitor for positional headaches and contact NSGY at [phone] for any questions or concerns. LLQ abdominal incision with monocryl and dermabond in place, no signs of erythema or fluctuance, abdomen non-tender to palpation. RENATA drain with light pink tinged fluid with concern for possible serous fluid mixed with CSF. Spoke with SICU resident and discussed taking RENATA off self-suction and leaving to gravity per Dr. D'Amico's request.     Monitor for positional headaches and contact NSGY at [phone] for any questions or concerns.

## 2022-09-01 LAB
ALBUMIN SERPL ELPH-MCNC: 2.3 G/DL — LOW (ref 3.3–5)
ALP SERPL-CCNC: 224 U/L — HIGH (ref 40–120)
ALT FLD-CCNC: 24 U/L — SIGNIFICANT CHANGE UP (ref 10–45)
ANION GAP SERPL CALC-SCNC: 9 MMOL/L — SIGNIFICANT CHANGE UP (ref 5–17)
APTT BLD: 50.4 SEC — HIGH (ref 27.5–35.5)
APTT BLD: 62.8 SEC — HIGH (ref 27.5–35.5)
APTT BLD: 72 SEC — HIGH (ref 27.5–35.5)
APTT BLD: 75.7 SEC — HIGH (ref 27.5–35.5)
AST SERPL-CCNC: 21 U/L — SIGNIFICANT CHANGE UP (ref 10–40)
BILIRUB SERPL-MCNC: 0.4 MG/DL — SIGNIFICANT CHANGE UP (ref 0.2–1.2)
BLD GP AB SCN SERPL QL: NEGATIVE — SIGNIFICANT CHANGE UP
BUN SERPL-MCNC: 9 MG/DL — SIGNIFICANT CHANGE UP (ref 7–23)
CALCIUM SERPL-MCNC: 8.2 MG/DL — LOW (ref 8.4–10.5)
CHLORIDE SERPL-SCNC: 98 MMOL/L — SIGNIFICANT CHANGE UP (ref 96–108)
CO2 SERPL-SCNC: 26 MMOL/L — SIGNIFICANT CHANGE UP (ref 22–31)
CREAT SERPL-MCNC: 1.05 MG/DL — SIGNIFICANT CHANGE UP (ref 0.5–1.3)
EGFR: 75 ML/MIN/1.73M2 — SIGNIFICANT CHANGE UP
GLUCOSE BLDC GLUCOMTR-MCNC: 113 MG/DL — HIGH (ref 70–99)
GLUCOSE BLDC GLUCOMTR-MCNC: 140 MG/DL — HIGH (ref 70–99)
GLUCOSE BLDC GLUCOMTR-MCNC: 150 MG/DL — HIGH (ref 70–99)
GLUCOSE SERPL-MCNC: 108 MG/DL — HIGH (ref 70–99)
HCT VFR BLD CALC: 23.9 % — LOW (ref 39–50)
HGB BLD-MCNC: 7.8 G/DL — LOW (ref 13–17)
MAGNESIUM SERPL-MCNC: 1.8 MG/DL — SIGNIFICANT CHANGE UP (ref 1.6–2.6)
MCHC RBC-ENTMCNC: 27.9 PG — SIGNIFICANT CHANGE UP (ref 27–34)
MCHC RBC-ENTMCNC: 32.6 GM/DL — SIGNIFICANT CHANGE UP (ref 32–36)
MCV RBC AUTO: 85.4 FL — SIGNIFICANT CHANGE UP (ref 80–100)
NRBC # BLD: 0 /100 WBCS — SIGNIFICANT CHANGE UP (ref 0–0)
PHOSPHATE SERPL-MCNC: 2.9 MG/DL — SIGNIFICANT CHANGE UP (ref 2.5–4.5)
PLATELET # BLD AUTO: 228 K/UL — SIGNIFICANT CHANGE UP (ref 150–400)
POTASSIUM SERPL-MCNC: 3.5 MMOL/L — SIGNIFICANT CHANGE UP (ref 3.5–5.3)
POTASSIUM SERPL-SCNC: 3.5 MMOL/L — SIGNIFICANT CHANGE UP (ref 3.5–5.3)
PROT SERPL-MCNC: 6.4 G/DL — SIGNIFICANT CHANGE UP (ref 6–8.3)
RBC # BLD: 2.8 M/UL — LOW (ref 4.2–5.8)
RBC # FLD: 13.3 % — SIGNIFICANT CHANGE UP (ref 10.3–14.5)
RH IG SCN BLD-IMP: POSITIVE — SIGNIFICANT CHANGE UP
SODIUM SERPL-SCNC: 133 MMOL/L — LOW (ref 135–145)
WBC # BLD: 5.45 K/UL — SIGNIFICANT CHANGE UP (ref 3.8–10.5)
WBC # FLD AUTO: 5.45 K/UL — SIGNIFICANT CHANGE UP (ref 3.8–10.5)

## 2022-09-01 PROCEDURE — 99233 SBSQ HOSP IP/OBS HIGH 50: CPT | Mod: GC

## 2022-09-01 RX ORDER — POTASSIUM CHLORIDE 20 MEQ
40 PACKET (EA) ORAL
Refills: 0 | Status: COMPLETED | OUTPATIENT
Start: 2022-09-01 | End: 2022-09-01

## 2022-09-01 RX ORDER — FUROSEMIDE 40 MG
20 TABLET ORAL ONCE
Refills: 0 | Status: COMPLETED | OUTPATIENT
Start: 2022-09-01 | End: 2022-09-01

## 2022-09-01 RX ORDER — MAGNESIUM SULFATE 500 MG/ML
1 VIAL (ML) INJECTION ONCE
Refills: 0 | Status: COMPLETED | OUTPATIENT
Start: 2022-09-01 | End: 2022-09-01

## 2022-09-01 RX ORDER — POLYETHYLENE GLYCOL 3350 17 G/17G
17 POWDER, FOR SOLUTION ORAL EVERY 24 HOURS
Refills: 0 | Status: DISCONTINUED | OUTPATIENT
Start: 2022-09-01 | End: 2022-09-01

## 2022-09-01 RX ORDER — FOLIC ACID 0.8 MG
1 TABLET ORAL DAILY
Refills: 0 | Status: DISCONTINUED | OUTPATIENT
Start: 2022-09-01 | End: 2022-09-06

## 2022-09-01 RX ORDER — SENNA PLUS 8.6 MG/1
2 TABLET ORAL AT BEDTIME
Refills: 0 | Status: DISCONTINUED | OUTPATIENT
Start: 2022-09-01 | End: 2022-09-05

## 2022-09-01 RX ORDER — PANTOPRAZOLE SODIUM 20 MG/1
40 TABLET, DELAYED RELEASE ORAL
Refills: 0 | Status: DISCONTINUED | OUTPATIENT
Start: 2022-09-01 | End: 2022-09-06

## 2022-09-01 RX ADMIN — Medication 650 MILLIGRAM(S): at 07:00

## 2022-09-01 RX ADMIN — Medication 20 MILLIGRAM(S): at 09:59

## 2022-09-01 RX ADMIN — LIDOCAINE 1 PATCH: 4 CREAM TOPICAL at 10:00

## 2022-09-01 RX ADMIN — FINASTERIDE 5 MILLIGRAM(S): 5 TABLET, FILM COATED ORAL at 11:59

## 2022-09-01 RX ADMIN — SACUBITRIL AND VALSARTAN 1 TABLET(S): 24; 26 TABLET, FILM COATED ORAL at 18:26

## 2022-09-01 RX ADMIN — Medication 40 MILLIEQUIVALENT(S): at 14:59

## 2022-09-01 RX ADMIN — Medication 650 MILLIGRAM(S): at 06:15

## 2022-09-01 RX ADMIN — Medication 3 MILLIGRAM(S): at 21:14

## 2022-09-01 RX ADMIN — VORTIOXETINE 20 MILLIGRAM(S): 5 TABLET, FILM COATED ORAL at 19:17

## 2022-09-01 RX ADMIN — Medication 1 DROP(S): at 06:17

## 2022-09-01 RX ADMIN — Medication 650 MILLIGRAM(S): at 11:58

## 2022-09-01 RX ADMIN — POLYETHYLENE GLYCOL 3350 17 GRAM(S): 17 POWDER, FOR SOLUTION ORAL at 14:18

## 2022-09-01 RX ADMIN — Medication 1 DROP(S): at 18:26

## 2022-09-01 RX ADMIN — Medication 50 MILLIGRAM(S): at 06:15

## 2022-09-01 RX ADMIN — OXYCODONE HYDROCHLORIDE 15 MILLIGRAM(S): 5 TABLET ORAL at 12:29

## 2022-09-01 RX ADMIN — LIDOCAINE 1 PATCH: 4 CREAM TOPICAL at 21:19

## 2022-09-01 RX ADMIN — HEPARIN SODIUM 13 UNIT(S)/HR: 5000 INJECTION INTRAVENOUS; SUBCUTANEOUS at 00:52

## 2022-09-01 RX ADMIN — SIMVASTATIN 5 MILLIGRAM(S): 20 TABLET, FILM COATED ORAL at 21:15

## 2022-09-01 RX ADMIN — SENNA PLUS 2 TABLET(S): 8.6 TABLET ORAL at 21:15

## 2022-09-01 RX ADMIN — Medication 40 MILLIEQUIVALENT(S): at 11:32

## 2022-09-01 RX ADMIN — OXYCODONE HYDROCHLORIDE 20 MILLIGRAM(S): 5 TABLET ORAL at 22:00

## 2022-09-01 RX ADMIN — OXYCODONE HYDROCHLORIDE 20 MILLIGRAM(S): 5 TABLET ORAL at 06:15

## 2022-09-01 RX ADMIN — Medication 100 GRAM(S): at 10:00

## 2022-09-01 RX ADMIN — Medication 4 MILLIGRAM(S): at 21:14

## 2022-09-01 RX ADMIN — Medication 650 MILLIGRAM(S): at 12:29

## 2022-09-01 RX ADMIN — Medication 650 MILLIGRAM(S): at 18:26

## 2022-09-01 RX ADMIN — Medication 650 MILLIGRAM(S): at 00:10

## 2022-09-01 RX ADMIN — LIDOCAINE 1 PATCH: 4 CREAM TOPICAL at 19:15

## 2022-09-01 RX ADMIN — OXYCODONE HYDROCHLORIDE 20 MILLIGRAM(S): 5 TABLET ORAL at 07:00

## 2022-09-01 RX ADMIN — Medication 50 MILLIGRAM(S): at 20:37

## 2022-09-01 RX ADMIN — Medication 1 MILLIGRAM(S): at 11:59

## 2022-09-01 RX ADMIN — Medication 650 MILLIGRAM(S): at 01:00

## 2022-09-01 RX ADMIN — NORTRIPTYLINE HYDROCHLORIDE 75 MILLIGRAM(S): 10 CAPSULE ORAL at 21:14

## 2022-09-01 RX ADMIN — OXYCODONE HYDROCHLORIDE 20 MILLIGRAM(S): 5 TABLET ORAL at 14:18

## 2022-09-01 RX ADMIN — OXYCODONE HYDROCHLORIDE 15 MILLIGRAM(S): 5 TABLET ORAL at 11:59

## 2022-09-01 RX ADMIN — Medication 50 MILLIGRAM(S): at 21:14

## 2022-09-01 RX ADMIN — SACUBITRIL AND VALSARTAN 1 TABLET(S): 24; 26 TABLET, FILM COATED ORAL at 06:15

## 2022-09-01 RX ADMIN — OXYCODONE HYDROCHLORIDE 20 MILLIGRAM(S): 5 TABLET ORAL at 21:15

## 2022-09-01 RX ADMIN — Medication 50 MILLIGRAM(S): at 09:59

## 2022-09-01 RX ADMIN — OXYCODONE HYDROCHLORIDE 20 MILLIGRAM(S): 5 TABLET ORAL at 14:48

## 2022-09-01 NOTE — OCCUPATIONAL THERAPY INITIAL EVALUATION ADULT - GENERAL OBSERVATIONS, REHAB EVAL
Pt cleared for OT by MERON Pineda. Pt received semi-constantino, NAD, +bl scd, +A-line, +IV, +tele, +JPx1, +IJ.

## 2022-09-01 NOTE — PROGRESS NOTE ADULT - ASSESSMENT
Assessment: 71 y/o M with colon mass s/p partial colectomy (2017), appendectomy, hernia repair, aortic and iliac aneurysm surgery (2017) s/p stent placement, HTN, HLD, paroxysmal atrial fibrillation on Eliquis, blood clotting disorder s/p IVC filter, right knee ligament repair, pacemaker implant (2004) s/p recent ppm interrogation, herniated disc and related surgery in [id number] complicated by spinal fusion, local hematoma, foot drop with chronic pain s/p failed intrathecal morphine pump, recently admitted to [facility] on 08/07 with acute cholecystitis c/b cardiomyopathy s/p percutaneous cholecystostomy tube by IR. pt readmitted 8/20 for concerns of bleeding at the perc yonis tube site. s/p ERCP 8/26 with removal of several small and medium sized stones, transferred to SICU 8/27 for transient altered mental status, suspicious for narcotic intoxication (resolved) with negative work up. Now s/p RA cholecystectomy and intrathecal morphine pump 8/30/22. Will continue to monitor for acute delirium post-procedure whilst controlling his pain.     Plan:   Neuro: Transient encephalopathic event- improved, CTH negative, Neuro following; Melatonin qHS, Home Pamelor qHS, Trintellix    Pain: Pain team following; Tylenol ATC, PRN IV dilaudid 0.5 q4h for breakthrough; Home OxyContin ER 20mg q8h with Oxy IR 15mg q4h PRN; s/p removal of intrathecal morphine pump 8/31- RENATA drain w/o suction   HENT: Refresh eye gtt.  CV: HX of cardiomyopathy; 8/22 Echo: LVEF 55%- cont Toprol XL 50 bid, Hydralazine 50 q8h,  Zocor 5 qhs, Entresto  Pulm: RA  GI: low fat reg diet; cont PPI, miralax, senna, folic acid   : Barber, cont cardura, Proscar  ID:  not on abx   Endo: ISS  PPx: SCD, restart hep gtt   Lines: PIV   Wounds: RA port sites, RENATA drain with NO SUCTION   PT/OT: Re-ordered 9/1             Assessment: 73 y/o M with colon mass s/p partial colectomy (2017), appendectomy, hernia repair, aortic and iliac aneurysm surgery (2017) s/p stent placement, HTN, HLD, paroxysmal atrial fibrillation on Eliquis, blood clotting disorder s/p IVC filter, right knee ligament repair, pacemaker implant (2004) s/p recent ppm interrogation, herniated disc and related surgery in [id number] complicated by spinal fusion, local hematoma, foot drop with chronic pain s/p failed intrathecal morphine pump, recently admitted to [facility] on 08/07 with acute cholecystitis c/b cardiomyopathy s/p percutaneous cholecystostomy tube by IR. pt readmitted 8/20 for concerns of bleeding at the perc yonis tube site. s/p ERCP 8/26 with removal of several small and medium sized stones, transferred to SICU 8/27 for transient altered mental status, suspicious for narcotic intoxication (resolved) with negative work up. Now s/p RA cholecystectomy and intrathecal morphine pump 8/30/22. Will continue to monitor for acute delirium post-procedure whilst controlling his pain.     Plan:   Neuro: Transient encephalopathic event- resolved, CTH negative, Neuro following; Melatonin qHS, Home Pamelor qHS, Trintellix    Pain: Pain team following; Tylenol ATC, PRN IV dilaudid 0.5 q4h for breakthrough; Home OxyContin ER 20mg q8h with Oxy IR 15mg q4h PRN; s/p removal of intrathecal morphine pump 8/31- RENATA drain OFF suction, remove tomorrow   HENT: Refresh eye gtt.  CV: Overloaded - lasix 20, HX of cardiomyopathy; 8/22 Echo: LVEF 55%- cont Toprol XL 50 bid, Hydralazine 50 q8h,  Zocor 5 qhs, Entresto  Pulm: RA  GI: low fat reg diet; cont PPI, miralax, senna, folic acid   : TOV, cont cardura, Proscar  ID:  not on abx   Endo: ISS  PPx: SCD, hep gtt   Lines: PIV   Wounds: RA port sites, RENATA drain with NO SUCTION   PT/OT: Re-ordered 9/1

## 2022-09-01 NOTE — PROGRESS NOTE ADULT - SUBJECTIVE AND OBJECTIVE BOX
ON:       SUBJECTIVE:    MEDICATIONS  (STANDING):  acetaminophen     Tablet .. 650 milliGRAM(s) Oral every 6 hours  artificial tears (preservative free) Ophthalmic Solution 1 Drop(s) Both EYES two times a day  dextrose 5%. 1000 milliLiter(s) (50 mL/Hr) IV Continuous <Continuous>  dextrose 5%. 1000 milliLiter(s) (100 mL/Hr) IV Continuous <Continuous>  dextrose 50% Injectable 25 Gram(s) IV Push once  dextrose 50% Injectable 12.5 Gram(s) IV Push once  dextrose 50% Injectable 25 Gram(s) IV Push once  doxazosin 4 milliGRAM(s) Oral at bedtime  finasteride 5 milliGRAM(s) Oral daily  folic acid 1 milliGRAM(s) Oral daily  glucagon  Injectable 1 milliGRAM(s) IntraMuscular once  heparin  Infusion 1000 Unit(s)/Hr (13 mL/Hr) IV Continuous <Continuous>  hydrALAZINE 50 milliGRAM(s) Oral every 12 hours  insulin lispro (ADMELOG) corrective regimen sliding scale   SubCutaneous Before meals and at bedtime  lidocaine   4% Patch 1 Patch Transdermal every 24 hours  lidocaine   4% Patch 1 Patch Transdermal every 24 hours  melatonin 3 milliGRAM(s) Oral at bedtime  metoprolol succinate ER 50 milliGRAM(s) Oral every 12 hours  nortriptyline 75 milliGRAM(s) Oral at bedtime  oxyCODONE  ER Tablet 20 milliGRAM(s) Oral every 8 hours  pantoprazole    Tablet 40 milliGRAM(s) Oral before breakfast  polyethylene glycol 3350 17 Gram(s) Oral two times a day  potassium chloride   Solution 40 milliEquivalent(s) Oral every 2 hours  sacubitril 24 mG/valsartan 26 mG 1 Tablet(s) Oral every 12 hours  senna 2 Tablet(s) Oral at bedtime  simvastatin 5 milliGRAM(s) Oral at bedtime  vortioxetine 20 milliGRAM(s) Oral every 24 hours    MEDICATIONS  (PRN):  dextrose Oral Gel 15 Gram(s) Oral once PRN Blood Glucose LESS THAN 70 milliGRAM(s)/deciliter  hydrALAZINE Injectable 10 milliGRAM(s) IV Push every 6 hours PRN SBP> 160  HYDROmorphone  Injectable 0.5 milliGRAM(s) IV Push every 4 hours PRN Severe Pain (7 - 10)  ondansetron Injectable 4 milliGRAM(s) IV Push every 6 hours PRN Nausea and/or Vomiting  oxyCODONE    IR 15 milliGRAM(s) Oral every 4 hours PRN Moderate Pain (4 - 6)      Drips:     ICU Vital Signs Last 24 Hrs  T(C): 36.8 (01 Sep 2022 10:50), Max: 36.9 (31 Aug 2022 12:00)  T(F): 98.2 (01 Sep 2022 10:50), Max: 98.5 (01 Sep 2022 09:00)  HR: 72 (01 Sep 2022 11:00) (60 - 75)  BP: 141/78 (01 Sep 2022 10:30) (141/78 - 174/87)  BP(mean): 103 (01 Sep 2022 10:30) (103 - 125)  ABP: 135/69 (01 Sep 2022 11:00) (121/56 - 189/79)  ABP(mean): 92 (01 Sep 2022 11:00) (80 - 119)  RR: 18 (01 Sep 2022 11:00) (11 - 23)  SpO2: 97% (01 Sep 2022 11:00) (94% - 97%)    O2 Parameters below as of 01 Sep 2022 11:00  Patient On (Oxygen Delivery Method): room air            Physical Exam:  General: NAD  HEENT: NC/AT, EOMI, PERRLA, normal hearing, no oral lesions, neck supple w/o LAD  Pulmonary: Nonlabored breathing, no respiratory distress, CTA-B  Cardiovascular: NSR, no murmurs  Abdominal: soft, NT/ND, +BS, no organomegaly  Extremities: WWP, 5/5 strength x 4, no clubbing/cyanosis/edema  Neuro: A/O x3, CNs II-XII grossly intact, normal motor/sensation, no focal deficits  Pulses: palpable distal pulses    Lines/tubes/drains:  Barber:	      Vent settings:      I&O's Summary    31 Aug 2022 07:01  -  01 Sep 2022 07:00  --------------------------------------------------------  IN: 283 mL / OUT: 2325 mL / NET: -2042 mL    01 Sep 2022 07:01  -  01 Sep 2022 11:56  --------------------------------------------------------  IN: 419 mL / OUT: 550 mL / NET: -131 mL        LABS:                        7.8    5.45  )-----------( 228      ( 01 Sep 2022 06:17 )             23.9     09-01    133<L>  |  98  |  9   ----------------------------<  108<H>  3.5   |  26  |  1.05    Ca    8.2<L>      01 Sep 2022 06:17  Phos  2.9     09-01  Mg     1.8     09-01    TPro  6.4  /  Alb  2.3<L>  /  TBili  0.4  /  DBili  x   /  AST  21  /  ALT  24  /  AlkPhos  224<H>  09-01    PT/INR - ( 31 Aug 2022 05:40 )   PT: 13.1 sec;   INR: 1.10          PTT - ( 01 Sep 2022 05:47 )  PTT:72.0 sec    CAPILLARY BLOOD GLUCOSE      POCT Blood Glucose.: 104 mg/dL (31 Aug 2022 22:27)  POCT Blood Glucose.: 116 mg/dL (31 Aug 2022 16:43)    LIVER FUNCTIONS - ( 01 Sep 2022 06:17 )  Alb: 2.3 g/dL / Pro: 6.4 g/dL / ALK PHOS: 224 U/L / ALT: 24 U/L / AST: 21 U/L / GGT: x             Cultures:    RADIOLOGY & ADDITIONAL STUDIES:     ON: ON: PTT: 39.6- Inc to 12 and repeat @MN:PTT 50.4 inc hep to 13 and repeat @6am       SUBJECTIVE: Patient reports that his pain is better controlled. Patient is alert and oriented x4. Tolerating diet without nausea/vomiting. Denies headache.    MEDICATIONS  (STANDING):  acetaminophen     Tablet .. 650 milliGRAM(s) Oral every 6 hours  artificial tears (preservative free) Ophthalmic Solution 1 Drop(s) Both EYES two times a day  dextrose 5%. 1000 milliLiter(s) (50 mL/Hr) IV Continuous <Continuous>  dextrose 5%. 1000 milliLiter(s) (100 mL/Hr) IV Continuous <Continuous>  dextrose 50% Injectable 25 Gram(s) IV Push once  dextrose 50% Injectable 12.5 Gram(s) IV Push once  dextrose 50% Injectable 25 Gram(s) IV Push once  doxazosin 4 milliGRAM(s) Oral at bedtime  finasteride 5 milliGRAM(s) Oral daily  folic acid 1 milliGRAM(s) Oral daily  glucagon  Injectable 1 milliGRAM(s) IntraMuscular once  heparin  Infusion 1000 Unit(s)/Hr (13 mL/Hr) IV Continuous <Continuous>  hydrALAZINE 50 milliGRAM(s) Oral every 12 hours  insulin lispro (ADMELOG) corrective regimen sliding scale   SubCutaneous Before meals and at bedtime  lidocaine   4% Patch 1 Patch Transdermal every 24 hours  lidocaine   4% Patch 1 Patch Transdermal every 24 hours  melatonin 3 milliGRAM(s) Oral at bedtime  metoprolol succinate ER 50 milliGRAM(s) Oral every 12 hours  nortriptyline 75 milliGRAM(s) Oral at bedtime  oxyCODONE  ER Tablet 20 milliGRAM(s) Oral every 8 hours  pantoprazole    Tablet 40 milliGRAM(s) Oral before breakfast  polyethylene glycol 3350 17 Gram(s) Oral two times a day  potassium chloride   Solution 40 milliEquivalent(s) Oral every 2 hours  sacubitril 24 mG/valsartan 26 mG 1 Tablet(s) Oral every 12 hours  senna 2 Tablet(s) Oral at bedtime  simvastatin 5 milliGRAM(s) Oral at bedtime  vortioxetine 20 milliGRAM(s) Oral every 24 hours    MEDICATIONS  (PRN):  dextrose Oral Gel 15 Gram(s) Oral once PRN Blood Glucose LESS THAN 70 milliGRAM(s)/deciliter  hydrALAZINE Injectable 10 milliGRAM(s) IV Push every 6 hours PRN SBP> 160  HYDROmorphone  Injectable 0.5 milliGRAM(s) IV Push every 4 hours PRN Severe Pain (7 - 10)  ondansetron Injectable 4 milliGRAM(s) IV Push every 6 hours PRN Nausea and/or Vomiting  oxyCODONE    IR 15 milliGRAM(s) Oral every 4 hours PRN Moderate Pain (4 - 6)      Drips:     ICU Vital Signs Last 24 Hrs  T(C): 36.8 (01 Sep 2022 10:50), Max: 36.9 (31 Aug 2022 12:00)  T(F): 98.2 (01 Sep 2022 10:50), Max: 98.5 (01 Sep 2022 09:00)  HR: 72 (01 Sep 2022 11:00) (60 - 75)  BP: 141/78 (01 Sep 2022 10:30) (141/78 - 174/87)  BP(mean): 103 (01 Sep 2022 10:30) (103 - 125)  ABP: 135/69 (01 Sep 2022 11:00) (121/56 - 189/79)  ABP(mean): 92 (01 Sep 2022 11:00) (80 - 119)  RR: 18 (01 Sep 2022 11:00) (11 - 23)  SpO2: 97% (01 Sep 2022 11:00) (94% - 97%)    O2 Parameters below as of 01 Sep 2022 11:00  Patient On (Oxygen Delivery Method): room air            Physical Exam:  General: NAD  HEENT: moist mucus membranes  Pulmonary: Nonlabored breathing, no respiratory distress, CTA-B  Cardiovascular: NSR, no murmurs  Abdominal: soft, minimally tender, ND, incisions c/d/i, RENATA drain off suction, serosang  Extremities: edematous upper extremities, WWP, no clubbing/cyanosis  Neuro: A/O x3, CNs II-XII grossly intact, normal motor/sensation, no focal deficits      Lines/tubes/drains:  Barber:	      Vent settings:      I&O's Summary    31 Aug 2022 07:01  -  01 Sep 2022 07:00  --------------------------------------------------------  IN: 283 mL / OUT: 2325 mL / NET: -2042 mL    01 Sep 2022 07:01  -  01 Sep 2022 11:56  --------------------------------------------------------  IN: 419 mL / OUT: 550 mL / NET: -131 mL        LABS:                        7.8    5.45  )-----------( 228      ( 01 Sep 2022 06:17 )             23.9     09-01    133<L>  |  98  |  9   ----------------------------<  108<H>  3.5   |  26  |  1.05    Ca    8.2<L>      01 Sep 2022 06:17  Phos  2.9     09-01  Mg     1.8     09-01    TPro  6.4  /  Alb  2.3<L>  /  TBili  0.4  /  DBili  x   /  AST  21  /  ALT  24  /  AlkPhos  224<H>  09-01    PT/INR - ( 31 Aug 2022 05:40 )   PT: 13.1 sec;   INR: 1.10          PTT - ( 01 Sep 2022 05:47 )  PTT:72.0 sec    CAPILLARY BLOOD GLUCOSE      POCT Blood Glucose.: 104 mg/dL (31 Aug 2022 22:27)  POCT Blood Glucose.: 116 mg/dL (31 Aug 2022 16:43)    LIVER FUNCTIONS - ( 01 Sep 2022 06:17 )  Alb: 2.3 g/dL / Pro: 6.4 g/dL / ALK PHOS: 224 U/L / ALT: 24 U/L / AST: 21 U/L / GGT: x             Cultures:    RADIOLOGY & ADDITIONAL STUDIES:

## 2022-09-01 NOTE — PROGRESS NOTE ADULT - ASSESSMENT
71 y/o M with extensive PMH/PSH s/p percutaneous cholecystostomy tube and now s/p laparoscopic cholecystectomy and removal of intrathecal pump, admitted to SICU postoperatively for hemodynamic monitoring. Afebrile, HDS overnight, abdomen is soft and non tender. CTH yesterday without evidence of CSF leak.     INCOMPLETE 73 y/o M with extensive PMH/PSH s/p percutaneous cholecystostomy tube and now s/p laparoscopic cholecystectomy and removal of intrathecal pump, admitted to SICU postoperatively for hemodynamic monitoring. Afebrile, HDS overnight, abdomen is soft and non tender. CTH yesterday without evidence of CSF leak.     - f/u Neurosurgery recommendations  - D/C verde  - Adv to reg low fat diet  - SDU if cleared by NSx  - remainder per SICU team

## 2022-09-01 NOTE — OCCUPATIONAL THERAPY INITIAL EVALUATION ADULT - PERTINENT HX OF CURRENT PROBLEM, REHAB EVAL
71 y/o M with colon mass s/p partial colectomy (2017), appendectomy, hernia repair, aortic and iliac aneurysm surgery (2017) s/p stent placement, HTN, HLD, paroxysmal atrial fibrillation on Eliquis, blood clotting disorder s/p IVC filter, right knee ligament repair, pacemaker implant (2004) s/p recent ppm interrogation, herniated disc and related surgery in [id number] complicated by spinal fusion, local hematoma, foot drop with chronic pain s/p failed intrathecal morphine pump, recently admitted to [facility] on 08/07 with acute cholecystitis c/b cardiomyopathy s/p percutaneous cholecystostomy tube by IR. pt readmitted 8/20 for concerns of bleeding at the perc yonis tube site. s/p ERCP 8/26 with removal of several small and medium sized stones, transferred to SICU 8/27 for transient altered mental status, suspicious for narcotic intoxication (resolved) with negative work up. Now s/p RA cholecystectomy and ventral removal of intrathecal morphine pump 8/30/22.

## 2022-09-01 NOTE — PROGRESS NOTE ADULT - SUBJECTIVE AND OBJECTIVE BOX
SUBJECTIVE: No specific complaints overnight. Pain is controlled. Passing flatus without bowel movement. Not yet OOB. Tolerating CLD      MEDICATIONS  (STANDING):  acetaminophen     Tablet .. 650 milliGRAM(s) Oral every 6 hours  artificial tears (preservative free) Ophthalmic Solution 1 Drop(s) Both EYES two times a day  dextrose 5%. 1000 milliLiter(s) (50 mL/Hr) IV Continuous <Continuous>  dextrose 5%. 1000 milliLiter(s) (100 mL/Hr) IV Continuous <Continuous>  dextrose 50% Injectable 25 Gram(s) IV Push once  dextrose 50% Injectable 12.5 Gram(s) IV Push once  dextrose 50% Injectable 25 Gram(s) IV Push once  doxazosin 4 milliGRAM(s) Oral at bedtime  finasteride 5 milliGRAM(s) Oral daily  glucagon  Injectable 1 milliGRAM(s) IntraMuscular once  heparin  Infusion 1000 Unit(s)/Hr (13 mL/Hr) IV Continuous <Continuous>  hydrALAZINE 50 milliGRAM(s) Oral every 12 hours  insulin lispro (ADMELOG) corrective regimen sliding scale   SubCutaneous Before meals and at bedtime  lidocaine   4% Patch 1 Patch Transdermal every 24 hours  lidocaine   4% Patch 1 Patch Transdermal every 24 hours  melatonin 3 milliGRAM(s) Oral at bedtime  metoprolol succinate ER 50 milliGRAM(s) Oral every 12 hours  nortriptyline 75 milliGRAM(s) Oral at bedtime  oxyCODONE  ER Tablet 20 milliGRAM(s) Oral every 8 hours  pantoprazole  Injectable 40 milliGRAM(s) IV Push daily  polyethylene glycol 3350 17 Gram(s) Oral two times a day  sacubitril 24 mG/valsartan 26 mG 1 Tablet(s) Oral every 12 hours  simvastatin 5 milliGRAM(s) Oral at bedtime  vortioxetine 20 milliGRAM(s) Oral every 24 hours    MEDICATIONS  (PRN):  dextrose Oral Gel 15 Gram(s) Oral once PRN Blood Glucose LESS THAN 70 milliGRAM(s)/deciliter  hydrALAZINE Injectable 10 milliGRAM(s) IV Push every 6 hours PRN SBP> 160  HYDROmorphone  Injectable 0.5 milliGRAM(s) IV Push every 4 hours PRN Severe Pain (7 - 10)  ondansetron Injectable 4 milliGRAM(s) IV Push every 6 hours PRN Nausea and/or Vomiting  oxyCODONE    IR 15 milliGRAM(s) Oral every 4 hours PRN Moderate Pain (4 - 6)      Vital Signs Last 24 Hrs  T(C): 36.7 (01 Sep 2022 02:05), Max: 36.9 (31 Aug 2022 12:00)  T(F): 98.1 (01 Sep 2022 02:05), Max: 98.4 (31 Aug 2022 12:00)  HR: 60 (01 Sep 2022 05:00) (60 - 81)  BP: 172/83 (31 Aug 2022 15:00) (137/70 - 194/94)  BP(mean): 119 (31 Aug 2022 15:00) (97 - 135)  RR: 11 (01 Sep 2022 05:00) (10 - 24)  SpO2: 97% (01 Sep 2022 05:00) (92% - 97%)    Parameters below as of 01 Sep 2022 05:00  Patient On (Oxygen Delivery Method): room air        Physical Exam:  General: NAD, resting comfortably in bed  Pulmonary: Nonlabored breathing, no respiratory distress  Cardiovascular: NSR  Abdominal: soft, NT/ND, incisions cdi, RENATA to gravity with light SS        I&O's Summary    30 Aug 2022 07:01  -  31 Aug 2022 07:00  --------------------------------------------------------  IN: 930 mL / OUT: 2252 mL / NET: -1322 mL    31 Aug 2022 07:01  -  01 Sep 2022 06:11  --------------------------------------------------------  IN: 257 mL / OUT: 2125 mL / NET: -1868 mL        LABS:                        8.4    6.60  )-----------( 280      ( 31 Aug 2022 05:40 )             25.8     08-31    132<L>  |  97  |  10  ----------------------------<  96  4.1   |  26  |  1.09    Ca    8.4      31 Aug 2022 05:40  Phos  4.0     08-31  Mg     2.0     08-31    TPro  6.6  /  Alb  2.8<L>  /  TBili  0.4  /  DBili  x   /  AST  31  /  ALT  34  /  AlkPhos  287<H>  08-31    PT/INR - ( 31 Aug 2022 05:40 )   PT: 13.1 sec;   INR: 1.10          PTT - ( 31 Aug 2022 23:53 )  PTT:50.4 sec    CAPILLARY BLOOD GLUCOSE      POCT Blood Glucose.: 104 mg/dL (31 Aug 2022 22:27)  POCT Blood Glucose.: 116 mg/dL (31 Aug 2022 16:43)  POCT Blood Glucose.: 97 mg/dL (31 Aug 2022 11:04)    LIVER FUNCTIONS - ( 31 Aug 2022 05:40 )  Alb: 2.8 g/dL / Pro: 6.6 g/dL / ALK PHOS: 287 U/L / ALT: 34 U/L / AST: 31 U/L / GGT: x             RADIOLOGY & ADDITIONAL STUDIES:

## 2022-09-01 NOTE — PROGRESS NOTE ADULT - SUBJECTIVE AND OBJECTIVE BOX
Pain Management Progress Note - [facility] [phone]    HPI: Patient seen and examined today, alert and awake in bed. Patient reports less abdominal pain today compared to yesterday. Patient reports adequate pain control with current pain regimen. Patient denies side effects from current pain regimen.     Pertinent PMH: Pain at: ___Back ___Neck___Knee ___Hip ___Shoulder ___X Opioid tolerance    Pain is __X_ sharp ____dull ___burning ___achy ___ Intensity: __X__ mild ____mod ____severe     Location _X____surgical site _____cervical _____lumbar __X__abd _____upper ext____lower ext    Worse with __X__activity __X__movement _____physical therapy___ Rest    Improved with __X__medication ___X_rest ____physical therapy    PAST MEDICAL & SURGICAL HISTORY:  AAA (abdominal aortic aneurysm)  HTN (hypertension)  Cardiomyopathy  Hyperlipidemia  ONOFRE (dyspnea on exertion)  DVT (deep venous thrombosis)  Pulmonary emphysema  COPD  Cardiac arrhythmia  Aneurysm of aortic root  Depression  anxiety  Anemia  Pelvic mass  Pacemaker  medtronic  History of kidney surgery  History of back surgery  multiple, lumbar  Surgery, elective  multiple neck surgery, cervical  S/P hip replacement, left  S/P arthroscopy of right knee  S/P AAA (abdominal aortic aneurysm) repair  with right iliac aneurysm endovascular repair  S/P carpal tunnel release  Surgery, elective  Cardiac Stent x4  Surgery, elective  Intrathecal pump placement    MEDICATIONS:  magnesium sulfate  IVPB  furosemide   Injectable  potassium chloride   Solution  pantoprazole    Tablet  folic acid  senna  polyethylene glycol 3350  vancomycin  IVPB  vortioxetine  acetaminophen     Tablet ..  heparin  Infusion  nortriptyline  sacubitril 24 mG/valsartan 26 mG  cefoTEtan  IVPB  lidocaine   4% Patch  lidocaine   4% Patch  lactated ringers.  HYDROmorphone  Injectable  magnesium sulfate  IVPB  glucagon  Injectable  dextrose 5%.  dextrose 50% Injectable  dextrose 50% Injectable  dextrose 50% Injectable  dextrose 5%.  dextrose Oral Gel  insulin lispro (ADMELOG) corrective regimen sliding scale  cefoTEtan  IVPB  HYDROmorphone  Injectable  melatonin  artificial tears (preservative free) Ophthalmic Solution  hydrALAZINE  magnesium sulfate  IVPB  dextrose 5% + sodium chloride 0.9%.  magnesium sulfate  IVPB  potassium phosphate IVPB  heparin  Infusion  oxyCODONE    IR  hydrALAZINE Injectable  oxyCODONE  ER Tablet  heparin  Infusion.  potassium chloride    Tablet ER  magnesium sulfate  IVPB  hydrALAZINE Injectable  lactated ringers.  oxyCODONE  ER Tablet  oxyCODONE    IR  oxyCODONE    IR  diazepam    Tablet  ALPRAZolam  magnesium sulfate  IVPB  dextrose 5% + sodium chloride 0.9%.  sodium chloride  sodium chloride 0.9%.  heparin  Infusion  heparin  Infusion  heparin  Infusion  lactated ringers.  hydrALAZINE  polyethylene glycol 3350  magnesium sulfate  IVPB  hydrALAZINE  metoprolol tartrate  sodium phosphate IVPB  lactated ringers.  heparin  Infusion  heparin  Infusion.  potassium phosphate / sodium phosphate Powder (PHOS-NaK)  magnesium sulfate  IVPB  heparin  Infusion  heparin  Infusion  ondansetron Injectable  diazepam    Tablet  metoprolol succinate ER  simvastatin  finasteride  nortriptyline  oxyCODONE  ER Tablet  oxyCODONE    IR  oxyCODONE    IR  pantoprazole  Injectable  hydrALAZINE  hydrALAZINE  doxazosin  ALPRAZolam  lactated ringers.  morphine  - Injectable    ROS: Const:  _N__febrile   Eyes:___ENT:___CV: _N__chest pain  Resp: N____sob  GI:_N__nausea _N__vomiting __Y__abd pain ___npo ___clears ___full diet __bm  :___ Musk: Y___pain ___spasm  Skin:___ Neuro:  _N__sedation___confusion___N_ numbness ___weakness _N__paresthesia  Psych:___anxiety  Endo:___ Heme:___Allergy:__ALTACE, PCN_      09-01 @ 06:171.05 mg/dL  Hemoglobin: 7.8 g/dL (09-01 @ 06:17)  Hemoglobin: 8.4 g/dL (08-31 @ 05:40)  Hemoglobin: 8.2 g/dL (08-30 @ 19:30)  T(C): 36.8 (09-01-22 @ 10:50), Max: 36.9 (09-01-22 @ 09:00)  HR: 72 (09-01-22 @ 12:00) (60 - 75)  BP: 141/78 (09-01-22 @ 10:30) (141/78 - 174/87)  RR: 20 (09-01-22 @ 12:00) (11 - 23)  SpO2: 97% (09-01-22 @ 12:00) (94% - 97%)  Wt(kg): --     PHYSICAL EXAM:  Gen Appearance: _X__no acute distress X___appropriate       Neuro: ___SILT feet____ EOM Intact Psych: AAOX3__, Xappropriate        Eyes: _X__conjunctiva WNL  _X____ Pupils equal and round        ENT: _X__ears and nose atraumatic__X_ Hearing grossly intact        Neck: _X__trachea midline, no visible masses ___thyroid without palpable mass    Resp: _X__Nml WOB____No tactile fremitus ___clear to auscultation    Cardio: X___extremities free from edema ____pedal pulses palpable    GI/Abdomen: ___soft _____ Nontender____X__Nondistended_____HSM    Lymphatic: ___no palpable nodes in neck  ___no palpable nodes calves and feet    Skin/Wound: ___Incision, ___Dressing c/d/i,   ____surrounding tissues soft,  ___drain/chest tube present____    Muscular: EHL ___/5  Gastrocnemius___/5    __X_absent clubbing/cyanosis         ASSESSMENT:  This is a 72y old Male with a history of colon mass s/p partial colectomy, aortic and iliac aneurysm surgery (2017), HTN, HLD, paroxysmal afib on eliquis, pacemaker implant (2004), multiple orthopedic surgeries including spine and right hip, intrathecal morphine pump placement (2004) reported to be inactive and not in use POD 2 S/P  laparoscopic cholecystectomy and removal of intra-thecal morphine pump, with reports of moderate back pain.    Recommended Treatment PLAN:  1. Consider home-dose pain regimen. Oxycontin 20 mg PO TID, Oxycodone 15 mg PO q8h PRN severe pain  2. Consider continuing Dilaudid 0.5 mg IVP q4h PRN breakthrough pain  3. Consider continuing daily lidocaine patches to abdomen  4. Consider starting Tylenol 650 mg PO q6h PRN mild pain  HOLD ALL OPIOIDS FOR SEDATION, RR<10, O2SAT <93%, SBP <96  Plan discussed with Dr. Richardson

## 2022-09-01 NOTE — PROGRESS NOTE ADULT - ATTENDING COMMENTS
72M with colon mass s/p partial colectomy (2017),  HTN, HLD, pAF (on Eliquis), ?blood clotting disorder s/p IVC filter placement, PPM (2004), herniated disc and related surgery in [id number] complicated by spinal fusion, and recent stress induced CM with recovered LVEF, now presenting for elective cholecystectomy (with planned ERCP prior) - procedure aborted after Stroke code called for AMS.   Physical Exam notable for: elderly patient laying flat in bed distressed that his case was aborted, flat neck veins, no MGR detected, clear lungs in anterior lung fields, patient declined/unable to sit up for posterior lung exam, no pretibial pitting edema, no ankle edema, skin WWP, A&Ox3  CCTA: nonobstructive CAD  TTE 8/22: Borderline normal left ventricular systolic function. Left ventricular ejection fraction is 50-55%.    Recommendations as follows:  Continue Hydralazine (home dose) augmented to 50 mg TID  -->NB: this drug is not first line anti HTN medication choice  -->if amenable to changing home med for 1st line BP agent, recommend drugs such as Amlodipine 5mg po daily, Nifedipine 30XL daily, Losartan 25mg po daily, and/or HCTZ 25mg po daily  Continue ToprolXL 50mg BID for improved rate control in patient with afib.  Resume Eliquis 5 mg BID when approved from surgical standpoint.  Outpatient follow up with primary care physician and cardiology  Patient stating he refuses any further surgical/procedural interventions.  Please recall with any further questions.  Tate Oconnor M.D.  Cardiology Attending
73 yo man with HTN and AF recently admitted with choledocholithiases and cholecystitis recently. Developed stress CMP. Discharged home on ARNI and BB returned only <1 week later for cholecystectomy.     Repeat echocardiogram showed normalized LVEF   CCTA showed no coronary disease   Remains hypertensive     - Increase Hydralazine to 50 mg tid   - Continue Toprol XL 25 mg bid   - Optimized from cardiac standpoint for surgery   - On discharge will optimize home regimen to ARB and BB     Jae Granda MD .
Patient seen and examined this morning. He tolerated ERCP and reports feeling improved while inquiring about timing for discharge. He reports improvement in abd pain. Examination was unremarkable. Vital and lab reviewed, notable uptrend in LT. Given unremarkable examination and overall improvement in symptoms, likely transient uptrend in the setting of recent manipulation. Continue to trend LT
Metabolic encephalopathy, s/p sphincerotomy, HTN  physical as above  continue with careful titration of pain meds  eventual MRI if pain pump and PPM can be ascertained safe but for now mental status back to baseline  continue metoprolol and hydralazine  plan for cholecystectomy  decision making of high complexity
Patient seen and examined this morning. He reports doing well, inquiring about when the procedure is. He reports mild abd pain. Examination was with mild tenderness. Vital and lab reviewed, notable downtrending LT. Given unremarkable examination and overall improvement in symptoms, likely transient uptrend in the setting of recent manipulation. Continue to trend LT
Pt seen and d/w fellow.  Also d/w Dr. Doyle.  Consider tube study to evaluate for biliary obstruction.  LFTs wnl.
73 yo man with HTN and AF recently admitted with choledocholithiases and cholecystitis recently. Developed stress CMP. Discharged home on ARNI and BB returned only <1 week later for cholecystectomy.     Repeat echocardiogram showed normalized LVEF   CCTA showed no coronary disease   Remains hypertensive     - Hydralazine 37.5 mg tid   - Continue Toprol XL 25 mg bid   - Optimized from cardiac standpoint for surgery   - On discharge will optimize home regimen to ARB and BB     Jae Granda MD
Patient seen and examined with house-staff during bedside rounds.  Resident note read, including vitals, physical findings, laboratory data, and radiological reports.   Revisions included below.  Direct personal management at bed side and extensive interpretation of the data.  Plan was outlined and discussed in details with the housestaff.  Decision making of high complexity  Action taken for acute disease activity to reflect the level of care provided:  - medication reconciliation  - review laboratory data  Clinically stable.  Mental status improved.  The blood pressure is still elevated but he is on metoprolol and hydralazine home dose.  Hold losartan preoperatively.  Use hydralazine as needed.  Patient is hemoglobin is stable.  I reviewed urine culture.  Patient was cleared by cardiology.  Patient is for the OR tomorrow.
s/p cholecystectomy, removal of morphine pump reservoir, AF, chronic back pain, HTN, metabolic encephalopathy  physical as above  pain better controlled with RTC tylenol on top of the opiates and lidocaine patches  question of CSF leak d/w neurosurgery and plan is to DC drain tomorrow; CT head as noted w/o evidence of bleeding  dose lasix today with continued slightly high BPs and UE edema; continue metoprolol, hydralazine end entresto  mobilize with PT  tolerating diet  replete K  continue heparin  decision making of high complexity

## 2022-09-01 NOTE — OCCUPATIONAL THERAPY INITIAL EVALUATION ADULT - LIVES WITH, PROFILE
[place] Website [place] [email or web address] [email or web address] Pt came from [facility], where he states he did not get out of bed during the 4-5 days he was there. Prior to previous admission, pt lived at home with wife./spouse

## 2022-09-01 NOTE — OCCUPATIONAL THERAPY INITIAL EVALUATION ADULT - OTHER, REHAB EVAL
Pt with complaints of pain in R shoulder/neck of 8/10 while seated EOB. Pt states pain in new, however MERON Pineda, says this pain is familiar to pt. Pt willing to attempt to sit in chair, however pain too great and requesting to return to bed.

## 2022-09-02 LAB
ALBUMIN SERPL ELPH-MCNC: 2.7 G/DL — LOW (ref 3.3–5)
ALP SERPL-CCNC: 203 U/L — HIGH (ref 40–120)
ALT FLD-CCNC: 18 U/L — SIGNIFICANT CHANGE UP (ref 10–45)
ANION GAP SERPL CALC-SCNC: 10 MMOL/L — SIGNIFICANT CHANGE UP (ref 5–17)
APTT BLD: 55.1 SEC — HIGH (ref 27.5–35.5)
APTT BLD: 64.8 SEC — HIGH (ref 27.5–35.5)
APTT BLD: 92.2 SEC — HIGH (ref 27.5–35.5)
AST SERPL-CCNC: 14 U/L — SIGNIFICANT CHANGE UP (ref 10–40)
B2 TRANSFERRIN FLD QL: POSITIVE
BILIRUB SERPL-MCNC: 0.4 MG/DL — SIGNIFICANT CHANGE UP (ref 0.2–1.2)
BUN SERPL-MCNC: 8 MG/DL — SIGNIFICANT CHANGE UP (ref 7–23)
CALCIUM SERPL-MCNC: 8.6 MG/DL — SIGNIFICANT CHANGE UP (ref 8.4–10.5)
CHLORIDE SERPL-SCNC: 98 MMOL/L — SIGNIFICANT CHANGE UP (ref 96–108)
CO2 SERPL-SCNC: 25 MMOL/L — SIGNIFICANT CHANGE UP (ref 22–31)
CREAT SERPL-MCNC: 1.01 MG/DL — SIGNIFICANT CHANGE UP (ref 0.5–1.3)
EGFR: 79 ML/MIN/1.73M2 — SIGNIFICANT CHANGE UP
GLUCOSE BLDC GLUCOMTR-MCNC: 104 MG/DL — HIGH (ref 70–99)
GLUCOSE BLDC GLUCOMTR-MCNC: 116 MG/DL — HIGH (ref 70–99)
GLUCOSE BLDC GLUCOMTR-MCNC: 138 MG/DL — HIGH (ref 70–99)
GLUCOSE BLDC GLUCOMTR-MCNC: 160 MG/DL — HIGH (ref 70–99)
GLUCOSE SERPL-MCNC: 104 MG/DL — HIGH (ref 70–99)
HCT VFR BLD CALC: 25.7 % — LOW (ref 39–50)
HGB BLD-MCNC: 8.2 G/DL — LOW (ref 13–17)
MAGNESIUM SERPL-MCNC: 1.8 MG/DL — SIGNIFICANT CHANGE UP (ref 1.6–2.6)
MCHC RBC-ENTMCNC: 27.8 PG — SIGNIFICANT CHANGE UP (ref 27–34)
MCHC RBC-ENTMCNC: 31.9 GM/DL — LOW (ref 32–36)
MCV RBC AUTO: 87.1 FL — SIGNIFICANT CHANGE UP (ref 80–100)
NRBC # BLD: 0 /100 WBCS — SIGNIFICANT CHANGE UP (ref 0–0)
PHOSPHATE SERPL-MCNC: 2.1 MG/DL — LOW (ref 2.5–4.5)
PLATELET # BLD AUTO: 239 K/UL — SIGNIFICANT CHANGE UP (ref 150–400)
POTASSIUM SERPL-MCNC: 4.1 MMOL/L — SIGNIFICANT CHANGE UP (ref 3.5–5.3)
POTASSIUM SERPL-SCNC: 4.1 MMOL/L — SIGNIFICANT CHANGE UP (ref 3.5–5.3)
PROT SERPL-MCNC: 6.9 G/DL — SIGNIFICANT CHANGE UP (ref 6–8.3)
RBC # BLD: 2.95 M/UL — LOW (ref 4.2–5.8)
RBC # FLD: 13.3 % — SIGNIFICANT CHANGE UP (ref 10.3–14.5)
SODIUM SERPL-SCNC: 133 MMOL/L — LOW (ref 135–145)
WBC # BLD: 5.5 K/UL — SIGNIFICANT CHANGE UP (ref 3.8–10.5)
WBC # FLD AUTO: 5.5 K/UL — SIGNIFICANT CHANGE UP (ref 3.8–10.5)

## 2022-09-02 PROCEDURE — 99233 SBSQ HOSP IP/OBS HIGH 50: CPT

## 2022-09-02 PROCEDURE — 99024 POSTOP FOLLOW-UP VISIT: CPT

## 2022-09-02 RX ORDER — OXYCODONE HYDROCHLORIDE 5 MG/1
15 TABLET ORAL EVERY 4 HOURS
Refills: 0 | Status: DISCONTINUED | OUTPATIENT
Start: 2022-09-02 | End: 2022-09-06

## 2022-09-02 RX ORDER — SODIUM,POTASSIUM PHOSPHATES 278-250MG
1 POWDER IN PACKET (EA) ORAL ONCE
Refills: 0 | Status: COMPLETED | OUTPATIENT
Start: 2022-09-02 | End: 2022-09-02

## 2022-09-02 RX ORDER — OXYCODONE HYDROCHLORIDE 5 MG/1
20 TABLET ORAL EVERY 8 HOURS
Refills: 0 | Status: DISCONTINUED | OUTPATIENT
Start: 2022-09-02 | End: 2022-09-06

## 2022-09-02 RX ORDER — MAGNESIUM SULFATE 500 MG/ML
1 VIAL (ML) INJECTION ONCE
Refills: 0 | Status: COMPLETED | OUTPATIENT
Start: 2022-09-02 | End: 2022-09-02

## 2022-09-02 RX ADMIN — Medication 50 MILLIGRAM(S): at 22:02

## 2022-09-02 RX ADMIN — Medication 1 DROP(S): at 19:43

## 2022-09-02 RX ADMIN — LIDOCAINE 1 PATCH: 4 CREAM TOPICAL at 10:00

## 2022-09-02 RX ADMIN — OXYCODONE HYDROCHLORIDE 15 MILLIGRAM(S): 5 TABLET ORAL at 16:48

## 2022-09-02 RX ADMIN — Medication 650 MILLIGRAM(S): at 11:21

## 2022-09-02 RX ADMIN — PANTOPRAZOLE SODIUM 40 MILLIGRAM(S): 20 TABLET, DELAYED RELEASE ORAL at 05:18

## 2022-09-02 RX ADMIN — LIDOCAINE 1 PATCH: 4 CREAM TOPICAL at 22:09

## 2022-09-02 RX ADMIN — Medication 50 MILLIGRAM(S): at 19:52

## 2022-09-02 RX ADMIN — VORTIOXETINE 20 MILLIGRAM(S): 5 TABLET, FILM COATED ORAL at 17:16

## 2022-09-02 RX ADMIN — Medication 650 MILLIGRAM(S): at 17:00

## 2022-09-02 RX ADMIN — OXYCODONE HYDROCHLORIDE 20 MILLIGRAM(S): 5 TABLET ORAL at 06:00

## 2022-09-02 RX ADMIN — FINASTERIDE 5 MILLIGRAM(S): 5 TABLET, FILM COATED ORAL at 11:21

## 2022-09-02 RX ADMIN — OXYCODONE HYDROCHLORIDE 20 MILLIGRAM(S): 5 TABLET ORAL at 15:38

## 2022-09-02 RX ADMIN — SIMVASTATIN 5 MILLIGRAM(S): 20 TABLET, FILM COATED ORAL at 22:11

## 2022-09-02 RX ADMIN — OXYCODONE HYDROCHLORIDE 20 MILLIGRAM(S): 5 TABLET ORAL at 22:30

## 2022-09-02 RX ADMIN — LIDOCAINE 1 PATCH: 4 CREAM TOPICAL at 19:44

## 2022-09-02 RX ADMIN — OXYCODONE HYDROCHLORIDE 20 MILLIGRAM(S): 5 TABLET ORAL at 05:17

## 2022-09-02 RX ADMIN — Medication 650 MILLIGRAM(S): at 23:51

## 2022-09-02 RX ADMIN — Medication 1 MILLIGRAM(S): at 11:22

## 2022-09-02 RX ADMIN — Medication 650 MILLIGRAM(S): at 06:00

## 2022-09-02 RX ADMIN — OXYCODONE HYDROCHLORIDE 20 MILLIGRAM(S): 5 TABLET ORAL at 15:31

## 2022-09-02 RX ADMIN — OXYCODONE HYDROCHLORIDE 20 MILLIGRAM(S): 5 TABLET ORAL at 22:01

## 2022-09-02 RX ADMIN — Medication 650 MILLIGRAM(S): at 17:17

## 2022-09-02 RX ADMIN — SACUBITRIL AND VALSARTAN 1 TABLET(S): 24; 26 TABLET, FILM COATED ORAL at 07:38

## 2022-09-02 RX ADMIN — OXYCODONE HYDROCHLORIDE 15 MILLIGRAM(S): 5 TABLET ORAL at 15:47

## 2022-09-02 RX ADMIN — Medication 50 MILLIGRAM(S): at 07:31

## 2022-09-02 RX ADMIN — POLYETHYLENE GLYCOL 3350 17 GRAM(S): 17 POWDER, FOR SOLUTION ORAL at 15:31

## 2022-09-02 RX ADMIN — SENNA PLUS 2 TABLET(S): 8.6 TABLET ORAL at 22:01

## 2022-09-02 RX ADMIN — Medication 1 PACKET(S): at 07:31

## 2022-09-02 RX ADMIN — Medication 2: at 22:02

## 2022-09-02 RX ADMIN — Medication 50 MILLIGRAM(S): at 10:00

## 2022-09-02 RX ADMIN — SACUBITRIL AND VALSARTAN 1 TABLET(S): 24; 26 TABLET, FILM COATED ORAL at 17:16

## 2022-09-02 RX ADMIN — Medication 3 MILLIGRAM(S): at 22:02

## 2022-09-02 RX ADMIN — Medication 1 DROP(S): at 05:17

## 2022-09-02 RX ADMIN — Medication 650 MILLIGRAM(S): at 05:17

## 2022-09-02 RX ADMIN — LIDOCAINE 1 PATCH: 4 CREAM TOPICAL at 19:43

## 2022-09-02 RX ADMIN — Medication 100 GRAM(S): at 07:31

## 2022-09-02 RX ADMIN — NORTRIPTYLINE HYDROCHLORIDE 75 MILLIGRAM(S): 10 CAPSULE ORAL at 22:02

## 2022-09-02 RX ADMIN — HEPARIN SODIUM 13 UNIT(S)/HR: 5000 INJECTION INTRAVENOUS; SUBCUTANEOUS at 05:16

## 2022-09-02 RX ADMIN — Medication 650 MILLIGRAM(S): at 12:55

## 2022-09-02 RX ADMIN — Medication 4 MILLIGRAM(S): at 22:01

## 2022-09-02 NOTE — PROGRESS NOTE ADULT - ASSESSMENT
73 y/o male with history of colon mass s/p partial colectomy (2017), appendectomy, hernia repair, aortic and iliac aneurysm surgery (2017) s/p stent placement, HTN, HLD, paroxysmal atrial fibrillation on Eliquis, Right LE DVT >5 years ago s/p IVC filter placement, right knee ligament repair, , herniated disc and related surgery in [id number] complicated by spinal fusion, local hematoma, foot drop with chronic pain s/p failed intrathecal morphine pump which caused bradycardia (s/p pacemaker implant [2004]) and left hip prosthesis. On last admission, had newly reduced EF w/concern for stress induced CM s/p percutaneous cholecystostomy tube by IR; Presented to this admission with bleeding at the perc yonis tube site; Now s/p ERCP on 8/26/22 with stone removal, and robot-assisted cholecystectomy on 8/30/2022 with removal of ventral intrathecal pain pump.     #Cholecystitis s/p cholecystectomy   s/p cholecystectomy on 8.30.22    #Chronic pain, opiate dependence   Substantial opiate regimen as outpatient. Pain control while inpatient per primary team   Standing miralax BID while on current pain regimen.     #Paroxsymal Afib  #S/p PPM   Defer recs re: Anti-coagulation to cardiology consult   Continue Metoprolol , statin  currently on heparin gtt    #HTN   Continue metoprolol and hydralazine  Ensure adequate pain control   If SBP >180 mmHg persistently, [ ] Get bladder scan [ ] if pain well-controlled, and bladder scan shows no urinary retention, can use labetalol 10mg IV push if HR >60 bpm or hydralazine IV 5mg  discharge BP med recommendations per cardiology consult     #BPH   Continue doxazosin  Continue finasteride  Low threshold for repeating bladder scan (if new tachycardia, elevated BP)

## 2022-09-02 NOTE — PROGRESS NOTE ADULT - SUBJECTIVE AND OBJECTIVE BOX
24 hr events: Transferred from ICU, verde removed and passed TOV    SUBJECTIVE: Patient feels well. No particular complaints. No respiratory distress. Abdominal pain well controlled. Alert and oriented.     Vital Signs Last 24 Hrs  T(C): 36.4 (03 Sep 2022 09:27), Max: 37.1 (02 Sep 2022 18:17)  T(F): 97.5 (03 Sep 2022 09:27), Max: 98.7 (02 Sep 2022 18:17)  HR: 60 (03 Sep 2022 08:01) (60 - 74)  BP: 158/75 (03 Sep 2022 08:01) (107/57 - 158/75)  BP(mean): 108 (03 Sep 2022 08:01) (76 - 108)  RR: 17 (03 Sep 2022 08:01) (15 - 18)  SpO2: 98% (03 Sep 2022 08:01) (96% - 98%)    Parameters below as of 03 Sep 2022 08:01  Patient On (Oxygen Delivery Method): room air    Physical Exam:  General: NAD  Pulmonary: Nonlabored breathing, no respiratory distress  Cardiovascular: NSR  Abdominal: soft, NT/ND, no organomegaly, port site incisions c/d/i  Extremities: WWP, SCDs in place  Lines/drains/tubes: RENATA w/ serosanguinous output    I/O  UOP - 400 (1.8L)  RENATA - 0    LABS:                        8.2    5.50  )-----------( 239      ( 02 Sep 2022 05:30 )             25.7     09-02    133<L>  |  98  |  8   ----------------------------<  104<H>  4.1   |  25  |  1.01    Ca    8.6      02 Sep 2022 05:30  Phos  2.1     09-02  Mg     1.8     09-02    TPro  6.9  /  Alb  2.7<L>  /  TBili  0.4  /  DBili  x   /  AST  14  /  ALT  18  /  AlkPhos  203<H>  09-02    PTT - ( 02 Sep 2022 19:48 )  PTT:55.1 sec    CAPILLARY BLOOD GLUCOSE      POCT Blood Glucose.: 117 mg/dL (03 Sep 2022 11:25)  POCT Blood Glucose.: 103 mg/dL (03 Sep 2022 06:10)  POCT Blood Glucose.: 160 mg/dL (02 Sep 2022 21:24)  POCT Blood Glucose.: 138 mg/dL (02 Sep 2022 16:08)    LIVER FUNCTIONS - ( 02 Sep 2022 05:30 )  Alb: 2.7 g/dL / Pro: 6.9 g/dL / ALK PHOS: 203 U/L / ALT: 18 U/L / AST: 14 U/L / GGT: x           Assessment: 71 y/o M with colon mass s/p partial colectomy (2017), appendectomy, hernia repair, aortic and iliac aneurysm surgery (2017) s/p stent placement, HTN, HLD, paroxysmal atrial fibrillation on Eliquis, blood clotting disorder s/p IVC filter, right knee ligament repair, pacemaker implant (2004) s/p recent ppm interrogation, herniated disc and related surgery in [id number] complicated by spinal fusion, local hematoma, foot drop with chronic pain s/p failed intrathecal morphine pump, recently admitted to [facility] on 08/07 with acute cholecystitis c/b cardiomyopathy s/p percutaneous cholecystostomy tube by IR. pt readmitted 8/20 for concerns of bleeding at the perc yonis tube site. s/p ERCP 8/26 with removal of several small and medium sized stones, transferred to SICU 8/27 for transient altered mental status, suspicious for narcotic intoxication (resolved) with negative work up. Now s/p RA cholecystectomy and intrathecal morphine pump 8/30/22. Will continue to monitor for acute delirium post-procedure whilst controlling his pain.     Plan:   LFD  Check with Neurosurgery regarding removing RENATA drain. Otherwise RENATA not to suction.   Pain/nausea control, pain mangement onboard  Toprol XL 50 bid, Hydalizine 50 q8h, Zocor 5 qhs, Entresto  PPI/Miralax/Senna/Folic Acid  Eliquis/SCDs   Home meds as appropriate  AM Labs 24 hr events: Transferred from ICU, verde removed and passed TOV    SUBJECTIVE: Patient feels well. No particular complaints. No respiratory distress. Abdominal pain well controlled. Alert and oriented.     Vital Signs Last 24 Hrs  T(C): 36.4 (03 Sep 2022 09:27), Max: 37.1 (02 Sep 2022 18:17)  T(F): 97.5 (03 Sep 2022 09:27), Max: 98.7 (02 Sep 2022 18:17)  HR: 60 (03 Sep 2022 08:01) (60 - 74)  BP: 158/75 (03 Sep 2022 08:01) (107/57 - 158/75)  BP(mean): 108 (03 Sep 2022 08:01) (76 - 108)  RR: 17 (03 Sep 2022 08:01) (15 - 18)  SpO2: 98% (03 Sep 2022 08:01) (96% - 98%)    Parameters below as of 03 Sep 2022 08:01  Patient On (Oxygen Delivery Method): room air    Physical Exam:  General: NAD  Pulmonary: Nonlabored breathing, no respiratory distress  Cardiovascular: NSR  Abdominal: soft, NT/ND, no organomegaly, port site incisions c/d/i  Extremities: WWP, SCDs in place  Lines/drains/tubes: RENATA w/ serosanguinous output    I/O  UOP - 400 (1.8L)  RENATA - 0    LABS:                        8.2    5.50  )-----------( 239      ( 02 Sep 2022 05:30 )             25.7     09-02    133<L>  |  98  |  8   ----------------------------<  104<H>  4.1   |  25  |  1.01    Ca    8.6      02 Sep 2022 05:30  Phos  2.1     09-02  Mg     1.8     09-02    TPro  6.9  /  Alb  2.7<L>  /  TBili  0.4  /  DBili  x   /  AST  14  /  ALT  18  /  AlkPhos  203<H>  09-02    PTT - ( 02 Sep 2022 19:48 )  PTT:55.1 sec    CAPILLARY BLOOD GLUCOSE      POCT Blood Glucose.: 117 mg/dL (03 Sep 2022 11:25)  POCT Blood Glucose.: 103 mg/dL (03 Sep 2022 06:10)  POCT Blood Glucose.: 160 mg/dL (02 Sep 2022 21:24)  POCT Blood Glucose.: 138 mg/dL (02 Sep 2022 16:08)    LIVER FUNCTIONS - ( 02 Sep 2022 05:30 )  Alb: 2.7 g/dL / Pro: 6.9 g/dL / ALK PHOS: 203 U/L / ALT: 18 U/L / AST: 14 U/L / GGT: x           Assessment: 73 y/o M with colon mass s/p partial colectomy (2017), appendectomy, hernia repair, aortic and iliac aneurysm surgery (2017) s/p stent placement, HTN, HLD, paroxysmal atrial fibrillation on Eliquis, blood clotting disorder s/p IVC filter, right knee ligament repair, pacemaker implant (2004) s/p recent ppm interrogation, herniated disc and related surgery in [id number] complicated by spinal fusion, local hematoma, foot drop with chronic pain s/p failed intrathecal morphine pump, recently admitted to [facility] on 08/07 with acute cholecystitis c/b cardiomyopathy s/p percutaneous cholecystostomy tube by IR. pt readmitted 8/20 for concerns of bleeding at the perc yonis tube site. s/p ERCP 8/26 with removal of several small and medium sized stones, transferred to SICU 8/27 for transient altered mental status, suspicious for narcotic intoxication (resolved) with negative work up. Now s/p RA cholecystectomy and intrathecal morphine pump 8/30/22. Will continue to monitor for acute delirium post-procedure whilst controlling his pain.     Plan:   LFD  Check with Neurosurgery regarding removing RENATA drain. Otherwise RENATA not to suction.   Pain/nausea control, pain mangement onboard  Toprol XL 50 bid, Hydalizine 50 q8h, Zocor 5 qhs, Entresto  PPI/Miralax/Senna/Folic Acid  Titrating heparin/SCDs   Home meds as appropriate  AM Labs

## 2022-09-02 NOTE — PROGRESS NOTE ADULT - NS ATTEND OPT1 GEN_ALL_CORE

## 2022-09-02 NOTE — PROGRESS NOTE ADULT - SUBJECTIVE AND OBJECTIVE BOX
HPI:  71 y/o M with colon mass s/p partial colectomy (2017), appendectomy, hernia repair, aortic and iliac aneurysm surgery (2017) s/p stent placement, HTN, HLD, paroxysmal atrial fibrillation on Eliquis, blood clotting disorder s/p IVC filter placement, right knee ligament repair, pacemaker implant (2004) s/p recent ppm interrogation on Eliquis, herniated disc and related surgery in [id number] complicated by spinal fusion, local hematoma, foot drop with chronic pain s/p failed intrathecal morphine pump which caused bradycardia and left hip prosthesis. Patient was recently admitted to [facility] on 08/07 with acute cholecystitis. During that admission patient developed takotsubo cardiomyopathy requiring MICU care. Because patient condition at that time, and not medically optimized, patient was not deemed a surgical candidate for cholecystectomy, and underwent a percutaneous cholecystostomy tube by IR. Patient tolerated the procedure well and was subsequently discharged to [facility]. 3 days ago patient was noted to have blood on the dressings covering the perc yonis, and called Dr. Doyle to update him; per family member Dr. Doyle advised them to stop the eliquis and continue to monitor the site for any bleeding. Patient stopped taking the Eliquis but next morning dressing was saturated with blood again, for which they called Dr. Doyle again and he instructed them to come to [facility] ED. Per rehab the drain was consistently putting out 300cc/d and did not have any blood in the tubing. On presentation, patient afebrile, HD stable, wbc 9.7, Hb 9.7 stable from 9.5 at discharge; LFTs wnl, Cr slightly elevated from discharge. On PE dressing noted to have some blood, on removal of dressing no obvious sign of bleeding and/or hematoma, abdomen is soft, NT/ND. Patient denied fever/nausea/vomit/diarrhea/pruritus. CTAP with percutaneous cholecystostomy catheter in collapsed gallbladder.          (20 Aug 2022 19:20)    Vital Signs Last 24 Hrs  T(C): 36.9 (02 Sep 2022 13:07), Max: 37.1 (01 Sep 2022 17:04)  T(F): 98.5 (02 Sep 2022 13:07), Max: 98.7 (01 Sep 2022 17:04)  HR: 60 (02 Sep 2022 09:58) (60 - 78)  BP: 134/76 (02 Sep 2022 09:58) (116/83 - 170/95)  BP(mean): 98 (02 Sep 2022 09:58) (95 - 124)  RR: 13 (02 Sep 2022 09:58) (13 - 30)  SpO2: 97% (02 Sep 2022 09:58) (91% - 98%)    Parameters below as of 02 Sep 2022 09:58  Patient On (Oxygen Delivery Method): room air        I&O's Summary    01 Sep 2022 07:01  -  02 Sep 2022 07:00  --------------------------------------------------------  IN: 1603 mL / OUT: 2085 mL / NET: -482 mL    02 Sep 2022 07:01  -  02 Sep 2022 14:33  --------------------------------------------------------  IN: 550 mL / OUT: 525 mL / NET: 25 mL        PHYSICAL EXAM:  GEN: laying in bed, appears well, NAD  NEURO: AOx3. FC, OE spont, speech intact, face symmetric. CNII-XII intact. PERRL, EOMI. No pronator drift. MAEx4. 5/5 strength throughout. SILT  CV: RRR +S1/S2  PULM: CTAB  GI: Abd soft, NT/ND  EXT: ext warm, dry, nontender  WOUND: LLQ abdominal incision c/d/i    LABS:                        8.2    5.50  )-----------( 239      ( 02 Sep 2022 05:30 )             25.7     09-02    133<L>  |  98  |  8   ----------------------------<  104<H>  4.1   |  25  |  1.01    Ca    8.6      02 Sep 2022 05:30  Phos  2.1     09-02  Mg     1.8     09-02    TPro  6.9  /  Alb  2.7<L>  /  TBili  0.4  /  DBili  x   /  AST  14  /  ALT  18  /  AlkPhos  203<H>  09-02    PTT - ( 02 Sep 2022 05:30 )  PTT:92.2 sec        CAPILLARY BLOOD GLUCOSE      POCT Blood Glucose.: 116 mg/dL (02 Sep 2022 11:54)  POCT Blood Glucose.: 104 mg/dL (02 Sep 2022 06:26)  POCT Blood Glucose.: 113 mg/dL (01 Sep 2022 21:23)  POCT Blood Glucose.: 150 mg/dL (01 Sep 2022 18:20)      Drug Levels: [] N/A    CSF Analysis: [] N/A      Allergies    Altace (Unknown)  penicillin (Unknown)    Intolerances      MEDICATIONS:  Antibiotics:    Neuro:  acetaminophen     Tablet .. 650 milliGRAM(s) Oral every 6 hours  HYDROmorphone  Injectable 0.5 milliGRAM(s) IV Push every 4 hours PRN  melatonin 3 milliGRAM(s) Oral at bedtime  nortriptyline 75 milliGRAM(s) Oral at bedtime  ondansetron Injectable 4 milliGRAM(s) IV Push every 6 hours PRN  oxyCODONE    IR 15 milliGRAM(s) Oral every 4 hours PRN  oxyCODONE  ER Tablet 20 milliGRAM(s) Oral every 8 hours  vortioxetine 20 milliGRAM(s) Oral every 24 hours    Anticoagulation:  heparin  Infusion 1000 Unit(s)/Hr IV Continuous <Continuous>    OTHER:  artificial tears (preservative free) Ophthalmic Solution 1 Drop(s) Both EYES two times a day  dextrose 50% Injectable 25 Gram(s) IV Push once  dextrose 50% Injectable 25 Gram(s) IV Push once  dextrose 50% Injectable 12.5 Gram(s) IV Push once  dextrose Oral Gel 15 Gram(s) Oral once PRN  doxazosin 4 milliGRAM(s) Oral at bedtime  finasteride 5 milliGRAM(s) Oral daily  glucagon  Injectable 1 milliGRAM(s) IntraMuscular once  hydrALAZINE 50 milliGRAM(s) Oral every 12 hours  hydrALAZINE Injectable 10 milliGRAM(s) IV Push every 6 hours PRN  insulin lispro (ADMELOG) corrective regimen sliding scale   SubCutaneous Before meals and at bedtime  lidocaine   4% Patch 1 Patch Transdermal every 24 hours  lidocaine   4% Patch 1 Patch Transdermal every 24 hours  metoprolol succinate ER 50 milliGRAM(s) Oral every 12 hours  pantoprazole    Tablet 40 milliGRAM(s) Oral before breakfast  polyethylene glycol 3350 17 Gram(s) Oral two times a day  sacubitril 24 mG/valsartan 26 mG 1 Tablet(s) Oral every 12 hours  senna 2 Tablet(s) Oral at bedtime  simvastatin 5 milliGRAM(s) Oral at bedtime    IVF:  dextrose 5%. 1000 milliLiter(s) IV Continuous <Continuous>  dextrose 5%. 1000 milliLiter(s) IV Continuous <Continuous>  folic acid 1 milliGRAM(s) Oral daily    CULTURES:    RADIOLOGY & ADDITIONAL TESTS:      ASSESSMENT:   71 y/o M with colon mass s/p partial colectomy (2017), appendectomy, hernia repair, aortic and iliac aneurysm surgery (2017) s/p stent placement, HTN, HLD, paroxysmal atrial fibrillation on Eliquis, blood clotting disorder s/p IVC filter, right knee ligament repair, pacemaker implant (2004) s/p recent ppm interrogation, herniated disc and related surgery in [id number] complicated by spinal fusion, local hematoma, foot drop with chronic pain s/p failed intrathecal morphine pump, recently admitted to [facility] on 08/07 with acute cholecystitis c/b cardiomyopathy s/p percutaneous cholecystostomy tube by IR. pt readmitted 8/20 for concerns of bleeding at the perc yonis tube site. s/p ERCP 8/26 with removal of several small and medium sized stones, transferred to SICU 8/27 for transient altered mental status, suspicious for narcotic intoxication (resolved) with negative work up. Now s/p RA cholecystectomy and ventral removal of intrathecal morphine pump 8/30/22    PLAN:   - Recommend removal of LLQ RENATA  - Please place abdominal binder, should be worn at all times when not flat in bed  - Monitor for positional headaches, low suspicion for CSF leak    Discussed with Dr. D'Amico

## 2022-09-02 NOTE — PROGRESS NOTE ADULT - SUBJECTIVE AND OBJECTIVE BOX
Patient is a 72y old  Male who presents with a chief complaint of bleeding around perc yonis site (02 Sep 2022 14:32)    INTERVAL EVENTS:  - tolerating low fat diet   - no dyspnea, no dysuria. ; urinating without difficulty     SUBJECTIVE:  Patient was seen and examined at bedside.  Review of systems: No fever, chills, CP, dyspnea, nausea or vomiting, dysuria, LE edema. Rest of 12 point Review of systems negative unless otherwise documented elsewhere in note.     Diet, Regular:   Low Fat (LOWFAT) (09-01-22 @ 07:59) [Active]    MEDICATIONS:  MEDICATIONS  (STANDING):  acetaminophen     Tablet .. 650 milliGRAM(s) Oral every 6 hours  artificial tears (preservative free) Ophthalmic Solution 1 Drop(s) Both EYES two times a day  dextrose 5%. 1000 milliLiter(s) (50 mL/Hr) IV Continuous <Continuous>  dextrose 5%. 1000 milliLiter(s) (100 mL/Hr) IV Continuous <Continuous>  dextrose 50% Injectable 25 Gram(s) IV Push once  dextrose 50% Injectable 12.5 Gram(s) IV Push once  dextrose 50% Injectable 25 Gram(s) IV Push once  doxazosin 4 milliGRAM(s) Oral at bedtime  finasteride 5 milliGRAM(s) Oral daily  folic acid 1 milliGRAM(s) Oral daily  glucagon  Injectable 1 milliGRAM(s) IntraMuscular once  heparin  Infusion 1000 Unit(s)/Hr (10 mL/Hr) IV Continuous <Continuous>  hydrALAZINE 50 milliGRAM(s) Oral every 12 hours  insulin lispro (ADMELOG) corrective regimen sliding scale   SubCutaneous Before meals and at bedtime  lidocaine   4% Patch 1 Patch Transdermal every 24 hours  lidocaine   4% Patch 1 Patch Transdermal every 24 hours  melatonin 3 milliGRAM(s) Oral at bedtime  metoprolol succinate ER 50 milliGRAM(s) Oral every 12 hours  nortriptyline 75 milliGRAM(s) Oral at bedtime  oxyCODONE  ER Tablet 20 milliGRAM(s) Oral every 8 hours  pantoprazole    Tablet 40 milliGRAM(s) Oral before breakfast  polyethylene glycol 3350 17 Gram(s) Oral two times a day  sacubitril 24 mG/valsartan 26 mG 1 Tablet(s) Oral every 12 hours  senna 2 Tablet(s) Oral at bedtime  simvastatin 5 milliGRAM(s) Oral at bedtime  vortioxetine 20 milliGRAM(s) Oral every 24 hours    MEDICATIONS  (PRN):  dextrose Oral Gel 15 Gram(s) Oral once PRN Blood Glucose LESS THAN 70 milliGRAM(s)/deciliter  hydrALAZINE Injectable 10 milliGRAM(s) IV Push every 6 hours PRN SBP> 160  HYDROmorphone  Injectable 0.5 milliGRAM(s) IV Push every 4 hours PRN Severe Pain (7 - 10)  ondansetron Injectable 4 milliGRAM(s) IV Push every 6 hours PRN Nausea and/or Vomiting  oxyCODONE    IR 15 milliGRAM(s) Oral every 4 hours PRN Moderate Pain (4 - 6)      Allergies    Altace (Unknown)  penicillin (Unknown)    Intolerances        OBJECTIVE:  Vital Signs Last 24 Hrs  T(C): 37.1 (02 Sep 2022 18:17), Max: 37.1 (02 Sep 2022 18:17)  T(F): 98.7 (02 Sep 2022 18:17), Max: 98.7 (02 Sep 2022 18:17)  HR: 74 (02 Sep 2022 14:54) (60 - 74)  BP: 133/80 (02 Sep 2022 14:54) (118/71 - 162/74)  BP(mean): 102 (02 Sep 2022 14:54) (89 - 111)  RR: 16 (02 Sep 2022 14:54) (13 - 17)  SpO2: 97% (02 Sep 2022 14:54) (95% - 98%)    Parameters below as of 02 Sep 2022 14:54  Patient On (Oxygen Delivery Method): room air      I&O's Summary    01 Sep 2022 07:01  -  02 Sep 2022 07:00  --------------------------------------------------------  IN: 1603 mL / OUT: 2085 mL / NET: -482 mL    02 Sep 2022 07:01  -  02 Sep 2022 20:13  --------------------------------------------------------  IN: 580 mL / OUT: 535 mL / NET: 45 mL      PHYSICAL EXAM:  Gen: Reclining in bed at time of exam, appears stated age  HEENT: NCAT, MMM, clear OP  Neck: supple, trachea at midline  CV: RRR, +S1/S2  Pulm: adequate respiratory effort, no increase in work of breathing  Abd: soft, ND; RENATA drain in place, with serosanguinous output ;  incisions clean, dry   Skin: warm and dry,   Ext: WWP, no LE edema  Neuro: AOx3, no gross focal neurological deficits  Psych: affect and behavior appropriate, pleasant at time of interview    LABS:                        8.2    5.50  )-----------( 239      ( 02 Sep 2022 05:30 )             25.7     09-02    133<L>  |  98  |  8   ----------------------------<  104<H>  4.1   |  25  |  1.01    Ca    8.6      02 Sep 2022 05:30  Phos  2.1     09-02  Mg     1.8     09-02    TPro  6.9  /  Alb  2.7<L>  /  TBili  0.4  /  DBili  x   /  AST  14  /  ALT  18  /  AlkPhos  203<H>  09-02    LIVER FUNCTIONS - ( 02 Sep 2022 05:30 )  Alb: 2.7 g/dL / Pro: 6.9 g/dL / ALK PHOS: 203 U/L / ALT: 18 U/L / AST: 14 U/L / GGT: x           PTT - ( 02 Sep 2022 19:48 )  PTT:55.1 sec  CAPILLARY BLOOD GLUCOSE      POCT Blood Glucose.: 138 mg/dL (02 Sep 2022 16:08)  POCT Blood Glucose.: 116 mg/dL (02 Sep 2022 11:54)  POCT Blood Glucose.: 104 mg/dL (02 Sep 2022 06:26)  POCT Blood Glucose.: 113 mg/dL (01 Sep 2022 21:23)        MICRODATA:      RADIOLOGY/OTHER STUDIES:

## 2022-09-02 NOTE — PROGRESS NOTE ADULT - NS ATTEND AMEND GEN_ALL_CORE FT
72y old Male with a history of colon mass s/p partial colectomy, aortic and iliac aneurysm surgery (2017), HTN, HLD, paroxysmal afib on Eliquis, pacemaker implant (2004), multiple orthopedic surgeries including spine and right hip, intrathecal morphine pump placement (2004) reported to be inactive and not in use scheduled for laparoscopic cholecystectomy today. Pt evaluated for pain control. Chart reviewed, Medications/allergies reviewed. Pain control options discussed. We will continue the comprehensive medical management and titrate to pain control and side effects. We will follow up. Dr. Richardson
72y old Male with a history of colon mass s/p partial colectomy, aortic and iliac aneurysm surgery (2017), HTN, HLD, paroxysmal afib on Eliquis, pacemaker implant (2004), multiple orthopedic surgeries including spine and right hip, intrathecal morphine pump placement (2004) reported to be inactive and not in use, s/p laparoscopic cholecystectomy POD 3, doing well. Pt evaluated for pain control. Chart reviewed, Medications/allergies reviewed. Pain control options discussed. We will continue the comprehensive medical management and titrate to pain control and side effects. We will follow up. Dr. Richardson
72y old Male with a history of colon mass s/p partial colectomy, aortic and iliac aneurysm surgery (2017), HTN, HLD, paroxysmal afib on Eliquis, pacemaker implant (2004), multiple orthopedic surgeries including spine and right hip, intrathecal morphine pump placement (2004) reported to be inactive and not in use, s/p laparoscopic cholecystectomy POD 2, with moderate pain. Pt evaluated for pain control. Chart reviewed, Medications/allergies reviewed. Pain control options discussed. We will continue the comprehensive medical management and titrate to pain control and side effects. We will follow up. Dr. Richardson
s/p cholecystectomy and removal of morphine pump; AF, chronic pain, HTN  physical as above  continue to reassure and reorient  to try to minimize opiates above his basal needs  AC when cleared by surgery  continue hydralazine and restart entresto  decision making of high complexity
72y old Male with a history of colon mass s/p partial colectomy, aortic and iliac aneurysm surgery (2017), HTN, HLD, paroxysmal afib on Eliquis, pacemaker implant (2004), multiple orthopedic surgeries including spine and right hip, intrathecal morphine pump placement (2004) reported to be inactive and not in use, s/p laparoscopic cholecystectomy POD 1, with moderate pain. Pt evaluated for pain control. Chart reviewed, Medications/allergies reviewed. Pain control options discussed. We will continue the comprehensive medical management and titrate to pain control and side effects. We will follow up. Dr. Richardson
Metabolic encephalopathy cholecystitis, PAF, HTN  physical as above  for cholecystectomy and removal of morphine pump in OR  encephalopathy has resolved  careful titration of pain meds  restart heparin when cleared by surgery  rest as above  decision making of high complexity

## 2022-09-02 NOTE — CHART NOTE - NSCHARTNOTEFT_GEN_A_CORE
Admitting Diagnosis:   Patient is a 72y old  Male who presents with a chief complaint of bleeding around perc yonis site (01 Sep 2022 12:52)    PAST MEDICAL & SURGICAL HISTORY:  AAA (abdominal aortic aneurysm)  HTN (hypertension)  Cardiomyopathy  Hyperlipidemia  ONOFRE (dyspnea on exertion)  DVT (deep venous thrombosis)  Pulmonary emphysema  COPD  Cardiac arrhythmia  Aneurysm of aortic root  Depression  anxiety  Anemia  Pelvic mass  Pacemaker  medtronic  History of kidney surgery  History of back surgery  multiple, lumbar  Surgery, elective  multiple neck surgery, cervical  S/P hip replacement, left  S/P arthroscopy of right knee  S/P AAA (abdominal aortic aneurysm) repair  with right iliac aneurysm endovascular repair  S/P carpal tunnel release  Surgery, elective  Cardiac Stent x4  Surgery, elective  Intrathecal pump placement    Current Nutrition Order:  Low fat diet     PO Intake: Good (%) [   ]  Fair (50-75%) [ x  ] Poor (<25%) [   ]    GI Issues:  WDL, awaiting BM, +F  No n/v/d/c  No abd distention or discomfort    Pain:  No pain noted    Skin Integrity:  Surgical incision, bobby score 19  +1 pitting edema left arm  No pressure ulcers noted    Labs:   09-02    133<L>  |  98  |  8   ----------------------------<  104<H>  4.1   |  25  |  1.01    Ca    8.6      02 Sep 2022 05:30  Phos  2.1     09-02  Mg     1.8     09-02    TPro  6.9  /  Alb  2.7<L>  /  TBili  0.4  /  DBili  x   /  AST  14  /  ALT  18  /  AlkPhos  203<H>  09-02    CAPILLARY BLOOD GLUCOSE    POCT Blood Glucose.: 116 mg/dL (02 Sep 2022 11:54)  POCT Blood Glucose.: 104 mg/dL (02 Sep 2022 06:26)  POCT Blood Glucose.: 113 mg/dL (01 Sep 2022 21:23)  POCT Blood Glucose.: 150 mg/dL (01 Sep 2022 18:20)    Medications:  MEDICATIONS  (STANDING):  acetaminophen     Tablet .. 650 milliGRAM(s) Oral every 6 hours  artificial tears (preservative free) Ophthalmic Solution 1 Drop(s) Both EYES two times a day  dextrose 5%. 1000 milliLiter(s) (50 mL/Hr) IV Continuous <Continuous>  dextrose 5%. 1000 milliLiter(s) (100 mL/Hr) IV Continuous <Continuous>  dextrose 50% Injectable 25 Gram(s) IV Push once  dextrose 50% Injectable 25 Gram(s) IV Push once  dextrose 50% Injectable 12.5 Gram(s) IV Push once  doxazosin 4 milliGRAM(s) Oral at bedtime  finasteride 5 milliGRAM(s) Oral daily  folic acid 1 milliGRAM(s) Oral daily  glucagon  Injectable 1 milliGRAM(s) IntraMuscular once  heparin  Infusion 1000 Unit(s)/Hr (10 mL/Hr) IV Continuous <Continuous>  hydrALAZINE 50 milliGRAM(s) Oral every 12 hours  insulin lispro (ADMELOG) corrective regimen sliding scale   SubCutaneous Before meals and at bedtime  lidocaine   4% Patch 1 Patch Transdermal every 24 hours  lidocaine   4% Patch 1 Patch Transdermal every 24 hours  melatonin 3 milliGRAM(s) Oral at bedtime  metoprolol succinate ER 50 milliGRAM(s) Oral every 12 hours  nortriptyline 75 milliGRAM(s) Oral at bedtime  oxyCODONE  ER Tablet 20 milliGRAM(s) Oral every 8 hours  pantoprazole    Tablet 40 milliGRAM(s) Oral before breakfast  polyethylene glycol 3350 17 Gram(s) Oral two times a day  sacubitril 24 mG/valsartan 26 mG 1 Tablet(s) Oral every 12 hours  senna 2 Tablet(s) Oral at bedtime  simvastatin 5 milliGRAM(s) Oral at bedtime  vortioxetine 20 milliGRAM(s) Oral every 24 hours    MEDICATIONS  (PRN):  dextrose Oral Gel 15 Gram(s) Oral once PRN Blood Glucose LESS THAN 70 milliGRAM(s)/deciliter  hydrALAZINE Injectable 10 milliGRAM(s) IV Push every 6 hours PRN SBP> 160  HYDROmorphone  Injectable 0.5 milliGRAM(s) IV Push every 4 hours PRN Severe Pain (7 - 10)  ondansetron Injectable 4 milliGRAM(s) IV Push every 6 hours PRN Nausea and/or Vomiting  oxyCODONE    IR 15 milliGRAM(s) Oral every 4 hours PRN Moderate Pain (4 - 6)    Admitted Anthropometrics:   Ht: 5'9  Wt: 199lbs   BMI: 29.3   IBW: 160lbs   %IBW: 124    Weight Change:   8/20: 199lbs - admit wt  8/29: 198lbs   **1lb wt loss, not significant. Please continue to trend wts weekly to assess for changes.     Nutrition Focused Physical Exam: Completed [   ]  Not Pertinent [ x ]    Estimated energy needs:    lbs. %%. IBW used to calculate energy needs due to pt's current body weight exceeding 120% of IBW (124%). Needs adjusted s/p yonis/procedure, advanced age.   Kcal: 1450-1812kcal/day (based on 20-25kcal/kg)   Pro: 87-102g/day (based on 1.2-1.4g/kg)  mL: 2175-2537mL/day (based on 30-35mL/kg)     Subjective:   71 y/o M with colon mass s/p partial colectomy (2017), appendectomy, hernia repair, aortic and iliac aneurysm surgery (2017) s/p stent placement, HTN, HLD, paroxysmal atrial fibrillation on Eliquis, blood clotting disorder s/p IVC filter, right knee ligament repair, pacemaker implant (2004) s/p recent ppm interrogation, herniated disc and related surgery in [id number] complicated by spinal fusion, local hematoma, foot drop with chronic pain s/p failed intrathecal morphine pump, recently admitted to [facility] on 08/07 with acute cholecystitis c/b cardiomyopathy s/p percutaneous cholecystostomy tube by IR. pt readmitted 8/20 for concerns of bleeding at the perc yonis tube site. s/p ERCP 8/26 with removal of several small and medium sized stones, transferred to SICU 8/27 for transient altered mental status, suspicious for narcotic intoxication (resolved), pt now s/p lap yonis 8/30.     On assessment, pt resting in bed. Currently on low fat diet- appetite slowly improving. Pt had consumed ~50% of breakfast this am. Pt noted difficulty chewing due to ill-fitting dentures/ being edentulous at this time. Mainly choosing breads, pastas, and mash potatoes, and barely any protein due to chewing difficulty. Pt may benefit from transitioning to soft and bite sized diet to aid with chewing- disc with team. No n/v/d/c. No abd distention or discomfort. Education provided on low fat diet guidelines, general healthy diet and importance of adequate protein intake- pt receptive. Please see nutr recs below.     Previous Nutrition Diagnosis: Inadequate Energy intake RT decreased appetite following sx, difficulty chewing AEB consuming ~50% of meals    Active [ x ]  Resolved [   ]    Goal:   1. Consistently meet >75% est needs    Recommendations:   1. Cont with Low fat diet  >>Recommend addition of Ensure Enlive BID (700 kcal, 40g protein, 360 mL free H2O)  >>Recommend change diet consistency to soft and bite sized to aid with chewing difficulty.   2. Monitor %PO intake, diet tolerance.   >>Encourage PO intake as appropriate   3. Monitor lytes, replete prn. Monitor BG.   4. Trend weekly wts. Monitor skin integrity, s/sx GI distress.   5. Pain/BM regimen per team   6. RD diet edu prn.   7. RD to remain available for additional nutrition interventions as needed.     Education:   Education provided on low fat diet guidelines, general healthy diet and importance of adequate protein intake- pt receptive.     Risk Level: High [ x ] Moderate [   ] Low [   ].

## 2022-09-02 NOTE — PROGRESS NOTE ADULT - SUBJECTIVE AND OBJECTIVE BOX
Pain Management Progress Note - [facility] [phone]    HPI: Patient seen and examined today. Patient reports endorsing no pain this AM. Patient reports adequate pain control with current regimen and denies any side effects.     Pertinent PMH: Pain at: ___Back ___Neck___Knee ___Hip ___Shoulder __X_ Opioid tolerance    Pain is ___ sharp ____dull ___burning ___achy ___ Intensity: ____ mild ____mod ____severe     Location ___X__surgical site _____cervical _____lumbar ___X_abd _____upper ext____lower ext    Worse with _X___activity ____movement _____physical therapy___ Rest    Improved with ___X_medication ____rest ____physical therapy    PAST MEDICAL & SURGICAL HISTORY:  AAA (abdominal aortic aneurysm)  HTN (hypertension)  Cardiomyopathy  Hyperlipidemia  ONOFRE (dyspnea on exertion)  DVT (deep venous thrombosis)  Pulmonary emphysema  COPD  Cardiac arrhythmia  Aneurysm of aortic root  Depression  anxiety  Anemia  Pelvic mass  Pacemaker  medtronic  History of kidney surgery  History of back surgery  multiple, lumbar  Surgery, elective  multiple neck surgery, cervical  S/P hip replacement, left  S/P arthroscopy of right knee  S/P AAA (abdominal aortic aneurysm) repair  with right iliac aneurysm endovascular repair  S/P carpal tunnel release  Surgery, elective  Cardiac Stent x4  Surgery, elective  Intrathecal pump placement    MEDICATIONS:  magnesium sulfate  IVPB  potassium phosphate / sodium phosphate Powder (PHOS-NaK)  magnesium sulfate  IVPB  furosemide   Injectable  potassium chloride   Solution  pantoprazole    Tablet  folic acid  senna  polyethylene glycol 3350  vancomycin  IVPB  vortioxetine  acetaminophen     Tablet ..  heparin  Infusion  nortriptyline  sacubitril 24 mG/valsartan 26 mG  cefoTEtan  IVPB  lidocaine   4% Patch  lactated ringers.  HYDROmorphone  Injectable  magnesium sulfate  IVPB  glucagon  Injectable  dextrose 5%.  dextrose 50% Injectable  dextrose Oral Gel  insulin lispro (ADMELOG) corrective regimen sliding scale  cefoTEtan  IVPB  HYDROmorphone  Injectable  melatonin  artificial tears (preservative free) Ophthalmic Solution  hydrALAZINE  magnesium sulfate  IVPB  dextrose 5% + sodium chloride 0.9%.  magnesium sulfate  IVPB  potassium phosphate IVPB  heparin  Infusion  oxyCODONE    IR  hydrALAZINE Injectable  oxyCODONE  ER Tablet  heparin  Infusion.  potassium chloride    Tablet ER  magnesium sulfate  IVPB  hydrALAZINE Injectable  lactated ringers.  oxyCODONE  ER Tablet  diazepam    Tablet  ALPRAZolam  magnesium sulfate  IVPB  dextrose 5% + sodium chloride 0.9%.  sodium chloride  sodium chloride 0.9%.  hydrALAZINE  polyethylene glycol 3350  magnesium sulfate  IVPB  hydrALAZINE  metoprolol tartrate  sodium phosphate IVPB  lactated ringers.  hydrALAZINE  heparin  Infusion  heparin  Infusion.  potassium phosphate / sodium phosphate Powder (PHOS-NaK)  magnesium sulfate  IVPB  heparin  Infusion  heparin  Infusion  ondansetron Injectable  diazepam    Tablet  metoprolol succinate ER  simvastatin  finasteride  nortriptyline  oxyCODONE  ER Tablet  pantoprazole  Injectable  hydrALAZINE  doxazosin  ALPRAZolam  lactated ringers.  morphine  - Injectable    ROS: Const:  __N_febrile   Eyes:___ENT:___CV: __N_chest pain  Resp: __N__sob  GI:_N__nausea __N_vomiting __N__abd pain ___npo ___clears ___full diet __bm  :___ Musk: _N__pain ___spasm  Skin:___ Neuro:  __N_sedation___confusion___N_ numbness ___weakness _N__paresthesia  Psych:___anxiety  Endo:___ Heme:___Allergy:__ALTACE, PCN_    09-02 @ 05:301.01 mg/dL  Hemoglobin: 8.2 g/dL (09-02 @ 05:30)  Hemoglobin: 7.8 g/dL (09-01 @ 06:17)  T(C): 36.9 (09-02-22 @ 13:07), Max: 37.1 (09-01-22 @ 17:04)  HR: 60 (09-02-22 @ 09:58) (60 - 78)  BP: 134/76 (09-02-22 @ 09:58) (116/83 - 170/95)  RR: 13 (09-02-22 @ 09:58) (13 - 30)  SpO2: 97% (09-02-22 @ 09:58) (91% - 98%)  Wt(kg): --     PHYSICAL EXAM:  Gen Appearance: _X__no acute distress _X__appropriate       Neuro: ___SILT feet____ EOM Intact Psych: AAOX__3, _X__mood/affect appropriate        Eyes: __X_conjunctiva WNL  __X___ Pupils equal and round        ENT: __X_ears and nose atraumatic__X_ Hearing grossly intact        Neck: _X__trachea midline, no visible masses ___thyroid without palpable mass    Resp: __X_Nml WOB____No tactile fremitus ___clear to auscultation    Cardio: _X__extremities free from edema ____pedal pulses palpable    GI/Abdomen: ___soft _____ Nontender____X__Nondistended_____HSM    Lymphatic: ___no palpable nodes in neck  ___no palpable nodes calves and feet    Skin/Wound: ___Incision, ___Dressing c/d/i,   ____surrounding tissues soft,  ___drain/chest tube present____    Muscular: EHL ___/5  Gastrocnemius___/5    _X__absent clubbing/cyanosis         ASSESSMENT:  This is a 72y old Male with a history of colon mass s/p partial colectomy, aortic and iliac aneurysm surgery (2017), HTN, HLD, paroxysmal afib on eliquis, pacemaker implant (2004), multiple orthopedic surgeries including spine and right hip, intrathecal morphine pump placement (2004) reported to be inactive and not in use POD 3 S/P  laparoscopic cholecystectomy and removal of intra-thecal morphine pump, doing well.    Recommended Treatment PLAN:  1. Consider home-dose pain regimen. Oxycontin 20 mg PO TID, Oxycodone 15 mg PO q8h PRN severe pain  2. Consider continuing Dilaudid 0.5 mg IVP q4h PRN breakthrough pain  3. Consider continuing daily lidocaine patches to abdomen  4. Consider starting Tylenol 650 mg PO q6h PRN mild pain  HOLD ALL OPIOIDS FOR SEDATION, RR<10, O2SAT <93%, SBP <96  Plan discussed with Dr. Richardson

## 2022-09-03 LAB
GLUCOSE BLDC GLUCOMTR-MCNC: 103 MG/DL — HIGH (ref 70–99)
GLUCOSE BLDC GLUCOMTR-MCNC: 110 MG/DL — HIGH (ref 70–99)
GLUCOSE BLDC GLUCOMTR-MCNC: 117 MG/DL — HIGH (ref 70–99)
GLUCOSE BLDC GLUCOMTR-MCNC: 127 MG/DL — HIGH (ref 70–99)

## 2022-09-03 PROCEDURE — 99233 SBSQ HOSP IP/OBS HIGH 50: CPT

## 2022-09-03 RX ORDER — ALPRAZOLAM 0.25 MG
0.5 TABLET ORAL ONCE
Refills: 0 | Status: DISCONTINUED | OUTPATIENT
Start: 2022-09-03 | End: 2022-09-03

## 2022-09-03 RX ORDER — APIXABAN 2.5 MG/1
5 TABLET, FILM COATED ORAL
Refills: 0 | Status: DISCONTINUED | OUTPATIENT
Start: 2022-09-03 | End: 2022-09-06

## 2022-09-03 RX ADMIN — Medication 650 MILLIGRAM(S): at 18:07

## 2022-09-03 RX ADMIN — LIDOCAINE 1 PATCH: 4 CREAM TOPICAL at 19:07

## 2022-09-03 RX ADMIN — Medication 50 MILLIGRAM(S): at 10:34

## 2022-09-03 RX ADMIN — Medication 650 MILLIGRAM(S): at 11:48

## 2022-09-03 RX ADMIN — Medication 1 MILLIGRAM(S): at 11:47

## 2022-09-03 RX ADMIN — Medication 50 MILLIGRAM(S): at 21:20

## 2022-09-03 RX ADMIN — OXYCODONE HYDROCHLORIDE 15 MILLIGRAM(S): 5 TABLET ORAL at 10:35

## 2022-09-03 RX ADMIN — Medication 650 MILLIGRAM(S): at 17:49

## 2022-09-03 RX ADMIN — OXYCODONE HYDROCHLORIDE 20 MILLIGRAM(S): 5 TABLET ORAL at 06:28

## 2022-09-03 RX ADMIN — LIDOCAINE 1 PATCH: 4 CREAM TOPICAL at 21:30

## 2022-09-03 RX ADMIN — Medication 1 DROP(S): at 06:29

## 2022-09-03 RX ADMIN — LIDOCAINE 1 PATCH: 4 CREAM TOPICAL at 10:26

## 2022-09-03 RX ADMIN — OXYCODONE HYDROCHLORIDE 15 MILLIGRAM(S): 5 TABLET ORAL at 23:46

## 2022-09-03 RX ADMIN — LIDOCAINE 1 PATCH: 4 CREAM TOPICAL at 10:25

## 2022-09-03 RX ADMIN — Medication 650 MILLIGRAM(S): at 23:46

## 2022-09-03 RX ADMIN — Medication 650 MILLIGRAM(S): at 12:16

## 2022-09-03 RX ADMIN — Medication 650 MILLIGRAM(S): at 06:28

## 2022-09-03 RX ADMIN — POLYETHYLENE GLYCOL 3350 17 GRAM(S): 17 POWDER, FOR SOLUTION ORAL at 14:04

## 2022-09-03 RX ADMIN — APIXABAN 5 MILLIGRAM(S): 2.5 TABLET, FILM COATED ORAL at 11:48

## 2022-09-03 RX ADMIN — SIMVASTATIN 5 MILLIGRAM(S): 20 TABLET, FILM COATED ORAL at 22:29

## 2022-09-03 RX ADMIN — Medication 4 MILLIGRAM(S): at 21:21

## 2022-09-03 RX ADMIN — Medication 3 MILLIGRAM(S): at 21:20

## 2022-09-03 RX ADMIN — FINASTERIDE 5 MILLIGRAM(S): 5 TABLET, FILM COATED ORAL at 11:47

## 2022-09-03 RX ADMIN — OXYCODONE HYDROCHLORIDE 20 MILLIGRAM(S): 5 TABLET ORAL at 22:29

## 2022-09-03 RX ADMIN — Medication 50 MILLIGRAM(S): at 08:24

## 2022-09-03 RX ADMIN — HEPARIN SODIUM 11.5 UNIT(S)/HR: 5000 INJECTION INTRAVENOUS; SUBCUTANEOUS at 06:30

## 2022-09-03 RX ADMIN — OXYCODONE HYDROCHLORIDE 20 MILLIGRAM(S): 5 TABLET ORAL at 15:28

## 2022-09-03 RX ADMIN — Medication 650 MILLIGRAM(S): at 00:20

## 2022-09-03 RX ADMIN — OXYCODONE HYDROCHLORIDE 20 MILLIGRAM(S): 5 TABLET ORAL at 14:04

## 2022-09-03 RX ADMIN — Medication 0.5 MILLIGRAM(S): at 14:04

## 2022-09-03 RX ADMIN — SACUBITRIL AND VALSARTAN 1 TABLET(S): 24; 26 TABLET, FILM COATED ORAL at 06:29

## 2022-09-03 RX ADMIN — NORTRIPTYLINE HYDROCHLORIDE 75 MILLIGRAM(S): 10 CAPSULE ORAL at 21:21

## 2022-09-03 RX ADMIN — SACUBITRIL AND VALSARTAN 1 TABLET(S): 24; 26 TABLET, FILM COATED ORAL at 17:49

## 2022-09-03 RX ADMIN — PANTOPRAZOLE SODIUM 40 MILLIGRAM(S): 20 TABLET, DELAYED RELEASE ORAL at 06:28

## 2022-09-03 RX ADMIN — OXYCODONE HYDROCHLORIDE 20 MILLIGRAM(S): 5 TABLET ORAL at 22:34

## 2022-09-03 RX ADMIN — Medication 1 DROP(S): at 17:50

## 2022-09-03 RX ADMIN — OXYCODONE HYDROCHLORIDE 15 MILLIGRAM(S): 5 TABLET ORAL at 12:16

## 2022-09-03 NOTE — PROGRESS NOTE ADULT - SUBJECTIVE AND OBJECTIVE BOX
INTERVAL HPI/OVERNIGHT EVENTS:    STATUS POST:      POST OPERATIVE DAY #:     SUBJECTIVE:      doxazosin 4 milliGRAM(s) Oral at bedtime  heparin  Infusion 1000 Unit(s)/Hr IV Continuous <Continuous>  hydrALAZINE 50 milliGRAM(s) Oral every 12 hours  hydrALAZINE Injectable 10 milliGRAM(s) IV Push every 6 hours PRN  metoprolol succinate ER 50 milliGRAM(s) Oral every 12 hours  sacubitril 24 mG/valsartan 26 mG 1 Tablet(s) Oral every 12 hours      Vital Signs Last 24 Hrs  T(C): 36.9 (03 Sep 2022 05:01), Max: 37.1 (02 Sep 2022 18:17)  T(F): 98.4 (03 Sep 2022 05:01), Max: 98.7 (02 Sep 2022 18:17)  HR: 60 (03 Sep 2022 03:35) (60 - 74)  BP: 114/62 (03 Sep 2022 03:35) (114/61 - 143/79)  BP(mean): 80 (03 Sep 2022 03:35) (80 - 105)  RR: 17 (03 Sep 2022 03:35) (13 - 18)  SpO2: 97% (03 Sep 2022 03:35) (96% - 98%)    Parameters below as of 03 Sep 2022 03:35  Patient On (Oxygen Delivery Method): room air      I&O's Detail    01 Sep 2022 07:01  -  02 Sep 2022 07:00  --------------------------------------------------------  IN:    Heparin: 273 mL    Oral Fluid: 1330 mL  Total IN: 1603 mL    OUT:    Bulb (mL): 0 mL    Indwelling Catheter - Urethral (mL): 250 mL    Voided (mL): 1835 mL  Total OUT: 2085 mL    Total NET: -482 mL      02 Sep 2022 07:01  -  03 Sep 2022 06:04  --------------------------------------------------------  IN:    Heparin: 180.5 mL    Oral Fluid: 480 mL  Total IN: 660.5 mL    OUT:    Bulb (mL): 35 mL    Intermittent Catheterization - Urethral (mL): 550 mL    Voided (mL): 500 mL  Total OUT: 1085 mL    Total NET: -424.5 mL          General: NAD, resting comfortably in bed  C/V: NSR  Pulm: Nonlabored breathing, no respiratory distress  Abd: soft, NT/ND.  Extrem: WWP, no edema, SCDs in place  Drains:  Barber:      LABS:                        8.2    5.50  )-----------( 239      ( 02 Sep 2022 05:30 )             25.7     09-02    133<L>  |  98  |  8   ----------------------------<  104<H>  4.1   |  25  |  1.01    Ca    8.6      02 Sep 2022 05:30  Phos  2.1     09-02  Mg     1.8     09-02    TPro  6.9  /  Alb  2.7<L>  /  TBili  0.4  /  DBili  x   /  AST  14  /  ALT  18  /  AlkPhos  203<H>  09-02    PTT - ( 02 Sep 2022 19:48 )  PTT:55.1 sec      RADIOLOGY & ADDITIONAL STUDIES:   INTERVAL HPI/OVERNIGHT EVENTS: PTT 55, inc hep gtt to 11.5. RENATA paulino'isaac, abd binder on. , SC     STATUS POST: laparoscopic cholecystectomy, JPx1 (8/30)    SUBJECTIVE:      doxazosin 4 milliGRAM(s) Oral at bedtime  heparin  Infusion 1000 Unit(s)/Hr IV Continuous <Continuous>  hydrALAZINE 50 milliGRAM(s) Oral every 12 hours  hydrALAZINE Injectable 10 milliGRAM(s) IV Push every 6 hours PRN  metoprolol succinate ER 50 milliGRAM(s) Oral every 12 hours  sacubitril 24 mG/valsartan 26 mG 1 Tablet(s) Oral every 12 hours      Vital Signs Last 24 Hrs  T(C): 36.9 (03 Sep 2022 05:01), Max: 37.1 (02 Sep 2022 18:17)  T(F): 98.4 (03 Sep 2022 05:01), Max: 98.7 (02 Sep 2022 18:17)  HR: 60 (03 Sep 2022 03:35) (60 - 74)  BP: 114/62 (03 Sep 2022 03:35) (114/61 - 143/79)  BP(mean): 80 (03 Sep 2022 03:35) (80 - 105)  RR: 17 (03 Sep 2022 03:35) (13 - 18)  SpO2: 97% (03 Sep 2022 03:35) (96% - 98%)    Parameters below as of 03 Sep 2022 03:35  Patient On (Oxygen Delivery Method): room air      I&O's Detail    01 Sep 2022 07:01  -  02 Sep 2022 07:00  --------------------------------------------------------  IN:    Heparin: 273 mL    Oral Fluid: 1330 mL  Total IN: 1603 mL    OUT:    Bulb (mL): 0 mL    Indwelling Catheter - Urethral (mL): 250 mL    Voided (mL): 1835 mL  Total OUT: 2085 mL    Total NET: -482 mL      02 Sep 2022 07:01  -  03 Sep 2022 06:04  --------------------------------------------------------  IN:    Heparin: 180.5 mL    Oral Fluid: 480 mL  Total IN: 660.5 mL    OUT:    Bulb (mL): 35 mL    Intermittent Catheterization - Urethral (mL): 550 mL    Voided (mL): 500 mL  Total OUT: 1085 mL    Total NET: -424.5 mL          General: NAD, resting comfortably in bed  C/V: NSR  Pulm: Nonlabored breathing, no respiratory distress  Abd: soft, NT/ND.  Extrem: WWP, no edema, SCDs in place  Drains:  Barber:      LABS:                        8.2    5.50  )-----------( 239      ( 02 Sep 2022 05:30 )             25.7     09-02    133<L>  |  98  |  8   ----------------------------<  104<H>  4.1   |  25  |  1.01    Ca    8.6      02 Sep 2022 05:30  Phos  2.1     09-02  Mg     1.8     09-02    TPro  6.9  /  Alb  2.7<L>  /  TBili  0.4  /  DBili  x   /  AST  14  /  ALT  18  /  AlkPhos  203<H>  09-02    PTT - ( 02 Sep 2022 19:48 )  PTT:55.1 sec      RADIOLOGY & ADDITIONAL STUDIES:   INTERVAL HPI/OVERNIGHT EVENTS: PTT 55, inc hep gtt to 11.5. RENATA pualino'd, abd binder on. , SC     STATUS POST: laparoscopic cholecystectomy, JPx1 (8/30)    SUBJECTIVE: Patient seen and examined at bedside with chief. He has no complaints at this time, denies abdominal pain, n/v, cp, sob. +F/-BM. Jennifer diet.      doxazosin 4 milliGRAM(s) Oral at bedtime  heparin  Infusion 1000 Unit(s)/Hr IV Continuous <Continuous>  hydrALAZINE 50 milliGRAM(s) Oral every 12 hours  hydrALAZINE Injectable 10 milliGRAM(s) IV Push every 6 hours PRN  metoprolol succinate ER 50 milliGRAM(s) Oral every 12 hours  sacubitril 24 mG/valsartan 26 mG 1 Tablet(s) Oral every 12 hours      Vital Signs Last 24 Hrs  T(C): 36.9 (03 Sep 2022 05:01), Max: 37.1 (02 Sep 2022 18:17)  T(F): 98.4 (03 Sep 2022 05:01), Max: 98.7 (02 Sep 2022 18:17)  HR: 60 (03 Sep 2022 03:35) (60 - 74)  BP: 114/62 (03 Sep 2022 03:35) (114/61 - 143/79)  BP(mean): 80 (03 Sep 2022 03:35) (80 - 105)  RR: 17 (03 Sep 2022 03:35) (13 - 18)  SpO2: 97% (03 Sep 2022 03:35) (96% - 98%)    Parameters below as of 03 Sep 2022 03:35  Patient On (Oxygen Delivery Method): room air      I&O's Detail    01 Sep 2022 07:01  -  02 Sep 2022 07:00  --------------------------------------------------------  IN:    Heparin: 273 mL    Oral Fluid: 1330 mL  Total IN: 1603 mL    OUT:    Bulb (mL): 0 mL    Indwelling Catheter - Urethral (mL): 250 mL    Voided (mL): 1835 mL  Total OUT: 2085 mL    Total NET: -482 mL      02 Sep 2022 07:01  -  03 Sep 2022 06:04  --------------------------------------------------------  IN:    Heparin: 180.5 mL    Oral Fluid: 480 mL  Total IN: 660.5 mL    OUT:    Bulb (mL): 35 mL    Intermittent Catheterization - Urethral (mL): 550 mL    Voided (mL): 500 mL  Total OUT: 1085 mL    Total NET: -424.5 mL          General: NAD, resting comfortably in bed  C/V: NSR  Pulm: Nonlabored breathing, no respiratory distress  Abd: soft, NT/ND. Incisions c/d/i.  Extrem: WWP, no edema, SCDs in place      LABS:                        8.2    5.50  )-----------( 239      ( 02 Sep 2022 05:30 )             25.7     09-02    133<L>  |  98  |  8   ----------------------------<  104<H>  4.1   |  25  |  1.01    Ca    8.6      02 Sep 2022 05:30  Phos  2.1     09-02  Mg     1.8     09-02    TPro  6.9  /  Alb  2.7<L>  /  TBili  0.4  /  DBili  x   /  AST  14  /  ALT  18  /  AlkPhos  203<H>  09-02    PTT - ( 02 Sep 2022 19:48 )  PTT:55.1 sec      RADIOLOGY & ADDITIONAL STUDIES:

## 2022-09-03 NOTE — PROGRESS NOTE ADULT - ASSESSMENT
73 y/o M with extensive PMH/PSH s/p percutaneous cholecystostomy tube and now s/p laparoscopic cholecystectomy and removal of intrathecal pump, admitted to SICU postoperatively for hemodynamic monitoring. Afebrile, HDS overnight, abdomen is soft and non tender. CTH yesterday without evidence of CSF leak.     LFD  Pain/nausea control, pain mangement onboard  Toprol XL 50 bid, Hydalizine 50 q8h, Zocor 5 qhs, Entresto  PPI/Miralax/Senna/Folic Acid  Hep gtt/SCDs   RENATA without suction  Home meds as appropriate  AM Labs   71 y/o M with extensive PMH/PSH s/p percutaneous cholecystostomy tube and now s/p laparoscopic cholecystectomy and removal of intrathecal pump, admitted to SICU postoperatively for hemodynamic monitoring. Afebrile, HDS overnight, abdomen is soft and non tender. CTH yesterday without evidence of CSF leak.     LFD  Pain/nausea control, pain mangement onboard  Toprol XL 50 bid, Hydalizine 50 q8h, Zocor 5 qhs, Entresto  PPI/Miralax/Senna/Folic Acid  d/c hep gtt  Eliquis/SCDs   RENATA without suction  Home meds as appropriate  AM Labs   73 y/o M with extensive PMH/PSH s/p percutaneous cholecystostomy tube and now s/p laparoscopic cholecystectomy and removal of intrathecal pump, admitted to SICU postoperatively for hemodynamic monitoring. Afebrile, HDS overnight, abdomen is soft and non tender. CTH yesterday without evidence of CSF leak.     LFD  Pain/nausea control, pain mangement onboard  Toprol XL 50 bid, Hydalizine 50 q8h, Zocor 5 qhs, Entresto  PPI/Miralax/Senna/Folic Acid  d/c hep gtt  Eliquis/SCDs   Home meds as appropriate  AM Labs

## 2022-09-03 NOTE — PROGRESS NOTE ADULT - SUBJECTIVE AND OBJECTIVE BOX
Patient is a 72y old  Male who presents with a chief complaint of bleeding around perc yonis site (03 Sep 2022 06:04)      INTERVAL EVENTS:  - tolerating low fat diet   - no dyspnea, no dysuria. ; urinating without difficulty     SUBJECTIVE:  Patient was seen and examined at bedside.  Review of systems: No fever, chills, CP, dyspnea, nausea or vomiting, dysuria, LE edema. Rest of 12 point Review of systems negative unless otherwise documented elsewhere in note.     Diet, Regular:   Low Fat (LOWFAT)  Soft and Bite Sized (SOFTBTSZ) (09-03-22 @ 19:46) [Active]      MEDICATIONS:  MEDICATIONS  (STANDING):  acetaminophen     Tablet .. 650 milliGRAM(s) Oral every 6 hours  apixaban 5 milliGRAM(s) Oral <User Schedule>  artificial tears (preservative free) Ophthalmic Solution 1 Drop(s) Both EYES two times a day  dextrose 5%. 1000 milliLiter(s) (100 mL/Hr) IV Continuous <Continuous>  dextrose 5%. 1000 milliLiter(s) (50 mL/Hr) IV Continuous <Continuous>  dextrose 50% Injectable 25 Gram(s) IV Push once  dextrose 50% Injectable 25 Gram(s) IV Push once  dextrose 50% Injectable 12.5 Gram(s) IV Push once  doxazosin 4 milliGRAM(s) Oral at bedtime  finasteride 5 milliGRAM(s) Oral daily  folic acid 1 milliGRAM(s) Oral daily  glucagon  Injectable 1 milliGRAM(s) IntraMuscular once  hydrALAZINE 50 milliGRAM(s) Oral every 12 hours  insulin lispro (ADMELOG) corrective regimen sliding scale   SubCutaneous Before meals and at bedtime  lidocaine   4% Patch 1 Patch Transdermal every 24 hours  lidocaine   4% Patch 1 Patch Transdermal every 24 hours  melatonin 3 milliGRAM(s) Oral at bedtime  metoprolol succinate ER 50 milliGRAM(s) Oral every 12 hours  nortriptyline 75 milliGRAM(s) Oral at bedtime  oxyCODONE  ER Tablet 20 milliGRAM(s) Oral every 8 hours  pantoprazole    Tablet 40 milliGRAM(s) Oral before breakfast  polyethylene glycol 3350 17 Gram(s) Oral two times a day  sacubitril 24 mG/valsartan 26 mG 1 Tablet(s) Oral every 12 hours  senna 2 Tablet(s) Oral at bedtime  simvastatin 5 milliGRAM(s) Oral at bedtime  vortioxetine 20 milliGRAM(s) Oral every 24 hours    MEDICATIONS  (PRN):  dextrose Oral Gel 15 Gram(s) Oral once PRN Blood Glucose LESS THAN 70 milliGRAM(s)/deciliter  hydrALAZINE Injectable 10 milliGRAM(s) IV Push every 6 hours PRN SBP> 160  HYDROmorphone  Injectable 0.5 milliGRAM(s) IV Push every 4 hours PRN Severe Pain (7 - 10)  ondansetron Injectable 4 milliGRAM(s) IV Push every 6 hours PRN Nausea and/or Vomiting  oxyCODONE    IR 15 milliGRAM(s) Oral every 4 hours PRN Moderate Pain (4 - 6)      Allergies    Altace (Unknown)  penicillin (Unknown)    Intolerances        OBJECTIVE:  Vital Signs Last 24 Hrs  T(C): 36.6 (03 Sep 2022 18:04), Max: 36.9 (03 Sep 2022 05:01)  T(F): 97.8 (03 Sep 2022 18:04), Max: 98.4 (03 Sep 2022 05:01)  HR: 70 (03 Sep 2022 20:51) (60 - 72)  BP: 145/81 (03 Sep 2022 20:51) (107/57 - 158/75)  BP(mean): 104 (03 Sep 2022 20:51) (76 - 108)  RR: 19 (03 Sep 2022 20:51) (16 - 20)  SpO2: 91% (03 Sep 2022 20:51) (91% - 100%)    Parameters below as of 03 Sep 2022 20:51  Patient On (Oxygen Delivery Method): room air      I&O's Summary    02 Sep 2022 07:01  -  03 Sep 2022 07:00  --------------------------------------------------------  IN: 713.5 mL / OUT: 1085 mL / NET: -371.5 mL    03 Sep 2022 07:01  -  03 Sep 2022 22:46  --------------------------------------------------------  IN: 34.5 mL / OUT: 850 mL / NET: -815.5 mL        PHYSICAL EXAM:  Gen: Reclining in bed at time of exam, appears stated age  HEENT: NCAT, MMM, clear OP  Neck: supple, trachea at midline  CV: RRR, +S1/S2  Pulm: adequate respiratory effort, no increase in work of breathing  Abd: soft, ND ;  incisions clean, dry   Skin: warm and dry,   Ext: WWP, no LE edema  Neuro: AOx3, no gross focal neurological deficits  Psych: affect and behavior appropriate, pleasant at time of interview    LABS:                        8.2    5.50  )-----------( 239      ( 02 Sep 2022 05:30 )             25.7     09-02    133<L>  |  98  |  8   ----------------------------<  104<H>  4.1   |  25  |  1.01    Ca    8.6      02 Sep 2022 05:30  Phos  2.1     09-02  Mg     1.8     09-02    TPro  6.9  /  Alb  2.7<L>  /  TBili  0.4  /  DBili  x   /  AST  14  /  ALT  18  /  AlkPhos  203<H>  09-02    LIVER FUNCTIONS - ( 02 Sep 2022 05:30 )  Alb: 2.7 g/dL / Pro: 6.9 g/dL / ALK PHOS: 203 U/L / ALT: 18 U/L / AST: 14 U/L / GGT: x           PTT - ( 02 Sep 2022 19:48 )  PTT:55.1 sec  CAPILLARY BLOOD GLUCOSE      POCT Blood Glucose.: 127 mg/dL (03 Sep 2022 21:44)  POCT Blood Glucose.: 110 mg/dL (03 Sep 2022 16:42)  POCT Blood Glucose.: 117 mg/dL (03 Sep 2022 11:25)  POCT Blood Glucose.: 103 mg/dL (03 Sep 2022 06:10)        MICRODATA:      RADIOLOGY/OTHER STUDIES:

## 2022-09-03 NOTE — PROGRESS NOTE ADULT - ASSESSMENT
73 y/o male with history of colon mass s/p partial colectomy (2017), appendectomy, hernia repair, aortic and iliac aneurysm surgery (2017) s/p stent placement, HTN, HLD, paroxysmal atrial fibrillation on Eliquis, Right LE DVT >5 years ago s/p IVC filter placement, right knee ligament repair, , herniated disc and related surgery in [id number] complicated by spinal fusion, local hematoma, foot drop with chronic pain s/p failed intrathecal morphine pump which caused bradycardia (s/p pacemaker implant [2004]) and left hip prosthesis. On last admission, had newly reduced EF w/concern for stress induced CM s/p percutaneous cholecystostomy tube by IR; Presented to this admission with bleeding at the perc yonis tube site; Now s/p ERCP on 8/26/22 with stone removal, and robot-assisted cholecystectomy on 8/30/2022 with removal of ventral intrathecal pain pump.     #Cholecystitis s/p cholecystectomy   s/p cholecystectomy on 8.30.22    #Chronic pain, opiate dependence   Substantial opiate regimen as outpatient. Pain control while inpatient per primary team   Standing miralax BID while on current pain regimen.     #Paroxsymal Afib  #S/p PPM   Defer recs re: Anti-coagulation to cardiology consult   Continue Metoprolol , statin  currently on apixaban     #HTN   Continue metoprolol and hydralazine  Ensure adequate pain control   If SBP >180 mmHg persistently, [ ] Get bladder scan [ ] if pain well-controlled, and bladder scan shows no urinary retention, can use labetalol 10mg IV push if HR >60 bpm or hydralazine IV 5mg  discharge BP med recommendations per cardiology consult     #BPH   Continue doxazosin  Continue finasteride  Low threshold for repeating bladder scan (if new tachycardia, elevated BP)

## 2022-09-04 LAB
ALBUMIN SERPL ELPH-MCNC: 2.8 G/DL — LOW (ref 3.3–5)
ALP SERPL-CCNC: 166 U/L — HIGH (ref 40–120)
ALT FLD-CCNC: 14 U/L — SIGNIFICANT CHANGE UP (ref 10–45)
ANION GAP SERPL CALC-SCNC: 9 MMOL/L — SIGNIFICANT CHANGE UP (ref 5–17)
AST SERPL-CCNC: 13 U/L — SIGNIFICANT CHANGE UP (ref 10–40)
BILIRUB SERPL-MCNC: 0.4 MG/DL — SIGNIFICANT CHANGE UP (ref 0.2–1.2)
BUN SERPL-MCNC: 10 MG/DL — SIGNIFICANT CHANGE UP (ref 7–23)
CALCIUM SERPL-MCNC: 8.8 MG/DL — SIGNIFICANT CHANGE UP (ref 8.4–10.5)
CHLORIDE SERPL-SCNC: 99 MMOL/L — SIGNIFICANT CHANGE UP (ref 96–108)
CO2 SERPL-SCNC: 25 MMOL/L — SIGNIFICANT CHANGE UP (ref 22–31)
CREAT SERPL-MCNC: 1.14 MG/DL — SIGNIFICANT CHANGE UP (ref 0.5–1.3)
EGFR: 68 ML/MIN/1.73M2 — SIGNIFICANT CHANGE UP
GLUCOSE BLDC GLUCOMTR-MCNC: 101 MG/DL — HIGH (ref 70–99)
GLUCOSE BLDC GLUCOMTR-MCNC: 107 MG/DL — HIGH (ref 70–99)
GLUCOSE BLDC GLUCOMTR-MCNC: 110 MG/DL — HIGH (ref 70–99)
GLUCOSE BLDC GLUCOMTR-MCNC: 99 MG/DL — SIGNIFICANT CHANGE UP (ref 70–99)
GLUCOSE SERPL-MCNC: 100 MG/DL — HIGH (ref 70–99)
HCT VFR BLD CALC: 25.4 % — LOW (ref 39–50)
HGB BLD-MCNC: 8.1 G/DL — LOW (ref 13–17)
MAGNESIUM SERPL-MCNC: 1.9 MG/DL — SIGNIFICANT CHANGE UP (ref 1.6–2.6)
MCHC RBC-ENTMCNC: 27.6 PG — SIGNIFICANT CHANGE UP (ref 27–34)
MCHC RBC-ENTMCNC: 31.9 GM/DL — LOW (ref 32–36)
MCV RBC AUTO: 86.7 FL — SIGNIFICANT CHANGE UP (ref 80–100)
NRBC # BLD: 0 /100 WBCS — SIGNIFICANT CHANGE UP (ref 0–0)
PHOSPHATE SERPL-MCNC: 3.3 MG/DL — SIGNIFICANT CHANGE UP (ref 2.5–4.5)
PLATELET # BLD AUTO: 230 K/UL — SIGNIFICANT CHANGE UP (ref 150–400)
POTASSIUM SERPL-MCNC: 3.8 MMOL/L — SIGNIFICANT CHANGE UP (ref 3.5–5.3)
POTASSIUM SERPL-SCNC: 3.8 MMOL/L — SIGNIFICANT CHANGE UP (ref 3.5–5.3)
PROT SERPL-MCNC: 6.9 G/DL — SIGNIFICANT CHANGE UP (ref 6–8.3)
RBC # BLD: 2.93 M/UL — LOW (ref 4.2–5.8)
RBC # FLD: 13.6 % — SIGNIFICANT CHANGE UP (ref 10.3–14.5)
SODIUM SERPL-SCNC: 133 MMOL/L — LOW (ref 135–145)
WBC # BLD: 6.32 K/UL — SIGNIFICANT CHANGE UP (ref 3.8–10.5)
WBC # FLD AUTO: 6.32 K/UL — SIGNIFICANT CHANGE UP (ref 3.8–10.5)

## 2022-09-04 PROCEDURE — 99233 SBSQ HOSP IP/OBS HIGH 50: CPT

## 2022-09-04 RX ORDER — ALPRAZOLAM 0.25 MG
0.5 TABLET ORAL ONCE
Refills: 0 | Status: DISCONTINUED | OUTPATIENT
Start: 2022-09-04 | End: 2022-09-04

## 2022-09-04 RX ORDER — POTASSIUM CHLORIDE 20 MEQ
20 PACKET (EA) ORAL ONCE
Refills: 0 | Status: COMPLETED | OUTPATIENT
Start: 2022-09-04 | End: 2022-09-04

## 2022-09-04 RX ORDER — MAGNESIUM SULFATE 500 MG/ML
1 VIAL (ML) INJECTION ONCE
Refills: 0 | Status: COMPLETED | OUTPATIENT
Start: 2022-09-04 | End: 2022-09-04

## 2022-09-04 RX ORDER — LOPERAMIDE HCL 2 MG
2 TABLET ORAL ONCE
Refills: 0 | Status: COMPLETED | OUTPATIENT
Start: 2022-09-04 | End: 2022-09-04

## 2022-09-04 RX ADMIN — SACUBITRIL AND VALSARTAN 1 TABLET(S): 24; 26 TABLET, FILM COATED ORAL at 05:37

## 2022-09-04 RX ADMIN — Medication 1 DROP(S): at 05:36

## 2022-09-04 RX ADMIN — Medication 650 MILLIGRAM(S): at 05:36

## 2022-09-04 RX ADMIN — Medication 650 MILLIGRAM(S): at 00:00

## 2022-09-04 RX ADMIN — Medication 50 MILLIGRAM(S): at 22:20

## 2022-09-04 RX ADMIN — LIDOCAINE 1 PATCH: 4 CREAM TOPICAL at 09:27

## 2022-09-04 RX ADMIN — Medication 650 MILLIGRAM(S): at 12:16

## 2022-09-04 RX ADMIN — OXYCODONE HYDROCHLORIDE 20 MILLIGRAM(S): 5 TABLET ORAL at 13:59

## 2022-09-04 RX ADMIN — OXYCODONE HYDROCHLORIDE 15 MILLIGRAM(S): 5 TABLET ORAL at 00:30

## 2022-09-04 RX ADMIN — Medication 100 GRAM(S): at 09:27

## 2022-09-04 RX ADMIN — OXYCODONE HYDROCHLORIDE 20 MILLIGRAM(S): 5 TABLET ORAL at 22:24

## 2022-09-04 RX ADMIN — Medication 4 MILLIGRAM(S): at 22:20

## 2022-09-04 RX ADMIN — Medication 50 MILLIGRAM(S): at 18:03

## 2022-09-04 RX ADMIN — APIXABAN 5 MILLIGRAM(S): 2.5 TABLET, FILM COATED ORAL at 11:15

## 2022-09-04 RX ADMIN — OXYCODONE HYDROCHLORIDE 20 MILLIGRAM(S): 5 TABLET ORAL at 05:36

## 2022-09-04 RX ADMIN — Medication 50 MILLIGRAM(S): at 09:28

## 2022-09-04 RX ADMIN — Medication 3 MILLIGRAM(S): at 22:20

## 2022-09-04 RX ADMIN — Medication 650 MILLIGRAM(S): at 23:58

## 2022-09-04 RX ADMIN — Medication 650 MILLIGRAM(S): at 18:33

## 2022-09-04 RX ADMIN — OXYCODONE HYDROCHLORIDE 20 MILLIGRAM(S): 5 TABLET ORAL at 05:37

## 2022-09-04 RX ADMIN — Medication 650 MILLIGRAM(S): at 05:37

## 2022-09-04 RX ADMIN — LIDOCAINE 1 PATCH: 4 CREAM TOPICAL at 22:29

## 2022-09-04 RX ADMIN — Medication 1 MILLIGRAM(S): at 11:15

## 2022-09-04 RX ADMIN — Medication 0.5 MILLIGRAM(S): at 22:50

## 2022-09-04 RX ADMIN — LIDOCAINE 1 PATCH: 4 CREAM TOPICAL at 18:37

## 2022-09-04 RX ADMIN — NORTRIPTYLINE HYDROCHLORIDE 75 MILLIGRAM(S): 10 CAPSULE ORAL at 22:20

## 2022-09-04 RX ADMIN — Medication 20 MILLIEQUIVALENT(S): at 09:27

## 2022-09-04 RX ADMIN — LIDOCAINE 1 PATCH: 4 CREAM TOPICAL at 09:28

## 2022-09-04 RX ADMIN — PANTOPRAZOLE SODIUM 40 MILLIGRAM(S): 20 TABLET, DELAYED RELEASE ORAL at 05:37

## 2022-09-04 RX ADMIN — Medication 50 MILLIGRAM(S): at 05:36

## 2022-09-04 RX ADMIN — FINASTERIDE 5 MILLIGRAM(S): 5 TABLET, FILM COATED ORAL at 11:15

## 2022-09-04 RX ADMIN — Medication 650 MILLIGRAM(S): at 11:15

## 2022-09-04 RX ADMIN — OXYCODONE HYDROCHLORIDE 20 MILLIGRAM(S): 5 TABLET ORAL at 23:00

## 2022-09-04 RX ADMIN — Medication 0.5 MILLIGRAM(S): at 02:48

## 2022-09-04 RX ADMIN — Medication 650 MILLIGRAM(S): at 18:03

## 2022-09-04 RX ADMIN — SIMVASTATIN 5 MILLIGRAM(S): 20 TABLET, FILM COATED ORAL at 22:20

## 2022-09-04 RX ADMIN — VORTIOXETINE 20 MILLIGRAM(S): 5 TABLET, FILM COATED ORAL at 18:04

## 2022-09-04 RX ADMIN — SACUBITRIL AND VALSARTAN 1 TABLET(S): 24; 26 TABLET, FILM COATED ORAL at 18:03

## 2022-09-04 NOTE — PROGRESS NOTE ADULT - NS ATTEST RISK PROBLEM GEN_ALL_CORE FT
PRN intravenous hydromorphone for pain

## 2022-09-04 NOTE — PROGRESS NOTE ADULT - SUBJECTIVE AND OBJECTIVE BOX
STATUS POST: POD 5 cholecystectomy and removal of intrathecal pump     SUBJECTIVE: Patient seen and examined bedside by chief resident.    apixaban 5 milliGRAM(s) Oral <User Schedule>  doxazosin 4 milliGRAM(s) Oral at bedtime  hydrALAZINE 50 milliGRAM(s) Oral every 12 hours  hydrALAZINE Injectable 10 milliGRAM(s) IV Push every 6 hours PRN  metoprolol succinate ER 50 milliGRAM(s) Oral every 12 hours  sacubitril 24 mG/valsartan 26 mG 1 Tablet(s) Oral every 12 hours      Vital Signs Last 24 Hrs  T(C): 36.3 (04 Sep 2022 05:46), Max: 36.9 (03 Sep 2022 22:00)  T(F): 97.4 (04 Sep 2022 05:46), Max: 98.4 (03 Sep 2022 22:00)  HR: 60 (04 Sep 2022 05:29) (60 - 72)  BP: 128/70 (04 Sep 2022 05:29) (107/57 - 158/84)  BP(mean): 93 (04 Sep 2022 05:29) (76 - 113)  RR: 14 (04 Sep 2022 05:29) (14 - 20)  SpO2: 99% (04 Sep 2022 05:29) (91% - 100%)    Parameters below as of 04 Sep 2022 05:29  Patient On (Oxygen Delivery Method): room air      I&O's Detail    02 Sep 2022 07:01  -  03 Sep 2022 07:00  --------------------------------------------------------  IN:    Heparin: 233.5 mL    Oral Fluid: 480 mL  Total IN: 713.5 mL    OUT:    Bulb (mL): 35 mL    Intermittent Catheterization - Urethral (mL): 550 mL    Voided (mL): 500 mL  Total OUT: 1085 mL    Total NET: -371.5 mL      03 Sep 2022 07:01  -  04 Sep 2022 06:10  --------------------------------------------------------  IN:    Heparin: 34.5 mL    Oral Fluid: 236 mL  Total IN: 270.5 mL    OUT:    Voided (mL): 1350 mL  Total OUT: 1350 mL    Total NET: -1079.5 mL          General: NAD, resting comfortably in bed  C/V: NSR  Pulm: Nonlabored breathing, no respiratory distress  Abd: soft, NT/ND.  Extrem: WWP, no edema, SCDs in place        LABS:          PTT - ( 02 Sep 2022 19:48 )  PTT:55.1 sec      RADIOLOGY & ADDITIONAL STUDIES:   STATUS POST: POD 5 cholecystectomy and removal of intrathecal pump     SUBJECTIVE: Patient seen and examined bedside by chief resident. This morning, he feels well; his pain is well-controlled. No nausea or vomiting. Passing flatus and having BMs. Tolerating diet. No acute complaints.      apixaban 5 milliGRAM(s) Oral <User Schedule>  doxazosin 4 milliGRAM(s) Oral at bedtime  hydrALAZINE 50 milliGRAM(s) Oral every 12 hours  hydrALAZINE Injectable 10 milliGRAM(s) IV Push every 6 hours PRN  metoprolol succinate ER 50 milliGRAM(s) Oral every 12 hours  sacubitril 24 mG/valsartan 26 mG 1 Tablet(s) Oral every 12 hours      Vital Signs Last 24 Hrs  T(C): 36.3 (04 Sep 2022 05:46), Max: 36.9 (03 Sep 2022 22:00)  T(F): 97.4 (04 Sep 2022 05:46), Max: 98.4 (03 Sep 2022 22:00)  HR: 60 (04 Sep 2022 05:29) (60 - 72)  BP: 128/70 (04 Sep 2022 05:29) (107/57 - 158/84)  BP(mean): 93 (04 Sep 2022 05:29) (76 - 113)  RR: 14 (04 Sep 2022 05:29) (14 - 20)  SpO2: 99% (04 Sep 2022 05:29) (91% - 100%)    Parameters below as of 04 Sep 2022 05:29  Patient On (Oxygen Delivery Method): room air      I&O's Detail    02 Sep 2022 07:01  -  03 Sep 2022 07:00  --------------------------------------------------------  IN:    Heparin: 233.5 mL    Oral Fluid: 480 mL  Total IN: 713.5 mL    OUT:    Bulb (mL): 35 mL    Intermittent Catheterization - Urethral (mL): 550 mL    Voided (mL): 500 mL  Total OUT: 1085 mL    Total NET: -371.5 mL      03 Sep 2022 07:01  -  04 Sep 2022 06:10  --------------------------------------------------------  IN:    Heparin: 34.5 mL    Oral Fluid: 236 mL  Total IN: 270.5 mL    OUT:    Voided (mL): 1350 mL  Total OUT: 1350 mL    Total NET: -1079.5 mL          General: NAD, resting comfortably in bed  C/V: NSR  Pulm: Nonlabored breathing, no respiratory distress  Abd: soft, NT/ND. Incisions c/d/i.  Extrem: WWP, no edema, SCDs in place        LABS:          PTT - ( 02 Sep 2022 19:48 )  PTT:55.1 sec      RADIOLOGY & ADDITIONAL STUDIES:

## 2022-09-04 NOTE — PROGRESS NOTE ADULT - ASSESSMENT
Assessment: 73 y/o M with colon mass s/p partial colectomy (2017), appendectomy, hernia repair, aortic and iliac aneurysm surgery (2017) s/p stent placement, HTN, HLD, paroxysmal atrial fibrillation on Eliquis, blood clotting disorder s/p IVC filter, right knee ligament repair, pacemaker implant (2004) s/p recent ppm interrogation, herniated disc and related surgery in [id number] complicated by spinal fusion, local hematoma, foot drop with chronic pain s/p failed intrathecal morphine pump, recently admitted to [facility] on 08/07 with acute cholecystitis c/b cardiomyopathy s/p percutaneous cholecystostomy tube by IR. pt readmitted 8/20 for concerns of bleeding at the perc yonis tube site. s/p ERCP 8/26 with removal of several small and medium sized stones, transferred to SICU 8/27 for transient altered mental status, suspicious for narcotic intoxication (resolved) with negative work up. Now s/p RA cholecystectomy and intrathecal morphine pump 8/30/22. Will continue to monitor for acute delirium post-procedure whilst controlling his pain.     Plan:   LFD soft/bite sized  Pain/nausea control, pain mangement onboard  Toprol XL 50 bid, Hydalizine 50 q8h, Zocor 5 qhs, Entresto  PPI/Miralax/Senna/Folic Acid  Eliquis/SCDs   RENATA without suction  Home meds as appropriate  AM Labs   Assessment: 71 y/o M with colon mass s/p partial colectomy (2017), appendectomy, hernia repair, aortic and iliac aneurysm surgery (2017) s/p stent placement, HTN, HLD, paroxysmal atrial fibrillation on Eliquis, blood clotting disorder s/p IVC filter, right knee ligament repair, pacemaker implant (2004) s/p recent ppm interrogation, herniated disc and related surgery in [id number] complicated by spinal fusion, local hematoma, foot drop with chronic pain s/p failed intrathecal morphine pump, recently admitted to [facility] on 08/07 with acute cholecystitis c/b cardiomyopathy s/p percutaneous cholecystostomy tube by IR. pt readmitted 8/20 for concerns of bleeding at the perc yonis tube site. s/p ERCP 8/26 with removal of several small and medium sized stones, transferred to SICU 8/27 for transient altered mental status, suspicious for narcotic intoxication (resolved) with negative work up. Now s/p RA cholecystectomy and intrathecal morphine pump 8/30/22. Will continue to monitor for acute delirium post-procedure whilst controlling his pain.     Plan:   Step down  LFD soft/bite sized  Pain/nausea control, pain mangement onboard  Toprol XL 50 bid, Hydalizine 50 q8h, Zocor 5 qhs, Entresto  PPI/Miralax/Senna/Folic Acid  Eliquis/SCDs   RENATA without suction  Home meds as appropriate  AM Labs

## 2022-09-04 NOTE — PROGRESS NOTE ADULT - ASSESSMENT
73 y/o male with history of colon mass s/p partial colectomy (2017), appendectomy, hernia repair, aortic and iliac aneurysm surgery (2017) s/p stent placement, HTN, HLD, paroxysmal atrial fibrillation on Eliquis, Right LE DVT >5 years ago s/p IVC filter placement, right knee ligament repair, , herniated disc and related surgery in [id number] complicated by spinal fusion, local hematoma, foot drop with chronic pain s/p failed intrathecal morphine pump which caused bradycardia (s/p pacemaker implant [2004]) and left hip prosthesis. On last admission, had newly reduced EF w/concern for stress induced CM s/p percutaneous cholecystostomy tube by IR; Presented to this admission with bleeding at the perc yonis tube site; Now s/p ERCP on 8/26/22 with stone removal, and robot-assisted cholecystectomy on 8/30/2022 with removal of ventral intrathecal pain pump.     #Cholecystitis s/p cholecystectomy   s/p cholecystectomy on 8.30.22    #Chronic pain, opiate dependence   Substantial opiate regimen as outpatient. Pain control while inpatient per primary team   Standing miralax BID while on current pain regimen.     #Paroxsymal Afib  #S/p PPM   Defer recs re: Anti-coagulation to cardiology consult   Continue Metoprolol , statin  currently on apixaban     #HTN   Continue metoprolol and hydralazine  Ensure adequate pain control   If SBP >180 mmHg persistently, [ ] Get bladder scan [ ] if pain well-controlled, and bladder scan shows no urinary retention, can use labetalol 10mg IV push if HR >60 bpm or hydralazine IV 5mg  discharge BP med recommendations per cardiology consult     #BPH   Continue doxazosin  Continue finasteride  Low threshold for repeating bladder scan (if new tachycardia, elevated BP)        DVT ppx - already on apixaban

## 2022-09-04 NOTE — PROGRESS NOTE ADULT - SUBJECTIVE AND OBJECTIVE BOX
Patient is a 72y old  Male who presents with a chief complaint of bleeding around perc yonis site (04 Sep 2022 06:10)    INTERVAL EVENTS:  - tolerating low fat diet   - no dyspnea, no dysuria. ; urinating without difficulty     SUBJECTIVE:  Patient was seen and examined at bedside.  Review of systems: No fever, chills, CP, dyspnea, nausea or vomiting, dysuria, LE edema. Rest of 12 point Review of systems negative unless otherwise documented elsewhere in note.     Diet, Regular:   Low Fat (LOWFAT)  Soft and Bite Sized (SOFTBTSZ) (09-03-22 @ 19:46) [Active]      MEDICATIONS:  MEDICATIONS  (STANDING):  acetaminophen     Tablet .. 650 milliGRAM(s) Oral every 6 hours  apixaban 5 milliGRAM(s) Oral <User Schedule>  artificial tears (preservative free) Ophthalmic Solution 1 Drop(s) Both EYES two times a day  dextrose 5%. 1000 milliLiter(s) (100 mL/Hr) IV Continuous <Continuous>  dextrose 5%. 1000 milliLiter(s) (50 mL/Hr) IV Continuous <Continuous>  dextrose 50% Injectable 25 Gram(s) IV Push once  dextrose 50% Injectable 12.5 Gram(s) IV Push once  dextrose 50% Injectable 25 Gram(s) IV Push once  doxazosin 4 milliGRAM(s) Oral at bedtime  finasteride 5 milliGRAM(s) Oral daily  folic acid 1 milliGRAM(s) Oral daily  glucagon  Injectable 1 milliGRAM(s) IntraMuscular once  hydrALAZINE 50 milliGRAM(s) Oral every 12 hours  insulin lispro (ADMELOG) corrective regimen sliding scale   SubCutaneous Before meals and at bedtime  lidocaine   4% Patch 1 Patch Transdermal every 24 hours  lidocaine   4% Patch 1 Patch Transdermal every 24 hours  melatonin 3 milliGRAM(s) Oral at bedtime  metoprolol succinate ER 50 milliGRAM(s) Oral every 12 hours  nortriptyline 75 milliGRAM(s) Oral at bedtime  oxyCODONE  ER Tablet 20 milliGRAM(s) Oral every 8 hours  pantoprazole    Tablet 40 milliGRAM(s) Oral before breakfast  polyethylene glycol 3350 17 Gram(s) Oral two times a day  sacubitril 24 mG/valsartan 26 mG 1 Tablet(s) Oral every 12 hours  senna 2 Tablet(s) Oral at bedtime  simvastatin 5 milliGRAM(s) Oral at bedtime  vortioxetine 20 milliGRAM(s) Oral every 24 hours    MEDICATIONS  (PRN):  dextrose Oral Gel 15 Gram(s) Oral once PRN Blood Glucose LESS THAN 70 milliGRAM(s)/deciliter  hydrALAZINE Injectable 10 milliGRAM(s) IV Push every 6 hours PRN SBP> 160  HYDROmorphone  Injectable 0.5 milliGRAM(s) IV Push every 4 hours PRN Severe Pain (7 - 10)  ondansetron Injectable 4 milliGRAM(s) IV Push every 6 hours PRN Nausea and/or Vomiting  oxyCODONE    IR 15 milliGRAM(s) Oral every 4 hours PRN Moderate Pain (4 - 6)      Allergies    Altace (Unknown)  penicillin (Unknown)    Intolerances        OBJECTIVE:  Vital Signs Last 24 Hrs  T(C): 36.5 (05 Sep 2022 00:00), Max: 36.6 (04 Sep 2022 17:07)  T(F): 97.7 (05 Sep 2022 00:00), Max: 97.8 (04 Sep 2022 17:07)  HR: 69 (05 Sep 2022 00:00) (60 - 71)  BP: 145/75 (05 Sep 2022 00:00) (119/63 - 154/76)  BP(mean): 98 (05 Sep 2022 00:00) (86 - 110)  RR: 17 (05 Sep 2022 00:00) (14 - 20)  SpO2: 97% (05 Sep 2022 00:00) (97% - 100%)    Parameters below as of 05 Sep 2022 00:00  Patient On (Oxygen Delivery Method): room air      I&O's Summary    03 Sep 2022 07:01  -  04 Sep 2022 07:00  --------------------------------------------------------  IN: 270.5 mL / OUT: 1350 mL / NET: -1079.5 mL    04 Sep 2022 07:01  -  05 Sep 2022 02:45  --------------------------------------------------------  IN: 240 mL / OUT: 1450 mL / NET: -1210 mL        PHYSICAL EXAM:  Gen: Reclining in bed at time of exam, appears stated age  HEENT: NCAT, MMM, clear OP  Neck: supple, trachea at midline  CV: RRR, +S1/S2  Pulm: adequate respiratory effort, no increase in work of breathing  Abd: soft, ND ;  incisions clean, dry   Skin: warm and dry,   Ext: WWP, no LE edema  Neuro: AOx3, no gross focal neurological deficits  Psych: affect and behavior appropriate, pleasant at time of interview      LABS:                        8.1    6.32  )-----------( 230      ( 04 Sep 2022 06:42 )             25.4     09-04    133<L>  |  99  |  10  ----------------------------<  100<H>  3.8   |  25  |  1.14    Ca    8.8      04 Sep 2022 06:42  Phos  3.3     09-04  Mg     1.9     09-04    TPro  6.9  /  Alb  2.8<L>  /  TBili  0.4  /  DBili  x   /  AST  13  /  ALT  14  /  AlkPhos  166<H>  09-04    LIVER FUNCTIONS - ( 04 Sep 2022 06:42 )  Alb: 2.8 g/dL / Pro: 6.9 g/dL / ALK PHOS: 166 U/L / ALT: 14 U/L / AST: 13 U/L / GGT: x             CAPILLARY BLOOD GLUCOSE      POCT Blood Glucose.: 101 mg/dL (04 Sep 2022 22:00)  POCT Blood Glucose.: 99 mg/dL (04 Sep 2022 17:14)  POCT Blood Glucose.: 110 mg/dL (04 Sep 2022 11:15)  POCT Blood Glucose.: 107 mg/dL (04 Sep 2022 06:40)        MICRODATA:      RADIOLOGY/OTHER STUDIES:

## 2022-09-05 LAB
ALBUMIN SERPL ELPH-MCNC: 2.8 G/DL — LOW (ref 3.3–5)
ALP SERPL-CCNC: 140 U/L — HIGH (ref 40–120)
ALT FLD-CCNC: 11 U/L — SIGNIFICANT CHANGE UP (ref 10–45)
ANION GAP SERPL CALC-SCNC: 8 MMOL/L — SIGNIFICANT CHANGE UP (ref 5–17)
AST SERPL-CCNC: 15 U/L — SIGNIFICANT CHANGE UP (ref 10–40)
BILIRUB SERPL-MCNC: 0.4 MG/DL — SIGNIFICANT CHANGE UP (ref 0.2–1.2)
BUN SERPL-MCNC: 11 MG/DL — SIGNIFICANT CHANGE UP (ref 7–23)
CALCIUM SERPL-MCNC: 8.4 MG/DL — SIGNIFICANT CHANGE UP (ref 8.4–10.5)
CHLORIDE SERPL-SCNC: 100 MMOL/L — SIGNIFICANT CHANGE UP (ref 96–108)
CO2 SERPL-SCNC: 25 MMOL/L — SIGNIFICANT CHANGE UP (ref 22–31)
CREAT SERPL-MCNC: 1.14 MG/DL — SIGNIFICANT CHANGE UP (ref 0.5–1.3)
EGFR: 68 ML/MIN/1.73M2 — SIGNIFICANT CHANGE UP
GLUCOSE BLDC GLUCOMTR-MCNC: 101 MG/DL — HIGH (ref 70–99)
GLUCOSE BLDC GLUCOMTR-MCNC: 101 MG/DL — HIGH (ref 70–99)
GLUCOSE BLDC GLUCOMTR-MCNC: 94 MG/DL — SIGNIFICANT CHANGE UP (ref 70–99)
GLUCOSE BLDC GLUCOMTR-MCNC: 99 MG/DL — SIGNIFICANT CHANGE UP (ref 70–99)
GLUCOSE SERPL-MCNC: 91 MG/DL — SIGNIFICANT CHANGE UP (ref 70–99)
HCT VFR BLD CALC: 24.2 % — LOW (ref 39–50)
HGB BLD-MCNC: 7.7 G/DL — LOW (ref 13–17)
MAGNESIUM SERPL-MCNC: 1.9 MG/DL — SIGNIFICANT CHANGE UP (ref 1.6–2.6)
MCHC RBC-ENTMCNC: 28 PG — SIGNIFICANT CHANGE UP (ref 27–34)
MCHC RBC-ENTMCNC: 31.8 GM/DL — LOW (ref 32–36)
MCV RBC AUTO: 88 FL — SIGNIFICANT CHANGE UP (ref 80–100)
NRBC # BLD: 0 /100 WBCS — SIGNIFICANT CHANGE UP (ref 0–0)
PHOSPHATE SERPL-MCNC: 3.2 MG/DL — SIGNIFICANT CHANGE UP (ref 2.5–4.5)
PLATELET # BLD AUTO: 234 K/UL — SIGNIFICANT CHANGE UP (ref 150–400)
POTASSIUM SERPL-MCNC: 4.2 MMOL/L — SIGNIFICANT CHANGE UP (ref 3.5–5.3)
POTASSIUM SERPL-SCNC: 4.2 MMOL/L — SIGNIFICANT CHANGE UP (ref 3.5–5.3)
PROT SERPL-MCNC: 6.4 G/DL — SIGNIFICANT CHANGE UP (ref 6–8.3)
RBC # BLD: 2.75 M/UL — LOW (ref 4.2–5.8)
RBC # FLD: 13.5 % — SIGNIFICANT CHANGE UP (ref 10.3–14.5)
SODIUM SERPL-SCNC: 133 MMOL/L — LOW (ref 135–145)
WBC # BLD: 6.8 K/UL — SIGNIFICANT CHANGE UP (ref 3.8–10.5)
WBC # FLD AUTO: 6.8 K/UL — SIGNIFICANT CHANGE UP (ref 3.8–10.5)

## 2022-09-05 RX ADMIN — SACUBITRIL AND VALSARTAN 1 TABLET(S): 24; 26 TABLET, FILM COATED ORAL at 18:32

## 2022-09-05 RX ADMIN — Medication 4 MILLIGRAM(S): at 22:08

## 2022-09-05 RX ADMIN — Medication 650 MILLIGRAM(S): at 23:02

## 2022-09-05 RX ADMIN — LIDOCAINE 1 PATCH: 4 CREAM TOPICAL at 17:09

## 2022-09-05 RX ADMIN — NORTRIPTYLINE HYDROCHLORIDE 75 MILLIGRAM(S): 10 CAPSULE ORAL at 22:02

## 2022-09-05 RX ADMIN — Medication 650 MILLIGRAM(S): at 01:00

## 2022-09-05 RX ADMIN — Medication 50 MILLIGRAM(S): at 10:51

## 2022-09-05 RX ADMIN — Medication 1 DROP(S): at 07:01

## 2022-09-05 RX ADMIN — Medication 650 MILLIGRAM(S): at 13:30

## 2022-09-05 RX ADMIN — OXYCODONE HYDROCHLORIDE 20 MILLIGRAM(S): 5 TABLET ORAL at 23:02

## 2022-09-05 RX ADMIN — SIMVASTATIN 5 MILLIGRAM(S): 20 TABLET, FILM COATED ORAL at 22:02

## 2022-09-05 RX ADMIN — LIDOCAINE 1 PATCH: 4 CREAM TOPICAL at 17:08

## 2022-09-05 RX ADMIN — Medication 1 DROP(S): at 18:33

## 2022-09-05 RX ADMIN — Medication 650 MILLIGRAM(S): at 18:31

## 2022-09-05 RX ADMIN — PANTOPRAZOLE SODIUM 40 MILLIGRAM(S): 20 TABLET, DELAYED RELEASE ORAL at 07:00

## 2022-09-05 RX ADMIN — SACUBITRIL AND VALSARTAN 1 TABLET(S): 24; 26 TABLET, FILM COATED ORAL at 07:00

## 2022-09-05 RX ADMIN — Medication 650 MILLIGRAM(S): at 07:45

## 2022-09-05 RX ADMIN — OXYCODONE HYDROCHLORIDE 20 MILLIGRAM(S): 5 TABLET ORAL at 16:51

## 2022-09-05 RX ADMIN — Medication 650 MILLIGRAM(S): at 19:30

## 2022-09-05 RX ADMIN — OXYCODONE HYDROCHLORIDE 15 MILLIGRAM(S): 5 TABLET ORAL at 18:31

## 2022-09-05 RX ADMIN — Medication 50 MILLIGRAM(S): at 22:01

## 2022-09-05 RX ADMIN — Medication 650 MILLIGRAM(S): at 07:00

## 2022-09-05 RX ADMIN — Medication 2 MILLIGRAM(S): at 00:17

## 2022-09-05 RX ADMIN — OXYCODONE HYDROCHLORIDE 15 MILLIGRAM(S): 5 TABLET ORAL at 01:30

## 2022-09-05 RX ADMIN — APIXABAN 5 MILLIGRAM(S): 2.5 TABLET, FILM COATED ORAL at 13:07

## 2022-09-05 RX ADMIN — VORTIOXETINE 20 MILLIGRAM(S): 5 TABLET, FILM COATED ORAL at 18:37

## 2022-09-05 RX ADMIN — OXYCODONE HYDROCHLORIDE 20 MILLIGRAM(S): 5 TABLET ORAL at 15:58

## 2022-09-05 RX ADMIN — LIDOCAINE 1 PATCH: 4 CREAM TOPICAL at 13:17

## 2022-09-05 RX ADMIN — OXYCODONE HYDROCHLORIDE 15 MILLIGRAM(S): 5 TABLET ORAL at 00:30

## 2022-09-05 RX ADMIN — FINASTERIDE 5 MILLIGRAM(S): 5 TABLET, FILM COATED ORAL at 13:08

## 2022-09-05 RX ADMIN — Medication 1 MILLIGRAM(S): at 13:07

## 2022-09-05 RX ADMIN — Medication 650 MILLIGRAM(S): at 13:07

## 2022-09-05 RX ADMIN — Medication 50 MILLIGRAM(S): at 18:32

## 2022-09-05 RX ADMIN — OXYCODONE HYDROCHLORIDE 15 MILLIGRAM(S): 5 TABLET ORAL at 19:30

## 2022-09-05 RX ADMIN — Medication 50 MILLIGRAM(S): at 07:00

## 2022-09-05 RX ADMIN — OXYCODONE HYDROCHLORIDE 20 MILLIGRAM(S): 5 TABLET ORAL at 07:00

## 2022-09-05 RX ADMIN — Medication 3 MILLIGRAM(S): at 22:02

## 2022-09-05 RX ADMIN — OXYCODONE HYDROCHLORIDE 15 MILLIGRAM(S): 5 TABLET ORAL at 13:43

## 2022-09-05 RX ADMIN — LIDOCAINE 1 PATCH: 4 CREAM TOPICAL at 13:04

## 2022-09-05 RX ADMIN — OXYCODONE HYDROCHLORIDE 20 MILLIGRAM(S): 5 TABLET ORAL at 07:45

## 2022-09-05 RX ADMIN — OXYCODONE HYDROCHLORIDE 15 MILLIGRAM(S): 5 TABLET ORAL at 14:40

## 2022-09-05 NOTE — PROGRESS NOTE ADULT - ASSESSMENT
Assessment: 73 y/o M with colon mass s/p partial colectomy (2017), appendectomy, hernia repair, aortic and iliac aneurysm surgery (2017) s/p stent placement, HTN, HLD, paroxysmal atrial fibrillation on Eliquis, blood clotting disorder s/p IVC filter, right knee ligament repair, pacemaker implant (2004) s/p recent ppm interrogation, herniated disc and related surgery in 0144-5212 complicated by spinal fusion, local hematoma, foot drop with chronic pain s/p failed intrathecal morphine pump, recently admitted to [facility] on 08/07 with acute cholecystitis c/b cardiomyopathy s/p percutaneous cholecystostomy tube by IR. pt readmitted 8/20 for concerns of bleeding at the perc yonis tube site. s/p ERCP 8/26 with removal of several small and medium sized stones, transferred to SICU 8/27 for transient altered mental status, suspicious for narcotic intoxication (resolved) with negative work up. Now s/p RA cholecystectomy and intrathecal morphine pump 8/30/22. Will continue to monitor for acute delirium post-procedure whilst controlling his pain.     Plan:   LFD soft/bite sized  Pain/nausea control, pain mangement onboard  Toprol XL 50 bid, Hydalizine 50 q8h, Zocor 5 qhs, Entresto  PPI/Folic Acid  Eliquis/SCDs   RENATA without suction  Home meds as appropriate  AM [facility]  dispo: home w/ [facility]

## 2022-09-05 NOTE — PROVIDER CONTACT NOTE (OTHER) - SITUATION
pt slept most of the morning, this PM, pt was assisted to commode 2person assist +BM loose with some soft formed stool, pt requesting Immodium, no order of Immodium and pt was educated regarding high
Patient clamed he voided about 2-3 hours ago but no amount was recorded in [place]
Latest YI=737/ 91 mmhg.
Latest BP= 161/ 74 mmhg. Other VS=WNL.

## 2022-09-05 NOTE — PROVIDER CONTACT NOTE (OTHER) - BACKGROUND
SBP parameter order= 100- 160 mmhg
Has not voided since midnight. patient claimed he voided around 2200.
risk for constipation given pt taking narcotics frequently. Later then, pt was irritable, anxious and frustrated regarding his situation, pt wants to go home. Despite RNs, PCAs more frequently
Statement Selected

## 2022-09-05 NOTE — PROVIDER CONTACT NOTE (OTHER) - RECOMMENDATIONS
Mrs Guerrero via phone when she called unit and Mrs Guerrero had requested agency number for private duty aide. From [employer] - [phone] [email or web address] information was given to Mrs. Guerrero.

## 2022-09-05 NOTE — PROGRESS NOTE ADULT - SUBJECTIVE AND OBJECTIVE BOX
STATUS POST:   cholecystectomy and removal of intrathecal pump    SUBJECTIVE: Pt seen and examined at bedside this am by surgery team.  This morning, he feels well; his pain is well-controlled. No nausea or vomiting. Passing flatus and having loose BM x 4. No acute complaints.Denies f/n/v/cp/sob.      Vital Signs Last 24 Hrs  T(C): 36.4 (05 Sep 2022 08:51), Max: 36.8 (05 Sep 2022 05:00)  T(F): 97.6 (05 Sep 2022 08:51), Max: 98.2 (05 Sep 2022 05:00)  HR: 61 (05 Sep 2022 08:51) (60 - 71)  BP: 114/66 (05 Sep 2022 08:51) (114/66 - 145/75)  BP(mean): 85 (05 Sep 2022 05:00) (85 - 98)  RR: 18 (05 Sep 2022 08:51) (16 - 18)  SpO2: 97% (05 Sep 2022 08:51) (97% - 100%)    Parameters below as of 05 Sep 2022 08:51  Patient On (Oxygen Delivery Method): room air        PHYSICAL EXAM:   Gen: Awake, alert, NAD, resting comfortably   CV: NSR  Pulm: no respiratory distress on RA  Abd: soft, ND, appropriate ttp , no rebound or guarding, incisions c d/i   Ext: WWP, no edema, SCDs in place     I&O's Detail    04 Sep 2022 07:01  -  05 Sep 2022 07:00  --------------------------------------------------------  IN:    Oral Fluid: 240 mL  Total IN: 240 mL    OUT:    Voided (mL): 1450 mL  Total OUT: 1450 mL    Total NET: -1210 mL          LABS:                        7.7    6.80  )-----------( 234      ( 05 Sep 2022 06:06 )             24.2     09-05    133<L>  |  100  |  11  ----------------------------<  91  4.2   |  25  |  1.14    Ca    8.4      05 Sep 2022 06:06  Phos  3.2     09-05  Mg     1.9     09-05    TPro  6.4  /  Alb  2.8<L>  /  TBili  0.4  /  DBili  x   /  AST  15  /  ALT  11  /  AlkPhos  140<H>  09-05    LIVER FUNCTIONS - ( 05 Sep 2022 06:06 )  Alb: 2.8 g/dL / Pro: 6.4 g/dL / ALK PHOS: 140 U/L / ALT: 11 U/L / AST: 15 U/L / GGT: x             CAPILLARY BLOOD GLUCOSE      POCT Blood Glucose.: 94 mg/dL (05 Sep 2022 12:08)  POCT Blood Glucose.: 99 mg/dL (05 Sep 2022 08:12)  POCT Blood Glucose.: 101 mg/dL (04 Sep 2022 22:00)  POCT Blood Glucose.: 99 mg/dL (04 Sep 2022 17:14)               RADIOLOGY & ADDITIONAL STUDIES:

## 2022-09-05 NOTE — PROVIDER CONTACT NOTE (OTHER) - ACTION/TREATMENT ORDERED:
Pt now calm. safety and comfort measures continues, bed alarm on, call bell at reach. Pt now calm. safety and comfort measures continues, bed alarm on, call bell at reach. Plan: D/C tomorrow when home care set up as per Dr. Mckeon. Pt's wife aware.

## 2022-09-05 NOTE — PROVIDER CONTACT NOTE (OTHER) - ASSESSMENT
Bladder scan done- revealed 309 ml. Patient denied urge to urinate at present.
Bladder ahed=751 but refused straight catheterization
purposeful rounding with pt to anticipate his needs, pt still reported to his wife, "no ones helping" "they have too many patients and not enough nurses". APRIL Cope came and spoke with pt, Spoke also with

## 2022-09-06 ENCOUNTER — TRANSCRIPTION ENCOUNTER (OUTPATIENT)
Age: 72
End: 2022-09-06

## 2022-09-06 VITALS
HEART RATE: 70 BPM | RESPIRATION RATE: 18 BRPM | SYSTOLIC BLOOD PRESSURE: 140 MMHG | TEMPERATURE: 98 F | OXYGEN SATURATION: 99 % | DIASTOLIC BLOOD PRESSURE: 70 MMHG

## 2022-09-06 LAB
GLUCOSE BLDC GLUCOMTR-MCNC: 127 MG/DL — HIGH (ref 70–99)
GLUCOSE BLDC GLUCOMTR-MCNC: 97 MG/DL — SIGNIFICANT CHANGE UP (ref 70–99)

## 2022-09-06 PROCEDURE — 84133 ASSAY OF URINE POTASSIUM: CPT

## 2022-09-06 PROCEDURE — 84300 ASSAY OF URINE SODIUM: CPT

## 2022-09-06 PROCEDURE — 74177 CT ABD & PELVIS W/CONTRAST: CPT | Mod: MA

## 2022-09-06 PROCEDURE — 47531 INJECTION FOR CHOLANGIOGRAM: CPT

## 2022-09-06 PROCEDURE — 70450 CT HEAD/BRAIN W/O DYE: CPT

## 2022-09-06 PROCEDURE — 71045 X-RAY EXAM CHEST 1 VIEW: CPT

## 2022-09-06 PROCEDURE — S2900: CPT

## 2022-09-06 PROCEDURE — C1769: CPT

## 2022-09-06 PROCEDURE — 88300 SURGICAL PATH GROSS: CPT

## 2022-09-06 PROCEDURE — 82140 ASSAY OF AMMONIA: CPT

## 2022-09-06 PROCEDURE — 82436 ASSAY OF URINE CHLORIDE: CPT

## 2022-09-06 PROCEDURE — 83036 HEMOGLOBIN GLYCOSYLATED A1C: CPT

## 2022-09-06 PROCEDURE — 83690 ASSAY OF LIPASE: CPT

## 2022-09-06 PROCEDURE — 83935 ASSAY OF URINE OSMOLALITY: CPT

## 2022-09-06 PROCEDURE — U0005: CPT

## 2022-09-06 PROCEDURE — 85730 THROMBOPLASTIN TIME PARTIAL: CPT

## 2022-09-06 PROCEDURE — 74328 X-RAY BILE DUCT ENDOSCOPY: CPT

## 2022-09-06 PROCEDURE — 99232 SBSQ HOSP IP/OBS MODERATE 35: CPT

## 2022-09-06 PROCEDURE — 82570 ASSAY OF URINE CREATININE: CPT

## 2022-09-06 PROCEDURE — 97116 GAIT TRAINING THERAPY: CPT

## 2022-09-06 PROCEDURE — 82150 ASSAY OF AMYLASE: CPT

## 2022-09-06 PROCEDURE — 72132 CT LUMBAR SPINE W/DYE: CPT

## 2022-09-06 PROCEDURE — 80048 BASIC METABOLIC PNL TOTAL CA: CPT

## 2022-09-06 PROCEDURE — 97530 THERAPEUTIC ACTIVITIES: CPT

## 2022-09-06 PROCEDURE — 81003 URINALYSIS AUTO W/O SCOPE: CPT

## 2022-09-06 PROCEDURE — 83605 ASSAY OF LACTIC ACID: CPT

## 2022-09-06 PROCEDURE — 83735 ASSAY OF MAGNESIUM: CPT

## 2022-09-06 PROCEDURE — C1889: CPT

## 2022-09-06 PROCEDURE — 85027 COMPLETE CBC AUTOMATED: CPT

## 2022-09-06 PROCEDURE — 83880 ASSAY OF NATRIURETIC PEPTIDE: CPT

## 2022-09-06 PROCEDURE — 80053 COMPREHEN METABOLIC PANEL: CPT

## 2022-09-06 PROCEDURE — U0003: CPT

## 2022-09-06 PROCEDURE — 84100 ASSAY OF PHOSPHORUS: CPT

## 2022-09-06 PROCEDURE — 74018 RADEX ABDOMEN 1 VIEW: CPT

## 2022-09-06 PROCEDURE — 36415 COLL VENOUS BLD VENIPUNCTURE: CPT

## 2022-09-06 PROCEDURE — 86901 BLOOD TYPING SEROLOGIC RH(D): CPT

## 2022-09-06 PROCEDURE — 97161 PT EVAL LOW COMPLEX 20 MIN: CPT

## 2022-09-06 PROCEDURE — 97164 PT RE-EVAL EST PLAN CARE: CPT

## 2022-09-06 PROCEDURE — 75574 CT ANGIO HRT W/3D IMAGE: CPT

## 2022-09-06 PROCEDURE — 80076 HEPATIC FUNCTION PANEL: CPT

## 2022-09-06 PROCEDURE — 86850 RBC ANTIBODY SCREEN: CPT

## 2022-09-06 PROCEDURE — 93971 EXTREMITY STUDY: CPT

## 2022-09-06 PROCEDURE — 82962 GLUCOSE BLOOD TEST: CPT

## 2022-09-06 PROCEDURE — 84132 ASSAY OF SERUM POTASSIUM: CPT

## 2022-09-06 PROCEDURE — 72126 CT NECK SPINE W/DYE: CPT

## 2022-09-06 PROCEDURE — 99285 EMERGENCY DEPT VISIT HI MDM: CPT | Mod: 25

## 2022-09-06 PROCEDURE — 86900 BLOOD TYPING SEROLOGIC ABO: CPT

## 2022-09-06 PROCEDURE — 83930 ASSAY OF BLOOD OSMOLALITY: CPT

## 2022-09-06 PROCEDURE — 85025 COMPLETE CBC W/AUTO DIFF WBC: CPT

## 2022-09-06 PROCEDURE — 86335 IMMUNFIX E-PHORSIS/URINE/CSF: CPT

## 2022-09-06 PROCEDURE — 72129 CT CHEST SPINE W/DYE: CPT

## 2022-09-06 PROCEDURE — 93308 TTE F-UP OR LMTD: CPT

## 2022-09-06 PROCEDURE — 85610 PROTHROMBIN TIME: CPT

## 2022-09-06 PROCEDURE — 88304 TISSUE EXAM BY PATHOLOGIST: CPT

## 2022-09-06 PROCEDURE — 87635 SARS-COV-2 COVID-19 AMP PRB: CPT

## 2022-09-06 RX ORDER — OXYCODONE HYDROCHLORIDE 5 MG/1
5 TABLET ORAL ONCE
Refills: 0 | Status: DISCONTINUED | OUTPATIENT
Start: 2022-09-06 | End: 2022-09-06

## 2022-09-06 RX ORDER — CHLORHEXIDINE GLUCONATE 213 G/1000ML
1 SOLUTION TOPICAL DAILY
Refills: 0 | Status: DISCONTINUED | OUTPATIENT
Start: 2022-09-06 | End: 2022-09-06

## 2022-09-06 RX ORDER — SACUBITRIL AND VALSARTAN 24; 26 MG/1; MG/1
1 TABLET, FILM COATED ORAL
Qty: 60 | Refills: 0
Start: 2022-09-06 | End: 2022-10-05

## 2022-09-06 RX ADMIN — Medication 1 DROP(S): at 06:30

## 2022-09-06 RX ADMIN — SACUBITRIL AND VALSARTAN 1 TABLET(S): 24; 26 TABLET, FILM COATED ORAL at 06:30

## 2022-09-06 RX ADMIN — Medication 650 MILLIGRAM(S): at 06:30

## 2022-09-06 RX ADMIN — PANTOPRAZOLE SODIUM 40 MILLIGRAM(S): 20 TABLET, DELAYED RELEASE ORAL at 06:31

## 2022-09-06 RX ADMIN — OXYCODONE HYDROCHLORIDE 20 MILLIGRAM(S): 5 TABLET ORAL at 00:05

## 2022-09-06 RX ADMIN — OXYCODONE HYDROCHLORIDE 5 MILLIGRAM(S): 5 TABLET ORAL at 14:00

## 2022-09-06 RX ADMIN — FINASTERIDE 5 MILLIGRAM(S): 5 TABLET, FILM COATED ORAL at 11:44

## 2022-09-06 RX ADMIN — OXYCODONE HYDROCHLORIDE 15 MILLIGRAM(S): 5 TABLET ORAL at 11:43

## 2022-09-06 RX ADMIN — LIDOCAINE 1 PATCH: 4 CREAM TOPICAL at 01:00

## 2022-09-06 RX ADMIN — Medication 650 MILLIGRAM(S): at 07:30

## 2022-09-06 RX ADMIN — Medication 50 MILLIGRAM(S): at 09:28

## 2022-09-06 RX ADMIN — OXYCODONE HYDROCHLORIDE 20 MILLIGRAM(S): 5 TABLET ORAL at 07:03

## 2022-09-06 RX ADMIN — OXYCODONE HYDROCHLORIDE 20 MILLIGRAM(S): 5 TABLET ORAL at 14:15

## 2022-09-06 RX ADMIN — OXYCODONE HYDROCHLORIDE 15 MILLIGRAM(S): 5 TABLET ORAL at 12:40

## 2022-09-06 RX ADMIN — OXYCODONE HYDROCHLORIDE 20 MILLIGRAM(S): 5 TABLET ORAL at 08:05

## 2022-09-06 RX ADMIN — Medication 50 MILLIGRAM(S): at 06:30

## 2022-09-06 RX ADMIN — APIXABAN 5 MILLIGRAM(S): 2.5 TABLET, FILM COATED ORAL at 11:43

## 2022-09-06 RX ADMIN — Medication 650 MILLIGRAM(S): at 12:40

## 2022-09-06 RX ADMIN — Medication 1 MILLIGRAM(S): at 11:43

## 2022-09-06 RX ADMIN — Medication 650 MILLIGRAM(S): at 11:44

## 2022-09-06 RX ADMIN — OXYCODONE HYDROCHLORIDE 5 MILLIGRAM(S): 5 TABLET ORAL at 13:31

## 2022-09-06 RX ADMIN — Medication 650 MILLIGRAM(S): at 00:00

## 2022-09-06 NOTE — PROGRESS NOTE ADULT - SUBJECTIVE AND OBJECTIVE BOX
STATUS POST:  8/26: ERCP, small to med sized stones removed  8/30: Cholecystectomy, removal of intrathecal pump        SUBJECTIVE: Patient seen and examined bedside by chief resident.  patient feeling well. diarrhea has improved. tolerating low fat diet without nausea or vomiting. pain is well controlled. understands plan for discharge today     apixaban 5 milliGRAM(s) Oral <User Schedule>  doxazosin 4 milliGRAM(s) Oral at bedtime  hydrALAZINE 50 milliGRAM(s) Oral every 12 hours  hydrALAZINE Injectable 10 milliGRAM(s) IV Push every 6 hours PRN  metoprolol succinate ER 50 milliGRAM(s) Oral every 12 hours  sacubitril 24 mG/valsartan 26 mG 1 Tablet(s) Oral every 12 hours      Vital Signs Last 24 Hrs  T(C): 36.7 (06 Sep 2022 08:39), Max: 36.7 (05 Sep 2022 13:30)  T(F): 98.1 (06 Sep 2022 08:39), Max: 98.1 (06 Sep 2022 08:39)  HR: 68 (06 Sep 2022 08:39) (60 - 74)  BP: 140/70 (06 Sep 2022 08:39) (110/68 - 147/88)  BP(mean): 92 (06 Sep 2022 05:00) (92 - 92)  RR: 17 (06 Sep 2022 08:39) (17 - 19)  SpO2: 98% (06 Sep 2022 08:39) (96% - 98%)    Parameters below as of 06 Sep 2022 08:39  Patient On (Oxygen Delivery Method): room air      I&O's Detail    05 Sep 2022 07:01  -  06 Sep 2022 07:00  --------------------------------------------------------  IN:    Oral Fluid: 1080 mL  Total IN: 1080 mL    OUT:    Voided (mL): 1550 mL  Total OUT: 1550 mL    Total NET: -470 mL          General: NAD, resting comfortably in bed  C/V: NSR  Pulm: Nonlabored breathing, no respiratory distress  Abd: soft, NT/ND. incisions are clean, dry and intact   Extrem: WWP, no edema, SCDs in place        LABS:                        7.7    6.80  )-----------( 234      ( 05 Sep 2022 06:06 )             24.2     09-05    133<L>  |  100  |  11  ----------------------------<  91  4.2   |  25  |  1.14    Ca    8.4      05 Sep 2022 06:06  Phos  3.2     09-05  Mg     1.9     09-05    TPro  6.4  /  Alb  2.8<L>  /  TBili  0.4  /  DBili  x   /  AST  15  /  ALT  11  /  AlkPhos  140<H>  09-05          RADIOLOGY & ADDITIONAL STUDIES:

## 2022-09-06 NOTE — DISCHARGE NOTE PROVIDER - NSDCMRMEDTOKEN_GEN_ALL_CORE_FT
ALPRAZolam 1 mg oral tablet: 1 tab(s) orally once a day (at bedtime), As needed, insomnia  B-12 Resin 1000 mcg oral tablet: 1 tab(s) orally once a week  Cardura 4 mg oral tablet: 1 tab(s) orally once a day  Eliquis 5 mg oral tablet: 1 tab(s) orally once a day  Entresto 24 mg-26 mg oral tablet: 1 tab(s) orally 2 times a day  folic acid 1 mg oral tablet: 1 tab(s) orally once a day  hydrALAZINE 10 mg oral tablet: 50 milligram(s) orally 1 time a day + 25 milligrams orally 1 time a day  omeprazole 20 mg oral delayed release capsule: 1 cap(s) orally once a day  oxyCODONE 15 mg oral tablet: 1 tab(s) orally every 4 hours, As needed, Moderate Pain (4 - 6) MDD:6  oxyCODONE 30 mg oral tablet, extended release: 1 tab(s) orally every 8 hours, As Needed -for severe pain MDD:3  OxyCONTIN 20 mg oral tablet, extended release: 20 milligram(s) orally every 8 hours  Pamelor 75 mg oral capsule: 1 cap(s) orally once a day (at bedtime)  Proscar 5 mg oral tablet: 1 tab(s) orally once a day  sacubitril-valsartan 24 mg-26 mg oral tablet: 1 tab(s) orally 2 times a day  simvastatin 5 mg oral tablet: 1 tab(s) orally once a day (at bedtime)  Toprol-XL 50 mg oral tablet, extended release: 1 tab(s) orally 2 times a day  Trintellix 20 mg oral tablet: 1 tab(s) orally once a day  Valium 2 mg oral tablet: 1 tab(s) orally once a day (at bedtime), As Needed - for insomnia

## 2022-09-06 NOTE — PROGRESS NOTE ADULT - ASSESSMENT
71 y/o male with history of colon mass s/p partial colectomy (2017), appendectomy, hernia repair, aortic and iliac aneurysm surgery (2017) s/p stent placement, HTN, HLD, paroxysmal atrial fibrillation on Eliquis, Right LE DVT >5 years ago s/p IVC filter placement, right knee ligament repair, , herniated disc and related surgery in [id number] complicated by spinal fusion, local hematoma, foot drop with chronic pain s/p failed intrathecal morphine pump which caused bradycardia (s/p pacemaker implant [2004]) and left hip prosthesis. On last admission, had newly reduced EF w/concern for stress induced CM s/p percutaneous cholecystostomy tube by IR; Presented to this admission with bleeding at the perc yonis tube site; Now s/p ERCP on 8/26/22 with stone removal, and robot-assisted cholecystectomy on 8/30/2022 with removal of ventral intrathecal pain pump.     #Cholecystitis s/p cholecystectomy   s/p cholecystectomy on 8.30.22    #Chronic pain, opiate dependence   Substantial opiate regimen as outpatient. Pain control while inpatient per primary team   Standing miralax BID while on current pain regimen.     #Chronic Afib  #S/p PPM   Defer recs re: Anti-coagulation to cardiology consult   Continue Metoprolol , statin  currently on apixaban     #HTN   Continue metoprolol and hydralazine  Ensure adequate pain control   If SBP >180 mmHg persistently, [ ] Get bladder scan [ ] if pain well-controlled, and bladder scan shows no urinary retention, can use labetalol 10mg IV push if HR >60 bpm or hydralazine IV 5mg  discharge BP med recommendations per cardiology consult     #BPH   Continue doxazosin  Continue finasteride  Low threshold for repeating bladder scan (if new tachycardia, elevated BP)      #hyponatremia   Na 133 likely in the setting of pain and chronic back pain   remains stable       DVT ppx - already on apixaban

## 2022-09-06 NOTE — DISCHARGE NOTE PROVIDER - CARE PROVIDER_API CALL
Rocky Doyle  SURGERY  [address] [place] [address]  Phone: [phone]  Fax: [phone]  Follow Up Time:

## 2022-09-06 NOTE — DISCHARGE NOTE NURSING/CASE MANAGEMENT/SOCIAL WORK - NSDCPEFALRISK_GEN_ALL_CORE
For information on Fall & Injury Prevention, visit: [email or web address] OR  [email or web address] OR  https://www.cdc.gov/steadi/patient.html

## 2022-09-06 NOTE — PROGRESS NOTE ADULT - REASON FOR ADMISSION
bleeding around perc yonis site

## 2022-09-06 NOTE — PROGRESS NOTE ADULT - SUBJECTIVE AND OBJECTIVE BOX
Pain Management Progress Note - [facility] [phone]    HPI: Patient seen and examined today. Patient reports doing well this AM, states he is endorsing no abdominal pain. Patient reports endorsing some chronic back pain, but controlled with pain medications. Patient denies any side effects from current pain regimen.     Pertinent PMH: Pain at: ___Back ___Neck___Knee ___Hip ___Shoulder _X__ Opioid tolerance    Pain is __X_ sharp ____dull ___burning ___achy ___ Intensity: ____ mild __X__mod _X___severe     Location ___X__surgical site _____cervical ____X_lumbar __X__abd _____upper ext____lower ext    Worse with _X___activity __X__movement _____physical therapy___ Rest    Improved with __X__medication __X__rest ____physical therapy    PAST MEDICAL & SURGICAL HISTORY:  AAA (abdominal aortic aneurysm)  HTN (hypertension)  Cardiomyopathy  Hyperlipidemia  ONOFRE (dyspnea on exertion)  DVT (deep venous thrombosis)  Pulmonary emphysema  COPD  Cardiac arrhythmia  Aneurysm of aortic root  Depression  anxiety  Anemia  Pelvic mass  Pacemaker  medtronic  History of kidney surgery  History of back surgery  multiple, lumbar  Surgery, elective  multiple neck surgery, cervical  S/P hip replacement, left  S/P arthroscopy of right knee  S/P AAA (abdominal aortic aneurysm) repair  with right iliac aneurysm endovascular repair  S/P carpal tunnel release  Surgery, elective  Cardiac Stent x4  Surgery, elective  Intrathecal pump placement    MEDICATIONS:  chlorhexidine 2% Cloths  loperamide  ALPRAZolam  potassium chloride    Tablet ER  magnesium sulfate  IVPB  ALPRAZolam  ALPRAZolam  apixaban  oxyCODONE    IR  oxyCODONE  ER Tablet  magnesium sulfate  IVPB  potassium phosphate / sodium phosphate Powder (PHOS-NaK)  magnesium sulfate  IVPB  furosemide   Injectable  potassium chloride   Solution  pantoprazole    Tablet  folic acid  senna  polyethylene glycol 3350  vancomycin  IVPB  vortioxetine  acetaminophen     Tablet ..  heparin  Infusion  nortriptyline  sacubitril 24 mG/valsartan 26 mG  cefoTEtan  IVPB  lidocaine   4% Patch  lidocaine   4% Patch  lactated ringers.  HYDROmorphone  Injectable  magnesium sulfate  IVPB  glucagon  Injectable  dextrose 5%.  dextrose 50% Injectable  dextrose 50% Injectable  dextrose 50% Injectable  dextrose 5%.  dextrose Oral Gel  insulin lispro (ADMELOG) corrective regimen sliding scale  cefoTEtan  IVPB  HYDROmorphone  Injectable  melatonin  artificial tears (preservative free) Ophthalmic Solution  hydrALAZINE  magnesium sulfate  IVPB  dextrose 5% + sodium chloride 0.9%.  magnesium sulfate  IVPB  potassium phosphate IVPB  heparin  Infusion  oxyCODONE    IR  hydrALAZINE Injectable  oxyCODONE  ER Tablet  heparin  Infusion.  potassium chloride    Tablet ER  magnesium sulfate  IVPB  hydrALAZINE Injectable  lactated ringers.  oxyCODONE  ER Tablet  oxyCODONE    IR  oxyCODONE    IR  diazepam    Tablet  ALPRAZolam  magnesium sulfate  IVPB  dextrose 5% + sodium chloride 0.9%.  sodium chloride  sodium chloride 0.9%.  heparin  Infusion  heparin  Infusion  heparin  Infusion  lactated ringers.  hydrALAZINE  polyethylene glycol 3350  magnesium sulfate  IVPB  hydrALAZINE  metoprolol tartrate  sodium phosphate IVPB  lactated ringers.  hydrALAZINE  heparin  Infusion  heparin  Infusion.  potassium phosphate / sodium phosphate Powder (PHOS-NaK)  magnesium sulfate  IVPB  heparin  Infusion  heparin  Infusion  ondansetron Injectable  diazepam    Tablet  metoprolol succinate ER  simvastatin  finasteride  nortriptyline  oxyCODONE  ER Tablet  oxyCODONE    IR  oxyCODONE    IR  pantoprazole  Injectable  hydrALAZINE  hydrALAZINE  doxazosin  ALPRAZolam  lactated ringers.  morphine  - Injectable    ROS: Const:  N___febrile   Eyes:___ENT:___CV: _N__chest pain  Resp: _N___sob  GI:_N__nausea N___vomiting __N__abd pain ___npo ___clears ___full diet __bm  :___ Musk: _Y__pain ___spasm  Skin:___ Neuro:  _N__sedation_N__confusion__N__ numbness ___weakness _N__paresthesia  Psych:_N__anxiety  Endo:___ Heme:___Allergy:__ALTACE, PCN_    Hemoglobin: 7.7 g/dL (09-05 @ 06:06)  T(C): 36.7 (09-06-22 @ 08:39), Max: 36.7 (09-05-22 @ 13:30)  HR: 68 (09-06-22 @ 08:39) (60 - 74)  BP: 140/70 (09-06-22 @ 08:39) (110/68 - 147/88)  RR: 17 (09-06-22 @ 08:39) (17 - 19)  SpO2: 98% (09-06-22 @ 08:39) (96% - 98%)  Wt(kg): --     PHYSICAL EXAM:  Gen Appearance: __X_no acute distress _X__appropriate       Neuro: ___SILT feet____ EOM Intact Psych: AAOX_3_, __X_mood/affect appropriate        Eyes: __X_conjunctiva WNL  _X____ Pupils equal and round        ENT: __X_ears and nose atraumatic_X__ Hearing grossly intact        Neck: _X__trachea midline, no visible masses ___thyroid without palpable mass    Resp: __X_Nml WOB____No tactile fremitus ___clear to auscultation    Cardio: _X__extremities free from edema ____pedal pulses palpable    GI/Abdomen: ___soft _____ Nontender____X__Nondistended_____HSM    Lymphatic: ___no palpable nodes in neck  ___no palpable nodes calves and feet    Skin/Wound: ___Incision, _X__Dressing c/d/i,   ____surrounding tissues soft,  ___drain/chest tube present____    Muscular: EHL ___/5  Gastrocnemius___/5    __X_absent clubbing/cyanosis         ASSESSMENT:  This is a 72y old Male with a history of colon mass s/p partial colectomy, aortic and iliac aneurysm surgery (2017), HTN, HLD, paroxysmal afib on eliquis, pacemaker implant (2004), multiple orthopedic surgeries including spine and right hip, intrathecal morphine pump placement (2004) reported to be inactive and not in use POD 7 S/P  laparoscopic cholecystectomy and removal of intra-thecal morphine pump, doing well.    Recommended Treatment PLAN:  1. Consider home-dose pain regimen. Oxycontin 20 mg PO TID, Oxycodone 15 mg PO q8h PRN severe pain  2. Consider continuing Dilaudid 0.5 mg IVP q4h PRN breakthrough pain  3. Consider continuing daily lidocaine patches to abdomen  4. Consider starting Tylenol 650 mg PO q6h PRN mild pain  HOLD ALL OPIOIDS FOR SEDATION, RR<10, O2SAT <93%, SBP <96  Plan discussed with Dr. Frey, pt seen and examined by him on PM rounds   [facility] [phone]    HPI: Patient seen and examined today. Patient reports doing well this AM, states he is endorsing no abdominal pain. Patient reports endorsing some chronic back pain, but controlled with pain medications. Patient denies any side effects from current pain regimen.     Pertinent PMH: Pain at: ___Back ___Neck___Knee ___Hip ___Shoulder _X__ Opioid tolerance    Pain is __X_ sharp ____dull ___burning ___achy ___ Intensity: ____ mild __X__mod _X___severe     Location ___X__surgical site _____cervical ____X_lumbar __X__abd _____upper ext____lower ext    Worse with _X___activity __X__movement _____physical therapy___ Rest    Improved with __X__medication __X__rest ____physical therapy    PAST MEDICAL & SURGICAL HISTORY:  AAA (abdominal aortic aneurysm)  HTN (hypertension)  Cardiomyopathy  Hyperlipidemia  ONOFRE (dyspnea on exertion)  DVT (deep venous thrombosis)  Pulmonary emphysema  COPD  Cardiac arrhythmia  Aneurysm of aortic root  Depression  anxiety  Anemia  Pelvic mass  Pacemaker  medtronic  History of kidney surgery  History of back surgery  multiple, lumbar  Surgery, elective  multiple neck surgery, cervical  S/P hip replacement, left  S/P arthroscopy of right knee  S/P AAA (abdominal aortic aneurysm) repair  with right iliac aneurysm endovascular repair  S/P carpal tunnel release  Surgery, elective  Cardiac Stent x4  Surgery, elective  Intrathecal pump placement    MEDICATIONS:  chlorhexidine 2% Cloths  loperamide  ALPRAZolam  potassium chloride    Tablet ER  magnesium sulfate  IVPB  ALPRAZolam  ALPRAZolam  apixaban  oxyCODONE    IR  oxyCODONE  ER Tablet  magnesium sulfate  IVPB  potassium phosphate / sodium phosphate Powder (PHOS-NaK)  magnesium sulfate  IVPB  furosemide   Injectable  potassium chloride   Solution  pantoprazole    Tablet  folic acid  senna  polyethylene glycol 3350  vancomycin  IVPB  vortioxetine  acetaminophen     Tablet ..  heparin  Infusion  nortriptyline  sacubitril 24 mG/valsartan 26 mG  cefoTEtan  IVPB  lidocaine   4% Patch  lidocaine   4% Patch  lactated ringers.  HYDROmorphone  Injectable  magnesium sulfate  IVPB  glucagon  Injectable  dextrose 5%.  dextrose 50% Injectable  dextrose 50% Injectable  dextrose 50% Injectable  dextrose 5%.  dextrose Oral Gel  insulin lispro (ADMELOG) corrective regimen sliding scale  cefoTEtan  IVPB  HYDROmorphone  Injectable  melatonin  artificial tears (preservative free) Ophthalmic Solution  hydrALAZINE  magnesium sulfate  IVPB  dextrose 5% + sodium chloride 0.9%.  magnesium sulfate  IVPB  potassium phosphate IVPB  heparin  Infusion  oxyCODONE    IR  hydrALAZINE Injectable  oxyCODONE  ER Tablet  heparin  Infusion.  potassium chloride    Tablet ER  magnesium sulfate  IVPB  hydrALAZINE Injectable  lactated ringers.  oxyCODONE  ER Tablet  oxyCODONE    IR  oxyCODONE    IR  diazepam    Tablet  ALPRAZolam  magnesium sulfate  IVPB  dextrose 5% + sodium chloride 0.9%.  sodium chloride  sodium chloride 0.9%.  heparin  Infusion  heparin  Infusion  heparin  Infusion  lactated ringers.  hydrALAZINE  polyethylene glycol 3350  magnesium sulfate  IVPB  hydrALAZINE  metoprolol tartrate  sodium phosphate IVPB  lactated ringers.  hydrALAZINE  heparin  Infusion  heparin  Infusion.  potassium phosphate / sodium phosphate Powder (PHOS-NaK)  magnesium sulfate  IVPB  heparin  Infusion  heparin  Infusion  ondansetron Injectable  diazepam    Tablet  metoprolol succinate ER  simvastatin  finasteride  nortriptyline  oxyCODONE  ER Tablet  oxyCODONE    IR  oxyCODONE    IR  pantoprazole  Injectable  hydrALAZINE  hydrALAZINE  doxazosin  ALPRAZolam  lactated ringers.  morphine  - Injectable    ROS: Const:  N___febrile   Eyes:___ENT:___CV: _N__chest pain  Resp: _N___sob  GI:_N__nausea N___vomiting __N__abd pain ___npo ___clears ___full diet __bm  :___ Musk: _Y__pain ___spasm  Skin:___ Neuro:  _N__sedation_N__confusion__N__ numbness ___weakness _N__paresthesia  Psych:_N__anxiety  Endo:___ Heme:___Allergy:__ALTACE, PCN_    Hemoglobin: 7.7 g/dL (09-05 @ 06:06)  T(C): 36.7 (09-06-22 @ 08:39), Max: 36.7 (09-05-22 @ 13:30)  HR: 68 (09-06-22 @ 08:39) (60 - 74)  BP: 140/70 (09-06-22 @ 08:39) (110/68 - 147/88)  RR: 17 (09-06-22 @ 08:39) (17 - 19)  SpO2: 98% (09-06-22 @ 08:39) (96% - 98%)  Wt(kg): --     PHYSICAL EXAM:  Gen Appearance: __X_no acute distress _X__appropriate       Neuro: ___SILT feet____ EOM Intact Psych: AAOX_3_, __X_mood/affect appropriate        Eyes: __X_conjunctiva WNL  _X____ Pupils equal and round        ENT: __X_ears and nose atraumatic_X__ Hearing grossly intact        Neck: _X__trachea midline, no visible masses ___thyroid without palpable mass    Resp: __X_Nml WOB____No tactile fremitus ___clear to auscultation    Cardio: _X__extremities free from edema ____pedal pulses palpable    GI/Abdomen: ___soft _____ Nontender____X__Nondistended_____HSM    Lymphatic: ___no palpable nodes in neck  ___no palpable nodes calves and feet    Skin/Wound: ___Incision, _X__Dressing c/d/i,   ____surrounding tissues soft,  ___drain/chest tube present____    Muscular: EHL ___/5  Gastrocnemius___/5    __X_absent clubbing/cyanosis         ASSESSMENT:  This is a 72y old Male with a history of colon mass s/p partial colectomy, aortic and iliac aneurysm surgery (2017), HTN, HLD, paroxysmal afib on eliquis, pacemaker implant (2004), multiple orthopedic surgeries including spine and right hip, intrathecal morphine pump placement (2004) reported to be inactive and not in use POD 7 S/P  laparoscopic cholecystectomy and removal of intra-thecal morphine pump, doing well.    Recommended Treatment PLAN:  1. Consider home-dose pain regimen. Oxycontin 20 mg PO TID, Oxycodone 15 mg PO q8h PRN severe pain  2. Consider continuing Dilaudid 0.5 mg IVP q4h PRN breakthrough pain  3. Consider continuing daily lidocaine patches to abdomen  HOLD ALL OPIOIDS FOR SEDATION, RR<10, O2SAT <93%, SBP <96  Plan discussed with Dr. Frey

## 2022-09-06 NOTE — DISCHARGE NOTE PROVIDER - NSDCCPCAREPLAN_GEN_ALL_CORE_FT
PRINCIPAL DISCHARGE DIAGNOSIS  Diagnosis: RUQ abdominal pain  Assessment and Plan of Treatment:       SECONDARY DISCHARGE DIAGNOSES  Diagnosis: Cholecystostomy care  Assessment and Plan of Treatment:

## 2022-09-06 NOTE — DISCHARGE NOTE NURSING/CASE MANAGEMENT/SOCIAL WORK - NSDCPNINST_GEN_ALL_CORE
Call Dr. Doyle if you have any questions or concerns. Educational material given: [employer] Patient Education- Surgical Wound Discharge Instructions.

## 2022-09-06 NOTE — DISCHARGE NOTE PROVIDER - HOSPITAL COURSE
72 year old male with past medical history of hypertension, hyperlipidemia, paroxysmal atrial fibrillation on Eliquis, questionable history of blood clotting disorder s/p IVC filter placement, colon mass s/p partial colectomy (2017) and past surgical history of appendectomy, hernia repair, iliac aneurysm surgery (2017) s/p stent placement, pacemaker implant (2004) s/p recent ppm interrogation on Eliquis, herniated disc and related surgery (6900-8040) complicated by spinal fusion, local hematoma, foot drop with chronic pain s/p failed intrathecal morphine pump causing bradycardia and left hip prosthesis who was admitted on 8/20 for bleeding from percutaneous cholecystostomy site. Patient was recently admitted to [facility] on 8/7 for acute cholecystitis however developed Takutsobu cardiomyopathy requiring MICU care, patient was not medically optimized at the time and was not an adequate surgical candidate for cholecystectomy. Therefore, he underwent a percutaneous cholecystostomy tube by Interventional Radiology and was discharged to subacute rehab on 8/16. On 8/18, patient contacted Dr. Doyle with complaints of bleeding through the dressing covering the cholecystostomy tube and was advised to stop Eliquis and continue monitoring the site for any bleeding. However the dressing was saturated over the next day and the patient was advised to come to [facility] ED. Of note, per the rehab facility, the drain was consistently putting out 300cc/day with no blood in the tubing. On admission, the dressing was noted to have some blood but no obvious signs of bleeding or hematoma on exam, the patient was hemodynamically stable, hemoglobin was 9.7 from 9.5 at discharge, LFTs were within normal limits. CTAP shows percutaneous cholecystostomy catheter in collapsed gallbladder. The patient was admitted with plans for interval cholecystectomy once cleared by cardiology.    During admission, CT angiography was notable for severe calcium score at the 85th percentile and non-obstructive coronary artery disease. ECHO showed ejection fraction of 50-55%. IR study on 8/23 showed choledocholithiasis and patient was scheduled for ERCP with GI on 8/25. However, patient was reported to be unresponsive/difficult to rouse in the endoscopy suite per anesthesiologist and a stroke code was called. Stroke work up was negative however ERCP was rescheduled for 8/26 with sphincterotomy and removal of stones. The patient tolerated the procedure well however on 8/27 became lethargic and obtunded and was transferred to the SICU for better management of altered mental status. CT head showed no acute intracranial hemorrhage, mass effect, or CT evidence of recent transcortical infarction.     Neurosurgery was consulted for removal of intrathecal pump per patient request. CT spine correlated intrathecal catheter entering entering the spinal canal on the the right side at the L2/3 level and extending superiorly to the thoracic spine with the tip on the right side of the spinal canal at the T9 level. On 8/30, patient underwent a laparoscopic cholecystectomy with RENATA drain x1 and removal of intrathecal pump. Post-operatively, the patient developed a non-positional throbbing headache with no neurological deficits, STAT CT head was negative for acute event. Per neurosurgery, RENATA should be kept off suction for possible tract with CSF. On 9/1, patient was  recovering well, tolerating diet, urinating with no issues, and was stepped down to regional floors. The remainder of the patient's hospital course was uncomplicated and the RENATA drain was discontinued on 9/2. At time of discharge, patient had stable vital signs, was tolerating diet, voiding without assistance, and pain was controlled. The patient has met benchmarks for discharge with plans to follow up with Dr. Doyle in 1-2 weeks.

## 2022-09-06 NOTE — DISCHARGE NOTE PROVIDER - NSDCFUADDINST_GEN_ALL_CORE_FT
1. Please follow up with Dr. Doyle in 1-2 weeks. To make an appointment, please call the office at [phone].  2. Please follow up with Dr. Gonzales for your appointment on 9/14/2022 at the address provided.  3. Please follow up with Dr. Pastor for your appointment on 9/20/2022 at the address provided.  4. Please follow up with Cardiology on 9/30/2022 at the address provided.    General Discharge Instructions:  Please resume all regular home medications unless specifically advised not to take a particular medication. Also, please take any new medications as prescribed.  Please get plenty of rest, continue to ambulate several times per day, and drink adequate amounts of fluids. Avoid lifting weights greater than 5-10 lbs until you follow-up with your surgeon, who will instruct you further regarding activity restrictions.  Avoid driving or operating heavy machinery while taking pain medications.  Please follow-up with your surgeon and Primary Care Provider (PCP) as advised.  Incision Care:  *Please call your doctor or nurse practitioner if you have increased pain, swelling, redness, or drainage from the incision site.  *Avoid swimming and baths until your follow-up appointment.  *You may shower, and wash surgical incisions with a mild soap and warm water. Gently pat the area dry.  *If you have staples, they will be removed at your follow-up appointment.  *If you have steri-strips, they will fall off on their own. Please remove any remaining strips 7-10 days after surgery.    Warning Signs:  Please call your doctor or nurse practitioner if you experience the following:  *You experience new chest pain, pressure, squeezing or tightness.  *New or worsening cough, shortness of breath, or wheeze.  *If you are vomiting and cannot keep down fluids or your medications.  *You are getting dehydrated due to continued vomiting, diarrhea, or other reasons. Signs of dehydration include dry mouth, rapid heartbeat, or feeling dizzy or faint when standing.  *You see blood or dark/black material when you vomit or have a bowel movement.  *You experience burning when you urinate, have blood in your urine, or experience a discharge.  *Your pain is not improving within 8-12 hours or is not gone within 24 hours. Call or return immediately if your pain is getting worse, changes location, or moves to your chest or back.  *You have shaking chills, or fever greater than 101.5 degrees Fahrenheit or 38 degrees Celsius.  *Any change in your symptoms, or any new symptoms that concern you.

## 2022-09-06 NOTE — DISCHARGE NOTE PROVIDER - INSTRUCTIONS
Please continue a low fat diet, this can include:  Cereals, whole grains, and whole grain pasta products  corn or whole wheat tortillas, baked crackers, noodles, especially whole grain versions, oatmeal, rice, English muffins    Dairy products can be high in fat, but food manufacturers often offer lower fat versions. These include: fat free cheese or yogurt or cream cheese    Protein sources: Tofu, beans, lentils, egg whites, lean cuts of meat, lentils, tuna, peas, shrimp, skinless chicken or turkey breast  veggie burgers, Fruits and vegetables are naturally low fat. Choose fresh, frozen, or canned options.

## 2022-09-06 NOTE — DISCHARGE NOTE PROVIDER - NSDCFUSCHEDAPPT_GEN_ALL_CORE_FT
Santiago Gonzales  [facility]  HEARTVASC [id number]  Scheduled Appointment: 09/14/2022    Guille Pastor  [facility]  HEARTVASC [address]  Scheduled Appointment: 09/20/2022    [facility]  CARDIOLOGY [id number]  Scheduled Appointment: 09/30/2022

## 2022-09-06 NOTE — DISCHARGE NOTE NURSING/CASE MANAGEMENT/SOCIAL WORK - PATIENT PORTAL LINK FT
You can access the FollowMyHealth Patient Portal offered by [facility] by registering at the following website: [email or web address]. By joining [employer]’s FollowMyHealth portal, you will also be able to view your health information using other applications (apps) compatible with our system.

## 2022-09-06 NOTE — PROGRESS NOTE ADULT - PROVIDER SPECIALTY LIST ADULT
Hospitalist
Hospitalist
Internal Medicine
Pain Medicine
SICU
SICU
Surgery
Gastroenterology
Gastroenterology
Hospitalist
Hospitalist
Neurology
Neurosurgery
Neurosurgery
Pain Medicine
Pain Medicine
SICU
Surgery
Cardiology
Gastroenterology
Hospitalist
Pain Medicine
Pain Medicine
Surgery
Gastroenterology
Hospitalist

## 2022-09-06 NOTE — PROGRESS NOTE ADULT - SUBJECTIVE AND OBJECTIVE BOX
Patient is a 72y old  Male who presents with a chief complaint of bleeding around perc yonis site (06 Sep 2022 11:25)      INTERVAL HPI/OVERNIGHT EVENTS: offers no new complaints; current symptoms resolving    MEDICATIONS  (STANDING):  acetaminophen     Tablet .. 650 milliGRAM(s) Oral every 6 hours  apixaban 5 milliGRAM(s) Oral <User Schedule>  artificial tears (preservative free) Ophthalmic Solution 1 Drop(s) Both EYES two times a day  chlorhexidine 2% Cloths 1 Application(s) Topical daily  dextrose 5%. 1000 milliLiter(s) (50 mL/Hr) IV Continuous <Continuous>  dextrose 5%. 1000 milliLiter(s) (100 mL/Hr) IV Continuous <Continuous>  dextrose 50% Injectable 25 Gram(s) IV Push once  dextrose 50% Injectable 12.5 Gram(s) IV Push once  dextrose 50% Injectable 25 Gram(s) IV Push once  doxazosin 4 milliGRAM(s) Oral at bedtime  finasteride 5 milliGRAM(s) Oral daily  folic acid 1 milliGRAM(s) Oral daily  glucagon  Injectable 1 milliGRAM(s) IntraMuscular once  hydrALAZINE 50 milliGRAM(s) Oral every 12 hours  insulin lispro (ADMELOG) corrective regimen sliding scale   SubCutaneous Before meals and at bedtime  lidocaine   4% Patch 1 Patch Transdermal every 24 hours  lidocaine   4% Patch 1 Patch Transdermal every 24 hours  melatonin 3 milliGRAM(s) Oral at bedtime  metoprolol succinate ER 50 milliGRAM(s) Oral every 12 hours  nortriptyline 75 milliGRAM(s) Oral at bedtime  oxyCODONE  ER Tablet 20 milliGRAM(s) Oral every 8 hours  pantoprazole    Tablet 40 milliGRAM(s) Oral before breakfast  sacubitril 24 mG/valsartan 26 mG 1 Tablet(s) Oral every 12 hours  simvastatin 5 milliGRAM(s) Oral at bedtime  vortioxetine 20 milliGRAM(s) Oral every 24 hours    MEDICATIONS  (PRN):  dextrose Oral Gel 15 Gram(s) Oral once PRN Blood Glucose LESS THAN 70 milliGRAM(s)/deciliter  hydrALAZINE Injectable 10 milliGRAM(s) IV Push every 6 hours PRN SBP> 160  HYDROmorphone  Injectable 0.5 milliGRAM(s) IV Push every 4 hours PRN Severe Pain (7 - 10)  ondansetron Injectable 4 milliGRAM(s) IV Push every 6 hours PRN Nausea and/or Vomiting  oxyCODONE    IR 15 milliGRAM(s) Oral every 4 hours PRN Moderate Pain (4 - 6)      __________________________________________________  REVIEW OF SYSTEMS:    CONSTITUTIONAL: No fever,   EYES: no acute visual disturbances  NECK: No pain or stiffness  RESPIRATORY: No cough; No shortness of breath  CARDIOVASCULAR: No chest pain, no palpitations  GASTROINTESTINAL: No pain. No nausea or vomiting; No diarrhea   NEUROLOGICAL: No headache or numbness, no tremors  MUSCULOSKELETAL: No joint pain, no muscle pain  GENITOURINARY: no dysuria, no frequency, no hesitancy  PSYCHIATRY: no depression , no anxiety  ALL OTHER  ROS negative        Vital Signs Last 24 Hrs  T(C): 36.7 (06 Sep 2022 12:29), Max: 36.7 (06 Sep 2022 00:28)  T(F): 98.1 (06 Sep 2022 12:29), Max: 98.1 (06 Sep 2022 08:39)  HR: 70 (06 Sep 2022 12:29) (60 - 74)  BP: 140/70 (06 Sep 2022 12:29) (115/70 - 147/88)  BP(mean): 92 (06 Sep 2022 05:00) (92 - 92)  RR: 18 (06 Sep 2022 12:29) (17 - 19)  SpO2: 99% (06 Sep 2022 12:29) (96% - 99%)    Parameters below as of 06 Sep 2022 12:29  Patient On (Oxygen Delivery Method): room air        ________________________________________________  PHYSICAL EXAM:  GENERAL: NAD  HEENT: Normocephalic;  conjunctivae and sclerae clear; moist mucous membranes;   NECK : supple  CHEST/LUNG: Clear to auscultation bilaterally with good air entry   HEART: S1 S2  regular; no murmurs, gallops or rubs  ABDOMEN: Soft, Nontender, Nondistended; Bowel sounds present  EXTREMITIES: no cyanosis; no edema; no calf tenderness  SKIN: warm and dry; no rash  NERVOUS SYSTEM:  Awake and alert; Oriented  to place, person and time ; no new deficits    _________________________________________________  LABS:                        7.7    6.80  )-----------( 234      ( 05 Sep 2022 06:06 )             24.2     09-05    133<L>  |  100  |  11  ----------------------------<  91  4.2   |  25  |  1.14    Ca    8.4      05 Sep 2022 06:06  Phos  3.2     09-05  Mg     1.9     09-05    TPro  6.4  /  Alb  2.8<L>  /  TBili  0.4  /  DBili  x   /  AST  15  /  ALT  11  /  AlkPhos  140<H>  09-05        CAPILLARY BLOOD GLUCOSE      POCT Blood Glucose.: 127 mg/dL (06 Sep 2022 11:44)  POCT Blood Glucose.: 97 mg/dL (06 Sep 2022 07:40)  POCT Blood Glucose.: 101 mg/dL (05 Sep 2022 21:41)  POCT Blood Glucose.: 101 mg/dL (05 Sep 2022 16:58)        RADIOLOGY & ADDITIONAL TESTS:      Plan of care was discussed with patient and /or primary care giver; all questions and concerns were addressed and care was aligned with patient's wishes.

## 2022-09-07 ENCOUNTER — NON-APPOINTMENT (OUTPATIENT)
Age: 72
End: 2022-09-07

## 2022-09-12 ENCOUNTER — TRANSCRIPTION ENCOUNTER (OUTPATIENT)
Age: 72
End: 2022-09-12

## 2022-09-12 ENCOUNTER — INPATIENT (INPATIENT)
Facility: HOSPITAL | Age: 72
LOS: 7 days | Discharge: NURSING, REHAB OR LONG-TERM CARE | DRG: 29 | End: 2022-09-20
Attending: NEUROLOGICAL SURGERY | Admitting: NEUROLOGICAL SURGERY
Payer: MEDICARE

## 2022-09-12 VITALS
TEMPERATURE: 98 F | HEART RATE: 82 BPM | WEIGHT: 199.96 LBS | RESPIRATION RATE: 16 BRPM | SYSTOLIC BLOOD PRESSURE: 119 MMHG | HEIGHT: 69 IN | DIASTOLIC BLOOD PRESSURE: 63 MMHG | OXYGEN SATURATION: 100 %

## 2022-09-12 DIAGNOSIS — Z41.9 ENCOUNTER FOR PROCEDURE FOR PURPOSES OTHER THAN REMEDYING HEALTH STATE, UNSPECIFIED: Chronic | ICD-10-CM

## 2022-09-12 DIAGNOSIS — Z98.890 OTHER SPECIFIED POSTPROCEDURAL STATES: Chronic | ICD-10-CM

## 2022-09-12 DIAGNOSIS — R18.8 OTHER ASCITES: ICD-10-CM

## 2022-09-12 DIAGNOSIS — Z95.0 PRESENCE OF CARDIAC PACEMAKER: Chronic | ICD-10-CM

## 2022-09-12 DIAGNOSIS — Z96.642 PRESENCE OF LEFT ARTIFICIAL HIP JOINT: Chronic | ICD-10-CM

## 2022-09-12 LAB
ALBUMIN SERPL ELPH-MCNC: 2.8 G/DL — LOW (ref 3.3–5)
ALP SERPL-CCNC: 132 U/L — HIGH (ref 40–120)
ALT FLD-CCNC: SIGNIFICANT CHANGE UP U/L (ref 10–45)
ANION GAP SERPL CALC-SCNC: 11 MMOL/L — SIGNIFICANT CHANGE UP (ref 5–17)
APPEARANCE CSF: SIGNIFICANT CHANGE UP
APPEARANCE SPUN FLD: SIGNIFICANT CHANGE UP
APPEARANCE UR: CLEAR — SIGNIFICANT CHANGE UP
APTT BLD: 45 SEC — HIGH (ref 27.5–35.5)
AST SERPL-CCNC: SIGNIFICANT CHANGE UP U/L (ref 10–40)
BACTERIA # UR AUTO: PRESENT /HPF
BASOPHILS # BLD AUTO: 0.04 K/UL — SIGNIFICANT CHANGE UP (ref 0–0.2)
BASOPHILS NFR BLD AUTO: 0.4 % — SIGNIFICANT CHANGE UP (ref 0–2)
BILIRUB SERPL-MCNC: 0.6 MG/DL — SIGNIFICANT CHANGE UP (ref 0.2–1.2)
BILIRUB UR-MCNC: NEGATIVE — SIGNIFICANT CHANGE UP
BLD GP AB SCN SERPL QL: NEGATIVE — SIGNIFICANT CHANGE UP
BUN SERPL-MCNC: 15 MG/DL — SIGNIFICANT CHANGE UP (ref 7–23)
CALCIUM SERPL-MCNC: 8.7 MG/DL — SIGNIFICANT CHANGE UP (ref 8.4–10.5)
CHLORIDE SERPL-SCNC: 98 MMOL/L — SIGNIFICANT CHANGE UP (ref 96–108)
CO2 SERPL-SCNC: 23 MMOL/L — SIGNIFICANT CHANGE UP (ref 22–31)
COLOR CSF: ABNORMAL
COLOR SPEC: YELLOW — SIGNIFICANT CHANGE UP
COMMENT - URINE: SIGNIFICANT CHANGE UP
CREAT SERPL-MCNC: 1.1 MG/DL — SIGNIFICANT CHANGE UP (ref 0.5–1.3)
CSF COMMENTS: SIGNIFICANT CHANGE UP
DIFF PNL FLD: NEGATIVE — SIGNIFICANT CHANGE UP
EGFR: 71 ML/MIN/1.73M2 — SIGNIFICANT CHANGE UP
EOSINOPHIL # BLD AUTO: 0.23 K/UL — SIGNIFICANT CHANGE UP (ref 0–0.5)
EOSINOPHIL NFR BLD AUTO: 2.5 % — SIGNIFICANT CHANGE UP (ref 0–6)
EPI CELLS # UR: SIGNIFICANT CHANGE UP /HPF (ref 0–5)
GLUCOSE CSF-MCNC: 62 MG/DL — SIGNIFICANT CHANGE UP (ref 40–70)
GLUCOSE SERPL-MCNC: 102 MG/DL — HIGH (ref 70–99)
GLUCOSE UR QL: NEGATIVE — SIGNIFICANT CHANGE UP
GRAM STN FLD: SIGNIFICANT CHANGE UP
HCT VFR BLD CALC: 27.4 % — LOW (ref 39–50)
HGB BLD-MCNC: 8.9 G/DL — LOW (ref 13–17)
IMM GRANULOCYTES NFR BLD AUTO: 0.4 % — SIGNIFICANT CHANGE UP (ref 0–1.5)
INR BLD: 1.35 — HIGH (ref 0.88–1.16)
KETONES UR-MCNC: NEGATIVE — SIGNIFICANT CHANGE UP
LEUKOCYTE ESTERASE UR-ACNC: ABNORMAL
LYMPHOCYTES # BLD AUTO: 1.27 K/UL — SIGNIFICANT CHANGE UP (ref 1–3.3)
LYMPHOCYTES # BLD AUTO: 13.7 % — SIGNIFICANT CHANGE UP (ref 13–44)
LYMPHOCYTES # CSF: 1 % — LOW (ref 40–80)
MCHC RBC-ENTMCNC: 28.3 PG — SIGNIFICANT CHANGE UP (ref 27–34)
MCHC RBC-ENTMCNC: 32.5 GM/DL — SIGNIFICANT CHANGE UP (ref 32–36)
MCV RBC AUTO: 87.3 FL — SIGNIFICANT CHANGE UP (ref 80–100)
MONOCYTES # BLD AUTO: 0.96 K/UL — HIGH (ref 0–0.9)
MONOCYTES NFR BLD AUTO: 10.4 % — SIGNIFICANT CHANGE UP (ref 2–14)
NEUTROPHILS # BLD AUTO: 6.73 K/UL — SIGNIFICANT CHANGE UP (ref 1.8–7.4)
NEUTROPHILS # CSF: 1 % — SIGNIFICANT CHANGE UP (ref 0–6)
NEUTROPHILS NFR BLD AUTO: 72.6 % — SIGNIFICANT CHANGE UP (ref 43–77)
NITRITE UR-MCNC: NEGATIVE — SIGNIFICANT CHANGE UP
NRBC # BLD: 0 /100 WBCS — SIGNIFICANT CHANGE UP (ref 0–0)
NRBC NFR CSF: 2 /UL — SIGNIFICANT CHANGE UP (ref 0–5)
PH UR: 6.5 — SIGNIFICANT CHANGE UP (ref 5–8)
PLATELET # BLD AUTO: 276 K/UL — SIGNIFICANT CHANGE UP (ref 150–400)
POTASSIUM SERPL-MCNC: SIGNIFICANT CHANGE UP MMOL/L (ref 3.5–5.3)
POTASSIUM SERPL-SCNC: SIGNIFICANT CHANGE UP MMOL/L (ref 3.5–5.3)
PROT CSF-MCNC: 195 MG/DL — HIGH (ref 15–45)
PROT SERPL-MCNC: 7.1 G/DL — SIGNIFICANT CHANGE UP (ref 6–8.3)
PROT UR-MCNC: NEGATIVE MG/DL — SIGNIFICANT CHANGE UP
PROTHROM AB SERPL-ACNC: 16.1 SEC — HIGH (ref 10.5–13.4)
RBC # BLD: 3.14 M/UL — LOW (ref 4.2–5.8)
RBC # CSF: HIGH /UL (ref 0–0)
RBC # FLD: 14.2 % — SIGNIFICANT CHANGE UP (ref 10.3–14.5)
RBC CASTS # UR COMP ASSIST: < 5 /HPF — SIGNIFICANT CHANGE UP
RH IG SCN BLD-IMP: POSITIVE — SIGNIFICANT CHANGE UP
SARS-COV-2 RNA SPEC QL NAA+PROBE: NEGATIVE — SIGNIFICANT CHANGE UP
SODIUM SERPL-SCNC: 132 MMOL/L — LOW (ref 135–145)
SP GR SPEC: 1.01 — SIGNIFICANT CHANGE UP (ref 1–1.03)
SPECIMEN SOURCE: SIGNIFICANT CHANGE UP
TUBE TYPE: SIGNIFICANT CHANGE UP
UROBILINOGEN FLD QL: 0.2 E.U./DL — SIGNIFICANT CHANGE UP
WBC # BLD: 9.27 K/UL — SIGNIFICANT CHANGE UP (ref 3.8–10.5)
WBC # FLD AUTO: 9.27 K/UL — SIGNIFICANT CHANGE UP (ref 3.8–10.5)
WBC UR QL: > 10 /HPF

## 2022-09-12 PROCEDURE — 74177 CT ABD & PELVIS W/CONTRAST: CPT | Mod: 26,MG

## 2022-09-12 PROCEDURE — G1004: CPT

## 2022-09-12 PROCEDURE — 99285 EMERGENCY DEPT VISIT HI MDM: CPT | Mod: FS

## 2022-09-12 PROCEDURE — 71045 X-RAY EXAM CHEST 1 VIEW: CPT | Mod: 26

## 2022-09-12 RX ORDER — PANTOPRAZOLE SODIUM 20 MG/1
40 TABLET, DELAYED RELEASE ORAL
Refills: 0 | Status: DISCONTINUED | OUTPATIENT
Start: 2022-09-12 | End: 2022-09-13

## 2022-09-12 RX ORDER — METOPROLOL TARTRATE 50 MG
50 TABLET ORAL
Refills: 0 | Status: DISCONTINUED | OUTPATIENT
Start: 2022-09-12 | End: 2022-09-12

## 2022-09-12 RX ORDER — HYDRALAZINE HCL 50 MG
10 TABLET ORAL DAILY
Refills: 0 | Status: DISCONTINUED | OUTPATIENT
Start: 2022-09-13 | End: 2022-09-13

## 2022-09-12 RX ORDER — FINASTERIDE 5 MG/1
5 TABLET, FILM COATED ORAL DAILY
Refills: 0 | Status: DISCONTINUED | OUTPATIENT
Start: 2022-09-12 | End: 2022-09-13

## 2022-09-12 RX ORDER — OXYCODONE HYDROCHLORIDE 5 MG/1
20 TABLET ORAL EVERY 12 HOURS
Refills: 0 | Status: DISCONTINUED | OUTPATIENT
Start: 2022-09-12 | End: 2022-09-12

## 2022-09-12 RX ORDER — NORTRIPTYLINE HYDROCHLORIDE 10 MG/1
75 CAPSULE ORAL AT BEDTIME
Refills: 0 | Status: DISCONTINUED | OUTPATIENT
Start: 2022-09-12 | End: 2022-09-13

## 2022-09-12 RX ORDER — OXYCODONE HYDROCHLORIDE 5 MG/1
20 TABLET ORAL EVERY 8 HOURS
Refills: 0 | Status: DISCONTINUED | OUTPATIENT
Start: 2022-09-12 | End: 2022-09-13

## 2022-09-12 RX ORDER — CHLORHEXIDINE GLUCONATE 213 G/1000ML
1 SOLUTION TOPICAL EVERY 12 HOURS
Refills: 0 | Status: DISCONTINUED | OUTPATIENT
Start: 2022-09-12 | End: 2022-09-13

## 2022-09-12 RX ORDER — ALPRAZOLAM 0.25 MG
1 TABLET ORAL AT BEDTIME
Refills: 0 | Status: DISCONTINUED | OUTPATIENT
Start: 2022-09-12 | End: 2022-09-13

## 2022-09-12 RX ORDER — SENNA PLUS 8.6 MG/1
2 TABLET ORAL AT BEDTIME
Refills: 0 | Status: DISCONTINUED | OUTPATIENT
Start: 2022-09-12 | End: 2022-09-13

## 2022-09-12 RX ORDER — METOPROLOL TARTRATE 50 MG
50 TABLET ORAL EVERY 12 HOURS
Refills: 0 | Status: DISCONTINUED | OUTPATIENT
Start: 2022-09-12 | End: 2022-09-13

## 2022-09-12 RX ORDER — OXYCODONE HYDROCHLORIDE 5 MG/1
15 TABLET ORAL EVERY 6 HOURS
Refills: 0 | Status: DISCONTINUED | OUTPATIENT
Start: 2022-09-12 | End: 2022-09-13

## 2022-09-12 RX ORDER — FOLIC ACID 0.8 MG
1 TABLET ORAL DAILY
Refills: 0 | Status: DISCONTINUED | OUTPATIENT
Start: 2022-09-12 | End: 2022-09-13

## 2022-09-12 RX ORDER — APREPITANT 80 MG/1
40 CAPSULE ORAL ONCE
Refills: 0 | Status: COMPLETED | OUTPATIENT
Start: 2022-09-13 | End: 2022-09-13

## 2022-09-12 RX ORDER — SODIUM CHLORIDE 9 MG/ML
1000 INJECTION INTRAMUSCULAR; INTRAVENOUS; SUBCUTANEOUS
Refills: 0 | Status: DISCONTINUED | OUTPATIENT
Start: 2022-09-12 | End: 2022-09-13

## 2022-09-12 RX ORDER — SIMVASTATIN 20 MG/1
5 TABLET, FILM COATED ORAL AT BEDTIME
Refills: 0 | Status: DISCONTINUED | OUTPATIENT
Start: 2022-09-12 | End: 2022-09-13

## 2022-09-12 RX ORDER — POVIDONE-IODINE 5 %
1 AEROSOL (ML) TOPICAL ONCE
Refills: 0 | Status: COMPLETED | OUTPATIENT
Start: 2022-09-12 | End: 2022-09-13

## 2022-09-12 RX ORDER — ACETAMINOPHEN 500 MG
1000 TABLET ORAL ONCE
Refills: 0 | Status: COMPLETED | OUTPATIENT
Start: 2022-09-13 | End: 2022-09-13

## 2022-09-12 RX ORDER — HYDRALAZINE HCL 50 MG
25 TABLET ORAL AT BEDTIME
Refills: 0 | Status: DISCONTINUED | OUTPATIENT
Start: 2022-09-12 | End: 2022-09-13

## 2022-09-12 RX ORDER — DOXAZOSIN MESYLATE 4 MG
4 TABLET ORAL AT BEDTIME
Refills: 0 | Status: DISCONTINUED | OUTPATIENT
Start: 2022-09-12 | End: 2022-09-13

## 2022-09-12 NOTE — ED PROVIDER NOTE - ATTENDING APP SHARED VISIT CONTRIBUTION OF CARE
Pt had pain pump removed to llq of abd 2 wks ago, who p/w swelling to site starting last week. No associated abd pain, fever, chills, nausea, vomiting, hematuria... Currently on eliquis. AVSS. PE as above. Surgical incision closed to llq w/ associated hematoma. Non-tender, no g/r. Hg improved from baseline. Labs otherwise unremarkable. Plan for ct a/p to r/o abscess vs. hematoma. NSG consult requested for evaluation.

## 2022-09-12 NOTE — CONSULT NOTE ADULT - ASSESSMENT
72M with complex PMH including colon mass s/p partial colectomy (2017),  HTN, HLD, pAF (on Eliquis), ?blood clotting disorder s/p IVC filter placement, PPM (2004), herniated disc and related surgery in [id number] complicated by spinal fusion and recent history of newly reduced EF w/concern for stress induced CM with full recovery, now presenting for removal of intrathecal pump.      #Pre-operative Risk Assessment  Cardiology following on recent admission for jeannette-operative risk stratification for cholecystectomy, hospital course complicated by HTN emergency and acute EF drop with suspicion for stress induced CM with recovery of LVEF.   - ECG:   - CCTA: nonobstructive CAD  - TTE 8/22: Borderline normal left ventricular systolic function. Left ventricular ejection fraction is 50-55%.  - Per last Cardiology notes, patient's medications were: Hydralazine 50 mg TID, Toprol 50mg BID, Entresto 24-26mg BID but was dc'd on hydralazine 10mg in AM and 25mg at bedtime.  Please obtain official med rec. OK to hold Entresto pre-op in AM with plans to restart at night.     #Atrial Fibrillation  CHADSVAC 5 points  - c/w Toprol 50mg BID with plan to transition to Toprol 100mg close to d/c   - AC on hold for surgery. Restart Eliquis 5 mg BID when cleared from surgical standpoint. 72M with complex PMH including colon mass s/p partial colectomy (2017),  HTN, HLD, pAF (on Eliquis), ?blood clotting disorder s/p IVC filter placement, PPM (2004), herniated disc and related surgery in [id number] complicated by spinal fusion and recent history of newly reduced EF w/concern for stress induced CM with full recovery, now presenting for removal of intrathecal pump.      #Pre-operative Risk Assessment  Cardiology following on recent admission for jeannette-operative risk stratification for cholecystectomy, hospital course complicated by HTN emergency and acute EF drop with suspicion for stress induced CM with recovery of LVEF.   - ECG: NSR, 1st degree AVB, RBBB  - CCTA: nonobstructive CAD  - TTE 8/22: Borderline normal left ventricular systolic function. Left ventricular ejection fraction is 50-55%.  - Pt with poor functional status. Does not really exert himself but functional status is limited by deconditioning and post op pain. No exertional symptoms. Recent CCTA with non-obstructive diz  - Patient is intermediate risk for intermediate risk procedure   - Per last Cardiology notes, patient's medications were: Hydralazine 50 mg TID, Toprol 50mg BID, Entresto 24-26mg BID but was dc'd on hydralazine 10mg in AM and 25mg at bedtime.  Please obtain official med rec. OK to hold Entresto pre-op in AM with plans to restart at night.     #Atrial Fibrillation  CHADSVAC 5 points  - c/w Toprol 50mg BID with plan to transition to Toprol 100mg close to d/c   - AC on hold for surgery. Restart Eliquis 5 mg BID when cleared from surgical standpoint.    Cardiology to follow

## 2022-09-12 NOTE — PROGRESS NOTE ADULT - SUBJECTIVE AND OBJECTIVE BOX
Surgery: Removal of posterior intrathecal pain pump   Consent: Signed by HCP Aarti Guerrero [phone]    Representative Consent: [x] Signed by  HCP      Yasmin (Unknown)  penicillin (Unknown)    T(C): 37.1 (09-12-22 @ 20:07), Max: 37.1 (09-12-22 @ 20:07)  HR: 72 (09-12-22 @ 20:07) (66 - 82)  BP: 124/72 (09-12-22 @ 20:07) (114/74 - 124/72)  RR: 18 (09-12-22 @ 20:07) (16 - 18)  SpO2: 98% (09-12-22 @ 20:07) (98% - 100%)  Wt(kg): --    EXAM:  PHYSICAL EXAM:  GEN: laying in bed, appears well, NAD  NEURO: AOx3. FC, OE spont, speech intact, face symmetric. CNII-XII intact. PERRL, EOMI. No pronator drift. MAEx4. 5/5 strength throughout. SILT  CV: RRR +S1/S2  PULM: CTAB  GI: Abd soft, NT/ND  EXT: ext warm, dry, nontender  WOUND: LLQ abdominal incision tender and fluctuant with clear drainage     09-12    132<L>  |  98  |  15  ----------------------------<  102<H>  see note   |  23  |  1.10    Ca    8.7      12 Sep 2022 14:29    TPro  7.1  /  Alb  2.8<L>  /  TBili  0.6  /  DBili  x   /  AST  see note  /  ALT  see note  /  AlkPhos  132<H>  09-12    CBC Full  -  ( 12 Sep 2022 14:29 )  WBC Count : 9.27 K/uL  RBC Count : 3.14 M/uL  Hemoglobin : 8.9 g/dL  Hematocrit : 27.4 %  Platelet Count - Automated : 276 K/uL  Mean Cell Volume : 87.3 fl  Mean Cell Hemoglobin : 28.3 pg  Mean Cell Hemoglobin Concentration : 32.5 gm/dL  Auto Neutrophil # : 6.73 K/uL  Auto Lymphocyte # : 1.27 K/uL  Auto Monocyte # : 0.96 K/uL  Auto Eosinophil # : 0.23 K/uL  Auto Basophil # : 0.04 K/uL  Auto Neutrophil % : 72.6 %  Auto Lymphocyte % : 13.7 %  Auto Monocyte % : 10.4 %  Auto Eosinophil % : 2.5 %  Auto Basophil % : 0.4 %    PT/INR - ( 12 Sep 2022 15:11 )   PT: 16.1 sec;   INR: 1.35          PTT - ( 12 Sep 2022 15:11 )  PTT:45.0 sec    Pregnancy test (serum hcg for any female under 56, must be resulted day before OR): [ ] Negative Result  [ ] Positive Result  [ ] N/A : male or female>55 y/o  Type & Screen (in past 72hrs):     2 Type & Screen within 72 hours if anticipate blood need in OR:  _ Y _ N     Blood ordered and on hold for OR:   [ ] No need     [ ] 1u pRBC on hold      [ ] 2u pRBC on hold    COVID swab (in past 48hrs): _ Y  _N    CXR:  EKG:  ECHO:  Medical Clearances:  Other Clearances:     Last dose of antiplatelet/anticoagulation drug:    Implanted Devices (pacemaker, drug pump...etc):  []YES   [] NO                  If yes --> EPS consulted to interrogate device: [ ] YES  [ ] NO                            If yes -->  EPS called to let them know patient going for surgery: [ ] device needs to be turned off                                                                                                                                                 [ ] magnet needs to be placed for surgery                                                                                                                                                [ ] nothing to do per EP, may proceed with cautery use in OR                                       3M nasal swab ordered?  _Y  _N    Cranial surgery: Order written for hair to be shampooed night before surgery and morning before surgery  [] yes   []no  Chlorhexidine Wipes ordered for Neck Down?  _ Y  _ N  (twice a day if 1 day before surgery, daily for 3 days if 3 days prior, daily if in ICU)                 Assessment:    Plan:    Assessment:  Present when checked    []  GCS  E   V  M     Heart Failure: []Acute, [] acute on chronic , []chronic  Heart Failure:  [] Diastolic (HFpEF), [] Systolic (HFrEF), []Combined (HFpEF and HFrEF), [] RHF, [] Pulm HTN, [] Other    [] CLAUDIO, [] ATN, [] AIN, [] other  [] CKD1, [] CKD2, [] CKD 3, [] CKD 4, [] CKD 5, []ESRD    Encephalopathy: [] Metabolic, [] Hepatic, [] toxic, [] Neurological, [] Other    Abnormal Nurtitional Status: [] malnurtition (see nutrition note), [ ]underweight: BMI < 19, [] morbid obesity: BMI >40, [] Cachexia    [] Sepsis  [] hypovolemic shock,[] cardiogenic shock, [] hemorrhagic shock, [] neuogenic shock  [] Acute Respiratory Failure  []Cerebral edema, [] Brain compression/ herniation,   [] Functional quadriplegia  [] Acute blood loss anemia   Surgery: Removal of posterior intrathecal pain pump   Consent: Signed by HCP Aarti Guerrero [phone]    Representative Consent: [x] Signed by  HCP      Yasmin (Unknown)  penicillin (Unknown)    T(C): 37.1 (09-12-22 @ 20:07), Max: 37.1 (09-12-22 @ 20:07)  HR: 72 (09-12-22 @ 20:07) (66 - 82)  BP: 124/72 (09-12-22 @ 20:07) (114/74 - 124/72)  RR: 18 (09-12-22 @ 20:07) (16 - 18)  SpO2: 98% (09-12-22 @ 20:07) (98% - 100%)  Wt(kg): --    EXAM:  PHYSICAL EXAM:  GEN: laying in bed, appears well, NAD  NEURO: AOx3. FC, OE spont, speech intact, face symmetric. CNII-XII intact. PERRL, EOMI. No pronator drift. MAEx4. 5/5 strength throughout. SILT  CV: RRR +S1/S2  PULM: CTAB  GI: Abd soft, NT/ND  EXT: ext warm, dry, nontender  WOUND: LLQ abdominal incision tender and fluctuant with clear drainage     09-12    132<L>  |  98  |  15  ----------------------------<  102<H>  see note   |  23  |  1.10    Ca    8.7      12 Sep 2022 14:29    TPro  7.1  /  Alb  2.8<L>  /  TBili  0.6  /  DBili  x   /  AST  see note  /  ALT  see note  /  AlkPhos  132<H>  09-12    CBC Full  -  ( 12 Sep 2022 14:29 )  WBC Count : 9.27 K/uL  RBC Count : 3.14 M/uL  Hemoglobin : 8.9 g/dL  Hematocrit : 27.4 %  Platelet Count - Automated : 276 K/uL  Mean Cell Volume : 87.3 fl  Mean Cell Hemoglobin : 28.3 pg  Mean Cell Hemoglobin Concentration : 32.5 gm/dL  Auto Neutrophil # : 6.73 K/uL  Auto Lymphocyte # : 1.27 K/uL  Auto Monocyte # : 0.96 K/uL  Auto Eosinophil # : 0.23 K/uL  Auto Basophil # : 0.04 K/uL  Auto Neutrophil % : 72.6 %  Auto Lymphocyte % : 13.7 %  Auto Monocyte % : 10.4 %  Auto Eosinophil % : 2.5 %  Auto Basophil % : 0.4 %    PT/INR - ( 12 Sep 2022 15:11 )   PT: 16.1 sec;   INR: 1.35          PTT - ( 12 Sep 2022 15:11 )  PTT:45.0 sec    Pregnancy test (serum hcg for any female under 56, must be resulted day before OR): [ ] Negative Result  [ ] Positive Result  [x ] N/A : male or female>55 y/o  Type & Screen (in past 72hrs):     2 Type & Screen within 72 hours if anticipate blood need in OR:  _x Y _ N     Blood ordered and on hold for OR:   [ ] No need     [ ] 1u pRBC on hold      [x] 2u pRBC on hold    COVID swab (in past 48hrs): _ x Y  _N    CXR: pending  EKG: NSR  ECHO: 50-55%  Medical Clearances: cleared in August   Other Clearances: cardiac clearance in August     Last dose of antiplatelet/anticoagulation drug:    Implanted Devices (pacemaker, drug pump...etc):  []YES   [] NO                  If yes --> EPS consulted to interrogate device: [ ] YES  [ ] NO                            If yes -->  EPS called to let them know patient going for surgery: [ ] device needs to be turned off                                                                                                                                                 [ ] magnet needs to be placed for surgery                                                                                                                                                [ ] nothing to do per EP, may proceed with cautery use in OR                                       3M nasal swab ordered?  x_Y  _N    Chlorhexidine Wipes ordered for Neck Down?  _d Y  _ N  (twice a day if 1 day before surgery, daily for 3 days if 3 days prior, daily if in ICU)                 Assessment:  This is a 72-year-old female with history of HTN, HLD, Afib on Eliquis. Patient underwent cholecystectomy with general surgery and ventral removal of intrathecal pain pump 8/30/22 Over the patient week patient has noticed increased swelling over the LLQ. Denies pain, fevers, abdominal pain, signs of infection, recent trauma/fall. Patient admitted to neurosurgery pre op for removal of the posterior portion of pain pump.   Plan:    Assessment:   - OR tomorrow for removal of pain pump  - NPO+ IVF  - ERAS  - 2nd type and screen  - repeat coags in AM  - will reconsult cardiology for clearance      Surgery: Removal of posterior intrathecal pain pump   Consent: Signed by HCP Aarti Guerrero [phone]    Representative Consent: [x] Signed by  HCP      Yasmin (Unknown)  penicillin (Unknown)    T(C): 37.1 (09-12-22 @ 20:07), Max: 37.1 (09-12-22 @ 20:07)  HR: 72 (09-12-22 @ 20:07) (66 - 82)  BP: 124/72 (09-12-22 @ 20:07) (114/74 - 124/72)  RR: 18 (09-12-22 @ 20:07) (16 - 18)  SpO2: 98% (09-12-22 @ 20:07) (98% - 100%)  Wt(kg): --    EXAM:  PHYSICAL EXAM:  GEN: laying in bed, appears well, NAD  NEURO: AOx3. FC, OE spont, speech intact, face symmetric. CNII-XII intact. PERRL, EOMI. No pronator drift. MAEx4. 5/5 strength throughout. SILT  CV: RRR +S1/S2  PULM: CTAB  GI: Abd soft, NT/ND  EXT: ext warm, dry, nontender  WOUND: LLQ abdominal incision tender and fluctuant with clear drainage     09-12    132<L>  |  98  |  15  ----------------------------<  102<H>  see note   |  23  |  1.10    Ca    8.7      12 Sep 2022 14:29    TPro  7.1  /  Alb  2.8<L>  /  TBili  0.6  /  DBili  x   /  AST  see note  /  ALT  see note  /  AlkPhos  132<H>  09-12    CBC Full  -  ( 12 Sep 2022 14:29 )  WBC Count : 9.27 K/uL  RBC Count : 3.14 M/uL  Hemoglobin : 8.9 g/dL  Hematocrit : 27.4 %  Platelet Count - Automated : 276 K/uL  Mean Cell Volume : 87.3 fl  Mean Cell Hemoglobin : 28.3 pg  Mean Cell Hemoglobin Concentration : 32.5 gm/dL  Auto Neutrophil # : 6.73 K/uL  Auto Lymphocyte # : 1.27 K/uL  Auto Monocyte # : 0.96 K/uL  Auto Eosinophil # : 0.23 K/uL  Auto Basophil # : 0.04 K/uL  Auto Neutrophil % : 72.6 %  Auto Lymphocyte % : 13.7 %  Auto Monocyte % : 10.4 %  Auto Eosinophil % : 2.5 %  Auto Basophil % : 0.4 %    PT/INR - ( 12 Sep 2022 15:11 )   PT: 16.1 sec;   INR: 1.35          PTT - ( 12 Sep 2022 15:11 )  PTT:45.0 sec    Pregnancy test (serum hcg for any female under 56, must be resulted day before OR): [ ] Negative Result  [ ] Positive Result  [x ] N/A : male or female>57 y/o  Type & Screen (in past 72hrs):     2 Type & Screen within 72 hours if anticipate blood need in OR:  _x Y _ N     Blood ordered and on hold for OR:   [ ] No need     [ ] 1u pRBC on hold      [x] 2u pRBC on hold    COVID swab (in past 48hrs): _ x Y  _N    CXR: pending  EKG: NSR  ECHO: 50-55%  Medical Clearances: cleared in August   Other Clearances: cardiac clearance in August     Last dose of antiplatelet/anticoagulation drug:    Implanted Devices (pacemaker, drug pump...etc):  [x]YES   [] NO                  If yes --> EPS consulted to interrogate device: [x] YES  [ ] NO pending                             If yes -->  EPS called to let them know patient going for surgery: [ ] device needs to be turned off                                                                                                                                                 [ ] magnet needs to be placed for surgery                                                                                                                                                [ ] nothing to do per EP, may proceed with cautery use in OR                                       3M nasal swab ordered?  x_Y  _N    Chlorhexidine Wipes ordered for Neck Down?  _d Y  _ N  (twice a day if 1 day before surgery, daily for 3 days if 3 days prior, daily if in ICU)                 Assessment:  This is a 72-year-old female with history of HTN, HLD, Afib on Eliquis. Patient underwent cholecystectomy with general surgery and ventral removal of intrathecal pain pump 8/30/22 Over the patient week patient has noticed increased swelling over the LLQ. Denies pain, fevers, abdominal pain, signs of infection, recent trauma/fall. Patient admitted to neurosurgery pre op for removal of the posterior portion of pain pump.   Plan:    Assessment:   - OR tomorrow for removal of pain pump  - NPO+ IVF  - ERAS  - 2nd type and screen  - repeat coags in AM  - will reconsult cardiology for clearance      Surgery: Removal of posterior intrathecal pain pump   Consent: Signed by HCP Aarti Guerrero [phone]    Representative Consent: [x] Signed by  HCP      Yasmin (Unknown)  penicillin (Unknown)    T(C): 37.1 (09-12-22 @ 20:07), Max: 37.1 (09-12-22 @ 20:07)  HR: 72 (09-12-22 @ 20:07) (66 - 82)  BP: 124/72 (09-12-22 @ 20:07) (114/74 - 124/72)  RR: 18 (09-12-22 @ 20:07) (16 - 18)  SpO2: 98% (09-12-22 @ 20:07) (98% - 100%)  Wt(kg): --    EXAM:  PHYSICAL EXAM:  GEN: laying in bed, appears well, NAD  NEURO: AOx3. FC, OE spont, speech intact, face symmetric. CNII-XII intact. PERRL, EOMI. No pronator drift. MAEx4. 5/5 strength throughout. SILT  CV: RRR +S1/S2  PULM: CTAB  GI: Abd soft, NT/ND  EXT: ext warm, dry, nontender  WOUND: LLQ abdominal incision tender and fluctuant with clear drainage     09-12    132<L>  |  98  |  15  ----------------------------<  102<H>  see note   |  23  |  1.10    Ca    8.7      12 Sep 2022 14:29    TPro  7.1  /  Alb  2.8<L>  /  TBili  0.6  /  DBili  x   /  AST  see note  /  ALT  see note  /  AlkPhos  132<H>  09-12    CBC Full  -  ( 12 Sep 2022 14:29 )  WBC Count : 9.27 K/uL  RBC Count : 3.14 M/uL  Hemoglobin : 8.9 g/dL  Hematocrit : 27.4 %  Platelet Count - Automated : 276 K/uL  Mean Cell Volume : 87.3 fl  Mean Cell Hemoglobin : 28.3 pg  Mean Cell Hemoglobin Concentration : 32.5 gm/dL  Auto Neutrophil # : 6.73 K/uL  Auto Lymphocyte # : 1.27 K/uL  Auto Monocyte # : 0.96 K/uL  Auto Eosinophil # : 0.23 K/uL  Auto Basophil # : 0.04 K/uL  Auto Neutrophil % : 72.6 %  Auto Lymphocyte % : 13.7 %  Auto Monocyte % : 10.4 %  Auto Eosinophil % : 2.5 %  Auto Basophil % : 0.4 %    PT/INR - ( 12 Sep 2022 15:11 )   PT: 16.1 sec;   INR: 1.35          PTT - ( 12 Sep 2022 15:11 )  PTT:45.0 sec    Pregnancy test (serum hcg for any female under 56, must be resulted day before OR): [ ] Negative Result  [ ] Positive Result  [x ] N/A : male or female>57 y/o  Type & Screen (in past 72hrs):     2 Type & Screen within 72 hours if anticipate blood need in OR:  _x Y _ N     Blood ordered and on hold for OR:   [ ] No need     [ ] 1u pRBC on hold      [x] 2u pRBC on hold    COVID swab (in past 48hrs): _ x Y  _N    CXR: pending  EKG: NSR  ECHO: 50-55%  Medical Clearances: cleared in August   Other Clearances: cardiac clearance in August     Last dose of antiplatelet/anticoagulation drug:    Implanted Devices (pacemaker, drug pump...etc):  [x]YES   [] NO                  If yes --> EPS consulted to interrogate device: [x] YES  [ ] NO pending                             If yes -->  EPS called to let them know patient going for surgery: [ ] device needs to be turned off                                                                                                                                                 [ ] magnet needs to be placed for surgery                                                                                                                                                [ ] nothing to do per EP, may proceed with cautery use in OR-call EP in AM ([employer] DDDR) recently interrogated, not MRI compatible, last admission per EP no need to reprogram before surgery                                        3M nasal swab ordered?  x_Y  _N    Chlorhexidine Wipes ordered for Neck Down?  _d Y  _ N  (twice a day if 1 day before surgery, daily for 3 days if 3 days prior, daily if in ICU)                 Assessment:  This is a 72-year-old female with history of HTN, HLD, Afib on Eliquis. Patient underwent cholecystectomy with general surgery and ventral removal of intrathecal pain pump 8/30/22 Over the patient week patient has noticed increased swelling over the LLQ. Denies pain, fevers, abdominal pain, signs of infection, recent trauma/fall. Patient admitted to neurosurgery pre op for removal of the posterior portion of pain pump.   Plan:    Assessment:   - OR tomorrow for removal of pain pump  - NPO+ IVF  - ERAS  - 2nd type and screen  - repeat coags in AM  - cardiology consulted and cleared for OR  - call EP in AM ([employer] DDDR) recently interrogated, not MRI compatible, last admission per EP no need to reprogram before surgery

## 2022-09-12 NOTE — H&P ADULT - HISTORY OF PRESENT ILLNESS
This is a 72-year-old female with history of HTN, HLD, Afib on Eliquis. Patient underwent cholecystectomy with general surgery and ventral removal of intrathecal pain pump 8/30/22 Over the patient week patient has noticed increased swelling over the LLQ. Denies pain, fevers, abdominal pain, signs of infection, recent trauma/fall. Patient admitted to neurosurgery pre op for removal of the posterior portion of pain pump.

## 2022-09-12 NOTE — H&P ADULT - PROBLEM SELECTOR PLAN 1
Admit to regional   CT A/P complete   Pre-op for removal of posterior portion of intrathecal pain pump 9/13/22 Admit to regional for post-operative CSF collection  CT A/P complete   Pre-op for removal of posterior portion of intrathecal pain pump 9/13/22

## 2022-09-12 NOTE — CONSULT NOTE ADULT - SUBJECTIVE AND OBJECTIVE BOX
HPI:    72 year old male with past medical history of hypertension, hyperlipidemia, paroxysmal atrial fibrillation on Eliquis, Hx of IVC filter, colon mass s/p partial colectomy (2017) and cholecystitis with recent admission to [facility]. Patient underwent cholecystectomy with general surgery and ventral removal of intrathecal pain pump 8/30/22. Over the past week, patient has noticed increased swelling over the LLQ. He is now admitted to neurosurgery for removal of the posterior portion of pain pump. Cardiology was consulted for preop risk optimization prior to OR tomorrow. Pt is known to Cardiology service. He had developed Takutsubo CM with recovery of his EF. He was then optimized for OR prior to surgery on last admission.     Currently, he denies any new complaints.       ROS: A 10-point review of systems was otherwise negative.    PAST MEDICAL & SURGICAL HISTORY:  AAA (abdominal aortic aneurysm)      HTN (hypertension)      Cardiomyopathy      Hyperlipidemia      ONOFRE (dyspnea on exertion)      DVT (deep venous thrombosis)      Pulmonary emphysema  COPD      Cardiac arrhythmia      Aneurysm of aortic root      Depression  anxiety      Anemia      Pelvic mass      Pacemaker  medtronic      History of kidney surgery      History of back surgery  multiple, lumbar      Surgery, elective  multiple neck surgery, cervical      S/P hip replacement, left      S/P arthroscopy of right knee      S/P AAA (abdominal aortic aneurysm) repair  with right iliac aneurysm endovascular repair      S/P carpal tunnel release      Surgery, elective  Cardiac Stent x4      Surgery, elective  Intrathecal pump placement          SOCIAL HISTORY:  FAMILY HISTORY:      ALLERGIES: 	  Altace (Unknown)  penicillin (Unknown)            MEDICATIONS:  ALPRAZolam 1 milliGRAM(s) Oral at bedtime PRN  chlorhexidine 2% Cloths 1 Application(s) Topical every 12 hours  doxazosin 4 milliGRAM(s) Oral at bedtime  finasteride 5 milliGRAM(s) Oral daily  folic acid 1 milliGRAM(s) Oral daily  metoprolol tartrate 50 milliGRAM(s) Oral two times a day  nortriptyline 75 milliGRAM(s) Oral at bedtime  oxyCODONE    IR 15 milliGRAM(s) Oral every 6 hours PRN  oxyCODONE  ER Tablet 20 milliGRAM(s) Oral every 12 hours  pantoprazole    Tablet 40 milliGRAM(s) Oral before breakfast  povidone iodine 5% Nasal Swab 1 Application(s) Both Nostrils once  senna 2 Tablet(s) Oral at bedtime  simvastatin 5 milliGRAM(s) Oral at bedtime  sodium chloride 0.9%. 1000 milliLiter(s) IV Continuous <Continuous>      PHYSICAL EXAM:  T(C): 37.1 (09-12-22 @ 20:07), Max: 37.1 (09-12-22 @ 20:07)  HR: 72 (09-12-22 @ 20:07) (66 - 82)  BP: 124/72 (09-12-22 @ 20:07) (114/74 - 124/72)  RR: 18 (09-12-22 @ 20:07) (16 - 18)  SpO2: 98% (09-12-22 @ 20:07) (98% - 100%)  Wt(kg): --    GEN: Awake, comfortable. NAD.   HEENT: NCAT, PERRL, EOMI. Mucosa moist. No JVD.   RESP: CTA b/l  CV: RRR, normal s1/s2. No m/r/g.  ABD: Soft, NTND. BS+  EXT: Warm. No edema, clubbing, or cyanosis.   NEURO: AAOx3. No focal deficits.    I&O's Summary    Height (cm): 175.3 (09-12 @ 14:03)  Weight (kg): 90.7 (09-12 @ 14:03)  BMI (kg/m2): 29.5 (09-12 @ 14:03)  BSA (m2): 2.07 (09-12 @ 14:03)  	  LABS:	 	    CARDIAC MARKERS:                                  8.9    9.27  )-----------( 276      ( 12 Sep 2022 14:29 )             27.4     09-12    132<L>  |  98  |  15  ----------------------------<  102<H>  see note   |  23  |  1.10    Ca    8.7      12 Sep 2022 14:29    TPro  7.1  /  Alb  2.8<L>  /  TBili  0.6  /  DBili  x   /  AST  see note  /  ALT  see note  /  AlkPhos  132<H>  09-12    < from: CT Angio Cardiac w/ IV Cont (08.22.22 @ 13:08) >      < end of copied text >  < from: CT Angio Cardiac w/ IV Cont (08.22.22 @ 13:08) >  IMPRESSION:    Cardiac:  1.  The calcium score is severe at 1289 Agatston units, which is at the   85 percentile, adjusted for age, gender and race.  2.  Non obstructive coronary artery disease.  3.  Pacemaker lead noted in the right heart.    < end of copied text >      < from: TTE Echo Limited or F/U (08.22.22 @ 13:30) >  CONCLUSIONS:     1. Limited study obtained for evaluation of left ventricular function.   2. Borderline normal left ventricular systolic function. Left   ventricular ejection fraction is 50-55%.   3. Normal right ventricular size and systolic function.   4. No evidence of pulmonary hypertension, pulmonary artery systolic   pressure is 21 mmHg.   5. Compared to the previous TTE performed on 8/12/2022, there has been   interval improvement in LVEF.    < end of copied text >   HPI:    72 year old male with past medical history of hypertension, hyperlipidemia, paroxysmal atrial fibrillation on Eliquis, Hx of IVC filter, colon mass s/p partial colectomy (2017) and cholecystitis with recent admission to [facility]. Patient underwent cholecystectomy with general surgery and ventral removal of intrathecal pain pump 8/30/22. Over the past week, patient has noticed increased swelling over the LLQ. CT abdomen showed large abscess formation in the abdominal wall c/f CSF collection. He is now admitted to neurosurgery for removal of the posterior portion of pain pump. Cardiology was consulted for preop risk optimization prior to OR tomorrow. Pt is known to Cardiology service. He had developed Takutsubo CM with recovery of his EF. He was then optimized for OR prior to surgery on last admission.     Currently, he denies any new complaints. Since his discharge, he has mostly been bedbound due to severe deconditioning from long hospital stay. Sometimes he is able to stand up but his functional status is severely limited. He denies any chest pain/ SOB/ ONOFRE/ peripheral edema.       ROS: A 10-point review of systems was otherwise negative.    PAST MEDICAL & SURGICAL HISTORY:  AAA (abdominal aortic aneurysm)      HTN (hypertension)      Cardiomyopathy      Hyperlipidemia      ONOFRE (dyspnea on exertion)      DVT (deep venous thrombosis)      Pulmonary emphysema  COPD      Cardiac arrhythmia      Aneurysm of aortic root      Depression  anxiety      Anemia      Pelvic mass      Pacemaker  medtronic      History of kidney surgery      History of back surgery  multiple, lumbar      Surgery, elective  multiple neck surgery, cervical      S/P hip replacement, left      S/P arthroscopy of right knee      S/P AAA (abdominal aortic aneurysm) repair  with right iliac aneurysm endovascular repair      S/P carpal tunnel release      Surgery, elective  Cardiac Stent x4      Surgery, elective  Intrathecal pump placement          SOCIAL HISTORY:  FAMILY HISTORY:      ALLERGIES: 	  Altace (Unknown)  penicillin (Unknown)            MEDICATIONS:  ALPRAZolam 1 milliGRAM(s) Oral at bedtime PRN  chlorhexidine 2% Cloths 1 Application(s) Topical every 12 hours  doxazosin 4 milliGRAM(s) Oral at bedtime  finasteride 5 milliGRAM(s) Oral daily  folic acid 1 milliGRAM(s) Oral daily  metoprolol tartrate 50 milliGRAM(s) Oral two times a day  nortriptyline 75 milliGRAM(s) Oral at bedtime  oxyCODONE    IR 15 milliGRAM(s) Oral every 6 hours PRN  oxyCODONE  ER Tablet 20 milliGRAM(s) Oral every 12 hours  pantoprazole    Tablet 40 milliGRAM(s) Oral before breakfast  povidone iodine 5% Nasal Swab 1 Application(s) Both Nostrils once  senna 2 Tablet(s) Oral at bedtime  simvastatin 5 milliGRAM(s) Oral at bedtime  sodium chloride 0.9%. 1000 milliLiter(s) IV Continuous <Continuous>      PHYSICAL EXAM:  T(C): 37.1 (09-12-22 @ 20:07), Max: 37.1 (09-12-22 @ 20:07)  HR: 72 (09-12-22 @ 20:07) (66 - 82)  BP: 124/72 (09-12-22 @ 20:07) (114/74 - 124/72)  RR: 18 (09-12-22 @ 20:07) (16 - 18)  SpO2: 98% (09-12-22 @ 20:07) (98% - 100%)  Wt(kg): --    GEN: Awake, comfortable. NAD.   HEENT: NCAT, PERRL, EOMI. Mucosa moist. No JVD.   RESP: CTA b/l  CV: RRR, normal s1/s2. No m/r/g.  EXT: Warm. No edema, clubbing, or cyanosis.       I&O's Summary    Height (cm): 175.3 (09-12 @ 14:03)  Weight (kg): 90.7 (09-12 @ 14:03)  BMI (kg/m2): 29.5 (09-12 @ 14:03)  BSA (m2): 2.07 (09-12 @ 14:03)  	  LABS:	 	    CARDIAC MARKERS:                                  8.9    9.27  )-----------( 276      ( 12 Sep 2022 14:29 )             27.4     09-12    132<L>  |  98  |  15  ----------------------------<  102<H>  see note   |  23  |  1.10    Ca    8.7      12 Sep 2022 14:29    TPro  7.1  /  Alb  2.8<L>  /  TBili  0.6  /  DBili  x   /  AST  see note  /  ALT  see note  /  AlkPhos  132<H>  09-12    < from: CT Angio Cardiac w/ IV Cont (08.22.22 @ 13:08) >      < end of copied text >  < from: CT Angio Cardiac w/ IV Cont (08.22.22 @ 13:08) >  IMPRESSION:    Cardiac:  1.  The calcium score is severe at 1289 Agatston units, which is at the   85 percentile, adjusted for age, gender and race.  2.  Non obstructive coronary artery disease.  3.  Pacemaker lead noted in the right heart.    < end of copied text >      < from: TTE Echo Limited or F/U (08.22.22 @ 13:30) >  CONCLUSIONS:     1. Limited study obtained for evaluation of left ventricular function.   2. Borderline normal left ventricular systolic function. Left   ventricular ejection fraction is 50-55%.   3. Normal right ventricular size and systolic function.   4. No evidence of pulmonary hypertension, pulmonary artery systolic   pressure is 21 mmHg.   5. Compared to the previous TTE performed on 8/12/2022, there has been   interval improvement in LVEF.    < end of copied text >

## 2022-09-12 NOTE — ED PROVIDER NOTE - NS ED ATTENDING STATEMENT MOD
This was a shared visit with the TRISTEN. I reviewed and verified the documentation and independently performed the documented:

## 2022-09-12 NOTE — ED PROVIDER NOTE - GASTROINTESTINAL, MLM
Abdomen soft, non-tender, no guarding. a 4x5 cm hematoma to LLQ overlying the incision (clean and dry, dermabond in place), no warmth, no tend, no pus, no bleeding

## 2022-09-12 NOTE — ED ADULT NURSE NOTE - NSIMPLEMENTINTERV_GEN_ALL_ED
Implemented All Fall Risk Interventions:  [place] to call system. Call bell, personal items and telephone within reach. Instruct patient to call for assistance. Room bathroom lighting operational. Non-slip footwear when patient is off stretcher. Physically safe environment: no spills, clutter or unnecessary equipment. Stretcher in lowest position, wheels locked, appropriate side rails in place. Provide visual cue, wrist band, yellow gown, etc. Monitor gait and stability. Monitor for mental status changes and reorient to person, place, and time. Review medications for side effects contributing to fall risk. Reinforce activity limits and safety measures with patient and family.

## 2022-09-12 NOTE — H&P ADULT - NSHPPHYSICALEXAM_GEN_ALL_CORE
Constitutional: NAD, well groomed, well nourished  Respiratory: breathing non-labored, symmetrical chest wall movement  Cardiovascuar: RRR, no murmurs  Gastrointestinal: abdomen soft, non tender  Genitourinary: exam deffered  Neurological:  AAOX3. Face symmetric, Verbal function intact, speech clear  Cranial Nerves: II-XII intact  Motor: 5/5 power in b/l upper extremities and lower extremities  Sensation: intact to light touch in all extremities  Extremities: distal pulses 2+ x4  Wound/incision: Prior LLQ abdominal incision with large area of swelling/fluctuance, no active drainage

## 2022-09-12 NOTE — ED PROVIDER NOTE - OBJECTIVE STATEMENT
The pt is a 73 y/o M, pmh HTN, HLD, Afib (eliquis), c/o L lower abd swelling - had intrathecal morphine pump removed on 8/30, noticed increased swelling over past wk. Denies pain, pus, fevers, chills, abd pain, n/v, dysuria, hematuria, falls or injury to site.

## 2022-09-12 NOTE — ED ADULT TRIAGE NOTE - CHIEF COMPLAINT QUOTE
a&ox4 BIBA from home c.o post -op complication. sent in by MD [place]. PT had a pain infusion pump removed from his LLQ. presents today due to swelling to LLQ around incision site. on Eliquis.

## 2022-09-12 NOTE — ED ADULT NURSE NOTE - OBJECTIVE STATEMENT
Pt is a  73 y/o M, PMHx  HTN, HLD, Afib (eliquis), c/o L lower abd swelling . pt pt, had intrathecal morphine pump removed on 8/30/22 which he had since ~2005, noticed increased swelling over past week. pt endorsing diarrhea today, spouse gave Imodium with no relief. Denies pain, pus, fevers, drainage, chills, abd pain, n/v, dysuria, hematuria, falls or injury to site.

## 2022-09-12 NOTE — ED PROVIDER NOTE - CLINICAL SUMMARY MEDICAL DECISION MAKING FREE TEXT BOX
pt c/o swelling to L lower abd - had intrathecal pump removed on 8/30, swelling had been getting worse over past wk, no fevers/chills/pus/bleeding, on eliquis, exam consistent w/hematoma but ct ordered to r/o abscess, labs at pt's baseline w/o findings to suggest inf process, seen by nsg and plan is to admit for evacuation and obs

## 2022-09-13 ENCOUNTER — RESULT REVIEW (OUTPATIENT)
Age: 72
End: 2022-09-13

## 2022-09-13 ENCOUNTER — TRANSCRIPTION ENCOUNTER (OUTPATIENT)
Age: 72
End: 2022-09-13

## 2022-09-13 DIAGNOSIS — J43.9 EMPHYSEMA, UNSPECIFIED: ICD-10-CM

## 2022-09-13 DIAGNOSIS — M43.20 FUSION OF SPINE, SITE UNSPECIFIED: ICD-10-CM

## 2022-09-13 DIAGNOSIS — R82.71 BACTERIURIA: ICD-10-CM

## 2022-09-13 DIAGNOSIS — I48.20 CHRONIC ATRIAL FIBRILLATION, UNSPECIFIED: ICD-10-CM

## 2022-09-13 DIAGNOSIS — I10 ESSENTIAL (PRIMARY) HYPERTENSION: ICD-10-CM

## 2022-09-13 DIAGNOSIS — I42.9 CARDIOMYOPATHY, UNSPECIFIED: ICD-10-CM

## 2022-09-13 DIAGNOSIS — I50.22 CHRONIC SYSTOLIC (CONGESTIVE) HEART FAILURE: ICD-10-CM

## 2022-09-13 LAB
ISTAT ARTERIAL BE: 4 MMOL/L — HIGH (ref -2–3)
ISTAT ARTERIAL GLUCOSE: 112 MG/DL — HIGH (ref 70–99)
ISTAT ARTERIAL HCO3: 29 MMOL/L — HIGH (ref 22–26)
ISTAT ARTERIAL HEMATOCRIT: 23 % — LOW (ref 39–50)
ISTAT ARTERIAL HEMOGLOBIN: 7.8 G/DL — LOW (ref 13–17)
ISTAT ARTERIAL IONIZED CALCIUM: 1.23 MMOL/L — SIGNIFICANT CHANGE UP (ref 1.12–1.3)
ISTAT ARTERIAL PCO2: 46 MMHG — HIGH (ref 35–45)
ISTAT ARTERIAL PH: 7.41 — SIGNIFICANT CHANGE UP (ref 7.35–7.45)
ISTAT ARTERIAL PO2: 438 MMHG — HIGH (ref 80–105)
ISTAT ARTERIAL POTASSIUM: 4.2 MMOL/L — SIGNIFICANT CHANGE UP (ref 3.5–5.3)
ISTAT ARTERIAL SO2: 100 % — HIGH (ref 95–98)
ISTAT ARTERIAL SODIUM: 131 MMOL/L — LOW (ref 135–145)
ISTAT ARTERIAL TCO2: 30 MMOL/L — SIGNIFICANT CHANGE UP (ref 22–31)
LDH CSF L TO P-CCNC: 404 U/L — SIGNIFICANT CHANGE UP
LDH FLD-CCNC: 404 U/L — SIGNIFICANT CHANGE UP
SURGICAL PATHOLOGY STUDY: SIGNIFICANT CHANGE UP

## 2022-09-13 PROCEDURE — 72125 CT NECK SPINE W/O DYE: CPT | Mod: 26

## 2022-09-13 PROCEDURE — 72131 CT LUMBAR SPINE W/O DYE: CPT | Mod: 26

## 2022-09-13 PROCEDURE — 62355 REMOVE SPINAL CANAL CATHETER: CPT | Mod: AS

## 2022-09-13 PROCEDURE — 88300 SURGICAL PATH GROSS: CPT | Mod: 26

## 2022-09-13 PROCEDURE — 99222 1ST HOSP IP/OBS MODERATE 55: CPT

## 2022-09-13 PROCEDURE — 62355 REMOVE SPINAL CANAL CATHETER: CPT

## 2022-09-13 DEVICE — SURGIFLO HEMOSTATIC MATRIX KIT: Type: IMPLANTABLE DEVICE | Status: FUNCTIONAL

## 2022-09-13 RX ORDER — SIMVASTATIN 20 MG/1
5 TABLET, FILM COATED ORAL AT BEDTIME
Refills: 0 | Status: DISCONTINUED | OUTPATIENT
Start: 2022-09-13 | End: 2022-09-20

## 2022-09-13 RX ORDER — METOPROLOL TARTRATE 50 MG
50 TABLET ORAL
Refills: 0 | Status: DISCONTINUED | OUTPATIENT
Start: 2022-09-13 | End: 2022-09-14

## 2022-09-13 RX ORDER — ALPRAZOLAM 0.25 MG
1 TABLET ORAL AT BEDTIME
Refills: 0 | Status: DISCONTINUED | OUTPATIENT
Start: 2022-09-13 | End: 2022-09-20

## 2022-09-13 RX ORDER — CEFAZOLIN SODIUM 1 G
2000 VIAL (EA) INJECTION EVERY 8 HOURS
Refills: 0 | Status: COMPLETED | OUTPATIENT
Start: 2022-09-13 | End: 2022-09-14

## 2022-09-13 RX ORDER — HYDROMORPHONE HYDROCHLORIDE 2 MG/ML
0.25 INJECTION INTRAMUSCULAR; INTRAVENOUS; SUBCUTANEOUS ONCE
Refills: 0 | Status: DISCONTINUED | OUTPATIENT
Start: 2022-09-13 | End: 2022-09-13

## 2022-09-13 RX ORDER — ALBUTEROL 90 UG/1
2.5 AEROSOL, METERED ORAL ONCE
Refills: 0 | Status: DISCONTINUED | OUTPATIENT
Start: 2022-09-13 | End: 2022-09-20

## 2022-09-13 RX ORDER — OXYCODONE HYDROCHLORIDE 5 MG/1
20 TABLET ORAL EVERY 8 HOURS
Refills: 0 | Status: DISCONTINUED | OUTPATIENT
Start: 2022-09-13 | End: 2022-09-14

## 2022-09-13 RX ORDER — HYDRALAZINE HCL 50 MG
25 TABLET ORAL AT BEDTIME
Refills: 0 | Status: DISCONTINUED | OUTPATIENT
Start: 2022-09-13 | End: 2022-09-14

## 2022-09-13 RX ORDER — LABETALOL HCL 100 MG
10 TABLET ORAL ONCE
Refills: 0 | Status: DISCONTINUED | OUTPATIENT
Start: 2022-09-13 | End: 2022-09-13

## 2022-09-13 RX ORDER — ACETAMINOPHEN 500 MG
1000 TABLET ORAL ONCE
Refills: 0 | Status: DISCONTINUED | OUTPATIENT
Start: 2022-09-13 | End: 2022-09-13

## 2022-09-13 RX ORDER — SACUBITRIL AND VALSARTAN 24; 26 MG/1; MG/1
1 TABLET, FILM COATED ORAL
Refills: 0 | Status: DISCONTINUED | OUTPATIENT
Start: 2022-09-13 | End: 2022-09-14

## 2022-09-13 RX ORDER — FOLIC ACID 0.8 MG
1 TABLET ORAL DAILY
Refills: 0 | Status: DISCONTINUED | OUTPATIENT
Start: 2022-09-13 | End: 2022-09-20

## 2022-09-13 RX ORDER — SODIUM CHLORIDE 9 MG/ML
1000 INJECTION INTRAMUSCULAR; INTRAVENOUS; SUBCUTANEOUS
Refills: 0 | Status: DISCONTINUED | OUTPATIENT
Start: 2022-09-13 | End: 2022-09-15

## 2022-09-13 RX ORDER — NORTRIPTYLINE HYDROCHLORIDE 10 MG/1
75 CAPSULE ORAL AT BEDTIME
Refills: 0 | Status: DISCONTINUED | OUTPATIENT
Start: 2022-09-13 | End: 2022-09-20

## 2022-09-13 RX ORDER — OXYCODONE HYDROCHLORIDE 5 MG/1
15 TABLET ORAL EVERY 6 HOURS
Refills: 0 | Status: DISCONTINUED | OUTPATIENT
Start: 2022-09-13 | End: 2022-09-20

## 2022-09-13 RX ORDER — PANTOPRAZOLE SODIUM 20 MG/1
40 TABLET, DELAYED RELEASE ORAL
Refills: 0 | Status: DISCONTINUED | OUTPATIENT
Start: 2022-09-13 | End: 2022-09-20

## 2022-09-13 RX ORDER — DOXAZOSIN MESYLATE 4 MG
4 TABLET ORAL AT BEDTIME
Refills: 0 | Status: DISCONTINUED | OUTPATIENT
Start: 2022-09-13 | End: 2022-09-17

## 2022-09-13 RX ORDER — FINASTERIDE 5 MG/1
5 TABLET, FILM COATED ORAL DAILY
Refills: 0 | Status: DISCONTINUED | OUTPATIENT
Start: 2022-09-13 | End: 2022-09-20

## 2022-09-13 RX ADMIN — Medication 50 MILLIGRAM(S): at 06:25

## 2022-09-13 RX ADMIN — OXYCODONE HYDROCHLORIDE 15 MILLIGRAM(S): 5 TABLET ORAL at 13:35

## 2022-09-13 RX ADMIN — OXYCODONE HYDROCHLORIDE 15 MILLIGRAM(S): 5 TABLET ORAL at 13:03

## 2022-09-13 RX ADMIN — NORTRIPTYLINE HYDROCHLORIDE 75 MILLIGRAM(S): 10 CAPSULE ORAL at 21:45

## 2022-09-13 RX ADMIN — OXYCODONE HYDROCHLORIDE 20 MILLIGRAM(S): 5 TABLET ORAL at 16:32

## 2022-09-13 RX ADMIN — SACUBITRIL AND VALSARTAN 1 TABLET(S): 24; 26 TABLET, FILM COATED ORAL at 21:45

## 2022-09-13 RX ADMIN — Medication 100 MILLIGRAM(S): at 18:41

## 2022-09-13 RX ADMIN — OXYCODONE HYDROCHLORIDE 15 MILLIGRAM(S): 5 TABLET ORAL at 21:21

## 2022-09-13 RX ADMIN — OXYCODONE HYDROCHLORIDE 15 MILLIGRAM(S): 5 TABLET ORAL at 22:21

## 2022-09-13 RX ADMIN — Medication 1000 MILLIGRAM(S): at 09:15

## 2022-09-13 RX ADMIN — SIMVASTATIN 5 MILLIGRAM(S): 20 TABLET, FILM COATED ORAL at 21:45

## 2022-09-13 RX ADMIN — HYDROMORPHONE HYDROCHLORIDE 0.25 MILLIGRAM(S): 2 INJECTION INTRAMUSCULAR; INTRAVENOUS; SUBCUTANEOUS at 12:29

## 2022-09-13 RX ADMIN — CHLORHEXIDINE GLUCONATE 1 APPLICATION(S): 213 SOLUTION TOPICAL at 06:25

## 2022-09-13 RX ADMIN — HYDROMORPHONE HYDROCHLORIDE 0.25 MILLIGRAM(S): 2 INJECTION INTRAMUSCULAR; INTRAVENOUS; SUBCUTANEOUS at 12:15

## 2022-09-13 RX ADMIN — Medication 50 MILLIGRAM(S): at 18:41

## 2022-09-13 RX ADMIN — SODIUM CHLORIDE 90 MILLILITER(S): 9 INJECTION INTRAMUSCULAR; INTRAVENOUS; SUBCUTANEOUS at 00:00

## 2022-09-13 RX ADMIN — OXYCODONE HYDROCHLORIDE 20 MILLIGRAM(S): 5 TABLET ORAL at 17:00

## 2022-09-13 RX ADMIN — Medication 25 MILLIGRAM(S): at 21:46

## 2022-09-13 RX ADMIN — Medication 1000 MILLIGRAM(S): at 08:42

## 2022-09-13 RX ADMIN — APREPITANT 40 MILLIGRAM(S): 80 CAPSULE ORAL at 08:58

## 2022-09-13 RX ADMIN — OXYCODONE HYDROCHLORIDE 20 MILLIGRAM(S): 5 TABLET ORAL at 01:34

## 2022-09-13 RX ADMIN — PANTOPRAZOLE SODIUM 40 MILLIGRAM(S): 20 TABLET, DELAYED RELEASE ORAL at 06:25

## 2022-09-13 RX ADMIN — Medication 1 APPLICATION(S): at 09:03

## 2022-09-13 RX ADMIN — HYDROMORPHONE HYDROCHLORIDE 0.25 MILLIGRAM(S): 2 INJECTION INTRAMUSCULAR; INTRAVENOUS; SUBCUTANEOUS at 12:50

## 2022-09-13 RX ADMIN — HYDROMORPHONE HYDROCHLORIDE 0.25 MILLIGRAM(S): 2 INJECTION INTRAMUSCULAR; INTRAVENOUS; SUBCUTANEOUS at 12:01

## 2022-09-13 RX ADMIN — SODIUM CHLORIDE 90 MILLILITER(S): 9 INJECTION INTRAMUSCULAR; INTRAVENOUS; SUBCUTANEOUS at 08:50

## 2022-09-13 RX ADMIN — Medication 4 MILLIGRAM(S): at 21:45

## 2022-09-13 NOTE — PROGRESS NOTE ADULT - SUBJECTIVE AND OBJECTIVE BOX
NEUROSURGERY POST OP NOTE:    POD# 0 S/P Removal of proximal intrathecal catheter with oversew of catheter tract.  Small piece of catheter from lumbar subcutaneous area to abdomen still left in place.    S: This is a 72-year-old female with history of HTN, HLD, Afib on Eliquis. Patient underwent cholecystectomy with general surgery and ventral removal of intrathecal pain pump 8/30/22 Over the patient week patient has noticed increased swelling over the LLQ. Denies pain, fevers, abdominal pain, signs of infection, recent trauma/fall. Patient admitted to neurosurgery pre op for removal of the posterior portion of pain pump.         T(C): 36.2 (09-13-22 @ 11:50), Max: 37.1 (09-12-22 @ 20:07)  HR: 76 (09-13-22 @ 14:15) (66 - 80)  BP: 156/86 (09-13-22 @ 14:15) (114/74 - 174/84)  RR: 14 (09-13-22 @ 14:15) (12 - 20)  SpO2: 100% (09-13-22 @ 14:15) (96% - 100%)      09-12-22 @ 07:01  -  09-13-22 @ 07:00  --------------------------------------------------------  IN: 0 mL / OUT: 300 mL / NET: -300 mL    09-13-22 @ 07:01  -  09-13-22 @ 14:21  --------------------------------------------------------  IN: 270 mL / OUT: 0 mL / NET: 270 mL        ALBUTerol    0.083%. 2.5 milliGRAM(s) Nebulizer once PRN  ALPRAZolam 1 milliGRAM(s) Oral at bedtime PRN  ceFAZolin   IVPB 2000 milliGRAM(s) IV Intermittent every 8 hours  doxazosin 4 milliGRAM(s) Oral at bedtime  finasteride 5 milliGRAM(s) Oral daily  folic acid 1 milliGRAM(s) Oral daily  hydrALAZINE 25 milliGRAM(s) Oral at bedtime  metoprolol tartrate 50 milliGRAM(s) Oral two times a day  nortriptyline 75 milliGRAM(s) Oral at bedtime  oxyCODONE    IR 15 milliGRAM(s) Oral every 6 hours PRN  oxyCODONE  ER Tablet 20 milliGRAM(s) Oral every 8 hours PRN  pantoprazole    Tablet 40 milliGRAM(s) Oral before breakfast  simvastatin 5 milliGRAM(s) Oral at bedtime  sodium chloride 0.9%. 1000 milliLiter(s) IV Continuous <Continuous>      RADIOLOGY:     Exam:  Constitutional: NAD, well groomed, well nourished  Respiratory: breathing non-labored, symmetrical chest wall movement  Cardiovascuar: RRR, no murmurs  Gastrointestinal: abdomen soft, non tender  Genitourinary: exam deffered  Neurological:  AAOX3. Face symmetric, Verbal function intact, speech clear  Cranial Nerves: II-XII intact  Motor: 5/5 power in b/l upper extremities and lower extremities  Sensation: intact to light touch in all extremities  Extremities: distal pulses 2+ x4        WOUND/DRAINS:    DEVICES:       Assessment: 72 year old male with past medical history of hypertension, hyperlipidemia, paroxysmal atrial fibrillation on Eliquis, questionable history of blood clotting disorder s/p IVC filter placement, colon mass s/p partial colectomy (2017) and past surgical history of appendectomy, hernia repair, iliac aneurysm surgery (2017) s/p stent placement, pacemaker implant (2004) s/p recent ppm interrogation on Eliquis, herniated disc and related surgery ([id number]) complicated by spinal fusion, local hematoma, foot drop with chronic pain s/p failed intrathecal morphine pump causing bradycardia and left hip prosthesis who was admitted on 8/20 for bleeding from percutaneous cholecystostomy site and underwent interval laparoscopic cholecystectomy with RENATA drain x1 and removal of intrathecal pump, discharged on 9/6 with no drains. Now found to have LLQ fluctuance with clear drainage with no s/s of infection, CTAP 9/12 w/ interval recurrence of fluid collection in gallbladder fossa. He was readmitted to the neurosurgery service for drainage of LLQ collection and removal of posterior aspect of pain pump. Now, s/p removal of proximal intrathecal catheter with oversew of catheter tract (9/13/22)      Plan:  Neuro:   - neuro checks q4  - f/u body fluid anylsis of possible CSF, NGTD  - pending CT C/L spine     Cardio:   - recent admission for Takosubo cardiomyopathy, now resolved EF 50-55%  - Paroxysmal Afib: Toprol 50 BID, PPM in place, reconsult EP, last admission per EP no need to reporgram prior to OR, hold Eliquis   - HTN: recent admission with HTN urgency, contine Hydral 10/25, uptitrate as needed per cardio  - HLD: continue simvastatin     Pulm:   - RA  - small R pleural effusion on CXR  -incentive spirometry    GI:   -ADAT  - bowel regimen  - Gastritis: cont home protonix     /Renal:  - BPH: continue Cardura and Proscar     Heme:  - Dvt ppx: SCDs, hold chemoppx for OR  - holding home Eliquis  - IVCF in place     ID:  - No issues, no organisms seen on CSF studies, Afebrile    Endo:   - no issues, A1C 5.2    Dispo: Telemetry, family updated  Plan d/w Dr. D'Amico             Assessment:  Present when checked    []  GCS  E   V  M     Heart Failure: []Acute, [] acute on chronic , []chronic  Heart Failure:  [] Diastolic (HFpEF), [] Systolic (HFrEF), []Combined (HFpEF and HFrEF), [] RHF, [] Pulm HTN, [] Other    [] CLAUDIO, [] ATN, [] AIN, [] other  [] CKD1, [] CKD2, [] CKD 3, [] CKD 4, [] CKD 5, []ESRD    Encephalopathy: [] Metabolic, [] Hepatic, [] toxic, [] Neurological, [] Other    Abnormal Nurtitional Status: [] malnurtition (see nutrition note), [ ]underweight: BMI < 19, [] morbid obesity: BMI >40, [] Cachexia    [] Sepsis  [] hypovolemic shock,[] cardiogenic shock, [] hemorrhagic shock, [] neuogenic shock  [] Acute Respiratory Failure  []Cerebral edema, [] Brain compression/ herniation,   [] Functional quadriplegia  [] Acute blood loss anemia       NEUROSURGERY POST OP NOTE:    POD# 0 S/P Removal of proximal intrathecal catheter with oversew of catheter tract.  Small piece of catheter from lumbar subcutaneous area to abdomen still left in place.    S: This is a 72-year-old female with history of HTN, HLD, Afib on Eliquis. Patient underwent cholecystectomy with general surgery and ventral removal of intrathecal pain pump 8/30/22 Over the patient week patient has noticed increased swelling over the LLQ. Denies pain, fevers, abdominal pain, signs of infection, recent trauma/fall. Patient admitted to neurosurgery pre op for removal of the posterior portion of pain pump.         T(C): 36.2 (09-13-22 @ 11:50), Max: 37.1 (09-12-22 @ 20:07)  HR: 76 (09-13-22 @ 14:15) (66 - 80)  BP: 156/86 (09-13-22 @ 14:15) (114/74 - 174/84)  RR: 14 (09-13-22 @ 14:15) (12 - 20)  SpO2: 100% (09-13-22 @ 14:15) (96% - 100%)      09-12-22 @ 07:01  -  09-13-22 @ 07:00  --------------------------------------------------------  IN: 0 mL / OUT: 300 mL / NET: -300 mL    09-13-22 @ 07:01  -  09-13-22 @ 14:21  --------------------------------------------------------  IN: 270 mL / OUT: 0 mL / NET: 270 mL        ALBUTerol    0.083%. 2.5 milliGRAM(s) Nebulizer once PRN  ALPRAZolam 1 milliGRAM(s) Oral at bedtime PRN  ceFAZolin   IVPB 2000 milliGRAM(s) IV Intermittent every 8 hours  doxazosin 4 milliGRAM(s) Oral at bedtime  finasteride 5 milliGRAM(s) Oral daily  folic acid 1 milliGRAM(s) Oral daily  hydrALAZINE 25 milliGRAM(s) Oral at bedtime  metoprolol tartrate 50 milliGRAM(s) Oral two times a day  nortriptyline 75 milliGRAM(s) Oral at bedtime  oxyCODONE    IR 15 milliGRAM(s) Oral every 6 hours PRN  oxyCODONE  ER Tablet 20 milliGRAM(s) Oral every 8 hours PRN  pantoprazole    Tablet 40 milliGRAM(s) Oral before breakfast  simvastatin 5 milliGRAM(s) Oral at bedtime  sodium chloride 0.9%. 1000 milliLiter(s) IV Continuous <Continuous>      RADIOLOGY:     Exam:  Constitutional: NAD, well groomed, well nourished  Respiratory: breathing non-labored, symmetrical chest wall movement  Cardiovascuar: RRR, no murmurs  Gastrointestinal: abdomen soft, non tender  Genitourinary: exam deffered  Neurological:  AAOX3. Face symmetric, Verbal function intact, speech clear  Cranial Nerves: II-XII intact  Motor: 5/5 power in b/l upper extremities and lower extremities  Sensation: intact to light touch in all extremities  Extremities: distal pulses 2+ x4  Wound: Left posterior post op dressing in place, clean/dry/intact      WOUND/DRAINS: No drains    DEVICES: No devices       Assessment: 72 year old male with past medical history of hypertension, hyperlipidemia, paroxysmal atrial fibrillation on Eliquis, questionable history of blood clotting disorder s/p IVC filter placement, colon mass s/p partial colectomy (2017) and past surgical history of appendectomy, hernia repair, iliac aneurysm surgery (2017) s/p stent placement, pacemaker implant (2004) s/p recent ppm interrogation on Eliquis, herniated disc and related surgery ([id number]) complicated by spinal fusion, local hematoma, foot drop with chronic pain s/p failed intrathecal morphine pump causing bradycardia and left hip prosthesis who was admitted on 8/20 for bleeding from percutaneous cholecystostomy site and underwent interval laparoscopic cholecystectomy with RENATA drain x1 and removal of intrathecal pump, discharged on 9/6 with no drains. Now found to have LLQ fluctuance with clear drainage with no s/s of infection, CTAP 9/12 w/ interval recurrence of fluid collection in gallbladder fossa. He was readmitted to the neurosurgery service for drainage of LLQ collection and removal of posterior aspect of pain pump. Now, s/p removal of proximal intrathecal catheter with oversew of catheter tract (9/13/22)      Plan:  Neuro:   - neuro checks q4  - f/u body fluid anylsis of possible CSF, NGTD  - pending CT C/L spine     Cardio:   - recent admission for Takosubo cardiomyopathy, now resolved EF 50-55%  - Paroxysmal Afib: Toprol 50 BID, PPM in place, reconsult EP, last admission per EP no need to reporgram prior to OR, hold Eliquis   - HTN: recent admission with HTN urgency, contine Hydral 10/25, uptitrate as needed per cardio  - HLD: continue simvastatin     Pulm:   - RA  - small R pleural effusion on CXR  -incentive spirometry    GI:   -ADAT  - bowel regimen  - Gastritis: cont home protonix     /Renal:  - BPH: continue Cardura and Proscar     Heme:  - Dvt ppx: SCDs, hold chemoppx for OR  - holding home Eliquis  - IVCF in place     ID:  - No issues, no organisms seen on CSF studies, Afebrile    Endo:   - no issues, A1C 5.2    Dispo: Telemetry, family updated  Plan d/w Dr. D'Amico             Assessment:  Present when checked    []  GCS  E   V  M     Heart Failure: []Acute, [] acute on chronic , []chronic  Heart Failure:  [] Diastolic (HFpEF), [] Systolic (HFrEF), []Combined (HFpEF and HFrEF), [] RHF, [] Pulm HTN, [] Other    [] CLAUDIO, [] ATN, [] AIN, [] other  [] CKD1, [] CKD2, [] CKD 3, [] CKD 4, [] CKD 5, []ESRD    Encephalopathy: [] Metabolic, [] Hepatic, [] toxic, [] Neurological, [] Other    Abnormal Nurtitional Status: [] malnurtition (see nutrition note), [ ]underweight: BMI < 19, [] morbid obesity: BMI >40, [] Cachexia    [] Sepsis  [] hypovolemic shock,[] cardiogenic shock, [] hemorrhagic shock, [] neuogenic shock  [] Acute Respiratory Failure  []Cerebral edema, [] Brain compression/ herniation,   [] Functional quadriplegia  [] Acute blood loss anemia

## 2022-09-13 NOTE — PRE-ANESTHESIA EVALUATION ADULT - NS MD HP INPLANTS MED DEV
Pacemaker, Cardiac Stent x4, Intrathecal pump, Left hip, lumbar spine, cervical spine,/Pacemaker/Pain pump/Prosthetic device(s)

## 2022-09-13 NOTE — PATIENT PROFILE ADULT - DOES PATIENT HAVE ADVANCE DIRECTIVE
eMERGENCY dEPARTMENT eNCOUnter        [address] [facility]    Chief Complaint   Patient presents with    Ankle Pain    Fall       HPI    Guido Boo is a 34 y.o. female who presents with right ankle pain. Onset was earlier tonight. Context:  Patient states she tripped and rolled her ankle while taking out the trash. Pain is localized to the lateral right ankle. Constant since onset. Characterized as aching. Aggravated by weightbearing, alleviated by nothing. Severity of the pain is a(n) 10 on a scale of 0-10. Patient reports associated swelling. Denies any other injury. REVIEW OF SYSTEMS    See HPI for further details. Review of systems otherwise negative. Musculoskeletal:  + right ankle pain  Integument:  Denies skin changes  Neurologic:  Denies numbness, tingling     PAST MEDICAL HISTORY    Past Medical History:   Diagnosis Date    Active labor 3/18/2012    Delivery by elective caesarean section 3/18/2012    First pregnancy 3/18/2012    GERD (gastroesophageal reflux disease)     Insufficient prenatal care 3/18/2012    Sterilization 3/18/2012       SURGICAL HISTORY    History reviewed. No pertinent surgical history.     CURRENT MEDICATIONS    Current Outpatient Rx   Medication Sig Dispense Refill    orphenadrine (NORFLEX) 100 MG extended release tablet Take 1 tablet by mouth 2 times daily for 10 days 20 tablet 0    ibuprofen (ADVIL;MOTRIN) 600 MG tablet Take 1 tablet by mouth every 6 hours as needed for Pain 30 tablet 0    acetaminophen (APAP EXTRA STRENGTH) 500 MG tablet Take 1 tablet by mouth every 6 hours as needed for Pain 20 tablet 0    dicyclomine (BENTYL) 10 MG capsule Take 2 capsules by mouth 4 times daily (before meals and nightly) 30 capsule 0    famotidine (PEPCID) 20 MG tablet Take 1 tablet by mouth 2 times daily 20 tablet 0    ondansetron (ZOFRAN ODT) 4 MG disintegrating tablet Take 1 tablet by mouth every 8 hours as needed for Nausea or Vomiting 10 tablet 0    FLUoxetine (PROZAC) 40 MG capsule Take 1 capsule by mouth daily 30 capsule 5    hydrOXYzine (VISTARIL) 50 MG capsule Take 1 capsule by mouth 2 times daily 40 capsule 3    metoprolol succinate (TOPROL XL) 50 MG extended release tablet Take 1 tablet by mouth daily 30 tablet 5    topiramate (TOPAMAX) 50 MG tablet Take 1 tablet by mouth 2 times daily 60 tablet 5    loratadine (CLARITIN) 10 MG tablet Take 1 tablet by mouth daily 30 tablet 5    traZODone (DESYREL) 50 MG tablet Take 1 tablet by mouth nightly as needed for Sleep 30 tablet 5    albuterol sulfate  (90 Base) MCG/ACT inhaler Inhale 2 puffs into the lungs every 6 hours as needed for Wheezing or Shortness of Breath (or cough) Please include spacer with instructions for use. 1 Inhaler 0    calcium-vitamin D (OSCAL-500) 500-200 MG-UNIT per tablet Take 1 tablet by mouth 2 times daily (Patient not taking: Reported on 11/23/2020) 60 tablet 1    nicotine (NICODERM CQ) 14 MG/24HR Place 1 patch onto the skin daily (Patient not taking: Reported on 11/23/2020) 42 patch 0    nicotine (NICODERM CQ) 7 MG/24HR Place 1 patch onto the skin daily for 14 days (Patient not taking: Reported on 11/23/2020) 14 patch 0    risperiDONE (RISPERDAL) 1 MG tablet Take 0.5 mg by mouth 2 times daily       sertraline (ZOLOFT) 50 MG tablet Take 3 tablets by mouth daily. (Patient not taking: Reported on 10/21/2020) 90 tablet 0    cetirizine (ZYRTEC) 10 MG tablet Take 10 mg by mouth daily.  omeprazole (PRILOSEC) 20 MG capsule Take 20 mg by mouth daily.          ALLERGIES    No Known Allergies    FAMILY HISTORY    Family History   Problem Relation Age of Onset    Cancer Mother     Diabetes Maternal Grandmother     Cancer Maternal Grandfather        SOCIAL HISTORY    Social History     Socioeconomic History    Marital status: Single     Spouse name: None    Number of children: None    Years of education: None    Highest education level: None   Occupational History    Occupation: Disabled   Social Needs    Financial resource strain: None    Food insecurity     Worry: None     Inability: None    Transportation needs     Medical: None     Non-medical: None   Tobacco Use    Smoking status: Current Every Day Smoker     Packs/day: 0.25     Years: 2.00     Pack years: 0.50    Smokeless tobacco: Never Used    Tobacco comment: last cig was yesterday   Substance and Sexual Activity    Alcohol use: No    Drug use: No    Sexual activity: Not Currently   Lifestyle    Physical activity     Days per week: None     Minutes per session: None    Stress: None   Relationships    Social connections     Talks on phone: None     Gets together: None     Attends Yazidi service: None     Active member of club or organization: None     Attends meetings of clubs or organizations: None     Relationship status: None    Intimate partner violence     Fear of current or ex partner: None     Emotionally abused: None     Physically abused: None     Forced sexual activity: None   Other Topics Concern    None   Social History Narrative    ** Merged History Encounter **            PHYSICAL EXAM    VITAL SIGNS: /81   Pulse 87   Temp 98.4 °F (36.9 °C) (Oral)   Resp 22   Ht 5' 5\" (1.651 m)   Wt 250 lb (113.4 kg)   SpO2 98%   BMI 41.60 kg/m²   Constitutional:  Well developed, well nourished, no acute distress, non-toxic appearance   HENT:  NC/AT. Ears, nose, mouth normal.  Respiratory:  Normal respiratory effort. Musculoskeletal:  Mild soft tissue swelling to the lateral right ankle with ttp, no gross deformity, able to wiggle toes, dorsalis pedis pulse intact, no tibial plateau tenderness  Integument:  Warm and dry  Neurologic:  Sensation intact. RADIOLOGY/PROCEDURES    Xr Hand Left (min 3 Views)    Result Date: 2/12/2021  EXAMINATION: THREE XRAY VIEWS OF THE LEFT HAND 2/12/2021 8:40 pm COMPARISON: None.  HISTORY: ORDERING SYSTEM PROVIDED HISTORY: Fourth and fifth finger injuries TECHNOLOGIST PROVIDED HISTORY: Reason for exam:->Fourth and fifth finger injuries Reason for Exam: Fourth and fifth finger injuries Acuity: Acute Type of Exam: Initial Mechanism of Injury: shut hand in a door Relevant Medical/Surgical History: na FINDINGS: Three views of the left hand demonstrate no acute fracture or dislocation. Alignment is anatomic. Joint spaces appear preserved. No radiopaque foreign body identified. 1. No acute fracture identified. Xr Ankle Right (min 3 Views)    Result Date: 2/23/2021  EXAMINATION: THREE XRAY VIEWS OF THE RIGHT ANKLE 2/23/2021 12:23 am COMPARISON: None. HISTORY: ORDERING SYSTEM PROVIDED HISTORY: fall TECHNOLOGIST PROVIDED HISTORY: Reason for exam:->fall Reason for Exam: fall Acuity: Acute Type of Exam: Initial FINDINGS: The ankle mortise is intact. No fracture or dislocation is seen. There is mild soft tissue swelling around the ankle. Mild soft tissue swelling around the ankle with no acute bony abnormality. Ct Head Wo Contrast    Result Date: 2/20/2021  EXAMINATION: CT OF THE HEAD WITHOUT CONTRAST  2/19/2021 6:41 am TECHNIQUE: CT of the head was performed without the administration of intravenous contrast. Dose modulation, iterative reconstruction, and/or weight based adjustment of the mA/kV was utilized to reduce the radiation dose to as low as reasonably achievable. COMPARISON: 01/05/2010. HISTORY: ORDERING SYSTEM PROVIDED HISTORY: Strangulation TECHNOLOGIST PROVIDED HISTORY: Reason for exam:->Strangulation Has a \"code stroke\" or \"stroke alert\" been called? ->No Decision Support Exception->Emergency Medical Condition (MA) Is the patient pregnant?->No Reason for Exam: neck/head pain Acuity: Acute Type of Exam: Initial Mechanism of Injury: strangulation? FINDINGS: BRAIN/VENTRICLES: The ventricular system is normal in appearance. No evidence of mass effect or midline shift. No area of abnormal attenuation of brain parenchyma is identified.   No abnormal extra-axial fluid collections are identified. ORBITS: The visualized portion of the orbits demonstrate no acute abnormality. SINUSES: The visualized paranasal sinuses and mastoid air cells demonstrate no acute abnormality. With exception of tiny locules in the frontal ethmoid regions, frontal sinuses are not aerated on a developmental basis. SOFT TISSUES/SKULL:  No acute abnormality of the visualized skull or soft tissues. No evidence of acute intracranial abnormality. Cta Head Neck W Contrast    Result Date: 2/19/2021  EXAMINATION: CTA OF THE HEAD AND NECK WITH CONTRAST 2/19/2021 6:42 am: TECHNIQUE: CTA of the head and neck was performed with the administration of intravenous contrast. Multiplanar reformatted images are provided for review. MIP images are provided for review. Stenosis of the internal carotid arteries measured using NASCET criteria. Dose modulation, iterative reconstruction, and/or weight based adjustment of the mA/kV was utilized to reduce the radiation dose to as low as reasonably achievable. COMPARISON: Noncontrast CT head from earlier today HISTORY: ORDERING SYSTEM PROVIDED HISTORY: Strangulation TECHNOLOGIST PROVIDED HISTORY: Reason for exam:->Strangulation Decision Support Exception->Emergency Medical Condition (MA) Reason for Exam: neck pain Acuity: Acute Type of Exam: Initial Mechanism of Injury: strangulation ? FINDINGS: CTA NECK: AORTIC ARCH/ARCH VESSELS: No dissection or arterial injury. No significant stenosis of the brachiocephalic or subclavian arteries. CAROTID ARTERIES: No dissection, arterial injury, or hemodynamically significant stenosis by NASCET criteria. VERTEBRAL ARTERIES: No dissection, arterial injury, or significant stenosis. SOFT TISSUES: There is bronchial wall thickening, likely related to small airway disease or bronchiolitis. No cervical or superior mediastinal lymphadenopathy. The larynx and pharynx are unremarkable.   No acute abnormality of the salivary and thyroid Yes

## 2022-09-13 NOTE — PROGRESS NOTE ADULT - SUBJECTIVE AND OBJECTIVE BOX
HOSPITALIST INITIAL CONSULT NOTE    CHIEF COMPLAINT:      HPI:  This is a 72-year-old female with history of HTN, HLD, Afib on Eliquis. Patient underwent cholecystectomy with general surgery and ventral removal of intrathecal pain pump 22 Over the patient week patient has noticed increased swelling over the LLQ. Denies pain, fevers, abdominal pain, signs of infection, recent trauma/fall. Patient admitted to neurosurgery pre op for removal of the posterior portion of pain pump.  (12 Sep 2022 17:09)      PAST MEDICAL & SURGICAL HISTORY:  AAA (abdominal aortic aneurysm)      HTN (hypertension)      Cardiomyopathy      Hyperlipidemia      ONOFRE (dyspnea on exertion)      DVT (deep venous thrombosis)      Pulmonary emphysema  COPD      Cardiac arrhythmia      Aneurysm of aortic root      Depression  anxiety      Anemia      Pelvic mass      Pacemaker  medtronic      History of kidney surgery      History of back surgery  multiple, lumbar      Surgery, elective  multiple neck surgery, cervical      S/P hip replacement, left      S/P arthroscopy of right knee      S/P AAA (abdominal aortic aneurysm) repair  with right iliac aneurysm endovascular repair      S/P carpal tunnel release      Surgery, elective  Cardiac Stent x4      Surgery, elective  Intrathecal pump placement          REVIEW OF SYSTEMS:  As per HPI, otherwise negative for Constitutional, Eyes, Ears/Nose/Mouth/Throat, Neck, Cardiovascular, Respiratory, Gastrointestinal, Genitourinary, Skin, Endocrine, Musculoskeletal, Psychiatric, and Hematologic/Lymphatic.    MEDICATIONS  (STANDING):  ceFAZolin   IVPB 2000 milliGRAM(s) IV Intermittent every 8 hours  doxazosin 4 milliGRAM(s) Oral at bedtime  finasteride 5 milliGRAM(s) Oral daily  folic acid 1 milliGRAM(s) Oral daily  hydrALAZINE 25 milliGRAM(s) Oral at bedtime  metoprolol tartrate 50 milliGRAM(s) Oral two times a day  nortriptyline 75 milliGRAM(s) Oral at bedtime  pantoprazole    Tablet 40 milliGRAM(s) Oral before breakfast  sacubitril 24 mG/valsartan 26 mG 1 Tablet(s) Oral two times a day  simvastatin 5 milliGRAM(s) Oral at bedtime  sodium chloride 0.9%. 1000 milliLiter(s) (90 mL/Hr) IV Continuous <Continuous>    MEDICATIONS  (PRN):  ALBUTerol    0.083%. 2.5 milliGRAM(s) Nebulizer once PRN Shortness of Breath and/or Wheezing  ALPRAZolam 1 milliGRAM(s) Oral at bedtime PRN insomnia  oxyCODONE    IR 15 milliGRAM(s) Oral every 6 hours PRN Moderate Pain (4 - 6)  oxyCODONE  ER Tablet 20 milliGRAM(s) Oral every 8 hours PRN severe pain      Allergies    Altace (Unknown)  penicillin (Unknown)    Intolerances        FAMILY HISTORY:      Social History:      VITALS  Vital Signs Last 24 Hrs  T(C): 36.6 (13 Sep 2022 17:36), Max: 37.1 (12 Sep 2022 20:07)  T(F): 97.8 (13 Sep 2022 17:36), Max: 98.8 (12 Sep 2022 20:07)  HR: 76 (13 Sep 2022 17:36) (67 - 83)  BP: 143/84 (13 Sep 2022 17:36) (119/75 - 174/84)  BP(mean): 104 (13 Sep 2022 16:05) (91 - 120)  RR: 18 (13 Sep 2022 17:36) (12 - 20)  SpO2: 100% (13 Sep 2022 17:36) (96% - 100%)    Parameters below as of 13 Sep 2022 16:05  Patient On (Oxygen Delivery Method): nasal cannula  O2 Flow (L/min): 3      I&O's Summary    12 Sep 2022 07:01  -  13 Sep 2022 07:00  --------------------------------------------------------  IN: 0 mL / OUT: 300 mL / NET: -300 mL    13 Sep 2022 07:01  -  13 Sep 2022 19:30  --------------------------------------------------------  IN: 540 mL / OUT: 300 mL / NET: 240 mL        CAPILLARY BLOOD GLUCOSE          PHYSICAL EXAM  General: A&Ox3; NAD  Head: NC/AT; PERRL; EOMI; anicteric sclera  Neck: Supple; no JVD  Respiratory: CTA B/L; no wheezes/crackles/rales auscultated w/ good air movement  Cardiovascular: Regular rhythm/rate; S1/S2; no gallops or murmurs auscultated  Gastrointestinal: Soft; LLQ fluctuance with tenderness  Extremities: WWP; no edema or cyanosis; radial/pedal pulses palpable  Neurological:  CNII-XII grossly intact; no obvious focal deficits  Skin: No rashes noted  Vasc: +2 DP/PT pulses b/l   Psych: Appropriate Affect    LABS:                        8.9    9.27  )-----------( 276      ( 12 Sep 2022 14:29 )             27.4         132<L>  |  98  |  15  ----------------------------<  102<H>  see note   |  23  |  1.10    Ca    8.7      12 Sep 2022 14:29    TPro  7.1  /  Alb  2.8<L>  /  TBili  0.6  /  DBili  x   /  AST  see note  /  ALT  see note  /  AlkPhos  132<H>  12    PT/INR - ( 12 Sep 2022 15:11 )   PT: 16.1 sec;   INR: 1.35          PTT - ( 12 Sep 2022 15:11 )  PTT:45.0 sec  Urinalysis Basic - ( 12 Sep 2022 14:30 )    Color: Yellow / Appearance: Clear / S.010 / pH: x  Gluc: x / Ketone: NEGATIVE  / Bili: Negative / Urobili: 0.2 E.U./dL   Blood: x / Protein: NEGATIVE mg/dL / Nitrite: NEGATIVE   Leuk Esterase: Large / RBC: < 5 /HPF / WBC > 10 /HPF   Sq Epi: x / Non Sq Epi: 0-5 /HPF / Bacteria: Present /HPF        RADIOLOGY & ADDITIONAL STUDIES:  reviewed

## 2022-09-13 NOTE — BRIEF OPERATIVE NOTE - COMMENTS
unable to ventilate while attempting prone x2, bronchospasm treated and case carried out in lateral position after stabilized by anesthesia team

## 2022-09-13 NOTE — BRIEF OPERATIVE NOTE - OPERATION/FINDINGS
Removal of proximal intrathecal catheter with oversew of catheter tract.  Small piece of catheter from lumbar subcutaneous area to abdomen still left in place.

## 2022-09-13 NOTE — CONSULT NOTE ADULT - ASSESSMENT
72 year old male with past medical history of hypertension, hyperlipidemia, paroxysmal atrial fibrillation on Eliquis, questionable history of blood clotting disorder s/p IVC filter placement, colon mass s/p partial colectomy (2017) and past surgical history of appendectomy, hernia repair, iliac aneurysm surgery (2017) s/p stent placement, pacemaker implant (2004) s/p recent ppm interrogation on Eliquis, herniated disc and related surgery ([id number]) complicated by spinal fusion, local hematoma, foot drop with chronic pain s/p failed intrathecal morphine pump causing bradycardia and left hip prosthesis who was admitted on 8/20 for bleeding from percutaneous cholecystostomy site and underwent interval laparoscopic cholecystectomy with RENATA drain x1 and removal of intrathecal pump, discharged on 9/6 with no drains. Now found to have LLQ fluctuance with clear drainage with no s/s of infection, CTAP 9/12 w/ interval recurrence of fluid collection in gallbladder fossa. He was readmitted to the neurosurgery service for drainage of LLQ collection and removal of posterior aspect of pain pump. Surgery team 4C was consulted for continuity of care and possible intervention for gallbladder fossa fluid collection.     CT findings in gallbladder fossa more likely reflect post-surgical changes, unlikely to be abscess  No surgical interventions indicated at this time  Rest of care per primary team  Surgery team 4C will continue to follow

## 2022-09-13 NOTE — CONSULT NOTE ADULT - SUBJECTIVE AND OBJECTIVE BOX
HPI:  This is a 72-year-old female with history of HTN, HLD, Afib on Eliquis. Patient underwent cholecystectomy with general surgery and ventral removal of intrathecal pain pump 22 Over the patient week patient has noticed increased swelling over the LLQ. Denies pain, fevers, abdominal pain, signs of infection, recent trauma/fall. Patient admitted to neurosurgery pre op for removal of the posterior portion of pain pump.  (12 Sep 2022 17:09)      PAST MEDICAL & SURGICAL HISTORY:  AAA (abdominal aortic aneurysm)      HTN (hypertension)      Cardiomyopathy      Hyperlipidemia      ONOFRE (dyspnea on exertion)      DVT (deep venous thrombosis)      Pulmonary emphysema  COPD      Cardiac arrhythmia      Aneurysm of aortic root      Depression  anxiety      Anemia      Pelvic mass      Pacemaker  medtronic      History of kidney surgery      History of back surgery  multiple, lumbar      Surgery, elective  multiple neck surgery, cervical      S/P hip replacement, left      S/P arthroscopy of right knee      S/P AAA (abdominal aortic aneurysm) repair  with right iliac aneurysm endovascular repair      S/P carpal tunnel release      Surgery, elective  Cardiac Stent x4      Surgery, elective  Intrathecal pump placement      Allergies    Altace (Unknown)  penicillin (Unknown)    Intolerances        SOCIAL HISTORY:    FAMILY HISTORY:      Vital Signs Last 24 Hrs  T(C): 36.6 (13 Sep 2022 08:33), Max: 37.1 (12 Sep 2022 20:07)  T(F): 97.8 (13 Sep 2022 04:42), Max: 98.8 (12 Sep 2022 20:07)  HR: 76 (13 Sep 2022 08:33) (66 - 82)  BP: 158/91 (13 Sep 2022 08:33) (114/74 - 158/91)  BP(mean): --  RR: 18 (13 Sep 2022 08:33) (16 - 18)  SpO2: 98% (13 Sep 2022 08:33) (97% - 100%)    Parameters below as of 13 Sep 2022 04:42  Patient On (Oxygen Delivery Method): room air        PHYSICAL EXAM:   Gen: NAD, resting comfortably   CV: NSR  Pulm: no respiratory distress on RA, CTAB   Abd: soft, non-distended, fluctuance in the LLQ with clear drainage, ttp. Incisions c/d/i.  Ext: WWP, no edema   Neuro: motor/sensory grossly intact     LABS:                        8.9    9.27  )-----------( 276      ( 12 Sep 2022 14:29 )             27.4         132<L>  |  98  |  15  ----------------------------<  102<H>  see note   |  23  |  1.10    Ca    8.7      12 Sep 2022 14:29    TPro  7.1  /  Alb  2.8<L>  /  TBili  0.6  /  DBili  x   /  AST  see note  /  ALT  see note  /  AlkPhos  132<H>  12    LIVER FUNCTIONS - ( 12 Sep 2022 14:29 )  Alb: 2.8 g/dL / Pro: 7.1 g/dL / ALK PHOS: 132 U/L / ALT: see note U/L / AST: see note U/L / GGT: x           PT/INR - ( 12 Sep 2022 15:11 )   PT: 16.1 sec;   INR: 1.35          PTT - ( 12 Sep 2022 15:11 )  PTT:45.0 sec      CAPILLARY BLOOD GLUCOSE        Urinalysis Basic - ( 12 Sep 2022 14:30 )    Color: Yellow / Appearance: Clear / S.010 / pH: x  Gluc: x / Ketone: NEGATIVE  / Bili: Negative / Urobili: 0.2 E.U./dL   Blood: x / Protein: NEGATIVE mg/dL / Nitrite: NEGATIVE   Leuk Esterase: Large / RBC: < 5 /HPF / WBC > 10 /HPF   Sq Epi: x / Non Sq Epi: 0-5 /HPF / Bacteria: Present /HPF        Culture - CSF with Gram Stain (collected 12 Sep 2022 17:58)  Source: .CSF CSF  Gram Stain (12 Sep 2022 19:02):    No organisms seen    No WBC's seen.  Preliminary Report (13 Sep 2022 08:39):    No growth to date    Urinalysis with Rflx Culture (collected 12 Sep 2022 14:30)         RADIOLOGY & ADDITIONAL STUDIES:    Plan discussed with attending and chief resident.

## 2022-09-13 NOTE — PATIENT PROFILE ADULT - FALL HARM RISK - HARM RISK INTERVENTIONS

## 2022-09-14 ENCOUNTER — APPOINTMENT (OUTPATIENT)
Dept: HEART AND VASCULAR | Facility: CLINIC | Age: 72
End: 2022-09-14

## 2022-09-14 ENCOUNTER — TRANSCRIPTION ENCOUNTER (OUTPATIENT)
Age: 72
End: 2022-09-14

## 2022-09-14 LAB
-  AMPICILLIN: SIGNIFICANT CHANGE UP
-  CIPROFLOXACIN: SIGNIFICANT CHANGE UP
-  NITROFURANTOIN: SIGNIFICANT CHANGE UP
-  TETRACYCLINE: SIGNIFICANT CHANGE UP
-  VANCOMYCIN: SIGNIFICANT CHANGE UP
ANION GAP SERPL CALC-SCNC: 5 MMOL/L — SIGNIFICANT CHANGE UP (ref 5–17)
BASOPHILS # BLD AUTO: 0.03 K/UL — SIGNIFICANT CHANGE UP (ref 0–0.2)
BASOPHILS NFR BLD AUTO: 0.4 % — SIGNIFICANT CHANGE UP (ref 0–2)
BUN SERPL-MCNC: 12 MG/DL — SIGNIFICANT CHANGE UP (ref 7–23)
CALCIUM SERPL-MCNC: 8.2 MG/DL — LOW (ref 8.4–10.5)
CHLORIDE SERPL-SCNC: 103 MMOL/L — SIGNIFICANT CHANGE UP (ref 96–108)
CO2 SERPL-SCNC: 25 MMOL/L — SIGNIFICANT CHANGE UP (ref 22–31)
CREAT SERPL-MCNC: 0.98 MG/DL — SIGNIFICANT CHANGE UP (ref 0.5–1.3)
CULTURE RESULTS: SIGNIFICANT CHANGE UP
EGFR: 81 ML/MIN/1.73M2 — SIGNIFICANT CHANGE UP
EOSINOPHIL # BLD AUTO: 0.05 K/UL — SIGNIFICANT CHANGE UP (ref 0–0.5)
EOSINOPHIL NFR BLD AUTO: 0.7 % — SIGNIFICANT CHANGE UP (ref 0–6)
GLUCOSE BLDC GLUCOMTR-MCNC: 145 MG/DL — HIGH (ref 70–99)
GLUCOSE SERPL-MCNC: 98 MG/DL — SIGNIFICANT CHANGE UP (ref 70–99)
HCT VFR BLD CALC: 23.1 % — LOW (ref 39–50)
HGB BLD-MCNC: 7.3 G/DL — LOW (ref 13–17)
IMM GRANULOCYTES NFR BLD AUTO: 0.6 % — SIGNIFICANT CHANGE UP (ref 0–1.5)
LYMPHOCYTES # BLD AUTO: 1.09 K/UL — SIGNIFICANT CHANGE UP (ref 1–3.3)
LYMPHOCYTES # BLD AUTO: 15.1 % — SIGNIFICANT CHANGE UP (ref 13–44)
MCHC RBC-ENTMCNC: 27.9 PG — SIGNIFICANT CHANGE UP (ref 27–34)
MCHC RBC-ENTMCNC: 31.6 GM/DL — LOW (ref 32–36)
MCV RBC AUTO: 88.2 FL — SIGNIFICANT CHANGE UP (ref 80–100)
METHOD TYPE: SIGNIFICANT CHANGE UP
MONOCYTES # BLD AUTO: 0.69 K/UL — SIGNIFICANT CHANGE UP (ref 0–0.9)
MONOCYTES NFR BLD AUTO: 9.6 % — SIGNIFICANT CHANGE UP (ref 2–14)
NEUTROPHILS # BLD AUTO: 5.3 K/UL — SIGNIFICANT CHANGE UP (ref 1.8–7.4)
NEUTROPHILS NFR BLD AUTO: 73.6 % — SIGNIFICANT CHANGE UP (ref 43–77)
NRBC # BLD: 0 /100 WBCS — SIGNIFICANT CHANGE UP (ref 0–0)
ORGANISM # SPEC MICROSCOPIC CNT: SIGNIFICANT CHANGE UP
ORGANISM # SPEC MICROSCOPIC CNT: SIGNIFICANT CHANGE UP
PLATELET # BLD AUTO: 242 K/UL — SIGNIFICANT CHANGE UP (ref 150–400)
POTASSIUM SERPL-MCNC: 3.9 MMOL/L — SIGNIFICANT CHANGE UP (ref 3.5–5.3)
POTASSIUM SERPL-SCNC: 3.9 MMOL/L — SIGNIFICANT CHANGE UP (ref 3.5–5.3)
RBC # BLD: 2.62 M/UL — LOW (ref 4.2–5.8)
RBC # FLD: 13.8 % — SIGNIFICANT CHANGE UP (ref 10.3–14.5)
SODIUM SERPL-SCNC: 133 MMOL/L — LOW (ref 135–145)
SPECIMEN SOURCE: SIGNIFICANT CHANGE UP
WBC # BLD: 7.2 K/UL — SIGNIFICANT CHANGE UP (ref 3.8–10.5)
WBC # FLD AUTO: 7.2 K/UL — SIGNIFICANT CHANGE UP (ref 3.8–10.5)

## 2022-09-14 PROCEDURE — 74019 RADEX ABDOMEN 2 VIEWS: CPT | Mod: 26

## 2022-09-14 PROCEDURE — 99233 SBSQ HOSP IP/OBS HIGH 50: CPT

## 2022-09-14 PROCEDURE — 99221 1ST HOSP IP/OBS SF/LOW 40: CPT

## 2022-09-14 PROCEDURE — 99024 POSTOP FOLLOW-UP VISIT: CPT

## 2022-09-14 RX ORDER — INFLUENZA VIRUS VACCINE 15; 15; 15; 15 UG/.5ML; UG/.5ML; UG/.5ML; UG/.5ML
0.7 SUSPENSION INTRAMUSCULAR ONCE
Refills: 0 | Status: DISCONTINUED | OUTPATIENT
Start: 2022-09-14 | End: 2022-09-20

## 2022-09-14 RX ORDER — LOSARTAN POTASSIUM 100 MG/1
25 TABLET, FILM COATED ORAL DAILY
Refills: 0 | Status: DISCONTINUED | OUTPATIENT
Start: 2022-09-14 | End: 2022-09-17

## 2022-09-14 RX ORDER — METOPROLOL TARTRATE 50 MG
100 TABLET ORAL DAILY
Refills: 0 | Status: DISCONTINUED | OUTPATIENT
Start: 2022-09-15 | End: 2022-09-17

## 2022-09-14 RX ORDER — NIFEDIPINE 30 MG
60 TABLET, EXTENDED RELEASE 24 HR ORAL DAILY
Refills: 0 | Status: DISCONTINUED | OUTPATIENT
Start: 2022-09-14 | End: 2022-09-17

## 2022-09-14 RX ORDER — METOPROLOL TARTRATE 50 MG
100 TABLET ORAL DAILY
Refills: 0 | Status: DISCONTINUED | OUTPATIENT
Start: 2022-09-14 | End: 2022-09-14

## 2022-09-14 RX ORDER — POTASSIUM CHLORIDE 20 MEQ
20 PACKET (EA) ORAL ONCE
Refills: 0 | Status: DISCONTINUED | OUTPATIENT
Start: 2022-09-14 | End: 2022-09-15

## 2022-09-14 RX ORDER — OXYCODONE HYDROCHLORIDE 5 MG/1
20 TABLET ORAL EVERY 8 HOURS
Refills: 0 | Status: DISCONTINUED | OUTPATIENT
Start: 2022-09-14 | End: 2022-09-17

## 2022-09-14 RX ORDER — LABETALOL HCL 100 MG
5 TABLET ORAL ONCE
Refills: 0 | Status: COMPLETED | OUTPATIENT
Start: 2022-09-14 | End: 2022-09-14

## 2022-09-14 RX ADMIN — SIMVASTATIN 5 MILLIGRAM(S): 20 TABLET, FILM COATED ORAL at 23:51

## 2022-09-14 RX ADMIN — OXYCODONE HYDROCHLORIDE 20 MILLIGRAM(S): 5 TABLET ORAL at 00:50

## 2022-09-14 RX ADMIN — OXYCODONE HYDROCHLORIDE 20 MILLIGRAM(S): 5 TABLET ORAL at 01:50

## 2022-09-14 RX ADMIN — Medication 100 MILLIGRAM(S): at 02:04

## 2022-09-14 RX ADMIN — LOSARTAN POTASSIUM 25 MILLIGRAM(S): 100 TABLET, FILM COATED ORAL at 17:43

## 2022-09-14 RX ADMIN — OXYCODONE HYDROCHLORIDE 15 MILLIGRAM(S): 5 TABLET ORAL at 05:10

## 2022-09-14 RX ADMIN — OXYCODONE HYDROCHLORIDE 20 MILLIGRAM(S): 5 TABLET ORAL at 10:12

## 2022-09-14 RX ADMIN — OXYCODONE HYDROCHLORIDE 15 MILLIGRAM(S): 5 TABLET ORAL at 20:57

## 2022-09-14 RX ADMIN — Medication 60 MILLIGRAM(S): at 17:42

## 2022-09-14 RX ADMIN — OXYCODONE HYDROCHLORIDE 15 MILLIGRAM(S): 5 TABLET ORAL at 06:10

## 2022-09-14 RX ADMIN — Medication 5 MILLIGRAM(S): at 22:16

## 2022-09-14 RX ADMIN — OXYCODONE HYDROCHLORIDE 20 MILLIGRAM(S): 5 TABLET ORAL at 18:17

## 2022-09-14 RX ADMIN — PANTOPRAZOLE SODIUM 40 MILLIGRAM(S): 20 TABLET, DELAYED RELEASE ORAL at 05:09

## 2022-09-14 RX ADMIN — NORTRIPTYLINE HYDROCHLORIDE 75 MILLIGRAM(S): 10 CAPSULE ORAL at 23:51

## 2022-09-14 RX ADMIN — FINASTERIDE 5 MILLIGRAM(S): 5 TABLET, FILM COATED ORAL at 10:52

## 2022-09-14 RX ADMIN — SACUBITRIL AND VALSARTAN 1 TABLET(S): 24; 26 TABLET, FILM COATED ORAL at 09:16

## 2022-09-14 RX ADMIN — OXYCODONE HYDROCHLORIDE 20 MILLIGRAM(S): 5 TABLET ORAL at 18:50

## 2022-09-14 RX ADMIN — Medication 50 MILLIGRAM(S): at 05:10

## 2022-09-14 RX ADMIN — Medication 4 MILLIGRAM(S): at 23:50

## 2022-09-14 RX ADMIN — OXYCODONE HYDROCHLORIDE 20 MILLIGRAM(S): 5 TABLET ORAL at 09:27

## 2022-09-14 RX ADMIN — Medication 1 MILLIGRAM(S): at 09:16

## 2022-09-14 RX ADMIN — OXYCODONE HYDROCHLORIDE 15 MILLIGRAM(S): 5 TABLET ORAL at 21:40

## 2022-09-14 NOTE — PROGRESS NOTE ADULT - SUBJECTIVE AND OBJECTIVE BOX
O/N Events: S/P OR, during OR had increased secretions and unable to be proned, required intraOp bronch  Subjective/ROS: Denies HA, CP, SOB, n/v, changes in bowel/urinary habits.  12pt ROS otherwise negative.    VITALS  Vital Signs Last 24 Hrs  T(C): 36.6 (14 Sep 2022 09:14), Max: 36.9 (13 Sep 2022 20:52)  T(F): 97.8 (14 Sep 2022 09:14), Max: 98.4 (13 Sep 2022 20:52)  HR: 64 (14 Sep 2022 09:14) (60 - 83)  BP: 148/81 (14 Sep 2022 09:30) (110/64 - 181/91)  BP(mean): 104 (13 Sep 2022 16:05) (91 - 120)  RR: 16 (14 Sep 2022 09:14) (12 - 20)  SpO2: 99% (14 Sep 2022 09:14) (96% - 100%)    Parameters below as of 14 Sep 2022 09:14  Patient On (Oxygen Delivery Method): room air        I&O's Summary    13 Sep 2022 07:01  -  14 Sep 2022 07:00  --------------------------------------------------------  IN: 720 mL / OUT: 600 mL / NET: 120 mL        CAPILLARY BLOOD GLUCOSE          PHYSICAL EXAM  General: A&Ox3; NAD  Head: NC/AT; PERRL; EOMI; anicteric sclera  Neck: Supple; no JVD  Respiratory: CTA B/L; no wheezes/crackles/rales auscultated w/ good air movement  Cardiovascular: Regular rhythm/rate; S1/S2; no gallops or murmurs auscultated  Gastrointestinal: Soft; NTND w/out rebound tenderness or guarding; bowel sounds normal  Extremities: WWP; no edema or cyanosis; radial/pedal pulses palpable  Neurological:  CNII-XII grossly intact; no obvious focal deficits  Skin: No rashes noted  Vasc: +2 DP/PT pulses b/l   Psych: Appropriate Affect    MEDICATIONS  (STANDING):  doxazosin 4 milliGRAM(s) Oral at bedtime  finasteride 5 milliGRAM(s) Oral daily  folic acid 1 milliGRAM(s) Oral daily  hydrALAZINE 25 milliGRAM(s) Oral at bedtime  metoprolol tartrate 50 milliGRAM(s) Oral two times a day  nortriptyline 75 milliGRAM(s) Oral at bedtime  pantoprazole    Tablet 40 milliGRAM(s) Oral before breakfast  sacubitril 24 mG/valsartan 26 mG 1 Tablet(s) Oral two times a day  simvastatin 5 milliGRAM(s) Oral at bedtime  sodium chloride 0.9%. 1000 milliLiter(s) (90 mL/Hr) IV Continuous <Continuous>    MEDICATIONS  (PRN):  ALBUTerol    0.083%. 2.5 milliGRAM(s) Nebulizer once PRN Shortness of Breath and/or Wheezing  ALPRAZolam 1 milliGRAM(s) Oral at bedtime PRN insomnia  oxyCODONE    IR 15 milliGRAM(s) Oral every 6 hours PRN Moderate Pain (4 - 6)  oxyCODONE  ER Tablet 20 milliGRAM(s) Oral every 8 hours PRN severe pain      Altace (Unknown)  penicillin (Unknown)      LABS                        7.3    7.20  )-----------( 242      ( 14 Sep 2022 05:30 )             23.1         132<L>  |  98  |  15  ----------------------------<  102<H>  see note   |  23  |  1.10    Ca    8.7      12 Sep 2022 14:29    TPro  7.1  /  Alb  2.8<L>  /  TBili  0.6  /  DBili  x   /  AST  see note  /  ALT  see note  /  AlkPhos  132<H>      PT/INR - ( 12 Sep 2022 15:11 )   PT: 16.1 sec;   INR: 1.35          PTT - ( 12 Sep 2022 15:11 )  PTT:45.0 sec  Urinalysis Basic - ( 12 Sep 2022 14:30 )    Color: Yellow / Appearance: Clear / S.010 / pH: x  Gluc: x / Ketone: NEGATIVE  / Bili: Negative / Urobili: 0.2 E.U./dL   Blood: x / Protein: NEGATIVE mg/dL / Nitrite: NEGATIVE   Leuk Esterase: Large / RBC: < 5 /HPF / WBC > 10 /HPF   Sq Epi: x / Non Sq Epi: 0-5 /HPF / Bacteria: Present /HPF            IMAGING/EKG/ETC: reviewed

## 2022-09-14 NOTE — PROGRESS NOTE ADULT - SUBJECTIVE AND OBJECTIVE BOX
HPI    ROS: A 10-point review of systems was otherwise negative.    PAST MEDICAL & SURGICAL HISTORY:  AAA (abdominal aortic aneurysm)      HTN (hypertension)      Cardiomyopathy      Hyperlipidemia      ONOFRE (dyspnea on exertion)      DVT (deep venous thrombosis)      Pulmonary emphysema  COPD      Cardiac arrhythmia      Aneurysm of aortic root      Depression  anxiety      Anemia      Pelvic mass      Pacemaker  medtronic      History of kidney surgery      History of back surgery  multiple, lumbar      Surgery, elective  multiple neck surgery, cervical      S/P hip replacement, left      S/P arthroscopy of right knee      S/P AAA (abdominal aortic aneurysm) repair  with right iliac aneurysm endovascular repair      S/P carpal tunnel release      Surgery, elective  Cardiac Stent x4      Surgery, elective  Intrathecal pump placement          SOCIAL HISTORY:  FAMILY HISTORY:      ALLERGIES: 	  Altace (Unknown)  penicillin (Unknown)            MEDICATIONS:  ALBUTerol    0.083%. 2.5 milliGRAM(s) Nebulizer once PRN  ALPRAZolam 1 milliGRAM(s) Oral at bedtime PRN  doxazosin 4 milliGRAM(s) Oral at bedtime  finasteride 5 milliGRAM(s) Oral daily  folic acid 1 milliGRAM(s) Oral daily  hydrALAZINE 25 milliGRAM(s) Oral at bedtime  metoprolol tartrate 50 milliGRAM(s) Oral two times a day  nortriptyline 75 milliGRAM(s) Oral at bedtime  oxyCODONE    IR 15 milliGRAM(s) Oral every 6 hours PRN  oxyCODONE  ER Tablet 20 milliGRAM(s) Oral every 8 hours PRN  pantoprazole    Tablet 40 milliGRAM(s) Oral before breakfast  sacubitril 24 mG/valsartan 26 mG 1 Tablet(s) Oral two times a day  simvastatin 5 milliGRAM(s) Oral at bedtime  sodium chloride 0.9%. 1000 milliLiter(s) IV Continuous <Continuous>      PHYSICAL EXAM:  T(C): 36.6 (09-14-22 @ 09:14), Max: 36.9 (09-13-22 @ 20:52)  HR: 64 (09-14-22 @ 09:14) (60 - 83)  BP: 148/81 (09-14-22 @ 09:30) (110/64 - 181/91)  RR: 16 (09-14-22 @ 09:14) (12 - 20)  SpO2: 99% (09-14-22 @ 09:14) (96% - 100%)  Wt(kg): --    GEN: Awake, comfortable. NAD.   HEENT: NCAT, PERRL, EOMI. Mucosa moist. No JVD.   RESP: CTA b/l  CV: RRR, normal s1/s2. No m/r/g.  ABD: Soft, NTND. BS+  EXT: Warm. No edema, clubbing, or cyanosis.   NEURO: AAOx3. No focal deficits.    I&O's Summary    13 Sep 2022 07:01  -  14 Sep 2022 07:00  --------------------------------------------------------  IN: 720 mL / OUT: 600 mL / NET: 120 mL        	  LABS:	 	    CARDIAC MARKERS:                                  8.9    9.27  )-----------( 276      ( 12 Sep 2022 14:29 )             27.4     09-12    132<L>  |  98  |  15  ----------------------------<  102<H>  see note   |  23  |  1.10    Ca    8.7      12 Sep 2022 14:29    TPro  7.1  /  Alb  2.8<L>  /  TBili  0.6  /  DBili  x   /  AST  see note  /  ALT  see note  /  AlkPhos  132<H>  09-12    proBNP:   Lipid Profile:   HgA1c:   TSH:     TELEMETRY: 	    ECG:  	  RADIOLOGY:   ECHO:  STRESS:  CATH:   HPI    ROS: A 10-point review of systems was otherwise negative.    PAST MEDICAL & SURGICAL HISTORY:  AAA (abdominal aortic aneurysm)      HTN (hypertension)      Cardiomyopathy      Hyperlipidemia      ONOFER (dyspnea on exertion)      DVT (deep venous thrombosis)      Pulmonary emphysema  COPD      Cardiac arrhythmia      Aneurysm of aortic root      Depression  anxiety      Anemia      Pelvic mass      Pacemaker  medtronic      History of kidney surgery      History of back surgery  multiple, lumbar      Surgery, elective  multiple neck surgery, cervical      S/P hip replacement, left      S/P arthroscopy of right knee      S/P AAA (abdominal aortic aneurysm) repair  with right iliac aneurysm endovascular repair      S/P carpal tunnel release      Surgery, elective  Cardiac Stent x4      Surgery, elective  Intrathecal pump placement          SOCIAL HISTORY:  FAMILY HISTORY:      ALLERGIES: 	  Altace (Unknown)  penicillin (Unknown)            MEDICATIONS:  ALBUTerol    0.083%. 2.5 milliGRAM(s) Nebulizer once PRN  ALPRAZolam 1 milliGRAM(s) Oral at bedtime PRN  doxazosin 4 milliGRAM(s) Oral at bedtime  finasteride 5 milliGRAM(s) Oral daily  folic acid 1 milliGRAM(s) Oral daily  hydrALAZINE 25 milliGRAM(s) Oral at bedtime  metoprolol tartrate 50 milliGRAM(s) Oral two times a day  nortriptyline 75 milliGRAM(s) Oral at bedtime  oxyCODONE    IR 15 milliGRAM(s) Oral every 6 hours PRN  oxyCODONE  ER Tablet 20 milliGRAM(s) Oral every 8 hours PRN  pantoprazole    Tablet 40 milliGRAM(s) Oral before breakfast  sacubitril 24 mG/valsartan 26 mG 1 Tablet(s) Oral two times a day  simvastatin 5 milliGRAM(s) Oral at bedtime  sodium chloride 0.9%. 1000 milliLiter(s) IV Continuous <Continuous>      PHYSICAL EXAM:  T(C): 36.6 (09-14-22 @ 09:14), Max: 36.9 (09-13-22 @ 20:52)  HR: 64 (09-14-22 @ 09:14) (60 - 83)  BP: 148/81 (09-14-22 @ 09:30) (110/64 - 181/91)  RR: 16 (09-14-22 @ 09:14) (12 - 20)  SpO2: 99% (09-14-22 @ 09:14) (96% - 100%)  Wt(kg): --    GEN: Awake, comfortable. NAD.   HEENT: No JVD.   RESP: CTA b/l  CV: RRR, normal s1/s2  ABD: Soft,   EXT: Warm. No edema      I&O's Summary    13 Sep 2022 07:01  -  14 Sep 2022 07:00  --------------------------------------------------------  IN: 720 mL / OUT: 600 mL / NET: 120 mL        	  LABS:	 	    CARDIAC MARKERS:                                  8.9    9.27  )-----------( 276      ( 12 Sep 2022 14:29 )             27.4     09-12    132<L>  |  98  |  15  ----------------------------<  102<H>  see note   |  23  |  1.10    Ca    8.7      12 Sep 2022 14:29    TPro  7.1  /  Alb  2.8<L>  /  TBili  0.6  /  DBili  x   /  AST  see note  /  ALT  see note  /  AlkPhos  132<H>  09-12    proBNP:   Lipid Profile:   HgA1c:   TSH:     TELEMETRY: 	    ECG:  	  RADIOLOGY:   ECHO:  STRESS:  CATH:

## 2022-09-14 NOTE — PHYSICAL THERAPY INITIAL EVALUATION ADULT - THERAPY FREQUENCY, PT EVAL
Patient educated on frequency of inpatient physical therapy at [facility], patient verbalized understanding./2-3x/week

## 2022-09-14 NOTE — PHYSICAL THERAPY INITIAL EVALUATION ADULT - LEVEL OF INDEPENDENCE: SIT/STAND, REHAB EVAL
Head, normocephalic, atraumatic, Face, Face within normal limits, Ears, External ears within normal limits, Nose/Nasopharynx, External nose  normal appearance, nares patent, no nasal discharge, Mouth and Throat, Oral cavity appearance normal, Breath odor normal, Lips, Appearance normal
moderate assist (50% patients effort)

## 2022-09-14 NOTE — PHYSICAL THERAPY INITIAL EVALUATION ADULT - DID THE PATIENT HAVE SURGERY?
Removal of proximal intrathecal catheter with oversew of catheter tract.  Small piece of catheter from lumbar subcutaneous area to abdomen still left in place/yes

## 2022-09-14 NOTE — DISCHARGE NOTE PROVIDER - NSDCFUADDAPPT_GEN_ALL_CORE_FT
Please follow up with Dr. D'Amico by calling [phone] to confirm appointment for suture removal.    Please follow  up with your primary care provider.  Please follow up with Dr. D'Amico by calling [phone] to confirm appointment for suture removal on October 4 @ 2 pm with Eva.     Please follow up with Dr. Adam Barba (pulmonologist) outpatient to establish care and obtain baseline pulmonary tests by calling [phone].     Please follow up with Dr. Gonzales (cardiologist) outpatient by calling [phone].    Please follow  up with your primary care provider.

## 2022-09-14 NOTE — OCCUPATIONAL THERAPY INITIAL EVALUATION ADULT - RANGE OF MOTION EXAMINATION, UPPER EXTREMITY
**with exception of b/l shoulder flexion ~0-100 degrees/bilateral UE Active ROM was WFL  (within functional limits)

## 2022-09-14 NOTE — OCCUPATIONAL THERAPY INITIAL EVALUATION ADULT - GENERAL OBSERVATIONS, REHAB EVAL
Pt received semi-supine in bed, +tele, +heplock, in NAD and agreeable to OT. Cleared by MERON Coffman to see.

## 2022-09-14 NOTE — PHYSICAL THERAPY INITIAL EVALUATION ADULT - GENERAL OBSERVATIONS, REHAB EVAL
PT IE completed. Chart reviewed. Patient without complaints of pain at rest, agreeable to PT. Patient received semi-supine, NAD, +tele, +(R)IV, +lumbar dressing C/D/I, PA Dar (nsx) cleared patient for treatment.

## 2022-09-14 NOTE — OCCUPATIONAL THERAPY INITIAL EVALUATION ADULT - MODIFIED CLINICAL TEST OF SENSORY INTEGRATION IN BALANCE TEST
Pt able to take ~3 side steps along EOB with min-modx2 person assist using RW, pt with kyphotic posture and decreased step length, no LOB ntoed

## 2022-09-14 NOTE — PHYSICAL THERAPY INITIAL EVALUATION ADULT - PHYSICAL ASSIST/NONPHYSICAL ASSIST: SIT/SUPINE, REHAB EVAL
decreased eccentric trunk control; *increased assist for B/L LEs onto bed surface/verbal cues/nonverbal cues (demo/gestures)/2 person assist

## 2022-09-14 NOTE — DISCHARGE NOTE PROVIDER - DETAILS OF MALNUTRITION DIAGNOSIS/DIAGNOSES
This patient has been assessed with a concern for Malnutrition and was treated during this hospitalization for the following Nutrition diagnosis/diagnoses:     -  09/15/2022: Moderate protein-calorie malnutrition

## 2022-09-14 NOTE — DISCHARGE NOTE PROVIDER - CARE PROVIDERS DIRECT ADDRESSES
,DirectAddress_Unknown ,DirectAddress_Unknown,[email or web address] ,DirectAddress_Unknown,[email or web address]

## 2022-09-14 NOTE — DISCHARGE NOTE PROVIDER - PROVIDER TOKENS
PROVIDER:[TOKEN:[[id number]]] PROVIDER:[TOKEN:[[id number]]],PROVIDER:[TOKEN:[[id number]]] PROVIDER:[TOKEN:[[id number]]],PROVIDER:[TOKEN:[[id number]]],PROVIDER:[TOKEN:[[id number]]]

## 2022-09-14 NOTE — PHYSICAL THERAPY INITIAL EVALUATION ADULT - PERTINENT HX OF CURRENT PROBLEM, REHAB EVAL
This is a 72-year-old female with history of HTN, HLD, Afib on Eliquis. Patient underwent cholecystectomy with general surgery and ventral removal of intrathecal pain pump 8/30/22 Over the patient week patient has noticed increased swelling over the LLQ. Denies pain, fevers, abdominal pain, signs of infection, recent trauma/fall. Patient admitted to neurosurgery pre op for removal of the posterior portion of pain pump. Please refer to H&P on [place] for remaining.

## 2022-09-14 NOTE — DISCHARGE NOTE PROVIDER - NSDCMRMEDTOKEN_GEN_ALL_CORE_FT
ALPRAZolam 1 mg oral tablet: 1 tab(s) orally once a day (at bedtime), As needed, insomnia  B-12 Resin 1000 mcg oral tablet: 1 tab(s) orally once a week  Cardura 4 mg oral tablet: 1 tab(s) orally once a day  Eliquis 5 mg oral tablet: 1 tab(s) orally once a day  Entresto 24 mg-26 mg oral tablet: 1 tab(s) orally 2 times a day  folic acid 1 mg oral tablet: 1 tab(s) orally once a day  hydrALAZINE 10 mg oral tablet: 50 milligram(s) orally 1 time a day + 25 milligrams orally 1 time a day  omeprazole 20 mg oral delayed release capsule: 1 cap(s) orally once a day  oxyCODONE 15 mg oral tablet: 1 tab(s) orally every 4 hours, As needed, Moderate Pain (4 - 6) MDD:6  oxyCODONE 30 mg oral tablet, extended release: 1 tab(s) orally every 8 hours, As Needed -for severe pain MDD:3  OxyCONTIN 20 mg oral tablet, extended release: 20 milligram(s) orally every 8 hours  Pamelor 75 mg oral capsule: 1 cap(s) orally once a day (at bedtime)  Proscar 5 mg oral tablet: 1 tab(s) orally once a day  sacubitril-valsartan 24 mg-26 mg oral tablet: 1 tab(s) orally 2 times a day  simvastatin 5 mg oral tablet: 1 tab(s) orally once a day (at bedtime)  Toprol-XL 50 mg oral tablet, extended release: 1 tab(s) orally 2 times a day  Trintellix 20 mg oral tablet: 1 tab(s) orally once a day  Valium 2 mg oral tablet: 1 tab(s) orally once a day (at bedtime), As Needed - for insomnia   ALPRAZolam 1 mg oral tablet: 1 tab(s) orally once a day (at bedtime), As needed, insomnia  apixaban 5 mg oral tablet: 1 tab(s) orally every 12 hours  folic acid 1 mg oral tablet: 1 tab(s) orally once a day  losartan 25 mg oral tablet: 1 tab(s) orally once a day  Metoprolol Succinate  mg oral tablet, extended release: 1 tab(s) orally once a day  NIFEdipine 60 mg oral tablet, extended release: 1 tab(s) orally once a day  oxyCODONE 15 mg oral tablet: 1 tab(s) orally every 4 hours, As needed, Moderate Pain (4 - 6) MDD:6  oxyCODONE 20 mg oral tablet, extended release: 1 tab(s) orally every 8 hours  Pamelor 75 mg oral capsule: 1 cap(s) orally once a day (at bedtime)  pantoprazole 40 mg oral delayed release tablet: 1 tab(s) orally once a day (before a meal)  Proscar 5 mg oral tablet: 1 tab(s) orally once a day  simvastatin 5 mg oral tablet: 1 tab(s) orally once a day (at bedtime)

## 2022-09-14 NOTE — PHYSICAL THERAPY INITIAL EVALUATION ADULT - PHYSICAL ASSIST/NONPHYSICAL ASSIST: SCOOT/BRIDGE, REHAB EVAL
scooting to EOB in sitting with josué-pad assist/verbal cues/nonverbal cues (demo/gestures)/1 person assist

## 2022-09-14 NOTE — DISCHARGE NOTE PROVIDER - NSDCFUADDINST_GEN_ALL_CORE_FT
Neurosurgery follow up appointment date/time:  - please call the office to confirm appointment: [phone]    Wound Care:  - You can take a shower. Do not put any lotions, soaps, fragrance products on your incision site. Do not scrub your incision dry. Pat incision dry.   - Your dressing is covered with a surgical skin glue that will fall off on it's own. Do not pick at the incision site.      Activity:  - fatigue is common after surgery, rest if you feel tired   - no bending, lifting, twisting or heavy lifting   - walking is recommended, ambulate as tolerated  - you may shower when you get home, keep your incision dry  - no bathing   - no driving within 24 hours of anesthesia or while taking prescription pain medications   - keep hydrated, drink plenty of water     Inpatient consults:  - final recommendations  - you will need follow up with....    Please also follow up with your primary care doctor.     Pain Expectations:  - pain after surgery is expected  - please take pain meds as prescribed     Medications:  - Please take your home dose medications as prescribed.   - new meds?  - pain meds?  - when can antiplatelets or anticoagulants be restarted? f/u when Eliquis can be restarted.   - were adverse affects of meds discussed with patients?   - pain medications can cause constipation, you should eat a high fiber diet and may take a stool softener while on pain meds   - Avoid taking Advil (ibuprofen), Motrin (naproxen), or Aspirin for pain as they can cause bleeding     Call the office or come to ED if:  - wound has drainage or bleeding, increased redness or pain at incision site, neurological change, fever (>101), chills, night sweats, syncope, nausea/vomiting      Playback:  - are discharge instruction on playback?  - is a picture of the incision on playback?     WITHIN 24 HOURS OF DISCHARGE, PLEASE CONTACT NEURO PA  WITH ANY QUESTIONS OR CONCERNS: [phone]   OTHERWISE, PLEASE CALL THE OFFICE WITH ANY QUESTIONS OR CONCERNS: [phone] Neurosurgery follow up appointment date/time:  - please call the office to confirm appointment: [phone]    Wound Care:  - You can take a shower. Do not put any lotions, soaps, fragrance products on your incision site. Do not scrub your incision dry. Pat incision dry.   - Your posterior incision has sutures in place that will be removed in the office. Do not pick at the incision site.      Activity:  - fatigue is common after surgery, rest if you feel tired   - no bending, lifting, twisting or heavy lifting   - walking is recommended, ambulate as tolerated  - you may shower when you get home, keep your incision dry  - no bathing   - no driving within 24 hours of anesthesia or while taking prescription pain medications   - keep hydrated, drink plenty of water     Inpatient consults:  - final recommendations  - you will need follow up with Dr. Gonzales (cardiology) outpatient.     Please also follow up with your primary care doctor.     Pain Expectations:  - pain after surgery is expected  - please take pain meds as prescribed     Medications:  - Please take your home dose medications as prescribed.   - new meds?  - pain meds?  - when can antiplatelets or anticoagulants be restarted? f/u when Eliquis can be restarted.   - were adverse affects of meds discussed with patients?   - pain medications can cause constipation, you should eat a high fiber diet and may take a stool softener while on pain meds   - Avoid taking Advil (ibuprofen), Motrin (naproxen), or Aspirin for pain as they can cause bleeding     Call the office or come to ED if:  - wound has drainage or bleeding, increased redness or pain at incision site, neurological change, fever (>101), chills, night sweats, syncope, nausea/vomiting      Playback:  - are discharge instruction on playback?  - is a picture of the incision on playback?     WITHIN 24 HOURS OF DISCHARGE, PLEASE CONTACT NEURO PA  WITH ANY QUESTIONS OR CONCERNS: [phone]   OTHERWISE, PLEASE CALL THE OFFICE WITH ANY QUESTIONS OR CONCERNS: [phone] Neurosurgery follow up appointment date/time:  - please call the office to confirm appointment: [phone]    Wound Care:  - You can take a shower. Do not put any lotions, soaps, fragrance products on your incision site. Do not scrub your incision dry. Pat incision dry.   - Your posterior incision has sutures in place that will be removed in the office. Do not pick at the incision site.      Activity:  - fatigue is common after surgery, rest if you feel tired   - no bending, lifting, twisting or heavy lifting   - walking is recommended, ambulate as tolerated  - you may shower when you get home, keep your incision dry  - no bathing   - no driving within 24 hours of anesthesia or while taking prescription pain medications   - keep hydrated, drink plenty of water     Inpatient consults:  - you will need follow up with Dr. Barba (pulmonologist) outpatient. Please obtain appointment by calling [phone].   - you will need follow up with Dr. Gonzales (cardiology) outpatient. Continue taking Eliquis 5 mg every 12 hours for afib, Losartan 25 mg daily, Nifedipine extended release 60 mg, toprol 100 mg daily, and discontinue Doxazosin.     Please also follow up with your primary care doctor.     Pain Expectations:  - pain after surgery is expected  - please take pain meds as prescribed     Medications:  - Please take your home dose medications as prescribed. Proscar 5 mg, Pamelor 75 mg, and omeprazole 20 mg (no changes).   - Continue taking Eliquis 5 mg every 12 hours for afib, Losartan 25 mg daily, Nifedipine extended release 60 mg, toprol 100 mg daily, and discontinue Doxazosin and switch to Atorvastatin 20 mg from simvastatin.   - For pain continue taking Oxycodone extended release 20 mg every 8 hours. Continue oxycodone 15 mg every 6 hours as needed for moderate-severe pain.   - pain medications can cause constipation, you should eat a high fiber diet and may take a stool softener while on pain meds   - Avoid taking Advil (ibuprofen), Motrin (naproxen), or Aspirin for pain as they can cause bleeding     Call the office or come to ED if:  - wound has drainage or bleeding, increased redness or pain at incision site, neurological change, fever (>101), chills, night sweats, syncope, nausea/vomiting      Playback:  - Discharge instructions uploaded to [employer].     WITHIN 24 HOURS OF DISCHARGE, PLEASE CONTACT NEURO PA  WITH ANY QUESTIONS OR CONCERNS: [phone]   OTHERWISE, PLEASE CALL THE OFFICE WITH ANY QUESTIONS OR CONCERNS: [phone] Neurosurgery follow up appointment date/time: 10/4/22 @ 2 PM  - please call the office to confirm appointment: [phone]    Wound Care:  - You can take a shower. Do not put any lotions, soaps, fragrance products on your incision site. Do not scrub your incision dry. Pat incision dry.   - Your posterior incision has sutures in place that will be removed in the office. Do not pick at the incision site.      Activity:  - fatigue is common after surgery, rest if you feel tired   - no bending, lifting, twisting or heavy lifting   - walking is recommended, ambulate as tolerated  - you may shower when you get home, keep your incision dry  - no bathing   - no driving within 24 hours of anesthesia or while taking prescription pain medications   - keep hydrated, drink plenty of water     Inpatient consults:  - you will need follow up with Dr. Barba (pulmonologist) outpatient. Please obtain appointment by calling [phone].   - you will need follow up with Dr. Gonzales (cardiology) outpatient. Continue taking Eliquis 5 mg every 12 hours for afib, Losartan 25 mg daily, Nifedipine extended release 60 mg, toprol 100 mg daily, and discontinue Doxazosin.     Please also follow up with your primary care doctor.     Pain Expectations:  - pain after surgery is expected  - please take pain meds as prescribed     Medications:  - Please take your home dose medications as prescribed. Proscar 5 mg, Pamelor 75 mg, and omeprazole 20 mg (no changes).   - Continue taking Eliquis 5 mg every 12 hours for afib, Losartan 25 mg daily, Nifedipine extended release 60 mg, toprol 100 mg daily, and discontinue Doxazosin   - Continue simvastatin 5 mg daily   - For pain continue taking Oxycodone extended release 20 mg every 8 hours. Continue oxycodone 15 mg every 6 hours as needed for moderate-severe pain.   - pain medications can cause constipation, you should eat a high fiber diet and may take a stool softener while on pain meds   - Avoid taking Advil (ibuprofen), Motrin (naproxen), or Aspirin for pain as they can cause bleeding     Call the office or come to ED if:  - wound has drainage or bleeding, increased redness or pain at incision site, neurological change, fever (>101), chills, night sweats, syncope, nausea/vomiting      Playback:  - Discharge instructions uploaded to playback.     WITHIN 24 HOURS OF DISCHARGE, PLEASE CONTACT NEURO PA  WITH ANY QUESTIONS OR CONCERNS: [phone]   OTHERWISE, PLEASE CALL THE OFFICE WITH ANY QUESTIONS OR CONCERNS: [phone]

## 2022-09-14 NOTE — OCCUPATIONAL THERAPY INITIAL EVALUATION ADULT - DIAGNOSIS, OT EVAL
Pt is s/p  removal of the posterior portion of pain pump presents with impaired balance, generalized weakness, decreased postural control, and decreased activity tolerance impacting overall ease of completing ADLs and

## 2022-09-14 NOTE — PROGRESS NOTE ADULT - SUBJECTIVE AND OBJECTIVE BOX
HPI:  This is a 72-year-old female with history of HTN, HLD, Afib on Eliquis. Patient underwent cholecystectomy with general surgery and ventral removal of intrathecal pain pump 22 Over the patient week patient has noticed increased swelling over the LLQ. Denies pain, fevers, abdominal pain, signs of infection, recent trauma/fall. Patient admitted to neurosurgery pre op for removal of the posterior portion of pain pump.  (12 Sep 2022 17:09)    OVERNIGHT EVENTS: Watery stool overnight, neuro stable.     Hospital Course:   : POD#1 s/p removal of proximal intrathecal catheter with oversew of catheter tract.  Small piece of catheter from lumbar subcutaneous area to abdomen still left in place.  : POD2, restarted entresto, multiple episodes of loose stool, o/w GERMAINE overnight.     Vital Signs Last 24 Hrs  T(C): 36.7 (14 Sep 2022 00:36), Max: 36.9 (13 Sep 2022 20:52)  T(F): 98.1 (14 Sep 2022 00:36), Max: 98.4 (13 Sep 2022 20:52)  HR: 60 (14 Sep 2022 00:36) (60 - 83)  BP: 110/64 (14 Sep 2022 00:36) (110/64 - 174/84)  BP(mean): 104 (13 Sep 2022 16:05) (91 - 120)  RR: 17 (14 Sep 2022 00:36) (12 - 20)  SpO2: 99% (14 Sep 2022 00:36) (96% - 100%)    Parameters below as of 14 Sep 2022 00:36  Patient On (Oxygen Delivery Method): room air        I&O's Summary    12 Sep 2022 07:01  -  13 Sep 2022 07:00  --------------------------------------------------------  IN: 0 mL / OUT: 300 mL / NET: -300 mL    13 Sep 2022 07:01  -  14 Sep 2022 01:36  --------------------------------------------------------  IN: 720 mL / OUT: 300 mL / NET: 420 mL        PHYSICAL EXAM:  General: patient seen laying supine in bed in NAD  Neuro: AAOx3, FC, OE spontaneously, speech clear and fluent, CNII-XI grossly intact, face symmetric, no pronator drift, strength 5/5 b/l UE and LE, sensation grossly intact to light touch throughout  HEENT: PERRL, EOMI  Neck: supple  Cardiac: RRR, S1S2  Pulmonary: chest rise symmetric  Abdomen: soft, nontender, nondistended  Ext: perfusing well  Skin: warm, dry  Wound: L posterior dressing in place c/d/i      TUBES/LINES:  [] Barber  [] Lumbar Drain  [] Wound Drains  [] Others      DIET:  [] NPO  [x] Mechanical  [] Tube feeds    LABS:                        8.9    9.27  )-----------( 276      ( 12 Sep 2022 14:29 )             27.4         132<L>  |  98  |  15  ----------------------------<  102<H>  see note   |  23  |  1.10    Ca    8.7      12 Sep 2022 14:29    TPro  7.1  /  Alb  2.8<L>  /  TBili  0.6  /  DBili  x   /  AST  see note  /  ALT  see note  /  AlkPhos  132<H>      PT/INR - ( 12 Sep 2022 15:11 )   PT: 16.1 sec;   INR: 1.35          PTT - ( 12 Sep 2022 15:11 )  PTT:45.0 sec  Urinalysis Basic - ( 12 Sep 2022 14:30 )    Color: Yellow / Appearance: Clear / S.010 / pH: x  Gluc: x / Ketone: NEGATIVE  / Bili: Negative / Urobili: 0.2 E.U./dL   Blood: x / Protein: NEGATIVE mg/dL / Nitrite: NEGATIVE   Leuk Esterase: Large / RBC: < 5 /HPF / WBC > 10 /HPF   Sq Epi: x / Non Sq Epi: 0-5 /HPF / Bacteria: Present /HPF          CAPILLARY BLOOD GLUCOSE          Drug Levels: [] N/A    CSF Analysis: [] N/A      Allergies    Altace (Unknown)  penicillin (Unknown)    Intolerances      MEDICATIONS:  Antibiotics:  ceFAZolin   IVPB 2000 milliGRAM(s) IV Intermittent every 8 hours    Neuro:  ALPRAZolam 1 milliGRAM(s) Oral at bedtime PRN  nortriptyline 75 milliGRAM(s) Oral at bedtime  oxyCODONE    IR 15 milliGRAM(s) Oral every 6 hours PRN  oxyCODONE  ER Tablet 20 milliGRAM(s) Oral every 8 hours PRN    Anticoagulation:    OTHER:  ALBUTerol    0.083%. 2.5 milliGRAM(s) Nebulizer once PRN  doxazosin 4 milliGRAM(s) Oral at bedtime  finasteride 5 milliGRAM(s) Oral daily  hydrALAZINE 25 milliGRAM(s) Oral at bedtime  metoprolol tartrate 50 milliGRAM(s) Oral two times a day  pantoprazole    Tablet 40 milliGRAM(s) Oral before breakfast  sacubitril 24 mG/valsartan 26 mG 1 Tablet(s) Oral two times a day  simvastatin 5 milliGRAM(s) Oral at bedtime    IVF:  folic acid 1 milliGRAM(s) Oral daily  sodium chloride 0.9%. 1000 milliLiter(s) IV Continuous <Continuous>    CULTURES:  Culture Results:   No growth to date ( @ 17:58)  Culture Results:   >100,000 CFU/ml Enterococcus faecalis  Susceptibility to follow. ( @ 14:30)    RADIOLOGY & ADDITIONAL TESTS:  < from: CT Cervical Spine No Cont (22 @ 14:11) >  IMPRESSION:    Again demonstrated status post anterior and posterior fusion spanning   C2-T2 without significant interval change. No acute fracture or   malalignment.    --- End of Report ---    < end of copied text >  < from: CT Lumbar Spine No Cont (22 @ 14:11) >    IMPRESSION:    Since prior CT lumbar spine 2022 interval removal of intrathecal   pump device. A portionof the device remains within the left lateral   lower back subcutaneous soft tissues as noted within the patient's   electronic medical record.    --- End of Report ---    < end of copied text >      ASSESSMENT:  This is a 72-year-old male with history of HTN, HLD, Afib on Eliquis. PPM in place. Patient underwent cholecystectomy with general surgery and ventral removal of intrathecal pain pump 22 Over the patient week patient has noticed increased swelling over the LLQ. Denies pain, fevers, abdominal pain, signs of infection, recent trauma/fall. Patient admitted to neurosurgery pre op for removal of the posterior portion of pain pump. Now, s/p removal of proximal intrathecal catheter with oversew of catheter tract (22)      POSTOPERATIVE COMPLICA-TION    Handoff    MEWS Score    AAA (abdominal aortic aneurysm)    HTN (hypertension)    Cardiomyopathy    Hyperlipidemia    ONOFRE (dyspnea on exertion)    DVT (deep venous thrombosis)    Pulmonary emphysema    Cardiac arrhythmia    Aneurysm of aortic root    Depression    Anemia    Pelvic mass    CSF leak    CSF leak    Postoperative complication    Abdominal fluid collection    Cardiomyopathy    HTN (hypertension)    Pulmonary emphysema    Chronic atrial fibrillation    Chronic systolic heart failure    Fusion of spine, unspecified spinal region    Asymptomatic bacteriuria    Removal of intrathecal catheter    Pacemaker    History of kidney surgery    History of back surgery    Surgery, elective    S/P hip replacement, left    S/P arthroscopy of right knee    S/P AAA (abdominal aortic aneurysm) repair    S/P carpal tunnel release    Surgery, elective    Surgery, elective    ABDOMINAL PAIN    5    [email or web address]        PLAN:  Neuro:   - neuro checks q4  - f/u body fluid anylsis of possible CSF, NGTD  - CT C/L spine complete    Cardio:   - recent admission for Takosubo cardiomyopathy, now resolved EF 50-55%  - Paroxysmal Afib: Toprol 50 BID, PPM in place, reconsult EP, last admission per EP no need to reporgram prior to OR, hold Eliquis   - HTN: recent admission with HTN urgency, contine Hydral 10/, uptitrate as needed per cardio  - HLD: continue simvastatin     Pulm:   - RA  - small R pleural effusion on CXR  - incentive spirometry    GI:   -ADAT  - bowel regimen  - Gastritis: cont home protonix     /Renal:  - BPH: continue Cardura and Proscar     Heme:  - Dvt ppx: SCDs, hold chemoppx for OR  - holding home Eliquis  - IVCF in place     ID:  - No issues, no organisms seen on CSF studies, Afebrile    Endo:   - no issues, A1C 5.2    Dispo: Telemetry, family updated  Plan d/w Dr. D'Amico           Assessment:  Present when checked    []  GCS  E   V  M     Heart Failure: []Acute, [] acute on chronic , []chronic  Heart Failure:  [] Diastolic (HFpEF), [] Systolic (HFrEF), []Combined (HFpEF and HFrEF), [] RHF, [] Pulm HTN, [] Other    [] CLAUDIO, [] ATN, [] AIN, [] other  [] CKD1, [] CKD2, [] CKD 3, [] CKD 4, [] CKD 5, []ESRD    Encephalopathy: [] Metabolic, [] Hepatic, [] toxic, [] Neurological, [] Other    Abnormal Nurtitional Status: [] malnurtition (see nutrition note), [ ]underweight: BMI < 19, [] morbid obesity: BMI >40, [] Cachexia    [] Sepsis  [] hypovolemic shock,[] cardiogenic shock, [] hemorrhagic shock, [] neuogenic shock  [] Acute Respiratory Failure  []Cerebral edema, [] Brain compression/ herniation,   [] Functional quadriplegia  [] Acute blood loss anemia

## 2022-09-14 NOTE — DISCHARGE NOTE PROVIDER - HOSPITAL COURSE
This is a 72-year-old male with history of HTN, HLD, Afib on Eliquis. PPM in place. Patient underwent cholecystectomy with general surgery and ventral removal of intrathecal pain pump 8/30/22 Over the patient week patient has noticed increased swelling over the LLQ. Denies pain, fevers, abdominal pain, signs of infection, recent trauma/fall. Patient admitted to neurosurgery pre op for removal of the posterior portion of pain pump. Now, s/p removal of proximal intrathecal catheter with oversew of catheter tract (9/13/22)    Plan:  Neuro:   - neuro checks q4  - f/u body fluid anylsis of possible CSF, NGTD  - CT C/L spine complete    Cardio:   - recent admission for Takosubo cardiomyopathy, now resolved EF 50-55%  - Paroxysmal Afib: Toprol 50 BID, PPM in place, reconsult EP, last admission per EP no need to reporgram prior to OR, hold Eliquis   - HTN: recent admission with HTN urgency, contine Hydral 10/25, uptitrate as needed per cardio  - HLD: continue simvastatin     Pulm:   - RA  - small R pleural effusion on CXR  - incentive spirometry    GI:   - ADAT  - bowel regimen  - Gastritis: cont home protonix     /Renal:  - BPH: continue Cardura and Proscar     Heme:  - Dvt ppx: SCDs, hold chemoppx for OR  - holding home Eliquis  - IVCF in place     ID:  - No issues, no organisms seen on CSF studies, Afebrile    Endo:   - no issues, A1C 5.2    Dispo: Telemetry, family updated  Plan d/w Dr. D'Amico        This is a 72-year-old male with history of HTN, HLD, Afib on Eliquis. PPM in place. Patient underwent cholecystectomy with general surgery and ventral removal of intrathecal pain pump 8/30/22 Over the patient week patient has noticed increased swelling over the LLQ. Denies pain, fevers, abdominal pain, signs of infection, recent trauma/fall. Patient admitted to neurosurgery pre op for removal of the posterior portion of pain pump. Now, s/p removal of proximal intrathecal catheter with oversew of catheter tract (9/13/22)    Plan:  Neuro:   - neuro checks q4  - f/u body fluid anylsis of possible CSF, NGTD  - CT C/L spine complete    Cardio:   - recent admission for Takosubo cardiomyopathy, now resolved EF 50-55%  - Paroxysmal Afib: Toprol 50 BID, PPM in place, reconsult EP, last admission per EP no need to reporgram prior to OR, hold Eliquis   - HTN: recent admission with HTN urgency, contine Hydral 10/25, uptitrate as needed per cardio  - HLD: continue simvastatin     Pulm:   - RA  - small R pleural effusion on CXR  - incentive spirometry    GI:   - ADAT  - bowel regimen  - Gastritis: cont home protonix     /Renal:  - BPH: continue Cardura and Proscar     Heme:  - Dvt ppx: SCDs, hold chemoppx for OR  - holding home Eliquis  - IVCF in place     ID:  - No issues, no organisms seen on CSF studies, Afebrile    Endo:   - no issues, A1C 5.2    Dispo: Telemetry, family updated  Plan d/w Dr. D'Amico       HPI:  Hospital Course:    Patient evaluated by PT/OT who recommened:  Patient is going home? rehab? hospice? Facility Name:     Hospital course c/b:     Exam on day of discharge:     Checklist:   - Obtained follow up appointment from NP - pending   - Reviewed final recommendations of inpatient consults - pending pulm, cards, and nutrition recs   - review discharge planning on provider handoff - change toprol to 100mg on dc   - post op imaging completed - yes   - Neurologically stable for discharge - yes   - Vitals stable for discharge - htn (baseline)   - Afebrile for discharge  - afebrile   - WBC is stable 7.2   - Sodium level is normal 133   - Pain is adequately controlled - no complaints of pain   - Pt has PICC/walker/brace/collar  - awaiting PT/OT        This is a 72-year-old male with history of HTN, HLD, Afib on Eliquis. PPM in place. Patient underwent cholecystectomy with general surgery and ventral removal of intrathecal pain pump 8/30/22 Over the patient week patient has noticed increased swelling over the LLQ. Denies pain, fevers, abdominal pain, signs of infection, recent trauma/fall. Patient admitted to neurosurgery pre op for removal of the posterior portion of pain pump. Now, s/p removal of proximal intrathecal catheter with oversew of catheter tract (9/13/22)    Hospital Course   9/13: POD#1 s/p removal of proximal intrathecal catheter with oversew of catheter tract.  Small piece of catheter from lumbar subcutaneous area to abdomen still left in place.  9/14: POD2, restarted entresto, multiple episodes of loose stool, o/w GERMAINE overnight.      Hospital course c/b:     Plan:  Neuro:   - neuro checks q4  - f/u body fluid anylsis of possible CSF, NGTD  - CT C/L spine complete    Cardio:   - recent admission for Takosubo cardiomyopathy, now resolved EF 50-55%  - Paroxysmal Afib: Toprol 50 BID, PPM in place, reconsult EP, last admission per EP no need to reporgram prior to OR, hold Eliquis   - HTN: recent admission with HTN urgency, contine Hydral 10/25, uptitrate as needed per cardio  - HLD: continue simvastatin     Pulm:   - RA  - small R pleural effusion on CXR  - incentive spirometry    GI:   - ADAT  - bowel regimen  - Gastritis: cont home protonix     /Renal:  - BPH: continue Cardura and Proscar     Heme:  - Dvt ppx: SCDs, hold chemoppx for OR  - holding home Eliquis  - IVCF in place     ID:  - No issues, no organisms seen on CSF studies, Afebrile    Endo:   - no issues, A1C 5.2    Dispo: pending PT/OT      Discussed with Dr. D'Amico.     Patient evaluated by PT/OT who recommened:  Patient is going home? rehab? hospice? Facility Name:     Exam on day of discharge:     Checklist:   - Obtained follow up appointment from NP - pending   - Reviewed final recommendations of inpatient consults - pending pulm, cards, and nutrition recs   - review discharge planning on provider handoff - change toprol to 100mg on dc   - post op imaging completed - yes   - Neurologically stable for discharge - yes   - Vitals stable for discharge - htn (baseline)   - Afebrile for discharge  - afebrile   - WBC is stable 7.2   - Sodium level is normal 133   - Pain is adequately controlled - no complaints of pain   - Pt has PICC/walker/brace/collar  - awaiting PT/OT        This is a 72-year-old male with history of HTN, HLD, Afib on Eliquis. PPM in place. Patient underwent cholecystectomy with general surgery and ventral removal of intrathecal pain pump 8/30/22 Over the patient week patient has noticed increased swelling over the LLQ. Denies pain, fevers, abdominal pain, signs of infection, recent trauma/fall. Patient admitted to neurosurgery pre op for removal of the posterior portion of pain pump. Now, s/p removal of proximal intrathecal catheter with oversew of catheter tract (9/13/22)    Hospital Course   9/13: POD#1 s/p removal of proximal intrathecal catheter with oversew of catheter tract.  Small piece of catheter from lumbar subcutaneous area to abdomen still left in place.  9/14: POD2, restarted entresto, multiple episodes of loose stool, o/w GERMAINE overnight.      Hospital course c/b:     Patient evaluated by PT/OT who recommened:  Patient is going home? rehab? hospice? Facility Name:     Exam on day of discharge:     Checklist:   - Obtained follow up appointment from NP - pending   - Reviewed final recommendations of inpatient consults - pending pulm, cards, and nutrition recs   - review discharge planning on provider handoff - change toprol to 100mg on dc   - post op imaging completed - yes   - Neurologically stable for discharge - yes   - Vitals stable for discharge - htn (baseline)   - Afebrile for discharge  - afebrile   - WBC is stable 7.2   - Sodium level is normal 133   - Pain is adequately controlled - no complaints of pain   - Pt has PICC/walker/brace/collar  - awaiting PT/OT        This is a 72-year-old male with history of HTN, HLD, Afib on Eliquis. PPM in place. Patient underwent cholecystectomy with general surgery and ventral removal of intrathecal pain pump 8/30/22 Over the patient week patient has noticed increased swelling over the LLQ. Denies pain, fevers, abdominal pain, signs of infection, recent trauma/fall. Patient admitted to neurosurgery pre op for removal of the posterior portion of pain pump. Now, s/p removal of proximal intrathecal catheter with oversew of catheter tract (9/13/22)    Hospital Course   9/13: POD#1 s/p removal of proximal intrathecal catheter with oversew of catheter tract.  Small piece of catheter from lumbar subcutaneous area to abdomen still left in place.  9/14: POD2, restarted entresto, multiple episodes of loose stool, attributed to entresto, dc'd and losartan started instead, multiple changes made to HTN regimen per cards recs  9/15: POD2, GERMAINE.starting eliquis tonight,pt no longer taking entesto per cards recs, bp on low side today 95/65 and 107/66, losartan held @12 noon,   9/16: POD3, GERMAINE, eliquis 5 mg restarted, cardura held for hypotension. Pend dispo plan, pend JESSIKA.   9/17: POD4, GERMAINE overnight.   9/18: POD 5. GERMAINE o/n. Pt had an anxiety attack during the day and was given 1 mg xanax. Refusing to wear SCD's.    9/19: POD6, SBP 89/55, BP meds held and 500 cc bolus, pending JESSIKA.     Hospital course c/b: Diarrhea ; discontinued entresto medication. Hypotension : discontinued home doxazosin.     Patient evaluated by PT/OT who recommened: Subacute rehab     Exam on day of discharge:   General: patient seen laying supine in bed in NAD  Neuro: AAOx3, FC, OE spontaneously, speech clear and fluent, CNII-XI grossly intact, face symmetric, no pronator drift, PIMENTEL strong & symmetric, sensation grossly intact to light touch throughout  HEENT: PERRL, EOMI  Neck: supple  Cardiac: RRR, S1S2  Pulmonary: chest rise symmetric  Abdomen: soft, nontender, nondistended - scars intact  Ext: perfusing well  Skin: warm, dry  Wound: Posterior incision c/d/i with sutures in place, anterior incisions c/d/i no sutures/staples/dressings in place    Checklist:   - Obtained follow up appointment from NP - Oct 4 @2 pm.   - post op imaging completed - yes   - Neurologically stable for discharge - yes   - Vitals stable for discharge - htn (baseline)   - Afebrile for discharge  - afebrile   - WBC is stable 6.59  - Sodium level is normal 133   - Pain is adequately controlled - no complaints of pain          This is a 72-year-old male with history of HTN, HLD, Afib on Eliquis. PPM in place. Patient underwent cholecystectomy with general surgery and ventral removal of intrathecal pain pump 8/30/22 Over the patient week patient has noticed increased swelling over the LLQ. Denies pain, fevers, abdominal pain, signs of infection, recent trauma/fall. Patient admitted to neurosurgery pre op for removal of the posterior portion of pain pump. Now, s/p removal of proximal intrathecal catheter with oversew of catheter tract (9/13/22)    Hospital Course   9/13: POD#1 s/p removal of proximal intrathecal catheter with oversew of catheter tract.  Small piece of catheter from lumbar subcutaneous area to abdomen still left in place.  9/14: POD2, restarted entresto, multiple episodes of loose stool, attributed to entresto, dc'd and losartan started instead, multiple changes made to HTN regimen per cards recs  9/15: POD2, GERMAINE.starting eliquis tonight,pt no longer taking entesto per cards recs, bp on low side today 95/65 and 107/66, losartan held @12 noon,   9/16: POD3, GERMAINE, eliquis 5 mg restarted, cardura held for hypotension. Pend dispo plan, pend [facility].   9/17: POD4, GERMAINE overnight.   9/18: POD 5. GERMAINE o/n. Pt had an anxiety attack during the day and was given 1 mg xanax. Refusing to wear SCD's.    9/19: POD6, SBP 89/55, BP meds held and 500 cc bolus, pending [facility].   9/20: POD 7, Medically ready for discharge to [facility]     Hospital course c/b: Diarrhea; discontinued entresto medication. Hypotension : discontinued home doxazosin.     Patient evaluated by PT/OT who recommened: Subacute rehab, [facility]     Exam on day of discharge:   General: patient seen laying supine in bed in NAD  Neuro: AAOx3, FC, OE spontaneously, speech clear and fluent, CNII-XI grossly intact, face symmetric, no pronator drift, PIMENTEL strong & symmetric, sensation grossly intact to light touch throughout  HEENT: PERRL, EOMI   Neck: supple   Cardiac: RRR, S1S2   Pulmonary: chest rise symmetric   Abdomen: soft, nontender, nondistended - scars intact   Ext: perfusing well   Skin: warm, dry   Wound: Posterior incision c/d/i with sutures in place, anterior incisions c/d/i no sutures/staples/dressings in place    Checklist:   - Obtained follow up appointment from NP - Oct 4 @2 pm.   - post op imaging completed - yes   - Neurologically stable for discharge - yes   - Vitals stable for discharge - htn (baseline)   - Afebrile for discharge  - afebrile   - WBC is stable 6.59  - Sodium level is normal 135  - Pain is adequately controlled - no complaints of pain

## 2022-09-14 NOTE — DISCHARGE NOTE PROVIDER - CARE PROVIDER_API CALL
DAmico, Randy S (MD)  Neurosurgery  [address]  [place], [place] [address]  Phone: [phone]  Fax: [phone]  Follow Up Time:    DAmico, Randy S (MD)  Neurosurgery  [address], [address] [place] [address]  Phone: [phone]  Fax: [phone]  Follow Up Time:     Santiago Gonzales)  Cardiovascular Disease; Internal Medicine  [address], [address]  [place] [address]  Phone: [phone]  Fax: [phone]  Follow Up Time:    DAmico, Randy S (MD)  Neurosurgery  [address], [address] [place] [address]  Phone: [phone]  Fax: [phone]  Follow Up Time:     Santiago Gonzales)  Cardiovascular Disease; Internal Medicine  [address], [address]  [place] [address]  Phone: [phone]  Fax: [phone]  Follow Up Time:     Adam Barba)  Critical Care Medicine; Internal Medicine; Pulmonary Disease  [address] [place] [address]  Phone: [phone]  Fax: [phone]  Follow Up Time:

## 2022-09-14 NOTE — PHYSICAL THERAPY INITIAL EVALUATION ADULT - ACTIVE RANGE OF MOTION EXAMINATION, REHAB EVAL
limited (R)knee flexion from chronic arthritis/bilateral upper extremity Active ROM was WFL (within functional limits)/bilateral  lower extremity Active ROM was WFL (within functional limits)

## 2022-09-14 NOTE — OCCUPATIONAL THERAPY INITIAL EVALUATION ADULT - MANUAL MUSCLE TESTING RESULTS, REHAB EVAL
BUE strength ~4/5 throughout. BLE >3+/5 based on functional mobility assessment/no strength deficits were identified

## 2022-09-14 NOTE — PHYSICAL THERAPY INITIAL EVALUATION ADULT - PHYSICAL ASSIST/NONPHYSICAL ASSIST: SIT/STAND, REHAB EVAL
slightly unsteady; *increased time required to achieve static balance post transfer/verbal cues/nonverbal cues (demo/gestures)/2 person assist

## 2022-09-14 NOTE — OCCUPATIONAL THERAPY INITIAL EVALUATION ADULT - LEVEL OF INDEPENDENCE:TOILET, OT EVAL
using portable urinal while seated at EOB/supervision
Relationship stability/Residential stability/Engagement in treatment/Insight into violence risk and need for management/treatment

## 2022-09-14 NOTE — DISCHARGE NOTE PROVIDER - NSDCCPCAREPLAN_GEN_ALL_CORE_FT
PRINCIPAL DISCHARGE DIAGNOSIS  Diagnosis: Postoperative complication  Assessment and Plan of Treatment:        PRINCIPAL DISCHARGE DIAGNOSIS  Diagnosis: Postoperative complication  Assessment and Plan of Treatment:       SECONDARY DISCHARGE DIAGNOSES  Diagnosis: Abdominal fluid collection  Assessment and Plan of Treatment:     Diagnosis: Cardiomyopathy  Assessment and Plan of Treatment:     Diagnosis: Pulmonary emphysema  Assessment and Plan of Treatment: COPD    Diagnosis: Chronic atrial fibrillation  Assessment and Plan of Treatment:     Diagnosis: HTN (hypertension)  Assessment and Plan of Treatment:

## 2022-09-14 NOTE — PROGRESS NOTE ADULT - SUBJECTIVE AND OBJECTIVE BOX
SUBJECTIVE: Patient seen and examined. C/o loose stool.       MEDICATIONS  (STANDING):  doxazosin 4 milliGRAM(s) Oral at bedtime  finasteride 5 milliGRAM(s) Oral daily  folic acid 1 milliGRAM(s) Oral daily  hydrALAZINE 25 milliGRAM(s) Oral at bedtime  metoprolol tartrate 50 milliGRAM(s) Oral two times a day  nortriptyline 75 milliGRAM(s) Oral at bedtime  pantoprazole    Tablet 40 milliGRAM(s) Oral before breakfast  potassium chloride   Powder 20 milliEquivalent(s) Oral once  sacubitril 24 mG/valsartan 26 mG 1 Tablet(s) Oral two times a day  simvastatin 5 milliGRAM(s) Oral at bedtime  sodium chloride 0.9%. 1000 milliLiter(s) (90 mL/Hr) IV Continuous <Continuous>    MEDICATIONS  (PRN):  ALBUTerol    0.083%. 2.5 milliGRAM(s) Nebulizer once PRN Shortness of Breath and/or Wheezing  ALPRAZolam 1 milliGRAM(s) Oral at bedtime PRN insomnia  oxyCODONE    IR 15 milliGRAM(s) Oral every 6 hours PRN Moderate Pain (4 - 6)  oxyCODONE  ER Tablet 20 milliGRAM(s) Oral every 8 hours PRN severe pain      Vital Signs Last 24 Hrs  T(C): 36.6 (14 Sep 2022 09:14), Max: 36.9 (13 Sep 2022 20:52)  T(F): 97.8 (14 Sep 2022 09:14), Max: 98.4 (13 Sep 2022 20:52)  HR: 64 (14 Sep 2022 09:14) (60 - 76)  BP: 148/81 (14 Sep 2022 09:30) (110/64 - 181/91)  BP(mean): --  RR: 16 (14 Sep 2022 09:14) (16 - 18)  SpO2: 99% (14 Sep 2022 09:14) (98% - 100%)    Parameters below as of 14 Sep 2022 09:14  Patient On (Oxygen Delivery Method): room air        Physical Exam:  General: NAD, resting comfortably in bed  Pulmonary: Nonlabored breathing, no respiratory distress  Cardiovascular: NSR  Abdominal: soft, NT/ND, left anterior abdominal wall fluctuance under incision    I&O's Summary    13 Sep 2022 07:01  -  14 Sep 2022 07:00  --------------------------------------------------------  IN: 720 mL / OUT: 600 mL / NET: 120 mL    14 Sep 2022 07:01  -  14 Sep 2022 16:18  --------------------------------------------------------  IN: 600 mL / OUT: 540 mL / NET: 60 mL        LABS:                        7.3    7.20  )-----------( 242      ( 14 Sep 2022 05:30 )             23.1     09-14    133<L>  |  103  |  12  ----------------------------<  98  3.9   |  25  |  0.98    Ca    8.2<L>      14 Sep 2022 05:30          CAPILLARY BLOOD GLUCOSE      POCT Blood Glucose.: 145 mg/dL (14 Sep 2022 14:07)        RADIOLOGY & ADDITIONAL STUDIES:

## 2022-09-14 NOTE — OCCUPATIONAL THERAPY INITIAL EVALUATION ADULT - ADDITIONAL COMMENTS
Pt admitted from [facility] in which pt states that he was only doing minimal ambulation with RW and requiring assist with all ADLs. Pt lives with his spouse in an apartment with no COLETTE. Pt reports that he requires intermittent assistance from his wife for all ADLs/mobility. Pt owns a RW, shower chair in his tub/shower, as well as a commode with grab bars next to toilet.

## 2022-09-14 NOTE — PHYSICAL THERAPY INITIAL EVALUATION ADULT - ADDITIONAL COMMENTS
Patient reports minimal OOB/mobility at [facility] and "has not walked in a while". In private home, patient endorses owning 3 walkers, toilet commode, shower chair, and grab bars around toilet.

## 2022-09-14 NOTE — DISCHARGE NOTE PROVIDER - NSDCFUSCHEDAPPT_GEN_ALL_CORE_FT
Guille Pastor  [facility]  HEARTVASC [address]  Scheduled Appointment: 09/20/2022    [facility]  CARDIOLOGY [id number]  Scheduled Appointment: 09/30/2022     [facility] Phys[facility]  CARDIOLOGY [id number] [facility]  Scheduled Appointment: 09/30/2022     [facility]  CARDIOLOGY [id number]  Scheduled Appointment: 09/30/2022    Eva De La Cruz  [facility]  NEUROSURG [address] [place] [address]  Scheduled Appointment: 10/04/2022

## 2022-09-14 NOTE — PHYSICAL THERAPY INITIAL EVALUATION ADULT - GAIT DEVIATIONS NOTED, PT EVAL
slightly unsteady gait, crouched gait, flexed forward posture, no knee buckling although 2 LOBs benefiting from mod assist to recover/decreased urban/decreased velocity of limb motion/decreased step length/decreased weight-shifting ability

## 2022-09-15 ENCOUNTER — NON-APPOINTMENT (OUTPATIENT)
Age: 72
End: 2022-09-15

## 2022-09-15 ENCOUNTER — TRANSCRIPTION ENCOUNTER (OUTPATIENT)
Age: 72
End: 2022-09-15

## 2022-09-15 LAB
ANION GAP SERPL CALC-SCNC: 8 MMOL/L — SIGNIFICANT CHANGE UP (ref 5–17)
BASOPHILS # BLD AUTO: 0.03 K/UL — SIGNIFICANT CHANGE UP (ref 0–0.2)
BASOPHILS NFR BLD AUTO: 0.5 % — SIGNIFICANT CHANGE UP (ref 0–2)
BUN SERPL-MCNC: 9 MG/DL — SIGNIFICANT CHANGE UP (ref 7–23)
CALCIUM SERPL-MCNC: 8.2 MG/DL — LOW (ref 8.4–10.5)
CHLORIDE SERPL-SCNC: 100 MMOL/L — SIGNIFICANT CHANGE UP (ref 96–108)
CO2 SERPL-SCNC: 27 MMOL/L — SIGNIFICANT CHANGE UP (ref 22–31)
CREAT SERPL-MCNC: 0.89 MG/DL — SIGNIFICANT CHANGE UP (ref 0.5–1.3)
EGFR: 90 ML/MIN/1.73M2 — SIGNIFICANT CHANGE UP
EOSINOPHIL # BLD AUTO: 0.15 K/UL — SIGNIFICANT CHANGE UP (ref 0–0.5)
EOSINOPHIL NFR BLD AUTO: 2.3 % — SIGNIFICANT CHANGE UP (ref 0–6)
GLUCOSE SERPL-MCNC: 94 MG/DL — SIGNIFICANT CHANGE UP (ref 70–99)
HCT VFR BLD CALC: 23.3 % — LOW (ref 39–50)
HGB BLD-MCNC: 7.6 G/DL — LOW (ref 13–17)
IMM GRANULOCYTES NFR BLD AUTO: 0.6 % — SIGNIFICANT CHANGE UP (ref 0–1.5)
LYMPHOCYTES # BLD AUTO: 1.45 K/UL — SIGNIFICANT CHANGE UP (ref 1–3.3)
LYMPHOCYTES # BLD AUTO: 22.7 % — SIGNIFICANT CHANGE UP (ref 13–44)
MAGNESIUM SERPL-MCNC: 1.6 MG/DL — SIGNIFICANT CHANGE UP (ref 1.6–2.6)
MCHC RBC-ENTMCNC: 28.4 PG — SIGNIFICANT CHANGE UP (ref 27–34)
MCHC RBC-ENTMCNC: 32.6 GM/DL — SIGNIFICANT CHANGE UP (ref 32–36)
MCV RBC AUTO: 86.9 FL — SIGNIFICANT CHANGE UP (ref 80–100)
MONOCYTES # BLD AUTO: 0.79 K/UL — SIGNIFICANT CHANGE UP (ref 0–0.9)
MONOCYTES NFR BLD AUTO: 12.3 % — SIGNIFICANT CHANGE UP (ref 2–14)
NEUTROPHILS # BLD AUTO: 3.94 K/UL — SIGNIFICANT CHANGE UP (ref 1.8–7.4)
NEUTROPHILS NFR BLD AUTO: 61.6 % — SIGNIFICANT CHANGE UP (ref 43–77)
NRBC # BLD: 0 /100 WBCS — SIGNIFICANT CHANGE UP (ref 0–0)
PHOSPHATE SERPL-MCNC: 2.1 MG/DL — LOW (ref 2.5–4.5)
PLATELET # BLD AUTO: 258 K/UL — SIGNIFICANT CHANGE UP (ref 150–400)
POTASSIUM SERPL-MCNC: 3.3 MMOL/L — LOW (ref 3.5–5.3)
POTASSIUM SERPL-SCNC: 3.3 MMOL/L — LOW (ref 3.5–5.3)
RBC # BLD: 2.68 M/UL — LOW (ref 4.2–5.8)
RBC # FLD: 14 % — SIGNIFICANT CHANGE UP (ref 10.3–14.5)
SODIUM SERPL-SCNC: 135 MMOL/L — SIGNIFICANT CHANGE UP (ref 135–145)
WBC # BLD: 6.4 K/UL — SIGNIFICANT CHANGE UP (ref 3.8–10.5)
WBC # FLD AUTO: 6.4 K/UL — SIGNIFICANT CHANGE UP (ref 3.8–10.5)

## 2022-09-15 PROCEDURE — 99233 SBSQ HOSP IP/OBS HIGH 50: CPT

## 2022-09-15 PROCEDURE — 99024 POSTOP FOLLOW-UP VISIT: CPT

## 2022-09-15 PROCEDURE — 99223 1ST HOSP IP/OBS HIGH 75: CPT | Mod: GC

## 2022-09-15 PROCEDURE — 99232 SBSQ HOSP IP/OBS MODERATE 35: CPT

## 2022-09-15 RX ORDER — POTASSIUM CHLORIDE 20 MEQ
40 PACKET (EA) ORAL EVERY 4 HOURS
Refills: 0 | Status: COMPLETED | OUTPATIENT
Start: 2022-09-15 | End: 2022-09-15

## 2022-09-15 RX ORDER — SODIUM,POTASSIUM PHOSPHATES 278-250MG
1 POWDER IN PACKET (EA) ORAL ONCE
Refills: 0 | Status: COMPLETED | OUTPATIENT
Start: 2022-09-15 | End: 2022-09-15

## 2022-09-15 RX ORDER — APIXABAN 2.5 MG/1
5 TABLET, FILM COATED ORAL EVERY 12 HOURS
Refills: 0 | Status: DISCONTINUED | OUTPATIENT
Start: 2022-09-15 | End: 2022-09-20

## 2022-09-15 RX ORDER — POTASSIUM CHLORIDE 20 MEQ
40 PACKET (EA) ORAL EVERY 4 HOURS
Refills: 0 | Status: DISCONTINUED | OUTPATIENT
Start: 2022-09-15 | End: 2022-09-15

## 2022-09-15 RX ADMIN — OXYCODONE HYDROCHLORIDE 15 MILLIGRAM(S): 5 TABLET ORAL at 09:54

## 2022-09-15 RX ADMIN — FINASTERIDE 5 MILLIGRAM(S): 5 TABLET, FILM COATED ORAL at 13:48

## 2022-09-15 RX ADMIN — OXYCODONE HYDROCHLORIDE 20 MILLIGRAM(S): 5 TABLET ORAL at 02:45

## 2022-09-15 RX ADMIN — SIMVASTATIN 5 MILLIGRAM(S): 20 TABLET, FILM COATED ORAL at 22:20

## 2022-09-15 RX ADMIN — Medication 1 MILLIGRAM(S): at 00:08

## 2022-09-15 RX ADMIN — PANTOPRAZOLE SODIUM 40 MILLIGRAM(S): 20 TABLET, DELAYED RELEASE ORAL at 07:24

## 2022-09-15 RX ADMIN — OXYCODONE HYDROCHLORIDE 15 MILLIGRAM(S): 5 TABLET ORAL at 17:50

## 2022-09-15 RX ADMIN — Medication 1 PACKET(S): at 09:40

## 2022-09-15 RX ADMIN — Medication 60 MILLIGRAM(S): at 07:25

## 2022-09-15 RX ADMIN — OXYCODONE HYDROCHLORIDE 15 MILLIGRAM(S): 5 TABLET ORAL at 09:14

## 2022-09-15 RX ADMIN — NORTRIPTYLINE HYDROCHLORIDE 75 MILLIGRAM(S): 10 CAPSULE ORAL at 22:20

## 2022-09-15 RX ADMIN — Medication 40 MILLIEQUIVALENT(S): at 14:00

## 2022-09-15 RX ADMIN — Medication 100 MILLIGRAM(S): at 07:24

## 2022-09-15 RX ADMIN — Medication 1 MILLIGRAM(S): at 12:44

## 2022-09-15 RX ADMIN — Medication 40 MILLIEQUIVALENT(S): at 09:39

## 2022-09-15 RX ADMIN — Medication 40 MILLIEQUIVALENT(S): at 18:36

## 2022-09-15 RX ADMIN — APIXABAN 5 MILLIGRAM(S): 2.5 TABLET, FILM COATED ORAL at 22:20

## 2022-09-15 RX ADMIN — OXYCODONE HYDROCHLORIDE 20 MILLIGRAM(S): 5 TABLET ORAL at 22:20

## 2022-09-15 RX ADMIN — OXYCODONE HYDROCHLORIDE 20 MILLIGRAM(S): 5 TABLET ORAL at 13:44

## 2022-09-15 RX ADMIN — OXYCODONE HYDROCHLORIDE 15 MILLIGRAM(S): 5 TABLET ORAL at 18:50

## 2022-09-15 RX ADMIN — OXYCODONE HYDROCHLORIDE 20 MILLIGRAM(S): 5 TABLET ORAL at 02:13

## 2022-09-15 RX ADMIN — OXYCODONE HYDROCHLORIDE 20 MILLIGRAM(S): 5 TABLET ORAL at 23:20

## 2022-09-15 RX ADMIN — OXYCODONE HYDROCHLORIDE 20 MILLIGRAM(S): 5 TABLET ORAL at 12:44

## 2022-09-15 NOTE — DIETITIAN INITIAL EVALUATION ADULT - NS FNS DIET ORDER
Diet, Soft and Bite Sized:   Supplement Feeding Modality:  Oral  Ensure Enlive Cans or Servings Per Day:  1       Frequency:  Daily (09-15-22 @ 17:30)

## 2022-09-15 NOTE — CONSULT NOTE ADULT - CONSULT REASON
Intra-abdominal collections on CT s/p laparoscopic cholecystectomy and removal of intrathecal pain pump 8/30, established patient of Dr. Doyle
preop
Evaluation of cough and secretions

## 2022-09-15 NOTE — DIETITIAN INITIAL EVALUATION ADULT - PERTINENT MEDS FT
MEDICATIONS  (STANDING):  apixaban 5 milliGRAM(s) Oral every 12 hours  doxazosin 4 milliGRAM(s) Oral at bedtime  finasteride 5 milliGRAM(s) Oral daily  folic acid 1 milliGRAM(s) Oral daily  influenza  Vaccine (HIGH DOSE) 0.7 milliLiter(s) IntraMuscular once  losartan 25 milliGRAM(s) Oral daily  metoprolol succinate  milliGRAM(s) Oral daily  NIFEdipine XL 60 milliGRAM(s) Oral daily  nortriptyline 75 milliGRAM(s) Oral at bedtime  oxyCODONE  ER Tablet 20 milliGRAM(s) Oral every 8 hours  pantoprazole    Tablet 40 milliGRAM(s) Oral before breakfast  simvastatin 5 milliGRAM(s) Oral at bedtime    MEDICATIONS  (PRN):  ALBUTerol    0.083%. 2.5 milliGRAM(s) Nebulizer once PRN Shortness of Breath and/or Wheezing  ALPRAZolam 1 milliGRAM(s) Oral at bedtime PRN insomnia  oxyCODONE    IR 15 milliGRAM(s) Oral every 6 hours PRN Moderate Pain (4 - 6)

## 2022-09-15 NOTE — DIETITIAN INITIAL EVALUATION ADULT - OTHER INFO
72M with complex PMH including colon mass s/p partial colectomy (2017),  HTN, HLD,pAF (on Eliquis), ?blood clotting disorder s/p IVC filter placement, PPM (2004), herniated disc and related surgery in [id number] complicated by spinal fusion and recent history of newly reduced EF w/concern for stress induced CM with full recovery, S/P removal of intrathecal pump.      Pt seen in room for nutrition assessment. Pt reports difficulty chewing for > 6 months d/t getting new dentures that fit incorrectly. Per pt, he only got bottom dentures and needs top. Pt reports mainly taking soft foods, wife has been encouraging him to do protein shakes. Pt reports wt loss however unsure of amount. Per EMR wts have been stable at ~200lbs. Mild muscle/fat loss observed. Per ASPEN guidelines, pt meets criteria for moderate protein-calorie malnutrition 2/2 likely meeting <75% EER for >3 months, NFPE findings. RD provided education on importance of optimal PO intake, high kcal/protein soft food options. Pt expressed understanding. Please see full nutrition recommendations below. Will continue to follow per RD protocol.

## 2022-09-15 NOTE — CONSULT NOTE ADULT - ATTENDING COMMENTS
Emphysema with nicotine dependence without pulmonary symptoms. Labs, radiology and surgery notes were reviewed. Rest as above.

## 2022-09-15 NOTE — DIETITIAN INITIAL EVALUATION ADULT - PERTINENT LABORATORY DATA
09-15    135  |  100  |  9   ----------------------------<  94  3.3<L>   |  27  |  0.89    Ca    8.2<L>      15 Sep 2022 06:46  Phos  2.1     09-15  Mg     1.6     09-15    A1C with Estimated Average Glucose Result: 5.2 % (08-31-22 @ 05:40)

## 2022-09-15 NOTE — CONSULT NOTE ADULT - ASSESSMENT
73 y/o male w/ hx of CAD s/p PCI, stress induced CM vs ischemic CM, Afib, HTN, emphysema who presented to [facility] for elective removal of intrathecal pump. His operative course was complicated by intraoperative bronchospasm and secretions necessitating bronchoscopy, he is currently at his baseline breathing.     Emphysema   - Patient with mild cough, extensive smoking and history of emphysema. We have reviewed previous imaging of the thorax which is significant for extensive paraseptal and centrilobular emphysema. Suspect that the patient likely has a degree of airways obstruction and COPD. Clinically however, the patient has limited symptoms related to his pulmonary disease in his day to day life. There is no evidence of COPD exacerbation currently on exam and it is unclear what may have transpired in the OR - suspect transient bronchospasm during intubation from manipulation of the airways. His baseline mMRC is 0-1 and his CAT score is < 10. He has had no exacerbations. In accordance with GOLD guidelines, patient should not be treated with any long acting bronchodilators.     Plan   - PRN albuterol, no indication for long acting bronchodilators at this time   - Would recommend vaccination with PSV 23 + influenza if able   - Patient will need to be enrolled in low dose cancer screening outpatient at discharge   - Please arrange for patient to f/u with Adam Barba at discharge to establish care and obtain baseline PFTs.     Pulmonary service will sign off, please reconsult with questions

## 2022-09-15 NOTE — PROGRESS NOTE ADULT - SUBJECTIVE AND OBJECTIVE BOX
HPI    ROS: A 10-point review of systems was otherwise negative.    PAST MEDICAL & SURGICAL HISTORY:  AAA (abdominal aortic aneurysm)      HTN (hypertension)      Cardiomyopathy      Hyperlipidemia      ONOFRE (dyspnea on exertion)      DVT (deep venous thrombosis)      Pulmonary emphysema  COPD      Cardiac arrhythmia      Aneurysm of aortic root      Depression  anxiety      Anemia      Pelvic mass      Pacemaker  medtronic      History of kidney surgery      History of back surgery  multiple, lumbar      Surgery, elective  multiple neck surgery, cervical      S/P hip replacement, left      S/P arthroscopy of right knee      S/P AAA (abdominal aortic aneurysm) repair  with right iliac aneurysm endovascular repair      S/P carpal tunnel release      Surgery, elective  Cardiac Stent x4      Surgery, elective  Intrathecal pump placement          SOCIAL HISTORY:  FAMILY HISTORY:      ALLERGIES: 	  Altace (Unknown)  penicillin (Unknown)            MEDICATIONS:  ALBUTerol    0.083%. 2.5 milliGRAM(s) Nebulizer once PRN  ALPRAZolam 1 milliGRAM(s) Oral at bedtime PRN  doxazosin 4 milliGRAM(s) Oral at bedtime  finasteride 5 milliGRAM(s) Oral daily  folic acid 1 milliGRAM(s) Oral daily  influenza  Vaccine (HIGH DOSE) 0.7 milliLiter(s) IntraMuscular once  losartan 25 milliGRAM(s) Oral daily  metoprolol succinate  milliGRAM(s) Oral daily  NIFEdipine XL 60 milliGRAM(s) Oral daily  nortriptyline 75 milliGRAM(s) Oral at bedtime  oxyCODONE    IR 15 milliGRAM(s) Oral every 6 hours PRN  oxyCODONE  ER Tablet 20 milliGRAM(s) Oral every 8 hours  pantoprazole    Tablet 40 milliGRAM(s) Oral before breakfast  potassium chloride    Tablet ER 40 milliEquivalent(s) Oral every 4 hours  potassium phosphate / sodium phosphate Powder (PHOS-NaK) 1 Packet(s) Oral once  simvastatin 5 milliGRAM(s) Oral at bedtime      PHYSICAL EXAM:  T(C): 36.4 (09-15-22 @ 06:45), Max: 36.7 (09-14-22 @ 21:10)  HR: 66 (09-15-22 @ 08:44) (66 - 90)  BP: 107/59 (09-15-22 @ 08:44) (97/56 - 177/84)  RR: 18 (09-15-22 @ 08:44) (16 - 18)  SpO2: 95% (09-15-22 @ 08:44) (94% - 98%)  Wt(kg): --    GEN: Awake, comfortable. NAD.   HEENT: NCAT, PERRL, EOMI. Mucosa moist. No JVD.   RESP: CTA b/l  CV: RRR, normal s1/s2. No m/r/g.  ABD: Soft, NTND. BS+  EXT: Warm. No edema, clubbing, or cyanosis.   NEURO: AAOx3. No focal deficits.    I&O's Summary    14 Sep 2022 07:01  -  15 Sep 2022 07:00  --------------------------------------------------------  IN: 1590 mL / OUT: 3065 mL / NET: -1475 mL        	  LABS:	 	    CARDIAC MARKERS:                                  7.6    6.40  )-----------( 258      ( 15 Sep 2022 06:46 )             23.3     09-15    135  |  100  |  9   ----------------------------<  94  3.3<L>   |  27  |  0.89    Ca    8.2<L>      15 Sep 2022 06:46  Phos  2.1     09-15  Mg     1.6     09-15      proBNP:   Lipid Profile:   HgA1c:   TSH:     TELEMETRY: 	    ECG:  	  RADIOLOGY:   ECHO:  STRESS:  CATH:

## 2022-09-15 NOTE — CONSULT NOTE ADULT - SUBJECTIVE AND OBJECTIVE BOX
PULMONARY SERVICE INITIAL CONSULT NOTE    HPI:  This is a 72-year-old male with history of HTN, HLD, Afib on Eliquis. Patient underwent cholecystectomy with general surgery and ventral removal of intrathecal pain pump 8/30/22 Over the patient week patient has noticed increased swelling over the LLQ. Denies pain, fevers, abdominal pain, signs of infection, recent trauma/fall. Patient admitted to neurosurgery pre op for removal of the posterior portion of pain pump. During the procedure the patient reportedly had issues with secretions and possible intraoperative bronchospasm while attempting to prone patient. He reportedly underwent intraoperative bronchoscopy due to secretions but unclear from documentation. The bronchospasm improved with bronchodilators and the procedure was performed without further incident. His recovery thusfar has been uneventful from a pulmonary perspective. Per the patient, his breathing is currently at baseline. He does not experience dyspnea, productive cough, chest tightness or pressure at home. He has no pulmonary complaints. His physical activity is limited primarily due to MSK issues which he has been undergoing treatment for. He has never used an inhaler or seen a pulmonologist. There have never been any hospitalizations related to his breathing. No history of significant childhood pulmonary diseases. He has an extensive smoking history of > 50 pack years. He has been employed as a  for the [employer] but had to retire due to back pain.       REVIEW OF SYSTEMS:  All additional ROS negative.    PAST MEDICAL & SURGICAL HISTORY:  AAA (abdominal aortic aneurysm)  HTN (hypertension)  Cardiomyopathy  Hyperlipidemia  ONOFRE (dyspnea on exertion)  DVT (deep venous thrombosis)  Pulmonary emphysema  COPD  Cardiac arrhythmia  Aneurysm of aortic root  Depression  anxiety  Anemia  Pelvic mass  Pacemaker  medtronic  History of kidney surgery  History of back surgery  multiple, lumbar  Surgery, elective  multiple neck surgery, cervical  S/P hip replacement, left  S/P arthroscopy of right knee  S/P AAA (abdominal aortic aneurysm) repair  with right iliac aneurysm endovascular repair  S/P carpal tunnel release  Surgery, elective  Cardiac Stent x4  Surgery, elective  Intrathecal pump placement          FAMILY HISTORY:  No pertinent family history of COPD    SOCIAL HISTORY:  Smoking Status: [ ] Current, [x] Former, [ ] Never  Pack Years: 50    MEDICATIONS:  Pulmonary:  ALBUTerol    0.083%. 2.5 milliGRAM(s) Nebulizer once PRN    Antimicrobials:    Anticoagulants:    Onc:    GI/:  pantoprazole    Tablet 40 milliGRAM(s) Oral before breakfast    Endocrine:  finasteride 5 milliGRAM(s) Oral daily  simvastatin 5 milliGRAM(s) Oral at bedtime    Cardiac:  doxazosin 4 milliGRAM(s) Oral at bedtime  losartan 25 milliGRAM(s) Oral daily  metoprolol succinate  milliGRAM(s) Oral daily  NIFEdipine XL 60 milliGRAM(s) Oral daily    Other Medications:  ALPRAZolam 1 milliGRAM(s) Oral at bedtime PRN  folic acid 1 milliGRAM(s) Oral daily  influenza  Vaccine (HIGH DOSE) 0.7 milliLiter(s) IntraMuscular once  nortriptyline 75 milliGRAM(s) Oral at bedtime  oxyCODONE    IR 15 milliGRAM(s) Oral every 6 hours PRN  oxyCODONE  ER Tablet 20 milliGRAM(s) Oral every 8 hours      Allergies    Altace (Unknown)  penicillin (Unknown)    Intolerances        Vital Signs Last 24 Hrs  T(C): 36.4 (15 Sep 2022 06:45), Max: 36.7 (14 Sep 2022 21:10)  T(F): 97.5 (15 Sep 2022 06:45), Max: 98 (14 Sep 2022 21:10)  HR: 83 (15 Sep 2022 07:28) (64 - 90)  BP: 132/71 (15 Sep 2022 07:28) (97/56 - 177/84)  BP(mean): --  RR: 16 (15 Sep 2022 06:49) (16 - 18)  SpO2: 98% (15 Sep 2022 06:49) (94% - 99%)    Parameters below as of 15 Sep 2022 06:45  Patient On (Oxygen Delivery Method): room air        09-14 @ 07:01  -  09-15 @ 07:00  --------------------------------------------------------  IN: 1590 mL / OUT: 3065 mL / NET: -1475 mL    PHYSICAL EXAM:  Constitutional: WDWN  Head: NC/AT  EENT: PERRL, anicteric sclera; oropharynx clear, MMM  Neck: supple, no appreciable JVD  Respiratory: CTA B/L; no W/R/R  Cardiovascular: +S1/S2, RRR  Gastrointestinal: soft, NT/ND  Extremities: WWP; no edema, clubbing or cyanosis  Vascular: 2+ radial pulses B/L  Neurological: awake and alert; PIMENTEL    LABS:      CBC Full  -  ( 15 Sep 2022 06:46 )  WBC Count : 6.40 K/uL  RBC Count : 2.68 M/uL  Hemoglobin : 7.6 g/dL  Hematocrit : 23.3 %  Platelet Count - Automated : 258 K/uL  Mean Cell Volume : 86.9 fl  Mean Cell Hemoglobin : 28.4 pg  Mean Cell Hemoglobin Concentration : 32.6 gm/dL  Auto Neutrophil # : 3.94 K/uL  Auto Lymphocyte # : 1.45 K/uL  Auto Monocyte # : 0.79 K/uL  Auto Eosinophil # : 0.15 K/uL  Auto Basophil # : 0.03 K/uL  Auto Neutrophil % : 61.6 %  Auto Lymphocyte % : 22.7 %  Auto Monocyte % : 12.3 %  Auto Eosinophil % : 2.3 %  Auto Basophil % : 0.5 %    09-15    135  |  100  |  9   ----------------------------<  94  3.3<L>   |  27  |  0.89    Ca    8.2<L>      15 Sep 2022 06:46  Phos  2.1     09-15  Mg     1.6     09-15                        RADIOLOGY & ADDITIONAL STUDIES:

## 2022-09-15 NOTE — DIETITIAN NUTRITION RISK NOTIFICATION - MUSCLE MASS (LOSS OF MUSCLE)
-- Message is from the [facility]  Reason for Call: Regarding setting up/scheduling an appointment for ob/gyne.  Patient has a referral. Please give her a call to assist.       Caller Information       Type Contact Phone    06/03/2019 03:23 PM Phone (Incoming) Caitlin Bryant (Self) [phone] (H)          Alternative phone number: none    Turnaround time given to caller:   \"This message will be sent to [state Provider's name]. The clinical team will fulfill your request as soon as they review your message.\"     Temples.../Shoulders...

## 2022-09-15 NOTE — PROGRESS NOTE ADULT - SUBJECTIVE AND OBJECTIVE BOX
HPI:  This is a 72-year-old female with history of HTN, HLD, Afib on Eliquis. Patient underwent cholecystectomy with general surgery and ventral removal of intrathecal pain pump 8/30/22 Over the patient week patient has noticed increased swelling over the LLQ. Denies pain, fevers, abdominal pain, signs of infection, recent trauma/fall. Patient admitted to neurosurgery pre op for removal of the posterior portion of pain pump.  (12 Sep 2022 17:09)    Hospital course:   9/13: POD#1 s/p removal of proximal intrathecal catheter with oversew of catheter tract.  Small piece of catheter from lumbar subcutaneous area to abdomen still left in place.  9/14: POD2, restarted entresto, multiple episodes of loose stool, attributed to entresto, dc'd and losartan started instead, multiple changes made to HTN regimen per cards recs  9/15: POD3, GERMAINE     OVERNIGHT EVENTS: no acute events   Vital Signs Last 24 Hrs  T(C): 36.5 (15 Sep 2022 00:06), Max: 36.7 (14 Sep 2022 21:10)  T(F): 97.7 (15 Sep 2022 00:06), Max: 98 (14 Sep 2022 21:10)  HR: 90 (15 Sep 2022 00:06) (62 - 90)  BP: 143/71 (15 Sep 2022 00:06) (143/71 - 181/91)  BP(mean): --  RR: 18 (15 Sep 2022 00:06) (16 - 18)  SpO2: 94% (15 Sep 2022 00:06) (94% - 99%)    Parameters below as of 15 Sep 2022 00:06  Patient On (Oxygen Delivery Method): room air        I&O's Summary    13 Sep 2022 07:01  -  14 Sep 2022 07:00  --------------------------------------------------------  IN: 720 mL / OUT: 600 mL / NET: 120 mL    14 Sep 2022 07:01  -  15 Sep 2022 03:42  --------------------------------------------------------  IN: 1590 mL / OUT: 2165 mL / NET: -575 mL        PHYSICAL EXAM:  General: patient seen laying supine in bed in NAD  Neuro: AAOx3, FC, OE spontaneously, speech clear and fluent, CNII-XI grossly intact, face symmetric, no pronator drift, strength 5/5 b/l UE and LE, sensation grossly intact to light touch throughout  HEENT: PERRL, EOMI  Neck: supple  Cardiac: RRR, S1S2  Pulmonary: chest rise symmetric  Abdomen: soft, nontender, nondistended, no leakage seen from prior laporotomy incision   Ext: perfusing well  Skin: warm, dry  Wound: L posterior dressing in place c/d/i      LABS:                        7.3    7.20  )-----------( 242      ( 14 Sep 2022 05:30 )             23.1     09-14    133<L>  |  103  |  12  ----------------------------<  98  3.9   |  25  |  0.98    Ca    8.2<L>      14 Sep 2022 05:30      CAPILLARY BLOOD GLUCOSE: POCT Blood Glucose.: 145 mg/dL (14 Sep 2022 14:07)    Allergies    Altace (Unknown)  penicillin (Unknown)    MEDICATIONS:  Antibiotics:    Neuro:  ALPRAZolam 1 milliGRAM(s) Oral at bedtime PRN  nortriptyline 75 milliGRAM(s) Oral at bedtime  oxyCODONE    IR 15 milliGRAM(s) Oral every 6 hours PRN  oxyCODONE  ER Tablet 20 milliGRAM(s) Oral every 8 hours    Anticoagulation:    OTHER:  ALBUTerol    0.083%. 2.5 milliGRAM(s) Nebulizer once PRN  doxazosin 4 milliGRAM(s) Oral at bedtime  finasteride 5 milliGRAM(s) Oral daily  influenza  Vaccine (HIGH DOSE) 0.7 milliLiter(s) IntraMuscular once  losartan 25 milliGRAM(s) Oral daily  metoprolol succinate  milliGRAM(s) Oral daily  NIFEdipine XL 60 milliGRAM(s) Oral daily  pantoprazole    Tablet 40 milliGRAM(s) Oral before breakfast  simvastatin 5 milliGRAM(s) Oral at bedtime    IVF:  folic acid 1 milliGRAM(s) Oral daily    CULTURES:  Culture Results:   No growth to date (09-12 @ 17:58)  Culture Results:   >100,000 CFU/ml Enterococcus faecalis (09-12 @ 14:30)    RADIOLOGY & ADDITIONAL TESTS:      ASSESSMENT:  72y Male s/p    POSTOPERATIVE COMPLICA-TION    Handoff    MEWS Score    AAA (abdominal aortic aneurysm)    HTN (hypertension)    Cardiomyopathy    Hyperlipidemia    ONOFRE (dyspnea on exertion)    DVT (deep venous thrombosis)    Pulmonary emphysema    Cardiac arrhythmia    Aneurysm of aortic root    Depression    Anemia    Pelvic mass    CSF leak    CSF leak    Postoperative complication    Abdominal fluid collection    Cardiomyopathy    HTN (hypertension)    Pulmonary emphysema    Chronic atrial fibrillation    Chronic systolic heart failure    Fusion of spine, unspecified spinal region    Asymptomatic bacteriuria    Removal of intrathecal catheter    Pacemaker    History of kidney surgery    History of back surgery    Surgery, elective    S/P hip replacement, left    S/P arthroscopy of right knee    S/P AAA (abdominal aortic aneurysm) repair    S/P carpal tunnel release    Surgery, elective    Surgery, elective    ABDOMINAL PAIN    5    HTN (hypertension)    Abdominal fluid collection    Cardiomyopathy    Pulmonary emphysema    Chronic atrial fibrillation    [email or web address]        PLAN:  NEURO:    CARDIOVASCULAR:    PULMONARY:    RENAL:    GI:    HEME:    ID:    ENDO:    DVT PROPHYLAXIS:  [] Venodynes                                [] Heparin/Lovenox    DISPOSITION:    Assessment:  Present when checked    []  GCS  E   V  M     Heart Failure: []Acute, [] acute on chronic , []chronic  Heart Failure:  [] Diastolic (HFpEF), [] Systolic (HFrEF), []Combined (HFpEF and HFrEF), [] RHF, [] Pulm HTN, [] Other    [] CLAUDIO, [] ATN, [] AIN, [] other  [] CKD1, [] CKD2, [] CKD 3, [] CKD 4, [] CKD 5, []ESRD    Encephalopathy: [] Metabolic, [] Hepatic, [] toxic, [] Neurological, [] Other    Abnormal Nurtitional Status: [] malnurtition (see nutrition note), [ ]underweight: BMI < 19, [] morbid obesity: BMI >40, [] Cachexia    [] Sepsis  [] hypovolemic shock,[] cardiogenic shock, [] hemorrhagic shock, [] neuogenic shock  [] Acute Respiratory Failure  []Cerebral edema, [] Brain compression/ herniation,   [] Functional quadriplegia  [] Acute blood loss anemia   HPI:  This is a 72-year-old female with history of HTN, HLD, Afib on Eliquis. Patient underwent cholecystectomy with general surgery and ventral removal of intrathecal pain pump 8/30/22 Over the patient week patient has noticed increased swelling over the LLQ. Denies pain, fevers, abdominal pain, signs of infection, recent trauma/fall. Patient admitted to neurosurgery pre op for removal of the posterior portion of pain pump.  (12 Sep 2022 17:09)    Hospital course:   9/13: POD#1 s/p removal of proximal intrathecal catheter with oversew of catheter tract.  Small piece of catheter from lumbar subcutaneous area to abdomen still left in place.  9/14: POD2, restarted entresto, multiple episodes of loose stool, attributed to entresto, dc'd and losartan started instead, multiple changes made to HTN regimen per cards recs  9/15: POD3, GERMAINE     OVERNIGHT EVENTS: no acute events   Vital Signs Last 24 Hrs  T(C): 36.5 (15 Sep 2022 00:06), Max: 36.7 (14 Sep 2022 21:10)  T(F): 97.7 (15 Sep 2022 00:06), Max: 98 (14 Sep 2022 21:10)  HR: 90 (15 Sep 2022 00:06) (62 - 90)  BP: 143/71 (15 Sep 2022 00:06) (143/71 - 181/91)  BP(mean): --  RR: 18 (15 Sep 2022 00:06) (16 - 18)  SpO2: 94% (15 Sep 2022 00:06) (94% - 99%)    Parameters below as of 15 Sep 2022 00:06  Patient On (Oxygen Delivery Method): room air    I&O's Summary    13 Sep 2022 07:01  -  14 Sep 2022 07:00  --------------------------------------------------------  IN: 720 mL / OUT: 600 mL / NET: 120 mL    14 Sep 2022 07:01  -  15 Sep 2022 03:42  --------------------------------------------------------  IN: 1590 mL / OUT: 2165 mL / NET: -575 mL        PHYSICAL EXAM:  General: patient seen laying supine in bed in NAD  Neuro: AAOx3, FC, OE spontaneously, speech clear and fluent, CNII-XI grossly intact, face symmetric, no pronator drift, strength 5/5 b/l UE and LE, sensation grossly intact to light touch throughout  HEENT: PERRL, EOMI  Neck: supple  Cardiac: RRR, S1S2  Pulmonary: chest rise symmetric  Abdomen: soft, nontender, nondistended, no leakage seen from prior laporotomy incision   Ext: perfusing well  Skin: warm, dry  Wound: L posterior dressing in place c/d/i      LABS:                        7.3    7.20  )-----------( 242      ( 14 Sep 2022 05:30 )             23.1     09-14    133<L>  |  103  |  12  ----------------------------<  98  3.9   |  25  |  0.98    Ca    8.2<L>      14 Sep 2022 05:30      CAPILLARY BLOOD GLUCOSE: POCT Blood Glucose.: 145 mg/dL (14 Sep 2022 14:07)    Allergies    Altace (Unknown)  penicillin (Unknown)    MEDICATIONS:  Antibiotics:    Neuro:  ALPRAZolam 1 milliGRAM(s) Oral at bedtime PRN  nortriptyline 75 milliGRAM(s) Oral at bedtime  oxyCODONE    IR 15 milliGRAM(s) Oral every 6 hours PRN  oxyCODONE  ER Tablet 20 milliGRAM(s) Oral every 8 hours    Anticoagulation:    OTHER:  ALBUTerol    0.083%. 2.5 milliGRAM(s) Nebulizer once PRN  doxazosin 4 milliGRAM(s) Oral at bedtime  finasteride 5 milliGRAM(s) Oral daily  influenza  Vaccine (HIGH DOSE) 0.7 milliLiter(s) IntraMuscular once  losartan 25 milliGRAM(s) Oral daily  metoprolol succinate  milliGRAM(s) Oral daily  NIFEdipine XL 60 milliGRAM(s) Oral daily  pantoprazole    Tablet 40 milliGRAM(s) Oral before breakfast  simvastatin 5 milliGRAM(s) Oral at bedtime    IVF:  folic acid 1 milliGRAM(s) Oral daily    CULTURES:  Culture Results:   No growth to date (09-12 @ 17:58)  Culture Results:   >100,000 CFU/ml Enterococcus faecalis (09-12 @ 14:30)  This is a 72-year-old male with history of HTN, HLD, Afib on Eliquis. PPM in place, who nderwent cholecystectomy with general surgery and ventral removal of intrathecal pain pump 8/30/22 post op c/b LLQ fluctuance, noted to be CSF. Now, s/p removal of proximal intrathecal catheter with oversew of catheter tract (9/13/22)    Plan:  Neuro:   - neuro checks q4  - CT C/L spine complete    Cardio:   - recent admission for Takosubo cardiomyopathy, now resolved EF 50-55%  - Paroxysmal Afib: Toprol 50 BID, PPM in place, reconsult EP, last admission per EP no need to reporgram prior to OR, hold Eliquis   - HTN: recent admission with HTN urgency: osartan 25mg qd, d/c hydralizine, nifedipine 60mg qd, toprol 100mg qd, cardio following   - HLD: continue simvastatin       Pulm:   - RA  - small R pleural effusion on CXR  - incentive spirometry    GI:   - ADAT  - bowel regimen  - Gastritis: cont home protonix     /Renal:  - BPH: continue Cardura and Proscar     Heme:  - Dvt ppx: SCDs, hold chemoppx for OR  - holding home Eliquis  - IVCF in place     ID:  - No issues, no organisms seen on CSF studies, Afebrile  - Asymptomatic bacteuria: no treatment unless symptomatic     Endo:   - no issues, A1C 5.2    Dispo: Telemetry, family updated  Plan d/w Dr. D'Amico       [] Sepsis  [] hypovolemic shock,[] cardiogenic shock, [] hemorrhagic shock, [] neuogenic shock  [] Acute Respiratory Failure  []Cerebral edema, [] Brain compression/ herniation,   [] Functional quadriplegia  [] Acute blood loss anemia

## 2022-09-15 NOTE — PROGRESS NOTE ADULT - PROBLEM SELECTOR PLAN 6
Holding eliquis given surgery  Toprol Rate control RESTARTED eliquis  continue toprol XL for rate control

## 2022-09-15 NOTE — DIETITIAN INITIAL EVALUATION ADULT - ADD RECOMMEND
1. Continue soft and bite sized diet + Ensure Enlive QD (350kcal/20gpro)   2. Encourage PO intake, monitor tolerance to PO  3. Honor food preferences  4. RD to remain available prn

## 2022-09-16 LAB
ANION GAP SERPL CALC-SCNC: 5 MMOL/L — SIGNIFICANT CHANGE UP (ref 5–17)
BASOPHILS # BLD AUTO: 0.04 K/UL — SIGNIFICANT CHANGE UP (ref 0–0.2)
BASOPHILS NFR BLD AUTO: 0.6 % — SIGNIFICANT CHANGE UP (ref 0–2)
BUN SERPL-MCNC: 10 MG/DL — SIGNIFICANT CHANGE UP (ref 7–23)
CALCIUM SERPL-MCNC: 8.4 MG/DL — SIGNIFICANT CHANGE UP (ref 8.4–10.5)
CHLORIDE SERPL-SCNC: 101 MMOL/L — SIGNIFICANT CHANGE UP (ref 96–108)
CO2 SERPL-SCNC: 27 MMOL/L — SIGNIFICANT CHANGE UP (ref 22–31)
CREAT SERPL-MCNC: 1.13 MG/DL — SIGNIFICANT CHANGE UP (ref 0.5–1.3)
EGFR: 69 ML/MIN/1.73M2 — SIGNIFICANT CHANGE UP
EOSINOPHIL # BLD AUTO: 0.21 K/UL — SIGNIFICANT CHANGE UP (ref 0–0.5)
EOSINOPHIL NFR BLD AUTO: 3.3 % — SIGNIFICANT CHANGE UP (ref 0–6)
GLUCOSE SERPL-MCNC: 97 MG/DL — SIGNIFICANT CHANGE UP (ref 70–99)
HCT VFR BLD CALC: 24.7 % — LOW (ref 39–50)
HGB BLD-MCNC: 7.8 G/DL — LOW (ref 13–17)
IMM GRANULOCYTES NFR BLD AUTO: 0.6 % — SIGNIFICANT CHANGE UP (ref 0–0.9)
LYMPHOCYTES # BLD AUTO: 1.58 K/UL — SIGNIFICANT CHANGE UP (ref 1–3.3)
LYMPHOCYTES # BLD AUTO: 25.2 % — SIGNIFICANT CHANGE UP (ref 13–44)
MAGNESIUM SERPL-MCNC: 1.6 MG/DL — SIGNIFICANT CHANGE UP (ref 1.6–2.6)
MCHC RBC-ENTMCNC: 27.9 PG — SIGNIFICANT CHANGE UP (ref 27–34)
MCHC RBC-ENTMCNC: 31.6 GM/DL — LOW (ref 32–36)
MCV RBC AUTO: 88.2 FL — SIGNIFICANT CHANGE UP (ref 80–100)
MONOCYTES # BLD AUTO: 0.76 K/UL — SIGNIFICANT CHANGE UP (ref 0–0.9)
MONOCYTES NFR BLD AUTO: 12.1 % — SIGNIFICANT CHANGE UP (ref 2–14)
NEUTROPHILS # BLD AUTO: 3.64 K/UL — SIGNIFICANT CHANGE UP (ref 1.8–7.4)
NEUTROPHILS NFR BLD AUTO: 58.2 % — SIGNIFICANT CHANGE UP (ref 43–77)
NRBC # BLD: 0 /100 WBCS — SIGNIFICANT CHANGE UP (ref 0–0)
PHOSPHATE SERPL-MCNC: 2.2 MG/DL — LOW (ref 2.5–4.5)
PLATELET # BLD AUTO: 259 K/UL — SIGNIFICANT CHANGE UP (ref 150–400)
POTASSIUM SERPL-MCNC: 5 MMOL/L — SIGNIFICANT CHANGE UP (ref 3.5–5.3)
POTASSIUM SERPL-SCNC: 5 MMOL/L — SIGNIFICANT CHANGE UP (ref 3.5–5.3)
RBC # BLD: 2.8 M/UL — LOW (ref 4.2–5.8)
RBC # FLD: 14.2 % — SIGNIFICANT CHANGE UP (ref 10.3–14.5)
SARS-COV-2 RNA SPEC QL NAA+PROBE: SIGNIFICANT CHANGE UP
SODIUM SERPL-SCNC: 133 MMOL/L — LOW (ref 135–145)
WBC # BLD: 6.27 K/UL — SIGNIFICANT CHANGE UP (ref 3.8–10.5)
WBC # FLD AUTO: 6.27 K/UL — SIGNIFICANT CHANGE UP (ref 3.8–10.5)

## 2022-09-16 PROCEDURE — 99024 POSTOP FOLLOW-UP VISIT: CPT

## 2022-09-16 PROCEDURE — 99233 SBSQ HOSP IP/OBS HIGH 50: CPT

## 2022-09-16 RX ORDER — POTASSIUM PHOSPHATE, MONOBASIC POTASSIUM PHOSPHATE, DIBASIC 236; 224 MG/ML; MG/ML
30 INJECTION, SOLUTION INTRAVENOUS ONCE
Refills: 0 | Status: COMPLETED | OUTPATIENT
Start: 2022-09-16 | End: 2022-09-16

## 2022-09-16 RX ORDER — POTASSIUM CHLORIDE 20 MEQ
40 PACKET (EA) ORAL ONCE
Refills: 0 | Status: COMPLETED | OUTPATIENT
Start: 2022-09-16 | End: 2022-09-16

## 2022-09-16 RX ADMIN — POTASSIUM PHOSPHATE, MONOBASIC POTASSIUM PHOSPHATE, DIBASIC 83.33 MILLIMOLE(S): 236; 224 INJECTION, SOLUTION INTRAVENOUS at 13:18

## 2022-09-16 RX ADMIN — OXYCODONE HYDROCHLORIDE 15 MILLIGRAM(S): 5 TABLET ORAL at 19:38

## 2022-09-16 RX ADMIN — Medication 100 MILLIGRAM(S): at 14:08

## 2022-09-16 RX ADMIN — Medication 40 MILLIEQUIVALENT(S): at 13:17

## 2022-09-16 RX ADMIN — OXYCODONE HYDROCHLORIDE 20 MILLIGRAM(S): 5 TABLET ORAL at 06:00

## 2022-09-16 RX ADMIN — LOSARTAN POTASSIUM 25 MILLIGRAM(S): 100 TABLET, FILM COATED ORAL at 06:00

## 2022-09-16 RX ADMIN — Medication 1 MILLIGRAM(S): at 23:43

## 2022-09-16 RX ADMIN — OXYCODONE HYDROCHLORIDE 20 MILLIGRAM(S): 5 TABLET ORAL at 23:00

## 2022-09-16 RX ADMIN — NORTRIPTYLINE HYDROCHLORIDE 75 MILLIGRAM(S): 10 CAPSULE ORAL at 22:25

## 2022-09-16 RX ADMIN — PANTOPRAZOLE SODIUM 40 MILLIGRAM(S): 20 TABLET, DELAYED RELEASE ORAL at 06:00

## 2022-09-16 RX ADMIN — APIXABAN 5 MILLIGRAM(S): 2.5 TABLET, FILM COATED ORAL at 22:02

## 2022-09-16 RX ADMIN — OXYCODONE HYDROCHLORIDE 20 MILLIGRAM(S): 5 TABLET ORAL at 14:00

## 2022-09-16 RX ADMIN — FINASTERIDE 5 MILLIGRAM(S): 5 TABLET, FILM COATED ORAL at 13:20

## 2022-09-16 RX ADMIN — OXYCODONE HYDROCHLORIDE 20 MILLIGRAM(S): 5 TABLET ORAL at 07:00

## 2022-09-16 RX ADMIN — Medication 1 MILLIGRAM(S): at 13:17

## 2022-09-16 RX ADMIN — SIMVASTATIN 5 MILLIGRAM(S): 20 TABLET, FILM COATED ORAL at 22:25

## 2022-09-16 RX ADMIN — OXYCODONE HYDROCHLORIDE 20 MILLIGRAM(S): 5 TABLET ORAL at 22:02

## 2022-09-16 RX ADMIN — Medication 60 MILLIGRAM(S): at 14:08

## 2022-09-16 RX ADMIN — APIXABAN 5 MILLIGRAM(S): 2.5 TABLET, FILM COATED ORAL at 14:00

## 2022-09-16 RX ADMIN — Medication 4 MILLIGRAM(S): at 22:25

## 2022-09-16 RX ADMIN — OXYCODONE HYDROCHLORIDE 20 MILLIGRAM(S): 5 TABLET ORAL at 14:30

## 2022-09-16 NOTE — PROGRESS NOTE ADULT - SUBJECTIVE AND OBJECTIVE BOX
HPI:  This is a 72-year-old female with history of HTN, HLD, Afib on Eliquis. Patient underwent cholecystectomy with general surgery and ventral removal of intrathecal pain pump 8/30/22 Over the patient week patient has noticed increased swelling over the LLQ. Denies pain, fevers, abdominal pain, signs of infection, recent trauma/fall. Patient admitted to neurosurgery pre op for removal of the posterior portion of pain pump.  (12 Sep 2022 17:09)    OVERNIGHT EVENTS: Hypotensive, held cardura, pain medication held for one hour    Hospital Course:   9/13: POD#1 s/p removal of proximal intrathecal catheter with oversew of catheter tract.  Small piece of catheter from lumbar subcutaneous area to abdomen still left in place.  9/14: POD2, restarted entresto, multiple episodes of loose stool, attributed to entresto, dc'd and losartan started instead, multiple changes made to HTN regimen per cards recs  9/15: POD2, GERMAINE.starting eliquis tonight,pt no longer taking entesto per cards recs, bp on low side today 95/65 and 107/66, losartan held @12 noon,   9/16: POD3, GERMAINE, eliquis 5 mg restarted, cardura held for hypotension    Vital Signs Last 24 Hrs  T(C): 36.9 (16 Sep 2022 00:14), Max: 36.9 (16 Sep 2022 00:14)  T(F): 98.4 (16 Sep 2022 00:14), Max: 98.4 (16 Sep 2022 00:14)  HR: 60 (16 Sep 2022 00:14) (60 - 83)  BP: 103/59 (16 Sep 2022 00:14) (94/51 - 132/71)  BP(mean): --  RR: 17 (16 Sep 2022 00:14) (16 - 19)  SpO2: 98% (16 Sep 2022 00:14) (95% - 99%)    Parameters below as of 16 Sep 2022 00:14  Patient On (Oxygen Delivery Method): room air        I&O's Summary    14 Sep 2022 07:01  -  15 Sep 2022 07:00  --------------------------------------------------------  IN: 1590 mL / OUT: 3065 mL / NET: -1475 mL    15 Sep 2022 07:01  -  16 Sep 2022 01:16  --------------------------------------------------------  IN: 0 mL / OUT: 1100 mL / NET: -1100 mL        PHYSICAL EXAM:  General: patient seen laying supine in bed in NAD  Neuro: AAOx3, FC, OE spontaneously, speech clear and fluent, CNII-XI grossly intact, face symmetric, no pronator drift, PIMENTEL strong & symmetric, sensation grossly intact to light touch throughout  HEENT: PERRL, EOMI  Neck: supple  Cardiac: RRR, S1S2  Pulmonary: chest rise symmetric  Abdomen: soft, nontender, nondistended - scars intact  Ext: perfusing well  Skin: warm, dry  Wound: Posterior incision c/d/i with dressing in place    TUBES/LINES:  [] Barber  [] Lumbar Drain  [] Wound Drains  [] Others      DIET:  [] NPO  [x] Mechanical  [] Tube feeds    LABS:                        7.6    6.40  )-----------( 258      ( 15 Sep 2022 06:46 )             23.3     09-15    135  |  100  |  9   ----------------------------<  94  3.3<L>   |  27  |  0.89    Ca    8.2<L>      15 Sep 2022 06:46  Phos  2.1     09-15  Mg     1.6     09-15              CAPILLARY BLOOD GLUCOSE          Drug Levels: [] N/A    CSF Analysis: [] N/A      Allergies    Altace (Unknown)  penicillin (Unknown)    Intolerances      MEDICATIONS:  Antibiotics:    Neuro:  ALPRAZolam 1 milliGRAM(s) Oral at bedtime PRN  nortriptyline 75 milliGRAM(s) Oral at bedtime  oxyCODONE    IR 15 milliGRAM(s) Oral every 6 hours PRN  oxyCODONE  ER Tablet 20 milliGRAM(s) Oral every 8 hours    Anticoagulation:  apixaban 5 milliGRAM(s) Oral every 12 hours    OTHER:  ALBUTerol    0.083%. 2.5 milliGRAM(s) Nebulizer once PRN  doxazosin 4 milliGRAM(s) Oral at bedtime  finasteride 5 milliGRAM(s) Oral daily  influenza  Vaccine (HIGH DOSE) 0.7 milliLiter(s) IntraMuscular once  losartan 25 milliGRAM(s) Oral daily  metoprolol succinate  milliGRAM(s) Oral daily  NIFEdipine XL 60 milliGRAM(s) Oral daily  pantoprazole    Tablet 40 milliGRAM(s) Oral before breakfast  simvastatin 5 milliGRAM(s) Oral at bedtime    IVF:  folic acid 1 milliGRAM(s) Oral daily    CULTURES:  Culture Results:   No growth to date (09-12 @ 17:58)  Culture Results:   >100,000 CFU/ml Enterococcus faecalis (09-12 @ 14:30)    RADIOLOGY & ADDITIONAL TESTS:      ASSESSMENT:  This is a 72-year-old male with history of HTN, HLD, Afib on Eliquis. PPM in place, who nderwent cholecystectomy with general surgery and ventral removal of intrathecal pain pump 8/30/22 post op c/b LLQ fluctuance, noted to be CSF. Now, s/p removal of proximal intrathecal catheter with oversew of catheter tract (9/13/22)      POSTOPERATIVE COMPLICA-TION    Handoff    MEWS Score    AAA (abdominal aortic aneurysm)    HTN (hypertension)    Cardiomyopathy    Hyperlipidemia    ONOFRE (dyspnea on exertion)    DVT (deep venous thrombosis)    Pulmonary emphysema    Cardiac arrhythmia    Aneurysm of aortic root    Depression    Anemia    Pelvic mass    CSF leak    CSF leak    Postoperative complication    Abdominal fluid collection    Cardiomyopathy    HTN (hypertension)    Pulmonary emphysema    Chronic atrial fibrillation    Chronic systolic heart failure    Fusion of spine, unspecified spinal region    Asymptomatic bacteriuria    Removal of intrathecal catheter    Pacemaker    History of kidney surgery    History of back surgery    Surgery, elective    S/P hip replacement, left    S/P arthroscopy of right knee    S/P AAA (abdominal aortic aneurysm) repair    S/P carpal tunnel release    Surgery, elective    Surgery, elective    ABDOMINAL PAIN    5    HTN (hypertension)    Abdominal fluid collection    Cardiomyopathy    Pulmonary emphysema    Chronic atrial fibrillation    SysAdmin_VisitLink        PLAN:  Neuro:   - neuro checks q4  - CT C/L spine complete    Cardio:   - recent admission for Takosubo cardiomyopathy, now resolved EF 50-55%  - Paroxysmal Afib: PPM in place, reconsult EP, last admission per EP no need to reporgram prior to OR, Eliquis resumed 9/15  - HTN: recent admission with HTN urgency: losartan 25mg qd, nifedipine 60mg qd, toprol 100mg qd, cardio following   - HLD: continue simvastatin     Pulm:   - RA  - small R pleural effusion on CXR  - incentive spirometry    GI:   - soft and bite sized diet  - bowel regimen  - Gastritis: cont home protonix     /Renal:  - BPH: continue Cardura and Proscar     Heme:  - Dvt ppx: SCDs, hold chemoppx for OR  - holding home Eliquis  - IVCF in place     ID:  - No issues, no organisms seen on CSF studies, Afebrile  - Asymptomatic bacteuria: no treatment unless symptomatic     Endo:   - no issues, A1C 5.2    Dispo: Telemetry, family updated  Plan d/w Dr. D'Amico           Assessment:  Present when checked    []  GCS  E   V  M     Heart Failure: []Acute, [] acute on chronic , []chronic  Heart Failure:  [] Diastolic (HFpEF), [] Systolic (HFrEF), []Combined (HFpEF and HFrEF), [] RHF, [] Pulm HTN, [] Other    [] CLAUDIO, [] ATN, [] AIN, [] other  [] CKD1, [] CKD2, [] CKD 3, [] CKD 4, [] CKD 5, []ESRD    Encephalopathy: [] Metabolic, [] Hepatic, [] toxic, [] Neurological, [] Other    Abnormal Nurtitional Status: [] malnurtition (see nutrition note), [ ]underweight: BMI < 19, [] morbid obesity: BMI >40, [] Cachexia    [] Sepsis  [] hypovolemic shock,[] cardiogenic shock, [] hemorrhagic shock, [] neuogenic shock  [] Acute Respiratory Failure  []Cerebral edema, [] Brain compression/ herniation,   [] Functional quadriplegia  [] Acute blood loss anemia

## 2022-09-16 NOTE — PROGRESS NOTE ADULT - ATTENDING COMMENTS
Initial attending contact date  9/14/22    . See fellow note written above for details. I reviewed the fellow documentation. I have personally seen and examined this patient. I reviewed vitals, labs, medications, cardiac studies, and additional imaging. I agree with the above fellow's findings and plans as written above with the following additions/statements.    72M with complex PMH including colon mass s/p partial colectomy (2017),  HTN, HLD, pAF (on Eliquis), ?blood clotting disorder s/p IVC filter placement, PPM (2004), herniated disc and related surgery in [id number] complicated by spinal fusion and recent history of newly reduced EF w/concern for stress induced CM with full recovery, now presenting for removal of intrathecal pump.    - now s/p OR without active complaints  -On tele; predominantly NSR, short burst of A fib with aberrancy   -Given recovered EF in setting of prior stress induced CM, would dc entresto.  -BP overall better controlled with staggering of BP meds  -Isolated hypotensive reads on vitals, if able dc doxazosin   -Resume AC per primary team  -Pt fu with Dr Gonzales upon dc  -Pls reconsult as needed
Initial attending contact date  9/14/22    . See fellow note written above for details. I reviewed the fellow documentation. I have personally seen and examined this patient. I reviewed vitals, labs, medications, cardiac studies, and additional imaging. I agree with the above fellow's findings and plans as written above with the following additions/statements.    72M with complex PMH including colon mass s/p partial colectomy (2017),  HTN, HLD, pAF (on Eliquis), ?blood clotting disorder s/p IVC filter placement, PPM (2004), herniated disc and related surgery in [id number] complicated by spinal fusion and recent history of newly reduced EF w/concern for stress induced CM with full recovery, now presenting for removal of intrathecal pump.    - now s/p OR without active complaints  -On tele; predominantly NSR, short burst of A fib with aberrancy   -Given recovered EF in setting of prior stress induced CM, would dc entresto.   Start losartan 25 mg po qd for better control, dc hydral, start nifedipine XL 60 mg qd, transition to toprol 100 mg qd   -Resume AC per primary team  -Pt fu with Dr Gonzales upon dc
Initial attending contact date  9/14/22    . See fellow note written above for details. I reviewed the fellow documentation. I have personally seen and examined this patient. I reviewed vitals, labs, medications, cardiac studies, and additional imaging. I agree with the above fellow's findings and plans as written above with the following additions/statements.    72M with complex PMH including colon mass s/p partial colectomy (2017),  HTN, HLD, pAF (on Eliquis), ?blood clotting disorder s/p IVC filter placement, PPM (2004), herniated disc and related surgery in [id number] complicated by spinal fusion and recent history of newly reduced EF w/concern for stress induced CM with full recovery, now presenting for removal of intrathecal pump.    - now s/p OR without active complaints  -On tele; predominantly NSR, short burst of A fib with aberrancy   -Given recovered EF in setting of prior stress induced CM, would dc entresto.  -This am somnolent in setting of pain meds and hypotension -BP med administered at same time  -Pls ensure to stagger all BP meds to avoid hypotension   -Resume AC per primary team  -Pt fu with Dr Gonzales upon dc.

## 2022-09-16 NOTE — PROGRESS NOTE ADULT - SUBJECTIVE AND OBJECTIVE BOX
HPI    feeling better today    ROS: A 10-point review of systems was otherwise negative.    PAST MEDICAL & SURGICAL HISTORY:  AAA (abdominal aortic aneurysm)      HTN (hypertension)      Cardiomyopathy      Hyperlipidemia      ONOFRE (dyspnea on exertion)      DVT (deep venous thrombosis)      Pulmonary emphysema  COPD      Cardiac arrhythmia      Aneurysm of aortic root      Depression  anxiety      Anemia      Pelvic mass      Pacemaker  medtronic      History of kidney surgery      History of back surgery  multiple, lumbar      Surgery, elective  multiple neck surgery, cervical      S/P hip replacement, left      S/P arthroscopy of right knee      S/P AAA (abdominal aortic aneurysm) repair  with right iliac aneurysm endovascular repair      S/P carpal tunnel release      Surgery, elective  Cardiac Stent x4      Surgery, elective  Intrathecal pump placement          SOCIAL HISTORY:  FAMILY HISTORY:      ALLERGIES: 	  Altace (Unknown)  penicillin (Unknown)            MEDICATIONS:  ALBUTerol    0.083%. 2.5 milliGRAM(s) Nebulizer once PRN  ALPRAZolam 1 milliGRAM(s) Oral at bedtime PRN  apixaban 5 milliGRAM(s) Oral every 12 hours  doxazosin 4 milliGRAM(s) Oral at bedtime  finasteride 5 milliGRAM(s) Oral daily  folic acid 1 milliGRAM(s) Oral daily  influenza  Vaccine (HIGH DOSE) 0.7 milliLiter(s) IntraMuscular once  losartan 25 milliGRAM(s) Oral daily  metoprolol succinate  milliGRAM(s) Oral daily  NIFEdipine XL 60 milliGRAM(s) Oral daily  nortriptyline 75 milliGRAM(s) Oral at bedtime  oxyCODONE    IR 15 milliGRAM(s) Oral every 6 hours PRN  oxyCODONE  ER Tablet 20 milliGRAM(s) Oral every 8 hours  pantoprazole    Tablet 40 milliGRAM(s) Oral before breakfast  potassium chloride    Tablet ER 40 milliEquivalent(s) Oral once  potassium phosphate IVPB 30 milliMole(s) IV Intermittent once  simvastatin 5 milliGRAM(s) Oral at bedtime      PHYSICAL EXAM:  T(C): 36.3 (09-16-22 @ 09:51), Max: 36.9 (09-16-22 @ 00:14)  HR: 65 (09-16-22 @ 09:51) (60 - 70)  BP: 141/- (09-16-22 @ 09:51) (94/51 - 141/-)  RR: 16 (09-16-22 @ 09:51) (16 - 19)  SpO2: 98% (09-16-22 @ 09:51) (98% - 98%)  Wt(kg): --    GEN: Awake, comfortable. NAD.   HEENT:  No JVD.   RESP: CTA b/l  CV: , normal s1/s2.   ABD: Soft,  EXT: Warm. No edema,     I&O's Summary    15 Sep 2022 07:01  -  16 Sep 2022 07:00  --------------------------------------------------------  IN: 0 mL / OUT: 1100 mL / NET: -1100 mL    16 Sep 2022 07:01  -  16 Sep 2022 13:07  --------------------------------------------------------  IN: 0 mL / OUT: 600 mL / NET: -600 mL        	  LABS:	 	    CARDIAC MARKERS:                                  7.8    6.27  )-----------( 259      ( 16 Sep 2022 07:27 )             24.7     09-16    133<L>  |  101  |  10  ----------------------------<  97  5.0   |  27  |  1.13    Ca    8.4      16 Sep 2022 07:27  Phos  2.2     09-16  Mg     1.6     09-16      proBNP:   Lipid Profile:   HgA1c:   TSH:     TELEMETRY: 	    ECG:  	  RADIOLOGY:   ECHO:  STRESS:  CATH:

## 2022-09-17 LAB
ANION GAP SERPL CALC-SCNC: 5 MMOL/L — SIGNIFICANT CHANGE UP (ref 5–17)
BUN SERPL-MCNC: 10 MG/DL — SIGNIFICANT CHANGE UP (ref 7–23)
CALCIUM SERPL-MCNC: 8.5 MG/DL — SIGNIFICANT CHANGE UP (ref 8.4–10.5)
CHLORIDE SERPL-SCNC: 101 MMOL/L — SIGNIFICANT CHANGE UP (ref 96–108)
CO2 SERPL-SCNC: 26 MMOL/L — SIGNIFICANT CHANGE UP (ref 22–31)
CREAT SERPL-MCNC: 1.05 MG/DL — SIGNIFICANT CHANGE UP (ref 0.5–1.3)
EGFR: 75 ML/MIN/1.73M2 — SIGNIFICANT CHANGE UP
GLUCOSE SERPL-MCNC: 92 MG/DL — SIGNIFICANT CHANGE UP (ref 70–99)
HCT VFR BLD CALC: 26.3 % — LOW (ref 39–50)
HGB BLD-MCNC: 8.3 G/DL — LOW (ref 13–17)
MAGNESIUM SERPL-MCNC: 1.6 MG/DL — SIGNIFICANT CHANGE UP (ref 1.6–2.6)
MCHC RBC-ENTMCNC: 28.1 PG — SIGNIFICANT CHANGE UP (ref 27–34)
MCHC RBC-ENTMCNC: 31.6 GM/DL — LOW (ref 32–36)
MCV RBC AUTO: 89.2 FL — SIGNIFICANT CHANGE UP (ref 80–100)
NRBC # BLD: 0 /100 WBCS — SIGNIFICANT CHANGE UP (ref 0–0)
PHOSPHATE SERPL-MCNC: 3.2 MG/DL — SIGNIFICANT CHANGE UP (ref 2.5–4.5)
PLATELET # BLD AUTO: 256 K/UL — SIGNIFICANT CHANGE UP (ref 150–400)
POTASSIUM SERPL-MCNC: 4.9 MMOL/L — SIGNIFICANT CHANGE UP (ref 3.5–5.3)
POTASSIUM SERPL-SCNC: 4.9 MMOL/L — SIGNIFICANT CHANGE UP (ref 3.5–5.3)
RBC # BLD: 2.95 M/UL — LOW (ref 4.2–5.8)
RBC # FLD: 14.3 % — SIGNIFICANT CHANGE UP (ref 10.3–14.5)
SODIUM SERPL-SCNC: 132 MMOL/L — LOW (ref 135–145)
WBC # BLD: 6.49 K/UL — SIGNIFICANT CHANGE UP (ref 3.8–10.5)
WBC # FLD AUTO: 6.49 K/UL — SIGNIFICANT CHANGE UP (ref 3.8–10.5)

## 2022-09-17 PROCEDURE — 99233 SBSQ HOSP IP/OBS HIGH 50: CPT

## 2022-09-17 RX ORDER — METOPROLOL TARTRATE 50 MG
100 TABLET ORAL DAILY
Refills: 0 | Status: DISCONTINUED | OUTPATIENT
Start: 2022-09-17 | End: 2022-09-20

## 2022-09-17 RX ORDER — OXYCODONE HYDROCHLORIDE 5 MG/1
20 TABLET ORAL EVERY 8 HOURS
Refills: 0 | Status: DISCONTINUED | OUTPATIENT
Start: 2022-09-17 | End: 2022-09-20

## 2022-09-17 RX ORDER — OXYCODONE HYDROCHLORIDE 5 MG/1
20 TABLET ORAL EVERY 12 HOURS
Refills: 0 | Status: DISCONTINUED | OUTPATIENT
Start: 2022-09-17 | End: 2022-09-17

## 2022-09-17 RX ORDER — LOSARTAN POTASSIUM 100 MG/1
25 TABLET, FILM COATED ORAL DAILY
Refills: 0 | Status: DISCONTINUED | OUTPATIENT
Start: 2022-09-17 | End: 2022-09-20

## 2022-09-17 RX ORDER — NIFEDIPINE 30 MG
60 TABLET, EXTENDED RELEASE 24 HR ORAL DAILY
Refills: 0 | Status: DISCONTINUED | OUTPATIENT
Start: 2022-09-17 | End: 2022-09-20

## 2022-09-17 RX ADMIN — OXYCODONE HYDROCHLORIDE 15 MILLIGRAM(S): 5 TABLET ORAL at 15:27

## 2022-09-17 RX ADMIN — OXYCODONE HYDROCHLORIDE 20 MILLIGRAM(S): 5 TABLET ORAL at 18:00

## 2022-09-17 RX ADMIN — Medication 1 MILLIGRAM(S): at 11:13

## 2022-09-17 RX ADMIN — Medication 100 MILLIGRAM(S): at 09:44

## 2022-09-17 RX ADMIN — SIMVASTATIN 5 MILLIGRAM(S): 20 TABLET, FILM COATED ORAL at 22:39

## 2022-09-17 RX ADMIN — OXYCODONE HYDROCHLORIDE 15 MILLIGRAM(S): 5 TABLET ORAL at 16:27

## 2022-09-17 RX ADMIN — PANTOPRAZOLE SODIUM 40 MILLIGRAM(S): 20 TABLET, DELAYED RELEASE ORAL at 06:20

## 2022-09-17 RX ADMIN — OXYCODONE HYDROCHLORIDE 20 MILLIGRAM(S): 5 TABLET ORAL at 09:46

## 2022-09-17 RX ADMIN — NORTRIPTYLINE HYDROCHLORIDE 75 MILLIGRAM(S): 10 CAPSULE ORAL at 22:46

## 2022-09-17 RX ADMIN — OXYCODONE HYDROCHLORIDE 20 MILLIGRAM(S): 5 TABLET ORAL at 10:44

## 2022-09-17 RX ADMIN — APIXABAN 5 MILLIGRAM(S): 2.5 TABLET, FILM COATED ORAL at 09:44

## 2022-09-17 RX ADMIN — OXYCODONE HYDROCHLORIDE 20 MILLIGRAM(S): 5 TABLET ORAL at 17:07

## 2022-09-17 RX ADMIN — APIXABAN 5 MILLIGRAM(S): 2.5 TABLET, FILM COATED ORAL at 22:40

## 2022-09-17 RX ADMIN — FINASTERIDE 5 MILLIGRAM(S): 5 TABLET, FILM COATED ORAL at 11:13

## 2022-09-17 NOTE — PROGRESS NOTE ADULT - SUBJECTIVE AND OBJECTIVE BOX
HPI:  This is a 72-year-old female with history of HTN, HLD, Afib on Eliquis. Patient underwent cholecystectomy with general surgery and ventral removal of intrathecal pain pump 8/30/22 Over the patient week patient has noticed increased swelling over the LLQ. Denies pain, fevers, abdominal pain, signs of infection, recent trauma/fall. Patient admitted to neurosurgery pre op for removal of the posterior portion of pain pump.  (12 Sep 2022 17:09)    OVERNIGHT EVENTS: GERMAINE, neuro stable.    Hospital Course:   9/13: POD#1 s/p removal of proximal intrathecal catheter with oversew of catheter tract.  Small piece of catheter from lumbar subcutaneous area to abdomen still left in place.  9/14: POD2, restarted entresto, multiple episodes of loose stool, attributed to entresto, dc'd and losartan started instead, multiple changes made to HTN regimen per cards recs  9/15: POD2, GERMAINE.starting eliquis tonight,pt no longer taking entesto per cards recs, bp on low side today 95/65 and 107/66, losartan held @12 noon,   9/16: POD3, GERMAINE, eliquis 5 mg restarted, cardura held for hypotension. Pend dispo plan, pend JESSIKA.   9/17: POD4, GERMAINE overnight.     Vital Signs Last 24 Hrs  T(C): 36.8 (16 Sep 2022 20:40), Max: 36.8 (16 Sep 2022 20:40)  T(F): 98.3 (16 Sep 2022 20:40), Max: 98.3 (16 Sep 2022 20:40)  HR: 70 (16 Sep 2022 20:40) (65 - 74)  BP: 117/76 (16 Sep 2022 20:40) (117/76 - 141/-)  BP(mean): --  RR: 17 (16 Sep 2022 20:40) (16 - 18)  SpO2: 99% (16 Sep 2022 20:40) (97% - 99%)    Parameters below as of 16 Sep 2022 20:40  Patient On (Oxygen Delivery Method): room air        I&O's Summary    15 Sep 2022 07:01  -  16 Sep 2022 07:00  --------------------------------------------------------  IN: 0 mL / OUT: 1100 mL / NET: -1100 mL    16 Sep 2022 07:01  -  17 Sep 2022 01:49  --------------------------------------------------------  IN: 859.8 mL / OUT: 1350 mL / NET: -490.2 mL        PHYSICAL EXAM:  General: patient seen laying supine in bed in NAD  Neuro: AAOx3, FC, OE spontaneously, speech clear and fluent, CNII-XI grossly intact, face symmetric, no pronator drift, PIMENTEL strong & symmetric, sensation grossly intact to light touch throughout  HEENT: PERRL, EOMI  Neck: supple  Cardiac: RRR, S1S2  Pulmonary: chest rise symmetric  Abdomen: soft, nontender, nondistended - scars intact LLQ soft  Ext: perfusing well  Skin: warm, dry  Wound: Posterior incision c/d/i with sutures in place    TUBES/LINES:  [] Barber  [] Lumbar Drain  [] Wound Drains  [] Others      DIET:  [] NPO  [x] Mechanical  [] Tube feeds    LABS:                        7.8    6.27  )-----------( 259      ( 16 Sep 2022 07:27 )             24.7     09-16    133<L>  |  101  |  10  ----------------------------<  97  5.0   |  27  |  1.13    Ca    8.4      16 Sep 2022 07:27  Phos  2.2     09-16  Mg     1.6     09-16              CAPILLARY BLOOD GLUCOSE          Drug Levels: [] N/A    CSF Analysis: [] N/A      Allergies    Altace (Unknown)  penicillin (Unknown)    Intolerances      MEDICATIONS:  Antibiotics:    Neuro:  ALPRAZolam 1 milliGRAM(s) Oral at bedtime PRN  nortriptyline 75 milliGRAM(s) Oral at bedtime  oxyCODONE    IR 15 milliGRAM(s) Oral every 6 hours PRN  oxyCODONE  ER Tablet 20 milliGRAM(s) Oral every 8 hours    Anticoagulation:  apixaban 5 milliGRAM(s) Oral every 12 hours    OTHER:  ALBUTerol    0.083%. 2.5 milliGRAM(s) Nebulizer once PRN  doxazosin 4 milliGRAM(s) Oral at bedtime  finasteride 5 milliGRAM(s) Oral daily  influenza  Vaccine (HIGH DOSE) 0.7 milliLiter(s) IntraMuscular once  losartan 25 milliGRAM(s) Oral daily  metoprolol succinate  milliGRAM(s) Oral daily  NIFEdipine XL 60 milliGRAM(s) Oral daily  pantoprazole    Tablet 40 milliGRAM(s) Oral before breakfast  simvastatin 5 milliGRAM(s) Oral at bedtime    IVF:  folic acid 1 milliGRAM(s) Oral daily    CULTURES:  Culture Results:   No growth to date (09-12 @ 17:58)  Culture Results:   >100,000 CFU/ml Enterococcus faecalis (09-12 @ 14:30)    RADIOLOGY & ADDITIONAL TESTS:      ASSESSMENT:  This is a 72-year-old male with history of HTN, HLD, Afib on Eliquis. PPM in place, who nderwent cholecystectomy with general surgery and ventral removal of intrathecal pain pump 8/30/22 post op c/b LLQ fluctuance, noted to be CSF. Now, s/p removal of proximal intrathecal catheter with oversew of catheter tract (9/13/22)    POSTOPERATIVE COMPLICA-TION    Handoff    MEWS Score    AAA (abdominal aortic aneurysm)    HTN (hypertension)    Cardiomyopathy    Hyperlipidemia    ONOFRE (dyspnea on exertion)    DVT (deep venous thrombosis)    Pulmonary emphysema    Cardiac arrhythmia    Aneurysm of aortic root    Depression    Anemia    Pelvic mass    CSF leak    CSF leak    Postoperative complication    Abdominal fluid collection    Cardiomyopathy    HTN (hypertension)    Pulmonary emphysema    Chronic atrial fibrillation    Chronic systolic heart failure    Fusion of spine, unspecified spinal region    Asymptomatic bacteriuria    Removal of intrathecal catheter    Pacemaker    History of kidney surgery    History of back surgery    Surgery, elective    S/P hip replacement, left    S/P arthroscopy of right knee    S/P AAA (abdominal aortic aneurysm) repair    S/P carpal tunnel release    Surgery, elective    Surgery, elective    ABDOMINAL PAIN    5    HTN (hypertension)    Abdominal fluid collection    Cardiomyopathy    Pulmonary emphysema    Chronic atrial fibrillation    SysAdmin_VisitLink        PLAN:  Neuro:   - neuro checks q4  - CT C/L spine complete    Cardio:   - recent admission for Takosubo cardiomyopathy, now resolved EF 50-55%  - Paroxysmal Afib: PPM in place, reconsult EP, last admission per EP no need to reporgram prior to OR, Eliquis resumed 9/15  - HTN: recent admission with HTN urgency: losartan 25mg qd, nifedipine 60mg qd, toprol 100mg qd, cardio following   - HLD: continue simvastatin   - Doxasozin dc'd per cardiology     Pulm:   - RA  - small R pleural effusion on CXR  - incentive spirometry    GI:   - soft and bite sized diet  - bowel regimen  - Gastritis: cont home protonix     /Renal:  - BPH: continue Cardura and Proscar     Heme:  - Dvt ppx: SCDs, hold chemoppx for OR  - holding home Eliquis  - IVCF in place     ID:  - No issues, no organisms seen on CSF studies, Afebrile  - Asymptomatic bacteuria: no treatment unless symptomatic     Endo:   - no issues, A1C 5.2    Dispo: Telemetry, family updated  Plan d/w Dr. D'Amico           Assessment:  Present when checked    []  GCS  E   V  M     Heart Failure: []Acute, [] acute on chronic , []chronic  Heart Failure:  [] Diastolic (HFpEF), [] Systolic (HFrEF), []Combined (HFpEF and HFrEF), [] RHF, [] Pulm HTN, [] Other    [] CLAUDIO, [] ATN, [] AIN, [] other  [] CKD1, [] CKD2, [] CKD 3, [] CKD 4, [] CKD 5, []ESRD    Encephalopathy: [] Metabolic, [] Hepatic, [] toxic, [] Neurological, [] Other    Abnormal Nurtitional Status: [] malnurtition (see nutrition note), [ ]underweight: BMI < 19, [] morbid obesity: BMI >40, [] Cachexia    [] Sepsis  [] hypovolemic shock,[] cardiogenic shock, [] hemorrhagic shock, [] neuogenic shock  [] Acute Respiratory Failure  []Cerebral edema, [] Brain compression/ herniation,   [] Functional quadriplegia  [] Acute blood loss anemia

## 2022-09-17 NOTE — PROGRESS NOTE ADULT - SUBJECTIVE AND OBJECTIVE BOX
Patient was seen and examined at bedside. Case discuss with resident. Pt w/o any complaints this morning.     OBJECTIVE:  Vital Signs Last 24 Hrs  T(C): 36.3 (17 Sep 2022 09:41), Max: 36.8 (16 Sep 2022 20:40)  T(F): 97.4 (17 Sep 2022 09:41), Max: 98.3 (16 Sep 2022 20:40)  HR: 68 (17 Sep 2022 11:15) (60 - 74)  BP: 107/75 (17 Sep 2022 12:42) (90/64 - 131/74)  BP(mean): --  RR: 15 (17 Sep 2022 09:41) (15 - 18)  SpO2: 97% (17 Sep 2022 09:41) (96% - 99%)    PHYSICAL EXAM:  Gen: NAD sitting in a bed ; appears comfortable   CV: RRR, +S1/S2, no mumur  Pulm: CTA b/l no wheezing or crackles   Abd: soft, NTND + BS no rebound or guarding       LABS:                        8.3    6.49  )-----------( 256      ( 17 Sep 2022 05:30 )             26.3     09-17    132<L>  |  101  |  10  ----------------------------<  92  4.9   |  26  |  1.05    Ca    8.5      17 Sep 2022 05:30  Phos  3.2     09-17  Mg     1.6     09-17    MEDICATIONS  (STANDING):  apixaban 5 milliGRAM(s) Oral every 12 hours  finasteride 5 milliGRAM(s) Oral daily  folic acid 1 milliGRAM(s) Oral daily  influenza  Vaccine (HIGH DOSE) 0.7 milliLiter(s) IntraMuscular once  losartan 25 milliGRAM(s) Oral daily  metoprolol succinate  milliGRAM(s) Oral daily  NIFEdipine XL 60 milliGRAM(s) Oral daily  nortriptyline 75 milliGRAM(s) Oral at bedtime  oxyCODONE  ER Tablet 20 milliGRAM(s) Oral every 12 hours  pantoprazole    Tablet 40 milliGRAM(s) Oral before breakfast  simvastatin 5 milliGRAM(s) Oral at bedtime    A/P: 72M with complex PMH including colon mass s/p partial colectomy (2017),  HTN, HLD,pAF (on Eliquis), ?blood clotting disorder s/p IVC filter placement, PPM (2004), herniated disc and related surgery in [id number] complicated by spinal fusion and recent history of newly reduced EF w/concern for stress induced CM with full recovery, S/P removal of intrathecal pump.      #Abdominal fluid collection   -Management as per NSG  -Pt s/p OR for pain pump removal.    #Chronic systolic heart failure  #HTN  #cardiomyopathy   #chronic atrial fibrillation   - Continue Losartan, Nifedipine XL, Eliquis and  toprol XL for rate control.      #Pulmonary emphysema.   -Pulm c/s appreciated regarding outpatient PFT.    # Fusion of spine, unspecified spinal region.   -COntinue pain medication     #Dispo  -As per neurosurgery

## 2022-09-18 LAB
ALBUMIN SERPL ELPH-MCNC: 2.8 G/DL — LOW (ref 3.3–5)
ALP SERPL-CCNC: 96 U/L — SIGNIFICANT CHANGE UP (ref 40–120)
ALT FLD-CCNC: 7 U/L — LOW (ref 10–45)
ANION GAP SERPL CALC-SCNC: 7 MMOL/L — SIGNIFICANT CHANGE UP (ref 5–17)
AST SERPL-CCNC: 15 U/L — SIGNIFICANT CHANGE UP (ref 10–40)
BILIRUB SERPL-MCNC: 0.3 MG/DL — SIGNIFICANT CHANGE UP (ref 0.2–1.2)
BUN SERPL-MCNC: 11 MG/DL — SIGNIFICANT CHANGE UP (ref 7–23)
CALCIUM SERPL-MCNC: 8.7 MG/DL — SIGNIFICANT CHANGE UP (ref 8.4–10.5)
CHLORIDE SERPL-SCNC: 99 MMOL/L — SIGNIFICANT CHANGE UP (ref 96–108)
CO2 SERPL-SCNC: 27 MMOL/L — SIGNIFICANT CHANGE UP (ref 22–31)
CREAT SERPL-MCNC: 1.13 MG/DL — SIGNIFICANT CHANGE UP (ref 0.5–1.3)
EGFR: 69 ML/MIN/1.73M2 — SIGNIFICANT CHANGE UP
GLUCOSE SERPL-MCNC: 87 MG/DL — SIGNIFICANT CHANGE UP (ref 70–99)
HCT VFR BLD CALC: 25.8 % — LOW (ref 39–50)
HGB BLD-MCNC: 8.1 G/DL — LOW (ref 13–17)
MAGNESIUM SERPL-MCNC: 1.9 MG/DL — SIGNIFICANT CHANGE UP (ref 1.6–2.6)
MCHC RBC-ENTMCNC: 27.7 PG — SIGNIFICANT CHANGE UP (ref 27–34)
MCHC RBC-ENTMCNC: 31.4 GM/DL — LOW (ref 32–36)
MCV RBC AUTO: 88.4 FL — SIGNIFICANT CHANGE UP (ref 80–100)
NRBC # BLD: 0 /100 WBCS — SIGNIFICANT CHANGE UP (ref 0–0)
PHOSPHATE SERPL-MCNC: 3.2 MG/DL — SIGNIFICANT CHANGE UP (ref 2.5–4.5)
PLATELET # BLD AUTO: 273 K/UL — SIGNIFICANT CHANGE UP (ref 150–400)
POTASSIUM SERPL-MCNC: 4.5 MMOL/L — SIGNIFICANT CHANGE UP (ref 3.5–5.3)
POTASSIUM SERPL-SCNC: 4.5 MMOL/L — SIGNIFICANT CHANGE UP (ref 3.5–5.3)
PROT SERPL-MCNC: 6.5 G/DL — SIGNIFICANT CHANGE UP (ref 6–8.3)
RBC # BLD: 2.92 M/UL — LOW (ref 4.2–5.8)
RBC # FLD: 14.2 % — SIGNIFICANT CHANGE UP (ref 10.3–14.5)
SODIUM SERPL-SCNC: 133 MMOL/L — LOW (ref 135–145)
WBC # BLD: 6.59 K/UL — SIGNIFICANT CHANGE UP (ref 3.8–10.5)
WBC # FLD AUTO: 6.59 K/UL — SIGNIFICANT CHANGE UP (ref 3.8–10.5)

## 2022-09-18 PROCEDURE — 99233 SBSQ HOSP IP/OBS HIGH 50: CPT

## 2022-09-18 RX ORDER — MAGNESIUM SULFATE 500 MG/ML
1 VIAL (ML) INJECTION ONCE
Refills: 0 | Status: COMPLETED | OUTPATIENT
Start: 2022-09-18 | End: 2022-09-18

## 2022-09-18 RX ORDER — ALPRAZOLAM 0.25 MG
1 TABLET ORAL ONCE
Refills: 0 | Status: DISCONTINUED | OUTPATIENT
Start: 2022-09-18 | End: 2022-09-18

## 2022-09-18 RX ADMIN — Medication 60 MILLIGRAM(S): at 11:13

## 2022-09-18 RX ADMIN — APIXABAN 5 MILLIGRAM(S): 2.5 TABLET, FILM COATED ORAL at 21:16

## 2022-09-18 RX ADMIN — OXYCODONE HYDROCHLORIDE 20 MILLIGRAM(S): 5 TABLET ORAL at 02:00

## 2022-09-18 RX ADMIN — OXYCODONE HYDROCHLORIDE 20 MILLIGRAM(S): 5 TABLET ORAL at 09:04

## 2022-09-18 RX ADMIN — OXYCODONE HYDROCHLORIDE 20 MILLIGRAM(S): 5 TABLET ORAL at 01:01

## 2022-09-18 RX ADMIN — OXYCODONE HYDROCHLORIDE 15 MILLIGRAM(S): 5 TABLET ORAL at 14:51

## 2022-09-18 RX ADMIN — APIXABAN 5 MILLIGRAM(S): 2.5 TABLET, FILM COATED ORAL at 09:07

## 2022-09-18 RX ADMIN — OXYCODONE HYDROCHLORIDE 20 MILLIGRAM(S): 5 TABLET ORAL at 10:03

## 2022-09-18 RX ADMIN — Medication 1 MILLIGRAM(S): at 11:13

## 2022-09-18 RX ADMIN — LOSARTAN POTASSIUM 25 MILLIGRAM(S): 100 TABLET, FILM COATED ORAL at 11:14

## 2022-09-18 RX ADMIN — NORTRIPTYLINE HYDROCHLORIDE 75 MILLIGRAM(S): 10 CAPSULE ORAL at 21:16

## 2022-09-18 RX ADMIN — Medication 1 MILLIGRAM(S): at 15:27

## 2022-09-18 RX ADMIN — OXYCODONE HYDROCHLORIDE 20 MILLIGRAM(S): 5 TABLET ORAL at 18:00

## 2022-09-18 RX ADMIN — FINASTERIDE 5 MILLIGRAM(S): 5 TABLET, FILM COATED ORAL at 11:13

## 2022-09-18 RX ADMIN — OXYCODONE HYDROCHLORIDE 15 MILLIGRAM(S): 5 TABLET ORAL at 21:45

## 2022-09-18 RX ADMIN — Medication 100 GRAM(S): at 08:01

## 2022-09-18 RX ADMIN — OXYCODONE HYDROCHLORIDE 20 MILLIGRAM(S): 5 TABLET ORAL at 17:07

## 2022-09-18 RX ADMIN — Medication 100 MILLIGRAM(S): at 09:07

## 2022-09-18 RX ADMIN — PANTOPRAZOLE SODIUM 40 MILLIGRAM(S): 20 TABLET, DELAYED RELEASE ORAL at 06:57

## 2022-09-18 RX ADMIN — OXYCODONE HYDROCHLORIDE 15 MILLIGRAM(S): 5 TABLET ORAL at 15:40

## 2022-09-18 RX ADMIN — OXYCODONE HYDROCHLORIDE 15 MILLIGRAM(S): 5 TABLET ORAL at 21:15

## 2022-09-18 NOTE — PROGRESS NOTE ADULT - SUBJECTIVE AND OBJECTIVE BOX
HPI:  This is a 72-year-old female with history of HTN, HLD, Afib on Eliquis. Patient underwent cholecystectomy with general surgery and ventral removal of intrathecal pain pump 8/30/22 Over the patient week patient has noticed increased swelling over the LLQ. Denies pain, fevers, abdominal pain, signs of infection, recent trauma/fall. Patient admitted to neurosurgery pre op for removal of the posterior portion of pain pump.  (12 Sep 2022 17:09)    INTERVAL EVENTS:  GERMAINE o/n.     HOSPITAL COURSE:  9/13: POD#1 s/p removal of proximal intrathecal catheter with oversew of catheter tract.  Small piece of catheter from lumbar subcutaneous area to abdomen still left in place.  9/14: POD2, restarted entresto, multiple episodes of loose stool, attributed to entresto, dc'd and losartan started instead, multiple changes made to HTN regimen per cards recs  9/15: POD2, GERMAINE.starting eliquis tonight,pt no longer taking entesto per cards recs, bp on low side today 95/65 and 107/66, losartan held @12 noon,   9/16: POD3, GERMAINE, eliquis 5 mg restarted, cardura held for hypotension. Pend dispo plan, pend JESSIKA.   9/17: POD4, GERMAINE overnight.   9/18: POD 5. GERMAINE o/n.     Vital Signs Last 24 Hrs  T(C): 36.3 (17 Sep 2022 20:30), Max: 36.6 (17 Sep 2022 16:14)  T(F): 97.4 (17 Sep 2022 20:30), Max: 97.8 (17 Sep 2022 16:14)  HR: 69 (17 Sep 2022 20:30) (60 - 69)  BP: 123/78 (17 Sep 2022 20:30) (90/64 - 123/78)  BP(mean): --  RR: 18 (17 Sep 2022 20:30) (15 - 18)  SpO2: 99% (17 Sep 2022 20:30) (96% - 99%)    Parameters below as of 17 Sep 2022 20:30  Patient On (Oxygen Delivery Method): room air        I&O's Summary    16 Sep 2022 07:01  -  17 Sep 2022 07:00  --------------------------------------------------------  IN: 859.8 mL / OUT: 1350 mL / NET: -490.2 mL    17 Sep 2022 07:01  -  18 Sep 2022 01:53  --------------------------------------------------------  IN: 1054 mL / OUT: 1750 mL / NET: -696 mL        PHYSICAL EXAM:    PHYSICAL EXAM:  General: patient seen laying supine in bed in NAD  Neuro: AAOx3, FC, OE spontaneously, speech clear and fluent, CNII-XI grossly intact, face symmetric, no pronator drift, PIMENTEL strong & symmetric, sensation grossly intact to light touch throughout  HEENT: PERRL, EOMI  Neck: supple  Cardiac: RRR, S1S2  Pulmonary: chest rise symmetric  Abdomen: soft, nontender, nondistended - scars intact LLQ soft  Ext: perfusing well  Skin: warm, dry  Wound: Posterior incision c/d/i with sutures in place    LABS:                        8.3    6.49  )-----------( 256      ( 17 Sep 2022 05:30 )             26.3     09-17    132<L>  |  101  |  10  ----------------------------<  92  4.9   |  26  |  1.05    Ca    8.5      17 Sep 2022 05:30  Phos  3.2     09-17  Mg     1.6     09-17              CAPILLARY BLOOD GLUCOSE          Drug Levels: [] N/A    CSF Analysis: [] N/A      Allergies    Altace (Unknown)  penicillin (Unknown)    Intolerances      MEDICATIONS:  Antibiotics:    Neuro:  ALPRAZolam 1 milliGRAM(s) Oral at bedtime PRN  nortriptyline 75 milliGRAM(s) Oral at bedtime  oxyCODONE    IR 15 milliGRAM(s) Oral every 6 hours PRN  oxyCODONE  ER Tablet 20 milliGRAM(s) Oral every 8 hours    Anticoagulation:  apixaban 5 milliGRAM(s) Oral every 12 hours    OTHER:  ALBUTerol    0.083%. 2.5 milliGRAM(s) Nebulizer once PRN  finasteride 5 milliGRAM(s) Oral daily  influenza  Vaccine (HIGH DOSE) 0.7 milliLiter(s) IntraMuscular once  losartan 25 milliGRAM(s) Oral daily  metoprolol succinate  milliGRAM(s) Oral daily  NIFEdipine XL 60 milliGRAM(s) Oral daily  pantoprazole    Tablet 40 milliGRAM(s) Oral before breakfast  simvastatin 5 milliGRAM(s) Oral at bedtime    IVF:  folic acid 1 milliGRAM(s) Oral daily    CULTURES:  Culture Results:   No growth to date (09-12 @ 17:58)  Culture Results:   >100,000 CFU/ml Enterococcus faecalis (09-12 @ 14:30)    RADIOLOGY & ADDITIONAL TESTS:      ASSESSMENT:  This is a 72-year-old male with history of HTN, HLD, Afib on Eliquis. PPM in place, who nderwent cholecystectomy with general surgery and ventral removal of intrathecal pain pump 8/30/22 post op c/b LLQ fluctuance, noted to be CSF. Now, s/p removal of proximal intrathecal catheter with oversew of catheter tract (9/13/22)    Plan:  Neuro:   - neuro checks q4  - CT C/L spine complete    Cardio:   - recent admission for Takosubo cardiomyopathy, now resolved EF 50-55%  - Paroxysmal Afib: PPM in place, reconsult EP, last admission per EP no need to reporgram prior to OR, Eliquis resumed 9/15  - HTN: recent admission with HTN urgency: losartan 25mg qd, nifedipine 60mg qd, toprol 100mg qd, cardio following   - HLD: continue simvastatin   - Doxasozin dc'd per cardiology     Pulm:   - RA  - small R pleural effusion on CXR  - incentive spirometry    GI:   - soft and bite sized diet  - bowel regimen  - Gastritis: cont home protonix     /Renal:  - BPH: continue Cardura and Proscar     Heme:  - Dvt ppx: SCDs, hold chemoppx for OR  - holding home Eliquis  - IVCF in place     ID:  - No issues, no organisms seen on CSF studies, Afebrile  - Asymptomatic bacteuria: no treatment unless symptomatic     Endo:   - no issues, A1C 5.2    Dispo: regional, family updated  Plan d/w Dr. D'Amico

## 2022-09-18 NOTE — PROGRESS NOTE ADULT - SUBJECTIVE AND OBJECTIVE BOX
Patient was seen and examined at bedside. Case discuss with resident. Pt w/o any complaints this morning.     OBJECTIVE:  Vital Signs Last 24 Hrs  T(C): 36.4 (18 Sep 2022 09:00), Max: 36.6 (17 Sep 2022 16:14)  T(F): 97.6 (18 Sep 2022 09:00), Max: 97.8 (17 Sep 2022 16:14)  HR: 66 (18 Sep 2022 09:00) (60 - 69)  BP: 160/83 (18 Sep 2022 09:00) (107/75 - 160/83)  BP(mean): 93 (18 Sep 2022 05:00) (93 - 93)  RR: 15 (18 Sep 2022 09:00) (15 - 18)  SpO2: 100% (18 Sep 2022 09:00) (97% - 100%)    Parameters below as of 18 Sep 2022 09:00  Patient On (Oxygen Delivery Method): room air    PHYSICAL EXAM:  Gen: NAD laying in bed  CV: RRR, +S1/S2, no mumur  Pulm: CTA b/l no wheezing or crackles   Abd: soft, NTND + BS no rebound or guarding       LABS:                        8.1    6.59  )-----------( 273      ( 18 Sep 2022 06:43 )             25.8     09-18    133<L>  |  99  |  11  ----------------------------<  87  4.5   |  27  |  1.13    Ca    8.7      18 Sep 2022 06:43  Phos  3.2     09-18  Mg     1.9     09-18    TPro  6.5  /  Alb  2.8<L>  /  TBili  0.3  /  DBili  x   /  AST  15  /  ALT  7<L>  /  AlkPhos  96  09-18    LIVER FUNCTIONS - ( 18 Sep 2022 06:43 )  Alb: 2.8 g/dL / Pro: 6.5 g/dL / ALK PHOS: 96 U/L / ALT: 7 U/L / AST: 15 U/L / GGT: x           MEDICATIONS  (STANDING):  apixaban 5 milliGRAM(s) Oral every 12 hours  finasteride 5 milliGRAM(s) Oral daily  folic acid 1 milliGRAM(s) Oral daily  influenza  Vaccine (HIGH DOSE) 0.7 milliLiter(s) IntraMuscular once  losartan 25 milliGRAM(s) Oral daily  metoprolol succinate  milliGRAM(s) Oral daily  NIFEdipine XL 60 milliGRAM(s) Oral daily  nortriptyline 75 milliGRAM(s) Oral at bedtime  oxyCODONE  ER Tablet 20 milliGRAM(s) Oral every 8 hours  pantoprazole    Tablet 40 milliGRAM(s) Oral before breakfast  simvastatin 5 milliGRAM(s) Oral at bedtime    A/P: 72M with complex PMH including colon mass s/p partial colectomy (2017),  HTN, HLD,pAF (on Eliquis), ?blood clotting disorder s/p IVC filter placement, PPM (2004), herniated disc and related surgery in [id number] complicated by spinal fusion and recent history of newly reduced EF w/concern for stress induced CM with full recovery, S/P removal of intrathecal pump.      #Abdominal fluid collection   -Management as per NSG  -Pt s/p OR for pain pump removal.    #Chronic systolic heart failure  #HTN  #cardiomyopathy   #chronic atrial fibrillation   - Continue Losartan, Nifedipine XL, Eliquis and  toprol XL for rate control.    #Pulmonary emphysema.   -Pulm c/s appreciated regarding outpatient PFT.    # Fusion of spine, unspecified spinal region.   -Continue pain medication     #Dispo  -As per neurosurgery   -Pt is awaiting JESSIKA placement

## 2022-09-18 NOTE — CHART NOTE - NSCHARTNOTEFT_GEN_A_CORE
Neurologically stable, dressing removed this AM, sutures in place. Pt stated he was having a panic attack and was given 1 mg Xanax.
9/16: POD3, GERMAINE, eliquis 5 mg restarted, cardura held for hypotension. Pend dispo plan, pend JESSIKA.
POD2, GERMAINE.starting eliquis tonight for a.fib,  pt no longer taking entresto per cards recs, bp on low side today 95/65 and 107/66, losartan held @12 noon, pulmonology saw today and recommending prn albuterol and cancer screening outpatient

## 2022-09-19 LAB — SARS-COV-2 RNA SPEC QL NAA+PROBE: NEGATIVE — SIGNIFICANT CHANGE UP

## 2022-09-19 PROCEDURE — 99233 SBSQ HOSP IP/OBS HIGH 50: CPT

## 2022-09-19 PROCEDURE — 99024 POSTOP FOLLOW-UP VISIT: CPT

## 2022-09-19 RX ORDER — SODIUM CHLORIDE 9 MG/ML
250 INJECTION INTRAMUSCULAR; INTRAVENOUS; SUBCUTANEOUS ONCE
Refills: 0 | Status: DISCONTINUED | OUTPATIENT
Start: 2022-09-19 | End: 2022-09-19

## 2022-09-19 RX ORDER — SODIUM CHLORIDE 9 MG/ML
500 INJECTION INTRAMUSCULAR; INTRAVENOUS; SUBCUTANEOUS ONCE
Refills: 0 | Status: COMPLETED | OUTPATIENT
Start: 2022-09-19 | End: 2022-09-19

## 2022-09-19 RX ADMIN — SODIUM CHLORIDE 500 MILLILITER(S): 9 INJECTION INTRAMUSCULAR; INTRAVENOUS; SUBCUTANEOUS at 05:02

## 2022-09-19 RX ADMIN — OXYCODONE HYDROCHLORIDE 15 MILLIGRAM(S): 5 TABLET ORAL at 20:27

## 2022-09-19 RX ADMIN — PANTOPRAZOLE SODIUM 40 MILLIGRAM(S): 20 TABLET, DELAYED RELEASE ORAL at 05:01

## 2022-09-19 RX ADMIN — OXYCODONE HYDROCHLORIDE 15 MILLIGRAM(S): 5 TABLET ORAL at 21:27

## 2022-09-19 RX ADMIN — OXYCODONE HYDROCHLORIDE 20 MILLIGRAM(S): 5 TABLET ORAL at 01:33

## 2022-09-19 RX ADMIN — OXYCODONE HYDROCHLORIDE 20 MILLIGRAM(S): 5 TABLET ORAL at 01:03

## 2022-09-19 RX ADMIN — APIXABAN 5 MILLIGRAM(S): 2.5 TABLET, FILM COATED ORAL at 22:48

## 2022-09-19 RX ADMIN — NORTRIPTYLINE HYDROCHLORIDE 75 MILLIGRAM(S): 10 CAPSULE ORAL at 22:48

## 2022-09-19 RX ADMIN — OXYCODONE HYDROCHLORIDE 20 MILLIGRAM(S): 5 TABLET ORAL at 18:25

## 2022-09-19 RX ADMIN — APIXABAN 5 MILLIGRAM(S): 2.5 TABLET, FILM COATED ORAL at 09:25

## 2022-09-19 RX ADMIN — Medication 1 MILLIGRAM(S): at 11:57

## 2022-09-19 RX ADMIN — OXYCODONE HYDROCHLORIDE 20 MILLIGRAM(S): 5 TABLET ORAL at 17:25

## 2022-09-19 RX ADMIN — OXYCODONE HYDROCHLORIDE 20 MILLIGRAM(S): 5 TABLET ORAL at 10:25

## 2022-09-19 RX ADMIN — OXYCODONE HYDROCHLORIDE 20 MILLIGRAM(S): 5 TABLET ORAL at 09:25

## 2022-09-19 RX ADMIN — FINASTERIDE 5 MILLIGRAM(S): 5 TABLET, FILM COATED ORAL at 11:57

## 2022-09-19 RX ADMIN — SIMVASTATIN 5 MILLIGRAM(S): 20 TABLET, FILM COATED ORAL at 22:48

## 2022-09-19 NOTE — PROGRESS NOTE ADULT - SUBJECTIVE AND OBJECTIVE BOX
to follow: Neurosurgery addendum note:  All medications that patient is currently receiving via nebulizer can be converted to MDI at any time as needed.

## 2022-09-19 NOTE — PROGRESS NOTE ADULT - SUBJECTIVE AND OBJECTIVE BOX
O/N Events: GERMAINE  Subjective/ROS: No complaints. Sleeping comfortably. Denies HA, CP, SOB, n/v, changes in bowel/urinary habits.  12pt ROS otherwise negative.    VITALS  Vital Signs Last 24 Hrs  T(C): 36.3 (19 Sep 2022 09:12), Max: 36.7 (18 Sep 2022 14:45)  T(F): 97.4 (19 Sep 2022 09:12), Max: 98.1 (18 Sep 2022 14:45)  HR: 67 (19 Sep 2022 09:12) (62 - 78)  BP: 133/78 (19 Sep 2022 09:12) (89/55 - 150/86)  BP(mean): 66 (19 Sep 2022 05:02) (66 - 66)  RR: 16 (19 Sep 2022 09:12) (15 - 17)  SpO2: 98% (19 Sep 2022 09:12) (96% - 100%)    Parameters below as of 19 Sep 2022 09:12  Patient On (Oxygen Delivery Method): room air        I&O's Summary    18 Sep 2022 07:01  -  19 Sep 2022 07:00  --------------------------------------------------------  IN: 690 mL / OUT: 2500 mL / NET: -1810 mL    19 Sep 2022 07:01  -  19 Sep 2022 12:29  --------------------------------------------------------  IN: 300 mL / OUT: 400 mL / NET: -100 mL        CAPILLARY BLOOD GLUCOSE          PHYSICAL EXAM  General: A&Ox3; NAD  Head: NC/AT; PERRL; EOMI; anicteric sclera  Neck: Supple; no JVD  Respiratory: CTA B/L; no wheezes/crackles/rales auscultated w/ good air movement  Cardiovascular: Regular rhythm/rate; S1/S2; no gallops or murmurs auscultated  Gastrointestinal: Soft; NTND w/out rebound tenderness or guarding; bowel sounds normal  Extremities: WWP; no edema or cyanosis; radial/pedal pulses palpable  Neurological:  CNII-XII grossly intact; no obvious focal deficits  Skin: No rashes noted  Vasc: +2 DP/PT pulses b/l   Psych: Appropriate Affect    MEDICATIONS  (STANDING):  apixaban 5 milliGRAM(s) Oral every 12 hours  finasteride 5 milliGRAM(s) Oral daily  folic acid 1 milliGRAM(s) Oral daily  influenza  Vaccine (HIGH DOSE) 0.7 milliLiter(s) IntraMuscular once  losartan 25 milliGRAM(s) Oral daily  metoprolol succinate  milliGRAM(s) Oral daily  NIFEdipine XL 60 milliGRAM(s) Oral daily  nortriptyline 75 milliGRAM(s) Oral at bedtime  oxyCODONE  ER Tablet 20 milliGRAM(s) Oral every 8 hours  pantoprazole    Tablet 40 milliGRAM(s) Oral before breakfast  simvastatin 5 milliGRAM(s) Oral at bedtime    MEDICATIONS  (PRN):  ALBUTerol    0.083%. 2.5 milliGRAM(s) Nebulizer once PRN Shortness of Breath and/or Wheezing  ALPRAZolam 1 milliGRAM(s) Oral at bedtime PRN insomnia  oxyCODONE    IR 15 milliGRAM(s) Oral every 6 hours PRN Moderate Pain (4 - 6)      Altace (Unknown)  penicillin (Unknown)      LABS                        8.1    6.59  )-----------( 273      ( 18 Sep 2022 06:43 )             25.8     09-18    133<L>  |  99  |  11  ----------------------------<  87  4.5   |  27  |  1.13    Ca    8.7      18 Sep 2022 06:43  Phos  3.2     09-18  Mg     1.9     09-18    TPro  6.5  /  Alb  2.8<L>  /  TBili  0.3  /  DBili  x   /  AST  15  /  ALT  7<L>  /  AlkPhos  96  09-18              IMAGING/EKG/ETC: reviewed

## 2022-09-19 NOTE — PROGRESS NOTE ADULT - SUBJECTIVE AND OBJECTIVE BOX
HPI:  This is a 72-year-old female with history of HTN, HLD, Afib on Eliquis. Patient underwent cholecystectomy with general surgery and ventral removal of intrathecal pain pump 8/30/22 Over the patient week patient has noticed increased swelling over the LLQ. Denies pain, fevers, abdominal pain, signs of infection, recent trauma/fall. Patient admitted to neurosurgery pre op for removal of the posterior portion of pain pump.  (12 Sep 2022 17:09)    9/13: POD#1 s/p removal of proximal intrathecal catheter with oversew of catheter tract.  Small piece of catheter from lumbar subcutaneous area to abdomen still left in place.  9/14: POD2, restarted entresto, multiple episodes of loose stool, attributed to entresto, dc'd and losartan started instead, multiple changes made to HTN regimen per cards recs  9/15: POD2, GERMAINE.starting eliquis tonight,pt no longer taking entesto per cards recs, bp on low side today 95/65 and 107/66, losartan held @12 noon,   9/16: POD3, GERMAINE, eliquis 5 mg restarted, cardura held for hypotension. Pend dispo plan, pend JESSIKA.   9/17: POD4, GERMAINE overnight.   9/18: POD 5. GERMAINE o/n. Pt had an anxiety attack during the day and was given 1 mg xanax. Refusing to wear SCD's.    9/19: POD6, GERMAINE, pending JESSIKA    OVERNIGHT EVENTS: No acute events   Vital Signs Last 24 Hrs  T(C): 36.6 (18 Sep 2022 20:59), Max: 36.7 (18 Sep 2022 14:45)  T(F): 97.9 (18 Sep 2022 20:59), Max: 98.1 (18 Sep 2022 14:45)  HR: 72 (18 Sep 2022 20:59) (60 - 78)  BP: 143/78 (18 Sep 2022 20:59) (110/68 - 160/83)  BP(mean): 93 (18 Sep 2022 05:00) (93 - 93)  RR: 17 (18 Sep 2022 20:59) (15 - 18)  SpO2: 98% (18 Sep 2022 20:59) (96% - 100%)    Parameters below as of 18 Sep 2022 20:59  Patient On (Oxygen Delivery Method): room air        I&O's Summary    17 Sep 2022 07:01  -  18 Sep 2022 07:00  --------------------------------------------------------  IN: 1054 mL / OUT: 2050 mL / NET: -996 mL    18 Sep 2022 07:01  -  19 Sep 2022 01:39  --------------------------------------------------------  IN: 690 mL / OUT: 2200 mL / NET: -1510 mL    PHYSICAL EXAM:  General: patient seen laying supine in bed in NAD  Neuro: AAOx3, FC, OE spontaneously, speech clear and fluent, CNII-XI grossly intact, face symmetric, no pronator drift, PIMENTEL strong & symmetric, sensation grossly intact to light touch throughout  HEENT: PERRL, EOMI  Neck: supple  Cardiac: RRR, S1S2  Pulmonary: chest rise symmetric  Abdomen: soft, nontender, nondistended - scars intact LLQ soft  Ext: perfusing well  Skin: warm, dry  Wound: Posterior incision c/d/i with sutures in place    LABS:                        8.1    6.59  )-----------( 273      ( 18 Sep 2022 06:43 )             25.8     09-18    133<L>  |  99  |  11  ----------------------------<  87  4.5   |  27  |  1.13    Ca    8.7      18 Sep 2022 06:43  Phos  3.2     09-18  Mg     1.9     09-18    TPro  6.5  /  Alb  2.8<L>  /  TBili  0.3  /  DBili  x   /  AST  15  /  ALT  7<L>  /  AlkPhos  96  09-18            CAPILLARY BLOOD GLUCOSE          Drug Levels: [] N/A    CSF Analysis: [] N/A      Allergies    Altace (Unknown)  penicillin (Unknown)    Intolerances      MEDICATIONS:  Antibiotics:    Neuro:  ALPRAZolam 1 milliGRAM(s) Oral at bedtime PRN  nortriptyline 75 milliGRAM(s) Oral at bedtime  oxyCODONE    IR 15 milliGRAM(s) Oral every 6 hours PRN  oxyCODONE  ER Tablet 20 milliGRAM(s) Oral every 8 hours    Anticoagulation:  apixaban 5 milliGRAM(s) Oral every 12 hours    OTHER:  ALBUTerol    0.083%. 2.5 milliGRAM(s) Nebulizer once PRN  finasteride 5 milliGRAM(s) Oral daily  influenza  Vaccine (HIGH DOSE) 0.7 milliLiter(s) IntraMuscular once  losartan 25 milliGRAM(s) Oral daily  metoprolol succinate  milliGRAM(s) Oral daily  NIFEdipine XL 60 milliGRAM(s) Oral daily  pantoprazole    Tablet 40 milliGRAM(s) Oral before breakfast  simvastatin 5 milliGRAM(s) Oral at bedtime    IVF:  folic acid 1 milliGRAM(s) Oral daily    CULTURES:  Culture Results:   No growth to date (09-12 @ 17:58)  Culture Results:   >100,000 CFU/ml Enterococcus faecalis (09-12 @ 14:30)  This is a 72-year-old male with history of HTN, HLD, Afib on Eliquis. PPM in place, who nderwent cholecystectomy with general surgery and ventral removal of intrathecal pain pump 8/30/22 post op c/b LLQ fluctuance, noted to be CSF. Now, s/p removal of proximal intrathecal catheter with oversew of catheter tract (9/13/22)    Plan:  Neuro:   - neuro checks q4  - CT C/L spine complete    Cardio:   - recent admission for Takosubo cardiomyopathy, now resolved EF 50-55%  - Paroxysmal Afib: PPM in place, reconsult EP, last admission per EP no need to reporgram prior to OR, Eliquis resumed 9/15  - HTN: recent admission with HTN urgency: losartan 25mg qd, nifedipine 60mg qd, toprol 100mg qd, cardio following   - HLD: continue simvastatin   - Doxasozin dc'd per cardiology     Pulm:   - RA  - small R pleural effusion on CXR  - incentive spirometry    GI:   - soft and bite sized diet  - bowel regimen  - Gastritis: cont home protonix     /Renal:  - BPH: continue Cardura and Proscar     Heme:  - Dvt ppx: SCDs, hold chemoppx for OR  - holding home Eliquis  - IVCF in place     ID:  - No issues, no organisms seen on CSF studies, Afebrile  - Asymptomatic bacteuria: no treatment unless symptomatic     Endo:   - no issues, A1C 5.2    Dispo: regional, family updated  Plan d/w Dr. D'Amico        HPI:  This is a 72-year-old female with history of HTN, HLD, Afib on Eliquis. Patient underwent cholecystectomy with general surgery and ventral removal of intrathecal pain pump 8/30/22 Over the patient week patient has noticed increased swelling over the LLQ. Denies pain, fevers, abdominal pain, signs of infection, recent trauma/fall. Patient admitted to neurosurgery pre op for removal of the posterior portion of pain pump.  (12 Sep 2022 17:09)    9/13: POD#1 s/p removal of proximal intrathecal catheter with oversew of catheter tract.  Small piece of catheter from lumbar subcutaneous area to abdomen still left in place.  9/14: POD2, restarted entresto, multiple episodes of loose stool, attributed to entresto, dc'd and losartan started instead, multiple changes made to HTN regimen per cards recs  9/15: POD2, GERMAINE.starting eliquis tonight,pt no longer taking entesto per cards recs, bp on low side today 95/65 and 107/66, losartan held @12 noon,   9/16: POD3, GERMAINE, eliquis 5 mg restarted, cardura held for hypotension. Pend dispo plan, pend JESSIKA.   9/17: POD4, GERMAINE overnight.   9/18: POD 5. GERMAINE o/n. Pt had an anxiety attack during the day and was given 1 mg xanax. Refusing to wear SCD's.    9/19: POD6, GERMAINE, pending JESSIKA    OVERNIGHT EVENTS: No acute events   Vital Signs Last 24 Hrs  T(C): 36.6 (18 Sep 2022 20:59), Max: 36.7 (18 Sep 2022 14:45)  T(F): 97.9 (18 Sep 2022 20:59), Max: 98.1 (18 Sep 2022 14:45)  HR: 72 (18 Sep 2022 20:59) (60 - 78)  BP: 143/78 (18 Sep 2022 20:59) (110/68 - 160/83)  BP(mean): 93 (18 Sep 2022 05:00) (93 - 93)  RR: 17 (18 Sep 2022 20:59) (15 - 18)  SpO2: 98% (18 Sep 2022 20:59) (96% - 100%)    Parameters below as of 18 Sep 2022 20:59  Patient On (Oxygen Delivery Method): room air        I&O's Summary    17 Sep 2022 07:01  -  18 Sep 2022 07:00  --------------------------------------------------------  IN: 1054 mL / OUT: 2050 mL / NET: -996 mL    18 Sep 2022 07:01  -  19 Sep 2022 01:39  --------------------------------------------------------  IN: 690 mL / OUT: 2200 mL / NET: -1510 mL    PHYSICAL EXAM:  General: patient seen laying supine in bed in NAD  Neuro: AAOx3, FC, OE spontaneously, speech clear and fluent, CNII-XI grossly intact, face symmetric, no pronator drift, PIMENTEL strong & symmetric, sensation grossly intact to light touch throughout  HEENT: PERRL, EOMI  Neck: supple  Cardiac: RRR, S1S2  Pulmonary: chest rise symmetric  Abdomen: soft, nontender, nondistended - scars intact LLQ soft  Ext: perfusing well  Skin: warm, dry  Wound: Posterior incision c/d/i with sutures in place    LABS:                        8.1    6.59  )-----------( 273      ( 18 Sep 2022 06:43 )             25.8     09-18    133<L>  |  99  |  11  ----------------------------<  87  4.5   |  27  |  1.13    Ca    8.7      18 Sep 2022 06:43  Phos  3.2     09-18  Mg     1.9     09-18    TPro  6.5  /  Alb  2.8<L>  /  TBili  0.3  /  DBili  x   /  AST  15  /  ALT  7<L>  /  AlkPhos  96  09-18            CAPILLARY BLOOD GLUCOSE          Drug Levels: [] N/A    CSF Analysis: [] N/A      Allergies    Altace (Unknown)  penicillin (Unknown)    Intolerances      MEDICATIONS:  Antibiotics:    Neuro:  ALPRAZolam 1 milliGRAM(s) Oral at bedtime PRN  nortriptyline 75 milliGRAM(s) Oral at bedtime  oxyCODONE    IR 15 milliGRAM(s) Oral every 6 hours PRN  oxyCODONE  ER Tablet 20 milliGRAM(s) Oral every 8 hours    Anticoagulation:  apixaban 5 milliGRAM(s) Oral every 12 hours    OTHER:  ALBUTerol    0.083%. 2.5 milliGRAM(s) Nebulizer once PRN  finasteride 5 milliGRAM(s) Oral daily  influenza  Vaccine (HIGH DOSE) 0.7 milliLiter(s) IntraMuscular once  losartan 25 milliGRAM(s) Oral daily  metoprolol succinate  milliGRAM(s) Oral daily  NIFEdipine XL 60 milliGRAM(s) Oral daily  pantoprazole    Tablet 40 milliGRAM(s) Oral before breakfast  simvastatin 5 milliGRAM(s) Oral at bedtime    IVF:  folic acid 1 milliGRAM(s) Oral daily    CULTURES:  Culture Results:   No growth to date (09-12 @ 17:58)  Culture Results:   >100,000 CFU/ml Enterococcus faecalis (09-12 @ 14:30)  This is a 72-year-old male with history of HTN, HLD, Afib on Eliquis. PPM in place, who nderwent cholecystectomy with general surgery and ventral removal of intrathecal pain pump 8/30/22 post op c/b LLQ fluctuance, noted to be CSF. Now, s/p removal of proximal intrathecal catheter with oversew of catheter tract (9/13/22)    Plan:  Neuro:   - neuro checks q4  - CT C/L spine complete    Cardio:   - recent admission for Takosubo cardiomyopathy, now resolved EF 50-55%  - Paroxysmal Afib: PPM in place, reconsult EP, last admission per EP no need to reporgram prior to OR, Eliquis resumed 9/15  - HTN: recent admission with HTN urgency: losartan 25mg qd, nifedipine 60mg qd, toprol 100mg qd, cardio following   - HLD: continue simvastatin   - Doxasozin dc'd per cardiology     Pulm:   - RA  - small R pleural effusion on CXR  - incentive spirometry    GI:   - soft and bite sized diet  - bowel regimen  - Gastritis: cont home protonix     /Renal:  - BPH: continue Cardura and Proscar     Heme:  - Dvt ppx: SCDs, hold chemoppx for OR  - Restarted home Eliuqis    - IVCF in place     ID:  - No issues, no organisms seen on CSF studies, Afebrile  - Asymptomatic bacteuria: no treatment unless symptomatic     Endo:   - no issues, A1C 5.2    Dispo: regional, family updated  Plan d/w Dr. D'Amico

## 2022-09-20 ENCOUNTER — APPOINTMENT (OUTPATIENT)
Dept: HEART AND VASCULAR | Facility: CLINIC | Age: 72
End: 2022-09-20

## 2022-09-20 ENCOUNTER — TRANSCRIPTION ENCOUNTER (OUTPATIENT)
Age: 72
End: 2022-09-20

## 2022-09-20 VITALS
SYSTOLIC BLOOD PRESSURE: 120 MMHG | TEMPERATURE: 98 F | DIASTOLIC BLOOD PRESSURE: 78 MMHG | OXYGEN SATURATION: 99 % | HEART RATE: 72 BPM | RESPIRATION RATE: 18 BRPM

## 2022-09-20 LAB
ANION GAP SERPL CALC-SCNC: 9 MMOL/L — SIGNIFICANT CHANGE UP (ref 5–17)
BUN SERPL-MCNC: 14 MG/DL — SIGNIFICANT CHANGE UP (ref 7–23)
CALCIUM SERPL-MCNC: 8.9 MG/DL — SIGNIFICANT CHANGE UP (ref 8.4–10.5)
CHLORIDE SERPL-SCNC: 99 MMOL/L — SIGNIFICANT CHANGE UP (ref 96–108)
CO2 SERPL-SCNC: 27 MMOL/L — SIGNIFICANT CHANGE UP (ref 22–31)
CREAT SERPL-MCNC: 1.14 MG/DL — SIGNIFICANT CHANGE UP (ref 0.5–1.3)
EGFR: 68 ML/MIN/1.73M2 — SIGNIFICANT CHANGE UP
GLUCOSE SERPL-MCNC: 89 MG/DL — SIGNIFICANT CHANGE UP (ref 70–99)
HCT VFR BLD CALC: 27.1 % — LOW (ref 39–50)
HGB BLD-MCNC: 8.6 G/DL — LOW (ref 13–17)
MAGNESIUM SERPL-MCNC: 1.9 MG/DL — SIGNIFICANT CHANGE UP (ref 1.6–2.6)
MCHC RBC-ENTMCNC: 27.9 PG — SIGNIFICANT CHANGE UP (ref 27–34)
MCHC RBC-ENTMCNC: 31.7 GM/DL — LOW (ref 32–36)
MCV RBC AUTO: 88 FL — SIGNIFICANT CHANGE UP (ref 80–100)
NRBC # BLD: 0 /100 WBCS — SIGNIFICANT CHANGE UP (ref 0–0)
PHOSPHATE SERPL-MCNC: 3.3 MG/DL — SIGNIFICANT CHANGE UP (ref 2.5–4.5)
PLATELET # BLD AUTO: 263 K/UL — SIGNIFICANT CHANGE UP (ref 150–400)
POTASSIUM SERPL-MCNC: 4.5 MMOL/L — SIGNIFICANT CHANGE UP (ref 3.5–5.3)
POTASSIUM SERPL-SCNC: 4.5 MMOL/L — SIGNIFICANT CHANGE UP (ref 3.5–5.3)
RBC # BLD: 3.08 M/UL — LOW (ref 4.2–5.8)
RBC # FLD: 14.3 % — SIGNIFICANT CHANGE UP (ref 10.3–14.5)
SODIUM SERPL-SCNC: 135 MMOL/L — SIGNIFICANT CHANGE UP (ref 135–145)
WBC # BLD: 7.64 K/UL — SIGNIFICANT CHANGE UP (ref 3.8–10.5)
WBC # FLD AUTO: 7.64 K/UL — SIGNIFICANT CHANGE UP (ref 3.8–10.5)

## 2022-09-20 PROCEDURE — 89051 BODY FLUID CELL COUNT: CPT

## 2022-09-20 PROCEDURE — 84132 ASSAY OF SERUM POTASSIUM: CPT

## 2022-09-20 PROCEDURE — G1004: CPT

## 2022-09-20 PROCEDURE — 86922 COMPATIBILITY TEST ANTIGLOB: CPT

## 2022-09-20 PROCEDURE — 84295 ASSAY OF SERUM SODIUM: CPT

## 2022-09-20 PROCEDURE — 97161 PT EVAL LOW COMPLEX 20 MIN: CPT

## 2022-09-20 PROCEDURE — 84145 PROCALCITONIN (PCT): CPT

## 2022-09-20 PROCEDURE — 87635 SARS-COV-2 COVID-19 AMP PRB: CPT

## 2022-09-20 PROCEDURE — 86900 BLOOD TYPING SEROLOGIC ABO: CPT

## 2022-09-20 PROCEDURE — 71045 X-RAY EXAM CHEST 1 VIEW: CPT

## 2022-09-20 PROCEDURE — 87070 CULTURE OTHR SPECIMN AEROBIC: CPT

## 2022-09-20 PROCEDURE — U0005: CPT

## 2022-09-20 PROCEDURE — 85014 HEMATOCRIT: CPT

## 2022-09-20 PROCEDURE — 87086 URINE CULTURE/COLONY COUNT: CPT

## 2022-09-20 PROCEDURE — 99285 EMERGENCY DEPT VISIT HI MDM: CPT | Mod: 25

## 2022-09-20 PROCEDURE — 82962 GLUCOSE BLOOD TEST: CPT

## 2022-09-20 PROCEDURE — 80048 BASIC METABOLIC PNL TOTAL CA: CPT

## 2022-09-20 PROCEDURE — 97116 GAIT TRAINING THERAPY: CPT

## 2022-09-20 PROCEDURE — 84100 ASSAY OF PHOSPHORUS: CPT

## 2022-09-20 PROCEDURE — 87205 SMEAR GRAM STAIN: CPT

## 2022-09-20 PROCEDURE — C1889: CPT

## 2022-09-20 PROCEDURE — 84157 ASSAY OF PROTEIN OTHER: CPT

## 2022-09-20 PROCEDURE — 80053 COMPREHEN METABOLIC PANEL: CPT

## 2022-09-20 PROCEDURE — 82803 BLOOD GASES ANY COMBINATION: CPT

## 2022-09-20 PROCEDURE — 87186 SC STD MICRODIL/AGAR DIL: CPT

## 2022-09-20 PROCEDURE — 86901 BLOOD TYPING SEROLOGIC RH(D): CPT

## 2022-09-20 PROCEDURE — 86850 RBC ANTIBODY SCREEN: CPT

## 2022-09-20 PROCEDURE — C9399: CPT

## 2022-09-20 PROCEDURE — 85027 COMPLETE CBC AUTOMATED: CPT

## 2022-09-20 PROCEDURE — 72125 CT NECK SPINE W/O DYE: CPT

## 2022-09-20 PROCEDURE — 74019 RADEX ABDOMEN 2 VIEWS: CPT

## 2022-09-20 PROCEDURE — 85730 THROMBOPLASTIN TIME PARTIAL: CPT

## 2022-09-20 PROCEDURE — 83735 ASSAY OF MAGNESIUM: CPT

## 2022-09-20 PROCEDURE — 82945 GLUCOSE OTHER FLUID: CPT

## 2022-09-20 PROCEDURE — 82947 ASSAY GLUCOSE BLOOD QUANT: CPT

## 2022-09-20 PROCEDURE — 82330 ASSAY OF CALCIUM: CPT

## 2022-09-20 PROCEDURE — 88300 SURGICAL PATH GROSS: CPT

## 2022-09-20 PROCEDURE — 85025 COMPLETE CBC W/AUTO DIFF WBC: CPT

## 2022-09-20 PROCEDURE — 81001 URINALYSIS AUTO W/SCOPE: CPT

## 2022-09-20 PROCEDURE — U0003: CPT

## 2022-09-20 PROCEDURE — 83615 LACTATE (LD) (LDH) ENZYME: CPT

## 2022-09-20 PROCEDURE — 72131 CT LUMBAR SPINE W/O DYE: CPT

## 2022-09-20 PROCEDURE — 97110 THERAPEUTIC EXERCISES: CPT

## 2022-09-20 PROCEDURE — 74177 CT ABD & PELVIS W/CONTRAST: CPT | Mod: MG

## 2022-09-20 PROCEDURE — 36415 COLL VENOUS BLD VENIPUNCTURE: CPT

## 2022-09-20 PROCEDURE — 85610 PROTHROMBIN TIME: CPT

## 2022-09-20 PROCEDURE — 97535 SELF CARE MNGMENT TRAINING: CPT

## 2022-09-20 PROCEDURE — 99233 SBSQ HOSP IP/OBS HIGH 50: CPT

## 2022-09-20 PROCEDURE — 76000 FLUOROSCOPY <1 HR PHYS/QHP: CPT

## 2022-09-20 RX ORDER — SIMVASTATIN 20 MG/1
1 TABLET, FILM COATED ORAL
Qty: 0 | Refills: 0 | DISCHARGE
Start: 2022-09-20

## 2022-09-20 RX ORDER — OXYCODONE HYDROCHLORIDE 5 MG/1
1 TABLET ORAL
Qty: 0 | Refills: 0 | DISCHARGE
Start: 2022-09-20

## 2022-09-20 RX ORDER — METOPROLOL TARTRATE 50 MG
1 TABLET ORAL
Qty: 0 | Refills: 0 | DISCHARGE

## 2022-09-20 RX ORDER — SIMVASTATIN 20 MG/1
1 TABLET, FILM COATED ORAL
Qty: 0 | Refills: 0 | DISCHARGE

## 2022-09-20 RX ORDER — LOSARTAN POTASSIUM 100 MG/1
1 TABLET, FILM COATED ORAL
Qty: 0 | Refills: 0 | DISCHARGE
Start: 2022-09-20

## 2022-09-20 RX ORDER — METOPROLOL TARTRATE 50 MG
1 TABLET ORAL
Qty: 0 | Refills: 0 | DISCHARGE
Start: 2022-09-20

## 2022-09-20 RX ORDER — NIFEDIPINE 30 MG
1 TABLET, EXTENDED RELEASE 24 HR ORAL
Qty: 0 | Refills: 0 | DISCHARGE
Start: 2022-09-20

## 2022-09-20 RX ORDER — APIXABAN 2.5 MG/1
1 TABLET, FILM COATED ORAL
Qty: 0 | Refills: 0 | DISCHARGE
Start: 2022-09-20

## 2022-09-20 RX ORDER — OXYCODONE HYDROCHLORIDE 5 MG/1
20 TABLET ORAL
Qty: 0 | Refills: 0 | DISCHARGE

## 2022-09-20 RX ORDER — HYDRALAZINE HCL 50 MG
50 TABLET ORAL
Qty: 0 | Refills: 0 | DISCHARGE

## 2022-09-20 RX ORDER — NORTRIPTYLINE HYDROCHLORIDE 10 MG/1
1 CAPSULE ORAL
Qty: 0 | Refills: 0 | DISCHARGE
Start: 2022-09-20

## 2022-09-20 RX ORDER — DIAZEPAM 5 MG
1 TABLET ORAL
Qty: 0 | Refills: 0 | DISCHARGE

## 2022-09-20 RX ORDER — FOLIC ACID 0.8 MG
1 TABLET ORAL
Qty: 0 | Refills: 0 | DISCHARGE
Start: 2022-09-20

## 2022-09-20 RX ORDER — PANTOPRAZOLE SODIUM 20 MG/1
1 TABLET, DELAYED RELEASE ORAL
Qty: 0 | Refills: 0 | DISCHARGE
Start: 2022-09-20

## 2022-09-20 RX ORDER — ALPRAZOLAM 0.25 MG
1 TABLET ORAL
Qty: 0 | Refills: 0 | DISCHARGE
Start: 2022-09-20

## 2022-09-20 RX ORDER — APIXABAN 2.5 MG/1
1 TABLET, FILM COATED ORAL
Qty: 0 | Refills: 0 | DISCHARGE

## 2022-09-20 RX ADMIN — FINASTERIDE 5 MILLIGRAM(S): 5 TABLET, FILM COATED ORAL at 11:02

## 2022-09-20 RX ADMIN — Medication 60 MILLIGRAM(S): at 06:41

## 2022-09-20 RX ADMIN — OXYCODONE HYDROCHLORIDE 15 MILLIGRAM(S): 5 TABLET ORAL at 04:32

## 2022-09-20 RX ADMIN — OXYCODONE HYDROCHLORIDE 15 MILLIGRAM(S): 5 TABLET ORAL at 03:32

## 2022-09-20 RX ADMIN — OXYCODONE HYDROCHLORIDE 20 MILLIGRAM(S): 5 TABLET ORAL at 09:50

## 2022-09-20 RX ADMIN — Medication 100 MILLIGRAM(S): at 06:40

## 2022-09-20 RX ADMIN — Medication 1 MILLIGRAM(S): at 11:02

## 2022-09-20 RX ADMIN — LOSARTAN POTASSIUM 25 MILLIGRAM(S): 100 TABLET, FILM COATED ORAL at 06:40

## 2022-09-20 RX ADMIN — OXYCODONE HYDROCHLORIDE 20 MILLIGRAM(S): 5 TABLET ORAL at 02:11

## 2022-09-20 RX ADMIN — APIXABAN 5 MILLIGRAM(S): 2.5 TABLET, FILM COATED ORAL at 09:50

## 2022-09-20 RX ADMIN — PANTOPRAZOLE SODIUM 40 MILLIGRAM(S): 20 TABLET, DELAYED RELEASE ORAL at 06:40

## 2022-09-20 RX ADMIN — OXYCODONE HYDROCHLORIDE 20 MILLIGRAM(S): 5 TABLET ORAL at 01:11

## 2022-09-20 NOTE — PROGRESS NOTE ADULT - SUBJECTIVE AND OBJECTIVE BOX
Patient is a 72y old  Male who presents with a chief complaint of Post op CSF collection (20 Sep 2022 02:36)      INTERVAL HPI/OVERNIGHT EVENTS: offers no new complaints; current symptoms resolving    MEDICATIONS  (STANDING):  apixaban 5 milliGRAM(s) Oral every 12 hours  finasteride 5 milliGRAM(s) Oral daily  folic acid 1 milliGRAM(s) Oral daily  influenza  Vaccine (HIGH DOSE) 0.7 milliLiter(s) IntraMuscular once  losartan 25 milliGRAM(s) Oral daily  metoprolol succinate  milliGRAM(s) Oral daily  NIFEdipine XL 60 milliGRAM(s) Oral daily  nortriptyline 75 milliGRAM(s) Oral at bedtime  oxyCODONE  ER Tablet 20 milliGRAM(s) Oral every 8 hours  pantoprazole    Tablet 40 milliGRAM(s) Oral before breakfast  simvastatin 5 milliGRAM(s) Oral at bedtime    MEDICATIONS  (PRN):  ALBUTerol    0.083%. 2.5 milliGRAM(s) Nebulizer once PRN Shortness of Breath and/or Wheezing  ALPRAZolam 1 milliGRAM(s) Oral at bedtime PRN insomnia  oxyCODONE    IR 15 milliGRAM(s) Oral every 6 hours PRN Moderate Pain (4 - 6)      __________________________________________________  REVIEW OF SYSTEMS:    CONSTITUTIONAL: No fever,   EYES: no acute visual disturbances  NECK: No pain or stiffness  RESPIRATORY: No cough; No shortness of breath  CARDIOVASCULAR: No chest pain, no palpitations  GASTROINTESTINAL: No pain. No nausea or vomiting; No diarrhea   NEUROLOGICAL: No headache or numbness, no tremors  MUSCULOSKELETAL: No joint pain, no muscle pain  GENITOURINARY: no dysuria, no frequency, no hesitancy  PSYCHIATRY: no depression , no anxiety  ALL OTHER  ROS negative        Vital Signs Last 24 Hrs  T(C): 36.4 (20 Sep 2022 09:23), Max: 36.8 (19 Sep 2022 20:15)  T(F): 97.6 (20 Sep 2022 09:23), Max: 98.3 (19 Sep 2022 20:15)  HR: 72 (20 Sep 2022 09:23) (72 - 84)  BP: 120/78 (20 Sep 2022 09:23) (116/68 - 135/76)  BP(mean): --  RR: 18 (20 Sep 2022 09:23) (17 - 18)  SpO2: 99% (20 Sep 2022 09:23) (96% - 99%)    Parameters below as of 20 Sep 2022 09:23  Patient On (Oxygen Delivery Method): room air        ________________________________________________  PHYSICAL EXAM:  GENERAL: NAD  HEENT: Normocephalic;  conjunctivae and sclerae clear; moist mucous membranes;   NECK : supple  CHEST/LUNG: Clear to auscultation bilaterally with good air entry   HEART: S1 S2  regular; no murmurs, gallops or rubs  ABDOMEN: Soft, Nontender, Nondistended; Bowel sounds present, scars healing well   EXTREMITIES: no cyanosis; no edema; no calf tenderness  SKIN: warm and dry; no rash  NERVOUS SYSTEM:  Awake and alert; Oriented  to place, person and time ; no new deficits    _________________________________________________  LABS:                        8.6    7.64  )-----------( 263      ( 20 Sep 2022 07:08 )             27.1     09-20    135  |  99  |  14  ----------------------------<  89  4.5   |  27  |  1.14    Ca    8.9      20 Sep 2022 07:08  Phos  3.3     09-20  Mg     1.9     09-20          CAPILLARY BLOOD GLUCOSE            RADIOLOGY & ADDITIONAL TESTS:      Plan of care was discussed with patient and /or primary care giver; all questions and concerns were addressed and care was aligned with patient's wishes.

## 2022-09-20 NOTE — PROGRESS NOTE ADULT - PROBLEM SELECTOR PROBLEM 7
Fusion of spine, unspecified spinal region

## 2022-09-20 NOTE — PROGRESS NOTE ADULT - PROBLEM SELECTOR PROBLEM 8
Asymptomatic bacteriuria

## 2022-09-20 NOTE — DISCHARGE NOTE NURSING/CASE MANAGEMENT/SOCIAL WORK - NSDCPEPT PROEDHF_GEN_ALL_CORE
Call primary care provider for follow up after discharge/Activities as tolerated/Low salt diet/Monitor weight daily/Report signs and symptoms to primary care provider
Call primary care provider for follow up after discharge/Activities as tolerated/Low salt diet/Monitor weight daily/Report signs and symptoms to primary care provider

## 2022-09-20 NOTE — PROGRESS NOTE ADULT - PROBLEM SELECTOR PROBLEM 1
Abdominal fluid collection

## 2022-09-20 NOTE — PROGRESS NOTE ADULT - PROBLEM SELECTOR PLAN 5
Documented previously, not on medications currently will need collateral, likely component of increased secretions. Ok to get pulm consult but patient would benefit from tiotropium daily (LAMA) to start.   -Start LAMA (tiotropium) daily
outpatient PFTs
outpatient PFTs
Documented previously, not on medications currently will need collateral
Documented previously, not on medications currently will need collateral, likely component of increased secretions. pulm c/s appreciated regarding outpatient PFT

## 2022-09-20 NOTE — PROGRESS NOTE ADULT - REASON FOR ADMISSION
Post op CSF collection

## 2022-09-20 NOTE — PROGRESS NOTE ADULT - NUTRITIONAL ASSESSMENT
This patient has been assessed with a concern for Malnutrition and has been determined to have a diagnosis/diagnoses of Moderate protein-calorie malnutrition.    This patient is being managed with:   Diet Soft and Bite Sized-  Supplement Feeding Modality:  Oral  Ensure Enlive Cans or Servings Per Day:  1       Frequency:  Daily  Entered: Sep 15 2022  5:30PM    
This patient has been assessed with a concern for Malnutrition and has been determined to have a diagnosis/diagnoses of Moderate protein-calorie malnutrition.    This patient is being managed with:   Diet Soft and Bite Sized-  Supplement Feeding Modality:  Oral  Ensure Enlive Cans or Servings Per Day:  3       Frequency:  Daily  Entered: Sep 16 2022  2:50PM    

## 2022-09-20 NOTE — DISCHARGE NOTE NURSING/CASE MANAGEMENT/SOCIAL WORK - NSDCPEFALRISK_GEN_ALL_CORE
For information on Fall & Injury Prevention, visit: [email or web address] OR  [email or web address] OR  https://www.cdc.gov/steadi/patient.html
For information on Fall & Injury Prevention, visit: [email or web address] OR  [email or web address] OR  https://www.cdc.gov/steadi/patient.html

## 2022-09-20 NOTE — PROGRESS NOTE ADULT - PROBLEM SELECTOR PLAN 2
F/U Cards Recs
F/U Cards Recs  -Losartan  -Succinate 100 qd
F/U Cards Recs  -Losartan  -Succinate 100 qd
F/U Cards Recs  -Patient claims diarrhea 2/2 Entresto, if he will not adhere to entresto would recommend losartan instead
F/U Cards Recs  -Patient claims diarrhea 2/2 Entresto, if he will not adhere to entresto would recommend losartan instead

## 2022-09-20 NOTE — PROGRESS NOTE ADULT - PROBLEM SELECTOR PLAN 4
continue home regimen for cardiomyopathy, will control BP as well

## 2022-09-20 NOTE — PROGRESS NOTE ADULT - SUBJECTIVE AND OBJECTIVE BOX
HPI:  This is a 72-year-old female with history of HTN, HLD, Afib on Eliquis. Patient underwent cholecystectomy with general surgery and ventral removal of intrathecal pain pump 8/30/22 Over the patient week patient has noticed increased swelling over the LLQ. Denies pain, fevers, abdominal pain, signs of infection, recent trauma/fall. Patient admitted to neurosurgery pre op for removal of the posterior portion of pain pump.  (12 Sep 2022 17:09)  9/13: POD#1 s/p removal of proximal intrathecal catheter with oversew of catheter tract.  Small piece of catheter from lumbar subcutaneous area to abdomen still left in place.  9/14: POD2, restarted entresto, multiple episodes of loose stool, attributed to entresto, dc'd and losartan started instead, multiple changes made to HTN regimen per cards recs  9/15: POD2, GERMAINE.starting eliquis tonight,pt no longer taking entesto per cards recs, bp on low side today 95/65 and 107/66, losartan held @12 noon,   9/16: POD3, GERMAINE, eliquis 5 mg restarted, cardura held for hypotension. Pend dispo plan, pend JESSIKA.   9/17: POD4, GERMAINE overnight.   9/18: POD 5. GERMAINE o/n. Pt had an anxiety attack during the day and was given 1 mg xanax. Refusing to wear SCD's.    9/19: POD6, SBP 89/55, BP meds held and 500 cc bolus, pending JESSIKA.   9/20: POD7, GERMAINE     OVERNIGHT EVENTS: GERMAINE   Vital Signs Last 24 Hrs  T(C): 36.6 (19 Sep 2022 23:50), Max: 36.8 (19 Sep 2022 20:15)  T(F): 97.8 (19 Sep 2022 23:50), Max: 98.3 (19 Sep 2022 20:15)  HR: 83 (19 Sep 2022 23:50) (62 - 84)  BP: 126/76 (19 Sep 2022 23:50) (89/55 - 135/76)  BP(mean): 66 (19 Sep 2022 05:02) (66 - 66)  RR: 18 (19 Sep 2022 23:50) (16 - 18)  SpO2: 96% (19 Sep 2022 23:50) (96% - 100%)    Parameters below as of 19 Sep 2022 23:50  Patient On (Oxygen Delivery Method): room air        I&O's Summary    18 Sep 2022 07:01  -  19 Sep 2022 07:00  --------------------------------------------------------  IN: 690 mL / OUT: 2500 mL / NET: -1810 mL    19 Sep 2022 07:01  -  20 Sep 2022 02:45  --------------------------------------------------------  IN: 500 mL / OUT: 1325 mL / NET: -825 mL      PHYSICAL EXAM:  General: patient seen laying supine in bed in NAD  Neuro: AAOx3, FC, OE spontaneously, speech clear and fluent, CNII-XI grossly intact, face symmetric, no pronator drift, PIMENTEL strong & symmetric, sensation grossly intact to light touch throughout  HEENT: PERRL, EOMI  Neck: supple  Cardiac: RRR, S1S2  Pulmonary: chest rise symmetric  Abdomen: soft, nontender, nondistended - scars intact LLQ soft  Ext: perfusing well  Skin: warm, dry  Wound: Posterior incision c/d/i with sutures in place    LABS:                        8.1    6.59  )-----------( 273      ( 18 Sep 2022 06:43 )             25.8     09-18    133<L>  |  99  |  11  ----------------------------<  87  4.5   |  27  |  1.13    Ca    8.7      18 Sep 2022 06:43  Phos  3.2     09-18  Mg     1.9     09-18    TPro  6.5  /  Alb  2.8<L>  /  TBili  0.3  /  DBili  x   /  AST  15  /  ALT  7<L>  /  AlkPhos  96  09-18      Allergies    Altace (Unknown)  penicillin (Unknown)    Intolerances      MEDICATIONS:  Antibiotics:    Neuro:  ALPRAZolam 1 milliGRAM(s) Oral at bedtime PRN  nortriptyline 75 milliGRAM(s) Oral at bedtime  oxyCODONE    IR 15 milliGRAM(s) Oral every 6 hours PRN  oxyCODONE  ER Tablet 20 milliGRAM(s) Oral every 8 hours    Anticoagulation:  apixaban 5 milliGRAM(s) Oral every 12 hours    OTHER:  ALBUTerol    0.083%. 2.5 milliGRAM(s) Nebulizer once PRN  finasteride 5 milliGRAM(s) Oral daily  influenza  Vaccine (HIGH DOSE) 0.7 milliLiter(s) IntraMuscular once  losartan 25 milliGRAM(s) Oral daily  metoprolol succinate  milliGRAM(s) Oral daily  NIFEdipine XL 60 milliGRAM(s) Oral daily  pantoprazole    Tablet 40 milliGRAM(s) Oral before breakfast  simvastatin 5 milliGRAM(s) Oral at bedtime    IVF:  folic acid 1 milliGRAM(s) Oral daily    CULTURES:  Culture Results:   No growth to date (09-12 @ 17:58)  Culture Results:   >100,000 CFU/ml Enterococcus faecalis (09-12 @ 14:30)    RADIOLOGY & ADDITIONAL TESTS:    This is a 72-year-old male with history of HTN, HLD, Afib on Eliquis. PPM in place, who underwent cholecystectomy with general surgery and ventral removal of intrathecal pain pump 8/30/22 post op c/b LLQ fluctuance, noted to be CSF. Now, s/p removal of proximal intrathecal catheter with oversew of catheter tract (9/13/22)    Plan:  Neuro:   - neuro checks q4  - CT C/L spine complete    Cardio:   - recent admission for Takosubo cardiomyopathy, now resolved EF 50-55%  - Paroxysmal Afib: PPM in place, reconsult EP, last admission per EP no need to reporgram prior to OR, Eliquis resumed 9/15  - HTN: recent admission with HTN urgency: losartan 25mg qd, nifedipine 60mg qd, toprol 100mg qd, cardio following   - HLD: continue simvastatin   - Doxasozin dc'd per cardiology     Pulm:   - RA  - small R pleural effusion on CXR  - incentive spirometry    GI:   - soft and bite sized diet  - bowel regimen  - Gastritis: cont home protonix     /Renal:  - BPH: continue Cardura and Proscar     Heme:  - Dvt ppx: SCDs, hold chemoppx for OR  - restarted home eliquis   - IVCF in place     ID:  - No issues, no organisms seen on CSF studies, Afebrile  - Asymptomatic bacteuria: no treatment unless symptomatic     Endo:   - no issues, A1C 5.2    Dispo: regional, family updated  Plan d/w Dr. D'Amico

## 2022-09-20 NOTE — PROGRESS NOTE ADULT - PROBLEM SELECTOR PLAN 8
Growing E. faecalis but without symptoms, no indication to treat  if patient were to develop symptoms treatment would be ampicillin but given PCN allergy would use Vanc    Dispo: Pending JESSIKA acceptance, continues to be medically ready for discharge.
Growing E. faecalis but without symptoms, no indication to treat  if patient were to develop symptoms treatment would be ampicillin but given PCN allergy would use Vanc
Growing E. faecalis but without symptoms, no indication to treat  if patient were to develop symptoms treatment would be ampicillin but given PCN allergy would use Vanc    Dispo: Medically optimized for discharge and transition of problems to outpatient from Internal Medicine point of view. When optimized from neurosurgical perspective no contraindication to discharge. Would ensure PCP appointment to address COPD diagnosis following discharge from hospital.
Growing E. faecalis but without symptoms, no indication to treat  if patient were to develop symptoms treatment would be ampicillin but given PCN allergy would use Vanc    Dispo: Pending JESSIKA acceptance, continues to be medically ready for discharge.
Growing E. faecalis but without symptoms, no indication to treat  if patient were to develop symptoms treatment would be ampicillin but given PCN allergy would use Vanc    Dispo: Medically optimized for discharge and transition of problems to outpatient from Internal Medicine point of view. When optimized from neurosurgical perspective no contraindication to discharge. Would ensure PCP appointment to address COPD diagnosis following discharge from hospital.

## 2022-09-20 NOTE — DISCHARGE NOTE NURSING/CASE MANAGEMENT/SOCIAL WORK - NSDCFUADDAPPT_GEN_ALL_CORE_FT
Please follow up with Dr. D'Amico by calling [phone] to confirm appointment for suture removal on October 4 @ 2 pm with Eva.     Please follow up with Dr. Adam Barba (pulmonologist) outpatient to establish care and obtain baseline pulmonary tests by calling [phone].     Please follow up with Dr. Gonzales (cardiologist) outpatient by calling [phone].    Please follow  up with your primary care provider.

## 2022-09-20 NOTE — DISCHARGE NOTE NURSING/CASE MANAGEMENT/SOCIAL WORK - PATIENT PORTAL LINK FT
You can access the FollowMyHealth Patient Portal offered by [facility] by registering at the following website: [email or web address]. By joining [employer]’s FollowMyHealth portal, you will also be able to view your health information using other applications (apps) compatible with our system.
You can access the FollowMyHealth Patient Portal offered by [facility] by registering at the following website: [email or web address]. By joining [employer]’s FollowMyHealth portal, you will also be able to view your health information using other applications (apps) compatible with our system.

## 2022-09-20 NOTE — PROGRESS NOTE ADULT - ASSESSMENT
72 year old male with past medical history of hypertension, hyperlipidemia, paroxysmal atrial fibrillation on Eliquis, questionable history of blood clotting disorder s/p IVC filter placement, colon mass s/p partial colectomy (2017) and past surgical history of appendectomy, hernia repair, iliac aneurysm surgery (2017) s/p stent placement, pacemaker implant (2004) s/p recent ppm interrogation on Eliquis, herniated disc and related surgery (6486-3153) complicated by spinal fusion, local hematoma, foot drop with chronic pain s/p failed intrathecal morphine pump causing bradycardia and left hip prosthesis who was admitted on 8/20 for bleeding from percutaneous cholecystostomy site and underwent interval laparoscopic cholecystectomy with RENATA drain x1 and removal of intrathecal pump, discharged on 9/6 with no drains. Now found to have LLQ fluctuance with clear drainage with no s/s of infection, CTAP 9/12 w/ interval recurrence of fluid collection in gallbladder fossa. He was readmitted to the neurosurgery service for drainage of LLQ collection and removal of posterior aspect of pain pump. Surgery team 4C was consulted for continuity of care and possible intervention for gallbladder fossa fluid collection. S/p removal of pain pump. Afebrile HDS, labs including LFTs normal. Favor postoperative changes in gallbladder fossa.     - Surgery Team 4C will sign off, please reconsult as necessary  - Care per Neurosurgery team
72M with complex PMH including colon mass s/p partial colectomy (2017),  HTN, HLD, pAF (on Eliquis), ?blood clotting disorder s/p IVC filter placement, PPM (2004), herniated disc and related surgery in [id number] complicated by spinal fusion and recent history of newly reduced EF w/concern for stress induced CM with full recovery, now presenting for removal of intrathecal pump.      now s/p OR without chest pain. Tele; NSR, brief A fib with aberrancy       Hx stress CM: c/w losartan 25mg qD     HTN:  c/w nifedipine XL 60 mg qd, toprol 100 mg qd. Noted low BP at times. Please  d/c doxazosin and ensure he stops it on discharge as well. Other meds as noted.    HLD: switch to atorvastatin 20mg, d/c simvastatin.    PPM - not requiring pacing.    pAfib: cw eliquis    
72M with complex PMH including colon mass s/p partial colectomy (2017),  HTN, HLD, pAF (on Eliquis), ?blood clotting disorder s/p IVC filter placement, PPM (2004), herniated disc and related surgery in [id number] complicated by spinal fusion and recent history of newly reduced EF w/concern for stress induced CM with full recovery, now presenting for removal of intrathecal pump.      now s/p OR without chest pain. Tele; NSR, brief A fib with aberrancy       Hx stress CM: c/w losartan 25mg qD     HTN:  c/w nifedipine XL 60 mg qd, toprol 100 mg qd. Noted SBP 97 in the AM. If this is a recurrent issue would d/c doxazosin.     PPM - not requiring pacing.    pAfib: restart eliquis when able    
72M with complex PMH including colon mass s/p partial colectomy (2017),  HTN, HLD,pAF (on Eliquis), ?blood clotting disorder s/p IVC filter placement, PPM (2004), herniated disc and related surgery in [id number] complicated by spinal fusion and recent history of newly reduced EF w/concern for stress induced CM with full recovery, now presenting for removal of intrathecal pump.  
72M with complex PMH including colon mass s/p partial colectomy (2017),  HTN, HLD, pAF (on Eliquis), ?blood clotting disorder s/p IVC filter placement, PPM (2004), herniated disc and related surgery in [id number] complicated by spinal fusion and recent history of newly reduced EF w/concern for stress induced CM with full recovery, now presenting for removal of intrathecal pump.      now s/p OR without chest pain. Tele; NSR, brief A fib with aberrancy       Hx stress CM: EF recovered and reports diarrhoea with entresto. Ok to d/c this and start losartan 25mg qD     HTN:  dc hydralazine, start nifedipine XL 60 mg qd, transition to toprol 100 mg qd    pAfib: restart eliquis when able 
72M with complex PMH including colon mass s/p partial colectomy (2017),  HTN, HLD,pAF (on Eliquis), ?blood clotting disorder s/p IVC filter placement, PPM (2004), herniated disc and related surgery in [id number] complicated by spinal fusion and recent history of newly reduced EF w/concern for stress induced CM with full recovery, S/P removal of intrathecal pump.  
72M with complex PMH including colon mass s/p partial colectomy (2017),  HTN, HLD,pAF (on Eliquis), ?blood clotting disorder s/p IVC filter placement, PPM (2004), herniated disc and related surgery in 0927-1588 complicated by spinal fusion and recent history of newly reduced EF w/concern for stress induced CM with full recovery, now presenting for removal of intrathecal pump.  
72M with complex PMH including colon mass s/p partial colectomy (2017),  HTN, HLD,pAF (on Eliquis), ?blood clotting disorder s/p IVC filter placement, PPM (2004), herniated disc and related surgery in 2337-2615 complicated by spinal fusion and recent history of newly reduced EF w/concern for stress induced CM with full recovery, now presenting for removal of intrathecal pump.  
72M with complex PMH including colon mass s/p partial colectomy (2017),  HTN, HLD,pAF (on Eliquis), ?blood clotting disorder s/p IVC filter placement, PPM (2004), herniated disc and related surgery in [id number] complicated by spinal fusion and recent history of newly reduced EF w/concern for stress induced CM with full recovery, now presenting for removal of intrathecal pump.

## 2022-09-20 NOTE — PROGRESS NOTE ADULT - PROBLEM SELECTOR PLAN 7
Pain management  Continue home regimen

## 2022-09-20 NOTE — PROGRESS NOTE ADULT - PROVIDER SPECIALTY LIST ADULT
Cardiology
Hospitalist
Neurosurgery
Neurosurgery
Surgery
Cardiology
Neurosurgery
Neurosurgery
Hospitalist
Neurosurgery
Cardiology
Neurosurgery
Hospitalist
Internal Medicine
Hospitalist

## 2022-09-20 NOTE — PROGRESS NOTE ADULT - PROBLEM SELECTOR PLAN 3
from stress cardiomyopathy, continue to titrate GDMT

## 2022-09-20 NOTE — PROGRESS NOTE ADULT - PROBLEM SELECTOR PLAN 1
Management as per NSG  s/p OR for pain pump removal
Management as per NSG  s/p OR for pain pump removal
Management as per NSG  OR today
Management as per NSG  s/p OR for pain pump removal
Management as per NSG  s/p OR for pain pump removal

## 2022-09-21 ENCOUNTER — NON-APPOINTMENT (OUTPATIENT)
Age: 72
End: 2022-09-21

## 2022-09-22 LAB — SURGICAL PATHOLOGY STUDY: SIGNIFICANT CHANGE UP

## 2022-09-23 DIAGNOSIS — I71.4 ABDOMINAL AORTIC ANEURYSM, WITHOUT RUPTURE: ICD-10-CM

## 2022-09-23 DIAGNOSIS — I11.0 HYPERTENSIVE HEART DISEASE WITH HEART FAILURE: ICD-10-CM

## 2022-09-23 DIAGNOSIS — R82.71 BACTERIURIA: ICD-10-CM

## 2022-09-23 DIAGNOSIS — I95.9 HYPOTENSION, UNSPECIFIED: ICD-10-CM

## 2022-09-23 DIAGNOSIS — Z79.01 LONG TERM (CURRENT) USE OF ANTICOAGULANTS: ICD-10-CM

## 2022-09-23 DIAGNOSIS — Z96.642 PRESENCE OF LEFT ARTIFICIAL HIP JOINT: ICD-10-CM

## 2022-09-23 DIAGNOSIS — I48.20 CHRONIC ATRIAL FIBRILLATION, UNSPECIFIED: ICD-10-CM

## 2022-09-23 DIAGNOSIS — J43.9 EMPHYSEMA, UNSPECIFIED: ICD-10-CM

## 2022-09-23 DIAGNOSIS — N40.0 BENIGN PROSTATIC HYPERPLASIA WITHOUT LOWER URINARY TRACT SYMPTOMS: ICD-10-CM
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
## 2022-09-23 DIAGNOSIS — Z41.8 ENCOUNTER FOR OTHER PROCEDURES FOR PURPOSES OTHER THAN REMEDYING HEALTH STATE: ICD-10-CM

## 2022-09-23 DIAGNOSIS — I42.9 CARDIOMYOPATHY, UNSPECIFIED: ICD-10-CM

## 2022-09-23 DIAGNOSIS — T85.635A: ICD-10-CM

## 2022-09-23 DIAGNOSIS — G96.09 OTHER SPINAL CEREBROSPINAL FLUID LEAK: ICD-10-CM

## 2022-09-23 DIAGNOSIS — D64.9 ANEMIA, UNSPECIFIED: ICD-10-CM

## 2022-09-23 DIAGNOSIS — E44.0 MODERATE PROTEIN-CALORIE MALNUTRITION: ICD-10-CM

## 2022-09-23 DIAGNOSIS — E78.5 HYPERLIPIDEMIA, UNSPECIFIED: ICD-10-CM
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
## 2022-09-23 DIAGNOSIS — K29.70 GASTRITIS, UNSPECIFIED, WITHOUT BLEEDING: ICD-10-CM

## 2022-09-23 DIAGNOSIS — I50.22 CHRONIC SYSTOLIC (CONGESTIVE) HEART FAILURE: ICD-10-CM

## 2022-09-23 DIAGNOSIS — F32.A DEPRESSION, UNSPECIFIED: ICD-10-CM

## 2022-09-23 DIAGNOSIS — Z95.0 PRESENCE OF CARDIAC PACEMAKER: ICD-10-CM

## 2022-09-26 PROBLEM — K80.20 CHOLELITHIASIS: Status: ACTIVE | Noted: 2022-06-30

## 2022-09-26 PROBLEM — Z09 POSTOP CHECK: Status: ACTIVE | Noted: 2022-09-26

## 2022-09-26 LAB
CULTURE RESULTS: NO GROWTH — SIGNIFICANT CHANGE UP
SPECIMEN SOURCE: SIGNIFICANT CHANGE UP

## 2022-09-28 ENCOUNTER — APPOINTMENT (OUTPATIENT)
Dept: NEUROSURGERY | Facility: CLINIC | Age: 72
End: 2022-09-28

## 2022-09-28 ENCOUNTER — NON-APPOINTMENT (OUTPATIENT)
Age: 72
End: 2022-09-28

## 2022-09-28 VITALS
HEART RATE: 71 BPM | OXYGEN SATURATION: 98 % | BODY MASS INDEX: 29.92 KG/M2 | RESPIRATION RATE: 18 BRPM | DIASTOLIC BLOOD PRESSURE: 73 MMHG | SYSTOLIC BLOOD PRESSURE: 109 MMHG | WEIGHT: 202 LBS | TEMPERATURE: 98 F | HEIGHT: 69 IN

## 2022-09-28 DIAGNOSIS — Z87.891 PERSONAL HISTORY OF NICOTINE DEPENDENCE: ICD-10-CM

## 2022-09-28 DIAGNOSIS — K80.20 CALCULUS OF GALLBLADDER W/OUT CHOLECYSTITIS W/OUT OBSTRUCTION: ICD-10-CM

## 2022-09-28 DIAGNOSIS — Z09 ENCOUNTER FOR FOLLOW-UP EXAMINATION AFTER COMPLETED TREATMENT FOR CONDITIONS OTHER THAN MALIGNANT NEOPLASM: ICD-10-CM

## 2022-09-28 PROCEDURE — 99024 POSTOP FOLLOW-UP VISIT: CPT

## 2022-09-28 NOTE — ASSESSMENT
[FreeTextEntry1] : 72-year-old male with history of cervical and lumbar spinal fusions who recently underwent surgical removal of pain pump complicated by persistent CSF leaking requiring removal of posterior catheter.  Incision clean dry and intact.  No evidence of leak.  Patient doing well and recovering nicely from gallbladder surgery.  Sutures removed.\par \par Dr. D'Amico independently reviewed all available images with patient. \par \par Sutures removed in clinic- incision site checked.\par \par \par PLAN:\par - Continue follow- up with GI\par - Continue to work with PT at the facility\par - RTC in 3 months (January '23)\par \par \par Patient verbalizes understanding of today's discussion and next steps in treatment plan.\par \par \par A total of 15 minutes was spent reviewing the labs, imaging and physical examination of the patient. We discussed the diagnosis, and the plan. The patient's questions were answered. The patient demonstrated an excellent understanding of the plan.

## 2022-09-28 NOTE — HISTORY OF PRESENT ILLNESS
[FreeTextEntry1] : This is a 72-year-old female with history of HTN, HLD, Afib on Eliquis. Patient underwent cholecystectomy with general surgery and ventral removal of intrathecal pain pump 8/30/22. The patient had noticed increased swelling over the LLQ who presented to [facility] ED on 9/12/22. \par \par He underwent removal of proximal intrathecal catheter from lumbar subcutaneous area to the abdomen that was still left in place.  \par \par He was readmitted to the neurosurgery service for drainage of LLQ collection and removal of posterior aspect of pain pump. Now, s/p removal of proximal intrathecal catheter with oversew of catheter tract (9/13/22)\par \par TODAY 9/28/22: Patient remains in rehab center. The facility had recommended he come in due to concern of fluid collection in his abdomen. He denies any pain or tenderness at the site.\par \par He remains wheelchair bound  but continues to work with physical therapy daily at the facility and states improvement in his strength and mobility\par \par  He otherwise denies complaints to include seizures, vision changes, dizziness, or sensory changes.\par

## 2022-09-28 NOTE — PHYSICAL EXAM
[General Appearance - Alert] : alert [General Appearance - In No Acute Distress] : in no acute distress [Limited Balance] : the patient's balance was impaired [] : no respiratory distress [Abdomen Soft] : soft [Abdomen Tenderness] : non-tender [FreeTextEntry1] : no evidence of fluid collection

## 2022-09-28 NOTE — DATA REVIEWED
[de-identified] : \par ACC: [id number] EXAM: XR ABDOMEN 2V\par \par PROCEDURE DATE: 09/14/2022\par \par \par \par INTERPRETATION: TECHNIQUE: 3 portable view(s) of the abdomen.\par \par COMPARISON: 8/31/2022\par \par CLINICAL indications:constipation\par \par FINDINGS:\par \par IVC filter. Aorta iliac stent grafts.\par Cervical spinal fusion hardware. Left hip arthroplasty. Small right-sided pleural effusion. Cardiomegaly. No evidence of ileus or obstruction. No air-fluid levels. No acute osseous abnormality. CT is more sensitive. Right common iliac artery metallic coil.\par \par IMPRESSION: Nonobstructive bowel gas pattern.\par \par --- End of Report ---\par \par \par \par \par \par PRICE SALGUERO MD; Attending Radiologist\par This document has been electronically signed. Sep 14 2022 10:09PM

## 2022-09-28 NOTE — REVIEW OF SYSTEMS
[Leg Weakness] : leg weakness [Difficulty Walking] : difficulty walking [Feeling Poorly] : not feeling poorly [Feeling Tired] : not feeling tired [Anxiety] : no anxiety [Depression] : no depression [Confused or Disoriented] : no confusion [Numbness] : no numbness [Tingling] : no tingling [Seizures] : no convulsions [Dizziness] : no dizziness [Cluster Headache] : no cluster headache [Eyesight Problems] : no eyesight problems

## 2022-09-28 NOTE — REASON FOR VISIT
[Follow-Up: _____] : a [unfilled] follow-up visit [FreeTextEntry1] : s/p removal of intrathecal cath 9/12/22

## 2022-09-30 ENCOUNTER — APPOINTMENT (OUTPATIENT)
Dept: HEART AND VASCULAR | Facility: CLINIC | Age: 72
End: 2022-09-30
Payer: MEDICARE

## 2022-09-30 ENCOUNTER — APPOINTMENT (OUTPATIENT)
Dept: CARDIOLOGY | Facility: CLINIC | Age: 72
End: 2022-09-30
Payer: MEDICARE

## 2022-09-30 VITALS — SYSTOLIC BLOOD PRESSURE: 106 MMHG | HEART RATE: 60 BPM | DIASTOLIC BLOOD PRESSURE: 60 MMHG

## 2022-09-30 DIAGNOSIS — I37.1 NONRHEUMATIC PULMONARY VALVE INSUFFICIENCY: ICD-10-CM

## 2022-09-30 DIAGNOSIS — I35.9 NONRHEUMATIC AORTIC VALVE DISORDER, UNSPECIFIED: ICD-10-CM

## 2022-09-30 PROCEDURE — 93306 TTE W/DOPPLER COMPLETE: CPT

## 2022-09-30 PROCEDURE — 93000 ELECTROCARDIOGRAM COMPLETE: CPT

## 2022-09-30 PROCEDURE — 93010 ELECTROCARDIOGRAM REPORT: CPT

## 2022-09-30 PROCEDURE — 99214 OFFICE O/P EST MOD 30 MIN: CPT | Mod: 25

## 2022-09-30 RX ORDER — HYDROXYZINE HYDROCHLORIDE 50 MG/1
50 TABLET ORAL
Qty: 30 | Refills: 0 | Status: DISCONTINUED | COMMUNITY
Start: 2022-01-25 | End: 2022-09-30

## 2022-09-30 RX ORDER — CICLOPIROX 10 MG/.96ML
1 SHAMPOO TOPICAL
Qty: 120 | Refills: 0 | Status: DISCONTINUED | COMMUNITY
Start: 2021-10-22 | End: 2022-09-30

## 2022-09-30 RX ORDER — PANTOPRAZOLE SODIUM 40 MG/1
40 GRANULE, DELAYED RELEASE ORAL
Refills: 0 | Status: ACTIVE | COMMUNITY

## 2022-09-30 RX ORDER — HYDRALAZINE HYDROCHLORIDE 50 MG/1
50 TABLET ORAL
Qty: 90 | Refills: 1 | Status: DISCONTINUED | COMMUNITY
Start: 2022-06-01 | End: 2022-09-30

## 2022-09-30 RX ORDER — HYDRALAZINE HYDROCHLORIDE 25 MG/1
25 TABLET ORAL
Qty: 90 | Refills: 1 | Status: DISCONTINUED | COMMUNITY
Start: 2021-05-26 | End: 2022-09-30

## 2022-09-30 RX ORDER — DOXAZOSIN 4 MG/1
4 TABLET ORAL
Refills: 0 | Status: DISCONTINUED | COMMUNITY
End: 2022-09-30

## 2022-09-30 RX ORDER — FLUOCINOLONE ACETONIDE 0.1 MG/ML
0.01 SOLUTION TOPICAL
Qty: 60 | Refills: 0 | Status: DISCONTINUED | COMMUNITY
Start: 2021-10-22 | End: 2022-09-30

## 2022-09-30 RX ORDER — LORATADINE 5 MG
17 TABLET,CHEWABLE ORAL
Refills: 0 | Status: DISCONTINUED | COMMUNITY
End: 2022-09-30

## 2022-09-30 RX ORDER — OMEPRAZOLE 20 MG/1
20 CAPSULE, DELAYED RELEASE ORAL DAILY
Qty: 90 | Refills: 3 | Status: DISCONTINUED | COMMUNITY
Start: 2022-06-30 | End: 2022-09-30

## 2022-09-30 RX ORDER — OMEPRAZOLE 40 MG/1
CAPSULE, DELAYED RELEASE ORAL
Refills: 0 | Status: DISCONTINUED | COMMUNITY
End: 2022-09-30

## 2022-09-30 RX ORDER — ERGOCALCIFEROL (VITAMIN D2) 1250 MCG
50000 CAPSULE ORAL
Refills: 0 | Status: DISCONTINUED | COMMUNITY
End: 2022-09-30

## 2022-09-30 RX ORDER — METRONIDAZOLE 10 MG/G
1 GEL TOPICAL
Qty: 60 | Refills: 0 | Status: DISCONTINUED | COMMUNITY
Start: 2021-10-22 | End: 2022-09-30

## 2022-09-30 NOTE — HISTORY OF PRESENT ILLNESS
[FreeTextEntry1] : had cholecystectomy and course complicated by sepsis, cardiomyopathy- BP was labile and meds adjusted- now in rehab\par no cp/sob/palpitations

## 2022-09-30 NOTE — DISCUSSION/SUMMARY
[FreeTextEntry1] : EKG:NSR,RBBB, minimal inferior Q waves(no change)\par TTE: normal LVEF,LVH,ascending aorta=4.79cm;Mild-mod AR,moderate FL,mild MR\par hospital records/DC summary reviewed - had reduced LVEF- wife tells me was told of MI- i feel cardiomyopathy and ?trop elevation) probably all due to sepsis\par continue Eliquis +Toprol for PAF;nifedipine for HPTN,Simvastatin for HLD\par He was on Cardura for his prostate- recommend  evaluation if still needs it\par would get Chest CT for evaluation of ascending aortic aneurysm seen on echo\par \par Time spent included drug reconciliation,review of hospital records/reports done previously and at time of Visit

## 2022-09-30 NOTE — PHYSICAL EXAM
[Normal Venous Pressure] : normal venous pressure [No Carotid Bruit] : no carotid bruit [Normal S1, S2] : normal S1, S2 [No Rub] : no rub [Normal] : clear lung fields, good air entry, no respiratory distress [Soft] : abdomen soft [Non Tender] : non-tender [Normal Bowel Sounds] : normal bowel sounds [No Edema] : no edema [No Rash] : no rash [Moves all extremities] : moves all extremities [Alert and Oriented] : alert and oriented [de-identified] : examined in wheelchair [de-identified] : wheelchair

## 2022-10-03 ENCOUNTER — OUTPATIENT (OUTPATIENT)
Dept: OUTPATIENT SERVICES | Facility: HOSPITAL | Age: 72
LOS: 1 days | Discharge: HOME | End: 2022-10-03

## 2022-10-03 DIAGNOSIS — Z96.642 PRESENCE OF LEFT ARTIFICIAL HIP JOINT: Chronic | ICD-10-CM

## 2022-10-03 DIAGNOSIS — Z98.890 OTHER SPECIFIED POSTPROCEDURAL STATES: Chronic | ICD-10-CM

## 2022-10-03 DIAGNOSIS — Z95.0 PRESENCE OF CARDIAC PACEMAKER: Chronic | ICD-10-CM

## 2022-10-03 DIAGNOSIS — I71.9 AORTIC ANEURYSM OF UNSPECIFIED SITE, WITHOUT RUPTURE: ICD-10-CM

## 2022-10-03 DIAGNOSIS — Z41.9 ENCOUNTER FOR PROCEDURE FOR PURPOSES OTHER THAN REMEDYING HEALTH STATE, UNSPECIFIED: Chronic | ICD-10-CM

## 2022-10-03 DIAGNOSIS — I71.2 THORACIC AORTIC ANEURYSM, WITHOUT RUPTURE: ICD-10-CM

## 2022-10-03 PROCEDURE — 71275 CT ANGIOGRAPHY CHEST: CPT | Mod: 26

## 2022-10-17 ENCOUNTER — APPOINTMENT (OUTPATIENT)
Dept: UROLOGY | Facility: CLINIC | Age: 72
End: 2022-10-17

## 2022-10-17 DIAGNOSIS — R39.12 POOR URINARY STREAM: ICD-10-CM

## 2022-10-26 ENCOUNTER — APPOINTMENT (OUTPATIENT)
Dept: UROLOGY | Facility: CLINIC | Age: 72
End: 2022-10-26

## 2022-10-26 NOTE — PATIENT PROFILE ADULT - FUNCTIONAL ASSESSMENT - DAILY ACTIVITY SCORE.
I have ordered a sleep study on this patient POTS if changing medications takes care of the daytime sleepiness and we will need to do it. 13

## 2022-11-01 ENCOUNTER — APPOINTMENT (OUTPATIENT)
Dept: CT IMAGING | Facility: HOSPITAL | Age: 72
End: 2022-11-01

## 2022-11-01 ENCOUNTER — OUTPATIENT (OUTPATIENT)
Dept: OUTPATIENT SERVICES | Facility: HOSPITAL | Age: 72
LOS: 1 days | Discharge: HOME | End: 2022-11-01

## 2022-11-01 DIAGNOSIS — Z98.890 OTHER SPECIFIED POSTPROCEDURAL STATES: Chronic | ICD-10-CM

## 2022-11-01 DIAGNOSIS — K91.5 POSTCHOLECYSTECTOMY SYNDROME: ICD-10-CM

## 2022-11-01 DIAGNOSIS — Z96.642 PRESENCE OF LEFT ARTIFICIAL HIP JOINT: Chronic | ICD-10-CM

## 2022-11-01 DIAGNOSIS — Z41.9 ENCOUNTER FOR PROCEDURE FOR PURPOSES OTHER THAN REMEDYING HEALTH STATE, UNSPECIFIED: Chronic | ICD-10-CM

## 2022-11-01 DIAGNOSIS — Z95.0 PRESENCE OF CARDIAC PACEMAKER: Chronic | ICD-10-CM

## 2022-11-01 DIAGNOSIS — S34.9XXA: ICD-10-CM

## 2022-11-01 PROCEDURE — 74177 CT ABD & PELVIS W/CONTRAST: CPT | Mod: 26

## 2022-11-02 NOTE — PRE-OP CHECKLIST - RESPIRATORY RATE (BREATHS/MIN)
Pt discharged to home . Pt IV removed. Pt has all discharge instructions and personal belongings. Tolerating clear liquids well. No further questions. Adequate for discharge.    16

## 2022-11-30 ENCOUNTER — APPOINTMENT (OUTPATIENT)
Dept: HEART AND VASCULAR | Facility: CLINIC | Age: 72
End: 2022-11-30

## 2022-11-30 PROCEDURE — 99213 OFFICE O/P EST LOW 20 MIN: CPT | Mod: 95

## 2022-11-30 NOTE — DISCUSSION/SUMMARY
[FreeTextEntry1] : i reviewed and reconciled meds- continue Eliquis + toprol for PAF;losartan + nifedipine for HPTN;Zocor for HLD\par meds refilled

## 2022-11-30 NOTE — REASON FOR VISIT
[Home] : at home, [unfilled] , at the time of the visit. [Medical Office: ([facility])___] : at the medical office located in  [Spouse] : spouse

## 2022-12-02 NOTE — ED ADULT NURSE NOTE - ED STAT RN HANDOFF DETAILS
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  There are   minimal degenerative changes of the thoracic spine and bilateral   acromioclavicular and glenohumeral joints.  The heart size is normal.    There is slight tortuosity of the thoracic aorta.  There is slight relative   elevation of the left hemidiaphragm.      The lungs are clear.      Electronically Signed by: ANAMIKA MAJANO MD    Signed on: 11/30/2022 10:38 AM               Brief Hospital Course:   70 year old mal with PMH of ESRD on HD (MWF), HTN, DM, foot ulcer sp skin graft presented for an episode of fever and rigors during dialysis. The episode lasted about an hour with associated chest pressure, shortness of breath, nausea, and vomiting. He was unable to complete dialysis and presented to [facility]. In the ED, he was febrile once at 102, tachycardic and hypertensive. Labs were significant for a mild lactic acidosis that cleared without intervention. CXR was unremarkable. Blood cultures were obtained. He received a dose of vancomycin and rocephin the ED. There was no evidence of infection of the foot ulcer. The patient was admitted and monitored without additional administration of antibiotics as there was low suspicion for infectious process. Patient remained afebrile, blood cultures demonstrated no growth after one day. The patient received a session of dialysis that was uneventful. He was discharged in stable in condition with the below instructions.       Physical Exam:   General: AOx4, NAD  HENT: Normocephalic, atraumatic  Cardiovascular:  regular rate  Respiratory: non-labored respirations on room air  Abdomen: non-distended  Extremities: RLE with dressing  Neurological: no focal deficits  Skin: Right heel ulcer      Visit Vitals  /62 (BP Location: RUE - Right upper extremity, Patient Position: Semi-Olivas's)   Pulse 88   Temp 98.1 °F (36.7 °C) (Oral)   Resp 18   Ht 6' 2\" (1.88 m)   Wt 92.5 kg (203 lb 14.8 oz)   SpO2 99%   BMI 26.18 kg/m²           DISCHARGE MEDICATION LIST      Summary of your Discharge Medications      Take these Medications      Details   Accu-Chek FastClix Lancets Misc   Use 1x a day to check blood sugars     Accu-Chek Guide w/Device Kit   1 each daily.     acetaminophen-codeine 300-30 MG per tablet  Commonly known as: TYLENOL NO.3  Notes to patient: Side effects- constipation, nausea, abdominal cramping   Take 1 tablet by mouth every 4 hours as needed.     amLODIPine 10 MG tablet  Commonly known as: NORVASC  Notes to patient: Side effects- low blood pressure, headache, dizziness   Take 1 tablet by mouth daily.     Aspirin Low Dose 81 MG EC tablet  Notes to patient: Side effects- increased risk for bleeding, bruising   Generic drug: aspirin  Take 1 tablet by mouth daily.     blood glucose test strip  Commonly known as: Accu-Chek Guide   Test blood sugar 1 times daily as directed. Diagnosis: E11.65 Meter: Accu-swetha Guide     calcitRIOL 0.25 MCG capsule  Commonly known as: ROCALTROL   Take 0.25 mcg by mouth daily.     cinacalcet 30 MG tablet  Commonly known as: SENSIPAR   Take 1 tablet by mouth 3 days a week.     furosemide 20 MG tablet  Commonly known as: LASIX   Take 1 tablet by mouth daily.     gabapentin 100 MG capsule  Commonly known as: NEURONTIN   Take 2 capsules by mouth at bedtime.     hydrALAZINE 50 MG tablet  Commonly known as: APRESOLINE   Take 1 tablet by mouth 3 times daily.     HYDROcodone-acetaminophen 5-325 MG per tablet  Commonly known as: NORCO   Take 1 tablet by mouth every 4 hours as needed for Pain.      lanthanum 1000 MG chewable tablet  Commonly known as: FOSRENOL   CHEW AND SWALLOW ONE TABLET BY MOUTH THREE TIMES DAILY WITH FOOD     lisinopril 40 MG tablet  Commonly known as: ZESTRIL   Take 1 tablet by mouth daily.     Magnesium 100 MG Cap   Take 100 mg by mouth daily.     pantoprazole 40 MG tablet  Commonly known as: PROTONIX   TAKE 1 TABLET BY MOUTH BEFORE BREAKFAST     polyethylene glycol 17 g packet  Commonly known as: MIRALAX   Take 17 g by mouth daily as needed. Do not start before May 28, 2021. Stir and dissolve powder in any 4 to 8 ounces of beverage, then drink.     Renal 1 MG Cap   Take 1 tablet (1 mg) by mouth daily.     SITagliptan 25 MG tablet  Commonly known as: JANUVIA   Take 1 tablet by mouth daily.  Comment: Need to see doctor first for 90 day supply     SM Vitamin D3 50 mcg (2,000 units) capsule   Generic drug: Cholecalciferol  Take 1 capsule by mouth daily.  Comment: 50 mcg = 2,000 units     sodium bicarbonate 650 MG tablet   Take 650 mg by mouth 3 times daily.     vitamin C 100 MG tablet   Take 1 tablet by mouth daily.             Changes to medications: Discontinue ibuprofen    Discharge Instructions:   You were admitted to the hospital for a fever. All of the tests and imaging were negative for any source of infection and the fever did not occur again. Since you were unable to complete dialysis due to your symptoms, dialysis was complete at the hospital. You will resume your regular Monday, Wednesday, Friday dialysis schedule.    While you were here, the podiatry team evaluated your foot. Please follow up with your podiatrist as scheduled.    Medications to Stop Takin. Ibuprofen - this medication can affect the kidneys. For pain, you can take tylenol.    If you develop these symptoms again of fevers, chest pain, difficulty breathing, please return to the emergency room.    Please follow up with your primary care provider for a post-hospital follow up.    Condition: Good  Discharge to:  Home - self care    Discharge instructions discussed with the patient or surrogate decision maker who verbalized understanding, agreed with the therapeutic plan and had the opportunity to ask questions.    Gina Solorio, DO  Internal Medicine, PGY-2  [employer]:        Patient stable for transfer. Transfer and plan of care discussed with patient and family member present at bedside and verbalized understanding. No acute distress noted. Safety precautions maintained. Belongings secured with patient.

## 2022-12-10 NOTE — DISCHARGE NOTE NURSING/CASE MANAGEMENT/SOCIAL WORK - NURSING SECTION COMPLETE
Patient/Caregiver provided printed discharge information.
(E4) spontaneous
Patient/Caregiver provided printed discharge information.

## 2022-12-30 ENCOUNTER — NON-APPOINTMENT (OUTPATIENT)
Age: 72
End: 2022-12-30

## 2022-12-30 ENCOUNTER — APPOINTMENT (OUTPATIENT)
Dept: HEART AND VASCULAR | Facility: CLINIC | Age: 72
End: 2022-12-30
Payer: MEDICARE

## 2022-12-30 VITALS
OXYGEN SATURATION: 97 % | BODY MASS INDEX: 25.77 KG/M2 | SYSTOLIC BLOOD PRESSURE: 99 MMHG | HEART RATE: 97 BPM | HEIGHT: 69 IN | WEIGHT: 174 LBS | DIASTOLIC BLOOD PRESSURE: 61 MMHG | TEMPERATURE: 98 F

## 2022-12-30 PROCEDURE — 99214 OFFICE O/P EST MOD 30 MIN: CPT | Mod: 25

## 2022-12-30 PROCEDURE — 93000 ELECTROCARDIOGRAM COMPLETE: CPT

## 2022-12-30 RX ORDER — NIFEDIPINE 30 MG/1
30 TABLET, EXTENDED RELEASE ORAL DAILY
Qty: 90 | Refills: 1 | Status: DISCONTINUED | COMMUNITY
Start: 1900-01-01 | End: 2022-12-30

## 2022-12-30 NOTE — PHYSICAL EXAM
[Normal Venous Pressure] : normal venous pressure [No Carotid Bruit] : no carotid bruit [Normal S1, S2] : normal S1, S2 [No Rub] : no rub [Normal] : clear lung fields, good air entry, no respiratory distress [Soft] : abdomen soft [Non Tender] : non-tender [Normal Bowel Sounds] : normal bowel sounds [No Edema] : no edema [No Rash] : no rash [Moves all extremities] : moves all extremities [Alert and Oriented] : alert and oriented [de-identified] : examined in wheelchair [de-identified] : wheelchair

## 2022-12-30 NOTE — DISCUSSION/SUMMARY
[FreeTextEntry1] : EKG:NSR,RBBB\par as BP low will stop nifedipine and continue losartan for HPTN;Zocor for HLD;Toprol + Eliquis for PAF

## 2022-12-30 NOTE — HISTORY OF PRESENT ILLNESS
[FreeTextEntry1] : just got out of rehab- was on Iron for anemia but stopped it due to GI effects. no CP/sob

## 2023-01-09 NOTE — PHYSICAL THERAPY INITIAL EVALUATION ADULT - ASSISTIVE DEVICE:SUPINE/SIT, REHAB EVAL
bed rails Acitretin Counseling:  I discussed with the patient the risks of acitretin including but not limited to hair loss, dry lips/skin/eyes, liver damage, hyperlipidemia, depression/suicidal ideation, photosensitivity.  Serious rare side effects can include but are not limited to pancreatitis, pseudotumor cerebri, bony changes, clot formation/stroke/heart attack.  Patient understands that alcohol is contraindicated since it can result in liver toxicity and significantly prolong the elimination of the drug by many years.

## 2023-01-23 ENCOUNTER — APPOINTMENT (OUTPATIENT)
Dept: GASTROENTEROLOGY | Facility: CLINIC | Age: 73
End: 2023-01-23
Payer: MEDICARE

## 2023-01-23 VITALS
DIASTOLIC BLOOD PRESSURE: 110 MMHG | TEMPERATURE: 97 F | SYSTOLIC BLOOD PRESSURE: 150 MMHG | HEART RATE: 87 BPM | OXYGEN SATURATION: 98 % | HEIGHT: 69 IN

## 2023-01-23 DIAGNOSIS — D50.9 IRON DEFICIENCY ANEMIA, UNSPECIFIED: ICD-10-CM

## 2023-01-23 DIAGNOSIS — K80.40 CALCULUS OF BILE DUCT WITH CHOLECYSTITIS, UNSPECIFIED, W/OUT OBSTRUCTION: ICD-10-CM

## 2023-01-23 DIAGNOSIS — D62 ACUTE POSTHEMORRHAGIC ANEMIA: ICD-10-CM

## 2023-01-23 DIAGNOSIS — Z86.010 PERSONAL HISTORY OF COLONIC POLYPS: ICD-10-CM

## 2023-01-23 DIAGNOSIS — K59.09 OTHER CONSTIPATION: ICD-10-CM

## 2023-01-23 PROCEDURE — 36415 COLL VENOUS BLD VENIPUNCTURE: CPT

## 2023-01-23 PROCEDURE — 99215 OFFICE O/P EST HI 40 MIN: CPT | Mod: 25

## 2023-01-23 NOTE — HISTORY OF PRESENT ILLNESS
[FreeTextEntry1] : Patient was seen in June for mild normocytic anemia felt to be secondary to anemia of chronic disease.  Chronic GERD symptoms, chronic constipation secondary to opiates, status post colon resection for benign mass outside of colon.  Around August he was hospitalized with cholecystitis and choledocholithiasis.  ERCP followed by laparoscopic cholecystectomy was planned but patient developed cardiac issues possibly MI and septic shock ended up having a cholecystostomy tube and was sent to rehab.  While in rehab he started having significant bleeding from the tube and was readmitted to the hospital where he underwent ERCP followed by cholecystectomy.  At the time of the surgery he had an intrathecal pump removed from the abdomen with the tubing left behind, but developed ascites secondary to spinal fluid leak.  He had surgery to remove the tubing.  After having been sent back to rehab he began experiencing incontinence of stools once or twice a day.  Stool was pasty but not necessarily diarrhea.  He was placed on cholestyramine which caused constipation and nausea and was stopped by the patient.  Was also started on iron since he became severely anemic after the above but it seemed to also trigger urgent bowel movements with urgency and incontinence.  Has been having frequent left lower quadrant crampy pain.

## 2023-01-23 NOTE — PHYSICAL EXAM
[Normal] : heart rate was normal and rhythm regular, normal S1 and S2, no murmurs [Bowel Sounds] : normal bowel sounds [No Masses] : no abdominal mass palpated [Abdomen Soft] : soft [] : no hepatosplenomegaly [LLQ] : in the left lower quadrant [de-identified] : Patient in moderate distress due to chronic pain

## 2023-01-23 NOTE — ASSESSMENT
[FreeTextEntry1] : Impression: Change in bowel habits since cholecystectomy most likely secondary to cholecystectomy.  Did not tolerate cholestyramine due to constipation and vomiting.  Anemia of chronic disease with superimposed acute blood loss complicating cholecystostomy.  Did not tolerate iron supplements.  Incontinence most likely secondary to chronic spinal issues and neuromuscular dysfunction.  Rule out C. difficile.  Left lower quadrant pain likely musculoskeletal but could also be IBS.  GERD symptoms controlled.\par \par Plan: We will send stool for C. difficile and PCR.  Will prescribe dicyclomine 20 mg 3 times daily.  Check CBC today.  Restart iron if still significantly anemic.  Consider restarting cholestyramine at lower dose pending above.  Patient is not a candidate for endoscopy

## 2023-01-23 NOTE — REVIEW OF SYSTEMS
[Feeling Poorly] : feeling poorly [Feeling Tired] : feeling tired [As Noted in HPI] : as noted in HPI [Arthralgias (joint pain)] : arthralgias [Negative] : Genitourinary [FreeTextEntry9] : Chronic back pain

## 2023-01-24 LAB
BASOPHILS # BLD AUTO: 0.08 K/UL
BASOPHILS NFR BLD AUTO: 0.9 %
EOSINOPHIL # BLD AUTO: 0.17 K/UL
EOSINOPHIL NFR BLD AUTO: 1.9 %
HCT VFR BLD CALC: 33.3 %
HGB BLD-MCNC: 10.2 G/DL
IMM GRANULOCYTES NFR BLD AUTO: 0.3 %
IRON SATN MFR SERPL: 10 %
IRON SERPL-MCNC: 27 UG/DL
LYMPHOCYTES # BLD AUTO: 1.25 K/UL
LYMPHOCYTES NFR BLD AUTO: 14 %
MAN DIFF?: NORMAL
MCHC RBC-ENTMCNC: 27.4 PG
MCHC RBC-ENTMCNC: 30.6 GM/DL
MCV RBC AUTO: 89.5 FL
MONOCYTES # BLD AUTO: 1.01 K/UL
MONOCYTES NFR BLD AUTO: 11.3 %
NEUTROPHILS # BLD AUTO: 6.42 K/UL
NEUTROPHILS NFR BLD AUTO: 71.6 %
PLATELET # BLD AUTO: 381 K/UL
PSA SERPL-MCNC: 0.24 NG/ML
RBC # BLD: 3.72 M/UL
RBC # FLD: 14.8 %
TIBC SERPL-MCNC: 268 UG/DL
UIBC SERPL-MCNC: 241 UG/DL
WBC # FLD AUTO: 8.96 K/UL

## 2023-01-31 ENCOUNTER — NON-APPOINTMENT (OUTPATIENT)
Age: 73
End: 2023-01-31

## 2023-02-01 ENCOUNTER — NON-APPOINTMENT (OUTPATIENT)
Age: 73
End: 2023-02-01

## 2023-02-03 ENCOUNTER — NON-APPOINTMENT (OUTPATIENT)
Age: 73
End: 2023-02-03

## 2023-02-07 ENCOUNTER — LABORATORY RESULT (OUTPATIENT)
Age: 73
End: 2023-02-07

## 2023-03-24 NOTE — PRE-OP CHECKLIST - TEMPERATURE IN FAHRENHEIT (DEGREES F)
97.9 Protopic Counseling: Patient may experience a mild burning sensation during topical application. Protopic is not approved in children less than 2 years of age. There have been case reports of hematologic and skin malignancies in patients using topical calcineurin inhibitors although causality is questionable.

## 2023-05-03 ENCOUNTER — NON-APPOINTMENT (OUTPATIENT)
Age: 73
End: 2023-05-03

## 2023-06-23 ENCOUNTER — APPOINTMENT (OUTPATIENT)
Dept: HEART AND VASCULAR | Facility: CLINIC | Age: 73
End: 2023-06-23
Payer: MEDICARE

## 2023-06-23 VITALS
WEIGHT: 171 LBS | BODY MASS INDEX: 25.25 KG/M2 | DIASTOLIC BLOOD PRESSURE: 84 MMHG | HEART RATE: 72 BPM | SYSTOLIC BLOOD PRESSURE: 126 MMHG

## 2023-06-23 DIAGNOSIS — R06.09 OTHER FORMS OF DYSPNEA: ICD-10-CM

## 2023-06-23 PROCEDURE — 99214 OFFICE O/P EST MOD 30 MIN: CPT | Mod: 25

## 2023-06-23 PROCEDURE — 93000 ELECTROCARDIOGRAM COMPLETE: CPT

## 2023-06-23 NOTE — DISCUSSION/SUMMARY
[FreeTextEntry1] : EKG:NSR,RBBB,First Degree AVB(no change)\par had ppm checked with Dr ROSA Lemos\par continue Eliquis + Toprol for PAF;simvastatin for HLD;losartan for HPTN/aorta\par feel his ONOFRE (which he denies) is due to deconditioning

## 2023-06-23 NOTE — REASON FOR VISIT
[Arrhythmia/ECG Abnorrmalities] : arrhythmia/ECG abnormalities [Hyperlipidemia] : hyperlipidemia [Spouse] : spouse [Family Member] : family member

## 2023-06-23 NOTE — PHYSICAL EXAM
[Normal Venous Pressure] : normal venous pressure [No Carotid Bruit] : no carotid bruit [Normal S1, S2] : normal S1, S2 [No Rub] : no rub [Normal] : clear lung fields, good air entry, no respiratory distress [Soft] : abdomen soft [Non Tender] : non-tender [Normal Bowel Sounds] : normal bowel sounds [No Edema] : no edema [No Rash] : no rash [Moves all extremities] : moves all extremities [Alert and Oriented] : alert and oriented [de-identified] : examined in wheelchair [de-identified] : wheelchair

## 2023-08-07 ENCOUNTER — EMERGENCY (EMERGENCY)
Facility: HOSPITAL | Age: 73
LOS: 1 days | Discharge: HOME | End: 2023-08-07
Attending: STUDENT IN AN ORGANIZED HEALTH CARE EDUCATION/TRAINING PROGRAM | Admitting: STUDENT IN AN ORGANIZED HEALTH CARE EDUCATION/TRAINING PROGRAM
Payer: MEDICARE

## 2023-08-07 ENCOUNTER — NON-APPOINTMENT (OUTPATIENT)
Age: 73
End: 2023-08-07

## 2023-08-07 VITALS
RESPIRATION RATE: 18 BRPM | OXYGEN SATURATION: 99 % | SYSTOLIC BLOOD PRESSURE: 201 MMHG | DIASTOLIC BLOOD PRESSURE: 102 MMHG | TEMPERATURE: 98 F | HEART RATE: 75 BPM

## 2023-08-07 DIAGNOSIS — Z98.890 OTHER SPECIFIED POSTPROCEDURAL STATES: Chronic | ICD-10-CM

## 2023-08-07 DIAGNOSIS — Z41.9 ENCOUNTER FOR PROCEDURE FOR PURPOSES OTHER THAN REMEDYING HEALTH STATE, UNSPECIFIED: Chronic | ICD-10-CM

## 2023-08-07 DIAGNOSIS — Z95.0 PRESENCE OF CARDIAC PACEMAKER: Chronic | ICD-10-CM

## 2023-08-07 DIAGNOSIS — Z96.642 PRESENCE OF LEFT ARTIFICIAL HIP JOINT: Chronic | ICD-10-CM

## 2023-08-07 PROCEDURE — 93010 ELECTROCARDIOGRAM REPORT: CPT

## 2023-08-07 PROCEDURE — 99284 EMERGENCY DEPT VISIT MOD MDM: CPT

## 2023-08-07 NOTE — ED ADULT TRIAGE NOTE - CHIEF COMPLAINT QUOTE
elevated blood pressure since Saturday despite medication compliance. PT reports headache that are intermittent in nature over same time period. Hx of HTN, HLD, spinal fusion, AAA repair

## 2023-08-08 VITALS — HEART RATE: 70 BPM | DIASTOLIC BLOOD PRESSURE: 83 MMHG | SYSTOLIC BLOOD PRESSURE: 155 MMHG

## 2023-08-08 LAB
ALBUMIN SERPL ELPH-MCNC: 3.8 G/DL — SIGNIFICANT CHANGE UP (ref 3.3–5)
ALP SERPL-CCNC: 117 U/L — SIGNIFICANT CHANGE UP (ref 40–120)
ALT FLD-CCNC: 14 U/L — SIGNIFICANT CHANGE UP (ref 4–41)
ANION GAP SERPL CALC-SCNC: 8 MMOL/L — SIGNIFICANT CHANGE UP (ref 7–14)
APPEARANCE UR: CLEAR — SIGNIFICANT CHANGE UP
AST SERPL-CCNC: 22 U/L — SIGNIFICANT CHANGE UP (ref 4–40)
BACTERIA # UR AUTO: NEGATIVE /HPF — SIGNIFICANT CHANGE UP
BASOPHILS # BLD AUTO: 0.06 K/UL — SIGNIFICANT CHANGE UP (ref 0–0.2)
BASOPHILS NFR BLD AUTO: 0.7 % — SIGNIFICANT CHANGE UP (ref 0–2)
BILIRUB SERPL-MCNC: 0.3 MG/DL — SIGNIFICANT CHANGE UP (ref 0.2–1.2)
BILIRUB UR-MCNC: NEGATIVE — SIGNIFICANT CHANGE UP
BUN SERPL-MCNC: 18 MG/DL — SIGNIFICANT CHANGE UP (ref 7–23)
CALCIUM SERPL-MCNC: 9.3 MG/DL — SIGNIFICANT CHANGE UP (ref 8.4–10.5)
CAST: 0 /LPF — SIGNIFICANT CHANGE UP (ref 0–4)
CHLORIDE SERPL-SCNC: 100 MMOL/L — SIGNIFICANT CHANGE UP (ref 98–107)
CO2 SERPL-SCNC: 29 MMOL/L — SIGNIFICANT CHANGE UP (ref 22–31)
COLOR SPEC: YELLOW — SIGNIFICANT CHANGE UP
CREAT SERPL-MCNC: 1.43 MG/DL — HIGH (ref 0.5–1.3)
DIFF PNL FLD: ABNORMAL
EGFR: 51 ML/MIN/1.73M2 — LOW
EOSINOPHIL # BLD AUTO: 0.35 K/UL — SIGNIFICANT CHANGE UP (ref 0–0.5)
EOSINOPHIL NFR BLD AUTO: 4 % — SIGNIFICANT CHANGE UP (ref 0–6)
GLUCOSE SERPL-MCNC: 111 MG/DL — HIGH (ref 70–99)
GLUCOSE UR QL: NEGATIVE MG/DL — SIGNIFICANT CHANGE UP
HCT VFR BLD CALC: 36 % — LOW (ref 39–50)
HGB BLD-MCNC: 11.6 G/DL — LOW (ref 13–17)
IANC: 5.79 K/UL — SIGNIFICANT CHANGE UP (ref 1.8–7.4)
IMM GRANULOCYTES NFR BLD AUTO: 0.2 % — SIGNIFICANT CHANGE UP (ref 0–0.9)
KETONES UR-MCNC: NEGATIVE MG/DL — SIGNIFICANT CHANGE UP
LEUKOCYTE ESTERASE UR-ACNC: NEGATIVE — SIGNIFICANT CHANGE UP
LYMPHOCYTES # BLD AUTO: 1.59 K/UL — SIGNIFICANT CHANGE UP (ref 1–3.3)
LYMPHOCYTES # BLD AUTO: 18.2 % — SIGNIFICANT CHANGE UP (ref 13–44)
MCHC RBC-ENTMCNC: 29.4 PG — SIGNIFICANT CHANGE UP (ref 27–34)
MCHC RBC-ENTMCNC: 32.2 GM/DL — SIGNIFICANT CHANGE UP (ref 32–36)
MCV RBC AUTO: 91.1 FL — SIGNIFICANT CHANGE UP (ref 80–100)
MONOCYTES # BLD AUTO: 0.91 K/UL — HIGH (ref 0–0.9)
MONOCYTES NFR BLD AUTO: 10.4 % — SIGNIFICANT CHANGE UP (ref 2–14)
NEUTROPHILS # BLD AUTO: 5.79 K/UL — SIGNIFICANT CHANGE UP (ref 1.8–7.4)
NEUTROPHILS NFR BLD AUTO: 66.5 % — SIGNIFICANT CHANGE UP (ref 43–77)
NITRITE UR-MCNC: NEGATIVE — SIGNIFICANT CHANGE UP
NRBC # BLD: 0 /100 WBCS — SIGNIFICANT CHANGE UP (ref 0–0)
NRBC # FLD: 0 K/UL — SIGNIFICANT CHANGE UP (ref 0–0)
PH UR: 7 — SIGNIFICANT CHANGE UP (ref 5–8)
PLATELET # BLD AUTO: 280 K/UL — SIGNIFICANT CHANGE UP (ref 150–400)
POTASSIUM SERPL-MCNC: 5 MMOL/L — SIGNIFICANT CHANGE UP (ref 3.5–5.3)
POTASSIUM SERPL-SCNC: 5 MMOL/L — SIGNIFICANT CHANGE UP (ref 3.5–5.3)
PROT SERPL-MCNC: 8 G/DL — SIGNIFICANT CHANGE UP (ref 6–8.3)
PROT UR-MCNC: 30 MG/DL
RBC # BLD: 3.95 M/UL — LOW (ref 4.2–5.8)
RBC # FLD: 14.6 % — HIGH (ref 10.3–14.5)
RBC CASTS # UR COMP ASSIST: 4 /HPF — SIGNIFICANT CHANGE UP (ref 0–4)
SODIUM SERPL-SCNC: 137 MMOL/L — SIGNIFICANT CHANGE UP (ref 135–145)
SP GR SPEC: 1.01 — SIGNIFICANT CHANGE UP (ref 1–1.03)
SQUAMOUS # UR AUTO: 0 /HPF — SIGNIFICANT CHANGE UP (ref 0–5)
UROBILINOGEN FLD QL: 0.2 MG/DL — SIGNIFICANT CHANGE UP (ref 0.2–1)
WBC # BLD: 8.72 K/UL — SIGNIFICANT CHANGE UP (ref 3.8–10.5)
WBC # FLD AUTO: 8.72 K/UL — SIGNIFICANT CHANGE UP (ref 3.8–10.5)
WBC UR QL: 0 /HPF — SIGNIFICANT CHANGE UP (ref 0–5)

## 2023-08-08 NOTE — ED ADULT NURSE NOTE - NSFALLRISKINTERV_ED_ALL_ED

## 2023-08-08 NOTE — ED PROVIDER NOTE - PATIENT PORTAL LINK FT
You can access the FollowMyHealth Patient Portal offered by [facility] by registering at the following website: [email or web address]. By joining [employer]’s FollowMyHealth portal, you will also be able to view your health information using other applications (apps) compatible with our system.

## 2023-08-08 NOTE — ED PROVIDER NOTE - NSFOLLOWUPINSTRUCTIONS_ED_ALL_ED_FT
Today you were seen in the ED for evaluation of headache.     A copy of your results are attached this document below.     Please follow up with your cardiologist in regards to today's visit and further management of your blood pressure. Continue to take your medications as prescribed until you are seen by cardiology.     Please return to the ED if you have any of the following:    You have a severe headache or vision loss.  You have weakness in an arm or leg.  Squeezing, pressure, or pain in your chest  Shortness of breath  Nausea or vomiting  Lightheadedness or a sudden cold sweat

## 2023-08-08 NOTE — ED PROVIDER NOTE - ATTENDING CONTRIBUTION TO CARE
73-year-old male, past medical history of hypertension, hyperlipidemia, A-fib on Eliquis, status post spinal fusion, status post AAA repair, status post PPM, presents to ED complaining of hypertension since Friday associated with intermittent headache.  Patient currently asymptomatic.  Patient has been told by his cardiologist that he should be monitoring his blood pressure over the next few days.  BP tonight was 220/110, and was advised to present to ED.  Patient currently on metoprolol 50 mg and losartan 25 mg (was told to increase dose tonight so took 75 mg).  Patient currently asymptomatic.  Denies fever/chills, chest pain, shortness of breath, vision changes, weakness/numbness/tingling, lower extremity edema, abdominal pain, nausea/vomiting, no urinary symptoms, lightheadedness/dizziness or any other symptoms at this time.    Patient BP in triage was 201/102, and room BP is 180/91.  Otherwise vital signs stable.  Physical exam is unremarkable.  Heart is regular rate and rhythm.  Lungs clear to auscultation bilaterally.  No abdominal tenderness.  No lower extremity edema.  Pupils equal round and reactive.  Exam is nonfocal.    Patient presenting with asymptomatic hypertension.  Patient did complain of intermittent headaches however symptoms are resolved at this time.  Low suspicion for intracranial process at this time.  No indication for CT head.  EKG is atrially paced and nonischemic.  Low suspicion for ACS at this time.  Given intermittent symptoms and significant medical history will check basic labs/electrolytes and UA.  Dispo pending results and reassessment, however likely will be able to discharge home with cardiology follow-up.

## 2023-08-08 NOTE — ED PROVIDER NOTE - NSICDXPASTSURGICALHX_GEN_ALL_CORE_FT
PAST SURGICAL HISTORY:  History of back surgery multiple, lumbar    History of kidney surgery     Pacemaker medtronic    S/P AAA (abdominal aortic aneurysm) repair with right iliac aneurysm endovascular repair    S/P arthroscopy of right knee     S/P carpal tunnel release     S/P hip replacement, left     Surgery, elective multiple neck surgery, cervical    Surgery, elective Cardiac Stent x4    Surgery, elective Intrathecal pump placement

## 2023-08-08 NOTE — ED ADULT NURSE NOTE - OBJECTIVE STATEMENT
Break RN: Pt is a 72 y/o Male, A&Ox4, ambulatory with walker with a PHx of HTN, AAA repair, pacemaker, and spinal fusion. Pt presents to the ED with c/o high blood pressure since Saturday despite medication compliance. Pt's wife at bedside states SBP was in 200s earlier today. Pt also endorses intermittent headaches. Neuro/sensory intact. Respirations even and unlabored, chest rise equal b/l. Abdomen soft, nondistended, nontender. 20g IVL placed in RAC. Labs collected and sent. VS as noted in flow sheets. Pt denies chest pain, SOB, fever, cough, chills, abdominal pain, N/V/D, blurred vision, changes in vision, dizziness, numbness/tingling or any urinary symptoms at this time. No acute distress noted. Safety maintained throughout. Will continue to monitor.

## 2023-08-10 ENCOUNTER — EMERGENCY (EMERGENCY)
Facility: HOSPITAL | Age: 73
LOS: 1 days | Discharge: HOME | End: 2023-08-10
Attending: EMERGENCY MEDICINE | Admitting: EMERGENCY MEDICINE
Payer: MEDICARE

## 2023-08-10 VITALS
SYSTOLIC BLOOD PRESSURE: 168 MMHG | OXYGEN SATURATION: 97 % | DIASTOLIC BLOOD PRESSURE: 93 MMHG | HEART RATE: 80 BPM | TEMPERATURE: 98 F | RESPIRATION RATE: 18 BRPM | WEIGHT: 199.08 LBS

## 2023-08-10 DIAGNOSIS — J44.9 CHRONIC OBSTRUCTIVE PULMONARY DISEASE, UNSPECIFIED: ICD-10-CM

## 2023-08-10 DIAGNOSIS — Z98.890 OTHER SPECIFIED POSTPROCEDURAL STATES: Chronic | ICD-10-CM

## 2023-08-10 DIAGNOSIS — Z95.0 PRESENCE OF CARDIAC PACEMAKER: Chronic | ICD-10-CM

## 2023-08-10 DIAGNOSIS — J98.11 ATELECTASIS: ICD-10-CM

## 2023-08-10 DIAGNOSIS — M54.2 CERVICALGIA: ICD-10-CM

## 2023-08-10 DIAGNOSIS — Z95.0 PRESENCE OF CARDIAC PACEMAKER: ICD-10-CM

## 2023-08-10 DIAGNOSIS — Z87.39 PERSONAL HISTORY OF OTHER DISEASES OF THE MUSCULOSKELETAL SYSTEM AND CONNECTIVE TISSUE: ICD-10-CM

## 2023-08-10 DIAGNOSIS — Z87.891 PERSONAL HISTORY OF NICOTINE DEPENDENCE: ICD-10-CM

## 2023-08-10 DIAGNOSIS — Z79.01 LONG TERM (CURRENT) USE OF ANTICOAGULANTS: ICD-10-CM

## 2023-08-10 DIAGNOSIS — I10 ESSENTIAL (PRIMARY) HYPERTENSION: ICD-10-CM

## 2023-08-10 DIAGNOSIS — Z86.2 PERSONAL HISTORY OF DISEASES OF THE BLOOD AND BLOOD-FORMING ORGANS AND CERTAIN DISORDERS INVOLVING THE IMMUNE MECHANISM: ICD-10-CM

## 2023-08-10 DIAGNOSIS — Z95.5 PRESENCE OF CORONARY ANGIOPLASTY IMPLANT AND GRAFT: ICD-10-CM

## 2023-08-10 DIAGNOSIS — Z41.9 ENCOUNTER FOR PROCEDURE FOR PURPOSES OTHER THAN REMEDYING HEALTH STATE, UNSPECIFIED: Chronic | ICD-10-CM

## 2023-08-10 DIAGNOSIS — Z96.642 PRESENCE OF LEFT ARTIFICIAL HIP JOINT: Chronic | ICD-10-CM

## 2023-08-10 DIAGNOSIS — I48.91 UNSPECIFIED ATRIAL FIBRILLATION: ICD-10-CM

## 2023-08-10 DIAGNOSIS — F41.8 OTHER SPECIFIED ANXIETY DISORDERS: ICD-10-CM

## 2023-08-10 DIAGNOSIS — Z86.718 PERSONAL HISTORY OF OTHER VENOUS THROMBOSIS AND EMBOLISM: ICD-10-CM

## 2023-08-10 DIAGNOSIS — Z86.79 PERSONAL HISTORY OF OTHER DISEASES OF THE CIRCULATORY SYSTEM: ICD-10-CM

## 2023-08-10 DIAGNOSIS — Z96.642 PRESENCE OF LEFT ARTIFICIAL HIP JOINT: ICD-10-CM

## 2023-08-10 DIAGNOSIS — Z88.8 ALLERGY STATUS TO OTHER DRUGS, MEDICAMENTS AND BIOLOGICAL SUBSTANCES STATUS: ICD-10-CM

## 2023-08-10 DIAGNOSIS — E78.5 HYPERLIPIDEMIA, UNSPECIFIED: ICD-10-CM

## 2023-08-10 DIAGNOSIS — Z88.0 ALLERGY STATUS TO PENICILLIN: ICD-10-CM

## 2023-08-10 LAB
ALBUMIN SERPL ELPH-MCNC: 3.8 G/DL — SIGNIFICANT CHANGE UP (ref 3.3–5)
ALP SERPL-CCNC: 120 U/L — SIGNIFICANT CHANGE UP (ref 40–120)
ALT FLD-CCNC: 15 U/L — SIGNIFICANT CHANGE UP (ref 10–45)
ANION GAP SERPL CALC-SCNC: 7 MMOL/L — SIGNIFICANT CHANGE UP (ref 5–17)
APTT BLD: 37.1 SEC — HIGH (ref 24.5–35.6)
AST SERPL-CCNC: 24 U/L — SIGNIFICANT CHANGE UP (ref 10–40)
BASOPHILS # BLD AUTO: 0.06 K/UL — SIGNIFICANT CHANGE UP (ref 0–0.2)
BASOPHILS NFR BLD AUTO: 0.8 % — SIGNIFICANT CHANGE UP (ref 0–2)
BILIRUB SERPL-MCNC: 0.5 MG/DL — SIGNIFICANT CHANGE UP (ref 0.2–1.2)
BUN SERPL-MCNC: 20 MG/DL — SIGNIFICANT CHANGE UP (ref 7–23)
CALCIUM SERPL-MCNC: 9.7 MG/DL — SIGNIFICANT CHANGE UP (ref 8.4–10.5)
CHLORIDE SERPL-SCNC: 97 MMOL/L — SIGNIFICANT CHANGE UP (ref 96–108)
CO2 SERPL-SCNC: 30 MMOL/L — SIGNIFICANT CHANGE UP (ref 22–31)
CREAT SERPL-MCNC: 1.35 MG/DL — HIGH (ref 0.5–1.3)
EGFR: 55 ML/MIN/1.73M2 — LOW
EOSINOPHIL # BLD AUTO: 0.32 K/UL — SIGNIFICANT CHANGE UP (ref 0–0.5)
EOSINOPHIL NFR BLD AUTO: 4.1 % — SIGNIFICANT CHANGE UP (ref 0–6)
GLUCOSE SERPL-MCNC: 90 MG/DL — SIGNIFICANT CHANGE UP (ref 70–99)
HCT VFR BLD CALC: 37.2 % — LOW (ref 39–50)
HGB BLD-MCNC: 11.9 G/DL — LOW (ref 13–17)
IMM GRANULOCYTES NFR BLD AUTO: 0.3 % — SIGNIFICANT CHANGE UP (ref 0–0.9)
INR BLD: 1.16 — SIGNIFICANT CHANGE UP (ref 0.85–1.18)
LYMPHOCYTES # BLD AUTO: 1.36 K/UL — SIGNIFICANT CHANGE UP (ref 1–3.3)
LYMPHOCYTES # BLD AUTO: 17.2 % — SIGNIFICANT CHANGE UP (ref 13–44)
MCHC RBC-ENTMCNC: 29.6 PG — SIGNIFICANT CHANGE UP (ref 27–34)
MCHC RBC-ENTMCNC: 32 GM/DL — SIGNIFICANT CHANGE UP (ref 32–36)
MCV RBC AUTO: 92.5 FL — SIGNIFICANT CHANGE UP (ref 80–100)
MONOCYTES # BLD AUTO: 0.87 K/UL — SIGNIFICANT CHANGE UP (ref 0–0.9)
MONOCYTES NFR BLD AUTO: 11 % — SIGNIFICANT CHANGE UP (ref 2–14)
NEUTROPHILS # BLD AUTO: 5.27 K/UL — SIGNIFICANT CHANGE UP (ref 1.8–7.4)
NEUTROPHILS NFR BLD AUTO: 66.6 % — SIGNIFICANT CHANGE UP (ref 43–77)
NRBC # BLD: 0 /100 WBCS — SIGNIFICANT CHANGE UP (ref 0–0)
PLATELET # BLD AUTO: 251 K/UL — SIGNIFICANT CHANGE UP (ref 150–400)
POTASSIUM SERPL-MCNC: 4.7 MMOL/L — SIGNIFICANT CHANGE UP (ref 3.5–5.3)
POTASSIUM SERPL-SCNC: 4.7 MMOL/L — SIGNIFICANT CHANGE UP (ref 3.5–5.3)
PROT SERPL-MCNC: 8.1 G/DL — SIGNIFICANT CHANGE UP (ref 6–8.3)
PROTHROM AB SERPL-ACNC: 13.2 SEC — HIGH (ref 9.5–13)
RBC # BLD: 4.02 M/UL — LOW (ref 4.2–5.8)
RBC # FLD: 14.6 % — HIGH (ref 10.3–14.5)
SODIUM SERPL-SCNC: 134 MMOL/L — LOW (ref 135–145)
WBC # BLD: 7.9 K/UL — SIGNIFICANT CHANGE UP (ref 3.8–10.5)
WBC # FLD AUTO: 7.9 K/UL — SIGNIFICANT CHANGE UP (ref 3.8–10.5)

## 2023-08-10 PROCEDURE — 71045 X-RAY EXAM CHEST 1 VIEW: CPT | Mod: 26

## 2023-08-10 PROCEDURE — 80053 COMPREHEN METABOLIC PANEL: CPT

## 2023-08-10 PROCEDURE — 85730 THROMBOPLASTIN TIME PARTIAL: CPT

## 2023-08-10 PROCEDURE — 36415 COLL VENOUS BLD VENIPUNCTURE: CPT

## 2023-08-10 PROCEDURE — 96365 THER/PROPH/DIAG IV INF INIT: CPT

## 2023-08-10 PROCEDURE — 93005 ELECTROCARDIOGRAM TRACING: CPT

## 2023-08-10 PROCEDURE — 84484 ASSAY OF TROPONIN QUANT: CPT

## 2023-08-10 PROCEDURE — 70450 CT HEAD/BRAIN W/O DYE: CPT | Mod: MA

## 2023-08-10 PROCEDURE — 71045 X-RAY EXAM CHEST 1 VIEW: CPT

## 2023-08-10 PROCEDURE — 85610 PROTHROMBIN TIME: CPT

## 2023-08-10 PROCEDURE — 85025 COMPLETE CBC W/AUTO DIFF WBC: CPT

## 2023-08-10 PROCEDURE — 99285 EMERGENCY DEPT VISIT HI MDM: CPT

## 2023-08-10 PROCEDURE — 72125 CT NECK SPINE W/O DYE: CPT | Mod: 26,MA

## 2023-08-10 PROCEDURE — 70450 CT HEAD/BRAIN W/O DYE: CPT | Mod: 26,MA

## 2023-08-10 PROCEDURE — 99285 EMERGENCY DEPT VISIT HI MDM: CPT | Mod: 25

## 2023-08-10 PROCEDURE — 72125 CT NECK SPINE W/O DYE: CPT | Mod: MA

## 2023-08-10 RX ORDER — LOSARTAN POTASSIUM 100 MG/1
25 TABLET, FILM COATED ORAL ONCE
Refills: 0 | Status: COMPLETED | OUTPATIENT
Start: 2023-08-10 | End: 2023-08-10

## 2023-08-10 RX ORDER — BACLOFEN 100 %
5 POWDER (GRAM) MISCELLANEOUS ONCE
Refills: 0 | Status: COMPLETED | OUTPATIENT
Start: 2023-08-10 | End: 2023-08-10

## 2023-08-10 RX ORDER — MAGNESIUM SULFATE 500 MG/ML
2 VIAL (ML) INJECTION ONCE
Refills: 0 | Status: COMPLETED | OUTPATIENT
Start: 2023-08-10 | End: 2023-08-10

## 2023-08-10 RX ORDER — LIDOCAINE 4 G/100G
2 CREAM TOPICAL ONCE
Refills: 0 | Status: COMPLETED | OUTPATIENT
Start: 2023-08-10 | End: 2023-08-10

## 2023-08-10 RX ADMIN — Medication 2 GRAM(S): at 22:44

## 2023-08-10 RX ADMIN — Medication 25 GRAM(S): at 21:18

## 2023-08-10 RX ADMIN — Medication 5 MILLIGRAM(S): at 22:49

## 2023-08-10 RX ADMIN — LOSARTAN POTASSIUM 25 MILLIGRAM(S): 100 TABLET, FILM COATED ORAL at 22:36

## 2023-08-10 RX ADMIN — LIDOCAINE 2 PATCH: 4 CREAM TOPICAL at 21:18

## 2023-08-10 NOTE — ED PROVIDER NOTE - CARE PROVIDER_API CALL
Man Lezama  Neurology  [address] [address]  Phone: [phone]  Fax: [phone]  Follow Up Time:     Gladys Madison  Neurology  [address] [address]  Phone: [phone]  Fax: [phone]  Follow Up Time:

## 2023-08-10 NOTE — ED PROVIDER NOTE - CLINICAL SUMMARY MEDICAL DECISION MAKING FREE TEXT BOX
74 y/o M with PMH HTN, HLD, afib on eliquis, s/p spinal fusion, s/p AAA repair p/w labile blood pressures x5 days with intermittent headaches. Pt currently asymptomatic, with no HA or CP. BP on presentation 168/93. VS otherwise wnl. EKG unchanged from prior EKG, no ST segment changes.  Plan:  -

## 2023-08-10 NOTE — ED ADULT TRIAGE NOTE - CHIEF COMPLAINT QUOTE
pt brought by family members for "fluctuating, labile, erratic" blood pressure readings at home x 1 week. Was seen at [facility] a few days ago for same, had normal readings there and was DC'ed home. Pt was given 50mg Losartan at 11AM. Family called erika Gonzales who instructed to come to [facility] ED for eval. Pt denies current head ache CP SOB

## 2023-08-10 NOTE — ED PROVIDER NOTE - CARE PROVIDERS DIRECT ADDRESSES
[email or web address]

## 2023-08-10 NOTE — ED ADULT TRIAGE NOTE - MEANS OF ARRIVAL
ambulatory I, Jay Knight MD,  performed the initial face to face bedside interview with this patient regarding history of present illness, review of symptoms and relevant past medical, social and family history.  I completed an independent physical examination.    The history, relevant review of systems, past medical and surgical history, medical decision making, and physical examination was documented by the scribe in my presence and I attest to the accuracy of the documentation.

## 2023-08-10 NOTE — ED ADULT NURSE NOTE - CHIEF COMPLAINT QUOTE
pt brought by family members for "fluctuating, labile, erratic" blood pressure readings at home x 1 week. Was seen at [facility] a few days ago for same, had normal readings there and was DC'ed home. Pt was given 50mg Losartan at 11AM. Family called erika Gonzales who instructed to come to [facility] ED for eval. Pt denies current head ache CP SOB

## 2023-08-10 NOTE — ED PROVIDER NOTE - PATIENT PORTAL LINK FT
You can access the FollowMyHealth Patient Portal offered by [facility] by registering at the following website: [email or web address]. By joining [employer]’s FollowMyHealth portal, you will also be able to view your health information using other applications (apps) compatible with our system.

## 2023-08-10 NOTE — ED PROVIDER NOTE - PHYSICAL EXAMINATION
General: resting comfortably  Neurologic: A&Ox3  Respiratory: CTABL  Abdomen: soft, NT, ND  Extremities: 1+ peripheral pulses, no edema  MSK: no neck or spine tenderness to palpation, no chest tenderness to palpation  Skin: warm and dry

## 2023-08-10 NOTE — ED ADULT NURSE NOTE - OBJECTIVE STATEMENT
Pt arrived to ED c/o hypertension. Pt reports he has been having "fluctuating and erratic" blood pressure. Pt was at [facility] a few days ago for the same CC. Pt SBP as high as 200s at home. Pt on 50mg losartan and 50mg metoprolol. Pt sent in by PCP for further eval. denies cp, sob. Pt occasional HA at home, no HA or vision changes at this time.

## 2023-08-10 NOTE — ED PROVIDER NOTE - OBJECTIVE STATEMENT
72 y/o M with PMH HTN, HLD, afib on eliquis, s/p spinal fusion, s/p AAA repair, p/w labile blood pressures x5 days with intermittent headaches. Pt states his BP taken by his physical therapist at home on Friday was elevated. They notified his PMD and were told to monitor his BP at home. Over the weekend he had multiple elevated BPs max 200s/110s. On Monday he had a BP of 220/120. His wife contacted his PMD-Dr. Gonzales and was told to take an additional 50 of losartan and present to ED. In the ED he continued to have elevated BPs but was found to be asymptomatic. Since d/c, pt has been monitoring BP at home and continues to have a wide range of pressures, lowest 109/79, max 220/180. He was d/c on metoprolol 50mg and losartan 50mg qD, although his wife has been giving additional losartan 25mg in PM when pressure is elevated, with little to no improvement. Pt states he has had intermittent diffusely distributed headaches during this time, denies current headache. He endorses one episode of seeing distorted colors on the TV x30 mins 2 days ago but denies blurry vision, loss of peripheral vision, or floaters in his vision. Pt also endorses worsening of chronic neck pain that is radiating anteriorly to just below his clavicles bilaterally. Denies fevers/chills, CP, palpitations, cough, SOB, N/V, swelling.    Home Meds:   oxycontin ER 20mg tid  oxycodone 15mg tid  Trintellix 20mg qD  Eliquis 5mg q12h  Pantoprazole   Pamelor 75mg qD  simvastatin 5mg qhs  folic acid 1mg qD  finasteride 5mg qD  losartan 50mg qD  metoprolol 50mg qD

## 2023-08-10 NOTE — ED ADULT NURSE NOTE - NSFALLHARMRISKINTERV_ED_ALL_ED
Assistance OOB with selected safe patient handling equipment if applicable/Assistance with ambulation/Communicate risk of Fall with Harm to all staff, patient, and family/Monitor gait and stability/Provide visual cue: red socks, yellow wristband, yellow gown, etc/Reinforce activity limits and safety measures with patient and family/Bed in lowest position, wheels locked, appropriate side rails in place/Call bell, personal items and telephone in reach/Instruct patient to call for assistance before getting out of bed/chair/stretcher/Non-slip footwear applied when patient is off stretcher/[place] to call system/Physically safe environment - no spills, clutter or unnecessary equipment/Purposeful Proactive Rounding/Room/bathroom lighting operational, light cord in reach

## 2023-08-11 VITALS
HEART RATE: 79 BPM | SYSTOLIC BLOOD PRESSURE: 160 MMHG | RESPIRATION RATE: 18 BRPM | OXYGEN SATURATION: 96 % | DIASTOLIC BLOOD PRESSURE: 79 MMHG | TEMPERATURE: 97 F

## 2023-08-11 NOTE — ED ADULT NURSE REASSESSMENT NOTE - NS ED NURSE REASSESS COMMENT FT1
Patient discharged home with daughter and wife, patient wheeled to the front. Patient and family made aware of f/u with pcp and specialist.

## 2023-08-14 ENCOUNTER — INPATIENT (INPATIENT)
Facility: HOSPITAL | Age: 73
LOS: 1 days | Discharge: HOME | DRG: 305 | End: 2023-08-16
Attending: STUDENT IN AN ORGANIZED HEALTH CARE EDUCATION/TRAINING PROGRAM | Admitting: STUDENT IN AN ORGANIZED HEALTH CARE EDUCATION/TRAINING PROGRAM
Payer: MEDICARE

## 2023-08-14 VITALS
HEART RATE: 70 BPM | TEMPERATURE: 98 F | SYSTOLIC BLOOD PRESSURE: 176 MMHG | OXYGEN SATURATION: 99 % | DIASTOLIC BLOOD PRESSURE: 106 MMHG | HEIGHT: 69 IN | RESPIRATION RATE: 17 BRPM | WEIGHT: 195.11 LBS

## 2023-08-14 DIAGNOSIS — Z96.642 PRESENCE OF LEFT ARTIFICIAL HIP JOINT: Chronic | ICD-10-CM

## 2023-08-14 DIAGNOSIS — Z41.9 ENCOUNTER FOR PROCEDURE FOR PURPOSES OTHER THAN REMEDYING HEALTH STATE, UNSPECIFIED: Chronic | ICD-10-CM

## 2023-08-14 DIAGNOSIS — Z98.890 OTHER SPECIFIED POSTPROCEDURAL STATES: Chronic | ICD-10-CM

## 2023-08-14 DIAGNOSIS — Z90.49 ACQUIRED ABSENCE OF OTHER SPECIFIED PARTS OF DIGESTIVE TRACT: Chronic | ICD-10-CM

## 2023-08-14 DIAGNOSIS — I16.0 HYPERTENSIVE URGENCY: ICD-10-CM

## 2023-08-14 DIAGNOSIS — E78.00 PURE HYPERCHOLESTEROLEMIA, UNSPECIFIED: ICD-10-CM

## 2023-08-14 DIAGNOSIS — Z95.0 PRESENCE OF CARDIAC PACEMAKER: Chronic | ICD-10-CM

## 2023-08-14 DIAGNOSIS — M54.9 DORSALGIA, UNSPECIFIED: ICD-10-CM

## 2023-08-14 LAB
ALBUMIN SERPL ELPH-MCNC: 3.7 G/DL — SIGNIFICANT CHANGE UP (ref 3.3–5)
ALP SERPL-CCNC: 121 U/L — HIGH (ref 40–120)
ALT FLD-CCNC: 17 U/L — SIGNIFICANT CHANGE UP (ref 10–45)
ANION GAP SERPL CALC-SCNC: 8 MMOL/L — SIGNIFICANT CHANGE UP (ref 5–17)
AST SERPL-CCNC: 30 U/L — SIGNIFICANT CHANGE UP (ref 10–40)
BASOPHILS # BLD AUTO: 0.05 K/UL — SIGNIFICANT CHANGE UP (ref 0–0.2)
BASOPHILS NFR BLD AUTO: 0.8 % — SIGNIFICANT CHANGE UP (ref 0–2)
BILIRUB SERPL-MCNC: 0.4 MG/DL — SIGNIFICANT CHANGE UP (ref 0.2–1.2)
BUN SERPL-MCNC: 18 MG/DL — SIGNIFICANT CHANGE UP (ref 7–23)
CALCIUM SERPL-MCNC: 9.3 MG/DL — SIGNIFICANT CHANGE UP (ref 8.4–10.5)
CHLORIDE SERPL-SCNC: 100 MMOL/L — SIGNIFICANT CHANGE UP (ref 96–108)
CK MB CFR SERPL CALC: 1.7 NG/ML — SIGNIFICANT CHANGE UP (ref 0–6.7)
CK SERPL-CCNC: 42 U/L — SIGNIFICANT CHANGE UP (ref 30–200)
CO2 SERPL-SCNC: 28 MMOL/L — SIGNIFICANT CHANGE UP (ref 22–31)
CREAT SERPL-MCNC: 1.46 MG/DL — HIGH (ref 0.5–1.3)
EGFR: 50 ML/MIN/1.73M2 — LOW
EOSINOPHIL # BLD AUTO: 0.3 K/UL — SIGNIFICANT CHANGE UP (ref 0–0.5)
EOSINOPHIL NFR BLD AUTO: 4.9 % — SIGNIFICANT CHANGE UP (ref 0–6)
GLUCOSE SERPL-MCNC: 116 MG/DL — HIGH (ref 70–99)
HCT VFR BLD CALC: 34.8 % — LOW (ref 39–50)
HGB BLD-MCNC: 11.3 G/DL — LOW (ref 13–17)
IMM GRANULOCYTES NFR BLD AUTO: 0.2 % — SIGNIFICANT CHANGE UP (ref 0–0.9)
LYMPHOCYTES # BLD AUTO: 1.12 K/UL — SIGNIFICANT CHANGE UP (ref 1–3.3)
LYMPHOCYTES # BLD AUTO: 18.4 % — SIGNIFICANT CHANGE UP (ref 13–44)
MAGNESIUM SERPL-MCNC: 2.2 MG/DL — SIGNIFICANT CHANGE UP (ref 1.6–2.6)
MCHC RBC-ENTMCNC: 30.3 PG — SIGNIFICANT CHANGE UP (ref 27–34)
MCHC RBC-ENTMCNC: 32.5 GM/DL — SIGNIFICANT CHANGE UP (ref 32–36)
MCV RBC AUTO: 93.3 FL — SIGNIFICANT CHANGE UP (ref 80–100)
MONOCYTES # BLD AUTO: 0.66 K/UL — SIGNIFICANT CHANGE UP (ref 0–0.9)
MONOCYTES NFR BLD AUTO: 10.8 % — SIGNIFICANT CHANGE UP (ref 2–14)
NEUTROPHILS # BLD AUTO: 3.96 K/UL — SIGNIFICANT CHANGE UP (ref 1.8–7.4)
NEUTROPHILS NFR BLD AUTO: 64.9 % — SIGNIFICANT CHANGE UP (ref 43–77)
NRBC # BLD: 0 /100 WBCS — SIGNIFICANT CHANGE UP (ref 0–0)
PLATELET # BLD AUTO: 241 K/UL — SIGNIFICANT CHANGE UP (ref 150–400)
POTASSIUM SERPL-MCNC: 5.1 MMOL/L — SIGNIFICANT CHANGE UP (ref 3.5–5.3)
POTASSIUM SERPL-SCNC: 5.1 MMOL/L — SIGNIFICANT CHANGE UP (ref 3.5–5.3)
PROT SERPL-MCNC: 7.8 G/DL — SIGNIFICANT CHANGE UP (ref 6–8.3)
RBC # BLD: 3.73 M/UL — LOW (ref 4.2–5.8)
RBC # FLD: 14.7 % — HIGH (ref 10.3–14.5)
SODIUM SERPL-SCNC: 136 MMOL/L — SIGNIFICANT CHANGE UP (ref 135–145)
TROPONIN T, HIGH SENSITIVITY RESULT: 16 NG/L — SIGNIFICANT CHANGE UP (ref 0–51)
TROPONIN T, HIGH SENSITIVITY RESULT: 18 NG/L — SIGNIFICANT CHANGE UP (ref 0–51)
WBC # BLD: 6.1 K/UL — SIGNIFICANT CHANGE UP (ref 3.8–10.5)
WBC # FLD AUTO: 6.1 K/UL — SIGNIFICANT CHANGE UP (ref 3.8–10.5)

## 2023-08-14 PROCEDURE — 99285 EMERGENCY DEPT VISIT HI MDM: CPT

## 2023-08-14 PROCEDURE — 71045 X-RAY EXAM CHEST 1 VIEW: CPT | Mod: 26

## 2023-08-14 PROCEDURE — 99223 1ST HOSP IP/OBS HIGH 75: CPT

## 2023-08-14 RX ORDER — APIXABAN 2.5 MG/1
5 TABLET, FILM COATED ORAL EVERY 12 HOURS
Refills: 0 | Status: DISCONTINUED | OUTPATIENT
Start: 2023-08-14 | End: 2023-08-16

## 2023-08-14 RX ORDER — PANTOPRAZOLE SODIUM 20 MG/1
40 TABLET, DELAYED RELEASE ORAL
Refills: 0 | Status: DISCONTINUED | OUTPATIENT
Start: 2023-08-14 | End: 2023-08-16

## 2023-08-14 RX ORDER — HYDRALAZINE HCL 50 MG
25 TABLET ORAL EVERY 8 HOURS
Refills: 0 | Status: DISCONTINUED | OUTPATIENT
Start: 2023-08-15 | End: 2023-08-15

## 2023-08-14 RX ORDER — APIXABAN 2.5 MG/1
5 TABLET, FILM COATED ORAL ONCE
Refills: 0 | Status: COMPLETED | OUTPATIENT
Start: 2023-08-14 | End: 2023-08-14

## 2023-08-14 RX ORDER — LOSARTAN POTASSIUM 100 MG/1
1 TABLET, FILM COATED ORAL
Refills: 0 | DISCHARGE

## 2023-08-14 RX ORDER — AMLODIPINE BESYLATE 2.5 MG/1
5 TABLET ORAL ONCE
Refills: 0 | Status: COMPLETED | OUTPATIENT
Start: 2023-08-14 | End: 2023-08-14

## 2023-08-14 RX ORDER — SIMVASTATIN 20 MG/1
5 TABLET, FILM COATED ORAL AT BEDTIME
Refills: 0 | Status: DISCONTINUED | OUTPATIENT
Start: 2023-08-14 | End: 2023-08-16

## 2023-08-14 RX ORDER — FOLIC ACID 0.8 MG
1 TABLET ORAL DAILY
Refills: 0 | Status: DISCONTINUED | OUTPATIENT
Start: 2023-08-14 | End: 2023-08-16

## 2023-08-14 RX ORDER — POLYETHYLENE GLYCOL 3350 17 G/17G
17 POWDER, FOR SOLUTION ORAL DAILY
Refills: 0 | Status: DISCONTINUED | OUTPATIENT
Start: 2023-08-14 | End: 2023-08-16

## 2023-08-14 RX ORDER — CARVEDILOL PHOSPHATE 80 MG/1
6.25 CAPSULE, EXTENDED RELEASE ORAL ONCE
Refills: 0 | Status: COMPLETED | OUTPATIENT
Start: 2023-08-14 | End: 2023-08-14

## 2023-08-14 RX ORDER — OXYCODONE HYDROCHLORIDE 5 MG/1
15 TABLET ORAL THREE TIMES A DAY
Refills: 0 | Status: DISCONTINUED | OUTPATIENT
Start: 2023-08-14 | End: 2023-08-16

## 2023-08-14 RX ORDER — HYDRALAZINE HCL 50 MG
10 TABLET ORAL ONCE
Refills: 0 | Status: COMPLETED | OUTPATIENT
Start: 2023-08-14 | End: 2023-08-14

## 2023-08-14 RX ORDER — CHOLESTYRAMINE 4 G/9G
4 POWDER, FOR SUSPENSION ORAL DAILY
Refills: 0 | Status: DISCONTINUED | OUTPATIENT
Start: 2023-08-15 | End: 2023-08-16

## 2023-08-14 RX ORDER — OXYCODONE HYDROCHLORIDE 5 MG/1
20 TABLET ORAL EVERY 8 HOURS
Refills: 0 | Status: DISCONTINUED | OUTPATIENT
Start: 2023-08-14 | End: 2023-08-16

## 2023-08-14 RX ORDER — LOSARTAN POTASSIUM 100 MG/1
50 TABLET, FILM COATED ORAL DAILY
Refills: 0 | Status: DISCONTINUED | OUTPATIENT
Start: 2023-08-14 | End: 2023-08-16

## 2023-08-14 RX ORDER — NORTRIPTYLINE HYDROCHLORIDE 10 MG/1
75 CAPSULE ORAL AT BEDTIME
Refills: 0 | Status: DISCONTINUED | OUTPATIENT
Start: 2023-08-14 | End: 2023-08-16

## 2023-08-14 RX ORDER — FINASTERIDE 5 MG/1
5 TABLET, FILM COATED ORAL DAILY
Refills: 0 | Status: DISCONTINUED | OUTPATIENT
Start: 2023-08-14 | End: 2023-08-16

## 2023-08-14 RX ORDER — VORTIOXETINE 5 MG/1
20 TABLET, FILM COATED ORAL DAILY
Refills: 0 | Status: DISCONTINUED | OUTPATIENT
Start: 2023-08-15 | End: 2023-08-16

## 2023-08-14 RX ADMIN — Medication 10 MILLIGRAM(S): at 19:45

## 2023-08-14 RX ADMIN — CARVEDILOL PHOSPHATE 6.25 MILLIGRAM(S): 80 CAPSULE, EXTENDED RELEASE ORAL at 20:58

## 2023-08-14 RX ADMIN — Medication 10 MILLIGRAM(S): at 22:20

## 2023-08-14 RX ADMIN — OXYCODONE HYDROCHLORIDE 20 MILLIGRAM(S): 5 TABLET ORAL at 22:19

## 2023-08-14 RX ADMIN — LOSARTAN POTASSIUM 50 MILLIGRAM(S): 100 TABLET, FILM COATED ORAL at 20:58

## 2023-08-14 RX ADMIN — APIXABAN 5 MILLIGRAM(S): 2.5 TABLET, FILM COATED ORAL at 20:17

## 2023-08-14 RX ADMIN — OXYCODONE HYDROCHLORIDE 15 MILLIGRAM(S): 5 TABLET ORAL at 22:47

## 2023-08-14 RX ADMIN — OXYCODONE HYDROCHLORIDE 15 MILLIGRAM(S): 5 TABLET ORAL at 23:00

## 2023-08-14 RX ADMIN — OXYCODONE HYDROCHLORIDE 20 MILLIGRAM(S): 5 TABLET ORAL at 23:00

## 2023-08-14 RX ADMIN — SIMVASTATIN 5 MILLIGRAM(S): 20 TABLET, FILM COATED ORAL at 22:19

## 2023-08-14 RX ADMIN — NORTRIPTYLINE HYDROCHLORIDE 75 MILLIGRAM(S): 10 CAPSULE ORAL at 22:19

## 2023-08-14 RX ADMIN — AMLODIPINE BESYLATE 5 MILLIGRAM(S): 2.5 TABLET ORAL at 17:41

## 2023-08-14 NOTE — H&P ADULT - NSICDXFAMILYHX_GEN_ALL_CORE_FT
FAMILY HISTORY:  Sibling  Still living? No  History of dementia in sibling, Age at diagnosis: Age Unknown

## 2023-08-14 NOTE — H&P ADULT - NSHPLABSRESULTS_GEN_ALL_CORE
11.3   6.10  )-----------( 241      ( 14 Aug 2023 15:54 )             34.8       08-14    136  |  100  |  18  ----------------------------<  116<H>  5.1   |  28  |  1.46<H>    Ca    9.3      14 Aug 2023 16:46  Mg     2.2     08-14    TPro  7.8  /  Alb  3.7  /  TBili  0.4  /  DBili  x   /  AST  30  /  ALT  17  /  AlkPhos  121<H>  08-14        Urinalysis Basic - ( 14 Aug 2023 16:46 )    Color: x / Appearance: x / SG: x / pH: x  Gluc: 116 mg/dL / Ketone: x  / Bili: x / Urobili: x   Blood: x / Protein: x / Nitrite: x   Leuk Esterase: x / RBC: x / WBC x   Sq Epi: x / Non Sq Epi: x / Bacteria: x        EKG:

## 2023-08-14 NOTE — PATIENT PROFILE ADULT - FUNCTIONAL ASSESSMENT - BASIC MOBILITY 6.
2-calculated by average/Not able to assess (calculate score using [facility] averaging method)

## 2023-08-14 NOTE — PATIENT PROFILE ADULT - NS PRO AD PATIENT TYPE
Manisha Quintana [address] Phone  Send Link Via Text  No text has been sent  Last text sent10/11/2022 1:28 PM  To:[phone]  Email  Send Link Via Email  No email has been sent  Last email sent10/11/2022 1:28 PM  To:[email or web address]        [address] [place] [address] Phone  Send Link Via Text  No text has been sent  Last text sent10/11/2022 1:49 PM  To:[phone]  Email  Send Link Via Email  No email has been sent  Last email sent10/11/2022 1:49 PM  To:[email or web address]  I patient 8 PM and then her voice message, it went directly into the voicemail     I Left voice message second time to clinck on the link     I Left voice message x 2 went straight to voice mail   I left another voice message on the [phone] number, also went straight to the voicemail  Went again into voice male without drinking at all, I tried [phone] again, she will need to be rescheduled        Please try to reschedule in office with any of the providers with openings, she needs her incontinence supplies    Future Appointments   Date Time Provider Dante Amaya   10/12/2022  1:30 PM Dora Hurley 308   10/9/2023  1:00 PM Merlinda Cones, [facility] Care Proxy (HCP)

## 2023-08-14 NOTE — ED ADULT NURSE NOTE - OBJECTIVE STATEMENT
73 y.o. Male c/o uncontrolled blood pressure at home x2 weeks. Pt states he was here 2 days ago for HTN and left AMA. Presenting with headaches. Denies CP, SOB, vision changes, numbness/tingling, abd pain nvd, fevers/chills, syncope. Per pt wife SBP has been in 200s and DBP in 100s. Per phone calls with cardiologist they have been administering higher doses of losartan at home but Blood pressure is still not controlled. originally was taking metoprolol 50 mg and losartan 25 mg now taking metoprolol 50 mg (unchanged dose) and losartan 100 mg. Pt baseline wheelchair bound only able to walk few steps. PSHx multiple spine surgeries, hip replacement, gallbladder removal.

## 2023-08-14 NOTE — ED PROVIDER NOTE - CLINICAL SUMMARY MEDICAL DECISION MAKING FREE TEXT BOX
73 M w refractory HTN- added norvasc and 10 IV hydral- without improvement- d/w cards- will admit to cards for BP control- add Losartan 50 and coreg 6.25

## 2023-08-14 NOTE — H&P ADULT - NSICDXPASTSURGICALHX_GEN_ALL_CORE_FT
PAST SURGICAL HISTORY:  History of back surgery multiple, lumbar    History of kidney surgery     Pacemaker medtronic    S/P AAA (abdominal aortic aneurysm) repair with right iliac aneurysm endovascular repair    S/P arthroscopy of right knee     S/P carpal tunnel release     S/P cholecystectomy     S/P hip replacement, left     Surgery, elective multiple neck surgery, cervical    Surgery, elective Cardiac Stent x4    Surgery, elective Intrathecal pump placement

## 2023-08-14 NOTE — ED ADULT NURSE NOTE - NSFALLRISKINTERV_ED_ALL_ED

## 2023-08-14 NOTE — ED ADULT TRIAGE NOTE - CHIEF COMPLAINT QUOTE
"I still have uncontrolled blood pressure and the last time I was here they wanted to admit me but I did not want to stay then but I am still having high blood pressure and headaches". Patient denies blurry vision, dizziness and weakness.

## 2023-08-14 NOTE — H&P ADULT - ASSESSMENT
74 y/o male with HTN, HPL, multiple ER visits over the past 9 days with uncontrolled BP, now admitted with uncontrolled /110's

## 2023-08-14 NOTE — ED PROVIDER NOTE - OBJECTIVE STATEMENT
74 y/o M with PMH HTN, HLD, afib on eliquis, s/p spinal fusion, s/p AAA repair, p/w labile blood pressures x5 days with intermittent headaches. Pt states his BP taken by his physical therapist at home on Friday was elevated. They notified his PMD and were told to monitor his BP at home. Over the weekend he had multiple elevated BPs max 200s/110s. On Monday he had a BP of 220/120. His wife contacted his PMD-Dr. Gonzales and was told to take an additional 50 of losartan and present to ED. In the ED he continued to have elevated BPs but was found to be asymptomatic. Since d/c, pt has been monitoring BP at home and continues to have a wide range of pressures, lowest 109/79, max 220/180. He was d/c on metoprolol 50mg and losartan 50mg qD, although his wife has been giving additional losartan 25mg in PM when pressure is elevated, with little to no improvement. Pt states he has had intermittent diffusely distributed headaches during this time, denies current headache. He endorses one episode of seeing distorted colors on the TV x30 74 y/o M with PMH HTN, HLD, afib on eliquis, s/p spinal fusion, s/p AAA repair, p/w labile blood pressures x9 days with intermittent headaches. Pt states his BP taken by his physical therapist at home on Friday was elevated. They notified his PMD and were told to monitor his BP at home. Over the weekend he had multiple elevated BPs max 200s/110s. On Monday he had a BP of 220/120. His wife contacted his PMD-Dr. Gonzales and was told to take an additional 50 of losartan and present to ED. In the ED he continued to have elevated BPs but was found to be asymptomatic. Since d/c, pt has been monitoring BP at home and continues to have a wide range of pressures, lowest 109/79, max 220/180. He was d/c on metoprolol 50mg and losartan 50mg qD, although his wife has been giving additional losartan 25mg in PM when pressure is elevated, with little to no improvement. Pt states he has had intermittent diffusely distributed headaches during this time, denies current headache.  no sig exac/allev factors  moderate- severe

## 2023-08-14 NOTE — H&P ADULT - NSHPPHYSICALEXAM_GEN_ALL_CORE
T(C): 36.8 (08-14-23 @ 21:40), Max: 36.9 (08-14-23 @ 14:20)  HR: 68 (08-14-23 @ 21:40) (68 - 70)  BP: 228/106 (08-14-23 @ 21:40) (163/91 - 228/106)  RR: 29 (08-14-23 @ 21:40) (16 - 29)  SpO2: 100% (08-14-23 @ 21:40) (98% - 100%)  Wt(kg): --    Appearance: Normal	  HEENT:   Normal oral mucosa, PERRL, EOMI	  Neck: Supple, - JVD; No Carotid Bruit and 2+ pulses B/L  Cardiovascular: Normal S1 S2, No JVD, No murmurs  Respiratory: Lungs clear to auscultation, no Rales, Rhonchi, Wheezing	  Gastrointestinal:  Soft, Non-tender, + BS	  Skin: No rashes, No ecchymoses, No cyanosis  Extremities: Normal range of motion, No clubbing, cyanosis or edema  Vascular: Femoral pulses 2+ b/l without bruit, DP 1+ b/l, PT 1+ b/l  Neurologic: Non-focal  Psychiatry: A & O x 3, Mood & affect appropriate

## 2023-08-14 NOTE — H&P ADULT - NS ATTEND AMEND GEN_ALL_CORE FT
I saw, evaluated, and examined the patient at the bedside. I discussed the patient’s case with the ACP and agree with ACP’s note, ROS findings, physical exam, assessment, and plan as documented in the ACP’s note, with the following addendum.    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 Now admitted for HTN emergency with CLAUDIO with no known hx of CKD, headache, and HTN.     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     Lotus Dai M.D.  CARDIOLOGY ATTENDING

## 2023-08-14 NOTE — PATIENT PROFILE ADULT - CAREGIVER PHONE NUMBER
Called and spoke to pt regarding Dilantin 30 MG medication refill. Informed pt Dr. Jeter refilled medication 11/30/21 for 120 caps and 4 additional refills. Informed pt to contact pharmacy to verify refills on file. No further questions or concerns.   [phone]

## 2023-08-14 NOTE — H&P ADULT - HISTORY OF PRESENT ILLNESS
This is a 74 y/o male  This is a 74 y/o male with PMH HTN, HLD, afib on eliquis, s/p spinal fusion, s/p AAA repair, p/w labile blood pressures x 9 days associated with intermittent headaches. Pt states his BP taken by his physical therapist at home on Friday 8/4 was elevated. They notified his PMD and were told to monitor his BP at home. Over the weekend of 8/5-8/5 he had multiple elevated BPs max 200s/110s. On Monday 8/7 he had a BP of 220/120. His wife contacted his PMD-Dr. Gonzales and was told to take an additional 50 of losartan and present to [facility] ED on 8/8. Blood work was done but no medication given, he was discharged home. He continued to have elevated BPs and presented to [facility] ED on 8/10. He received IV magnesium, Baclofen for pain and Losartan. CTH was without acute event, labs with Creat 1.45. He refused admission and therefore discharged home. Since d/c, pt has been monitoring BP at home and continues to have a wide range of pressures, lowest 109/79, max 220/180. He was d/c on metoprolol 50mg and losartan 50mg qD, although his wife has been giving additional losartan 50mg when pressure is elevated, with little to no improvement. Pt states he has had intermittent headaches. Denies floaters, blurred vision, dizziness, chest pain, diaphoresis, SOB. He admits o his chronic back and neck pains for which he takes oxycontin/oxyIR  In the ER, /106--228/106. He received in total: Hydralazine 10mg IV, Norvasc 5mg PO, Coreg 6.125mg PO, Losartan 50mg. Upon evaluation, BP still 217/104, pt with mild headache. Labs significant for Creat 1.45 (Baseline 0.8-1.14).  He is admitted for HTN urgency

## 2023-08-14 NOTE — PATIENT PROFILE ADULT - FALL HARM RISK - HARM RISK INTERVENTIONS

## 2023-08-14 NOTE — H&P ADULT - NSHPREVIEWOFSYSTEMS_GEN_ALL_CORE
GENERAL, CONSTITUTIONAL : denies recent weight loss, fever, chills  EYES, VISION: denies changes in vision   EARS, NOSE, THROAT: denies hearing loss  HEART, CARDIOVASCULAR: + pAfib, denies chest pain, palpitations, SOB,  LE edema, claudication  RESPIRATORY: Denies cough, SOB, wheezing, PND, orthopnea  GASTROINTESTINAL: Denies abdominal pain, heartburn, bloody stool, dark tarry stool  GENITOURINARY: Denies frequent urination, urgency  MUSCULOSKELETAL:+ back pain, decreased motion, walks with a cane. Denies joint pain or swelling, restricted motion, musculoskeletal pain.   SKIN & INTEGUMENTARY Denies rashes, sores, blisters, blisters, growths.  NEUROLOGICAL: + headache. Denies numbness or tingling sensations, sensation loss, burning.   PSYCHIATRIC: + depression. Denies nervousness, anxiety  ENDOCRINE Denies heat or cold intolerance, excessive thirst  HEMATOLOGIC/LYMPHATIC: Denies abnormal bleeding, bleeding of any kind

## 2023-08-14 NOTE — ED ADULT NURSE REASSESSMENT NOTE - NS ED NURSE REASSESS COMMENT FT1
Pt ready for transport, placed on cardiac monitor, /104, 5 lachman resident at bedside and gave ok to take to floor, states will order meds to give to patient once on unit, pt stable otherwise.

## 2023-08-14 NOTE — H&P ADULT - PROBLEM SELECTOR PLAN 1
/110  Metoprolol switched to coreg 6.125mg q12  Continue Losartan--monitor kidney function  Check renal US  IV Hydral PRN 80

## 2023-08-15 LAB
ANION GAP SERPL CALC-SCNC: 6 MMOL/L — SIGNIFICANT CHANGE UP (ref 5–17)
BUN SERPL-MCNC: 17 MG/DL — SIGNIFICANT CHANGE UP (ref 7–23)
CALCIUM SERPL-MCNC: 9 MG/DL — SIGNIFICANT CHANGE UP (ref 8.4–10.5)
CHLORIDE SERPL-SCNC: 100 MMOL/L — SIGNIFICANT CHANGE UP (ref 96–108)
CO2 SERPL-SCNC: 28 MMOL/L — SIGNIFICANT CHANGE UP (ref 22–31)
CREAT ?TM UR-MCNC: 35 MG/DL — SIGNIFICANT CHANGE UP
CREAT SERPL-MCNC: 1.34 MG/DL — HIGH (ref 0.5–1.3)
EGFR: 56 ML/MIN/1.73M2 — LOW
GLUCOSE SERPL-MCNC: 98 MG/DL — SIGNIFICANT CHANGE UP (ref 70–99)
HCT VFR BLD CALC: 35.6 % — LOW (ref 39–50)
HGB BLD-MCNC: 11.4 G/DL — LOW (ref 13–17)
MAGNESIUM SERPL-MCNC: 2.2 MG/DL — SIGNIFICANT CHANGE UP (ref 1.6–2.6)
MCHC RBC-ENTMCNC: 30.1 PG — SIGNIFICANT CHANGE UP (ref 27–34)
MCHC RBC-ENTMCNC: 32 GM/DL — SIGNIFICANT CHANGE UP (ref 32–36)
MCV RBC AUTO: 93.9 FL — SIGNIFICANT CHANGE UP (ref 80–100)
NRBC # BLD: 0 /100 WBCS — SIGNIFICANT CHANGE UP (ref 0–0)
OSMOLALITY UR: 244 MOSM/KG — LOW (ref 300–900)
PLATELET # BLD AUTO: 250 K/UL — SIGNIFICANT CHANGE UP (ref 150–400)
POTASSIUM SERPL-MCNC: 4.1 MMOL/L — SIGNIFICANT CHANGE UP (ref 3.5–5.3)
POTASSIUM SERPL-SCNC: 4.1 MMOL/L — SIGNIFICANT CHANGE UP (ref 3.5–5.3)
POTASSIUM UR-SCNC: 26 MMOL/L — SIGNIFICANT CHANGE UP
PROT ?TM UR-MCNC: 16 MG/DL — HIGH (ref 0–12)
PROT/CREAT UR-RTO: 0.5 RATIO — HIGH (ref 0–0.2)
RBC # BLD: 3.79 M/UL — LOW (ref 4.2–5.8)
RBC # FLD: 14.6 % — HIGH (ref 10.3–14.5)
SODIUM SERPL-SCNC: 134 MMOL/L — LOW (ref 135–145)
SODIUM UR-SCNC: 64 MMOL/L — SIGNIFICANT CHANGE UP
UUN UR-MCNC: 196 MG/DL — SIGNIFICANT CHANGE UP
WBC # BLD: 6.94 K/UL — SIGNIFICANT CHANGE UP (ref 3.8–10.5)
WBC # FLD AUTO: 6.94 K/UL — SIGNIFICANT CHANGE UP (ref 3.8–10.5)

## 2023-08-15 PROCEDURE — 76770 US EXAM ABDO BACK WALL COMP: CPT | Mod: 26

## 2023-08-15 PROCEDURE — 93306 TTE W/DOPPLER COMPLETE: CPT | Mod: 26

## 2023-08-15 RX ORDER — ACETAMINOPHEN 500 MG
650 TABLET ORAL ONCE
Refills: 0 | Status: COMPLETED | OUTPATIENT
Start: 2023-08-15 | End: 2023-08-15

## 2023-08-15 RX ORDER — HYDRALAZINE HCL 50 MG
10 TABLET ORAL ONCE
Refills: 0 | Status: COMPLETED | OUTPATIENT
Start: 2023-08-15 | End: 2023-08-15

## 2023-08-15 RX ORDER — CARVEDILOL PHOSPHATE 80 MG/1
12.5 CAPSULE, EXTENDED RELEASE ORAL EVERY 12 HOURS
Refills: 0 | Status: DISCONTINUED | OUTPATIENT
Start: 2023-08-15 | End: 2023-08-16

## 2023-08-15 RX ORDER — LANOLIN ALCOHOL/MO/W.PET/CERES
5 CREAM (GRAM) TOPICAL AT BEDTIME
Refills: 0 | Status: DISCONTINUED | OUTPATIENT
Start: 2023-08-15 | End: 2023-08-16

## 2023-08-15 RX ORDER — LANOLIN ALCOHOL/MO/W.PET/CERES
5 CREAM (GRAM) TOPICAL ONCE
Refills: 0 | Status: COMPLETED | OUTPATIENT
Start: 2023-08-15 | End: 2023-08-15

## 2023-08-15 RX ORDER — ACETAMINOPHEN 500 MG
975 TABLET ORAL EVERY 6 HOURS
Refills: 0 | Status: DISCONTINUED | OUTPATIENT
Start: 2023-08-15 | End: 2023-08-16

## 2023-08-15 RX ORDER — HYDRALAZINE HCL 50 MG
37.5 TABLET ORAL EVERY 8 HOURS
Refills: 0 | Status: DISCONTINUED | OUTPATIENT
Start: 2023-08-15 | End: 2023-08-16

## 2023-08-15 RX ORDER — CARVEDILOL PHOSPHATE 80 MG/1
6.25 CAPSULE, EXTENDED RELEASE ORAL EVERY 12 HOURS
Refills: 0 | Status: DISCONTINUED | OUTPATIENT
Start: 2023-08-15 | End: 2023-08-15

## 2023-08-15 RX ADMIN — OXYCODONE HYDROCHLORIDE 15 MILLIGRAM(S): 5 TABLET ORAL at 11:12

## 2023-08-15 RX ADMIN — Medication 5 MILLIGRAM(S): at 22:29

## 2023-08-15 RX ADMIN — APIXABAN 5 MILLIGRAM(S): 2.5 TABLET, FILM COATED ORAL at 05:55

## 2023-08-15 RX ADMIN — Medication 975 MILLIGRAM(S): at 22:27

## 2023-08-15 RX ADMIN — OXYCODONE HYDROCHLORIDE 15 MILLIGRAM(S): 5 TABLET ORAL at 17:28

## 2023-08-15 RX ADMIN — OXYCODONE HYDROCHLORIDE 15 MILLIGRAM(S): 5 TABLET ORAL at 16:28

## 2023-08-15 RX ADMIN — APIXABAN 5 MILLIGRAM(S): 2.5 TABLET, FILM COATED ORAL at 19:03

## 2023-08-15 RX ADMIN — Medication 650 MILLIGRAM(S): at 09:16

## 2023-08-15 RX ADMIN — Medication 1 MILLIGRAM(S): at 11:12

## 2023-08-15 RX ADMIN — Medication 37.5 MILLIGRAM(S): at 15:09

## 2023-08-15 RX ADMIN — OXYCODONE HYDROCHLORIDE 15 MILLIGRAM(S): 5 TABLET ORAL at 05:37

## 2023-08-15 RX ADMIN — Medication 5 MILLIGRAM(S): at 00:18

## 2023-08-15 RX ADMIN — OXYCODONE HYDROCHLORIDE 20 MILLIGRAM(S): 5 TABLET ORAL at 05:56

## 2023-08-15 RX ADMIN — OXYCODONE HYDROCHLORIDE 15 MILLIGRAM(S): 5 TABLET ORAL at 12:12

## 2023-08-15 RX ADMIN — OXYCODONE HYDROCHLORIDE 15 MILLIGRAM(S): 5 TABLET ORAL at 04:39

## 2023-08-15 RX ADMIN — CARVEDILOL PHOSPHATE 6.25 MILLIGRAM(S): 80 CAPSULE, EXTENDED RELEASE ORAL at 09:17

## 2023-08-15 RX ADMIN — OXYCODONE HYDROCHLORIDE 20 MILLIGRAM(S): 5 TABLET ORAL at 22:28

## 2023-08-15 RX ADMIN — CARVEDILOL PHOSPHATE 12.5 MILLIGRAM(S): 80 CAPSULE, EXTENDED RELEASE ORAL at 21:05

## 2023-08-15 RX ADMIN — OXYCODONE HYDROCHLORIDE 20 MILLIGRAM(S): 5 TABLET ORAL at 15:45

## 2023-08-15 RX ADMIN — Medication 975 MILLIGRAM(S): at 21:27

## 2023-08-15 RX ADMIN — Medication 25 MILLIGRAM(S): at 06:40

## 2023-08-15 RX ADMIN — Medication 10 MILLIGRAM(S): at 13:34

## 2023-08-15 RX ADMIN — Medication 37.5 MILLIGRAM(S): at 22:28

## 2023-08-15 RX ADMIN — OXYCODONE HYDROCHLORIDE 20 MILLIGRAM(S): 5 TABLET ORAL at 14:48

## 2023-08-15 RX ADMIN — CHOLESTYRAMINE 4 GRAM(S): 4 POWDER, FOR SUSPENSION ORAL at 10:21

## 2023-08-15 RX ADMIN — NORTRIPTYLINE HYDROCHLORIDE 75 MILLIGRAM(S): 10 CAPSULE ORAL at 22:29

## 2023-08-15 RX ADMIN — PANTOPRAZOLE SODIUM 40 MILLIGRAM(S): 20 TABLET, DELAYED RELEASE ORAL at 05:55

## 2023-08-15 RX ADMIN — FINASTERIDE 5 MILLIGRAM(S): 5 TABLET, FILM COATED ORAL at 11:12

## 2023-08-15 RX ADMIN — SIMVASTATIN 5 MILLIGRAM(S): 20 TABLET, FILM COATED ORAL at 22:28

## 2023-08-15 RX ADMIN — Medication 650 MILLIGRAM(S): at 10:16

## 2023-08-15 RX ADMIN — VORTIOXETINE 20 MILLIGRAM(S): 5 TABLET, FILM COATED ORAL at 11:13

## 2023-08-15 NOTE — PROGRESS NOTE ADULT - SUBJECTIVE AND OBJECTIVE BOX
**INCOMPLETE NOTE    OVERNIGHT EVENTS:    SUBJECTIVE:  Patient seen and examined at bedside.    Vital Signs Last 12 Hrs  T(F): 97.3 (08-15-23 @ 05:01), Max: 98.2 (08-14-23 @ 21:40)  HR: 60 (08-15-23 @ 06:33) (60 - 80)  BP: 154/75 (08-15-23 @ 06:33) (132/84 - 228/106)  BP(mean): 107 (08-15-23 @ 06:33) (90 - 150)  RR: 13 (08-15-23 @ 06:33) (13 - 30)  SpO2: 100% (08-15-23 @ 06:33) (96% - 100%)  I&O's Summary      PHYSICAL EXAM:  Constitutional: NAD, comfortable in bed.  HEENT: NC/AT, PERRLA, EOMI, no conjunctival pallor or scleral icterus, MMM  Neck: Supple, no JVD  Respiratory: CTA B/L. No w/r/r.   Cardiovascular: RRR, normal S1 and S2, no m/r/g.   Gastrointestinal: +BS, soft NTND, no guarding or rebound tenderness, no palpable masses   Extremities: wwp; no cyanosis, clubbing or edema.   Vascular: Pulses equal and strong throughout.   Neurological: AAOx3, no CN deficits, strength and sensation intact throughout.   Skin: No gross skin abnormalities or rashes        LABS:                        11.4   6.94  )-----------( 250      ( 15 Aug 2023 05:30 )             35.6     08-15    134<L>  |  100  |  17  ----------------------------<  98  4.1   |  28  |  1.34<H>    Ca    9.0      15 Aug 2023 05:30  Mg     2.2     08-15    TPro  7.8  /  Alb  3.7  /  TBili  0.4  /  DBili  x   /  AST  30  /  ALT  17  /  AlkPhos  121<H>  08-14      Urinalysis Basic - ( 15 Aug 2023 05:30 )    Color: x / Appearance: x / SG: x / pH: x  Gluc: 98 mg/dL / Ketone: x  / Bili: x / Urobili: x   Blood: x / Protein: x / Nitrite: x   Leuk Esterase: x / RBC: x / WBC x   Sq Epi: x / Non Sq Epi: x / Bacteria: x          RADIOLOGY & ADDITIONAL TESTS:    MEDICATIONS  (STANDING):  apixaban 5 milliGRAM(s) Oral every 12 hours  carvedilol 6.25 milliGRAM(s) Oral every 12 hours  cholestyramine Powder (Sugar-Free) 4 Gram(s) Oral daily  finasteride 5 milliGRAM(s) Oral daily  folic acid 1 milliGRAM(s) Oral daily  hydrALAZINE 25 milliGRAM(s) Oral every 8 hours  losartan 50 milliGRAM(s) Oral daily  nortriptyline 75 milliGRAM(s) Oral at bedtime  oxyCODONE  ER Tablet 20 milliGRAM(s) Oral every 8 hours  pantoprazole    Tablet 40 milliGRAM(s) Oral before breakfast  polyethylene glycol 3350 17 Gram(s) Oral daily  simvastatin 5 milliGRAM(s) Oral at bedtime  vortioxetine 20 milliGRAM(s) Oral daily    MEDICATIONS  (PRN):  oxyCODONE    IR 15 milliGRAM(s) Oral three times a day PRN Severe Pain (7 - 10)   OVERNIGHT EVENTS: none to report    SUBJECTIVE:  Patient seen and examined at bedside. Found stable and in NAD. Denies chest pain, visual disturbances, SOB, leg swelling.    Vital Signs Last 12 Hrs  T(F): 97.3 (08-15-23 @ 05:01), Max: 98.2 (08-14-23 @ 21:40)  HR: 60 (08-15-23 @ 06:33) (60 - 80)  BP: 154/75 (08-15-23 @ 06:33) (132/84 - 228/106)  BP(mean): 107 (08-15-23 @ 06:33) (90 - 150)  RR: 13 (08-15-23 @ 06:33) (13 - 30)  SpO2: 100% (08-15-23 @ 06:33) (96% - 100%)  I&O's Summary      PHYSICAL EXAM:  Constituional: well-nourished. Appear his age.  HEENT:   Normal oral mucosa, PERRL, EOMI	  Neck: Supple, - JVD; No Carotid Bruit and 2+ pulses B/L  Cardiovascular: Normal S1 S2, No JVD, No murmurs  Respiratory: Lungs clear to auscultation, no Rales, Rhonchi, Wheezing	  Gastrointestinal:  Soft, Non-tender, + BS	  Skin: No rashes, No ecchymoses, No cyanosis  Extremities: Normal range of motion, No clubbing, cyanosis or edema  Vascular: Femoral pulses 2+ b/l without bruit, DP 1+ b/l, PT 1+ b/l  Neurologic: Non-focal  Psychiatry: A & O x 3, Mood & affect appropriate      LABS:                        11.4   6.94  )-----------( 250      ( 15 Aug 2023 05:30 )             35.6     08-15    134<L>  |  100  |  17  ----------------------------<  98  4.1   |  28  |  1.34<H>    Ca    9.0      15 Aug 2023 05:30  Mg     2.2     08-15    TPro  7.8  /  Alb  3.7  /  TBili  0.4  /  DBili  x   /  AST  30  /  ALT  17  /  AlkPhos  121<H>  08-14      Urinalysis Basic - ( 15 Aug 2023 05:30 )    Color: x / Appearance: x / SG: x / pH: x  Gluc: 98 mg/dL / Ketone: x  / Bili: x / Urobili: x   Blood: x / Protein: x / Nitrite: x   Leuk Esterase: x / RBC: x / WBC x   Sq Epi: x / Non Sq Epi: x / Bacteria: x          RADIOLOGY & ADDITIONAL TESTS:    MEDICATIONS  (STANDING):  apixaban 5 milliGRAM(s) Oral every 12 hours  carvedilol 6.25 milliGRAM(s) Oral every 12 hours  cholestyramine Powder (Sugar-Free) 4 Gram(s) Oral daily  finasteride 5 milliGRAM(s) Oral daily  folic acid 1 milliGRAM(s) Oral daily  hydrALAZINE 25 milliGRAM(s) Oral every 8 hours  losartan 50 milliGRAM(s) Oral daily  nortriptyline 75 milliGRAM(s) Oral at bedtime  oxyCODONE  ER Tablet 20 milliGRAM(s) Oral every 8 hours  pantoprazole    Tablet 40 milliGRAM(s) Oral before breakfast  polyethylene glycol 3350 17 Gram(s) Oral daily  simvastatin 5 milliGRAM(s) Oral at bedtime  vortioxetine 20 milliGRAM(s) Oral daily    MEDICATIONS  (PRN):  oxyCODONE    IR 15 milliGRAM(s) Oral three times a day PRN Severe Pain (7 - 10)

## 2023-08-15 NOTE — PROGRESS NOTE ADULT - ASSESSMENT
72 y/o male with HTN, HPL, multiple ER visits over the past 9 days with uncontrolled BP, now admitted with uncontrolled /110's 72 y/o male with PMH HTN, HLD, afib on eliquis, s/p spinal fusion, s/p AAA repair  with multiple ER visits over the past 9 days due to labile blood pressures now admitted due to uncontrolled BP associated with intermittent headaches.

## 2023-08-15 NOTE — PROGRESS NOTE ADULT - PROBLEM SELECTOR PLAN 1
/110  Metoprolol switched to coreg 6.125mg q12  Continue Losartan--monitor kidney function  Check renal US  IV Hydral PRN Patient administered IV hydralazine 10mg IVP for initial BP control having adequate response with BP <140/90. Started oral apresoline 37.5mg TID and coreg 12.5mg BID but had an additional episode of breakthrough BP >180/100 for which he received an additional dose of IV hydralazine 10mg IVP with BP <140/90 as a result. Patient currently negative for hypertensive symptoms and improving sCr (1.34 from 1.46). Urine studies obtained showing elevated Lara suggesting intrinsic kidney injury. Abdominal U/S, urine metanephrine, and aldosterone/renin obtained for characterization of possible etiologies.     Plan:  - Continue Losartan 50mg QD PO  - Start Coreg 12.5mg BID PO  - Start Apresoline 37.5mg TID PO  - F/u renal US  - F/u urine metanephrines  - F/u aldosterone/renin

## 2023-08-16 ENCOUNTER — TRANSCRIPTION ENCOUNTER (OUTPATIENT)
Age: 73
End: 2023-08-16

## 2023-08-16 VITALS
DIASTOLIC BLOOD PRESSURE: 82 MMHG | OXYGEN SATURATION: 97 % | HEART RATE: 78 BPM | SYSTOLIC BLOOD PRESSURE: 140 MMHG | RESPIRATION RATE: 20 BRPM

## 2023-08-16 LAB
ALBUMIN SERPL ELPH-MCNC: 3.3 G/DL — SIGNIFICANT CHANGE UP (ref 3.3–5)
ALP SERPL-CCNC: 108 U/L — SIGNIFICANT CHANGE UP (ref 40–120)
ALT FLD-CCNC: 13 U/L — SIGNIFICANT CHANGE UP (ref 10–45)
ANION GAP SERPL CALC-SCNC: 8 MMOL/L — SIGNIFICANT CHANGE UP (ref 5–17)
AST SERPL-CCNC: 24 U/L — SIGNIFICANT CHANGE UP (ref 10–40)
BASOPHILS # BLD AUTO: 0.04 K/UL — SIGNIFICANT CHANGE UP (ref 0–0.2)
BASOPHILS NFR BLD AUTO: 0.6 % — SIGNIFICANT CHANGE UP (ref 0–2)
BILIRUB SERPL-MCNC: 0.4 MG/DL — SIGNIFICANT CHANGE UP (ref 0.2–1.2)
BUN SERPL-MCNC: 18 MG/DL — SIGNIFICANT CHANGE UP (ref 7–23)
CALCIUM SERPL-MCNC: 9.1 MG/DL — SIGNIFICANT CHANGE UP (ref 8.4–10.5)
CHLORIDE SERPL-SCNC: 101 MMOL/L — SIGNIFICANT CHANGE UP (ref 96–108)
CO2 SERPL-SCNC: 26 MMOL/L — SIGNIFICANT CHANGE UP (ref 22–31)
CREAT SERPL-MCNC: 1.37 MG/DL — HIGH (ref 0.5–1.3)
EGFR: 54 ML/MIN/1.73M2 — LOW
EOSINOPHIL # BLD AUTO: 0.3 K/UL — SIGNIFICANT CHANGE UP (ref 0–0.5)
EOSINOPHIL NFR BLD AUTO: 4.7 % — SIGNIFICANT CHANGE UP (ref 0–6)
GLUCOSE SERPL-MCNC: 96 MG/DL — SIGNIFICANT CHANGE UP (ref 70–99)
HCT VFR BLD CALC: 35.2 % — LOW (ref 39–50)
HGB BLD-MCNC: 11.4 G/DL — LOW (ref 13–17)
IMM GRANULOCYTES NFR BLD AUTO: 0.3 % — SIGNIFICANT CHANGE UP (ref 0–0.9)
LYMPHOCYTES # BLD AUTO: 1.35 K/UL — SIGNIFICANT CHANGE UP (ref 1–3.3)
LYMPHOCYTES # BLD AUTO: 21.1 % — SIGNIFICANT CHANGE UP (ref 13–44)
MAGNESIUM SERPL-MCNC: 2.2 MG/DL — SIGNIFICANT CHANGE UP (ref 1.6–2.6)
MCHC RBC-ENTMCNC: 30.3 PG — SIGNIFICANT CHANGE UP (ref 27–34)
MCHC RBC-ENTMCNC: 32.4 GM/DL — SIGNIFICANT CHANGE UP (ref 32–36)
MCV RBC AUTO: 93.6 FL — SIGNIFICANT CHANGE UP (ref 80–100)
MONOCYTES # BLD AUTO: 0.72 K/UL — SIGNIFICANT CHANGE UP (ref 0–0.9)
MONOCYTES NFR BLD AUTO: 11.2 % — SIGNIFICANT CHANGE UP (ref 2–14)
NEUTROPHILS # BLD AUTO: 3.98 K/UL — SIGNIFICANT CHANGE UP (ref 1.8–7.4)
NEUTROPHILS NFR BLD AUTO: 62.1 % — SIGNIFICANT CHANGE UP (ref 43–77)
NRBC # BLD: 0 /100 WBCS — SIGNIFICANT CHANGE UP (ref 0–0)
PHOSPHATE SERPL-MCNC: 3.2 MG/DL — SIGNIFICANT CHANGE UP (ref 2.5–4.5)
PLATELET # BLD AUTO: 241 K/UL — SIGNIFICANT CHANGE UP (ref 150–400)
POTASSIUM SERPL-MCNC: 4.4 MMOL/L — SIGNIFICANT CHANGE UP (ref 3.5–5.3)
POTASSIUM SERPL-SCNC: 4.4 MMOL/L — SIGNIFICANT CHANGE UP (ref 3.5–5.3)
PROT SERPL-MCNC: 6.9 G/DL — SIGNIFICANT CHANGE UP (ref 6–8.3)
RBC # BLD: 3.76 M/UL — LOW (ref 4.2–5.8)
RBC # FLD: 14.9 % — HIGH (ref 10.3–14.5)
SODIUM SERPL-SCNC: 135 MMOL/L — SIGNIFICANT CHANGE UP (ref 135–145)
WBC # BLD: 6.41 K/UL — SIGNIFICANT CHANGE UP (ref 3.8–10.5)
WBC # FLD AUTO: 6.41 K/UL — SIGNIFICANT CHANGE UP (ref 3.8–10.5)

## 2023-08-16 PROCEDURE — 82570 ASSAY OF URINE CREATININE: CPT

## 2023-08-16 PROCEDURE — 93005 ELECTROCARDIOGRAM TRACING: CPT

## 2023-08-16 PROCEDURE — 83835 ASSAY OF METANEPHRINES: CPT

## 2023-08-16 PROCEDURE — 97161 PT EVAL LOW COMPLEX 20 MIN: CPT

## 2023-08-16 PROCEDURE — 85027 COMPLETE CBC AUTOMATED: CPT

## 2023-08-16 PROCEDURE — 76770 US EXAM ABDO BACK WALL COMP: CPT

## 2023-08-16 PROCEDURE — 83935 ASSAY OF URINE OSMOLALITY: CPT

## 2023-08-16 PROCEDURE — 93306 TTE W/DOPPLER COMPLETE: CPT

## 2023-08-16 PROCEDURE — 71045 X-RAY EXAM CHEST 1 VIEW: CPT

## 2023-08-16 PROCEDURE — 82550 ASSAY OF CK (CPK): CPT

## 2023-08-16 PROCEDURE — 84100 ASSAY OF PHOSPHORUS: CPT

## 2023-08-16 PROCEDURE — 84156 ASSAY OF PROTEIN URINE: CPT

## 2023-08-16 PROCEDURE — 83735 ASSAY OF MAGNESIUM: CPT

## 2023-08-16 PROCEDURE — 80048 BASIC METABOLIC PNL TOTAL CA: CPT

## 2023-08-16 PROCEDURE — 84540 ASSAY OF URINE/UREA-N: CPT

## 2023-08-16 PROCEDURE — 99285 EMERGENCY DEPT VISIT HI MDM: CPT | Mod: 25

## 2023-08-16 PROCEDURE — 99239 HOSP IP/OBS DSCHRG MGMT >30: CPT

## 2023-08-16 PROCEDURE — 82553 CREATINE MB FRACTION: CPT

## 2023-08-16 PROCEDURE — 96374 THER/PROPH/DIAG INJ IV PUSH: CPT

## 2023-08-16 PROCEDURE — 36415 COLL VENOUS BLD VENIPUNCTURE: CPT

## 2023-08-16 PROCEDURE — 84300 ASSAY OF URINE SODIUM: CPT

## 2023-08-16 PROCEDURE — 84133 ASSAY OF URINE POTASSIUM: CPT

## 2023-08-16 PROCEDURE — 85025 COMPLETE CBC W/AUTO DIFF WBC: CPT

## 2023-08-16 PROCEDURE — 80053 COMPREHEN METABOLIC PANEL: CPT

## 2023-08-16 PROCEDURE — 84484 ASSAY OF TROPONIN QUANT: CPT

## 2023-08-16 RX ORDER — HYDRALAZINE HCL 50 MG
1 TABLET ORAL
Qty: 90 | Refills: 3
Start: 2023-08-16

## 2023-08-16 RX ORDER — HYDRALAZINE HCL 50 MG
1 TABLET ORAL
Qty: 0 | Refills: 0 | DISCHARGE
Start: 2023-08-16

## 2023-08-16 RX ORDER — HYDRALAZINE HCL 50 MG
25 TABLET ORAL EVERY 8 HOURS
Refills: 0 | Status: DISCONTINUED | OUTPATIENT
Start: 2023-08-16 | End: 2023-08-16

## 2023-08-16 RX ORDER — ACETAMINOPHEN 500 MG
3 TABLET ORAL
Qty: 0 | Refills: 0 | DISCHARGE
Start: 2023-08-16

## 2023-08-16 RX ORDER — HYDRALAZINE HCL 50 MG
12.5 TABLET ORAL ONCE
Refills: 0 | Status: COMPLETED | OUTPATIENT
Start: 2023-08-16 | End: 2023-08-16

## 2023-08-16 RX ORDER — POLYETHYLENE GLYCOL 3350 17 G/17G
17 POWDER, FOR SOLUTION ORAL
Qty: 0 | Refills: 0 | DISCHARGE
Start: 2023-08-16

## 2023-08-16 RX ORDER — LOSARTAN POTASSIUM 100 MG/1
1 TABLET, FILM COATED ORAL
Qty: 30 | Refills: 3
Start: 2023-08-16

## 2023-08-16 RX ORDER — HYDRALAZINE HCL 50 MG
1.5 TABLET ORAL
Qty: 135 | Refills: 0
Start: 2023-08-16

## 2023-08-16 RX ORDER — VORTIOXETINE 5 MG/1
1 TABLET, FILM COATED ORAL
Qty: 0 | Refills: 0 | DISCHARGE
Start: 2023-08-16

## 2023-08-16 RX ORDER — CARVEDILOL PHOSPHATE 80 MG/1
1 CAPSULE, EXTENDED RELEASE ORAL
Qty: 0 | Refills: 0 | DISCHARGE
Start: 2023-08-16

## 2023-08-16 RX ORDER — CARVEDILOL PHOSPHATE 80 MG/1
1 CAPSULE, EXTENDED RELEASE ORAL
Qty: 30 | Refills: 3
Start: 2023-08-16

## 2023-08-16 RX ORDER — HYDRALAZINE HCL 50 MG
37.5 TABLET ORAL EVERY 8 HOURS
Refills: 0 | Status: DISCONTINUED | OUTPATIENT
Start: 2023-08-16 | End: 2023-08-16

## 2023-08-16 RX ORDER — METOPROLOL TARTRATE 50 MG
1 TABLET ORAL
Refills: 0 | DISCHARGE

## 2023-08-16 RX ORDER — SIMVASTATIN 20 MG/1
1 TABLET, FILM COATED ORAL
Qty: 30 | Refills: 3
Start: 2023-08-16

## 2023-08-16 RX ADMIN — PANTOPRAZOLE SODIUM 40 MILLIGRAM(S): 20 TABLET, DELAYED RELEASE ORAL at 05:38

## 2023-08-16 RX ADMIN — FINASTERIDE 5 MILLIGRAM(S): 5 TABLET, FILM COATED ORAL at 10:14

## 2023-08-16 RX ADMIN — Medication 975 MILLIGRAM(S): at 12:21

## 2023-08-16 RX ADMIN — Medication 12.5 MILLIGRAM(S): at 15:18

## 2023-08-16 RX ADMIN — OXYCODONE HYDROCHLORIDE 15 MILLIGRAM(S): 5 TABLET ORAL at 06:53

## 2023-08-16 RX ADMIN — OXYCODONE HYDROCHLORIDE 15 MILLIGRAM(S): 5 TABLET ORAL at 18:17

## 2023-08-16 RX ADMIN — APIXABAN 5 MILLIGRAM(S): 2.5 TABLET, FILM COATED ORAL at 06:21

## 2023-08-16 RX ADMIN — LOSARTAN POTASSIUM 50 MILLIGRAM(S): 100 TABLET, FILM COATED ORAL at 05:38

## 2023-08-16 RX ADMIN — Medication 975 MILLIGRAM(S): at 11:56

## 2023-08-16 RX ADMIN — VORTIOXETINE 20 MILLIGRAM(S): 5 TABLET, FILM COATED ORAL at 10:13

## 2023-08-16 RX ADMIN — APIXABAN 5 MILLIGRAM(S): 2.5 TABLET, FILM COATED ORAL at 17:45

## 2023-08-16 RX ADMIN — OXYCODONE HYDROCHLORIDE 20 MILLIGRAM(S): 5 TABLET ORAL at 06:21

## 2023-08-16 RX ADMIN — Medication 1 MILLIGRAM(S): at 10:14

## 2023-08-16 RX ADMIN — CHOLESTYRAMINE 4 GRAM(S): 4 POWDER, FOR SUSPENSION ORAL at 10:14

## 2023-08-16 RX ADMIN — OXYCODONE HYDROCHLORIDE 15 MILLIGRAM(S): 5 TABLET ORAL at 07:48

## 2023-08-16 RX ADMIN — POLYETHYLENE GLYCOL 3350 17 GRAM(S): 17 POWDER, FOR SOLUTION ORAL at 10:15

## 2023-08-16 RX ADMIN — OXYCODONE HYDROCHLORIDE 20 MILLIGRAM(S): 5 TABLET ORAL at 15:20

## 2023-08-16 RX ADMIN — CARVEDILOL PHOSPHATE 12.5 MILLIGRAM(S): 80 CAPSULE, EXTENDED RELEASE ORAL at 11:01

## 2023-08-16 RX ADMIN — OXYCODONE HYDROCHLORIDE 20 MILLIGRAM(S): 5 TABLET ORAL at 14:17

## 2023-08-16 RX ADMIN — Medication 25 MILLIGRAM(S): at 14:17

## 2023-08-16 RX ADMIN — Medication 37.5 MILLIGRAM(S): at 05:38

## 2023-08-16 NOTE — DIETITIAN INITIAL EVALUATION ADULT - ADD RECOMMEND
- Change diet texture to Soft & Bite Sized  - Add Ensure Enlive once daily (350kcal, 20gm pro)   - Monitor PO intake  > Consistently meet >75% of estimated needs during admission   - Monitor labs (electrolytes, CMP), wt trends (weekly), GI function, and skin integrity   - Encourage pt to meet nutritional needs and honor food preferences as able   - RD to remain available for additional nutrition interventions and diet edu as needed   **Moderate Nutrition Risk

## 2023-08-16 NOTE — PROGRESS NOTE ADULT - SUBJECTIVE AND OBJECTIVE BOX
**INCOMPLETE NOTE    OVERNIGHT EVENTS:    SUBJECTIVE:  Patient seen and examined at bedside.    Vital Signs Last 12 Hrs  T(F): 97.9 (08-16-23 @ 05:56), Max: 98.4 (08-15-23 @ 22:57)  HR: 70 (08-16-23 @ 06:26) (60 - 76)  BP: 150/78 (08-16-23 @ 06:26) (118/62 - 150/78)  BP(mean): 107 (08-16-23 @ 06:26) (84 - 107)  RR: 18 (08-16-23 @ 06:26) (13 - 23)  SpO2: 98% (08-16-23 @ 06:26) (96% - 99%)  I&O's Summary    15 Aug 2023 07:01  -  16 Aug 2023 07:00  --------------------------------------------------------  IN: 237 mL / OUT: 1300 mL / NET: -1063 mL        PHYSICAL EXAM:  Constitutional: NAD, comfortable in bed.  HEENT: NC/AT, PERRLA, EOMI, no conjunctival pallor or scleral icterus, MMM  Neck: Supple, no JVD  Respiratory: CTA B/L. No w/r/r.   Cardiovascular: RRR, normal S1 and S2, no m/r/g.   Gastrointestinal: +BS, soft NTND, no guarding or rebound tenderness, no palpable masses   Extremities: wwp; no cyanosis, clubbing or edema.   Vascular: Pulses equal and strong throughout.   Neurological: AAOx3, no CN deficits, strength and sensation intact throughout.   Skin: No gross skin abnormalities or rashes        LABS:                        11.4   6.41  )-----------( 241      ( 16 Aug 2023 05:30 )             35.2     08-16    135  |  101  |  18  ----------------------------<  96  4.4   |  26  |  1.37<H>    Ca    9.1      16 Aug 2023 05:30  Phos  3.2     08-16  Mg     2.2     08-16    TPro  6.9  /  Alb  3.3  /  TBili  0.4  /  DBili  x   /  AST  24  /  ALT  13  /  AlkPhos  108  08-16      Urinalysis Basic - ( 16 Aug 2023 05:30 )    Color: x / Appearance: x / SG: x / pH: x  Gluc: 96 mg/dL / Ketone: x  / Bili: x / Urobili: x   Blood: x / Protein: x / Nitrite: x   Leuk Esterase: x / RBC: x / WBC x   Sq Epi: x / Non Sq Epi: x / Bacteria: x          RADIOLOGY & ADDITIONAL TESTS:    MEDICATIONS  (STANDING):  apixaban 5 milliGRAM(s) Oral every 12 hours  carvedilol 12.5 milliGRAM(s) Oral every 12 hours  cholestyramine Powder (Sugar-Free) 4 Gram(s) Oral daily  finasteride 5 milliGRAM(s) Oral daily  folic acid 1 milliGRAM(s) Oral daily  hydrALAZINE 37.5 milliGRAM(s) Oral every 8 hours  losartan 50 milliGRAM(s) Oral daily  nortriptyline 75 milliGRAM(s) Oral at bedtime  oxyCODONE  ER Tablet 20 milliGRAM(s) Oral every 8 hours  pantoprazole    Tablet 40 milliGRAM(s) Oral before breakfast  polyethylene glycol 3350 17 Gram(s) Oral daily  simvastatin 5 milliGRAM(s) Oral at bedtime  vortioxetine 20 milliGRAM(s) Oral daily    MEDICATIONS  (PRN):  acetaminophen     Tablet .. 975 milliGRAM(s) Oral every 6 hours PRN Mild Pain (1 - 3)  melatonin 5 milliGRAM(s) Oral at bedtime PRN Insomnia  oxyCODONE    IR 15 milliGRAM(s) Oral three times a day PRN Severe Pain (7 - 10)   OVERNIGHT EVENTS: none to report    SUBJECTIVE:  Patient seen and examined at bedside. Found stable and in NAD.     Vital Signs Last 12 Hrs  T(F): 97.9 (08-16-23 @ 05:56), Max: 98.4 (08-15-23 @ 22:57)  HR: 70 (08-16-23 @ 06:26) (60 - 76)  BP: 150/78 (08-16-23 @ 06:26) (118/62 - 150/78)  BP(mean): 107 (08-16-23 @ 06:26) (84 - 107)  RR: 18 (08-16-23 @ 06:26) (13 - 23)  SpO2: 98% (08-16-23 @ 06:26) (96% - 99%)  I&O's Summary    15 Aug 2023 07:01  -  16 Aug 2023 07:00  --------------------------------------------------------  IN: 237 mL / OUT: 1300 mL / NET: -1063 mL        PHYSICAL EXAM:  Constitutional: NAD, comfortable in bed.  HEENT: NC/AT, PERRLA, EOMI, no conjunctival pallor or scleral icterus, MMM  Neck: Supple, no JVD  Respiratory: CTA B/L. No w/r/r.   Cardiovascular: RRR, normal S1 and S2, no m/r/g.   Gastrointestinal: +BS, soft NTND, no guarding or rebound tenderness, no palpable masses   Extremities: wwp; no cyanosis, clubbing or edema.   Vascular: Pulses equal and strong throughout.   Neurological: AAOx3, no CN deficits, strength and sensation intact throughout.   Skin: No gross skin abnormalities or rashes        LABS:                        11.4   6.41  )-----------( 241      ( 16 Aug 2023 05:30 )             35.2     08-16    135  |  101  |  18  ----------------------------<  96  4.4   |  26  |  1.37<H>    Ca    9.1      16 Aug 2023 05:30  Phos  3.2     08-16  Mg     2.2     08-16    TPro  6.9  /  Alb  3.3  /  TBili  0.4  /  DBili  x   /  AST  24  /  ALT  13  /  AlkPhos  108  08-16      Urinalysis Basic - ( 16 Aug 2023 05:30 )    Color: x / Appearance: x / SG: x / pH: x  Gluc: 96 mg/dL / Ketone: x  / Bili: x / Urobili: x   Blood: x / Protein: x / Nitrite: x   Leuk Esterase: x / RBC: x / WBC x   Sq Epi: x / Non Sq Epi: x / Bacteria: x          RADIOLOGY & ADDITIONAL TESTS:    MEDICATIONS  (STANDING):  apixaban 5 milliGRAM(s) Oral every 12 hours  carvedilol 12.5 milliGRAM(s) Oral every 12 hours  cholestyramine Powder (Sugar-Free) 4 Gram(s) Oral daily  finasteride 5 milliGRAM(s) Oral daily  folic acid 1 milliGRAM(s) Oral daily  hydrALAZINE 37.5 milliGRAM(s) Oral every 8 hours  losartan 50 milliGRAM(s) Oral daily  nortriptyline 75 milliGRAM(s) Oral at bedtime  oxyCODONE  ER Tablet 20 milliGRAM(s) Oral every 8 hours  pantoprazole    Tablet 40 milliGRAM(s) Oral before breakfast  polyethylene glycol 3350 17 Gram(s) Oral daily  simvastatin 5 milliGRAM(s) Oral at bedtime  vortioxetine 20 milliGRAM(s) Oral daily    MEDICATIONS  (PRN):  acetaminophen     Tablet .. 975 milliGRAM(s) Oral every 6 hours PRN Mild Pain (1 - 3)  melatonin 5 milliGRAM(s) Oral at bedtime PRN Insomnia  oxyCODONE    IR 15 milliGRAM(s) Oral three times a day PRN Severe Pain (7 - 10)   OVERNIGHT EVENTS: none to report    SUBJECTIVE:  Patient seen and examined at bedside. Found stable and in NAD. Denies SOB, chest, pain, visual disturbances. Endorses urination.    Vital Signs Last 12 Hrs  T(F): 97.9 (08-16-23 @ 05:56), Max: 98.4 (08-15-23 @ 22:57)  HR: 70 (08-16-23 @ 06:26) (60 - 76)  BP: 150/78 (08-16-23 @ 06:26) (118/62 - 150/78)  BP(mean): 107 (08-16-23 @ 06:26) (84 - 107)  RR: 18 (08-16-23 @ 06:26) (13 - 23)  SpO2: 98% (08-16-23 @ 06:26) (96% - 99%)  I&O's Summary    15 Aug 2023 07:01  -  16 Aug 2023 07:00  --------------------------------------------------------  IN: 237 mL / OUT: 1300 mL / NET: -1063 mL        PHYSICAL EXAM:  Constituional: well-nourished. Appear his age.  HEENT:   Normal oral mucosa, PERRL, EOMI	  Neck: Supple, - JVD; No Carotid Bruit and 2+ pulses B/L  Cardiovascular: Normal S1 S2, No JVD, No murmurs  Respiratory: Lungs clear to auscultation, no Rales, Rhonchi, Wheezing	  Gastrointestinal:  Soft, Non-tender, + BS	  Skin: No rashes, No ecchymoses, No cyanosis  Extremities: Normal range of motion, No clubbing, cyanosis or edema  Vascular: Femoral pulses 2+ b/l without bruit, DP 1+ b/l, PT 1+ b/l  Neurologic: Non-focal  Psychiatry: A & O x 3, Mood & affect appropriate        LABS:                        11.4   6.41  )-----------( 241      ( 16 Aug 2023 05:30 )             35.2     08-16    135  |  101  |  18  ----------------------------<  96  4.4   |  26  |  1.37<H>    Ca    9.1      16 Aug 2023 05:30  Phos  3.2     08-16  Mg     2.2     08-16    TPro  6.9  /  Alb  3.3  /  TBili  0.4  /  DBili  x   /  AST  24  /  ALT  13  /  AlkPhos  108  08-16      Urinalysis Basic - ( 16 Aug 2023 05:30 )    Color: x / Appearance: x / SG: x / pH: x  Gluc: 96 mg/dL / Ketone: x  / Bili: x / Urobili: x   Blood: x / Protein: x / Nitrite: x   Leuk Esterase: x / RBC: x / WBC x   Sq Epi: x / Non Sq Epi: x / Bacteria: x          RADIOLOGY & ADDITIONAL TESTS:    MEDICATIONS  (STANDING):  apixaban 5 milliGRAM(s) Oral every 12 hours  carvedilol 12.5 milliGRAM(s) Oral every 12 hours  cholestyramine Powder (Sugar-Free) 4 Gram(s) Oral daily  finasteride 5 milliGRAM(s) Oral daily  folic acid 1 milliGRAM(s) Oral daily  hydrALAZINE 37.5 milliGRAM(s) Oral every 8 hours  losartan 50 milliGRAM(s) Oral daily  nortriptyline 75 milliGRAM(s) Oral at bedtime  oxyCODONE  ER Tablet 20 milliGRAM(s) Oral every 8 hours  pantoprazole    Tablet 40 milliGRAM(s) Oral before breakfast  polyethylene glycol 3350 17 Gram(s) Oral daily  simvastatin 5 milliGRAM(s) Oral at bedtime  vortioxetine 20 milliGRAM(s) Oral daily    MEDICATIONS  (PRN):  acetaminophen     Tablet .. 975 milliGRAM(s) Oral every 6 hours PRN Mild Pain (1 - 3)  melatonin 5 milliGRAM(s) Oral at bedtime PRN Insomnia  oxyCODONE    IR 15 milliGRAM(s) Oral three times a day PRN Severe Pain (7 - 10)

## 2023-08-16 NOTE — DISCHARGE NOTE PROVIDER - CARE PROVIDER_API CALL
Santiago Gonzales S  Cardiovascular Disease  [address], [address]  [place], [place] [facility]  Phone: [phone]  Fax: [phone]  Follow Up Time:

## 2023-08-16 NOTE — PHYSICAL THERAPY INITIAL EVALUATION ADULT - RANGE OF MOTION EXAMINATION, REHAB EVAL
BUE WFL; BLE decreased AROM of b/l hip flexion, knee flexion, and ankle DF/PF; PROM ~75%; decreased cervical extension at baseline

## 2023-08-16 NOTE — PROGRESS NOTE ADULT - ASSESSMENT
74 y/o male with PMH HTN, HLD, afib on eliquis, s/p spinal fusion, s/p AAA repair  with multiple ER visits over the past 9 days due to labile blood pressures now admitted due to uncontrolled BP associated with intermittent headaches.

## 2023-08-16 NOTE — DISCHARGE NOTE PROVIDER - HOSPITAL COURSE
Surinder Guerrero is a 74 y/o male with a past medical history of 74 y/o male with PMH HTN, HLD, afib on eliquis, s/p spinal fusion, s/p AAA repair  with multiple ER visits over the past 9 days due to labile blood pressures now admitted due to uncontrolled BP associated with intermittent headaches.      Problem List/Main Diagnoses (system-based):  #Hypertensive urgency.   Patient administered IV hydralazine 10mg IVP for initial BP control having adequate response with BP <140/90. Started oral apresoline 37.5mg TID and coreg 12.5mg BID but had an additional episode of breakthrough BP >180/100 for which he received an additional dose of IV hydralazine 10mg IVP with BP <140/90 as a result. Patient currently negative for hypertensive symptoms and improving sCr (1.34 from 1.46). Urine studies obtained showing elevated Lara suggesting intrinsic kidney injury. Abdominal U/S, urine metanephrine, and aldosterone/renin obtained for characterization of possible etiologies. Patient currently stable and asymptomatic. No overnight BP spike events. Retroperitoneal U/S unremarkable for findings suggestive of secondary hypertension etiologies.    Plan:  - C/w Losartan 50mg QD PO  - C/w 12.5mg BID PO  - C/w Apresoline 25mg q8 PO  - F/u urine metanephrines  - F/u aldosterone/renin.    Patient was discharged to: (home/[facility]/acute rehab/hospice, etc. and w/ home health/home PT/RN/home O2)    New medications: Apresoline 25mg q8 PO, Coreg 12.5mg BID PO  Changes to old medications: none  Medications that were stopped: Toprol 50mg QD PO    Items to follow up as outpatient: blood pressure monitoring    Physical exam at the time of discharge:  Constituional: well-nourished. Appear his age.  HEENT:   Normal oral mucosa, PERRL, EOMI	  Neck: Supple, - JVD; No Carotid Bruit and 2+ pulses B/L  Cardiovascular: Normal S1 S2, No JVD, No murmurs  Respiratory: Lungs clear to auscultation, no Rales, Rhonchi, Wheezing	  Gastrointestinal:  Soft, Non-tender, + BS	  Skin: No rashes, No ecchymoses, No cyanosis  Extremities: Normal range of motion, No clubbing, cyanosis or edema  Vascular: Femoral pulses 2+ b/l without bruit, DP 1+ b/l, PT 1+ b/l  Neurologic: Non-focal  Psychiatry: A & O x 3, Mood & affect appropriate     Surinder Guerrero is a 72 y/o male with a past medical history of 72 y/o male with PMH HTN, HLD, afib on eliquis, s/p spinal fusion, s/p AAA repair  with multiple ER visits over the past 9 days due to labile blood pressures now admitted due to uncontrolled BP associated with intermittent headaches.      Problem List/Main Diagnoses (system-based):  #Hypertensive urgency.   Patient administered IV hydralazine 10mg IVP for initial BP control having adequate response with BP <140/90. Started oral apresoline 37.5mg TID and coreg 12.5mg BID but had an additional episode of breakthrough BP >180/100 for which he received an additional dose of IV hydralazine 10mg IVP with BP <140/90 as a result. Patient currently negative for hypertensive symptoms and improving sCr (1.34 from 1.46). Urine studies obtained showing elevated Lara suggesting intrinsic kidney injury. Abdominal U/S, urine metanephrine, and aldosterone/renin obtained for characterization of possible etiologies. Patient currently stable and asymptomatic. No overnight BP spike events. Retroperitoneal U/S unremarkable for findings suggestive of secondary hypertension etiologies.    Plan:  - C/w Losartan 50mg QD PO  - C/w 12.5mg BID PO  - C/w Apresoline 25mg q8 PO  - F/u urine metanephrines  - F/u aldosterone/renin.    Patient was discharged to: home  New medications: Apresoline 25mg q8 PO, Coreg 12.5mg BID PO  Changes to old medications: none  Medications that were stopped: Toprol 50mg QD PO    Items to follow up as outpatient: blood pressure monitoring    Physical exam at the time of discharge:  Constituional: well-nourished. Appear his age.  HEENT:   Normal oral mucosa, PERRL, EOMI	  Neck: Supple, - JVD; No Carotid Bruit and 2+ pulses B/L  Cardiovascular: Normal S1 S2, No JVD, No murmurs  Respiratory: Lungs clear to auscultation, no Rales, Rhonchi, Wheezing	  Gastrointestinal:  Soft, Non-tender, + BS	  Skin: No rashes, No ecchymoses, No cyanosis  Extremities: Normal range of motion, No clubbing, cyanosis or edema  Vascular: Femoral pulses 2+ b/l without bruit, DP 1+ b/l, PT 1+ b/l  Neurologic: Non-focal  Psychiatry: A & O x 3, Mood & affect appropriate     Surinder Guerrero is a 74 y/o male with a past medical history of 74 y/o male with PMH HTN, HLD, afib on eliquis, s/p spinal fusion, s/p AAA repair  with multiple ER visits over the past 9 days due to labile blood pressures now admitted due to uncontrolled BP associated with intermittent headaches.      Problem List/Main Diagnoses (system-based):  #Hypertensive urgency.   Patient administered IV hydralazine 10mg IVP for initial BP control having adequate response with BP <140/90. Started oral apresoline 37.5mg TID and coreg 12.5mg BID but had an additional episode of breakthrough BP >180/100 for which he received an additional dose of IV hydralazine 10mg IVP with BP <140/90 as a result. Patient currently negative for hypertensive symptoms and improving sCr (1.34 from 1.46). Urine studies obtained showing elevated Lara suggesting intrinsic kidney injury. Abdominal U/S, urine metanephrine, and aldosterone/renin obtained for characterization of possible etiologies. Patient currently stable and asymptomatic. No overnight BP spike events. Retroperitoneal U/S unremarkable for findings suggestive of secondary hypertension etiologies. Last BP before the discharge 140/82. To follow up with Dr. Gonzales as outpatient next week. Patient to check his BP at home three times a day and call his cardiologist for any hypotension or SBP >180.       Plan:  - C/w Losartan 50mg QD PO  - C/w coreg 12.5mg BID PO  - C/w hydralazine 37.5mg q8 PO  - F/u urine metanephrines  - F/u aldosterone/renin.    Patient was discharged to: home  New medications: Apresoline 37.5mg q8 PO, Coreg 12.5mg BID PO  Changes to old medications: none  Medications that were stopped: Toprol 50mg QD PO    Items to follow up as outpatient: blood pressure monitoring    Physical exam at the time of discharge:  Constitutional: well-nourished. Appear his age.  HEENT:   Normal oral mucosa, PERRL, EOMI	  Neck: Supple, - JVD; No Carotid Bruit and 2+ pulses B/L  Cardiovascular: Normal S1 S2, No JVD, No murmurs  Respiratory: Lungs clear to auscultation, no Rales, Rhonchi, Wheezing	  Gastrointestinal:  Soft, Non-tender, + BS	  Skin: No rashes, No ecchymoses, No cyanosis  Extremities: Normal range of motion, No clubbing, cyanosis or edema  Vascular: Femoral pulses 2+ b/l without bruit, DP 1+ b/l, PT 1+ b/l  Neurologic: Non-focal  Psychiatry: A & O x 3, Mood & affect appropriate

## 2023-08-16 NOTE — DIETITIAN INITIAL EVALUATION ADULT - PROBLEM SELECTOR PLAN 1
/110  Metoprolol switched to coreg 6.125mg q12  Continue Losartan--monitor kidney function  Check renal US  IV Hydral PRN

## 2023-08-16 NOTE — DISCHARGE NOTE PROVIDER - CARE PROVIDERS DIRECT ADDRESSES
[email or web address]

## 2023-08-16 NOTE — DISCHARGE NOTE PROVIDER - NSDCMRMEDTOKEN_GEN_ALL_CORE_FT
ALPRAZolam 1 mg oral tablet: 1 tab(s) orally once a day (at bedtime), As needed, insomnia  apixaban 5 mg oral tablet: 1 tab(s) orally every 12 hours  folic acid 1 mg oral tablet: 1 tab(s) orally once a day  losartan 50 mg oral tablet: 1 orally once a day  metoprolol succinate 50 mg oral capsule, extended release: 1 orally once a day  oxyCODONE 15 mg oral tablet: 1 tab(s) orally every 4 hours, As needed, Moderate Pain (4 - 6) MDD:6  oxyCODONE 20 mg oral tablet, extended release: 1 tab(s) orally every 8 hours  Pamelor 75 mg oral capsule: 1 cap(s) orally once a day (at bedtime)  pantoprazole 40 mg oral delayed release tablet: 1 tab(s) orally once a day (before a meal)  Proscar 5 mg oral tablet: 1 tab(s) orally once a day  simvastatin 5 mg oral tablet: 1 tab(s) orally once a day (at bedtime)   acetaminophen 325 mg oral tablet: 3 tab(s) orally every 6 hours As needed Mild Pain (1 - 3)  ALPRAZolam 1 mg oral tablet: 1 tab(s) orally once a day (at bedtime), As needed, insomnia  apixaban 5 mg oral tablet: 1 tab(s) orally every 12 hours  folic acid 1 mg oral tablet: 1 tab(s) orally once a day  losartan 50 mg oral tablet: 1 orally once a day  oxyCODONE 15 mg oral tablet: 1 tab(s) orally every 4 hours, As needed, Moderate Pain (4 - 6) MDD:6  oxyCODONE 20 mg oral tablet, extended release: 1 tab(s) orally every 8 hours  Pamelor 75 mg oral capsule: 1 cap(s) orally once a day (at bedtime)  pantoprazole 40 mg oral delayed release tablet: 1 tab(s) orally once a day (before a meal)  polyethylene glycol 3350 oral powder for reconstitution: 17 gram(s) orally once a day  Proscar 5 mg oral tablet: 1 tab(s) orally once a day  simvastatin 5 mg oral tablet: 1 tab(s) orally once a day (at bedtime)  vortioxetine 20 mg oral tablet: 1 tab(s) orally once a day   acetaminophen 325 mg oral tablet: 3 tab(s) orally every 6 hours As needed Mild Pain (1 - 3)  ALPRAZolam 1 mg oral tablet: 1 tab(s) orally once a day (at bedtime), As needed, insomnia  apixaban 5 mg oral tablet: 1 tab(s) orally every 12 hours  carvedilol 12.5 mg oral tablet: 1 tab(s) orally every 12 hours  carvedilol 12.5 mg oral tablet: 1 tab(s) orally every 12 hours  folic acid 1 mg oral tablet: 1 tab(s) orally once a day  hydrALAZINE 25 mg oral tablet: 1 tab(s) orally every 8 hours  hydrALAZINE 25 mg oral tablet: 1 tab(s) orally every 8 hours  losartan 50 mg oral tablet: 1 orally once a day  losartan 50 mg oral tablet: 1 tab(s) orally once a day  oxyCODONE 15 mg oral tablet: 1 tab(s) orally every 4 hours, As needed, Moderate Pain (4 - 6) MDD:6  oxyCODONE 20 mg oral tablet, extended release: 1 tab(s) orally every 8 hours  Pamelor 75 mg oral capsule: 1 cap(s) orally once a day (at bedtime)  pantoprazole 40 mg oral delayed release tablet: 1 tab(s) orally once a day (before a meal)  polyethylene glycol 3350 oral powder for reconstitution: 17 gram(s) orally once a day  Proscar 5 mg oral tablet: 1 tab(s) orally once a day  simvastatin 5 mg oral tablet: 1 tab(s) orally once a day (at bedtime)  simvastatin 5 mg oral tablet: 1 tab(s) orally once a day  vortioxetine 20 mg oral tablet: 1 tab(s) orally once a day   acetaminophen 325 mg oral tablet: 3 tab(s) orally every 6 hours As needed Mild Pain (1 - 3)  ALPRAZolam 1 mg oral tablet: 1 tab(s) orally once a day (at bedtime), As needed, insomnia  apixaban 5 mg oral tablet: 1 tab(s) orally every 12 hours  carvedilol 12.5 mg oral tablet: 1 tab(s) orally every 12 hours  carvedilol 12.5 mg oral tablet: 1 tab(s) orally every 12 hours  folic acid 1 mg oral tablet: 1 tab(s) orally once a day  hydrALAZINE 25 mg oral tablet: 1 tab(s) orally every 8 hours  hydrALAZINE 25 mg oral tablet: 1.5 tab(s) orally every 8 hours  losartan 50 mg oral tablet: 1 orally once a day  losartan 50 mg oral tablet: 1 tab(s) orally once a day  oxyCODONE 15 mg oral tablet: 1 tab(s) orally every 4 hours, As needed, Moderate Pain (4 - 6) MDD:6  oxyCODONE 20 mg oral tablet, extended release: 1 tab(s) orally every 8 hours  Pamelor 75 mg oral capsule: 1 cap(s) orally once a day (at bedtime)  pantoprazole 40 mg oral delayed release tablet: 1 tab(s) orally once a day (before a meal)  polyethylene glycol 3350 oral powder for reconstitution: 17 gram(s) orally once a day  Proscar 5 mg oral tablet: 1 tab(s) orally once a day  simvastatin 5 mg oral tablet: 1 tab(s) orally once a day (at bedtime)  simvastatin 5 mg oral tablet: 1 tab(s) orally once a day  vortioxetine 20 mg oral tablet: 1 tab(s) orally once a day

## 2023-08-16 NOTE — PHYSICAL THERAPY INITIAL EVALUATION ADULT - ADDITIONAL COMMENTS
As per pt & wife at bedside, PTA he required assistance with all functional mobility, ADLs, and IADLs. Pt has a rolling walker, rollator, straight cane, wheelchair, handicapped shower, shower chair, grab bars, and raised toilet seat. As per wife, they had a private pay home health aide x 8 hrs however not as of recently secondary to cost issues. Wife cares for pt and provides all assistances. Pt ambulates short distances in the home.

## 2023-08-16 NOTE — DISCHARGE NOTE PROVIDER - ATTENDING DISCHARGE PHYSICAL EXAMINATION:
GENERAL: No acute distress, well-developed  HEAD:  Atraumatic, Normocephalic  ENT: EOMI, PERRLA, conjunctiva and sclera clear, Neck supple, No JVD, moist mucosa  CHEST/LUNG: Clear to auscultation bilaterally; No wheeze, equal breath sounds bilaterally   BACK: No spinal tenderness  HEART: Regular rate and rhythm; No murmurs, rubs, or gallops  ABDOMEN: Soft, Nontender, Nondistended; Bowel sounds present  EXTREMITIES:  No clubbing, cyanosis, or edema  PSYCH: Nl behavior, nl affect  NEUROLOGY: AAOx3, non-focal, cranial nerves intact  SKIN: Normal color, No rashes or lesions

## 2023-08-16 NOTE — DISCHARGE NOTE NURSING/CASE MANAGEMENT/SOCIAL WORK - NSDCPEELIQUISDIET_GEN_ALL_CORE
Called and informed pt she would need to speak to billing department and or insurance. Pt verbalized understanding    Eat healthy foods you enjoy. Apixaban/Eliquis DOES NOT have a special diet. Limit your alcohol intake.

## 2023-08-16 NOTE — DISCHARGE NOTE PROVIDER - DETAILS OF MALNUTRITION DIAGNOSIS/DIAGNOSES
This patient has been assessed with a concern for Malnutrition and was treated during this hospitalization for the following Nutrition diagnosis/diagnoses:     -  08/16/2023: Moderate protein-calorie malnutrition

## 2023-08-16 NOTE — PHYSICAL THERAPY INITIAL EVALUATION ADULT - PERTINENT HX OF CURRENT PROBLEM, REHAB EVAL
72 y/o male with PMH HTN, HLD, afib on eliquis, s/p spinal fusion, s/p AAA repair, p/w labile blood pressures x 9 days associated with intermittent headaches. Pt states his BP taken by his physical therapist at home on Friday 8/4 was elevated. They notified his PMD and were told to monitor his BP at home. Over the weekend of 8/5-8/5 he had multiple elevated BPs max 200s/110s. On Monday 8/7 he had a BP of 220/120. He refused admission and therefore discharged home. Since d/c, pt has been monitoring BP at home and continues to have a wide range of pressures, lowest 109/79, max 220/180. Pt states he has had intermittent headaches. He admits chronic back and neck pains for which he takes oxycontin.

## 2023-08-16 NOTE — PHYSICAL THERAPY INITIAL EVALUATION ADULT - GAIT DEVIATIONS NOTED, PT EVAL
baseline forward flexed posture, unsteady gait/decreased urban/decreased velocity of limb motion/decreased step length/decreased weight-shifting ability

## 2023-08-16 NOTE — DIETITIAN INITIAL EVALUATION ADULT - OTHER CALCULATIONS
Ht: 5'9", CBW: 87.9kg/193lb, IBW 72.7kg/160lb +/-10%, %% . IBW used to calculate estimated needs based on Standards of Care given pt exceeds 120% IBW. Adjusted for age, malnutrition. **Fluids per team

## 2023-08-16 NOTE — DISCHARGE NOTE NURSING/CASE MANAGEMENT/SOCIAL WORK - PATIENT PORTAL LINK FT
You can access the FollowMyHealth Patient Portal offered by [facility] by registering at the following website: [email or web address]. By joining [facility]’s FollowMyHealth portal, you will also be able to view your health information using other applications (apps) compatible with our system.

## 2023-08-16 NOTE — DIETITIAN INITIAL EVALUATION ADULT - SIGNS/SYMPTOMS
AEB +NFPE with moderate muscle wasting and fat loss AEB suspect meeting </= 75% of estimated needs >/= 1 mo, moderate physical findings

## 2023-08-16 NOTE — DIETITIAN NUTRITION RISK NOTIFICATION - ADDITIONAL COMMENTS/DIETITIAN RECOMMENDATIONS
**See Dietitian Initial Evaluation 8/16/23    Malnutrition (moderate, chronic) R/T inadequate energy intake to meet nutrition needs AEB suspect meeting </= 75% of estimated needs >/= 1 mo, moderate physical findings    Additional Recommendations:  - Change diet texture to Soft & Bite Sized  - Add Ensure Enlive once daily (350kcal, 20gm pro)   - Monitor PO intake  > Consistently meet >75% of estimated needs during admission   - Monitor labs (electrolytes, CMP), wt trends (weekly), GI function, and skin integrity   - Encourage pt to meet nutritional needs and honor food preferences as able   - RD to remain available for additional nutrition interventions and diet edu as needed   **Moderate Nutrition Risk

## 2023-08-16 NOTE — PROGRESS NOTE ADULT - PROBLEM SELECTOR PLAN 1
Patient administered IV hydralazine 10mg IVP for initial BP control having adequate response with BP <140/90. Started oral apresoline 37.5mg TID and coreg 12.5mg BID but had an additional episode of breakthrough BP >180/100 for which he received an additional dose of IV hydralazine 10mg IVP with BP <140/90 as a result. Patient currently negative for hypertensive symptoms and improving sCr (1.34 from 1.46). Urine studies obtained showing elevated Lara suggesting intrinsic kidney injury. Abdominal U/S, urine metanephrine, and aldosterone/renin obtained for characterization of possible etiologies.     Plan:  - Continue Losartan 50mg QD PO  - Start Coreg 12.5mg BID PO  - Start Apresoline 37.5mg TID PO  - F/u renal US  - F/u urine metanephrines  - F/u aldosterone/renin Patient currently stable and asymptomatic. No overnight BP spike events. Retroperitoneal U/S unremarkable for findings suggestive of secondary hypertension etiologies.    Plan:  - C/w Losartan 50mg QD PO  - C/w 12.5mg BID PO  - C/w Apresoline 37.5mg TID PO  - F/u urine metanephrines  - F/u aldosterone/renin

## 2023-08-16 NOTE — PHYSICAL THERAPY INITIAL EVALUATION ADULT - IMPAIRMENTS CONTRIBUTING TO GAIT DEVIATIONS, PT EVAL
cognition/impaired postural control/decreased strength impaired balance/impaired postural control/decreased strength

## 2023-08-16 NOTE — DIETITIAN INITIAL EVALUATION ADULT - OTHER INFO
72 y/o male with HTN, HPL, multiple ER visits over the past 9 days with uncontrolled BP, now admitted with uncontrolled /110's, now on PO anti-hypertensive agents with lowering blood pressures.    Pt seen by RDN at bedside for nutrition assessment on 5LA. NKFA. Pt expressed preference for no salmon – added to [employer]. Pt edentulous and having trouble with wearing his dentures. Reported prolonged difficulty chewing, denies difficulty swallowing. Currently placed on a DASH/TLC diet. Pt has been ordering soft foods, however, recommend changing diet texture to Soft & Bite Sized; pt in agreement. Pt endorses good appetite and intake currently and PTA. Reported having yogurt, oatmeal, and fruit for breakfast, completing ~95%. PTA, pt was consuming 3 meals/day prepared by his wife including soft foods such as ravioli, mashed potatoes, and peas. Pt stated protein intake is limited. Pt endorses UBW ~200lb x 1 year ago, fluctuating down to 185lb following prior hospital admission and JESSIKA (September 2022). Current admission wt 87.9kg/193lb reflective of overall -7lb/-3.5% wt loss x 1 year, not clinically significant. +NFPE with moderate muscle wasting/fat loss. Per ASPEN guidelines, suspect pt meets criteria for chronic moderate malnutrition AEB suspect meeting </= 75% of estimated needs >/= 1 mo, moderate physical findings. Provided education on importance of optimal PO intake including high protein soft food options. Pt receptive to adding an oral nutrition supplement to support him in meeting his needs. Reviewed Heart Healthy Nutrition Therapy and provided supporting handout for reference; pt receptive and expressed understanding. Please see nutrition recommendations below. Pt made aware RDN remains available. Will continue to follow per RD protocol.    GI: Denies N/V/D/C at present; ordered for Miralax. No abd distention noted.  Skin: R venous ulcer. 1+ edema, b/l ankles. No pressure injuries documented at this time. Leonardo 18.  Pain: No pain reported during RD interview

## 2023-08-16 NOTE — DISCHARGE NOTE PROVIDER - NSDCFUSCHEDAPPT_GEN_ALL_CORE_FT
Santiago Gonzales  [facility] Physician [facility]  [facility] [id number] [address]  Scheduled Appointment: 08/25/2023     Santiago Gonzales  [facility] Physician [facility]  [facility] [id number] [address]  Scheduled Appointment: 08/25/2023    Santiago Gonzales  [facility]  [facility] [id number] [address]  Scheduled Appointment: 08/30/2023

## 2023-08-16 NOTE — DISCHARGE NOTE PROVIDER - NSDCCPTREATMENT_GEN_ALL_CORE_FT
PRINCIPAL PROCEDURE  Procedure: Transthoracic echo  Findings and Treatment: This procedure allowed us to see your heart using soundwaves. THe test showed that your heart is functioning adequately.      SECONDARY PROCEDURE  Procedure: US retroperitoneum limited  Findings and Treatment: This test allowed us to see organs in the back of your abdomen. Of particular interest to us was your kidneys which did not show any findings that may concern us at the moment.

## 2023-08-16 NOTE — DISCHARGE NOTE PROVIDER - NSDCCPCAREPLAN_GEN_ALL_CORE_FT
PRINCIPAL DISCHARGE DIAGNOSIS  Diagnosis: Hypertensive emergency  Assessment and Plan of Treatment: You sufferd something called hypertensive emergency, which happens whe your blood pressure is very elevated and shows signs of organ damage which in your case was your kidneys. We were able to adequately control your blood pressure with medications which you will be discharged with and is very important you adhere too. Your kidneys have been improving and should not be an issue. When you follow-up with your health providers they will get blood tests that will allow them to monitor your renal function imrpovement.

## 2023-08-16 NOTE — DIETITIAN INITIAL EVALUATION ADULT - PERTINENT MEDS FT
MEDICATIONS  (STANDING):  apixaban 5 milliGRAM(s) Oral every 12 hours  carvedilol 12.5 milliGRAM(s) Oral every 12 hours  cholestyramine Powder (Sugar-Free) 4 Gram(s) Oral daily  finasteride 5 milliGRAM(s) Oral daily  folic acid 1 milliGRAM(s) Oral daily  hydrALAZINE 25 milliGRAM(s) Oral every 8 hours  losartan 50 milliGRAM(s) Oral daily  nortriptyline 75 milliGRAM(s) Oral at bedtime  oxyCODONE  ER Tablet 20 milliGRAM(s) Oral every 8 hours  pantoprazole    Tablet 40 milliGRAM(s) Oral before breakfast  polyethylene glycol 3350 17 Gram(s) Oral daily  simvastatin 5 milliGRAM(s) Oral at bedtime  vortioxetine 20 milliGRAM(s) Oral daily    MEDICATIONS  (PRN):  acetaminophen     Tablet .. 975 milliGRAM(s) Oral every 6 hours PRN Mild Pain (1 - 3)  melatonin 5 milliGRAM(s) Oral at bedtime PRN Insomnia  oxyCODONE    IR 15 milliGRAM(s) Oral three times a day PRN Severe Pain (7 - 10)

## 2023-08-16 NOTE — DIETITIAN INITIAL EVALUATION ADULT - PERTINENT LABORATORY DATA
08-16    135  |  101  |  18  ----------------------------<  96  4.4   |  26  |  1.37<H>    Ca    9.1      16 Aug 2023 05:30  Phos  3.2     08-16  Mg     2.2     08-16    TPro  6.9  /  Alb  3.3  /  TBili  0.4  /  DBili  x   /  AST  24  /  ALT  13  /  AlkPhos  108  08-16  A1C with Estimated Average Glucose Result: 5.2 % (08-31-22 @ 05:40)

## 2023-08-21 LAB
CREATININE, RNDM UR: 35.7 MG/DL — SIGNIFICANT CHANGE UP
METANEPHS 24H UR-MCNC: 0.5 — SIGNIFICANT CHANGE UP (ref 0–1)
METANEPHS 24H UR-MCNC: 37 UG/L — SIGNIFICANT CHANGE UP
NORMETANEPHRINE.: 136 UG/L — SIGNIFICANT CHANGE UP

## 2023-08-21 RX ORDER — CARVEDILOL 12.5 MG/1
12.5 TABLET, FILM COATED ORAL
Qty: 180 | Refills: 3 | Status: ACTIVE | COMMUNITY
Start: 2023-08-21 | End: 1900-01-01

## 2023-08-23 DIAGNOSIS — Z88.8 ALLERGY STATUS TO OTHER DRUGS, MEDICAMENTS AND BIOLOGICAL SUBSTANCES: ICD-10-CM

## 2023-08-23 DIAGNOSIS — Z86.718 PERSONAL HISTORY OF OTHER VENOUS THROMBOSIS AND EMBOLISM: ICD-10-CM

## 2023-08-23 DIAGNOSIS — Z41.8 ENCOUNTER FOR OTHER PROCEDURES FOR PURPOSES OTHER THAN REMEDYING HEALTH STATE: ICD-10-CM

## 2023-08-23 DIAGNOSIS — J43.9 EMPHYSEMA, UNSPECIFIED: ICD-10-CM

## 2023-08-23 DIAGNOSIS — E44.0 MODERATE PROTEIN-CALORIE MALNUTRITION: ICD-10-CM

## 2023-08-23 DIAGNOSIS — Z95.5 PRESENCE OF CORONARY ANGIOPLASTY IMPLANT AND GRAFT: ICD-10-CM

## 2023-08-23 DIAGNOSIS — Z79.891 LONG TERM (CURRENT) USE OF OPIATE ANALGESIC: ICD-10-CM

## 2023-08-23 DIAGNOSIS — I49.5 SICK SINUS SYNDROME: ICD-10-CM

## 2023-08-23 DIAGNOSIS — I12.9 HYPERTENSIVE CHRONIC KIDNEY DISEASE WITH STAGE 1 THROUGH STAGE 4 CHRONIC KIDNEY DISEASE, OR UNSPECIFIED CHRONIC KIDNEY DISEASE: ICD-10-CM

## 2023-08-23 DIAGNOSIS — I48.0 PAROXYSMAL ATRIAL FIBRILLATION: ICD-10-CM

## 2023-08-23 DIAGNOSIS — N18.9 CHRONIC KIDNEY DISEASE, UNSPECIFIED: ICD-10-CM

## 2023-08-23 DIAGNOSIS — I16.0 HYPERTENSIVE URGENCY: ICD-10-CM

## 2023-08-23 DIAGNOSIS — Z90.49 ACQUIRED ABSENCE OF OTHER SPECIFIED PARTS OF DIGESTIVE TRACT: ICD-10-CM

## 2023-08-23 DIAGNOSIS — Z86.79 PERSONAL HISTORY OF OTHER DISEASES OF THE CIRCULATORY SYSTEM: ICD-10-CM

## 2023-08-23 DIAGNOSIS — Z79.899 OTHER LONG TERM (CURRENT) DRUG THERAPY: ICD-10-CM

## 2023-08-23 DIAGNOSIS — M54.2 CERVICALGIA: ICD-10-CM

## 2023-08-23 DIAGNOSIS — I25.10 ATHEROSCLEROTIC HEART DISEASE OF NATIVE CORONARY ARTERY WITHOUT ANGINA PECTORIS: ICD-10-CM

## 2023-08-23 DIAGNOSIS — F32.A DEPRESSION, UNSPECIFIED: ICD-10-CM

## 2023-08-23 DIAGNOSIS — Z87.891 PERSONAL HISTORY OF NICOTINE DEPENDENCE: ICD-10-CM

## 2023-08-23 DIAGNOSIS — Z88.0 ALLERGY STATUS TO PENICILLIN: ICD-10-CM

## 2023-08-23 DIAGNOSIS — Z95.0 PRESENCE OF CARDIAC PACEMAKER: ICD-10-CM

## 2023-08-23 DIAGNOSIS — M54.9 DORSALGIA, UNSPECIFIED: ICD-10-CM

## 2023-08-23 DIAGNOSIS — Z98.1 ARTHRODESIS STATUS: ICD-10-CM

## 2023-08-23 DIAGNOSIS — E78.00 PURE HYPERCHOLESTEROLEMIA, UNSPECIFIED: ICD-10-CM

## 2023-08-23 DIAGNOSIS — Z96.642 PRESENCE OF LEFT ARTIFICIAL HIP JOINT: ICD-10-CM

## 2023-08-23 DIAGNOSIS — G89.29 OTHER CHRONIC PAIN: ICD-10-CM

## 2023-08-23 DIAGNOSIS — Z79.01 LONG TERM (CURRENT) USE OF ANTICOAGULANTS: ICD-10-CM

## 2023-08-25 ENCOUNTER — APPOINTMENT (OUTPATIENT)
Dept: HEART AND VASCULAR | Facility: CLINIC | Age: 73
End: 2023-08-25

## 2023-08-30 ENCOUNTER — APPOINTMENT (OUTPATIENT)
Dept: HEART AND VASCULAR | Facility: CLINIC | Age: 73
End: 2023-08-30
Payer: MEDICARE

## 2023-08-30 ENCOUNTER — NON-APPOINTMENT (OUTPATIENT)
Age: 73
End: 2023-08-30

## 2023-08-30 VITALS
WEIGHT: 200 LBS | BODY MASS INDEX: 29.62 KG/M2 | DIASTOLIC BLOOD PRESSURE: 81 MMHG | SYSTOLIC BLOOD PRESSURE: 146 MMHG | TEMPERATURE: 98 F | OXYGEN SATURATION: 98 % | HEART RATE: 62 BPM | HEIGHT: 69 IN

## 2023-08-30 VITALS — DIASTOLIC BLOOD PRESSURE: 92 MMHG | SYSTOLIC BLOOD PRESSURE: 150 MMHG

## 2023-08-30 DIAGNOSIS — I71.21 ANEURYSM OF THE ASCENDING AORTA, WITHOUT RUPTURE: ICD-10-CM

## 2023-08-30 DIAGNOSIS — I10 ESSENTIAL (PRIMARY) HYPERTENSION: ICD-10-CM

## 2023-08-30 PROCEDURE — 93000 ELECTROCARDIOGRAM COMPLETE: CPT

## 2023-08-30 PROCEDURE — 99214 OFFICE O/P EST MOD 30 MIN: CPT | Mod: 25

## 2023-08-30 RX ORDER — DICYCLOMINE HYDROCHLORIDE 20 MG/1
20 TABLET ORAL
Qty: 90 | Refills: 5 | Status: DISCONTINUED | COMMUNITY
Start: 2023-01-23 | End: 2023-08-30

## 2023-08-30 RX ORDER — METOPROLOL SUCCINATE 50 MG/1
50 TABLET, EXTENDED RELEASE ORAL DAILY
Qty: 1 | Refills: 0 | Status: DISCONTINUED | COMMUNITY
End: 2023-08-30

## 2023-08-30 RX ORDER — ALPRAZOLAM 0.5 MG/1
0.5 TABLET ORAL
Refills: 0 | Status: DISCONTINUED | COMMUNITY
End: 2023-08-30

## 2023-08-30 NOTE — PHYSICAL EXAM
[Normal Venous Pressure] : normal venous pressure [No Carotid Bruit] : no carotid bruit [Normal S1, S2] : normal S1, S2 [No Rub] : no rub [Normal] : clear lung fields, good air entry, no respiratory distress [Soft] : abdomen soft [Non Tender] : non-tender [Normal Bowel Sounds] : normal bowel sounds [No Edema] : no edema [No Rash] : no rash [Moves all extremities] : moves all extremities [Alert and Oriented] : alert and oriented [de-identified] : examined in wheelchair [de-identified] : wheelchair

## 2023-08-30 NOTE — DISCUSSION/SUMMARY
[FreeTextEntry1] : EKG:NSR,RBBB reviewed echo will change losartan to 25mg BID and adjust hydralazine for HPTN . continue eliquis for PAF;zocor for HLD meds refilled- they will call me with BP readings- His home bP runs higher than our readings will arrange for home 24h monitor

## 2023-08-30 NOTE — HISTORY OF PRESENT ILLNESS
[FreeTextEntry1] : was in [facility] for hPTN- had TTE hsi BP ahs been elevated at home he has dizzness and HA no cp/sob

## 2023-09-21 ENCOUNTER — APPOINTMENT (OUTPATIENT)
Dept: CARDIOLOGY | Facility: CLINIC | Age: 73
End: 2023-09-21

## 2023-10-05 ENCOUNTER — APPOINTMENT (OUTPATIENT)
Dept: CARDIOLOGY | Facility: CLINIC | Age: 73
End: 2023-10-05
Payer: MEDICARE

## 2023-10-05 DIAGNOSIS — R03.0 ELEVATED BLOOD-PRESSURE READING, W/OUT DIAGNOSIS OF HYPERTENSION: ICD-10-CM

## 2023-10-05 PROCEDURE — 93784 AMBL BP MNTR W/SOFTWARE: CPT

## 2023-10-09 PROBLEM — R03.0 WHITE COAT SYNDROME WITHOUT DIAGNOSIS OF HYPERTENSION: Status: ACTIVE | Noted: 2023-10-09

## 2023-11-01 ENCOUNTER — APPOINTMENT (OUTPATIENT)
Dept: HEART AND VASCULAR | Facility: CLINIC | Age: 73
End: 2023-11-01
Payer: MEDICARE

## 2023-11-01 ENCOUNTER — NON-APPOINTMENT (OUTPATIENT)
Age: 73
End: 2023-11-01

## 2023-11-01 VITALS
WEIGHT: 196 LBS | SYSTOLIC BLOOD PRESSURE: 111 MMHG | TEMPERATURE: 97.3 F | BODY MASS INDEX: 29.03 KG/M2 | DIASTOLIC BLOOD PRESSURE: 72 MMHG | HEART RATE: 81 BPM | HEIGHT: 69 IN | OXYGEN SATURATION: 97 %

## 2023-11-01 PROCEDURE — 99214 OFFICE O/P EST MOD 30 MIN: CPT | Mod: 25

## 2023-11-01 PROCEDURE — 93000 ELECTROCARDIOGRAM COMPLETE: CPT

## 2023-11-01 RX ORDER — PANTOPRAZOLE 40 MG/1
40 TABLET, DELAYED RELEASE ORAL
Qty: 90 | Refills: 0 | Status: DISCONTINUED | COMMUNITY
Start: 2023-06-01

## 2023-11-01 RX ORDER — CYCLOSPORINE 0.5 MG/ML
0.05 EMULSION OPHTHALMIC
Qty: 180 | Refills: 0 | Status: ACTIVE | COMMUNITY
Start: 2023-05-18

## 2023-11-01 RX ORDER — LOSARTAN POTASSIUM 50 MG/1
50 TABLET, FILM COATED ORAL
Qty: 90 | Refills: 0 | Status: DISCONTINUED | COMMUNITY
Start: 2023-08-07

## 2023-11-01 RX ORDER — MULTIVITAMIN
TABLET ORAL
Qty: 90 | Refills: 0 | Status: ACTIVE | COMMUNITY
Start: 2023-06-29

## 2023-11-01 RX ORDER — CHOLESTYRAMINE 4 G/9G
4 POWDER, FOR SUSPENSION ORAL
Qty: 60 | Refills: 0 | Status: ACTIVE | COMMUNITY
Start: 2023-10-25

## 2023-11-14 NOTE — OCCUPATIONAL THERAPY INITIAL EVALUATION ADULT - HEALTH SCREEN CRITERIA
I attest my time as attending is greater than 50% of the total combined time spent on qualifying patient care activities by the PA/NP and attending. I attest my time as attending is greater than 50% of the total combined time spent on qualifying patient care activities by the PA/NP and attending. I attest my time as attending is greater than 50% of the total combined time spent on qualifying patient care activities by the PA/NP and attending. I attest my time as attending is greater than 50% of the total combined time spent on qualifying patient care activities by the PA/NP and attending. I attest my time as attending is greater than 50% of the total combined time spent on qualifying patient care activities by the PA/NP and attending. I attest my time as attending is greater than 50% of the total combined time spent on qualifying patient care activities by the PA/NP and attending. I attest my time as attending is greater than 50% of the total combined time spent on qualifying patient care activities by the PA/NP and attending. I attest my time as attending is greater than 50% of the total combined time spent on qualifying patient care activities by the PA/NP and attending. I attest my time as attending is greater than 50% of the total combined time spent on qualifying patient care activities by the PA/NP and attending. yes
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
## 2023-12-08 ENCOUNTER — APPOINTMENT (OUTPATIENT)
Dept: PULMONOLOGY | Facility: CLINIC | Age: 73
End: 2023-12-08
Payer: MEDICARE

## 2023-12-08 VITALS
HEART RATE: 62 BPM | HEIGHT: 69 IN | OXYGEN SATURATION: 98 % | SYSTOLIC BLOOD PRESSURE: 163 MMHG | TEMPERATURE: 98.1 F | DIASTOLIC BLOOD PRESSURE: 97 MMHG | BODY MASS INDEX: 28.58 KG/M2 | RESPIRATION RATE: 18 BRPM | WEIGHT: 193 LBS

## 2023-12-08 PROCEDURE — 99213 OFFICE O/P EST LOW 20 MIN: CPT

## 2023-12-09 PROCEDURE — 95800 SLP STDY UNATTENDED: CPT | Mod: 52

## 2023-12-10 PROCEDURE — 95800 SLP STDY UNATTENDED: CPT

## 2023-12-13 ENCOUNTER — APPOINTMENT (OUTPATIENT)
Dept: HEART AND VASCULAR | Facility: CLINIC | Age: 73
End: 2023-12-13

## 2023-12-13 ENCOUNTER — APPOINTMENT (OUTPATIENT)
Dept: CARDIOLOGY | Facility: CLINIC | Age: 73
End: 2023-12-13

## 2023-12-29 ENCOUNTER — APPOINTMENT (OUTPATIENT)
Dept: PULMONOLOGY | Facility: CLINIC | Age: 73
End: 2023-12-29
Payer: MEDICARE

## 2023-12-29 DIAGNOSIS — G47.33 OBSTRUCTIVE SLEEP APNEA (ADULT) (PEDIATRIC): ICD-10-CM

## 2023-12-29 PROCEDURE — 99213 OFFICE O/P EST LOW 20 MIN: CPT | Mod: 95

## 2023-12-30 PROBLEM — G47.33 OSA (OBSTRUCTIVE SLEEP APNEA): Status: ACTIVE | Noted: 2023-12-08

## 2023-12-30 NOTE — ASSESSMENT
[FreeTextEntry1] : Degree of EDUIN is mild and unlikely to be a significant impact on patient's overall health considering his other comorbidities do not recommend specific treatment for sleep apnea unless there is a change in his overall condition

## 2023-12-30 NOTE — HISTORY OF PRESENT ILLNESS
[Home] : at home, [unfilled] , at the time of the visit. [Medical Office: ([facility])___] : at the medical office located in  [Spouse] : spouse [Verbal consent obtained from patient] : the patient, [unfilled] [TextBox_4] : Telehealth visit to review results of sleep study no change in history as provided

## 2024-01-01 NOTE — PATIENT PROFILE ADULT - FUNCTIONAL ASSESSMENT - DAILY ACTIVITY ASSESSMENT TYPE
Discharge instructions were given to the patient by MARGI WHITMAN RN.     The patient left the Emergency Department ambulatory, alert and oriented and in no acute distress with 2 prescriptions. The patient was encouraged to call or return to the ED for worsening issues or problems and was encouraged to schedule a follow up appointment for continuing care.     The patient verbalized understanding of discharge instructions and prescriptions, all questions were answered. The patient has no further concerns at this time.     
Admission

## 2024-02-21 RX ORDER — SIMVASTATIN 5 MG/1
5 TABLET, FILM COATED ORAL
Qty: 1 | Refills: 0 | Status: ACTIVE | COMMUNITY
Start: 1900-01-01 | End: 1900-01-01

## 2024-02-21 RX ORDER — HYDRALAZINE HYDROCHLORIDE 50 MG/1
50 TABLET ORAL TWICE DAILY
Qty: 180 | Refills: 1 | Status: ACTIVE | COMMUNITY
Start: 1900-01-01 | End: 1900-01-01

## 2024-03-05 ENCOUNTER — NON-APPOINTMENT (OUTPATIENT)
Age: 74
End: 2024-03-05

## 2024-03-05 ENCOUNTER — APPOINTMENT (OUTPATIENT)
Dept: HEART AND VASCULAR | Facility: CLINIC | Age: 74
End: 2024-03-05
Payer: MEDICARE

## 2024-03-05 VITALS
OXYGEN SATURATION: 96 % | TEMPERATURE: 97.4 F | HEIGHT: 69 IN | DIASTOLIC BLOOD PRESSURE: 88 MMHG | BODY MASS INDEX: 28.73 KG/M2 | HEART RATE: 80 BPM | WEIGHT: 194 LBS | SYSTOLIC BLOOD PRESSURE: 131 MMHG

## 2024-03-05 DIAGNOSIS — I10 ESSENTIAL (PRIMARY) HYPERTENSION: ICD-10-CM

## 2024-03-05 DIAGNOSIS — I48.0 PAROXYSMAL ATRIAL FIBRILLATION: ICD-10-CM

## 2024-03-05 DIAGNOSIS — I45.10 UNSPECIFIED RIGHT BUNDLE-BRANCH BLOCK: ICD-10-CM

## 2024-03-05 DIAGNOSIS — R94.31 ABNORMAL ELECTROCARDIOGRAM [ECG] [EKG]: ICD-10-CM

## 2024-03-05 DIAGNOSIS — I71.20 THORACIC AORTIC ANEURYSM, WITHOUT RUPTURE, UNSPECIFIED: ICD-10-CM

## 2024-03-05 DIAGNOSIS — I71.40 ABDOMINAL AORTIC ANEURYSM, WITHOUT RUPTURE, UNSPECIFIED: ICD-10-CM

## 2024-03-05 DIAGNOSIS — E78.5 HYPERLIPIDEMIA, UNSPECIFIED: ICD-10-CM

## 2024-03-05 PROCEDURE — 93000 ELECTROCARDIOGRAM COMPLETE: CPT

## 2024-03-05 PROCEDURE — 99214 OFFICE O/P EST MOD 30 MIN: CPT

## 2024-03-05 PROCEDURE — G2211 COMPLEX E/M VISIT ADD ON: CPT

## 2024-03-05 NOTE — REASON FOR VISIT
[Structural Heart and Valve Disease] : structural heart and valve disease [Hyperlipidemia] : hyperlipidemia [Hypertension] : hypertension [Spouse] : spouse

## 2024-03-05 NOTE — PHYSICAL EXAM
[No Carotid Bruit] : no carotid bruit [Normal Venous Pressure] : normal venous pressure [Normal S1, S2] : normal S1, S2 [No Rub] : no rub [Normal] : clear lung fields, good air entry, no respiratory distress [Soft] : abdomen soft [Non Tender] : non-tender [Normal Bowel Sounds] : normal bowel sounds [No Edema] : no edema [No Rash] : no rash [Moves all extremities] : moves all extremities [Alert and Oriented] : alert and oriented [de-identified] : soft systolic murmur [de-identified] : examined in wheelchair [de-identified] : Walker

## 2024-03-05 NOTE — DISCUSSION/SUMMARY
[FreeTextEntry1] : EKG:NSR continue Eliquis for PAF;zocor for HLD;losaartn + coreg for HPTN/aorta will get CTA of chest/abdomen/pelvis for f/u thoracic and abdominal aortic aneurysm

## 2024-03-25 NOTE — PHYSICAL THERAPY INITIAL EVALUATION ADULT - PHYSICAL ASSIST/NONPHYSICAL ASSIST: SIT/STAND, REHAB EVAL
Detail Level: Generalized Detail Level: Detailed verbal cues/nonverbal cues (demo/gestures)/2 person assist

## 2024-04-26 ENCOUNTER — NON-APPOINTMENT (OUTPATIENT)
Age: 74
End: 2024-04-26

## 2024-05-03 ENCOUNTER — APPOINTMENT (OUTPATIENT)
Dept: PULMONOLOGY | Facility: CLINIC | Age: 74
End: 2024-05-03
Payer: MEDICARE

## 2024-05-03 VITALS
OXYGEN SATURATION: 95 % | DIASTOLIC BLOOD PRESSURE: 74 MMHG | WEIGHT: 194 LBS | HEART RATE: 84 BPM | SYSTOLIC BLOOD PRESSURE: 117 MMHG | HEIGHT: 69 IN | BODY MASS INDEX: 28.73 KG/M2 | TEMPERATURE: 98.1 F

## 2024-05-03 DIAGNOSIS — R91.1 SOLITARY PULMONARY NODULE: ICD-10-CM

## 2024-05-03 DIAGNOSIS — I42.9 CARDIOMYOPATHY, UNSPECIFIED: ICD-10-CM

## 2024-05-03 PROCEDURE — 36415 COLL VENOUS BLD VENIPUNCTURE: CPT

## 2024-05-03 PROCEDURE — 99214 OFFICE O/P EST MOD 30 MIN: CPT | Mod: 25

## 2024-05-04 LAB
ALBUMIN SERPL ELPH-MCNC: 4.2 G/DL
ALP BLD-CCNC: 82 U/L
ALT SERPL-CCNC: 19 U/L
ANION GAP SERPL CALC-SCNC: 11 MMOL/L
AST SERPL-CCNC: 30 U/L
BASOPHILS # BLD AUTO: 0.06 K/UL
BASOPHILS NFR BLD AUTO: 0.6 %
BILIRUB SERPL-MCNC: 0.5 MG/DL
BUN SERPL-MCNC: 15 MG/DL
CALCIUM SERPL-MCNC: 9.5 MG/DL
CHLORIDE SERPL-SCNC: 96 MMOL/L
CO2 SERPL-SCNC: 27 MMOL/L
CREAT SERPL-MCNC: 1.37 MG/DL
EGFR: 54 ML/MIN/1.73M2
EOSINOPHIL # BLD AUTO: 0.22 K/UL
EOSINOPHIL NFR BLD AUTO: 2 %
GLUCOSE SERPL-MCNC: 111 MG/DL
HCT VFR BLD CALC: 36.8 %
HGB BLD-MCNC: 11.6 G/DL
IMM GRANULOCYTES NFR BLD AUTO: 0.3 %
LYMPHOCYTES # BLD AUTO: 0.86 K/UL
LYMPHOCYTES NFR BLD AUTO: 7.9 %
MAN DIFF?: NORMAL
MCHC RBC-ENTMCNC: 30.5 PG
MCHC RBC-ENTMCNC: 31.5 GM/DL
MCV RBC AUTO: 96.8 FL
MONOCYTES # BLD AUTO: 0.91 K/UL
MONOCYTES NFR BLD AUTO: 8.4 %
NEUTROPHILS # BLD AUTO: 8.74 K/UL
NEUTROPHILS NFR BLD AUTO: 80.8 %
PLATELET # BLD AUTO: 284 K/UL
POTASSIUM SERPL-SCNC: 4.2 MMOL/L
PROT SERPL-MCNC: 7.8 G/DL
RBC # BLD: 3.8 M/UL
RBC # FLD: 14.6 %
SODIUM SERPL-SCNC: 134 MMOL/L
WBC # FLD AUTO: 10.82 K/UL

## 2024-05-04 NOTE — PHYSICAL EXAM
[Normal Oropharynx] : normal oropharynx [Normal Appearance] : normal appearance [No Neck Mass] : no neck mass [Normal Rate/Rhythm] : normal rate/rhythm [Normal S1, S2] : normal s1, s2 [No Murmurs] : no murmurs [No Resp Distress] : no resp distress [Clear to Auscultation Bilaterally] : clear to auscultation bilaterally [No Abnormalities] : no abnormalities [Benign] : benign [Normal Gait] : normal gait [No Clubbing] : no clubbing [No Cyanosis] : no cyanosis [No Edema] : no edema [FROM] : FROM [Normal Color/ Pigmentation] : normal color/ pigmentation [No Focal Deficits] : no focal deficits [Oriented x3] : oriented x3 [Normal Affect] : normal affect [TextBox_2] : Chronically ill-appearing male

## 2024-05-04 NOTE — PROCEDURE
[FreeTextEntry1] : CT chest reviewed There is a new 1 cm nodular opacity in the Right upper lobe in an area surrounded by emphysematous and bullous changes

## 2024-05-04 NOTE — HISTORY OF PRESENT ILLNESS
[TextBox_4] : Patient here for evaluation of abnormal finding on chest CT.  He underwent imaging for evaluation of endovascular aortic aneurysm stent graft repair.  An incidental lung nodule was noted new compared to prior.  In the interim he has developed an acute GI illness with nausea and diarrhea.  He has follow-up with gastroenterology scheduled.

## 2024-05-04 NOTE — ASSESSMENT
[FreeTextEntry1] : New right upper lobe nodule in the area surrounded by emphysema.  Biopsy will be challenging and has high risk for pneumothorax.He is not a surgical candidate because of multiple reasons including poor overall health status as well as severe emphysema as well as underlying heart disease.  He might be a candidate for stereotactic radiosurgery. I did explain to the patient's family the difficulty in obtaining a biopsy in this situation. Will refer for PET scan for further assessment.  In the interim he does appear to have an acute diarrhea illness.  Labs were obtained and patient will be referred for hospitalization if appropriate

## 2024-05-06 ENCOUNTER — APPOINTMENT (OUTPATIENT)
Dept: GASTROENTEROLOGY | Facility: CLINIC | Age: 74
End: 2024-05-06
Payer: MEDICARE

## 2024-05-06 VITALS
DIASTOLIC BLOOD PRESSURE: 90 MMHG | TEMPERATURE: 97.3 F | BODY MASS INDEX: 28.14 KG/M2 | WEIGHT: 190 LBS | HEIGHT: 69 IN | SYSTOLIC BLOOD PRESSURE: 160 MMHG

## 2024-05-06 DIAGNOSIS — R19.4 CHANGE IN BOWEL HABIT: ICD-10-CM

## 2024-05-06 DIAGNOSIS — K21.9 GASTRO-ESOPHAGEAL REFLUX DISEASE W/OUT ESOPHAGITIS: ICD-10-CM

## 2024-05-06 DIAGNOSIS — R19.7 DIARRHEA, UNSPECIFIED: ICD-10-CM

## 2024-05-06 DIAGNOSIS — R10.32 LEFT LOWER QUADRANT PAIN: ICD-10-CM

## 2024-05-06 DIAGNOSIS — K58.9 IRRITABLE BOWEL SYNDROME W/OUT DIARRHEA: ICD-10-CM

## 2024-05-06 PROCEDURE — 99214 OFFICE O/P EST MOD 30 MIN: CPT

## 2024-05-06 RX ORDER — POLYETHYLENE GLYCOL 3350 17 G
POWDER IN PACKET (EA) ORAL
Refills: 0 | Status: ACTIVE | COMMUNITY

## 2024-05-06 RX ORDER — CHROMIUM 200 MCG
TABLET ORAL
Refills: 0 | Status: ACTIVE | COMMUNITY

## 2024-05-06 NOTE — ASSESSMENT
[FreeTextEntry1] : Impression: Most likely had an episode of infectious diarrhea last week now resolved.  Patient paradoxically takes cholestyramine and MiraLAX every day.  Seepage/incontinence most likely due to decreased anorectal sensation and sphincter tone as a result of chronic back issues.  Mild anemia of chronic disease stable.  Plan: Recommend holding MiraLAX for the time being.  May continue cholestyramine.  If becomes constipated take 2 Senokot every 2 or 3 days to maintain regularity.  The hope is that stool become firmer and less prone to seepage.  If constipation becomes chronic will DC cholestyramine.  Send stool for C. difficile and pancreatic elastase.

## 2024-05-06 NOTE — PHYSICAL EXAM
[Normal] : heart rate was normal and rhythm regular, normal S1 and S2, no murmurs [Bowel Sounds] : normal bowel sounds [Abdomen Tenderness] : non-tender [No Masses] : no abdominal mass palpated [Abdomen Soft] : soft [] : no hepatosplenomegaly [de-identified] : Patient in moderate distress due to chronic pain [de-identified] : Decree sphincter tone.  No stool in vault.

## 2024-05-06 NOTE — HISTORY OF PRESENT ILLNESS
[FreeTextEntry1] : Patient has been taking cholestyramine once daily for chronic diarrhea since cholecystectomy last year.  Also taking MiraLAX daily given history of constipation secondary to chronic opiate use.  Last week had severe diarrhea with incontinence.  Took Imodium for several days, held MiraLAX, now back on MiraLAX stools been pasty with some seepage/leakage but no actual bowel movement for the past 2 days.  Prior to cholecystectomy have been chronically constipated secondary to opiates.  Had failed Amitiza and Linzess in the past but MiraLAX have been working fairly well.  Has history of fecal impaction.  CAT scan done 2 weeks ago by pulmonologist showed a new pulmonary nodule but no intra-abdominal pathology.  PET scan to be done for further evaluation.

## 2024-05-08 ENCOUNTER — NON-APPOINTMENT (OUTPATIENT)
Age: 74
End: 2024-05-08

## 2024-05-15 ENCOUNTER — APPOINTMENT (OUTPATIENT)
Dept: VASCULAR SURGERY | Facility: CLINIC | Age: 74
End: 2024-05-15

## 2024-05-15 ENCOUNTER — TRANSCRIPTION ENCOUNTER (OUTPATIENT)
Age: 74
End: 2024-05-15

## 2024-05-15 LAB — CDIFF BY PCR: NOT DETECTED

## 2024-05-20 ENCOUNTER — NON-APPOINTMENT (OUTPATIENT)
Age: 74
End: 2024-05-20

## 2024-05-20 ENCOUNTER — TRANSCRIPTION ENCOUNTER (OUTPATIENT)
Age: 74
End: 2024-05-20

## 2024-05-20 LAB — PANCREATIC ELASTASE, FECAL: 101 CD:794062645

## 2024-05-20 RX ORDER — PANCRELIPASE 36000; 180000; 114000 [USP'U]/1; [USP'U]/1; [USP'U]/1
36000-114000 CAPSULE, DELAYED RELEASE PELLETS ORAL
Qty: 240 | Refills: 5 | Status: ACTIVE | COMMUNITY
Start: 2024-05-20 | End: 1900-01-01

## 2024-05-28 ENCOUNTER — APPOINTMENT (OUTPATIENT)
Dept: GASTROENTEROLOGY | Facility: CLINIC | Age: 74
End: 2024-05-28

## 2024-06-24 RX ORDER — APIXABAN 5 MG/1
5 TABLET, FILM COATED ORAL
Qty: 180 | Refills: 1 | Status: ACTIVE | COMMUNITY
Start: 2021-01-21 | End: 1900-01-01

## 2024-06-24 RX ORDER — LOSARTAN POTASSIUM 25 MG/1
25 TABLET, FILM COATED ORAL
Qty: 270 | Refills: 1 | Status: ACTIVE | COMMUNITY
Start: 1900-01-01 | End: 1900-01-01

## 2024-07-19 DIAGNOSIS — R93.89 ABNORMAL FINDINGS ON DIAGNOSTIC IMAGING OF OTHER SPECIFIED BODY STRUCTURES: ICD-10-CM

## 2024-07-29 ENCOUNTER — RX RENEWAL (OUTPATIENT)
Age: 74
End: 2024-07-29

## 2024-07-29 NOTE — DIETITIAN INITIAL EVALUATION ADULT. - EST PROTEIN NEEDS6
Patient states he brought in his transponder and data was downloaded. Patient has a copy and was told to attach it to a [employer] message. Patient states he is unsure how to do that and if we can get from [facility] that would be easier. Awaiting callback from [facility]   73.92

## 2024-08-06 NOTE — ED PROVIDER NOTE - MDM ORDERS SUBMITTED SELECTION
Is out so needs samples till mail order comes.thru xeralto 20mg  (also put on refill order since wasn't sure if separate msg needed)     Labs/EKG/Imaging Studies/Medications

## 2024-09-04 NOTE — ED ADULT NURSE NOTE - NSFALLOOBATTEMPT_ED_ALL_ED
[Patient] : patient [Parents] : parents [Follow Up] : a follow up visit [Family Member] : family member [FreeTextEntry1] : scoliosis No

## 2024-09-13 ENCOUNTER — APPOINTMENT (OUTPATIENT)
Dept: PULMONOLOGY | Facility: CLINIC | Age: 74
End: 2024-09-13
Payer: MEDICARE

## 2024-09-13 ENCOUNTER — APPOINTMENT (OUTPATIENT)
Dept: HEART AND VASCULAR | Facility: CLINIC | Age: 74
End: 2024-09-13
Payer: MEDICARE

## 2024-09-13 ENCOUNTER — NON-APPOINTMENT (OUTPATIENT)
Age: 74
End: 2024-09-13

## 2024-09-13 ENCOUNTER — APPOINTMENT (OUTPATIENT)
Dept: PULMONOLOGY | Facility: CLINIC | Age: 74
End: 2024-09-13

## 2024-09-13 ENCOUNTER — APPOINTMENT (OUTPATIENT)
Dept: CARDIOLOGY | Facility: CLINIC | Age: 74
End: 2024-09-13
Payer: MEDICARE

## 2024-09-13 VITALS — DIASTOLIC BLOOD PRESSURE: 90 MMHG | SYSTOLIC BLOOD PRESSURE: 160 MMHG

## 2024-09-13 VITALS
SYSTOLIC BLOOD PRESSURE: 195 MMHG | HEIGHT: 69 IN | WEIGHT: 190 LBS | TEMPERATURE: 97.8 F | HEART RATE: 69 BPM | BODY MASS INDEX: 28.14 KG/M2 | DIASTOLIC BLOOD PRESSURE: 86 MMHG | OXYGEN SATURATION: 97 %

## 2024-09-13 DIAGNOSIS — I71.20 THORACIC AORTIC ANEURYSM, WITHOUT RUPTURE, UNSPECIFIED: ICD-10-CM

## 2024-09-13 DIAGNOSIS — I48.0 PAROXYSMAL ATRIAL FIBRILLATION: ICD-10-CM

## 2024-09-13 DIAGNOSIS — E78.5 HYPERLIPIDEMIA, UNSPECIFIED: ICD-10-CM

## 2024-09-13 DIAGNOSIS — I45.10 UNSPECIFIED RIGHT BUNDLE-BRANCH BLOCK: ICD-10-CM

## 2024-09-13 DIAGNOSIS — I10 ESSENTIAL (PRIMARY) HYPERTENSION: ICD-10-CM

## 2024-09-13 DIAGNOSIS — I44.0 ATRIOVENTRICULAR BLOCK, FIRST DEGREE: ICD-10-CM

## 2024-09-13 DIAGNOSIS — R91.1 SOLITARY PULMONARY NODULE: ICD-10-CM

## 2024-09-13 DIAGNOSIS — I35.1 NONRHEUMATIC AORTIC (VALVE) INSUFFICIENCY: ICD-10-CM

## 2024-09-13 PROCEDURE — 99214 OFFICE O/P EST MOD 30 MIN: CPT | Mod: 25

## 2024-09-13 PROCEDURE — 93000 ELECTROCARDIOGRAM COMPLETE: CPT

## 2024-09-13 PROCEDURE — 99213 OFFICE O/P EST LOW 20 MIN: CPT

## 2024-09-13 PROCEDURE — 93306 TTE W/DOPPLER COMPLETE: CPT

## 2024-09-13 NOTE — DISCUSSION/SUMMARY
[FreeTextEntry1] : EKG:NSR,First degree AVB,RBBB TTE: normal LVEF,mild AR/TR- no pulmonary HPTN revied recent CT- aorta 4.3 cm BP not at goal-in  alot of pain asked them to monitor at home and they will be seeing PCP in 2 weeks continue hydralazine +Losartan + Coreg for HPTN/aorta; zocor for HLD due for physical soon

## 2024-09-13 NOTE — HISTORY OF PRESENT ILLNESS
[TextBox_4] : Telehealth follow-up for abnormal chest CT no respiratory complaints at this time denies cough congestion wheezing or shortness of breath

## 2024-09-13 NOTE — REASON FOR VISIT
[Home] : at home, [unfilled] , at the time of the visit. [Medical Office: ([facility])___] : at the medical office located in  [Spouse] : spouse

## 2024-09-13 NOTE — PHYSICAL EXAM
[Normal Venous Pressure] : normal venous pressure [No Carotid Bruit] : no carotid bruit [Normal S1, S2] : normal S1, S2 [No Rub] : no rub [Normal] : clear lung fields, good air entry, no respiratory distress [Soft] : abdomen soft [Non Tender] : non-tender [Normal Bowel Sounds] : normal bowel sounds [No Edema] : no edema [No Rash] : no rash [Moves all extremities] : moves all extremities [Alert and Oriented] : alert and oriented [de-identified] : examined in wheelchair [de-identified] : soft systolic murmur [de-identified] : Walker

## 2025-05-06 NOTE — PATIENT PROFILE ADULT - NSPRESCRALCSCORE_GEN_A_NUR_CAL
PRE-SEDATION ASSESSMENT  5/6/2025    CONSENT  Consent for procedure and sedation obtained: Yes    MEDICAL HISTORY  Possible Pregnancy (LMP): No    PHYSICAL EXAM  History and Physical Reviewed: Patient has valid H&P within 30 days. I have reviewed and there are no changes.  Airway Risk History: No previous complications  Airway Anatomy : Class II  Heart : Normal  Lungs : Normal  LOC/Mental Status : Normal    OTHER FINDINGS  Reviewed current medications and allergies: Yes  Pertinent lab/diagnostic test reviewed: Yes    SEDATION RISK ASSESSMENT  Risk Status ASA: Class II   Plan for Sedation: Moderate Sedation  EKG Monitoring: Yes    NARRATIVE FINDINGS     The plan is for this patient to receive :     Moderate sedation consisting of benzodiazepine and or opioid .  It is medically necessary that this patient requires it due to the patient's anxiety level, inability to tolerate pain discomfort and previous negative experience with needle injections.  The patient is suitable to undertake the medication.  The risks and benefits as well as alternative options have been discussed with the patient/decision-maker.    Antianxiety medication consisting of  benzodiazepine .  It is medically necessary that this patient requires it due to the patient's anxiety level, inability to tolerate pain discomfort and previous negative experience with needle injections.  The patient is suitable to undertake the medication.  The risks and benefits as well as alternative options have been discussed with the patient/decision-maker.    Rio Morgan MD  12:16 PM  05/06/25     1

## (undated) DEVICE — DRAPE IOBAN 23" X 23"

## (undated) DEVICE — DRSG AQUACEL 3.5 X 10"

## (undated) DEVICE — INSUFFLATION NDL COVIDIEN SURGINEEDLE VERESS 120MM

## (undated) DEVICE — DRAPE ULTRASOUND PROBE COVER NEOGAURD LATEX FREE

## (undated) DEVICE — SYR LUER LOK 10CC

## (undated) DEVICE — SUT VICRYL 2-0 18" CT-1 UNDYED (POP-OFF)

## (undated) DEVICE — SUT VICRYL 0 27" UR-5

## (undated) DEVICE — PACK GENERAL MINOR

## (undated) DEVICE — DRAPE C ARM C-ARMOUR

## (undated) DEVICE — DRSG DERMABOND 0.7ML

## (undated) DEVICE — ELCTR GROUNDING PAD ADULT COVIDIEN

## (undated) DEVICE — TROCAR COVIDIEN VERSAPORT BLADELESS OPTICAL 12MM STANDARD

## (undated) DEVICE — STAPLER SKIN PROXIMATE

## (undated) DEVICE — SYR LOR GLASS LUER SLIP 5CC

## (undated) DEVICE — SUT VICRYL 3-0 18" SH (POP-OFF)

## (undated) DEVICE — DRAPE TOP SHEET 53" X 101"

## (undated) DEVICE — XI DRAPE ARM

## (undated) DEVICE — D HELP - CLEARVIEW CLEARIFY SYSTEM

## (undated) DEVICE — TRAY ANES SPINAL EPI COMBO W DRUGS

## (undated) DEVICE — DRAPE SPLIT SHEET 77" X 108"

## (undated) DEVICE — SUT ETHIBOND 2-0 30" RB-1

## (undated) DEVICE — DRAPE C ARM 41X74"

## (undated) DEVICE — SYR LUER LOK 3CC

## (undated) DEVICE — WARMING BLANKET FULL UNDERBODY

## (undated) DEVICE — INZII RETRIEVAL SYSTEM 5MM

## (undated) DEVICE — XI VESSEL SEALER

## (undated) DEVICE — SUT ETHIBOND 2-0 30" CT-1

## (undated) DEVICE — GLV 7.5 PROTEXIS (WHITE)

## (undated) DEVICE — PACK PROC UNIV MAYO STAND 29118

## (undated) DEVICE — XI ENDOWRIST SUCTION IRRIGATOR 8MM

## (undated) DEVICE — XI DRAPE COLUMN

## (undated) DEVICE — SUT VICRYL 3-0 18" SH UNDYED (POP-OFF)

## (undated) DEVICE — GLV 6.5 PROTEXIS (BLUE)

## (undated) DEVICE — DRAPE IOBAN 33" X 23"

## (undated) DEVICE — VENODYNE/SCD SLEEVE CALF MEDIUM

## (undated) DEVICE — ENDOCATCH 10MM SPECIMEN POUCH

## (undated) DEVICE — DRAPE IOBAN 13" X 13"

## (undated) DEVICE — PACK GENERAL LAPAROSCOPY

## (undated) DEVICE — XI SEAL UNIV 5- 8 MM

## (undated) DEVICE — GLV 6.5 PROTEXIS (WHITE)

## (undated) DEVICE — DRSG TEGADERM 4X10"

## (undated) DEVICE — GLV 7.5 DERMAPRENE ULTRA

## (undated) DEVICE — REFIL TY PMP SYNCHROMED

## (undated) DEVICE — SPECIMEN CONTAINER 100ML

## (undated) DEVICE — PREP CHLORAPREP HI-LITE ORANGE 26ML

## (undated) DEVICE — SUT SILK 2-0 30" PSL

## (undated) DEVICE — TUBING STRYKER PNEUMOCLEAR HIGH FLOW

## (undated) DEVICE — SUT VICRYL 0 27" UR-6

## (undated) DEVICE — TROCAR COVIDIEN VISIPORT PLUS 5MM-12MM WITH FIXATION CANNULA

## (undated) DEVICE — TROCAR COVIDIEN VERSAPORT BLADELESS OPTICAL 5MM STANDARD

## (undated) DEVICE — XI TIP COVER

## (undated) DEVICE — SUT MONOCRYL 4-0 27" PS-2 UNDYED

## (undated) DEVICE — XI OBTURATOR OPTICAL BLADELESS 8MM

## (undated) DEVICE — MARKING PEN W RULER

## (undated) DEVICE — SLV COMPRESSION KNEE MED

## (undated) DEVICE — SUT MONOCRYL 3-0 18" PS-1

## (undated) DEVICE — SUT ETHIBOND EXCEL 2-0 30"